# Patient Record
Sex: FEMALE | Race: WHITE | NOT HISPANIC OR LATINO | Employment: OTHER | URBAN - METROPOLITAN AREA
[De-identification: names, ages, dates, MRNs, and addresses within clinical notes are randomized per-mention and may not be internally consistent; named-entity substitution may affect disease eponyms.]

---

## 2017-01-04 ENCOUNTER — DOCUMENTATION (OUTPATIENT)
Dept: BARIATRICS/WEIGHT MGMT | Facility: HOSPITAL | Age: 62
End: 2017-01-04

## 2017-01-04 NOTE — PROGRESS NOTES
Weight Loss Surgery  Presurgical Nutrition Assessment     Jessica Flowers  12/[birth year]16  [id number]  [id number]  1955  female    Surgery desired: Sleeve Gastrectomy    Vital Signs:  Weight: 242 lb 8 oz (110 kg)   Body mass index is 45.82 kg/(m^2).  Past Medical History   Diagnosis Date   • Allergic rhinitis      uses inhalers prn, denies asthma   • Anxiety    • Carpal tunnel syndrome    • Depression    • Dyspnea on exertion    • Fatigue    • GERD (gastroesophageal reflux disease)      on Prilosec + BID Zantac   • Glucose intolerance (impaired glucose tolerance)    • Hx MRSA infection      2010 on abdomen, tx w/ Bactrim   • Hyperlipidemia    • Hypertension    • Hypothyroid    • Insomnia      uses Trazodone   • Joint pain    • Morbid obesity    • OAB (overactive bladder)      tx w/ botox injections   • Plantar fasciitis    • Postmenopausal HRT (hormone replacement therapy)    • Psoriasis    • Vitamin D deficiency      Past Surgical History   Procedure Laterality Date   • Appendectomy  1989     emergent, open   • Laparoscopic tubal ligation  1988   • Dilatation and curettage  1990, 2015   • Carpal tunnel release       (B)   • Cholecystectomy open  1980     gallstone   • Other surgical history       denies anesthesia issues     No Known Allergies    Current Outpatient Prescriptions:   •  albuterol (PROVENTIL HFA;VENTOLIN HFA) 108 (90 BASE) MCG/ACT inhaler, Inhale 2 puffs Every 4 (Four) Hours As Needed for wheezing., Disp: , Rfl:   •  benazepril (LOTENSIN) 20 MG tablet, Take 20 mg by mouth Daily., Disp: , Rfl:   •  estradiol (ESTRACE) 1 MG tablet, Take 1 mg by mouth Daily., Disp: , Rfl:   •  FLUoxetine (PROzac) 20 MG capsule, Take 20 mg by mouth Daily., Disp: , Rfl:   •  gabapentin (NEURONTIN) 800 MG tablet, 800 mg 4 (Four) Times a Day., Disp: , Rfl: 0  •  HYDROcodone-acetaminophen (NORCO) 7.5-325 MG per tablet, take 1 tablet by mouth every 6 hours if needed for pain, Disp: , Rfl: 0  •  ibuprofen (ADVIL,MOTRIN) 800 MG  tablet, Take 800 mg by mouth Every 6 (Six) Hours As Needed for mild pain (1-3)., Disp: , Rfl:   •  ipratropium (ATROVENT) 0.03 % nasal spray, 2 sprays into each nostril Every 12 (Twelve) Hours., Disp: , Rfl:   •  levothyroxine (SYNTHROID, LEVOTHROID) 100 MCG tablet, Take 100 mcg by mouth Daily., Disp: , Rfl:   •  medroxyPROGESTERone (PROVERA) 10 MG tablet, Take 10 mg by mouth Daily., Disp: , Rfl:   •  meloxicam (MOBIC) 15 MG tablet, Take 15 mg by mouth Daily., Disp: , Rfl:   •  montelukast (SINGULAIR) 10 MG tablet, Take 10 mg by mouth Every Night., Disp: , Rfl:   •  omeprazole (priLOSEC) 40 MG capsule, Take 40 mg by mouth Daily., Disp: , Rfl:   •  phentermine (ADIPEX-P) 37.5 MG tablet, take 1 tablet by mouth once daily, Disp: , Rfl: 0  •  pravastatin (PRAVACHOL) 40 MG tablet, Take 40 mg by mouth Daily., Disp: , Rfl:   •  RA VITAMIN D-3 5000 UNITS capsule, 5,000 Units Daily., Disp: , Rfl: 0  •  raNITIdine (ZANTAC) 150 MG tablet, take 1 tablet by mouth twice a day, Disp: , Rfl: 0  •  traMADol (ULTRAM) 50 MG tablet, 50 mg 2 (Two) Times a Day., Disp: , Rfl: 0  •  traZODone (DESYREL) 100 MG tablet, Take 100 mg by mouth Every Night., Disp: , Rfl:       Nutrition Assessment    Estimated energy needs: 1800    Estimated calories for weight loss: 1400    IBW:  165      Excess body weight: 77       Nutrition Recall  24 Hour recall: (B) (L) (D) -  Reviewed and discussed with patient     Exercise  Unable to exercise due to knee pain.      Education    Provided manual:    Sleeve Gastrectomy    Recommend that team proceed with surgery and follow per protocol.      Nutrition Goals   Dietary Guidelines per manual  Protein goal:  grams per day     Exercise Goals  Add 15-30 minutes of activity per day as tolerated    Discussion:        Dayami Russell RD  01/04/2017  10:06 AM

## 2017-01-17 DIAGNOSIS — R06.09 DYSPNEA ON EXERTION: ICD-10-CM

## 2017-01-17 DIAGNOSIS — R53.83 FATIGUE, UNSPECIFIED TYPE: ICD-10-CM

## 2017-01-18 ENCOUNTER — HOSPITAL ENCOUNTER (OUTPATIENT)
Dept: OTHER | Age: 62
Discharge: HOME | End: 2017-01-18

## 2017-01-18 LAB
A/G RATIO: 1.6 (ref 0.8–2)
ALBUMIN SERPL-MCNC: 4 G/DL (ref 3.4–4.8)
ALP BLD-CCNC: 49 U/L (ref 25–100)
ALT SERPL-CCNC: 20 U/L (ref 4–36)
ANION GAP SERPL CALCULATED.3IONS-SCNC: 13 MMOL/L (ref 3–16)
AST SERPL-CCNC: 16 U/L (ref 8–33)
BILIRUB SERPL-MCNC: 0.5 MG/DL (ref 0.3–1.2)
BUN BLDV-MCNC: 24 MG/DL (ref 6–20)
CALCIUM SERPL-MCNC: 9.3 MG/DL (ref 8.5–10.5)
CHLORIDE BLD-SCNC: 105 MMOL/L (ref 98–107)
CO2: 24 MMOL/L (ref 20–30)
CREAT SERPL-MCNC: 0.8 MG/DL (ref 0.4–1.2)
GFR AFRICAN AMERICAN: >59
GFR NON-AFRICAN AMERICAN: >60
GLOBULIN: 2.5 G/DL
GLUCOSE BLD-MCNC: 114 MG/DL (ref 74–106)
HCT VFR BLD CALC: 39.6 % (ref 37–47)
HEMOGLOBIN: 12.8 G/DL (ref 11.5–16.5)
MCH RBC QN AUTO: 30.6 PG (ref 27–32)
MCHC RBC AUTO-ENTMCNC: 32.3 G/DL (ref 31–35)
MCV RBC AUTO: 94.7 FL (ref 80–100)
PDW BLD-RTO: 12.9 % (ref 11–16)
PLATELET # BLD: 296 K/UL (ref 150–400)
PMV BLD AUTO: 10.8 FL (ref 6–10)
POTASSIUM SERPL-SCNC: 4.5 MMOL/L (ref 3.4–5.1)
RBC # BLD: 4.18 M/UL (ref 3.8–5.8)
SODIUM BLD-SCNC: 142 MMOL/L (ref 136–145)
TOTAL PROTEIN: 6.5 G/DL (ref 6.4–8.3)
WBC # BLD: 6.8 K/UL (ref 4–11)

## 2017-01-23 PROCEDURE — 88305 TISSUE EXAM BY PATHOLOGIST: CPT | Performed by: SURGERY

## 2017-01-24 ENCOUNTER — LAB REQUISITION (OUTPATIENT)
Dept: LAB | Facility: HOSPITAL | Age: 62
End: 2017-01-24

## 2017-01-24 DIAGNOSIS — R06.00 DYSPNEA: ICD-10-CM

## 2017-01-25 LAB
CYTO UR: NORMAL
LAB AP CASE REPORT: NORMAL
LAB AP CLINICAL INFORMATION: NORMAL
Lab: NORMAL
PATH REPORT.FINAL DX SPEC: NORMAL
PATH REPORT.GROSS SPEC: NORMAL

## 2017-03-16 ENCOUNTER — HOSPITAL ENCOUNTER (OUTPATIENT)
Dept: OTHER | Age: 62
Discharge: HOME | End: 2017-03-16
Attending: UROLOGY | Admitting: UROLOGY

## 2017-03-16 DIAGNOSIS — N32.81 OVERACTIVE BLADDER: ICD-10-CM

## 2017-03-16 LAB
A/G RATIO: 1.9 (ref 0.8–2)
ALBUMIN SERPL-MCNC: 4.4 G/DL (ref 3.4–4.8)
ALP BLD-CCNC: 55 U/L (ref 25–100)
ALT SERPL-CCNC: 15 U/L (ref 4–36)
ANION GAP SERPL CALCULATED.3IONS-SCNC: 13 MMOL/L (ref 3–16)
AST SERPL-CCNC: 13 U/L (ref 8–33)
BILIRUB SERPL-MCNC: 0.6 MG/DL (ref 0.3–1.2)
BILIRUBIN URINE: NEGATIVE
BLOOD, URINE: NEGATIVE
BUN BLDV-MCNC: 22 MG/DL (ref 6–20)
CALCIUM SERPL-MCNC: 9.6 MG/DL (ref 8.5–10.5)
CHLORIDE BLD-SCNC: 103 MMOL/L (ref 98–107)
CLARITY: CLEAR
CO2: 26 MMOL/L (ref 20–30)
COLOR: YELLOW
CREAT SERPL-MCNC: 0.8 MG/DL (ref 0.4–1.2)
GFR AFRICAN AMERICAN: >59
GFR NON-AFRICAN AMERICAN: >60
GLOBULIN: 2.3 G/DL
GLUCOSE BLD-MCNC: 110 MG/DL (ref 74–106)
GLUCOSE URINE: NEGATIVE MG/DL
KETONES, URINE: NEGATIVE MG/DL
LEUKOCYTE ESTERASE, URINE: NEGATIVE
MICROSCOPIC EXAMINATION: NORMAL
NITRITE, URINE: NEGATIVE
PH UA: 5
POTASSIUM SERPL-SCNC: 4.5 MMOL/L (ref 3.4–5.1)
PROTEIN UA: NEGATIVE MG/DL
SODIUM BLD-SCNC: 142 MMOL/L (ref 136–145)
SPECIFIC GRAVITY UA: >=1.03
TOTAL PROTEIN: 6.7 G/DL (ref 6.4–8.3)
URINE TYPE: NORMAL
UROBILINOGEN, URINE: 0.2 E.U./DL

## 2017-03-18 LAB — URINE CULTURE, ROUTINE: NORMAL

## 2017-05-19 DIAGNOSIS — R10.13 DYSPEPSIA: ICD-10-CM

## 2017-05-19 DIAGNOSIS — R06.00 DYSPNEA, UNSPECIFIED TYPE: Primary | ICD-10-CM

## 2017-05-19 DIAGNOSIS — R53.83 FATIGUE, UNSPECIFIED TYPE: ICD-10-CM

## 2017-05-25 ENCOUNTER — HOSPITAL ENCOUNTER (OUTPATIENT)
Dept: OTHER | Age: 62
Discharge: HOME | End: 2017-05-25

## 2017-05-25 DIAGNOSIS — R06.03 ACUTE RESPIRATORY DISTRESS: ICD-10-CM

## 2017-05-25 LAB
A/G RATIO: 1.9 (ref 0.8–2)
ALBUMIN SERPL-MCNC: 4.2 G/DL (ref 3.4–4.8)
ALP BLD-CCNC: 53 U/L (ref 25–100)
ALT SERPL-CCNC: 12 U/L (ref 4–36)
ANION GAP SERPL CALCULATED.3IONS-SCNC: 14 MMOL/L (ref 3–16)
AST SERPL-CCNC: 11 U/L (ref 8–33)
BILIRUB SERPL-MCNC: 0.7 MG/DL (ref 0.3–1.2)
BUN BLDV-MCNC: 21 MG/DL (ref 6–20)
CALCIUM SERPL-MCNC: 9.5 MG/DL (ref 8.5–10.5)
CHLORIDE BLD-SCNC: 103 MMOL/L (ref 98–107)
CO2: 24 MMOL/L (ref 20–30)
CREAT SERPL-MCNC: 0.7 MG/DL (ref 0.4–1.2)
GFR AFRICAN AMERICAN: >59
GFR NON-AFRICAN AMERICAN: >60
GLOBULIN: 2.2 G/DL
GLUCOSE BLD-MCNC: 95 MG/DL (ref 74–106)
HCT VFR BLD CALC: 38.2 % (ref 37–47)
HEMOGLOBIN: 12.9 G/DL (ref 11.5–16.5)
MCH RBC QN AUTO: 31.9 PG (ref 27–32)
MCHC RBC AUTO-ENTMCNC: 33.8 G/DL (ref 31–35)
MCV RBC AUTO: 94.3 FL (ref 80–100)
PDW BLD-RTO: 12.4 % (ref 11–16)
PLATELET # BLD: 276 K/UL (ref 150–400)
PMV BLD AUTO: 11.1 FL (ref 6–10)
POTASSIUM SERPL-SCNC: 4.6 MMOL/L (ref 3.4–5.1)
RBC # BLD: 4.05 M/UL (ref 3.8–5.8)
SODIUM BLD-SCNC: 141 MMOL/L (ref 136–145)
TOTAL PROTEIN: 6.4 G/DL (ref 6.4–8.3)
WBC # BLD: 7.9 K/UL (ref 4–11)

## 2017-05-30 ENCOUNTER — CONSULT (OUTPATIENT)
Dept: BARIATRICS/WEIGHT MGMT | Facility: CLINIC | Age: 62
End: 2017-05-30

## 2017-05-30 VITALS
OXYGEN SATURATION: 99 % | SYSTOLIC BLOOD PRESSURE: 118 MMHG | BODY MASS INDEX: 38.42 KG/M2 | TEMPERATURE: 97.8 F | HEART RATE: 84 BPM | DIASTOLIC BLOOD PRESSURE: 82 MMHG | HEIGHT: 61 IN | RESPIRATION RATE: 18 BRPM | WEIGHT: 203.5 LBS

## 2017-05-30 DIAGNOSIS — E66.01 MORBID OBESITY DUE TO EXCESS CALORIES (HCC): Primary | ICD-10-CM

## 2017-05-30 PROCEDURE — 99215 OFFICE O/P EST HI 40 MIN: CPT | Performed by: SURGERY

## 2017-05-30 PROCEDURE — 99407 BEHAV CHNG SMOKING > 10 MIN: CPT | Performed by: SURGERY

## 2017-05-30 RX ORDER — CHLORHEXIDINE GLUCONATE 0.12 MG/ML
15 RINSE ORAL ONCE
Status: CANCELLED | OUTPATIENT
Start: 2017-05-30

## 2017-05-30 RX ORDER — SCOLOPAMINE TRANSDERMAL SYSTEM 1 MG/1
1 PATCH, EXTENDED RELEASE TRANSDERMAL ONCE
Status: CANCELLED | OUTPATIENT
Start: 2017-05-30 | End: 2017-05-30

## 2017-05-30 RX ORDER — SODIUM CHLORIDE, SODIUM LACTATE, POTASSIUM CHLORIDE, CALCIUM CHLORIDE 600; 310; 30; 20 MG/100ML; MG/100ML; MG/100ML; MG/100ML
150 INJECTION, SOLUTION INTRAVENOUS CONTINUOUS
Status: CANCELLED | OUTPATIENT
Start: 2017-05-30

## 2017-05-30 RX ORDER — PANTOPRAZOLE SODIUM 40 MG/10ML
40 INJECTION, POWDER, LYOPHILIZED, FOR SOLUTION INTRAVENOUS ONCE
Status: CANCELLED | OUTPATIENT
Start: 2017-05-30 | End: 2017-05-30

## 2017-05-30 RX ORDER — SODIUM CHLORIDE 0.9 % (FLUSH) 0.9 %
1-10 SYRINGE (ML) INJECTION AS NEEDED
Status: CANCELLED | OUTPATIENT
Start: 2017-05-30

## 2017-05-30 RX ORDER — ACETAMINOPHEN 325 MG/1
650 TABLET ORAL EVERY 6 HOURS PRN
COMMUNITY
End: 2017-10-31

## 2017-05-30 RX ORDER — ACETAMINOPHEN 10 MG/ML
1000 INJECTION, SOLUTION INTRAVENOUS ONCE
Status: CANCELLED | OUTPATIENT
Start: 2017-05-30 | End: 2017-05-30

## 2017-05-31 ENCOUNTER — HOSPITAL ENCOUNTER (OUTPATIENT)
Dept: GENERAL RADIOLOGY | Age: 62
Discharge: HOME | End: 2017-05-31

## 2017-05-31 DIAGNOSIS — Z12.39 BREAST CANCER SCREENING: ICD-10-CM

## 2017-06-30 PROCEDURE — S0260 H&P FOR SURGERY: HCPCS | Performed by: PHYSICIAN ASSISTANT

## 2017-07-02 ENCOUNTER — APPOINTMENT (OUTPATIENT)
Dept: PREADMISSION TESTING | Facility: HOSPITAL | Age: 62
End: 2017-07-02

## 2017-07-02 VITALS — HEIGHT: 61 IN | BODY MASS INDEX: 38.92 KG/M2 | WEIGHT: 206.13 LBS

## 2017-07-02 LAB
DEPRECATED RDW RBC AUTO: 45.6 FL (ref 37–54)
ERYTHROCYTE [DISTWIDTH] IN BLOOD BY AUTOMATED COUNT: 12.6 % (ref 11.3–14.5)
HBA1C MFR BLD: 5.6 % (ref 4.8–5.6)
HCT VFR BLD AUTO: 40.5 % (ref 34.5–44)
HGB BLD-MCNC: 12.9 G/DL (ref 11.5–15.5)
MCH RBC QN AUTO: 31.4 PG (ref 27–31)
MCHC RBC AUTO-ENTMCNC: 31.9 G/DL (ref 32–36)
MCV RBC AUTO: 98.5 FL (ref 80–99)
PLATELET # BLD AUTO: 278 10*3/MM3 (ref 150–450)
PMV BLD AUTO: 10.8 FL (ref 6–12)
POTASSIUM BLD-SCNC: 4.1 MMOL/L (ref 3.5–5.5)
RBC # BLD AUTO: 4.11 10*6/MM3 (ref 3.89–5.14)
WBC NRBC COR # BLD: 11.47 10*3/MM3 (ref 3.5–10.8)

## 2017-07-02 RX ORDER — OXYBUTYNIN CHLORIDE 10 MG/1
10 TABLET, EXTENDED RELEASE ORAL DAILY
Status: ON HOLD | COMMUNITY
End: 2023-03-25

## 2017-07-02 RX ORDER — LANSOPRAZOLE 30 MG/1
30 CAPSULE, DELAYED RELEASE ORAL DAILY
COMMUNITY
End: 2018-01-03

## 2017-07-02 RX ORDER — IPRATROPIUM BROMIDE 21 UG/1
2 SPRAY, METERED NASAL EVERY 12 HOURS
Status: ON HOLD | COMMUNITY
End: 2023-03-28

## 2017-07-04 LAB — MRSA SPEC QL CULT: NORMAL

## 2017-07-05 ENCOUNTER — ANESTHESIA (OUTPATIENT)
Dept: PERIOP | Facility: HOSPITAL | Age: 62
End: 2017-07-05

## 2017-07-05 ENCOUNTER — ANESTHESIA EVENT (OUTPATIENT)
Dept: PERIOP | Facility: HOSPITAL | Age: 62
End: 2017-07-05

## 2017-07-05 ENCOUNTER — HOSPITAL ENCOUNTER (INPATIENT)
Facility: HOSPITAL | Age: 62
LOS: 1 days | Discharge: HOME | End: 2017-07-06
Attending: SURGERY | Admitting: SURGERY

## 2017-07-05 DIAGNOSIS — E66.01 MORBID OBESITY DUE TO EXCESS CALORIES (HCC): ICD-10-CM

## 2017-07-05 PROCEDURE — 88307 TISSUE EXAM BY PATHOLOGIST: CPT | Performed by: SURGERY

## 2017-07-05 PROCEDURE — 25010000002 DEXAMETHASONE PER 1 MG: Performed by: NURSE ANESTHETIST, CERTIFIED REGISTERED

## 2017-07-05 PROCEDURE — 25010000002 ONDANSETRON PER 1 MG: Performed by: NURSE ANESTHETIST, CERTIFIED REGISTERED

## 2017-07-05 PROCEDURE — 0BQS4ZZ REPAIR LEFT DIAPHRAGM, PERCUTANEOUS ENDOSCOPIC APPROACH: ICD-10-PCS | Performed by: SURGERY

## 2017-07-05 PROCEDURE — 25010000002 BUPRENORPHINE PER 0.1 MG: Performed by: NURSE ANESTHETIST, CERTIFIED REGISTERED

## 2017-07-05 PROCEDURE — 25010000002 PROMETHAZINE PER 50 MG: Performed by: ANESTHESIOLOGY

## 2017-07-05 PROCEDURE — 25010000002 MIDAZOLAM PER 1 MG: Performed by: NURSE ANESTHETIST, CERTIFIED REGISTERED

## 2017-07-05 PROCEDURE — 25010000002 ENOXAPARIN PER 10 MG: Performed by: SURGERY

## 2017-07-05 PROCEDURE — 25010000003 CEFAZOLIN IN DEXTROSE 2-4 GM/100ML-% SOLUTION: Performed by: SURGERY

## 2017-07-05 PROCEDURE — 43775 LAP SLEEVE GASTRECTOMY: CPT | Performed by: SURGERY

## 2017-07-05 PROCEDURE — 25010000002 FENTANYL CITRATE (PF) 100 MCG/2ML SOLUTION: Performed by: NURSE ANESTHETIST, CERTIFIED REGISTERED

## 2017-07-05 PROCEDURE — 0BQR4ZZ REPAIR RIGHT DIAPHRAGM, PERCUTANEOUS ENDOSCOPIC APPROACH: ICD-10-PCS | Performed by: SURGERY

## 2017-07-05 PROCEDURE — 25010000002 DEXAMETHASONE SODIUM PHOSPHATE 10 MG/ML SOLUTION 1 ML VIAL: Performed by: NURSE ANESTHETIST, CERTIFIED REGISTERED

## 2017-07-05 PROCEDURE — 25010000002 NEOSTIGMINE PER 0.5 MG: Performed by: NURSE ANESTHETIST, CERTIFIED REGISTERED

## 2017-07-05 PROCEDURE — 25010000002 PROPOFOL 10 MG/ML EMULSION: Performed by: NURSE ANESTHETIST, CERTIFIED REGISTERED

## 2017-07-05 PROCEDURE — 94799 UNLISTED PULMONARY SVC/PX: CPT

## 2017-07-05 PROCEDURE — 0DB64Z3 EXCISION OF STOMACH, PERCUTANEOUS ENDOSCOPIC APPROACH, VERTICAL: ICD-10-PCS | Performed by: SURGERY

## 2017-07-05 DEVICE — PERI-STRIPS DRY WITH VERITAS COLLAGEN MATRIX (PSD-V) IS PREPARED FROM DEHYDRATED BOVINE PERICARDIUM PROCURED FROM CATTLE UNDER 30 MONTHS OF AGE IN THE UNITED STATES. ONE (1) TUBE OF PSD GEL (GEL) IS PROVIDED FOR EVERY TWO (2) POUCHES OF PSD-V. THE GEL IS USED TO CREATE A TEMPORARY BOND BETWEEN THE PSD-V BUTTRESS AND THE SURGICAL STAPLER JAWS UNTIL THE STAPLER IS POSITIONED AND FIRED.
Type: IMPLANTABLE DEVICE | Site: STOMACH | Status: FUNCTIONAL
Brand: PERI-STRIPS DRY WITH VERITAS COLLAGEN MATRIX

## 2017-07-05 DEVICE — SEALANT FIBRIN TISSEEL FZ 4ML: Type: IMPLANTABLE DEVICE | Site: STOMACH | Status: FUNCTIONAL

## 2017-07-05 RX ORDER — METOCLOPRAMIDE HYDROCHLORIDE 5 MG/ML
10 INJECTION INTRAMUSCULAR; INTRAVENOUS EVERY 6 HOURS PRN
Status: DISCONTINUED | OUTPATIENT
Start: 2017-07-05 | End: 2017-07-06 | Stop reason: HOSPADM

## 2017-07-05 RX ORDER — MORPHINE SULFATE 10 MG/ML
6 INJECTION INTRAMUSCULAR; INTRAVENOUS; SUBCUTANEOUS
Status: DISCONTINUED | OUTPATIENT
Start: 2017-07-05 | End: 2017-07-06 | Stop reason: HOSPADM

## 2017-07-05 RX ORDER — ONDANSETRON 2 MG/ML
INJECTION INTRAMUSCULAR; INTRAVENOUS AS NEEDED
Status: DISCONTINUED | OUTPATIENT
Start: 2017-07-05 | End: 2017-07-05 | Stop reason: SURG

## 2017-07-05 RX ORDER — PROMETHAZINE HYDROCHLORIDE 25 MG/ML
6.25 INJECTION, SOLUTION INTRAMUSCULAR; INTRAVENOUS ONCE
Status: COMPLETED | OUTPATIENT
Start: 2017-07-05 | End: 2017-07-05

## 2017-07-05 RX ORDER — PROPOFOL 10 MG/ML
VIAL (ML) INTRAVENOUS CONTINUOUS PRN
Status: DISCONTINUED | OUTPATIENT
Start: 2017-07-05 | End: 2017-07-05 | Stop reason: SURG

## 2017-07-05 RX ORDER — FAMOTIDINE 20 MG/1
20 TABLET, FILM COATED ORAL ONCE
Status: CANCELLED | OUTPATIENT
Start: 2017-07-05 | End: 2017-07-05

## 2017-07-05 RX ORDER — TRAZODONE HYDROCHLORIDE 100 MG/1
100 TABLET ORAL NIGHTLY
Status: DISCONTINUED | OUTPATIENT
Start: 2017-07-05 | End: 2017-07-06 | Stop reason: HOSPADM

## 2017-07-05 RX ORDER — IPRATROPIUM BROMIDE 21 UG/1
2 SPRAY, METERED NASAL EVERY 12 HOURS SCHEDULED
Status: DISCONTINUED | OUTPATIENT
Start: 2017-07-05 | End: 2017-07-06 | Stop reason: HOSPADM

## 2017-07-05 RX ORDER — OXYBUTYNIN CHLORIDE 10 MG/1
10 TABLET, EXTENDED RELEASE ORAL DAILY
Status: DISCONTINUED | OUTPATIENT
Start: 2017-07-06 | End: 2017-07-06 | Stop reason: HOSPADM

## 2017-07-05 RX ORDER — NALOXONE HCL 0.4 MG/ML
0.4 VIAL (ML) INJECTION
Status: DISCONTINUED | OUTPATIENT
Start: 2017-07-05 | End: 2017-07-06 | Stop reason: HOSPADM

## 2017-07-05 RX ORDER — DIPHENHYDRAMINE HYDROCHLORIDE 50 MG/ML
25 INJECTION INTRAMUSCULAR; INTRAVENOUS EVERY 4 HOURS PRN
Status: DISCONTINUED | OUTPATIENT
Start: 2017-07-05 | End: 2017-07-06 | Stop reason: HOSPADM

## 2017-07-05 RX ORDER — BENAZEPRIL HYDROCHLORIDE 20 MG/1
20 TABLET ORAL DAILY
Status: DISCONTINUED | OUTPATIENT
Start: 2017-07-06 | End: 2017-07-06 | Stop reason: HOSPADM

## 2017-07-05 RX ORDER — SODIUM CHLORIDE, SODIUM LACTATE, POTASSIUM CHLORIDE, CALCIUM CHLORIDE 600; 310; 30; 20 MG/100ML; MG/100ML; MG/100ML; MG/100ML
150 INJECTION, SOLUTION INTRAVENOUS CONTINUOUS
Status: DISCONTINUED | OUTPATIENT
Start: 2017-07-05 | End: 2017-07-06 | Stop reason: HOSPADM

## 2017-07-05 RX ORDER — LORAZEPAM 2 MG/ML
0.5 INJECTION INTRAMUSCULAR EVERY 12 HOURS PRN
Status: DISCONTINUED | OUTPATIENT
Start: 2017-07-05 | End: 2017-07-06 | Stop reason: HOSPADM

## 2017-07-05 RX ORDER — ATORVASTATIN CALCIUM 10 MG/1
10 TABLET, FILM COATED ORAL DAILY
Status: DISCONTINUED | OUTPATIENT
Start: 2017-07-05 | End: 2017-07-06 | Stop reason: HOSPADM

## 2017-07-05 RX ORDER — SIMETHICONE 80 MG
80 TABLET,CHEWABLE ORAL 4 TIMES DAILY PRN
Status: DISCONTINUED | OUTPATIENT
Start: 2017-07-05 | End: 2017-07-06 | Stop reason: HOSPADM

## 2017-07-05 RX ORDER — SODIUM CHLORIDE AND POTASSIUM CHLORIDE 150; 450 MG/100ML; MG/100ML
125 INJECTION, SOLUTION INTRAVENOUS CONTINUOUS
Status: DISCONTINUED | OUTPATIENT
Start: 2017-07-06 | End: 2017-07-06 | Stop reason: HOSPADM

## 2017-07-05 RX ORDER — SCOLOPAMINE TRANSDERMAL SYSTEM 1 MG/1
1 PATCH, EXTENDED RELEASE TRANSDERMAL ONCE
Status: DISCONTINUED | OUTPATIENT
Start: 2017-07-05 | End: 2017-07-05

## 2017-07-05 RX ORDER — CEFAZOLIN SODIUM 2 G/100ML
2 INJECTION, SOLUTION INTRAVENOUS EVERY 8 HOURS
Status: COMPLETED | OUTPATIENT
Start: 2017-07-05 | End: 2017-07-06

## 2017-07-05 RX ORDER — ONDANSETRON 2 MG/ML
4 INJECTION INTRAMUSCULAR; INTRAVENOUS ONCE AS NEEDED
Status: COMPLETED | OUTPATIENT
Start: 2017-07-05 | End: 2017-07-05

## 2017-07-05 RX ORDER — SODIUM CHLORIDE, SODIUM LACTATE, POTASSIUM CHLORIDE, CALCIUM CHLORIDE 600; 310; 30; 20 MG/100ML; MG/100ML; MG/100ML; MG/100ML
150 INJECTION, SOLUTION INTRAVENOUS CONTINUOUS
Status: DISCONTINUED | OUTPATIENT
Start: 2017-07-05 | End: 2017-07-05 | Stop reason: HOSPADM

## 2017-07-05 RX ORDER — FENTANYL CITRATE 50 UG/ML
INJECTION, SOLUTION INTRAMUSCULAR; INTRAVENOUS AS NEEDED
Status: DISCONTINUED | OUTPATIENT
Start: 2017-07-05 | End: 2017-07-05 | Stop reason: SURG

## 2017-07-05 RX ORDER — ACETAMINOPHEN 10 MG/ML
1000 INJECTION, SOLUTION INTRAVENOUS EVERY 6 HOURS
Status: COMPLETED | OUTPATIENT
Start: 2017-07-05 | End: 2017-07-06

## 2017-07-05 RX ORDER — LEVOTHYROXINE SODIUM 0.05 MG/1
50 TABLET ORAL DAILY
Status: DISCONTINUED | OUTPATIENT
Start: 2017-07-05 | End: 2017-07-06 | Stop reason: HOSPADM

## 2017-07-05 RX ORDER — HYDROMORPHONE HYDROCHLORIDE 2 MG/1
2 TABLET ORAL EVERY 4 HOURS PRN
Status: DISCONTINUED | OUTPATIENT
Start: 2017-07-05 | End: 2017-07-06 | Stop reason: HOSPADM

## 2017-07-05 RX ORDER — BUPIVACAINE HYDROCHLORIDE AND EPINEPHRINE 2.5; 5 MG/ML; UG/ML
INJECTION, SOLUTION EPIDURAL; INFILTRATION; INTRACAUDAL; PERINEURAL AS NEEDED
Status: DISCONTINUED | OUTPATIENT
Start: 2017-07-05 | End: 2017-07-05 | Stop reason: HOSPADM

## 2017-07-05 RX ORDER — SODIUM CHLORIDE 0.9 % (FLUSH) 0.9 %
1-10 SYRINGE (ML) INJECTION AS NEEDED
Status: DISCONTINUED | OUTPATIENT
Start: 2017-07-05 | End: 2017-07-05 | Stop reason: HOSPADM

## 2017-07-05 RX ORDER — LORAZEPAM 1 MG/1
1 TABLET ORAL EVERY 12 HOURS PRN
Status: DISCONTINUED | OUTPATIENT
Start: 2017-07-05 | End: 2017-07-06 | Stop reason: HOSPADM

## 2017-07-05 RX ORDER — LIDOCAINE HYDROCHLORIDE 10 MG/ML
INJECTION, SOLUTION INFILTRATION; PERINEURAL AS NEEDED
Status: DISCONTINUED | OUTPATIENT
Start: 2017-07-05 | End: 2017-07-05 | Stop reason: SURG

## 2017-07-05 RX ORDER — PANTOPRAZOLE SODIUM 40 MG/10ML
40 INJECTION, POWDER, LYOPHILIZED, FOR SOLUTION INTRAVENOUS ONCE
Status: COMPLETED | OUTPATIENT
Start: 2017-07-05 | End: 2017-07-05

## 2017-07-05 RX ORDER — GABAPENTIN 400 MG/1
800 CAPSULE ORAL EVERY 6 HOURS PRN
Status: DISCONTINUED | OUTPATIENT
Start: 2017-07-05 | End: 2017-07-06 | Stop reason: HOSPADM

## 2017-07-05 RX ORDER — SODIUM CHLORIDE, SODIUM LACTATE, POTASSIUM CHLORIDE, CALCIUM CHLORIDE 600; 310; 30; 20 MG/100ML; MG/100ML; MG/100ML; MG/100ML
9 INJECTION, SOLUTION INTRAVENOUS CONTINUOUS
Status: CANCELLED | OUTPATIENT
Start: 2017-07-05

## 2017-07-05 RX ORDER — FLUOXETINE HYDROCHLORIDE 20 MG/1
40 CAPSULE ORAL DAILY
Status: DISCONTINUED | OUTPATIENT
Start: 2017-07-05 | End: 2017-07-06 | Stop reason: HOSPADM

## 2017-07-05 RX ORDER — MORPHINE SULFATE 10 MG/ML
4 INJECTION INTRAMUSCULAR; INTRAVENOUS; SUBCUTANEOUS
Status: DISCONTINUED | OUTPATIENT
Start: 2017-07-05 | End: 2017-07-06 | Stop reason: HOSPADM

## 2017-07-05 RX ORDER — CLONIDINE HYDROCHLORIDE 0.1 MG/1
0.1 TABLET ORAL EVERY 6 HOURS PRN
Status: DISCONTINUED | OUTPATIENT
Start: 2017-07-05 | End: 2017-07-06 | Stop reason: HOSPADM

## 2017-07-05 RX ORDER — PANTOPRAZOLE SODIUM 40 MG/10ML
40 INJECTION, POWDER, LYOPHILIZED, FOR SOLUTION INTRAVENOUS
Status: DISCONTINUED | OUTPATIENT
Start: 2017-07-06 | End: 2017-07-06 | Stop reason: HOSPADM

## 2017-07-05 RX ORDER — FENTANYL CITRATE 50 UG/ML
50 INJECTION, SOLUTION INTRAMUSCULAR; INTRAVENOUS
Status: DISCONTINUED | OUTPATIENT
Start: 2017-07-05 | End: 2017-07-05 | Stop reason: HOSPADM

## 2017-07-05 RX ORDER — SODIUM CHLORIDE 9 MG/ML
INJECTION, SOLUTION INTRAVENOUS AS NEEDED
Status: DISCONTINUED | OUTPATIENT
Start: 2017-07-05 | End: 2017-07-05 | Stop reason: HOSPADM

## 2017-07-05 RX ORDER — PROPOFOL 10 MG/ML
VIAL (ML) INTRAVENOUS AS NEEDED
Status: DISCONTINUED | OUTPATIENT
Start: 2017-07-05 | End: 2017-07-05 | Stop reason: SURG

## 2017-07-05 RX ORDER — ATRACURIUM BESYLATE 10 MG/ML
INJECTION, SOLUTION INTRAVENOUS AS NEEDED
Status: DISCONTINUED | OUTPATIENT
Start: 2017-07-05 | End: 2017-07-05 | Stop reason: SURG

## 2017-07-05 RX ORDER — CHLORHEXIDINE GLUCONATE 0.12 MG/ML
15 RINSE ORAL ONCE
Status: COMPLETED | OUTPATIENT
Start: 2017-07-05 | End: 2017-07-05

## 2017-07-05 RX ORDER — ONDANSETRON 2 MG/ML
4 INJECTION INTRAMUSCULAR; INTRAVENOUS EVERY 6 HOURS PRN
Status: DISCONTINUED | OUTPATIENT
Start: 2017-07-05 | End: 2017-07-06 | Stop reason: HOSPADM

## 2017-07-05 RX ORDER — MIDAZOLAM HYDROCHLORIDE 1 MG/ML
INJECTION INTRAMUSCULAR; INTRAVENOUS AS NEEDED
Status: DISCONTINUED | OUTPATIENT
Start: 2017-07-05 | End: 2017-07-05 | Stop reason: SURG

## 2017-07-05 RX ORDER — LIDOCAINE HYDROCHLORIDE 10 MG/ML
0.5 INJECTION, SOLUTION EPIDURAL; INFILTRATION; INTRACAUDAL; PERINEURAL ONCE AS NEEDED
Status: COMPLETED | OUTPATIENT
Start: 2017-07-05 | End: 2017-07-05

## 2017-07-05 RX ORDER — FAMOTIDINE 10 MG/ML
20 INJECTION, SOLUTION INTRAVENOUS ONCE
Status: CANCELLED | OUTPATIENT
Start: 2017-07-05 | End: 2017-07-05

## 2017-07-05 RX ORDER — CYANOCOBALAMIN 1000 UG/ML
1000 INJECTION, SOLUTION INTRAMUSCULAR; SUBCUTANEOUS ONCE
Status: COMPLETED | OUTPATIENT
Start: 2017-07-06 | End: 2017-07-06

## 2017-07-05 RX ORDER — ONDANSETRON 4 MG/1
4 TABLET, FILM COATED ORAL EVERY 6 HOURS PRN
Status: DISCONTINUED | OUTPATIENT
Start: 2017-07-05 | End: 2017-07-06 | Stop reason: HOSPADM

## 2017-07-05 RX ORDER — GLYCOPYRROLATE 0.2 MG/ML
INJECTION INTRAMUSCULAR; INTRAVENOUS AS NEEDED
Status: DISCONTINUED | OUTPATIENT
Start: 2017-07-05 | End: 2017-07-05 | Stop reason: SURG

## 2017-07-05 RX ORDER — SODIUM CHLORIDE, SODIUM LACTATE, POTASSIUM CHLORIDE, CALCIUM CHLORIDE 600; 310; 30; 20 MG/100ML; MG/100ML; MG/100ML; MG/100ML
INJECTION, SOLUTION INTRAVENOUS CONTINUOUS PRN
Status: DISCONTINUED | OUTPATIENT
Start: 2017-07-05 | End: 2017-07-05 | Stop reason: SURG

## 2017-07-05 RX ORDER — DEXAMETHASONE SODIUM PHOSPHATE 4 MG/ML
INJECTION, SOLUTION INTRA-ARTICULAR; INTRALESIONAL; INTRAMUSCULAR; INTRAVENOUS; SOFT TISSUE AS NEEDED
Status: DISCONTINUED | OUTPATIENT
Start: 2017-07-05 | End: 2017-07-05 | Stop reason: SURG

## 2017-07-05 RX ORDER — CEFAZOLIN SODIUM 2 G/100ML
2 INJECTION, SOLUTION INTRAVENOUS ONCE
Status: COMPLETED | OUTPATIENT
Start: 2017-07-05 | End: 2017-07-05

## 2017-07-05 RX ORDER — ACETAMINOPHEN 10 MG/ML
1000 INJECTION, SOLUTION INTRAVENOUS ONCE
Status: COMPLETED | OUTPATIENT
Start: 2017-07-05 | End: 2017-07-05

## 2017-07-05 RX ORDER — MAGNESIUM HYDROXIDE 1200 MG/15ML
LIQUID ORAL AS NEEDED
Status: DISCONTINUED | OUTPATIENT
Start: 2017-07-05 | End: 2017-07-05 | Stop reason: HOSPADM

## 2017-07-05 RX ORDER — HYDROCODONE BITARTRATE AND ACETAMINOPHEN 7.5; 325 MG/1; MG/1
1 TABLET ORAL EVERY 4 HOURS PRN
Status: DISCONTINUED | OUTPATIENT
Start: 2017-07-05 | End: 2017-07-06 | Stop reason: HOSPADM

## 2017-07-05 RX ADMIN — DEXAMETHASONE SODIUM PHOSPHATE: 10 INJECTION, SOLUTION INTRAMUSCULAR; INTRAVENOUS at 07:53

## 2017-07-05 RX ADMIN — FENTANYL CITRATE 50 MCG: 50 INJECTION, SOLUTION INTRAMUSCULAR; INTRAVENOUS at 07:49

## 2017-07-05 RX ADMIN — ACETAMINOPHEN 1000 MG: 10 INJECTION, SOLUTION INTRAVENOUS at 20:53

## 2017-07-05 RX ADMIN — Medication 3 MG: at 09:00

## 2017-07-05 RX ADMIN — TRAZODONE HYDROCHLORIDE 100 MG: 100 TABLET ORAL at 20:52

## 2017-07-05 RX ADMIN — DEXAMETHASONE SODIUM PHOSPHATE 8 MG: 4 INJECTION, SOLUTION INTRAMUSCULAR; INTRAVENOUS at 08:10

## 2017-07-05 RX ADMIN — ACETAMINOPHEN 1000 MG: 10 INJECTION, SOLUTION INTRAVENOUS at 14:52

## 2017-07-05 RX ADMIN — PROMETHAZINE HYDROCHLORIDE 6.25 MG: 25 INJECTION INTRAMUSCULAR; INTRAVENOUS at 09:54

## 2017-07-05 RX ADMIN — SODIUM CHLORIDE, POTASSIUM CHLORIDE, SODIUM LACTATE AND CALCIUM CHLORIDE: 600; 310; 30; 20 INJECTION, SOLUTION INTRAVENOUS at 07:45

## 2017-07-05 RX ADMIN — IPRATROPIUM BROMIDE 2 SPRAY: 21 SPRAY NASAL at 21:00

## 2017-07-05 RX ADMIN — SODIUM CHLORIDE, POTASSIUM CHLORIDE, SODIUM LACTATE AND CALCIUM CHLORIDE 1000 ML: 600; 310; 30; 20 INJECTION, SOLUTION INTRAVENOUS at 07:03

## 2017-07-05 RX ADMIN — CHLORHEXIDINE GLUCONATE 60 ML: 1.2 RINSE ORAL at 07:03

## 2017-07-05 RX ADMIN — PROPOFOL 25 MCG/KG/MIN: 10 INJECTION, EMULSION INTRAVENOUS at 08:00

## 2017-07-05 RX ADMIN — CEFAZOLIN SODIUM 2 G: 2 INJECTION, SOLUTION INTRAVENOUS at 07:47

## 2017-07-05 RX ADMIN — SODIUM CHLORIDE, POTASSIUM CHLORIDE, SODIUM LACTATE AND CALCIUM CHLORIDE: 600; 310; 30; 20 INJECTION, SOLUTION INTRAVENOUS at 08:35

## 2017-07-05 RX ADMIN — ACETAMINOPHEN 1000 MG: 10 INJECTION, SOLUTION INTRAVENOUS at 08:56

## 2017-07-05 RX ADMIN — PANTOPRAZOLE SODIUM 40 MG: 40 INJECTION, POWDER, FOR SOLUTION INTRAVENOUS at 07:04

## 2017-07-05 RX ADMIN — ONDANSETRON 4 MG: 2 INJECTION INTRAMUSCULAR; INTRAVENOUS at 09:00

## 2017-07-05 RX ADMIN — CEFAZOLIN SODIUM 2 G: 2 INJECTION, SOLUTION INTRAVENOUS at 16:20

## 2017-07-05 RX ADMIN — SCOPALAMINE 1 PATCH: 1 PATCH, EXTENDED RELEASE TRANSDERMAL at 07:04

## 2017-07-05 RX ADMIN — LIDOCAINE HYDROCHLORIDE 0.4 ML: 10 INJECTION, SOLUTION EPIDURAL; INFILTRATION; INTRACAUDAL; PERINEURAL at 07:04

## 2017-07-05 RX ADMIN — PROPOFOL 150 MG: 10 INJECTION, EMULSION INTRAVENOUS at 07:49

## 2017-07-05 RX ADMIN — MIDAZOLAM HYDROCHLORIDE 2 MG: 1 INJECTION, SOLUTION INTRAMUSCULAR; INTRAVENOUS at 07:46

## 2017-07-05 RX ADMIN — LIDOCAINE HYDROCHLORIDE 50 MG: 10 INJECTION, SOLUTION INFILTRATION; PERINEURAL at 07:49

## 2017-07-05 RX ADMIN — ENOXAPARIN SODIUM 40 MG: 40 INJECTION SUBCUTANEOUS at 11:53

## 2017-07-05 RX ADMIN — ONDANSETRON 4 MG: 2 INJECTION INTRAMUSCULAR; INTRAVENOUS at 09:34

## 2017-07-05 RX ADMIN — ATRACURIUM BESYLATE 50 MG: 10 INJECTION, SOLUTION INTRAVENOUS at 07:49

## 2017-07-05 RX ADMIN — ROBINUL 0.4 MG: 0.2 INJECTION INTRAMUSCULAR; INTRAVENOUS at 09:00

## 2017-07-05 RX ADMIN — SODIUM CHLORIDE, POTASSIUM CHLORIDE, SODIUM LACTATE AND CALCIUM CHLORIDE 150 ML/HR: 600; 310; 30; 20 INJECTION, SOLUTION INTRAVENOUS at 20:51

## 2017-07-05 NOTE — ANESTHESIA PROCEDURE NOTES
Peripheral IV    Patient location during procedure: pre-op  Line placed for Difficult Access.  Performed By   CRNA: DEMOND SHANNON  Preanesthetic Checklist  Completed: patient identified, site marked, surgical consent, pre-op evaluation, timeout performed, IV checked, risks and benefits discussed and monitors and equipment checked  Peripheral IV Prep   Patient position: supine   Prep: ChloraPrep  Patient monitoring: heart rate, cardiac monitor and continuous pulse ox  Peripheral IV Procedure   Laterality:right  Location:  Hand  Catheter size: 20 G         Post Assessment   Dressing Type: tape and transparent.    IV Dressing/Site: clean, dry and intact

## 2017-07-05 NOTE — OP NOTE
Preop DX:  Morbid obesity w mult co-morbidities  Postop dx:  Same  Proc:  Laparoscopic sleeve gastrectomy   Laparoscopic hiatal hernia repair   EGD  Surgeon:  Marysol  Anesth:  JO ANN  EBL:  50 cc  Fluids crystalloid  Specimens ST gastrectomy  Drains none  Counts correct  Complics:  None    Indications: This is a 61-year-old morbidly obese white female who presents for elective laparoscopic sleeve gastrectomy and concomitant hiatal hernia repair.  She's undergone extensive preoperative education teaching and consent process, everything's in order, and she wishes to proceed.    Operative technique: The patient was brought to the operating room and placed supine upon the operating room table.  SCD hose were placed.  She underwent uneventful general endotracheal anesthesia per the anesthesiology staff, she received IV Ancef and subcutaneous Lovenox, the anesthesiology staff performed a TAP block, and her abdomen was prepped and draped with ChloraPrep in a sterile fashion, and Ioban was used as well, a Bear catheter was not placed.  The peritoneal cavity was entered in the upper abdomen to the left of midline using a 5 mm trocar and an Optiview technique and the abdomen was insufflated to a pressure of 15 mmHg with CO2 gas.  Exploratory laparoscopy revealed no evidence of injury from the entrance technique, an enlarged but smooth liver consistent with fatty infiltration, some adhesions from previous open cholecystectomy to the gallbladder bed fossa, surprisingly none to the anterior abdominal wall.  No other abnormalities noted.  Remaining trochars were placed under direct visualization including 2 additional 5 mm trochars on the left and one on the right.  A 15 mm trocar was placed to the right of the umbilicus and a skin fold.  Through a stab incision in the epigastrium and Nakita retractor was used to elevate the left lobe of the liver exposing the hiatus where there was a small hiatal hernia seen and photo  documented.  During the course of the procedure the lateral aspect of the liver lateral to the retractor would become ischemic but responded nicely to repositioning as needed.  Beginning approximately two thirds of the way around the greater curvature of the stomach the gastrocolic vessels were divided with the Harmonic scalpel.  This proceeded proximally taking down all the short gastric vessels and exposing the left regina along its length adhesions of the posterior stomach to the pancreas were divided.  The hernia sac was incised along the base of the left regina and this was extended up across the phrenoesophageal membrane.  The pars flaccida was divided.  There was not replaced hepatic vessel.  The hernia sac was incised along the base of the right regina and this was extended up across the phrenoesophageal membrane.  The hernia sac and its contents were dissected out of the mediastinum and reduced below the level of the crura.  The GE junction was then lengthened to well below the level of the crura by dissecting loose areolar tissue well into the mediastinum.  A half-inch Penrose drain was used temporarily for esophageal retraction.  The anterior posterior vagus nerves were preserved.  The crura were dissected to their meeting point inferiorly.  The hiatal hernia repair was performed posteriorly with 3 interrupted 0 silk suture with good result photodocumentation obtained before and after repair.  gastreocolic vessels were then divided medially to 5 cm proximal to the pylorus.  The anesthesiology staff passed a 36 French blunt tip bougie dilator which was manipulated along the lesser curvature into the distal antrum.  5% subtotal vertical sleeve gastrectomy was then performed over the 36 French bougie dilator using an [employer] 60 mm articulating electric GST stapler.  The first firing was a black load with a single absorbable Veritas supa-strip however it locked out.  There was no tissue damage.  Another stapler and  black load with the strip were obtained and the sleeve was completed with one black load and 5 green loads.  The black load and the first 4 green loads included a single absorbable Veritas supa-strips, and the final green load included dual of general Veritas supa-strips.  After clamping down the final green load and prior to firing it the bougie dilator was removed.  The sleeve was performed such that it was uniform in size, no hourglassing or narrowing, especially at the angularis, and the final firing was done a centimeter away from the angle of His to hopefully avoid incorporating esophageal fibers.  The subtotal gastrectomy specimen was placed into a large retrieval bag and withdrawn and placed on the separate [place] stand.  It was an average size specimen.  The sleeve was submerged under saline, I performed upper endoscopy, I advanced the endoscope into the duodenal bulb, no air bubbles or leak seen, no bleeding at the staple line, no narrowing at the angularis, no hiatal hernia, and the endoscope was withdrawn.  I inspected the subtotal gastrectomy specimen, staples were all well formed confirming laparoscopic findings, it was sent unopened to pathology for permanent section.  Irrigation fluid was suctioned free.  The sleeve is resting nicely and hemostatic.  The sleeve staple line was treated with 4 cc of aerosolized Tisseel fibrin glue.  Photodocumentation of the sleeve was obtained.  The Nakita retractor was removed.  She at the 15 mm trocar site incision was closed with a horizontal mattress 0 Vicryl suture placed with a suture passer under direct visualization and tying the knot extra poorly.  Fascia was infiltrated with approximately 10 cc of quarter percent Marcaine with epinephrine with the okay from anesthesia and trochars were removed under direct visualization and no bleeding noted from their sites.  Subcutaneous tissue in the 15 mm incision was closed with a figure-of-eight 2-0 Vicryl plus  suture.  And skin in each incision was closed using 3-0 Monocryl plus in an interrupted subcuticular stitch followed by skin affix.  The patient tolerated the procedure well without complication and was taken to the recovery room in stable condition.

## 2017-07-05 NOTE — BRIEF OP NOTE
GASTRIC SLEEVE LAPAROSCOPIC  Procedure Note    Jessica MARIE Lionel  7/5/2017    Pre-op Diagnosis:   Morbid obesity due to excess calories [E66.01]    Post-op Diagnosis:     Post-Op Diagnosis Codes:     * Morbid obesity due to excess calories [E66.01]    Procedure/CPT® Codes:      Procedure(s):  GASTRIC SLEEVE LAPAROSCOPIC, HIATAL HERNIA REPAIR    Surgeon(s):  Cj Gregg MD    Anesthesia: General with Block    Staff:   Circulator: Mago CLEMENTE RN  Scrub Person: Shreya Jones  Nursing Assistant: Eileen Alvarez    Estimated Blood Loss: *No blood loss documented*  Urine Voided: * No values recorded between 7/5/2017  7:45 AM and 7/5/2017  8:38 AM *    Specimens:                  ID Type Source Tests Collected by Time Destination   A : SUB-TOTAL GASTRECTOMY Tissue Stomach TISSUE EXAM Cj Gregg MD 7/5/2017 0817          Drains:           Findings:       Complications: none      Cj Gregg MD     Date: 7/5/2017  Time: 8:38 AM

## 2017-07-05 NOTE — ANESTHESIA PROCEDURE NOTES
Airway  Urgency: elective    Airway not difficult    General Information and Staff    Patient location during procedure: OR  CRNA: DEMOND SHANNON    Indications and Patient Condition  Indications for airway management: airway protection    Preoxygenated: yes  MILS not maintained throughout  Mask difficulty assessment: 1 - vent by mask    Final Airway Details  Final airway type: endotracheal airway      Successful airway: ETT  Cuffed: yes   Successful intubation technique: direct laryngoscopy  Facilitating devices/methods: intubating stylet  Endotracheal tube insertion site: oral  Blade: Santana  Blade size: #2  ETT size: 7.5 mm  Cormack-Lehane Classification: grade I - full view of glottis  Placement verified by: chest auscultation and capnometry   Measured from: lips  ETT to lips (cm): 20  Number of attempts at approach: 1    Additional Comments  Negative epigastric sounds, Breath sound equal bilaterally with symmetric chest rise and fall.  Teeth intact, atraumatic

## 2017-07-05 NOTE — ANESTHESIA POSTPROCEDURE EVALUATION
Patient: Jessica Flowers    Procedure Summary     Date Anesthesia Start Anesthesia Stop Room / Location    07/05/17 0745 0915  [facility] OR 02 / [facility]       Procedure Diagnosis Surgeon Provider    GASTRIC SLEEVE LAPAROSCOPIC,  (N/A Abdomen); HIATAL HERNIA REPAIR LAPAROSCOPIC (N/A Abdomen) Morbid obesity due to excess calories; Hiatal hernia with gastroesophageal reflux  (Morbid obesity due to excess calories [E66.01]) MD Michael Rice MD          Anesthesia Type: general  Last vitals  BP      Temp      Pulse     Resp      SpO2        Post Anesthesia Care and Evaluation    Patient location during evaluation: PACU  Patient participation: complete - patient participated  Level of consciousness: sleepy but conscious  Pain score: 0  Pain management: adequate  Airway patency: patent  Anesthetic complications: No anesthetic complications  PONV Status: none  Cardiovascular status: hemodynamically stable and acceptable  Respiratory status: nonlabored ventilation, acceptable, nasal cannula and oral airway  Hydration status: acceptable

## 2017-07-05 NOTE — ANESTHESIA PREPROCEDURE EVALUATION
Anesthesia Evaluation     NPO Solid Status: > 8 hours  NPO Liquid Status: > 8 hours     Airway   Mallampati: II  TM distance: >3 FB  Neck ROM: full  no difficulty expected  Dental      Pulmonary - normal exam   (-) asthma, not a smoker  Cardiovascular     ECG reviewed  Rhythm: regular  Rate: normal    (+) hypertension,   (-) pacemaker, angina, murmur, cardiac stents, CABG      Neuro/Psych  (-) seizures, TIA, CVA  GI/Hepatic/Renal/Endo    (+) obesity,    (-) liver disease, no renal disease, diabetes    Musculoskeletal     Abdominal    Substance History      OB/GYN          Other                                        Anesthesia Plan    ASA 3     general   (GA  TAPS)  intravenous induction     Plan discussed with CRNA.

## 2017-07-05 NOTE — PLAN OF CARE
Problem: Patient Care Overview (Adult)  Goal: Plan of Care Review  Outcome: Ongoing (interventions implemented as appropriate)    07/05/17 1128   Coping/Psychosocial Response Interventions   Plan Of Care Reviewed With patient;spouse   Patient Care Overview   Progress progress toward functional goals as expected         Problem: Perioperative Period (Adult)  Goal: Signs and Symptoms of Listed Potential Problems Will be Absent or Manageable (Perioperative Period)  Outcome: Ongoing (interventions implemented as appropriate)    07/05/17 1128   Perioperative Period   Problems Assessed (Perioperative Period) all   Problems Present (Perioperative Period) pain         Problem: Pain, Acute (Adult)  Goal: Identify Related Risk Factors and Signs and Symptoms    07/05/17 1128   Pain, Acute   Related Risk Factors (Acute Pain) procedure/treatment   Signs and Symptoms (Acute Pain) nausea/vomiting/anorexia

## 2017-07-05 NOTE — ANESTHESIA PROCEDURE NOTES
Peripheral Block    Patient location during procedure: OR  Reason for block: at surgeon's request and post-op pain management  Performed by  CRNA: BRANDT PAK  Assisted by: DEMOND SHANNON  Preanesthetic Checklist  Completed: patient identified, site marked, surgical consent, pre-op evaluation, timeout performed, IV checked, risks and benefits discussed and monitors and equipment checked  Peripheral Block Prep:  Sterile barriers:cap, gloves, sterile barriers and mask  Prep: ChloraPrep  Patient monitoring: blood pressure monitoring, continuous pulse oximetry and EKG  Peripheral Procedure  Guidance:ultrasound guided  Images:still images obtained    Laterality:Bilateral  Block Type:TAP  Injection Technique:single-shot  Needle Type:short-bevel  Needle Gauge:20 G    Medications  Comment:Block Injection:  LA dose divided between Right and Left block       Adjuncts:  Decadron 4mg PSF, Buprenex 0.3mg (Per total volume of LA)  Local Injected:bupivacaine 0.25% Local Amount Injected:60mL  Post Assessment  Injection Assessment: negative aspiration for heme, incremental injection and no paresthesia on injection  Patient Tolerance:comfortable throughout block  Complications:no  Additional Notes  The pt was placed in the Supine Position and was  anesthetized with:       General Anesthesia     Under Ultrasound guidance, a BBraun 4inch 360 degree needle was advanced with Normal Saline hydro dissection of tissue.  The Internal Oblique and Transversus Abdominus muscles where visualized.  At or before the aponeurosis of Internal Oblique, local anesthetic spread was visualized in the Transversus Abdominus Plane. Injection was made incrementally with aspiration every 5 mls.  There was no  intravascular injection,  injection pressure was normal, there was no neural injection, and the procedure was completed without difficulty.  Thank You.

## 2017-07-06 ENCOUNTER — APPOINTMENT (OUTPATIENT)
Dept: GENERAL RADIOLOGY | Facility: HOSPITAL | Age: 62
End: 2017-07-06
Attending: SURGERY

## 2017-07-06 VITALS
RESPIRATION RATE: 18 BRPM | TEMPERATURE: 97.4 F | SYSTOLIC BLOOD PRESSURE: 127 MMHG | HEIGHT: 61 IN | OXYGEN SATURATION: 90 % | HEART RATE: 80 BPM | BODY MASS INDEX: 38.48 KG/M2 | WEIGHT: 203.8 LBS | DIASTOLIC BLOOD PRESSURE: 61 MMHG

## 2017-07-06 LAB
ALBUMIN SERPL-MCNC: 3.7 G/DL (ref 3.2–4.8)
ALBUMIN/GLOB SERPL: 1.8 G/DL (ref 1.5–2.5)
ALP SERPL-CCNC: 57 U/L (ref 25–100)
ALT SERPL W P-5'-P-CCNC: 106 U/L (ref 7–40)
ANION GAP SERPL CALCULATED.3IONS-SCNC: 7 MMOL/L (ref 3–11)
AST SERPL-CCNC: 76 U/L (ref 0–33)
BASOPHILS # BLD AUTO: 0.01 10*3/MM3 (ref 0–0.2)
BASOPHILS NFR BLD AUTO: 0.1 % (ref 0–1)
BILIRUB SERPL-MCNC: 0.6 MG/DL (ref 0.3–1.2)
BUN BLD-MCNC: 17 MG/DL (ref 9–23)
BUN/CREAT SERPL: 34 (ref 7–25)
CALCIUM SPEC-SCNC: 9.6 MG/DL (ref 8.7–10.4)
CHLORIDE SERPL-SCNC: 100 MMOL/L (ref 99–109)
CO2 SERPL-SCNC: 29 MMOL/L (ref 20–31)
CREAT BLD-MCNC: 0.5 MG/DL (ref 0.6–1.3)
CYTO UR: NORMAL
DEPRECATED RDW RBC AUTO: 44.2 FL (ref 37–54)
EOSINOPHIL # BLD AUTO: 0.04 10*3/MM3 (ref 0–0.3)
EOSINOPHIL NFR BLD AUTO: 0.3 % (ref 0–3)
ERYTHROCYTE [DISTWIDTH] IN BLOOD BY AUTOMATED COUNT: 12.5 % (ref 11.3–14.5)
GFR SERPL CREATININE-BSD FRML MDRD: 126 ML/MIN/1.73
GLOBULIN UR ELPH-MCNC: 2.1 GM/DL
GLUCOSE BLD-MCNC: 101 MG/DL (ref 70–100)
HCT VFR BLD AUTO: 36.3 % (ref 34.5–44)
HGB BLD-MCNC: 12 G/DL (ref 11.5–15.5)
IMM GRANULOCYTES # BLD: 0.02 10*3/MM3 (ref 0–0.03)
IMM GRANULOCYTES NFR BLD: 0.2 % (ref 0–0.6)
IRON 24H UR-MRATE: 34 MCG/DL (ref 50–175)
LAB AP CASE REPORT: NORMAL
LAB AP CLINICAL INFORMATION: NORMAL
LYMPHOCYTES # BLD AUTO: 1.09 10*3/MM3 (ref 0.6–4.8)
LYMPHOCYTES NFR BLD AUTO: 9.2 % (ref 24–44)
Lab: NORMAL
MCH RBC QN AUTO: 32.1 PG (ref 27–31)
MCHC RBC AUTO-ENTMCNC: 33.1 G/DL (ref 32–36)
MCV RBC AUTO: 97.1 FL (ref 80–99)
MONOCYTES # BLD AUTO: 0.94 10*3/MM3 (ref 0–1)
MONOCYTES NFR BLD AUTO: 8 % (ref 0–12)
NEUTROPHILS # BLD AUTO: 9.72 10*3/MM3 (ref 1.5–8.3)
NEUTROPHILS NFR BLD AUTO: 82.2 % (ref 41–71)
PATH REPORT.FINAL DX SPEC: NORMAL
PATH REPORT.GROSS SPEC: NORMAL
PLATELET # BLD AUTO: 224 10*3/MM3 (ref 150–450)
PMV BLD AUTO: 11 FL (ref 6–12)
POTASSIUM BLD-SCNC: 4.1 MMOL/L (ref 3.5–5.5)
PROT SERPL-MCNC: 5.8 G/DL (ref 5.7–8.2)
RBC # BLD AUTO: 3.74 10*6/MM3 (ref 3.89–5.14)
SODIUM BLD-SCNC: 136 MMOL/L (ref 132–146)
WBC NRBC COR # BLD: 11.82 10*3/MM3 (ref 3.5–10.8)

## 2017-07-06 PROCEDURE — 25010000002 LORAZEPAM PER 2 MG: Performed by: SURGERY

## 2017-07-06 PROCEDURE — 0 DIATRIZOATE MEGLUMINE & SODIUM PER 1 ML: Performed by: SURGERY

## 2017-07-06 PROCEDURE — 85025 COMPLETE CBC W/AUTO DIFF WBC: CPT | Performed by: SURGERY

## 2017-07-06 PROCEDURE — 94799 UNLISTED PULMONARY SVC/PX: CPT

## 2017-07-06 PROCEDURE — 25010000002 THIAMINE PER 100 MG: Performed by: SURGERY

## 2017-07-06 PROCEDURE — 25010000002 ENOXAPARIN PER 10 MG: Performed by: SURGERY

## 2017-07-06 PROCEDURE — 25010000002 PYRIDOXINE PER 100 MG: Performed by: SURGERY

## 2017-07-06 PROCEDURE — 74241: CPT

## 2017-07-06 PROCEDURE — 25010000002 CYANOCOBALAMIN PER 1000 MCG: Performed by: SURGERY

## 2017-07-06 PROCEDURE — 25010000003 CEFAZOLIN IN DEXTROSE 2-4 GM/100ML-% SOLUTION: Performed by: SURGERY

## 2017-07-06 PROCEDURE — 25010000002 METOCLOPRAMIDE PER 10 MG: Performed by: SURGERY

## 2017-07-06 PROCEDURE — 83540 ASSAY OF IRON: CPT | Performed by: SURGERY

## 2017-07-06 PROCEDURE — 25010000002 NA FERRIC GLUC CPLX PER 12.5 MG: Performed by: SURGERY

## 2017-07-06 PROCEDURE — 80053 COMPREHEN METABOLIC PANEL: CPT | Performed by: SURGERY

## 2017-07-06 PROCEDURE — 99024 POSTOP FOLLOW-UP VISIT: CPT | Performed by: SURGERY

## 2017-07-06 RX ORDER — HYDROMORPHONE HYDROCHLORIDE 2 MG/1
2 TABLET ORAL EVERY 4 HOURS PRN
Qty: 30 TABLET | Refills: 0 | Status: SHIPPED | OUTPATIENT
Start: 2017-07-06 | End: 2017-10-31

## 2017-07-06 RX ORDER — ONDANSETRON 4 MG/1
4 TABLET, FILM COATED ORAL EVERY 4 HOURS PRN
Qty: 20 TABLET | Refills: 0 | Status: SHIPPED | OUTPATIENT
Start: 2017-07-06 | End: 2017-07-12

## 2017-07-06 RX ADMIN — CEFAZOLIN SODIUM 2 G: 2 INJECTION, SOLUTION INTRAVENOUS at 00:41

## 2017-07-06 RX ADMIN — SODIUM CHLORIDE 125 MG: 9 INJECTION, SOLUTION INTRAVENOUS at 14:08

## 2017-07-06 RX ADMIN — IPRATROPIUM BROMIDE 2 SPRAY: 21 SPRAY NASAL at 08:27

## 2017-07-06 RX ADMIN — ENOXAPARIN SODIUM 40 MG: 40 INJECTION SUBCUTANEOUS at 08:26

## 2017-07-06 RX ADMIN — BENAZEPRIL HYDROCHLORIDE 20 MG: 20 TABLET, FILM COATED ORAL at 08:25

## 2017-07-06 RX ADMIN — HYDROCODONE BITARTRATE AND ACETAMINOPHEN 1 TABLET: 7.5; 325 TABLET ORAL at 09:01

## 2017-07-06 RX ADMIN — LEVOTHYROXINE SODIUM 50 MCG: 50 TABLET ORAL at 08:26

## 2017-07-06 RX ADMIN — FLUOXETINE HYDROCHLORIDE 40 MG: 20 CAPSULE ORAL at 08:25

## 2017-07-06 RX ADMIN — THIAMINE HYDROCHLORIDE 250 ML/HR: 100 INJECTION, SOLUTION INTRAMUSCULAR; INTRAVENOUS at 08:26

## 2017-07-06 RX ADMIN — ACETAMINOPHEN 1000 MG: 10 INJECTION, SOLUTION INTRAVENOUS at 02:59

## 2017-07-06 RX ADMIN — CYANOCOBALAMIN 1000 MCG: 1000 INJECTION, SOLUTION INTRAMUSCULAR; SUBCUTANEOUS at 08:26

## 2017-07-06 RX ADMIN — ONDANSETRON 4 MG: 4 TABLET, FILM COATED ORAL at 09:01

## 2017-07-06 RX ADMIN — LORAZEPAM 0.5 MG: 2 INJECTION INTRAMUSCULAR; INTRAVENOUS at 02:59

## 2017-07-06 RX ADMIN — METOCLOPRAMIDE 10 MG: 5 INJECTION, SOLUTION INTRAMUSCULAR; INTRAVENOUS at 02:58

## 2017-07-06 RX ADMIN — PANTOPRAZOLE SODIUM 40 MG: 40 INJECTION, POWDER, FOR SOLUTION INTRAVENOUS at 06:07

## 2017-07-06 RX ADMIN — OXYBUTYNIN CHLORIDE 10 MG: 10 TABLET, EXTENDED RELEASE ORAL at 08:25

## 2017-07-06 RX ADMIN — Medication 60 ML: at 10:05

## 2017-07-06 NOTE — PROGRESS NOTES
Discharge Planning Assessment  [facility]     Patient Name: Jessica Flowers  MRN: [id number]  Today's Date: 7/6/2017    Admit Date: 7/5/2017          Discharge Needs Assessment       07/06/17 1458    Living Environment    Lives With spouse    Living Arrangements mobile home    Home Accessibility no concerns;stairs to enter home    Stair Railings at Home none    Type of Financial/Environmental Concern none    Transportation Available family or friend will provide    Living Environment    Provides Primary Care For no one    Primary Care Provided By spouse/significant other    Quality Of Family Relationships supportive    Able to Return to Prior Living Arrangements yes    Discharge Needs Assessment    Concerns To Be Addressed no discharge needs identified    Readmission Within The Last 30 Days no previous admission in last 30 days    Anticipated Changes Related to Illness none    Equipment Currently Used at Home none    Equipment Needed After Discharge none    Discharge Disposition home or self-care            Discharge Plan       07/06/17 1459    Case Management/Social Work Plan    Plan Home     Patient/Family In Agreement With Plan yes    Additional Comments Spoke with patient who lives with her spouse who can provide any help she needs and she is comfortable returning home. There are no discharge needs at this time - susan bonner rn x [phone]         Discharge Placement     No information found        Expected Discharge Date and Time     Expected Discharge Date Expected Discharge Time    Jul 6, 2017               Demographic Summary       07/06/17 1454    Referral Information    Admission Type inpatient    Arrived From admitted as an inpatient    Referral Source admission list    Reason For Consult discharge planning    Contact Information    Permission Granted to Share Information With     Primary Care Physician Information    Name Karley Dinh in [place]             Functional Status       07/06/17  1454    Functional Status Current    Current Functional Level Comment Please see nurses notes     Change in Functional Status Since Onset of Current Illness/Injury no    Functional Status Prior    Ambulation 0-->independent    Transferring 0-->independent    Toileting 0-->independent    Bathing 0-->independent    Dressing 0-->independent    Eating 0-->independent    Communication 0-->understands/communicates without difficulty    Swallowing 0-->swallows foods/liquids without difficulty    IADL    Medications independent    Meal Preparation independent    Housekeeping independent    Laundry independent    Shopping independent    Oral Care independent    Activity Tolerance    Current Activity Limitations none    Usual Activity Tolerance good    Current Activity Tolerance good    Cognitive/Perceptual/Developmental    Current Mental Status/Cognitive Functioning no deficits noted    Recent Changes in Mental Status/Cognitive Functioning no changes    Employment/Financial    Employment/Finance Comments no issues with finances or medical coverage             Psychosocial     None            Abuse/Neglect     None            Legal     None            Substance Abuse     None            Patient Forms     None          Sidra Woodson RN

## 2017-07-06 NOTE — PROGRESS NOTES
Cc:  POD#1  She feels much better today.  Her pain is controlled with oral medication.  She says she is ambulating and voiding well.  No pulmonary complaints.  She says she is voiding a lot.  No bowel movement or flatus.  She would like to go home.  No fever or tachycardia pulse 80 blood pressure 127/61 she is in no apparent distress.  Abdomen is soft, nontender, nondistended, bowel sounds present.  Wounds look okay.  Glucose 101 creatinine 0.5 a  AST 76 iron 34 white blood count 11.8 with 82 segs 9 lymphs 8 monocytes H&H 12 and 36 MRSA screen negative hemoglobin A1c 5.6 I reviewed her upper GI looks unremarkable to me    Impression: Doing okay.  Iron deficiency anemia without evidence of bleeding on Lovenox.    Plan: Discharge home.  Discharge instructions were discussed.  See orders

## 2017-07-12 ENCOUNTER — OFFICE VISIT (OUTPATIENT)
Dept: BARIATRICS/WEIGHT MGMT | Facility: CLINIC | Age: 62
End: 2017-07-12

## 2017-07-12 VITALS
OXYGEN SATURATION: 99 % | SYSTOLIC BLOOD PRESSURE: 112 MMHG | BODY MASS INDEX: 36.15 KG/M2 | DIASTOLIC BLOOD PRESSURE: 78 MMHG | WEIGHT: 191.5 LBS | HEIGHT: 61 IN | HEART RATE: 88 BPM | RESPIRATION RATE: 18 BRPM | TEMPERATURE: 97.8 F

## 2017-07-12 DIAGNOSIS — Z98.84 S/P BARIATRIC SURGERY: Primary | ICD-10-CM

## 2017-07-12 PROCEDURE — 99024 POSTOP FOLLOW-UP VISIT: CPT | Performed by: PHYSICIAN ASSISTANT

## 2017-07-12 NOTE — PROGRESS NOTES
[facility] Bariatric Surgery  [address]  [facility] [id number] [phone]      Patient Name:  Jessica Flowers.  :  1955      Date of Visit: 2017      Reason for Visit:  POD # 7    HPI:  Jessica Flowers is a 61 y.o. female s/p LSG/HHR by GDW on 17    Doing well.  No issues/concerns. Denies dysphagia, reflux, nausea, vomiting, abdominal pain, pulmonary issues and fevers.  Tolerating diet progression - on stage 1.  Getting 90g prot/day.  Drinking 64 fluid oz/day.  Taking MVI, B12, B1, Vit D and iron.  On Lansoprazole.  Holding Tramadol and Hormones.  Ambulating.     Presurgery weight: 203 pounds.  Today's weight is 191 lb 8 oz (86.9 kg) pounds, today's  Body mass index is 36.18 kg/(m^2)., and her weight loss since surgery is 12 pounds.       Past Medical History:   Diagnosis Date   • Abnormal CXR     bronchitis poss, [facility]   • Allergic rhinitis     uses inhalers prn, denies asthma   • Anxiety    • Carpal tunnel syndrome    • Chronic narcotic use     HC   • Depression    • Dyspnea on exertion    • Fatigue    • GERD (gastroesophageal reflux disease)     on Prilosec + BID Zantac   • Glucose intolerance (impaired glucose tolerance)    • Hiatal hernia with gastroesophageal reflux     EGD GDW , 39 cm, path DE mild nonspec changes.  serum h. pyl neg   • Hx MRSA infection     2010 on abdomen, tx w/ Bactrim   • Hyperlipidemia    • Hypertension    • Hypertriglyceridemia    • Hypothyroid    • Insomnia     uses Trazodone   • Joint pain    • Morbid obesity    • OAB (overactive bladder)     tx w/ botox injections   • Osteoarthritis of knees, bilateral     steroid injxns as above, supposed to get synvisc soon.  Says bone on bone, needs TKR, but ortho surgeon wants her to have WLS first   • Plantar fasciitis    • Postmenopausal HRT (hormone replacement therapy)    • Psoriasis    • Vitamin D deficiency      Past Surgical History:   Procedure Laterality Date   •  APPENDECTOMY  1989    emergent, open   • CARPAL TUNNEL RELEASE      (B)   • CHOLECYSTECTOMY OPEN  1980    [facility]   • DILATATION AND CURETTAGE  1990, 2015   • GASTRIC SLEEVE LAPAROSCOPIC N/A 7/5/2017    Procedure: GASTRIC SLEEVE LAPAROSCOPIC, ;  Surgeon: Cj Gregg MD;  Location:  [facility];  Service:    • LAPAROSCOPIC TUBAL LIGATION  1988   • OTHER SURGICAL HISTORY      denies anesthesia issues   • [place] LAP,ESOPHAGOGAST FUNDOPLASTY N/A 7/5/2017    Procedure: HIATAL HERNIA REPAIR LAPAROSCOPIC;  Surgeon: Cj Gregg MD;  Location:  [facility];  Service: Bariatric     Outpatient Prescriptions Marked as Taking for the 7/12/17 encounter (Office Visit) with GAVI Posey   Medication Sig Dispense Refill   • acetaminophen (TYLENOL) 325 MG tablet Take 650 mg by mouth Every 6 (Six) Hours As Needed for Mild Pain (1-3).     • albuterol (PROVENTIL HFA;VENTOLIN HFA) 108 (90 BASE) MCG/ACT inhaler Inhale 2 puffs Every 4 (Four) Hours As Needed for wheezing.     • benazepril (LOTENSIN) 20 MG tablet Take 20 mg by mouth Daily.     • FLUoxetine (PROzac) 20 MG capsule Take 40 mg by mouth Daily.     • gabapentin (NEURONTIN) 800 MG tablet 800 mg 4 (Four) Times a Day.  0   • HYDROmorphone (DILAUDID) 2 MG tablet Take 1 tablet by mouth Every 4 (Four) Hours As Needed for Moderate Pain (4-6). 30 tablet 0   • ipratropium (ATROVENT) 0.03 % nasal spray 2 sprays into each nostril Every 12 (Twelve) Hours.     • lansoprazole (PREVACID) 30 MG capsule Take 30 mg by mouth Daily.     • levothyroxine (SYNTHROID, LEVOTHROID) 100 MCG tablet Take 50 mcg by mouth Daily.     • oxybutynin XL (DITROPAN-XL) 10 MG 24 hr tablet Take 10 mg by mouth Daily.     • pravastatin (PRAVACHOL) 40 MG tablet Take 40 mg by mouth Daily.     • RA VITAMIN D-3 5000 UNITS capsule 5,000 Units Daily.  0   • raNITIdine (ZANTAC) 150 MG tablet take 1 tablet by mouth twice a day  0   • traZODone (DESYREL) 100 MG tablet Take 100 mg by mouth Every Night.       No  "Known Allergies    Social History     Social History   • Marital status: Legally      Spouse name: N/A   • Number of children: N/A   • Years of education: N/A     Occupational History   • Homemaker, formerly worked as a  @ [employer]      Social History Main Topics   • Smoking status: Never Smoker   • Smokeless tobacco: Never Used   • Alcohol use No   • Drug use: No   • Sexual activity: Yes     Partners: Male      Comment: spouse     Other Topics Concern   • Not on file     Social History Narrative    Lives in [place], [place] w/ son (who does smoke).        /78 (BP Location: Left arm, Patient Position: Sitting, Cuff Size: Large Adult)  Pulse 88  Temp 97.8 °F (36.6 °C) (Temporal Artery )   Resp 18  Ht 61\" (154.9 cm)  Wt 191 lb 8 oz (86.9 kg)  SpO2 99%  BMI 36.18 kg/m2  Physical Exam   Constitutional: She appears well-developed and well-nourished. She is cooperative.   obese   HENT:   Mouth/Throat: Oropharynx is clear and moist and mucous membranes are normal.   Eyes: Conjunctivae are normal. No scleral icterus.   Cardiovascular: Normal rate.    Pulmonary/Chest: Effort normal.   Abdominal: Soft. Bowel sounds are normal. There is no tenderness.   Incisions healing well   Musculoskeletal: Normal range of motion. She exhibits no edema.   Neurological: She is alert.   Skin: Skin is warm and dry. No rash noted.   Psychiatric: She has a normal mood and affect. Judgment normal.         Assessment:   POD # 7 s/p LSG/HHR by GDW on 7/5/17      Plan:  Doing well. Continue to advance diet per manual.  Increase protein intake to 100g/day.  Increase exercise/activity as tolerated.  Reviewed lifting restrictions, nothing >25 lbs x 2 more weeks.  Continue vitamins.  Continue PPI.  Continue to avoid ASA/NSAIDs/Steroids x 6 weeks postop.  Call w/ problems/concerns.    The patient was instructed to follow up in 3 weeks, sooner if needed.   "

## 2017-08-02 ENCOUNTER — OFFICE VISIT (OUTPATIENT)
Dept: BARIATRICS/WEIGHT MGMT | Facility: CLINIC | Age: 62
End: 2017-08-02

## 2017-08-02 VITALS
RESPIRATION RATE: 18 BRPM | TEMPERATURE: 97.8 F | OXYGEN SATURATION: 99 % | SYSTOLIC BLOOD PRESSURE: 118 MMHG | WEIGHT: 184.5 LBS | HEART RATE: 63 BPM | DIASTOLIC BLOOD PRESSURE: 75 MMHG | BODY MASS INDEX: 34.83 KG/M2 | HEIGHT: 61 IN

## 2017-08-02 DIAGNOSIS — R73.02 GLUCOSE INTOLERANCE (IMPAIRED GLUCOSE TOLERANCE): ICD-10-CM

## 2017-08-02 DIAGNOSIS — Z98.84 S/P BARIATRIC SURGERY: ICD-10-CM

## 2017-08-02 DIAGNOSIS — R53.83 FATIGUE, UNSPECIFIED TYPE: ICD-10-CM

## 2017-08-02 DIAGNOSIS — E78.5 HYPERLIPIDEMIA, UNSPECIFIED HYPERLIPIDEMIA TYPE: ICD-10-CM

## 2017-08-02 DIAGNOSIS — I10 ESSENTIAL HYPERTENSION: Primary | ICD-10-CM

## 2017-08-02 DIAGNOSIS — E66.9 OBESITY, CLASS I, BMI 30-34.9: ICD-10-CM

## 2017-08-02 DIAGNOSIS — E55.9 VITAMIN D DEFICIENCY: ICD-10-CM

## 2017-08-02 PROBLEM — E66.01 MORBID OBESITY DUE TO EXCESS CALORIES: Status: RESOLVED | Noted: 2017-07-05 | Resolved: 2017-08-02

## 2017-08-02 PROCEDURE — 99024 POSTOP FOLLOW-UP VISIT: CPT | Performed by: PHYSICIAN ASSISTANT

## 2017-08-07 LAB
25(OH)D3+25(OH)D2 SERPL-MCNC: 77.8 NG/ML (ref 30–100)
A-TOCOPHEROL VIT E SERPL-MCNC: 8.9 MG/L (ref 5.3–16.8)
ALBUMIN SERPL-MCNC: 4.2 G/DL (ref 3.5–5.5)
ALBUMIN/GLOB SERPL: 1.8 {RATIO} (ref 1.2–2.2)
ALP SERPL-CCNC: 54 IU/L (ref 39–117)
ALT SERPL-CCNC: 19 IU/L (ref 0–32)
AST SERPL-CCNC: 16 IU/L (ref 0–40)
BASOPHILS # BLD AUTO: 0 X10E3/UL (ref 0–0.2)
BASOPHILS NFR BLD AUTO: 1 %
BILIRUB SERPL-MCNC: 1 MG/DL (ref 0–1.2)
BUN SERPL-MCNC: 21 MG/DL (ref 6–24)
BUN/CREAT SERPL: 38 (ref 9–23)
CALCIUM SERPL-MCNC: 9.8 MG/DL (ref 8.7–10.2)
CHLORIDE SERPL-SCNC: 102 MMOL/L (ref 96–106)
CO2 SERPL-SCNC: 25 MMOL/L (ref 18–29)
CREAT SERPL-MCNC: 0.56 MG/DL (ref 0.57–1)
EOSINOPHIL # BLD AUTO: 0.2 X10E3/UL (ref 0–0.4)
EOSINOPHIL NFR BLD AUTO: 3 %
ERYTHROCYTE [DISTWIDTH] IN BLOOD BY AUTOMATED COUNT: 13.4 % (ref 12.3–15.4)
FERRITIN SERPL-MCNC: 157 NG/ML (ref 15–150)
FOLATE SERPL-MCNC: 19.6 NG/ML
GLOBULIN SER CALC-MCNC: 2.3 G/DL (ref 1.5–4.5)
GLUCOSE SERPL-MCNC: 83 MG/DL (ref 65–99)
HCT VFR BLD AUTO: 38.2 % (ref 34–46.6)
HGB BLD-MCNC: 12.7 G/DL (ref 11.1–15.9)
IMM GRANULOCYTES # BLD: 0 X10E3/UL (ref 0–0.1)
IMM GRANULOCYTES NFR BLD: 0 %
IRON SERPL-MCNC: 59 UG/DL (ref 27–159)
LYMPHOCYTES # BLD AUTO: 1.7 X10E3/UL (ref 0.7–3.1)
LYMPHOCYTES NFR BLD AUTO: 29 %
MAGNESIUM SERPL-MCNC: 2.1 MG/DL (ref 1.6–2.3)
MCH RBC QN AUTO: 30.4 PG (ref 26.6–33)
MCHC RBC AUTO-ENTMCNC: 33.2 G/DL (ref 31.5–35.7)
MCV RBC AUTO: 91 FL (ref 79–97)
METHYLMALONATE SERPL-SCNC: 559 NMOL/L (ref 0–378)
MONOCYTES # BLD AUTO: 0.4 X10E3/UL (ref 0.1–0.9)
MONOCYTES NFR BLD AUTO: 6 %
NEUTROPHILS # BLD AUTO: 3.5 X10E3/UL (ref 1.4–7)
NEUTROPHILS NFR BLD AUTO: 61 %
PHOSPHATE SERPL-MCNC: 4.3 MG/DL (ref 2.5–4.5)
PLATELET # BLD AUTO: 215 X10E3/UL (ref 150–379)
POTASSIUM SERPL-SCNC: 4.2 MMOL/L (ref 3.5–5.2)
PREALB SERPL-MCNC: 22 MG/DL (ref 10–36)
PROT SERPL-MCNC: 6.5 G/DL (ref 6–8.5)
PTH-INTACT SERPL-MCNC: 16 PG/ML (ref 15–65)
RBC # BLD AUTO: 4.18 X10E6/UL (ref 3.77–5.28)
SODIUM SERPL-SCNC: 144 MMOL/L (ref 134–144)
VIT A SERPL-MCNC: 61 UG/DL (ref 20–65)
VIT B1 BLD-SCNC: 224 NMOL/L (ref 66.5–200)
WBC # BLD AUTO: 5.8 X10E3/UL (ref 3.4–10.8)
ZINC SERPL-MCNC: 99 UG/DL (ref 56–134)

## 2017-08-16 ENCOUNTER — TELEPHONE (OUTPATIENT)
Dept: BARIATRICS/WEIGHT MGMT | Facility: CLINIC | Age: 62
End: 2017-08-16

## 2017-08-17 NOTE — TELEPHONE ENCOUNTER
Contacted pt to notify her that Zofran is not typically refilled after hospital discharge and asked if she was having any symptoms that she needed the Zofran refilled.  Pt stated that she wakes up with some nausea in the mornings, but its not all of the time and that she can get her PCP to give her some Zofran.

## 2017-08-29 ENCOUNTER — HOSPITAL ENCOUNTER (OUTPATIENT)
Dept: OTHER | Age: 62
Discharge: HOME | End: 2017-08-29
Attending: NURSE PRACTITIONER | Admitting: NURSE PRACTITIONER

## 2017-08-29 DIAGNOSIS — Z01.818 PREOP EXAMINATION: ICD-10-CM

## 2017-08-29 LAB
A/G RATIO: 2 (ref 0.8–2)
ALBUMIN SERPL-MCNC: 4.4 G/DL (ref 3.4–4.8)
ALP BLD-CCNC: 55 U/L (ref 25–100)
ALT SERPL-CCNC: 15 U/L (ref 4–36)
ANION GAP SERPL CALCULATED.3IONS-SCNC: 11 MMOL/L (ref 3–16)
AST SERPL-CCNC: 14 U/L (ref 8–33)
BASOPHILS ABSOLUTE: 0 K/UL (ref 0–0.1)
BASOPHILS RELATIVE PERCENT: 0.7 %
BILIRUB SERPL-MCNC: 1.2 MG/DL (ref 0.3–1.2)
BUN BLDV-MCNC: 21 MG/DL (ref 6–20)
CALCIUM SERPL-MCNC: 9.7 MG/DL (ref 8.5–10.5)
CHLORIDE BLD-SCNC: 104 MMOL/L (ref 98–107)
CHOLESTEROL, TOTAL: 109 MG/DL (ref 0–200)
CO2: 27 MMOL/L (ref 20–30)
CREAT SERPL-MCNC: 0.6 MG/DL (ref 0.4–1.2)
EOSINOPHILS ABSOLUTE: 0.2 K/UL (ref 0–0.4)
EOSINOPHILS RELATIVE PERCENT: 2.9 %
FOLATE: 18.8 NG/ML
GFR AFRICAN AMERICAN: >59
GFR NON-AFRICAN AMERICAN: >60
GLOBULIN: 2.2 G/DL
GLUCOSE BLD-MCNC: 97 MG/DL (ref 74–106)
HCT VFR BLD CALC: 41 % (ref 37–47)
HDLC SERPL-MCNC: 55 MG/DL (ref 40–60)
HEMOGLOBIN: 13.4 G/DL (ref 11.5–16.5)
LDL CHOLESTEROL CALCULATED: 38 MG/DL
LYMPHOCYTES ABSOLUTE: 1.5 K/UL (ref 1.5–4)
LYMPHOCYTES RELATIVE PERCENT: 24.8 % (ref 20–40)
MCH RBC QN AUTO: 31.3 PG (ref 27–32)
MCHC RBC AUTO-ENTMCNC: 32.7 G/DL (ref 31–35)
MCV RBC AUTO: 95.8 FL (ref 80–100)
MONOCYTES ABSOLUTE: 0.4 K/UL (ref 0.2–0.8)
MONOCYTES RELATIVE PERCENT: 7 % (ref 3–10)
NEUTROPHILS ABSOLUTE: 4 K/UL (ref 2–7.5)
NEUTROPHILS RELATIVE PERCENT: 64.6 %
PDW BLD-RTO: 13.4 % (ref 11–16)
PLATELET # BLD: 234 K/UL (ref 150–400)
PMV BLD AUTO: 13 FL (ref 6–10)
POTASSIUM SERPL-SCNC: 4.3 MMOL/L (ref 3.4–5.1)
RBC # BLD: 4.28 M/UL (ref 3.8–5.8)
SODIUM BLD-SCNC: 142 MMOL/L (ref 136–145)
TOTAL PROTEIN: 6.6 G/DL (ref 6.4–8.3)
TRIGL SERPL-MCNC: 78 MG/DL (ref 0–249)
TSH SERPL DL<=0.05 MIU/L-ACNC: 0.62 UIU/ML (ref 0.35–5.5)
VITAMIN B-12: 1166 PG/ML (ref 211–911)
VLDLC SERPL CALC-MCNC: 16 MG/DL
WBC # BLD: 6.1 K/UL (ref 4–11)

## 2017-10-31 ENCOUNTER — OFFICE VISIT (OUTPATIENT)
Dept: BARIATRICS/WEIGHT MGMT | Facility: CLINIC | Age: 62
End: 2017-10-31

## 2017-10-31 VITALS
SYSTOLIC BLOOD PRESSURE: 129 MMHG | WEIGHT: 153.5 LBS | BODY MASS INDEX: 28.98 KG/M2 | TEMPERATURE: 97.9 F | HEART RATE: 68 BPM | OXYGEN SATURATION: 99 % | DIASTOLIC BLOOD PRESSURE: 81 MMHG | HEIGHT: 61 IN | RESPIRATION RATE: 18 BRPM

## 2017-10-31 DIAGNOSIS — E03.9 HYPOTHYROIDISM, UNSPECIFIED TYPE: ICD-10-CM

## 2017-10-31 DIAGNOSIS — Z98.84 S/P BARIATRIC SURGERY: ICD-10-CM

## 2017-10-31 DIAGNOSIS — R53.83 FATIGUE, UNSPECIFIED TYPE: ICD-10-CM

## 2017-10-31 DIAGNOSIS — R10.13 DYSPEPSIA: Primary | ICD-10-CM

## 2017-10-31 DIAGNOSIS — E53.8 VITAMIN B12 DEFICIENCY: ICD-10-CM

## 2017-10-31 PROCEDURE — 99214 OFFICE O/P EST MOD 30 MIN: CPT | Performed by: PHYSICIAN ASSISTANT

## 2017-10-31 RX ORDER — ESTRADIOL 1 MG/1
TABLET ORAL
Refills: 0 | COMMUNITY
Start: 2017-10-10 | End: 2022-03-21 | Stop reason: SDUPTHER

## 2017-10-31 RX ORDER — MEDROXYPROGESTERONE ACETATE 2.5 MG/1
TABLET ORAL
Refills: 0 | COMMUNITY
Start: 2017-10-16 | End: 2022-03-21 | Stop reason: SDUPTHER

## 2017-10-31 NOTE — PROGRESS NOTES
[facility] Bariatric Surgery  [address]  [facility] [id number] [phone]      Patient Name:  Jessica Flowers.  :  1955      Date of Visit: 10/31/2017      Reason for Visit:  postop eval     HPI:  Jessica Flowers is a 62 y.o. female almost 4 months s/p LSG/HHR by [facility] on 17    LOV POD #28.  Says 3 month f/up was rescheduled d/t ortho issues.  Scheduled for (R) TKR in AM.  No further issues w/ nausea unless she has eaten something too fast.  Continues on Prevacid daily - denies reflux.  No other issues/concerns.  Tolerated diet progression.  Getting 60+g prot/day - still drinking 2 protein shakes/day.  Labs 17 - abnL /elevated MMA.   RX'ed daily B12 injections x 2 weeks but patient says she did not get RX.  Continues on same vitamin regimen - MVI, B12, B1, Vit D and iron.  External pharmacy med list shows she is taking B12 1000mcg SL daily.  Has not been exercising most recently d/t knee issues.       Presurgery weight: 203 pounds.  Today's weight is 153 lb 8 oz (69.6 kg) pounds, today's  Body mass index is 29 kg/(m^2)., and her weight loss since surgery is 50 pounds - goal weight 135 lbs.       Past Medical History:   Diagnosis Date   • Abnormal CXR     bronchitis poss, [facility]   • Allergic rhinitis     uses inhalers prn, denies asthma   • Anxiety    • Carpal tunnel syndrome    • Chronic narcotic use     HC   • Depression    • Dyspnea on exertion    • Fatigue    • GERD (gastroesophageal reflux disease)     on Prilosec + BID Zantac   • Glucose intolerance (impaired glucose tolerance)    • Hiatal hernia with gastroesophageal reflux     EGD [facility] , 39 cm, path DE mild nonspec changes.  serum h. pyl neg   • Hx MRSA infection      on abdomen, tx w/ Bactrim   • Hyperlipidemia    • Hypertension    • Hypertriglyceridemia    • Hypothyroid    • Insomnia     uses Trazodone   • Joint pain    • Morbid obesity    • OAB (overactive bladder)     tx w/ botox injections    • Osteoarthritis of knees, bilateral     steroid injxns as above, supposed to get synvisc soon.  Says bone on bone, needs TKR, but ortho surgeon wants her to have WLS first   • Plantar fasciitis    • Postmenopausal HRT (hormone replacement therapy)    • Psoriasis    • Vitamin D deficiency      Past Surgical History:   Procedure Laterality Date   • APPENDECTOMY  1989    emergent, open   • CARPAL TUNNEL RELEASE      (B)   • CHOLECYSTECTOMY OPEN  1980    [facility]   • DILATATION AND CURETTAGE  1990, 2015   • GASTRIC SLEEVE LAPAROSCOPIC N/A 7/5/2017    Procedure: GASTRIC SLEEVE LAPAROSCOPIC, ;  Surgeon: Cj Gregg MD;  Location:  [facility];  Service:    • LAPAROSCOPIC TUBAL LIGATION  1988   • OTHER SURGICAL HISTORY      denies anesthesia issues   • [place] LAP,ESOPHAGOGAST FUNDOPLASTY N/A 7/5/2017    Procedure: HIATAL HERNIA REPAIR LAPAROSCOPIC;  Surgeon: Cj Gregg MD;  Location:  [facility];  Service: Bariatric     Outpatient Prescriptions Marked as Taking for the 10/31/17 encounter (Office Visit) with GAVI Posey   Medication Sig Dispense Refill   • benazepril (LOTENSIN) 20 MG tablet Take 20 mg by mouth Daily.     • estradiol (ESTRACE) 1 MG tablet take 1 tablet by mouth once daily  0   • FLUoxetine (PROzac) 20 MG capsule Take 40 mg by mouth Daily.     • ipratropium (ATROVENT) 0.03 % nasal spray 2 sprays into each nostril Every 12 (Twelve) Hours.     • lansoprazole (PREVACID) 30 MG capsule Take 30 mg by mouth Daily.     • levothyroxine (SYNTHROID, LEVOTHROID) 100 MCG tablet Take 50 mcg by mouth Daily.     • medroxyPROGESTERone (PROVERA) 2.5 MG tablet take 1 tablet by mouth once daily  0   • oxybutynin XL (DITROPAN-XL) 10 MG 24 hr tablet Take 10 mg by mouth Daily.     • pravastatin (PRAVACHOL) 40 MG tablet Take 40 mg by mouth Daily.     • RA VITAMIN D-3 5000 UNITS capsule 5,000 Units Daily.  0   • traZODone (DESYREL) 100 MG tablet Take 100 mg by mouth Every Night.     • [DISCONTINUED] raNITIdine  "(ZANTAC) 150 MG tablet take 1 tablet by mouth twice a day  0     No Known Allergies    Social History     Social History   • Marital status: Legally      Spouse name: N/A   • Number of children: N/A   • Years of education: N/A     Occupational History   • Homemaker, formerly worked as a  @ [employer]      Social History Main Topics   • Smoking status: Never Smoker   • Smokeless tobacco: Never Used   • Alcohol use No   • Drug use: No   • Sexual activity: Yes     Partners: Male      Comment: spouse     Other Topics Concern   • Not on file     Social History Narrative    Lives in [place], [place] w/ son (who does smoke).        /81 (BP Location: Left arm, Patient Position: Sitting, Cuff Size: Large Adult)  Pulse 68  Temp 97.9 °F (36.6 °C) (Temporal Artery )   Resp 18  Ht 61\" (154.9 cm)  Wt 153 lb 8 oz (69.6 kg)  SpO2 99%  BMI 29 kg/m2  Physical Exam   Constitutional: She appears well-developed and well-nourished. She is cooperative.   obese   HENT:   Mouth/Throat: Oropharynx is clear and moist and mucous membranes are normal.   Eyes: Conjunctivae are normal. No scleral icterus.   Cardiovascular: Normal rate.    Pulmonary/Chest: Effort normal.   Abdominal: Soft. Bowel sounds are normal. There is no tenderness.   Incisions well healed   Musculoskeletal: Normal range of motion. She exhibits no edema.   Neurological: She is alert.   Skin: Skin is warm and dry. No rash noted.   Psychiatric: She has a normal mood and affect. Judgment normal.         Assessment:  Almost 4 months s/p LSG/HHR by GDW on 7/5/17    ICD-10-CM ICD-9-CM   1. Dyspepsia R10.13 536.8   2. Fatigue, unspecified type R53.83 780.79   3. Vitamin B12 deficiency E53.8 266.2   4. Hypothyroidism, unspecified type E03.9 244.9   5. S/P bariatric surgery Z98.84 V45.86       Plan:  Doing well from a bariatric standpoint. Continue w/ good food choices and healthy habits.  Routine labs ordered.  Continue vitamins w/ adjustments pending lab results.  " Call w/ problems/concerns.    The patient was instructed to follow up in 2 months, sooner if needed.     Total time spent w/ patient 25 minutes and 15 minutes spent counseling the patient on nutrition and necessary dietary/lifestyle modifications.

## 2017-11-07 LAB
25(OH)D3+25(OH)D2 SERPL-MCNC: 70.2 NG/ML (ref 30–100)
ALBUMIN SERPL-MCNC: 4.3 G/DL (ref 3.6–4.8)
ALBUMIN/GLOB SERPL: 2 {RATIO} (ref 1.2–2.2)
ALP SERPL-CCNC: 60 IU/L (ref 39–117)
ALT SERPL-CCNC: 16 IU/L (ref 0–32)
AST SERPL-CCNC: 14 IU/L (ref 0–40)
BASOPHILS # BLD AUTO: 0.1 X10E3/UL (ref 0–0.2)
BASOPHILS NFR BLD AUTO: 1 %
BILIRUB SERPL-MCNC: 1.1 MG/DL (ref 0–1.2)
BUN SERPL-MCNC: 26 MG/DL (ref 8–27)
BUN/CREAT SERPL: 41 (ref 12–28)
CALCIUM SERPL-MCNC: 9.3 MG/DL (ref 8.7–10.3)
CHLORIDE SERPL-SCNC: 105 MMOL/L (ref 96–106)
CO2 SERPL-SCNC: 22 MMOL/L (ref 18–29)
CREAT SERPL-MCNC: 0.64 MG/DL (ref 0.57–1)
EOSINOPHIL # BLD AUTO: 0.1 X10E3/UL (ref 0–0.4)
EOSINOPHIL NFR BLD AUTO: 2 %
ERYTHROCYTE [DISTWIDTH] IN BLOOD BY AUTOMATED COUNT: 13.4 % (ref 12.3–15.4)
FERRITIN SERPL-MCNC: 167 NG/ML (ref 15–150)
FOLATE SERPL-MCNC: 16.4 NG/ML
GFR SERPLBLD CREATININE-BSD FMLA CKD-EPI: 112 ML/MIN/1.73
GFR SERPLBLD CREATININE-BSD FMLA CKD-EPI: 97 ML/MIN/1.73
GLOBULIN SER CALC-MCNC: 2.1 G/DL (ref 1.5–4.5)
GLUCOSE SERPL-MCNC: 95 MG/DL (ref 65–99)
HCT VFR BLD AUTO: 41.2 % (ref 34–46.6)
HGB BLD-MCNC: 13.9 G/DL (ref 11.1–15.9)
IMM GRANULOCYTES # BLD: 0 X10E3/UL (ref 0–0.1)
IMM GRANULOCYTES NFR BLD: 0 %
IRON SERPL-MCNC: 45 UG/DL (ref 27–159)
LYMPHOCYTES # BLD AUTO: 1.6 X10E3/UL (ref 0.7–3.1)
LYMPHOCYTES NFR BLD AUTO: 25 %
MCH RBC QN AUTO: 31.2 PG (ref 26.6–33)
MCHC RBC AUTO-ENTMCNC: 33.7 G/DL (ref 31.5–35.7)
MCV RBC AUTO: 93 FL (ref 79–97)
METHYLMALONATE SERPL-SCNC: 690 NMOL/L (ref 0–378)
MONOCYTES # BLD AUTO: 0.5 X10E3/UL (ref 0.1–0.9)
MONOCYTES NFR BLD AUTO: 8 %
NEUTROPHILS # BLD AUTO: 4.1 X10E3/UL (ref 1.4–7)
NEUTROPHILS NFR BLD AUTO: 64 %
PLATELET # BLD AUTO: 268 X10E3/UL (ref 150–379)
POTASSIUM SERPL-SCNC: 4.2 MMOL/L (ref 3.5–5.2)
PREALB SERPL-MCNC: 24 MG/DL (ref 10–36)
PROT SERPL-MCNC: 6.4 G/DL (ref 6–8.5)
RBC # BLD AUTO: 4.45 X10E6/UL (ref 3.77–5.28)
SODIUM SERPL-SCNC: 145 MMOL/L (ref 134–144)
TSH SERPL DL<=0.005 MIU/L-ACNC: 0.65 UIU/ML (ref 0.45–4.5)
VIT B1 BLD-SCNC: 270 NMOL/L (ref 66.5–200)
VIT B12 SERPL-MCNC: 927 PG/ML (ref 211–946)
WBC # BLD AUTO: 6.5 X10E3/UL (ref 3.4–10.8)

## 2017-11-21 ENCOUNTER — HOSPITAL ENCOUNTER (OUTPATIENT)
Dept: PHYSICAL THERAPY | Age: 62
Discharge: HOME | End: 2017-11-30
Attending: ORTHOPAEDIC SURGERY | Admitting: ORTHOPAEDIC SURGERY

## 2017-11-21 DIAGNOSIS — N20.0 CALCULUS OF KIDNEY: ICD-10-CM

## 2017-11-21 NOTE — PROGRESS NOTES
Achieve right knee AROM: 0-115. Short term goal 3: Independent with HEP. Long term goals  Time Frame for Long term goals : 6-8 weeks  Long term goal 1: Achieve right knee pain at or less than 1/10 with ADL's and I-ADL's. Long term goal 2: Achieve right knee AROM: 0-125. Long term goal 3: Achieve 4+/5 right knee strength. Long term goal 4: Achieve a normal gait pattern on level surfaces. Long term goal 5: Achieve a LEFS score of 60/80. Therapy Time   Individual Concurrent Group Co-treatment   Time In           Time Out           Minutes                   Electronically signed by Luz Cota PT on 11/21/2017 at 5:83 PM      Certification of Medical Necessity: It will be understood that this treatment plan is certified medically necessary by the documenting therapist and referring physician mentioned in this report. Unless the physician indicated otherwise through written correspondence with our office, all further referrals will act as certification of medical necessity on this treatment plan. Thank you for this referral.  If you have questions regarding this plan of care, please call [phone].           Revisions to this plan (optional):                     Please sign and return this plan to:   FAX: [phone]      Signature:                                 Date:

## 2017-11-27 ENCOUNTER — HOSPITAL ENCOUNTER (OUTPATIENT)
Dept: PHYSICAL THERAPY | Age: 62
Discharge: HOME | End: 2017-11-27

## 2017-11-27 DIAGNOSIS — N20.0 CALCULUS OF KIDNEY: ICD-10-CM

## 2017-11-29 ENCOUNTER — HOSPITAL ENCOUNTER (OUTPATIENT)
Dept: PHYSICAL THERAPY | Age: 62
Discharge: HOME | End: 2017-11-29

## 2017-11-29 DIAGNOSIS — N20.0 CALCULUS OF KIDNEY: ICD-10-CM

## 2017-11-29 NOTE — FLOWSHEET NOTE
Physical Therapy Daily Treatment Note   Date:  2017    TIme In:   1430                   Time Out: 18    Patient Name:  Ignacio Patterson    :  1955  MRN: [id number]    Restrictions/Precautions:    Pertinent Medical History:  Medical/Treatment Diagnosis Information:    s/p right TKA: pain, ROM and strength deficits; difficulty walking  ·       Insurance/Certification information:    [place] [place] Medicaid  Physician Information:    Cody Meyers MD  Plan of care signed (Y/N):    Visit# / total visits:    3 /    G-Code (if applicable):      Date / Visit # G-Code Applied:         Progress Note: []  Yes  [x]  No  Next due by: Visit #10      Pain level: 0 /10  Subjective: Pt reports her knee is doing pretty good and she didn't get sore after last visit. She expresses that all of her pain is in her other knee. Objective:  Observation:   Test measurements:    Palpation:    Exercises:  Exercise Resistance/Repetitions Other comments   GS x30 29   QS 5\"x30 29   SLR flex 2x30 29   HS x3' 29   AP's x30 29   Heel prop x5' 29   Nustep L3x8' 29   Mini squats x15 29   Calf st 3x30\" 29                    Other Therapeutic Activities:      Manual Treatments:   Patella mobs, fem/tib mobs in ext, PROM x12'. Modalities:        Timed Code Treatment Minutes:  45      Total Treatment Minutes:  53    Treatment/Activity Tolerance:  [x] Patient tolerated treatment well [] Patient limited by fatigue  [] Patient limited by pain  [] Patient limited by other medical complications  [x] Other:  Pt completed tx with no pain and responds well to manual tx but does require rest breaks throughout PROM due to pain.     Pain after treatment:     0 /10    Prognosis: [x] Good [] Fair  [] Poor     Patient Requires Follow-up: [x] Yes  [] No    Plan:   [x] Continue per plan of care [] Alter current plan (see comments)  [] Plan of care initiated [] Hold pending MD visit [] Discharge    Plan for Next Session:        Electronically signed by:

## 2017-12-01 ENCOUNTER — HOSPITAL ENCOUNTER (OUTPATIENT)
Dept: OTHER | Age: 62
Discharge: HOME | End: 2017-12-31
Attending: ORTHOPAEDIC SURGERY | Admitting: ORTHOPAEDIC SURGERY

## 2017-12-04 ENCOUNTER — HOSPITAL ENCOUNTER (OUTPATIENT)
Dept: PHYSICAL THERAPY | Age: 62
Discharge: HOME | End: 2017-12-04

## 2017-12-04 DIAGNOSIS — N20.0 CALCULUS OF KIDNEY: ICD-10-CM

## 2017-12-04 NOTE — FLOWSHEET NOTE
Physical Therapy Daily Treatment Note   Date:  2017    TIme In:   4255                   Time Out: 7533    Patient Name:  Hernan Turner    :  1955  MRN: [id number]    Restrictions/Precautions:    Pertinent Medical History:  Medical/Treatment Diagnosis Information:    s/p right TKA: pain, ROM and strength deficits; difficulty walking   ·       Insurance/Certification information:    [place] Medicaid  Physician Information:    Jose Muse MD  Plan of care signed (Y/N):    Visit# / total visits:    4 /    G-Code (if applicable):      Date / Visit # G-Code Applied:         Progress Note: []  Yes  [x]  No  Next due by: Visit #10      Pain level:  0/10  Subjective: Pt reports her surgical knee is doing well but her bad knee is hurting. She expresses that her ankle hurts when we stretch. Objective:  Observation:   Test measurements:    Palpation:    Exercises:  Exercise Resistance/Repetitions Other comments   GS 20x5\" 4   QS 5\"x30 4   SLR flex 2x30 4   HS x3' 4   Ankle 4-way x30 YTB 4   Heel prop x5' 4   Nustep L5x8' 4   Mini squats 2x10 4   Calf st incline 4x20\" 4   St marches x1' 4   HR 2x10 4          Other Therapeutic Activities:      Manual Treatments:   Patella mobs, fem/tib mobs in ext, PROM x12'. Modalities:        Timed Code Treatment Minutes: 55      Total Treatment Minutes:  62    Treatment/Activity Tolerance:  [x] Patient tolerated treatment well [] Patient limited by fatigue  [] Patient limited by pain  [] Patient limited by other medical complications  [x] Other:  Pt completed tx with no pain and required rest breaks with manual tx.     Pain after treatment:    0 /10    Prognosis: [x] Good [] Fair  [] Poor     Patient Requires Follow-up: [x] Yes  [] No    Plan:   [x] Continue per plan of care [] Alter current plan (see comments)  [] Plan of care initiated [] Hold pending MD visit [] Discharge    Plan for Next Session:        Electronically signed by:  Alana Cantrell PTA

## 2017-12-05 ENCOUNTER — HOSPITAL ENCOUNTER (OUTPATIENT)
Dept: OTHER | Age: 62
Discharge: HOME | End: 2017-12-05
Attending: NURSE PRACTITIONER | Admitting: NURSE PRACTITIONER

## 2017-12-05 LAB
A/G RATIO: 1.9 (ref 0.8–2)
ALBUMIN SERPL-MCNC: 4.2 G/DL (ref 3.4–4.8)
ALP BLD-CCNC: 66 U/L (ref 25–100)
ALT SERPL-CCNC: 12 U/L (ref 4–36)
ANION GAP SERPL CALCULATED.3IONS-SCNC: 13 MMOL/L (ref 3–16)
AST SERPL-CCNC: 15 U/L (ref 8–33)
BASOPHILS ABSOLUTE: 0.1 K/UL (ref 0–0.1)
BASOPHILS RELATIVE PERCENT: 1.6 %
BILIRUB SERPL-MCNC: 1.2 MG/DL (ref 0.3–1.2)
BUN BLDV-MCNC: 20 MG/DL (ref 6–20)
CALCIUM SERPL-MCNC: 9.5 MG/DL (ref 8.5–10.5)
CHLORIDE BLD-SCNC: 104 MMOL/L (ref 98–107)
CHOLESTEROL, TOTAL: 129 MG/DL (ref 0–200)
CO2: 26 MMOL/L (ref 20–30)
CREAT SERPL-MCNC: 0.7 MG/DL (ref 0.4–1.2)
EOSINOPHILS ABSOLUTE: 0.6 K/UL (ref 0–0.4)
EOSINOPHILS RELATIVE PERCENT: 10.5 %
FOLATE: 13.28 NG/ML
GFR AFRICAN AMERICAN: >59
GFR NON-AFRICAN AMERICAN: >60
GLOBULIN: 2.2 G/DL
GLUCOSE BLD-MCNC: 104 MG/DL (ref 74–106)
HBA1C MFR BLD: 4.9 %
HCT VFR BLD CALC: 43.9 % (ref 37–47)
HDLC SERPL-MCNC: 66 MG/DL (ref 40–60)
HEMOGLOBIN: 13.4 G/DL (ref 11.5–16.5)
LDL CHOLESTEROL CALCULATED: 47 MG/DL
LYMPHOCYTES ABSOLUTE: 1.5 K/UL (ref 1.5–4)
LYMPHOCYTES RELATIVE PERCENT: 27.7 %
MCH RBC QN AUTO: 31.2 PG (ref 27–32)
MCHC RBC AUTO-ENTMCNC: 30.5 G/DL (ref 31–35)
MCV RBC AUTO: 102.1 FL (ref 80–100)
MONOCYTES ABSOLUTE: 0.4 K/UL (ref 0.2–0.8)
MONOCYTES RELATIVE PERCENT: 6.9 %
NEUTROPHILS ABSOLUTE: 2.9 K/UL (ref 2–7.5)
NEUTROPHILS RELATIVE PERCENT: 52.9 %
PDW BLD-RTO: 12.6 % (ref 11–16)
PLATELET # BLD: 234 K/UL (ref 150–400)
PMV BLD AUTO: 12.7 FL (ref 6–10)
POTASSIUM SERPL-SCNC: 4.5 MMOL/L (ref 3.4–5.1)
RBC # BLD: 4.3 M/UL (ref 3.8–5.8)
SODIUM BLD-SCNC: 143 MMOL/L (ref 136–145)
TOTAL PROTEIN: 6.4 G/DL (ref 6.4–8.3)
TRIGL SERPL-MCNC: 81 MG/DL (ref 0–249)
TSH SERPL DL<=0.05 MIU/L-ACNC: 1.36 UIU/ML (ref 0.35–5.5)
VITAMIN B-12: 787 PG/ML (ref 211–911)
VLDLC SERPL CALC-MCNC: 16 MG/DL
WBC # BLD: 5.5 K/UL (ref 4–11)

## 2017-12-11 ENCOUNTER — HOSPITAL ENCOUNTER (OUTPATIENT)
Dept: PHYSICAL THERAPY | Age: 62
Discharge: HOME | End: 2017-12-11

## 2017-12-11 DIAGNOSIS — N20.0 CALCULUS OF KIDNEY: ICD-10-CM

## 2017-12-20 ENCOUNTER — HOSPITAL ENCOUNTER (OUTPATIENT)
Dept: PHYSICAL THERAPY | Age: 62
Discharge: HOME | End: 2017-12-20

## 2017-12-20 DIAGNOSIS — N20.0 CALCULUS OF KIDNEY: ICD-10-CM

## 2017-12-20 NOTE — PROGRESS NOTES
Physical Therapy Daily Reassessment Note   Date:  2017    TIme In:   1430                   Time Out: 1526    Patient Name:  Ignacio Patterson    :  1955  MRN: [id number]    Restrictions/Precautions:    Pertinent Medical History:  Medical/Treatment Diagnosis Information:    s/p right TKA: pain, ROM and strength deficits; difficulty walking   ·       Insurance/Certification information:    [place] Medicaid  Physician Information:    Cody Meyers MD  Plan of care signed (Y/N):    Visit# / total visits:    6/    G-Code (if applicable):      Date / Visit # G-Code Applied:         Progress Note: [x]  Yes  []  No  Next due by: Visit #10      Pain level:  0/10    Subjective:    Pt reports doing okay today; no new c/o; had injection in left knee last week; states MD referred for 6 more weeks of therapy; functionally doing well at home with ADL's and light I-ADL's. Objective:  Observation:   Test measurements:  Right knee A/PROM: ext = -5/0 , flexion  110/115. MMT = 4/5 flex, ext ; LEFS = 50/80. Palpation:   Mild general tenderness right knee. Exercises:  Exercise Resistance/Repetitions Other comments   GS 20x5\" 20   QS 5\"x30 20   SLR flex 2x30 20   HS x3' 20   Ankle 4-way x30 YTB 20   Heel prop x5' 20   Nustep L5x8' 20   Mini squats 2x10 20   Calf st incline 4x20\" 20   St marches x1' 20   HR 2x10 20          Other Therapeutic Activities:      Manual Treatments:   Patella mobs, fem/tib mobs in ext, PROM x12'. Modalities:        Timed Code Treatment Minutes: 48'      Total Treatment Minutes:  47'    Treatment/Activity Tolerance:  [x] Patient tolerated treatment well [] Patient limited by fatigue  [] Patient limited by pain  [] Patient limited by other medical complications  [x] Other:  Pt completed tx with no pain.     Pain after treatment:    0 /10    Prognosis: [x] Good [] Fair  [] Poor    Goals  Short term goals  Time Frame for Short term goals: 3-4 weeks  Short term goal 1: Achieve right knee pain at or less than 2/10 with ADL's and I-ADL's. - met  Short term goal 2: Achieve right knee AROM: 0-115. - nearly met, progressing  Short term goal 3: Independent with HEP. - met  Long term goals  Time Frame for Long term goals : 6-8 weeks  Long term goal 1: Achieve right knee pain at or less than 1/10 with ADL's and I-ADL's. Long term goal 2: Achieve right knee AROM: 0-125. Long term goal 3: Achieve 4+/5 right knee strength. Long term goal 4: Achieve a normal gait pattern on level surfaces. Long term goal 5: Achieve a LEFS score of 60/80. Patient is showing steady, appropriate progress.      Patient Requires Follow-up: [x] Yes  [] No    Plan:   [x] Continue per plan of care [] Alter current plan (see comments)  [] Plan of care initiated [] Hold pending MD visit [] Discharge    Plan for Next Session:        Electronically signed by:  Electronically signed by Ruth Ann Hough PT on 12/20/2017 at 3:46 PM

## 2017-12-26 ENCOUNTER — HOSPITAL ENCOUNTER (OUTPATIENT)
Dept: PHYSICAL THERAPY | Age: 62
Discharge: HOME | End: 2017-12-26

## 2017-12-26 DIAGNOSIS — N20.0 CALCULUS OF KIDNEY: ICD-10-CM

## 2017-12-26 NOTE — PROGRESS NOTES
2: Achieve right knee AROM: 0-115. - nearly met, progressing  Short term goal 3: Independent with HEP. - met  Long term goals  Time Frame for Long term goals : 6-8 weeks  Long term goal 1: Achieve right knee pain at or less than 1/10 with ADL's and I-ADL's. Long term goal 2: Achieve right knee AROM: 0-125. Long term goal 3: Achieve 4+/5 right knee strength. Long term goal 4: Achieve a normal gait pattern on level surfaces. Long term goal 5: Achieve a LEFS score of 60/80.          Patient Requires Follow-up: [x] Yes  [] No    Plan:   [x] Continue per plan of care [] Alter current plan (see comments)  [] Plan of care initiated [] Hold pending MD visit [] Discharge    Plan for Next Session:        Electronically signed by:  Electronically signed by Sue Cantrell PTA on 12/26/2017 at 2:04 PM

## 2017-12-27 ENCOUNTER — HOSPITAL ENCOUNTER (OUTPATIENT)
Dept: OTHER | Age: 62
Discharge: HOME | End: 2017-12-27
Attending: NURSE PRACTITIONER | Admitting: NURSE PRACTITIONER

## 2017-12-27 LAB
RAPID INFLUENZA  B AGN: NEGATIVE
RAPID INFLUENZA A AGN: NEGATIVE

## 2018-01-01 ENCOUNTER — HOSPITAL ENCOUNTER (OUTPATIENT)
Dept: OTHER | Age: 63
Discharge: HOME | End: 2018-01-31
Attending: ORTHOPAEDIC SURGERY | Admitting: ORTHOPAEDIC SURGERY

## 2018-01-03 ENCOUNTER — HOSPITAL ENCOUNTER (OUTPATIENT)
Dept: PHYSICAL THERAPY | Age: 63
Discharge: HOME | End: 2018-01-03

## 2018-01-03 ENCOUNTER — OFFICE VISIT (OUTPATIENT)
Dept: BARIATRICS/WEIGHT MGMT | Facility: CLINIC | Age: 63
End: 2018-01-03

## 2018-01-03 VITALS
DIASTOLIC BLOOD PRESSURE: 87 MMHG | SYSTOLIC BLOOD PRESSURE: 132 MMHG | RESPIRATION RATE: 18 BRPM | OXYGEN SATURATION: 99 % | HEART RATE: 70 BPM | BODY MASS INDEX: 26.71 KG/M2 | TEMPERATURE: 98.1 F | HEIGHT: 61 IN | WEIGHT: 141.5 LBS

## 2018-01-03 DIAGNOSIS — N20.0 CALCULUS OF KIDNEY: ICD-10-CM

## 2018-01-03 DIAGNOSIS — E55.9 VITAMIN D DEFICIENCY: ICD-10-CM

## 2018-01-03 DIAGNOSIS — R10.13 DYSPEPSIA: Primary | ICD-10-CM

## 2018-01-03 DIAGNOSIS — R53.83 FATIGUE, UNSPECIFIED TYPE: ICD-10-CM

## 2018-01-03 DIAGNOSIS — M25.50 ARTHRALGIA, UNSPECIFIED JOINT: ICD-10-CM

## 2018-01-03 DIAGNOSIS — Z98.84 S/P BARIATRIC SURGERY: ICD-10-CM

## 2018-01-03 PROCEDURE — 99214 OFFICE O/P EST MOD 30 MIN: CPT | Performed by: PHYSICIAN ASSISTANT

## 2018-01-03 NOTE — PROGRESS NOTES
[facility] Bariatric Surgery  [address]  [facility] [id number] [phone]      Patient Name:  Jessica Flowers.  :  1955      Date of Visit: 1/3/2018      Reason for Visit:  6 months postop    HPI:  Jessica Flowers is a 62 y.o. female s/p LSG/HHR by [facility] on 17    Since LOV had (R) TKR.  Still doing PT, but overall doing well from an ortho standpoint.  Does say however, ever since having that surgery she has had issues w/ dysphagia and indigestion.  Med review shows she has discontinued her PPI - she does not recall when.  Denies associated vomiting/abd.pain/fevers/chills.       Labs 10/31/17 looked good.  Does have hx elevated MMA, but B12 level WNL.  Continues on same vitamin regimen - MVI, B12, B1, Vit D and iron.      Presurgery weight: 203 pounds.  Today's weight is 64.2 kg (141 lb 8 oz) pounds, today's  Body mass index is 26.74 kg/(m^2)., and her weight loss since surgery is 62 pounds - goal weight 135 lbs.       Past Medical History:   Diagnosis Date   • Abnormal CXR     bronchitis poss, [facility]   • Allergic rhinitis     uses inhalers prn, denies asthma   • Anxiety    • Carpal tunnel syndrome    • Chronic narcotic use     HC   • Depression    • Dyspnea on exertion    • Fatigue    • GERD (gastroesophageal reflux disease)     on Prilosec + BID Zantac   • Glucose intolerance (impaired glucose tolerance)    • Hiatal hernia with gastroesophageal reflux     EGD [facility] , 39 cm, path DE mild nonspec changes.  serum h. pyl neg   • Hx MRSA infection     2010 on abdomen, tx w/ Bactrim   • Hyperlipidemia    • Hypertension    • Hypertriglyceridemia    • Hypothyroid    • Insomnia     uses Trazodone   • Joint pain    • Morbid obesity    • OAB (overactive bladder)     tx w/ botox injections   • Osteoarthritis of knees, bilateral     steroid injxns as above, supposed to get synvisc soon.  Says bone on bone, needs TKR, but ortho surgeon wants her to have WLS first   • Plantar  fasciitis    • Postmenopausal HRT (hormone replacement therapy)    • Psoriasis    • Vitamin D deficiency      Past Surgical History:   Procedure Laterality Date   • APPENDECTOMY  1989    emergent, open   • CARPAL TUNNEL RELEASE      (B)   • CHOLECYSTECTOMY OPEN  1980    [facility]   • DILATATION AND CURETTAGE  1990, 2015   • GASTRIC SLEEVE LAPAROSCOPIC N/A 7/5/2017    Procedure: GASTRIC SLEEVE LAPAROSCOPIC, ;  Surgeon: Cj Gregg MD;  Location:  [facility];  Service:    • LAPAROSCOPIC TUBAL LIGATION  1988   • OTHER SURGICAL HISTORY      denies anesthesia issues   • UT LAP,ESOPHAGOGAST FUNDOPLASTY N/A 7/5/2017    Procedure: HIATAL HERNIA REPAIR LAPAROSCOPIC;  Surgeon: Cj Gregg MD;  Location:  [facility];  Service: Bariatric     Outpatient Prescriptions Marked as Taking for the 1/3/18 encounter (Office Visit) with GAVI Posey   Medication Sig Dispense Refill   • estradiol (ESTRACE) 1 MG tablet take 1 tablet by mouth once daily  0   • FLUoxetine (PROzac) 20 MG capsule Take 40 mg by mouth Daily.     • ipratropium (ATROVENT) 0.03 % nasal spray 2 sprays into each nostril Every 12 (Twelve) Hours.     • levothyroxine (SYNTHROID, LEVOTHROID) 100 MCG tablet Take 50 mcg by mouth Daily.     • medroxyPROGESTERone (PROVERA) 2.5 MG tablet take 1 tablet by mouth once daily  0   • oxybutynin XL (DITROPAN-XL) 10 MG 24 hr tablet Take 10 mg by mouth Daily.     • RA VITAMIN D-3 5000 UNITS capsule 5,000 Units Daily.  0   • traZODone (DESYREL) 100 MG tablet Take 100 mg by mouth Every Night.     • [DISCONTINUED] lansoprazole (PREVACID) 30 MG capsule Take 30 mg by mouth Daily.       No Known Allergies    Social History     Social History   • Marital status: Legally      Spouse name: N/A   • Number of children: N/A   • Years of education: N/A     Occupational History   • Homemaker, formerly worked as a  @ [employer]      Social History Main Topics   • Smoking status: Never Smoker   • Smokeless tobacco:  "Never Used   • Alcohol use No   • Drug use: No   • Sexual activity: Yes     Partners: Male      Comment: spouse     Other Topics Concern   • Not on file     Social History Narrative    Lives in [place], [place] w/ son (who does smoke).        /87 (BP Location: Left arm, Patient Position: Sitting, Cuff Size: Large Adult)  Pulse 70  Temp 98.1 °F (36.7 °C) (Temporal Artery )   Resp 18  Ht 154.9 cm (61\")  Wt 64.2 kg (141 lb 8 oz)  SpO2 99%  BMI 26.74 kg/m2  Physical Exam   Constitutional: She appears well-developed and well-nourished. She is cooperative.   HENT:   Mouth/Throat: Oropharynx is clear and moist and mucous membranes are normal.   Eyes: Conjunctivae are normal. No scleral icterus.   Cardiovascular: Normal rate.    Pulmonary/Chest: Effort normal.   Abdominal: Soft. Bowel sounds are normal. There is no tenderness.   Incisions well healed   Musculoskeletal: Normal range of motion. She exhibits no edema.   Neurological: She is alert.   Skin: Skin is warm and dry. No rash noted.   Psychiatric: She has a normal mood and affect. Judgment normal.         Assessment:  6 months s/p LSG/HHR by [facility] on 7/5/17    ICD-10-CM ICD-9-CM   1. Dyspepsia R10.13 536.8   2. Fatigue, unspecified type R53.83 780.79   3. Vitamin D deficiency E55.9 268.9   4. Arthralgia, unspecified joint M25.50 719.40   5. S/P bariatric surgery Z98.84 V45.86       Plan:  Discussed UGI to further evaluate dysphagia/dyspepsia/indigestion but she declines.  Will resume daily PPI and call if issues persist.  Of note, she did require daily PPI + BID [id number] prior to LSG/HHR.  Otherwise, continue w/ PT and increase exercise/activity as able.  Continue w/ good food choices and healthy habits.  Routine labs ordered.  Continue vitamins w/ adjustments pending lab results.  Call w/ any other problems/concerns.    The patient was instructed to follow up in 3 months, sooner if needed.     Total time spent w/ patient 25 minutes and 15 minutes spent " counseling the patient on nutrition and necessary dietary/lifestyle modifications.

## 2018-01-03 NOTE — PROGRESS NOTES
Physical Therapy Daily Note   Date:  1/3/2018    TIme In:    5987                  Time Out: [address] [address] [facility]    Patient Name:  Carl Kevin    :  1955  MRN: [id number]    Restrictions/Precautions:    Pertinent Medical History:  Medical/Treatment Diagnosis Information:    s/p right TKA: pain, ROM and strength deficits; difficulty walking   ·       Insurance/Certification information:    [employer] Medicaid  Physician Information:    Dixon Schmitz MD  Plan of care signed (Y/N):    Visit# / total visits:    8/    G-Code (if applicable):      Date / Visit # G-Code Applied:         Progress Note: []  Yes  [x]  No  Next due by: Visit #10      Pain level:  0/10    Subjective:    Pt reports nothing new going on with pain. Objective:  Observation:   Test measurements:  Right knee A/PROM: ext = -5/0 , flexion  110/115. MMT = 4/5 flex, ext ; LEFS = 50/80. Palpation:   Mild general tenderness right knee. Exercises:  Exercise Resistance/Repetitions Other comments   GS 20x5\" 3   QS 5\"x30 3   SLR flex 2x30 3   HS x5' 3   Ankle 4-way x30 YTB 3   Heel prop x5' 3   Nustep L5x10' 3   Mini squats 2x10 3   Calf st incline 4x20\" 3   St marches x1' 3   HR 2x10 3          Other Therapeutic Activities:      Manual Treatments:   Patella mobs, fem/tib mobs in ext, PROM x12'. Modalities:        Timed Code Treatment Minutes: 54'      Total Treatment Minutes:  58'    Treatment/Activity Tolerance:  [x] Patient tolerated treatment well [] Patient limited by fatigue  [] Patient limited by pain  [] Patient limited by other medical complications  [x] Other:  Pt completed tx with no pain and is tolerating manual tx with less discomfort.     Pain after treatment:   0  /10    Prognosis: [x] Good [] Fair  [] Poor    Goals  Short term goals  Time Frame for Short term goals: 3-4 weeks  Short term goal 1: Achieve right knee pain at or less than 2/10 with ADL's and I-ADL's. - met  Short term goal 2: Achieve right knee AROM: 0-115. - nearly met, progressing  Short term goal 3: Independent with HEP. - met  Long term goals  Time Frame for Long term goals : 6-8 weeks  Long term goal 1: Achieve right knee pain at or less than 1/10 with ADL's and I-ADL's. Long term goal 2: Achieve right knee AROM: 0-125. Long term goal 3: Achieve 4+/5 right knee strength. Long term goal 4: Achieve a normal gait pattern on level surfaces. Long term goal 5: Achieve a LEFS score of 60/80.          Patient Requires Follow-up: [x] Yes  [] No    Plan:   [x] Continue per plan of care [] Alter current plan (see comments)  [] Plan of care initiated [] Hold pending MD visit [] Discharge    Plan for Next Session:        Electronically signed by:  Electronically signed by Maria Esther Paz PT on 1/3/2018 at 4:43 PM

## 2018-01-05 ENCOUNTER — HOSPITAL ENCOUNTER (OUTPATIENT)
Dept: PHYSICAL THERAPY | Age: 63
Discharge: HOME | End: 2018-01-05

## 2018-01-05 DIAGNOSIS — N20.0 CALCULUS OF KIDNEY: ICD-10-CM

## 2018-01-05 NOTE — PROGRESS NOTES
Physical Therapy Daily Note   Date:  2018    TIme In:    1135                  Time Out:    1235    Patient Name:  Rosa Herzog    :  1955  MRN: [id number]    Restrictions/Precautions:    Pertinent Medical History:  Medical/Treatment Diagnosis Information:    s/p right TKA: pain, ROM and strength deficits; difficulty walking   ·       Insurance/Certification information:    [employer] Medicaid  Physician Information:    Finis Brittle, MD  Plan of care signed (Y/N):    Visit# / total visits:    9/    G-Code (if applicable):      Date / Visit # G-Code Applied:         Progress Note: []  Yes  [x]  No  Next due by: Visit #10      Pain level:  0/10    Subjective:    Pt reports nothing new going on with pain. Objective:  Observation:   Test measurements:  Right knee A/PROM: ext = -5/0 , flexion  110/115. MMT = 4/5 flex, ext ; LEFS = 50/80. Palpation:   Mild general tenderness right knee. Exercises:  Exercise Resistance/Repetitions Other comments   GS 20x5\" 5   QS 5\"x30 5   SLR flex 2x30 5   HS x5' 5   Ankle 4-way x30 YTB 5   Heel prop x5' 5   Nustep L5x10' 5   Mini squats 2x10 5   Calf st incline 4x20\" 5   St marches x1' 5   HR 2x10 5          Other Therapeutic Activities:      Manual Treatments:   Patella mobs, fem/tib mobs in ext, PROM x10'. Modalities:        Timed Code Treatment Minutes:   55      Total Treatment Minutes:    60    Treatment/Activity Tolerance:  [x] Patient tolerated treatment well [] Patient limited by fatigue  [] Patient limited by pain  [] Patient limited by other medical complications  [x] Other:  Pt completed tx with no pain and is tolerating manual tx with less discomfort.     Pain after treatment:   0  /10    Prognosis: [x] Good [] Fair  [] Poor    Goals  Short term goals  Time Frame for Short term goals: 3-4 weeks  Short term goal 1: Achieve right knee pain at or less than 2/10 with ADL's and I-ADL's. - met  Short term goal 2: Achieve right knee AROM: 0-115. - nearly met, progressing  Short term goal 3: Independent with HEP. - met  Long term goals  Time Frame for Long term goals : 6-8 weeks  Long term goal 1: Achieve right knee pain at or less than 1/10 with ADL's and I-ADL's. Long term goal 2: Achieve right knee AROM: 0-125. Long term goal 3: Achieve 4+/5 right knee strength. Long term goal 4: Achieve a normal gait pattern on level surfaces. Long term goal 5: Achieve a LEFS score of 60/80.          Patient Requires Follow-up: [x] Yes  [] No    Plan:   [x] Continue per plan of care [] Alter current plan (see comments)  [] Plan of care initiated [] Hold pending MD visit [] Discharge    Plan for Next Session:        Electronically signed by:  Electronically signed by Jojo Bledsoe PTA on 1/5/2018 at 11:49 AM

## 2018-01-06 LAB
25(OH)D3+25(OH)D2 SERPL-MCNC: 67.1 NG/ML (ref 30–100)
ALBUMIN SERPL-MCNC: 4 G/DL (ref 3.6–4.8)
ALBUMIN/GLOB SERPL: 1.8 {RATIO} (ref 1.2–2.2)
ALP SERPL-CCNC: 66 IU/L (ref 39–117)
ALT SERPL-CCNC: 14 IU/L (ref 0–32)
AST SERPL-CCNC: 16 IU/L (ref 0–40)
BASOPHILS # BLD AUTO: 0.1 X10E3/UL (ref 0–0.2)
BASOPHILS NFR BLD AUTO: 1 %
BILIRUB SERPL-MCNC: 0.7 MG/DL (ref 0–1.2)
BUN SERPL-MCNC: 16 MG/DL (ref 8–27)
BUN/CREAT SERPL: 26 (ref 12–28)
CALCIUM SERPL-MCNC: 9.5 MG/DL (ref 8.7–10.3)
CHLORIDE SERPL-SCNC: 107 MMOL/L (ref 96–106)
CO2 SERPL-SCNC: 24 MMOL/L (ref 18–29)
CREAT SERPL-MCNC: 0.61 MG/DL (ref 0.57–1)
EOSINOPHIL # BLD AUTO: 0.4 X10E3/UL (ref 0–0.4)
EOSINOPHIL NFR BLD AUTO: 5 %
ERYTHROCYTE [DISTWIDTH] IN BLOOD BY AUTOMATED COUNT: 12.7 % (ref 12.3–15.4)
FERRITIN SERPL-MCNC: 178 NG/ML (ref 15–150)
FOLATE SERPL-MCNC: 14.6 NG/ML
GLOBULIN SER CALC-MCNC: 2.2 G/DL (ref 1.5–4.5)
GLUCOSE SERPL-MCNC: 88 MG/DL (ref 65–99)
HCT VFR BLD AUTO: 40.7 % (ref 34–46.6)
HGB BLD-MCNC: 13.8 G/DL (ref 11.1–15.9)
IMM GRANULOCYTES # BLD: 0 X10E3/UL (ref 0–0.1)
IMM GRANULOCYTES NFR BLD: 0 %
IRON SERPL-MCNC: 69 UG/DL (ref 27–159)
LYMPHOCYTES # BLD AUTO: 1.9 X10E3/UL (ref 0.7–3.1)
LYMPHOCYTES NFR BLD AUTO: 27 %
MCH RBC QN AUTO: 31.7 PG (ref 26.6–33)
MCHC RBC AUTO-ENTMCNC: 33.9 G/DL (ref 31.5–35.7)
MCV RBC AUTO: 94 FL (ref 79–97)
METHYLMALONATE SERPL-SCNC: 536 NMOL/L (ref 0–378)
MONOCYTES # BLD AUTO: 0.3 X10E3/UL (ref 0.1–0.9)
MONOCYTES NFR BLD AUTO: 5 %
NEUTROPHILS # BLD AUTO: 4.5 X10E3/UL (ref 1.4–7)
NEUTROPHILS NFR BLD AUTO: 62 %
PLATELET # BLD AUTO: 267 X10E3/UL (ref 150–379)
POTASSIUM SERPL-SCNC: 4.3 MMOL/L (ref 3.5–5.2)
PREALB SERPL-MCNC: 23 MG/DL (ref 10–36)
PROT SERPL-MCNC: 6.2 G/DL (ref 6–8.5)
RBC # BLD AUTO: 4.35 X10E6/UL (ref 3.77–5.28)
SODIUM SERPL-SCNC: 146 MMOL/L (ref 134–144)
VIT B1 BLD-SCNC: 154.5 NMOL/L (ref 66.5–200)
WBC # BLD AUTO: 7.2 X10E3/UL (ref 3.4–10.8)

## 2018-01-08 ENCOUNTER — HOSPITAL ENCOUNTER (OUTPATIENT)
Dept: PHYSICAL THERAPY | Age: 63
Discharge: HOME | End: 2018-01-08

## 2018-01-08 DIAGNOSIS — N20.0 CALCULUS OF KIDNEY: ICD-10-CM

## 2018-01-08 NOTE — PROGRESS NOTES
Physical Therapy Daily Note   Date:  2018    TIme In:    1410                  Time Out:    1500    Patient Name:  Varun Salinas    :  1955  MRN: [id number]    Restrictions/Precautions:    Pertinent Medical History:  Medical/Treatment Diagnosis Information:    s/p right TKA: pain, ROM and strength deficits; difficulty walking   ·       Insurance/Certification information:    Areta Richter Medicaid  Physician Information:    Rhoderick Skiff, MD  Plan of care signed (Y/N):    Visit# / total visits:    10/    G-Code (if applicable):      Date / Visit # G-Code Applied:         Progress Note: []  Yes  [x]  No  Next due by: Visit #10      Pain level:  0/10    Subjective:    Pt reports being ill and not feeling well today. Objective:  Observation:   Test measurements:  Right knee A/PROM: ext = -5/0 , flexion  110/115. MMT = 4/5 flex, ext ; LEFS = 50/80. Palpation:   Mild general tenderness right knee. Exercises:  Exercise Resistance/Repetitions Other comments   GS with SB 20x5\" 8   SLR flex 2x10 2# 8   S/L hip abduction 2x10 8   Prone hip extension 2x10 8   HS x4' 8   Ankle 4-way x30 RTB 8   Heel prop x5' 8   Nustep L5x10' 8   Mini squats 2x10 8   Calf st incline 4x20\" 8   St marches 2x1' 8   HR/TR 2x10 8          Other Therapeutic Activities:      Manual Treatments:   Patella mobs, fem/tib mobs in ext, PROM     Modalities:        Timed Code Treatment Minutes:   50      Total Treatment Minutes:    50    Treatment/Activity Tolerance:  [x] Patient tolerated treatment well [] Patient limited by fatigue  [] Patient limited by pain  [] Patient limited by other medical complications  [x] Other:  Pt completed tx with no pain, manual therapy not performed due to pt being ill and wanting to go home.     Pain after treatment:   0/10    Prognosis: [x] Good [] Fair  [] Poor    Goals  Short term goals  Time Frame for Short term goals: 3-4 weeks  Short term goal 1: Achieve right knee pain at or less than 2/10 with ADL's and

## 2018-01-10 ENCOUNTER — HOSPITAL ENCOUNTER (OUTPATIENT)
Dept: PHYSICAL THERAPY | Age: 63
Discharge: HOME | End: 2018-01-10

## 2018-01-10 DIAGNOSIS — N20.0 CALCULUS OF KIDNEY: ICD-10-CM

## 2018-01-10 NOTE — PROGRESS NOTES
Physical Therapy Daily Note   Date:  1/10/2018    TIme In:   1137                   Time Out:   [place]    Patient Name:  Epi Palafox    :  1955  MRN: [id number]    Restrictions/Precautions:    Pertinent Medical History:  Medical/Treatment Diagnosis Information:    s/p right TKA: pain, ROM and strength deficits; difficulty walking   ·       Insurance/Certification information:    [facility] Medicaid  Physician Information:    Annie Thompson MD  Plan of care signed (Y/N):    Visit# / total visits:    11/    G-Code (if applicable):      Date / Visit # G-Code Applied:         Progress Note: []  Yes  [x]  No  Next due by: Visit #10      Pain level: 0 /10    Subjective:    Pt reports her knee is doing good but its just stiff. She knows that she needs to bend it more when she walks. Objective:   Observation:   Test measurements:  Right knee A/PROM: ext = -5/0 , flexion  110/120. MMT = 4/5 flex, ext ; LEFS = 50/80. Palpation:   Mild general tenderness right knee. Exercises:  Exercise Resistance/Repetitions Other comments   GS with peanut 20x5\" 10   SLR flex 2x10 2# 10   S/L hip abduction 2x10 10   Prone hip extension 2x10 10   HS x4' 10   Ankle 4-way x30 RTB 10   Heel prop x5' 10   Nustep L5x10' 10   Mini squats 2x10 10   Calf st incline 4x20\" 10   St marches 2x1' 10   HR/TR 2x10 10          Other Therapeutic Activities: There-ex observed by Martin Russell, PT. Manual Treatments:   Patella mobs, fem/tib mobs in ext, PROM     Modalities:        Timed Code Treatment Minutes:   50      Total Treatment Minutes:    55    Treatment/Activity Tolerance:  [x] Patient tolerated treatment well [] Patient limited by fatigue  [] Patient limited by pain  [] Patient limited by other medical complications  [x] Other:  Pt completed tx with no pain and increased right knee flex PROM as compared to previous measures.     Pain after treatment:   0/10 \"just stiff\"    Prognosis: [x] Good [] Fair  [] Poor    Goals  Short term goals  Time Frame for Short term goals: 3-4 weeks  Short term goal 1: Achieve right knee pain at or less than 2/10 with ADL's and I-ADL's. - met  Short term goal 2: Achieve right knee AROM: 0-115. - nearly met, progressing  Short term goal 3: Independent with HEP. - met  Long term goals  Time Frame for Long term goals : 6-8 weeks  Long term goal 1: Achieve right knee pain at or less than 1/10 with ADL's and I-ADL's. Long term goal 2: Achieve right knee AROM: 0-125. Long term goal 3: Achieve 4+/5 right knee strength. Long term goal 4: Achieve a normal gait pattern on level surfaces. Long term goal 5: Achieve a LEFS score of 60/80.          Patient Requires Follow-up: [x] Yes  [] No    Plan:   [x] Continue per plan of care [] Alter current plan (see comments)  [] Plan of care initiated [] Hold pending MD visit [] Discharge    Plan for Next Session:        Electronically signed by:  Electronically signed by Floyd Cantrell PTA on 1/10/2018 at 11:39 AM

## 2018-01-15 ENCOUNTER — HOSPITAL ENCOUNTER (OUTPATIENT)
Dept: PHYSICAL THERAPY | Age: 63
Discharge: HOME | End: 2018-01-15

## 2018-01-15 DIAGNOSIS — N20.0 CALCULUS OF KIDNEY: ICD-10-CM

## 2018-01-15 NOTE — PROGRESS NOTES
Physical Therapy Daily Note   Date:  1/15/2018    TIme In:   1405                   Time Out:   1500    Patient Name:  Antonio Lester    :  1955  MRN: [id number]    Restrictions/Precautions:    Pertinent Medical History:  Medical/Treatment Diagnosis Information:    s/p right TKA: pain, ROM and strength deficits; difficulty walking   ·       Insurance/Certification information:    [employer] Medicaid  Physician Information:    Camilo Michele MD  Plan of care signed (Y/N):    Visit# / total visits:    12/    G-Code (if applicable):      Date / Visit # G-Code Applied:         Progress Note: []  Yes  [x]  No  Next due by: Visit #10      Pain level: 0 /10    Subjective:    Pt reports her knee is doing good but its just stiff. Objective:   Observation:   Test measurements:  Right knee A/PROM: ext = -5/0 , flexion  110/120. MMT = 4/5 flex, ext ; LEFS = 50/80. Palpation:   Mild general tenderness right knee. Exercises:  Exercise Resistance/Repetitions Other comments   GS with peanut 20x5\" 10   SLR flex 2x10 2# 10   S/L hip abduction 2x10 10   Prone hip extension 2x10 10   HS x4' 10   Ankle 4-way x30 RTB 10   Heel prop x5' 10   Nustep L5x10' 10   Mini squats 2x10 10   Calf st incline 4x20\" 10   St marches 2x1' 10   HR/TR 2x10 10          Other Therapeutic Activities:      Manual Treatments:   Patella mobs, fem/tib mobs in ext, PROM     Modalities:        Timed Code Treatment Minutes:   50      Total Treatment Minutes:    55    Treatment/Activity Tolerance:  [x] Patient tolerated treatment well [] Patient limited by fatigue  [] Patient limited by pain  [] Patient limited by other medical complications  [x] Other:  Pt completed tx with no pain and increased right knee flex PROM.     Pain after treatment:   0/10 \"just stiff\"    Prognosis: [x] Good [] Fair  [] Poor    Goals  Short term goals  Time Frame for Short term goals: 3-4 weeks  Short term goal 1: Achieve right knee pain at or less than 2/10 with ADL's and

## 2018-02-01 ENCOUNTER — HOSPITAL ENCOUNTER (OUTPATIENT)
Dept: OTHER | Age: 63
Discharge: HOME | End: 2018-02-28
Attending: ORTHOPAEDIC SURGERY | Admitting: ORTHOPAEDIC SURGERY

## 2018-03-28 ENCOUNTER — HOSPITAL ENCOUNTER (OUTPATIENT)
Dept: GENERAL RADIOLOGY | Age: 63
Discharge: HOME | End: 2018-03-28
Attending: UROLOGY | Admitting: UROLOGY

## 2018-03-28 DIAGNOSIS — N20.0 CALCULUS OF KIDNEY: ICD-10-CM

## 2018-03-28 DIAGNOSIS — N13.30 HYDRONEPHROSIS, UNSPECIFIED HYDRONEPHROSIS TYPE: ICD-10-CM

## 2018-04-27 ENCOUNTER — HOSPITAL ENCOUNTER (OUTPATIENT)
Dept: OTHER | Age: 63
Discharge: HOME | End: 2018-04-27
Attending: NURSE PRACTITIONER | Admitting: NURSE PRACTITIONER

## 2018-04-27 LAB
A/G RATIO: 1.9 (ref 0.8–2)
ALBUMIN SERPL-MCNC: 4.1 G/DL (ref 3.4–4.8)
ALP BLD-CCNC: 54 U/L (ref 25–100)
ALT SERPL-CCNC: 17 U/L (ref 4–36)
ANION GAP SERPL CALCULATED.3IONS-SCNC: 10 MMOL/L (ref 3–16)
AST SERPL-CCNC: 17 U/L (ref 8–33)
BASOPHILS ABSOLUTE: 0.1 K/UL (ref 0–0.1)
BASOPHILS RELATIVE PERCENT: 0.8 %
BILIRUB SERPL-MCNC: 0.5 MG/DL (ref 0.3–1.2)
BUN BLDV-MCNC: 21 MG/DL (ref 6–20)
CALCIUM SERPL-MCNC: 8.9 MG/DL (ref 8.5–10.5)
CHLORIDE BLD-SCNC: 100 MMOL/L (ref 98–107)
CHOLESTEROL, TOTAL: 169 MG/DL (ref 0–200)
CO2: 29 MMOL/L (ref 20–30)
CREAT SERPL-MCNC: 0.6 MG/DL (ref 0.4–1.2)
EOSINOPHILS ABSOLUTE: 0.6 K/UL (ref 0–0.4)
EOSINOPHILS RELATIVE PERCENT: 10.6 %
FOLATE: 16.68 NG/ML
GFR AFRICAN AMERICAN: >59
GFR NON-AFRICAN AMERICAN: >60
GLOBULIN: 2.2 G/DL
GLUCOSE BLD-MCNC: 87 MG/DL (ref 74–106)
HBA1C MFR BLD: 5.2 %
HCT VFR BLD CALC: 40.3 % (ref 37–47)
HDLC SERPL-MCNC: 72 MG/DL (ref 40–60)
HEMOGLOBIN: 12.9 G/DL (ref 11.5–16.5)
IMMATURE GRANULOCYTES #: 0 K/UL
IMMATURE GRANULOCYTES %: 0.2 % (ref 0–5)
LDL CHOLESTEROL CALCULATED: 80 MG/DL
LYMPHOCYTES ABSOLUTE: 1.6 K/UL (ref 1.5–4)
LYMPHOCYTES RELATIVE PERCENT: 27.3 %
MCH RBC QN AUTO: 31.7 PG (ref 27–32)
MCHC RBC AUTO-ENTMCNC: 32 G/DL (ref 31–35)
MCV RBC AUTO: 99 FL (ref 80–100)
MONOCYTES ABSOLUTE: 0.4 K/UL (ref 0.2–0.8)
MONOCYTES RELATIVE PERCENT: 6 %
NEUTROPHILS ABSOLUTE: 3.3 K/UL (ref 2–7.5)
NEUTROPHILS RELATIVE PERCENT: 55.1 %
PDW BLD-RTO: 11.9 % (ref 11–16)
PLATELET # BLD: 236 K/UL (ref 150–400)
PMV BLD AUTO: 11.2 FL (ref 6–10)
POTASSIUM SERPL-SCNC: 4.6 MMOL/L (ref 3.4–5.1)
RBC # BLD: 4.07 M/UL (ref 3.8–5.8)
SODIUM BLD-SCNC: 139 MMOL/L (ref 136–145)
TOTAL PROTEIN: 6.3 G/DL (ref 6.4–8.3)
TRIGL SERPL-MCNC: 85 MG/DL (ref 0–249)
TSH SERPL DL<=0.05 MIU/L-ACNC: 2.03 UIU/ML (ref 0.35–5.5)
VITAMIN B-12: 727 PG/ML (ref 211–911)
VITAMIN D 25-HYDROXY: 59.9 (ref 32–100)
VLDLC SERPL CALC-MCNC: 17 MG/DL
WBC # BLD: 6 K/UL (ref 4–11)

## 2018-04-30 ENCOUNTER — HOSPITAL ENCOUNTER (OUTPATIENT)
Dept: OTHER | Age: 63
Discharge: HOME | End: 2018-04-30
Attending: NURSE PRACTITIONER | Admitting: NURSE PRACTITIONER

## 2018-05-03 LAB
ORGANISM: ABNORMAL
URINE CULTURE, ROUTINE: ABNORMAL
URINE CULTURE, ROUTINE: ABNORMAL

## 2018-05-09 ENCOUNTER — OFFICE VISIT (OUTPATIENT)
Dept: BARIATRICS/WEIGHT MGMT | Facility: CLINIC | Age: 63
End: 2018-05-09

## 2018-05-09 VITALS
TEMPERATURE: 97.9 F | DIASTOLIC BLOOD PRESSURE: 78 MMHG | HEIGHT: 61 IN | SYSTOLIC BLOOD PRESSURE: 121 MMHG | WEIGHT: 141.5 LBS | RESPIRATION RATE: 18 BRPM | OXYGEN SATURATION: 98 % | HEART RATE: 65 BPM | BODY MASS INDEX: 26.71 KG/M2

## 2018-05-09 DIAGNOSIS — E78.5 HYPERLIPIDEMIA, UNSPECIFIED HYPERLIPIDEMIA TYPE: ICD-10-CM

## 2018-05-09 DIAGNOSIS — E53.8 VITAMIN B12 DEFICIENCY: ICD-10-CM

## 2018-05-09 DIAGNOSIS — K91.2 POSTGASTRECTOMY MALABSORPTION: ICD-10-CM

## 2018-05-09 DIAGNOSIS — R53.83 FATIGUE, UNSPECIFIED TYPE: Primary | ICD-10-CM

## 2018-05-09 DIAGNOSIS — Z90.3 POSTGASTRECTOMY MALABSORPTION: ICD-10-CM

## 2018-05-09 DIAGNOSIS — Z13.0 SCREENING, IRON DEFICIENCY ANEMIA: ICD-10-CM

## 2018-05-09 DIAGNOSIS — Z13.21 MALNUTRITION SCREEN: ICD-10-CM

## 2018-05-09 DIAGNOSIS — Z98.84 STATUS POST BARIATRIC SURGERY: ICD-10-CM

## 2018-05-09 DIAGNOSIS — I10 ESSENTIAL HYPERTENSION: ICD-10-CM

## 2018-05-09 DIAGNOSIS — K21.9 GASTROESOPHAGEAL REFLUX DISEASE, ESOPHAGITIS PRESENCE NOT SPECIFIED: ICD-10-CM

## 2018-05-09 DIAGNOSIS — M25.50 ARTHRALGIA, UNSPECIFIED JOINT: ICD-10-CM

## 2018-05-09 DIAGNOSIS — E55.9 HYPOVITAMINOSIS D: ICD-10-CM

## 2018-05-09 PROCEDURE — 99214 OFFICE O/P EST MOD 30 MIN: CPT | Performed by: SURGERY

## 2018-05-09 RX ORDER — RANITIDINE 150 MG/1
150 TABLET ORAL 2 TIMES DAILY
COMMUNITY
End: 2018-08-23

## 2018-05-09 RX ORDER — OMEPRAZOLE 40 MG/1
40 CAPSULE, DELAYED RELEASE ORAL DAILY
COMMUNITY
End: 2019-02-25

## 2018-05-09 NOTE — PROGRESS NOTES
[facility] Bariatric Surgery  [address]  Formerly [facility] [address] [phone]        Patient Name:  Jessica Flowers.  :  1955      Date of Visit: 2018      Reason for Visit:   10 months postop      HPI: Jessica Flowers is a 62 y.o. female s/p LSG/HHR by [facility] on 17    Doing well.  Sometimes has some burping/dyspepsia but she thinks associated with eating too fast.  At last visit, was complaining of reflux/dysphagia, but both are better now on omeprazole 40 mg qday and ranitidine 150 mg bid, both of which she took before surgery.  Denies dysphagia, reflux, nausea, vomiting and abdominal pain.  Getting 70+g prot/day.  Still drinks 2 protein shakes per day.  For a while she didn't think she was getting enough protein because she had hair loss, so she increased her protein.  Drinking 64 fluid oz/day.  6 month labs revealed no deficiencies.  Taking MVI, B12, B1, Calcium, Vit D, iron and Vit C.  On Omeprazole  and ranitidine.  Exercise: does strength exercises at home every day.  These are exercises she learned at physical therapy after most recent total knee replacement.  Also does situps.       Presurgery weight: 203 pounds.  Today's weight is 64.2 kg (141 lb 8 oz) pounds, today's  Body mass index is 26.74 kg/m²., and her weight loss since surgery is 52 pounds.      Past Medical History:   Diagnosis Date   • Abnormal CXR     bronchitis poss, [facility]   • Allergic rhinitis     uses inhalers prn, denies asthma   • Anxiety    • Carpal tunnel syndrome    • Chronic narcotic use     HC   • Depression    • Dyspnea on exertion    • Fatigue    • GERD (gastroesophageal reflux disease)     on Prilosec + BID Zantac   • Glucose intolerance (impaired glucose tolerance)    • Hiatal hernia with gastroesophageal reflux     EGD [facility] , 39 cm, path DE mild nonspec changes.  serum h. pyl neg   • Hx MRSA infection     2010 on abdomen, tx w/ Bactrim   • Hyperlipidemia    • Hypertension     • Hypertriglyceridemia    • Hypothyroid    • Insomnia     uses Trazodone   • Joint pain    • Morbid obesity    • OAB (overactive bladder)     tx w/ botox injections   • Osteoarthritis of knees, bilateral     steroid injxns as above, supposed to get synvisc soon.  Says bone on bone, needs TKR, but ortho surgeon wants her to have WLS first   • Plantar fasciitis    • Postmenopausal HRT (hormone replacement therapy)    • Psoriasis    • Vitamin D deficiency      Past Surgical History:   Procedure Laterality Date   • APPENDECTOMY  1989    emergent, open   • CARPAL TUNNEL RELEASE      (B)   • CHOLECYSTECTOMY OPEN  1980    [facility]   • DILATATION AND CURETTAGE  1990, 2015   • GASTRIC SLEEVE LAPAROSCOPIC N/A 7/5/2017    Procedure: GASTRIC SLEEVE LAPAROSCOPIC, ;  Surgeon: Cj Gregg MD;  Location:  [facility];  Service:    • LAPAROSCOPIC TUBAL LIGATION  1988   • OTHER SURGICAL HISTORY      denies anesthesia issues   • DC LAP,ESOPHAGOGAST FUNDOPLASTY N/A 7/5/2017    Procedure: HIATAL HERNIA REPAIR LAPAROSCOPIC;  Surgeon: Cj Gregg MD;  Location:  [facility];  Service: Bariatric     Outpatient Prescriptions Marked as Taking for the 5/9/18 encounter (Office Visit) with Pebbles Arshad MD   Medication Sig Dispense Refill   • estradiol (ESTRACE) 1 MG tablet take 1 tablet by mouth once daily  0   • FLUoxetine (PROzac) 20 MG capsule Take 40 mg by mouth Daily.     • ipratropium (ATROVENT) 0.03 % nasal spray 2 sprays into each nostril Every 12 (Twelve) Hours.     • levothyroxine (SYNTHROID, LEVOTHROID) 100 MCG tablet Take 50 mcg by mouth Daily.     • medroxyPROGESTERone (PROVERA) 2.5 MG tablet take 1 tablet by mouth once daily  0   • omeprazole (priLOSEC) 40 MG capsule Take 40 mg by mouth Daily.     • oxybutynin XL (DITROPAN-XL) 10 MG 24 hr tablet Take 10 mg by mouth Daily.     • RA VITAMIN D-3 5000 UNITS capsule 5,000 Units Daily.  0   • raNITIdine (ZANTAC) 150 MG tablet Take 150 mg by mouth 2 (Two) Times a  "Day.     • traZODone (DESYREL) 100 MG tablet Take 100 mg by mouth Every Night.         No Known Allergies    Social History     Social History   • Marital status: Legally      Spouse name: N/A   • Number of children: N/A   • Years of education: N/A     Occupational History   • Homemaker, formerly worked as a  @ [employer]      Social History Main Topics   • Smoking status: Never Smoker   • Smokeless tobacco: Never Used   • Alcohol use No   • Drug use: No   • Sexual activity: Yes     Partners: Male      Comment: spouse     Other Topics Concern   • Not on file     Social History Narrative    Lives in [place], [place] w/ son (who does smoke).        /78 (BP Location: Left arm, Patient Position: Sitting, Cuff Size: Large Adult)   Pulse 65   Temp 97.9 °F (36.6 °C) (Temporal Artery )   Resp 18   Ht 154.9 cm (61\")   Wt 64.2 kg (141 lb 8 oz)   SpO2 98%   BMI 26.74 kg/m²     Physical Exam   Constitutional: She is oriented to person, place, and time. She appears well-developed and well-nourished. No distress.   HENT:   Head: Normocephalic and atraumatic.   Mouth/Throat: No oropharyngeal exudate.   Eyes: Conjunctivae and EOM are normal. Pupils are equal, round, and reactive to light.   Pulmonary/Chest: Effort normal. No respiratory distress.   Abdominal: Soft. She exhibits no distension.   Neurological: She is alert and oriented to person, place, and time. No cranial nerve deficit.   Skin: Skin is warm and dry. No rash noted. She is not diaphoretic. No erythema.   Psychiatric: She has a normal mood and affect. Her behavior is normal. Judgment and thought content normal.         Assessment:  10 months s/p LSG/HHR by [facility] on 7/5/17    ICD-10-CM ICD-9-CM   1. Fatigue, unspecified type R53.83 780.79   2. Hypovitaminosis D E55.9 268.9   3. Postgastrectomy malabsorption K91.2 579.3    Z90.3    4. Screening, iron deficiency anemia Z13.0 V78.0   5. Malnutrition screen Z13.21 V77.2   6. Vitamin B12 deficiency E53.8 " 266.2   7. Status post bariatric surgery Z98.84 V45.86   8. Essential hypertension I10 401.9   9. Arthralgia, unspecified joint M25.50 719.40   10. Hyperlipidemia, unspecified hyperlipidemia type E78.5 272.4   11. Gastroesophageal reflux disease, esophagitis presence not specified K21.9 530.81         Plan:  Doing well. Continue w/ good food choices and healthy habits.  Continue protein >70g/day.  Continue routine exercise.  Routine bariatric labs ordered.  Continue vitamins w/ adjustments pending lab results.  Call w/ problems/concerns.     The patient was instructed to follow up in 3 months, sooner if needed.    note: approx 15 of the 25 minute visit was spent counseling on nutrition and necessary dietary/lifestyle modifications.    Pebbles Arshad MD

## 2018-05-12 LAB
25(OH)D3+25(OH)D2 SERPL-MCNC: 48.1 NG/ML
ALBUMIN SERPL-MCNC: 3.8 G/DL (ref 3.2–4.8)
ALBUMIN/GLOB SERPL: 1.7 G/DL (ref 1.5–2.5)
ALP SERPL-CCNC: 52 U/L (ref 25–100)
ALT SERPL-CCNC: 33 U/L (ref 7–40)
AST SERPL-CCNC: 21 U/L (ref 0–33)
BASOPHILS # BLD AUTO: 0.05 10*3/MM3 (ref 0–0.2)
BASOPHILS NFR BLD AUTO: 1 % (ref 0–1)
BILIRUB SERPL-MCNC: 1.1 MG/DL (ref 0.3–1.2)
BUN SERPL-MCNC: 24 MG/DL (ref 9–23)
BUN/CREAT SERPL: 34.3 (ref 7–25)
CALCIUM SERPL-MCNC: 8.8 MG/DL (ref 8.7–10.4)
CHLORIDE SERPL-SCNC: 106 MMOL/L (ref 99–109)
CO2 SERPL-SCNC: 30 MMOL/L (ref 20–31)
CREAT SERPL-MCNC: 0.7 MG/DL (ref 0.6–1.3)
EOSINOPHIL # BLD AUTO: 0.66 10*3/MM3 (ref 0–0.3)
EOSINOPHIL NFR BLD AUTO: 13.7 % (ref 0–3)
ERYTHROCYTE [DISTWIDTH] IN BLOOD BY AUTOMATED COUNT: 12.6 % (ref 11.3–14.5)
FERRITIN SERPL-MCNC: 84 NG/ML (ref 10–291)
FOLATE SERPL-MCNC: 17.22 NG/ML (ref 3.2–20)
GFR SERPLBLD CREATININE-BSD FMLA CKD-EPI: 103 ML/MIN/1.73
GFR SERPLBLD CREATININE-BSD FMLA CKD-EPI: 85 ML/MIN/1.73
GLOBULIN SER CALC-MCNC: 2.3 GM/DL
GLUCOSE SERPL-MCNC: 81 MG/DL (ref 70–100)
HCT VFR BLD AUTO: 41.3 % (ref 34.5–44)
HGB BLD-MCNC: 13 G/DL (ref 11.5–15.5)
IMM GRANULOCYTES # BLD: 0.02 10*3/MM3 (ref 0–0.03)
IMM GRANULOCYTES NFR BLD: 0.4 % (ref 0–0.6)
IRON SERPL-MCNC: 109 MCG/DL (ref 50–175)
LYMPHOCYTES # BLD AUTO: 1.44 10*3/MM3 (ref 0.6–4.8)
LYMPHOCYTES NFR BLD AUTO: 29.8 % (ref 24–44)
MCH RBC QN AUTO: 31.3 PG (ref 27–31)
MCHC RBC AUTO-ENTMCNC: 31.5 G/DL (ref 32–36)
MCV RBC AUTO: 99.5 FL (ref 80–99)
METHYLMALONATE SERPL-SCNC: 992 NMOL/L (ref 0–378)
MONOCYTES # BLD AUTO: 0.33 10*3/MM3 (ref 0–1)
MONOCYTES NFR BLD AUTO: 6.8 % (ref 0–12)
NEUTROPHILS # BLD AUTO: 2.33 10*3/MM3 (ref 1.5–8.3)
NEUTROPHILS NFR BLD AUTO: 48.3 % (ref 41–71)
PLATELET # BLD AUTO: 238 10*3/MM3 (ref 150–450)
POTASSIUM SERPL-SCNC: 4.6 MMOL/L (ref 3.5–5.5)
PREALB SERPL-MCNC: 26 MG/DL (ref 10–36)
PROT SERPL-MCNC: 6.1 G/DL (ref 5.7–8.2)
RBC # BLD AUTO: 4.15 10*6/MM3 (ref 3.89–5.14)
SODIUM SERPL-SCNC: 142 MMOL/L (ref 132–146)
VIT B1 BLD-SCNC: 190.5 NMOL/L (ref 66.5–200)
WBC # BLD AUTO: 4.83 10*3/MM3 (ref 3.5–10.8)

## 2018-05-24 ENCOUNTER — TELEPHONE (OUTPATIENT)
Dept: BARIATRICS/WEIGHT MGMT | Facility: CLINIC | Age: 63
End: 2018-05-24

## 2018-05-24 NOTE — TELEPHONE ENCOUNTER
Pt called in and stated that she has been taking her b12 shots to her pcp and a MA at their office has been administering them to her. Is it okay if she misses them on Sunday and Monday? The doctor is not open on Sundays and will be out for memorial day on Monday. Please advise. Thanks.

## 2018-05-30 ENCOUNTER — HOSPITAL ENCOUNTER (OUTPATIENT)
Dept: OTHER | Age: 63
Discharge: HOME | End: 2018-05-30
Attending: NURSE PRACTITIONER | Admitting: NURSE PRACTITIONER

## 2018-06-02 LAB
ORGANISM: ABNORMAL
URINE CULTURE, ROUTINE: ABNORMAL
URINE CULTURE, ROUTINE: ABNORMAL

## 2018-06-05 ENCOUNTER — HOSPITAL ENCOUNTER (OUTPATIENT)
Dept: OTHER | Age: 63
Discharge: HOME | End: 2018-06-05
Attending: NURSE PRACTITIONER | Admitting: NURSE PRACTITIONER

## 2018-06-05 LAB — VITAMIN B-12: >2000 PG/ML (ref 211–911)

## 2018-06-07 ENCOUNTER — HOSPITAL ENCOUNTER (OUTPATIENT)
Dept: GENERAL RADIOLOGY | Age: 63
Discharge: HOME | End: 2018-06-07
Attending: NURSE PRACTITIONER | Admitting: NURSE PRACTITIONER

## 2018-06-07 DIAGNOSIS — N20.0 CALCULUS OF KIDNEY: ICD-10-CM

## 2018-06-07 DIAGNOSIS — Z12.39 BREAST CANCER SCREENING: ICD-10-CM

## 2018-06-26 ENCOUNTER — HOSPITAL ENCOUNTER (OUTPATIENT)
Dept: OTHER | Age: 63
Discharge: HOME | End: 2018-06-26
Attending: NURSE PRACTITIONER | Admitting: NURSE PRACTITIONER

## 2018-06-26 LAB
A/G RATIO: 2.1 (ref 0.8–2)
ALBUMIN SERPL-MCNC: 4.1 G/DL (ref 3.4–4.8)
ALP BLD-CCNC: 64 U/L (ref 25–100)
ALT SERPL-CCNC: 86 U/L (ref 4–36)
ANION GAP SERPL CALCULATED.3IONS-SCNC: 10 MMOL/L (ref 3–16)
AST SERPL-CCNC: 84 U/L (ref 8–33)
BASOPHILS ABSOLUTE: 0 K/UL (ref 0–0.1)
BASOPHILS RELATIVE PERCENT: 0.3 %
BILIRUB SERPL-MCNC: 1.8 MG/DL (ref 0.3–1.2)
BUN BLDV-MCNC: 22 MG/DL (ref 6–20)
CALCIUM SERPL-MCNC: 9.1 MG/DL (ref 8.5–10.5)
CHLORIDE BLD-SCNC: 100 MMOL/L (ref 98–107)
CHOLESTEROL, TOTAL: 175 MG/DL (ref 0–200)
CO2: 28 MMOL/L (ref 20–30)
CREAT SERPL-MCNC: 0.7 MG/DL (ref 0.4–1.2)
EOSINOPHILS ABSOLUTE: 0.8 K/UL (ref 0–0.4)
EOSINOPHILS RELATIVE PERCENT: 6.6 %
FOLATE: 16.86 NG/ML
GFR AFRICAN AMERICAN: >59
GFR NON-AFRICAN AMERICAN: >60
GLOBULIN: 2 G/DL
GLUCOSE BLD-MCNC: 106 MG/DL (ref 74–106)
HCT VFR BLD CALC: 42.3 % (ref 37–47)
HDLC SERPL-MCNC: 100 MG/DL (ref 40–60)
HEMOGLOBIN: 13.2 G/DL (ref 11.5–16.5)
IMMATURE GRANULOCYTES #: 0 K/UL
IMMATURE GRANULOCYTES %: 0.2 % (ref 0–5)
LDL CHOLESTEROL CALCULATED: 59 MG/DL
LYMPHOCYTES ABSOLUTE: 0.4 K/UL (ref 1.5–4)
LYMPHOCYTES RELATIVE PERCENT: 3.3 %
MCH RBC QN AUTO: 31.7 PG (ref 27–32)
MCHC RBC AUTO-ENTMCNC: 31.2 G/DL (ref 31–35)
MCV RBC AUTO: 101.7 FL (ref 80–100)
MONOCYTES ABSOLUTE: 0.8 K/UL (ref 0.2–0.8)
MONOCYTES RELATIVE PERCENT: 6.2 %
NEUTROPHILS ABSOLUTE: 10.2 K/UL (ref 2–7.5)
NEUTROPHILS RELATIVE PERCENT: 83.4 %
PDW BLD-RTO: 12.2 % (ref 11–16)
PLATELET # BLD: 198 K/UL (ref 150–400)
PMV BLD AUTO: 11.4 FL (ref 6–10)
POTASSIUM SERPL-SCNC: 4.2 MMOL/L (ref 3.4–5.1)
RBC # BLD: 4.16 M/UL (ref 3.8–5.8)
SODIUM BLD-SCNC: 138 MMOL/L (ref 136–145)
TOTAL PROTEIN: 6.1 G/DL (ref 6.4–8.3)
TRIGL SERPL-MCNC: 79 MG/DL (ref 0–249)
TSH SERPL DL<=0.05 MIU/L-ACNC: 1.48 UIU/ML (ref 0.35–5.5)
VITAMIN B-12: 1923 PG/ML (ref 211–911)
VITAMIN D 25-HYDROXY: 60 (ref 32–100)
VLDLC SERPL CALC-MCNC: 16 MG/DL
WBC # BLD: 12.2 K/UL (ref 4–11)

## 2018-06-27 LAB
HAV IGM SER IA-ACNC: NORMAL
HEPATITIS B CORE IGM ANTIBODY: NORMAL
HEPATITIS B SURFACE ANTIGEN INTERPRETATION: NORMAL
HEPATITIS C ANTIBODY INTERPRETATION: NORMAL

## 2018-07-02 ENCOUNTER — HOSPITAL ENCOUNTER (OUTPATIENT)
Dept: GENERAL RADIOLOGY | Age: 63
Discharge: HOME | End: 2018-07-02
Attending: NURSE PRACTITIONER | Admitting: NURSE PRACTITIONER

## 2018-07-02 DIAGNOSIS — R94.5 ABNORMAL RESULTS OF LIVER FUNCTION STUDIES: ICD-10-CM

## 2018-07-02 DIAGNOSIS — N20.0 CALCULUS OF KIDNEY: ICD-10-CM

## 2018-07-05 ENCOUNTER — OFFICE VISIT (OUTPATIENT)
Dept: SURGERY | Facility: CLINIC | Age: 63
End: 2018-07-05

## 2018-07-05 VITALS
DIASTOLIC BLOOD PRESSURE: 49 MMHG | OXYGEN SATURATION: 98 % | BODY MASS INDEX: 27.58 KG/M2 | HEART RATE: 74 BPM | WEIGHT: 146.1 LBS | HEIGHT: 61 IN | TEMPERATURE: 99.2 F | SYSTOLIC BLOOD PRESSURE: 92 MMHG

## 2018-07-05 DIAGNOSIS — K64.4 EXTERNAL HEMORRHOIDS: ICD-10-CM

## 2018-07-05 DIAGNOSIS — Z12.11 SCREENING FOR COLON CANCER: Primary | ICD-10-CM

## 2018-07-05 PROCEDURE — 99214 OFFICE O/P EST MOD 30 MIN: CPT | Performed by: SURGERY

## 2018-07-05 RX ORDER — BISACODYL 5 MG/1
5 TABLET, DELAYED RELEASE ORAL DAILY
Qty: 4 TABLET | Refills: 0 | Status: SHIPPED | OUTPATIENT
Start: 2018-07-05 | End: 2018-08-23

## 2018-07-05 RX ORDER — POLYETHYLENE GLYCOL 3350 17 G/17G
238 POWDER, FOR SOLUTION ORAL ONCE
Qty: 14 PACKET | Refills: 0 | Status: SHIPPED | OUTPATIENT
Start: 2018-07-05 | End: 2018-07-05

## 2018-07-05 NOTE — PROGRESS NOTES
Patient: Jessica Flowers    YOB: 1955    Date: 07/05/2018    Primary Care Provider: CYNTHIA Morrissey    Chief Complaint   Patient presents with   • Hemorrhoids       Subjective .     History of present illness:  I saw the patient in the office today as a consultation for evaluation and treatment of hemorrhoids. She complains of an external hemorrhoid for the past two years. She complains of associated sharp rectal pain and occasional constipation. She denies rectal bleeding and diarrhea. No previous colonoscopy. No visible rectal bleeding or anemia. No weight loss.    The following portions of the patient's history were reviewed and updated as appropriate: allergies, current medications, past family history, past medical history, past social history, past surgical history and problem list.      Review of Systems   Constitutional: Negative for chills, fever and unexpected weight change.   HENT: Negative for hearing loss, trouble swallowing and voice change.    Eyes: Negative for visual disturbance.   Respiratory: Negative for apnea, cough, chest tightness, shortness of breath and wheezing.    Cardiovascular: Negative for chest pain, palpitations and leg swelling.   Gastrointestinal: Positive for constipation and rectal pain. Negative for abdominal distention, abdominal pain, anal bleeding, blood in stool, diarrhea, nausea and vomiting.   Endocrine: Negative for cold intolerance and heat intolerance.   Genitourinary: Negative for difficulty urinating, dysuria and flank pain.   Musculoskeletal: Negative for back pain and gait problem.   Skin: Negative for color change, rash and wound.   Neurological: Negative for dizziness, syncope, speech difficulty, weakness, light-headedness, numbness and headaches.   Hematological: Negative for adenopathy. Does not bruise/bleed easily.   Psychiatric/Behavioral: Negative for confusion. The patient is not nervous/anxious.        History:  Past Medical History:    Diagnosis Date   • Abnormal CXR     bronchitis poss, [facility]   • Allergic rhinitis     uses inhalers prn, denies asthma   • Anxiety    • Carpal tunnel syndrome    • Chronic narcotic use     HC   • Depression    • Dyspnea on exertion    • Fatigue    • GERD (gastroesophageal reflux disease)     on Prilosec + BID Zantac   • Glucose intolerance (impaired glucose tolerance)    • Hiatal hernia with gastroesophageal reflux     EGD [facility] 1/17, 39 cm, path DE mild nonspec changes.  serum h. pyl neg   • Hx MRSA infection     2010 on abdomen, tx w/ Bactrim   • Hyperlipidemia    • Hypertension    • Hypertriglyceridemia    • Hypothyroid    • Insomnia     uses Trazodone   • Joint pain    • Morbid obesity (CMS/HCC)    • OAB (overactive bladder)     tx w/ botox injections   • Osteoarthritis of knees, bilateral     steroid injxns as above, supposed to get synvisc soon.  Says bone on bone, needs TKR, but ortho surgeon wants her to have WLS first   • Plantar fasciitis    • Postmenopausal HRT (hormone replacement therapy)    • Psoriasis    • Vitamin D deficiency        Past Surgical History:   Procedure Laterality Date   • APPENDECTOMY  1989    emergent, open   • CARPAL TUNNEL RELEASE      (B)   • CHOLECYSTECTOMY OPEN  1980    [facility]   • DILATATION AND CURETTAGE  1990, 2015   • GASTRIC SLEEVE LAPAROSCOPIC N/A 7/5/2017    Procedure: GASTRIC SLEEVE LAPAROSCOPIC, ;  Surgeon: Cj Gregg MD;  Location:  [facility];  Service:    • LAPAROSCOPIC TUBAL LIGATION  1988   • OTHER SURGICAL HISTORY      denies anesthesia issues   • ME LAP,ESOPHAGOGAST FUNDOPLASTY N/A 7/5/2017    Procedure: HIATAL HERNIA REPAIR LAPAROSCOPIC;  Surgeon: Cj Gregg MD;  Location:  [facility];  Service: Bariatric       Family History   Problem Relation Age of Onset   • Hypertension Mother    • Diabetes Father    • Cancer Father         brain   • Heart attack Father    • Cancer Sister         uterine   • Diabetes Brother    • Heart attack Brother   "      Social History   Substance Use Topics   • Smoking status: Never Smoker   • Smokeless tobacco: Never Used   • Alcohol use No       Medications:   Current Outpatient Prescriptions:   •  bisacodyl (bisacodyl) 5 MG EC tablet, Take 1 tablet by mouth Daily., Disp: 4 tablet, Rfl: 0  •  estradiol (ESTRACE) 1 MG tablet, take 1 tablet by mouth once daily, Disp: , Rfl: 0  •  FLUoxetine (PROzac) 20 MG capsule, Take 40 mg by mouth Daily., Disp: , Rfl:   •  ipratropium (ATROVENT) 0.03 % nasal spray, 2 sprays into each nostril Every 12 (Twelve) Hours., Disp: , Rfl:   •  levothyroxine (SYNTHROID, LEVOTHROID) 100 MCG tablet, Take 50 mcg by mouth Daily., Disp: , Rfl:   •  medroxyPROGESTERone (PROVERA) 2.5 MG tablet, take 1 tablet by mouth once daily, Disp: , Rfl: 0  •  omeprazole (priLOSEC) 40 MG capsule, Take 40 mg by mouth Daily., Disp: , Rfl:   •  oxybutynin XL (DITROPAN-XL) 10 MG 24 hr tablet, Take 10 mg by mouth Daily., Disp: , Rfl:   •  polyethylene glycol (MIRALAX) packet, Take 238 g by mouth 1 (One) Time for 1 dose., Disp: 14 packet, Rfl: 0  •  RA VITAMIN D-3 5000 UNITS capsule, 5,000 Units Daily., Disp: , Rfl: 0  •  raNITIdine (ZANTAC) 150 MG tablet, Take 150 mg by mouth 2 (Two) Times a Day., Disp: , Rfl:   •  traZODone (DESYREL) 100 MG tablet, Take 100 mg by mouth Every Night., Disp: , Rfl:        Allergies: No Known Allergies    Objective     Vital Signs:  Vitals:    07/05/18 1106   BP: 92/49   Pulse: 74   Temp: 99.2 °F (37.3 °C)   SpO2: 98%   Weight: 66.3 kg (146 lb 1.6 oz)   Height: 154.9 cm (60.98\")       Physical Exam:   General Appearance:    Alert, cooperative, in no acute distress   Head:    Normocephalic, without obvious abnormality, atraumatic   Eyes:            Lids and lashes normal, conjunctivae and sclerae normal, no   icterus, no pallor, corneas clear, PERRL   Ears:    Ears appear intact with no abnormalities noted   Throat:   No oral lesions, no thrush, oral mucosa moist   Neck:   No adenopathy, " supple, trachea midline, no thyromegaly,  no JVD   Lungs:     Clear to auscultation,respirations regular, even and                  unlabored    Heart:    Regular rhythm and normal rate, normal S1 and S2, no            murmur   Abdomen:     no masses, no organomegaly, soft non-tender, non-distended, no guarding   Extremities:   Moves all extremities well, no edema, no cyanosis, no             redness   Pulses:   Pulses palpable and equal bilaterally   Skin:   No bleeding, bruising or rash   Lymph nodes:   No palpable adenopathy   Neurologic:   Cranial nerves 2 - 12 grossly intact, sensation intact  Psychiatric: No evidence of depression or anxiety   Results Review:   I reviewed the patient's new clinical results.    Review of Systems was reviewed and confirmed as accurate today.    Assessment/Plan :    1. Screening for colon cancer    2. External hemorrhoids        I recommend a colonoscopy for further evaluation. The procedure was explained as well as the risks which include but are not limited to bleeding, infection, perforation, abdominal pain etc. The patient understands these risks and the procedure and wishes to proceed. Patient reassured about external hemorrhoid, appears to be a residual skin tag without bleeding or ulceration. Recommend a high fiber diet and local Preparation H for care.     Electronically signed by Heike Mcdonald MD  07/05/18  9:03 AM    Portions of this note has been scribed for Heike Mcdonald MD by Mandie Unger. 7/5/2018  12:12 PM

## 2018-07-31 ENCOUNTER — TELEPHONE (OUTPATIENT)
Dept: SURGERY | Facility: CLINIC | Age: 63
End: 2018-07-31

## 2018-08-02 ENCOUNTER — OUTSIDE FACILITY SERVICE (OUTPATIENT)
Dept: SURGERY | Facility: CLINIC | Age: 63
End: 2018-08-02

## 2018-08-02 ENCOUNTER — ANESTHESIA (OUTPATIENT)
Dept: ENDOSCOPY | Facility: HOSPITAL | Age: 63
End: 2018-08-02
Payer: MEDICAID

## 2018-08-02 ENCOUNTER — HOSPITAL ENCOUNTER (OUTPATIENT)
Facility: HOSPITAL | Age: 63
Setting detail: OUTPATIENT SURGERY
Discharge: HOME | End: 2018-08-02
Attending: INTERNAL MEDICINE | Admitting: INTERNAL MEDICINE
Payer: MEDICAID

## 2018-08-02 ENCOUNTER — ANESTHESIA EVENT (OUTPATIENT)
Dept: ENDOSCOPY | Facility: HOSPITAL | Age: 63
End: 2018-08-02
Payer: MEDICAID

## 2018-08-02 VITALS
RESPIRATION RATE: 18 BRPM | TEMPERATURE: 97.6 F | DIASTOLIC BLOOD PRESSURE: 78 MMHG | HEART RATE: 62 BPM | SYSTOLIC BLOOD PRESSURE: 102 MMHG | OXYGEN SATURATION: 99 %

## 2018-08-02 PROCEDURE — 2580000003 HC RX 258: Performed by: NURSE ANESTHETIST, CERTIFIED REGISTERED

## 2018-08-02 PROCEDURE — G0121 COLON CA SCRN NOT HI RSK IND: HCPCS | Performed by: SURGERY

## 2018-08-02 PROCEDURE — 7100000011 HC PHASE II RECOVERY - ADDTL 15 MIN: Performed by: SURGERY

## 2018-08-02 PROCEDURE — 3700000000 HC ANESTHESIA ATTENDED CARE: Performed by: SURGERY

## 2018-08-02 PROCEDURE — 7100000010 HC PHASE II RECOVERY - FIRST 15 MIN: Performed by: SURGERY

## 2018-08-02 PROCEDURE — 3609009500 HC COLONOSCOPY DIAGNOSTIC OR SCREENING: Performed by: SURGERY

## 2018-08-02 PROCEDURE — 6360000002 HC RX W HCPCS: Performed by: NURSE ANESTHETIST, CERTIFIED REGISTERED

## 2018-08-02 PROCEDURE — 3700000001 HC ADD 15 MINUTES (ANESTHESIA): Performed by: SURGERY

## 2018-08-02 RX ORDER — SODIUM CHLORIDE, SODIUM LACTATE, POTASSIUM CHLORIDE, CALCIUM CHLORIDE 600; 310; 30; 20 MG/100ML; MG/100ML; MG/100ML; MG/100ML
INJECTION, SOLUTION INTRAVENOUS CONTINUOUS PRN
Status: DISCONTINUED | OUTPATIENT
Start: 2018-08-02 | End: 2018-08-02 | Stop reason: SDUPTHER

## 2018-08-02 RX ORDER — PROPOFOL 10 MG/ML
INJECTION, EMULSION INTRAVENOUS PRN
Status: DISCONTINUED | OUTPATIENT
Start: 2018-08-02 | End: 2018-08-02 | Stop reason: SDUPTHER

## 2018-08-02 RX ADMIN — PROPOFOL 30 MG: 10 INJECTION, EMULSION INTRAVENOUS at 09:23

## 2018-08-02 RX ADMIN — PROPOFOL 30 MG: 10 INJECTION, EMULSION INTRAVENOUS at 09:33

## 2018-08-02 RX ADMIN — PROPOFOL 50 MG: 10 INJECTION, EMULSION INTRAVENOUS at 09:19

## 2018-08-02 RX ADMIN — SODIUM CHLORIDE, POTASSIUM CHLORIDE, SODIUM LACTATE AND CALCIUM CHLORIDE: 600; 310; 30; 20 INJECTION, SOLUTION INTRAVENOUS at 09:11

## 2018-08-02 RX ADMIN — PROPOFOL 40 MG: 10 INJECTION, EMULSION INTRAVENOUS at 09:26

## 2018-08-02 RX ADMIN — PROPOFOL 40 MG: 10 INJECTION, EMULSION INTRAVENOUS at 09:29

## 2018-08-02 RX ADMIN — PROPOFOL 20 MG: 10 INJECTION, EMULSION INTRAVENOUS at 09:37

## 2018-08-02 RX ADMIN — PROPOFOL 40 MG: 10 INJECTION, EMULSION INTRAVENOUS at 09:35

## 2018-08-02 NOTE — PROGRESS NOTES
Pt awake. No complaints of pain or nausea. Abd soft. +BS. +Flatus. Tolerates CL well. Verbalizes understanding of d/c instructions.

## 2018-08-02 NOTE — ANESTHESIA PRE PROCEDURE
BEART, [id number]     Type & Screen (If Applicable):  No results found for: LABABO, [address]    Anesthesia Evaluation  Patient summary reviewed and Nursing notes reviewed  Airway: Mallampati: II        Dental:          Pulmonary:Negative Pulmonary ROS and normal exam  breath sounds clear to auscultation                            ROS comment: Season allergies   Cardiovascular:    (+) hypertension:,         Rhythm: regular  Rate: normal                    Neuro/Psych:   Negative Neuro/Psych ROS              GI/Hepatic/Renal: Neg GI/Hepatic/Renal ROS            Endo/Other:    (+) hypothyroidism::., .                 Abdominal:           Vascular: negative vascular ROS. Anesthesia Plan      MAC     ASA 3       Induction: intravenous. Anesthetic plan and risks discussed with patient. Use of blood products discussed with patient whom.                    KARMA Galindo - CRNA   8/2/2018

## 2018-08-02 NOTE — OP NOTE
condition.     QUALITY OF PREP:  suboptimal    PLAN:  Repeat colonoscopy in 3 years due to poor prep    Electronically signed by Marco Antonio Guadarrama MD on 8/2/2018 at 9:47 AM

## 2018-08-02 NOTE — ANESTHESIA PRE PROCEDURE
Department of Anesthesiology  Preprocedure Note       Name:  Kendall Juan   Age:  63 y.o.  :  1955                                          MRN:  [id number]         Date:  2018      Surgeon: Aston Oropeza):  Marni Arias MD    Procedure: Procedure(s):  COLONOSCOPY DIAGNOSTIC OR SCREENING    Medications prior to admission:   Prior to Admission medications    Medication Sig Start Date End Date Taking? Authorizing Provider   albuterol sulfate HFA (PROVENTIL HFA) 108 (90 Base) MCG/ACT inhaler Inhale 2 puffs into the lungs every 4 hours as needed for Wheezing or Shortness of Breath With spacer (and mask if indicated). Thanks. 7/4/18 8/3/18  Theodore Nobles DO   traMADol (ULTRAM) 50 MG tablet Take 50 mg by mouth every 6 hours as needed for Pain. Cristina Manley Historical Provider, MD   pravastatin (PRAVACHOL) 10 MG tablet Take 10 mg by mouth daily Indications: patient unsure of dosage    Historical Provider, MD   levothyroxine (SYNTHROID) 100 MCG injection Infuse 50 mcg intravenously every morning (before breakfast) Indications: patient unsure of dosage    Historical Provider, MD       Current medications:    No current facility-administered medications for this encounter. Current Outpatient Prescriptions   Medication Sig Dispense Refill    albuterol sulfate HFA (PROVENTIL HFA) 108 (90 Base) MCG/ACT inhaler Inhale 2 puffs into the lungs every 4 hours as needed for Wheezing or Shortness of Breath With spacer (and mask if indicated). Thanks. 1 Inhaler 1    traMADol (ULTRAM) 50 MG tablet Take 50 mg by mouth every 6 hours as needed for Pain. Tiera Glatter pravastatin (PRAVACHOL) 10 MG tablet Take 10 mg by mouth daily Indications: patient unsure of dosage      levothyroxine (SYNTHROID) 100 MCG injection Infuse 50 mcg intravenously every morning (before breakfast) Indications: patient unsure of dosage         Allergies:  No Known Allergies    Problem List:  There is no problem list on file for this patient.       Past Medical History:        Diagnosis Date    Arthritis     Hypertension     Thyroid disease        Past Surgical History:        Procedure Laterality Date    APPENDECTOMY      CARPAL TUNNEL RELEASE Bilateral     CHOLECYSTECTOMY      GASTRIC BYPASS SURGERY      JOINT REPLACEMENT Right     TUBAL LIGATION         Social History:    Social History   Substance Use Topics    Smoking status: Never Smoker    Smokeless tobacco: Not on file    Alcohol use No                                Counseling given: Not Answered      Vital Signs (Current): There were no vitals filed for this visit. BP Readings from Last 3 Encounters:   07/04/18 103/72   06/09/18 129/84   09/06/15 121/75       NPO Status:                                                                                 BMI:   Wt Readings from Last 3 Encounters:   07/04/18 140 lb (63.5 kg)   06/09/18 140 lb (63.5 kg)   09/06/15 235 lb (106.6 kg)     There is no height or weight on file to calculate BMI.    CBC:   Lab Results   Component Value Date    WBC 12.2 06/26/2018    RBC 4.16 06/26/2018    HGB 13.2 06/26/2018    HCT 42.3 06/26/2018    .7 06/26/2018    RDW 12.2 06/26/2018     06/26/2018       CMP:   Lab Results   Component Value Date     06/26/2018    K 4.2 06/26/2018     06/26/2018    CO2 28 06/26/2018    BUN 22 06/26/2018    CREATININE 0.7 06/26/2018    GFRAA >59 06/26/2018    AGRATIO 2.1 06/26/2018    LABGLOM >60 06/26/2018    GLUCOSE 106 06/26/2018    PROT 6.1 06/26/2018    CALCIUM 9.1 06/26/2018    BILITOT 1.8 06/26/2018    ALKPHOS 64 06/26/2018    AST 84 06/26/2018    ALT 86 06/26/2018       POC Tests: No results for input(s): POCGLU, POCNA, POCK, POCCL, POCBUN, POCHEMO, POCHCT in the last 72 hours.     Coags: No results found for: PROTIME, INR, APTT    HCG (If Applicable): No results found for: PREGTESTUR, PREGSERUM, HCG, HCGQUANT     ABGs: No results found for: PHART, PO2ART, EIG6VVC, JVW8ANA,

## 2018-08-07 ENCOUNTER — HOSPITAL ENCOUNTER (OUTPATIENT)
Facility: HOSPITAL | Age: 63
Discharge: HOME | End: 2018-08-07
Payer: MEDICAID

## 2018-08-07 ENCOUNTER — HOSPITAL ENCOUNTER (OUTPATIENT)
Dept: GENERAL RADIOLOGY | Facility: HOSPITAL | Age: 63
Discharge: HOME | End: 2018-08-07
Payer: MEDICAID

## 2018-08-07 DIAGNOSIS — N30.00 ACUTE CYSTITIS WITHOUT HEMATURIA: ICD-10-CM

## 2018-08-07 LAB
A/G RATIO: 1.6 (ref 0.8–2)
ALBUMIN SERPL-MCNC: 3.9 G/DL (ref 3.4–4.8)
ALP BLD-CCNC: 48 U/L (ref 25–100)
ALT SERPL-CCNC: 20 U/L (ref 4–36)
ANION GAP SERPL CALCULATED.3IONS-SCNC: 8 MMOL/L (ref 3–16)
AST SERPL-CCNC: 17 U/L (ref 8–33)
BILIRUB SERPL-MCNC: 0.8 MG/DL (ref 0.3–1.2)
BILIRUBIN URINE: NEGATIVE
BLOOD, URINE: NEGATIVE
BUN BLDV-MCNC: 18 MG/DL (ref 6–20)
CALCIUM SERPL-MCNC: 9.2 MG/DL (ref 8.5–10.5)
CHLORIDE BLD-SCNC: 107 MMOL/L (ref 98–107)
CLARITY: CLEAR
CO2: 29 MMOL/L (ref 20–30)
COLOR: YELLOW
CREAT SERPL-MCNC: 0.7 MG/DL (ref 0.4–1.2)
GFR AFRICAN AMERICAN: >59
GFR NON-AFRICAN AMERICAN: >60
GLOBULIN: 2.4 G/DL
GLUCOSE BLD-MCNC: 114 MG/DL (ref 74–106)
GLUCOSE URINE: NEGATIVE MG/DL
KETONES, URINE: NEGATIVE MG/DL
LEUKOCYTE ESTERASE, URINE: NEGATIVE
MICROSCOPIC EXAMINATION: NORMAL
NITRITE, URINE: NEGATIVE
PH UA: 6
POTASSIUM SERPL-SCNC: 4.2 MMOL/L (ref 3.4–5.1)
PROTEIN UA: NEGATIVE MG/DL
SODIUM BLD-SCNC: 144 MMOL/L (ref 136–145)
SPECIFIC GRAVITY UA: 1.02
TOTAL PROTEIN: 6.3 G/DL (ref 6.4–8.3)
URINE TYPE: NORMAL
UROBILINOGEN, URINE: 0.2 E.U./DL

## 2018-08-07 PROCEDURE — 87086 URINE CULTURE/COLONY COUNT: CPT

## 2018-08-07 PROCEDURE — 80053 COMPREHEN METABOLIC PANEL: CPT

## 2018-08-07 PROCEDURE — 74018 RADEX ABDOMEN 1 VIEW: CPT

## 2018-08-07 PROCEDURE — 36415 COLL VENOUS BLD VENIPUNCTURE: CPT

## 2018-08-07 PROCEDURE — 81003 URINALYSIS AUTO W/O SCOPE: CPT

## 2018-08-09 LAB — URINE CULTURE, ROUTINE: NORMAL

## 2018-08-23 ENCOUNTER — OFFICE VISIT (OUTPATIENT)
Dept: BARIATRICS/WEIGHT MGMT | Facility: CLINIC | Age: 63
End: 2018-08-23

## 2018-08-23 VITALS
TEMPERATURE: 97.8 F | WEIGHT: 149.5 LBS | HEART RATE: 65 BPM | OXYGEN SATURATION: 99 % | RESPIRATION RATE: 18 BRPM | DIASTOLIC BLOOD PRESSURE: 70 MMHG | HEIGHT: 61 IN | SYSTOLIC BLOOD PRESSURE: 112 MMHG | BODY MASS INDEX: 28.22 KG/M2

## 2018-08-23 DIAGNOSIS — E55.9 HYPOVITAMINOSIS D: ICD-10-CM

## 2018-08-23 DIAGNOSIS — Z13.21 MALNUTRITION SCREEN: ICD-10-CM

## 2018-08-23 DIAGNOSIS — Z90.3 POSTGASTRECTOMY MALABSORPTION: ICD-10-CM

## 2018-08-23 DIAGNOSIS — E53.8 B12 DEFICIENCY: ICD-10-CM

## 2018-08-23 DIAGNOSIS — R53.83 FATIGUE, UNSPECIFIED TYPE: ICD-10-CM

## 2018-08-23 DIAGNOSIS — K91.2 POSTGASTRECTOMY MALABSORPTION: ICD-10-CM

## 2018-08-23 DIAGNOSIS — Z13.0 SCREENING, IRON DEFICIENCY ANEMIA: ICD-10-CM

## 2018-08-23 DIAGNOSIS — Z98.84 STATUS POST BARIATRIC SURGERY: Primary | ICD-10-CM

## 2018-08-23 PROCEDURE — 99214 OFFICE O/P EST MOD 30 MIN: CPT | Performed by: PHYSICIAN ASSISTANT

## 2018-08-23 NOTE — PROGRESS NOTES
[facility] Bariatric Surgery  [address]  [facility] [address]  [phone]        Patient Name:  Jessica Flowers.  :  1955      Date of Visit: 2018      Reason for Visit:   Annual Eval , 1 year postop    HPI: Jessica Flowers is a 63 y.o. female s/p LSG/HHR by [facility]DW on 17    Doing well.  Pleased with progress, very proud of herself, she has worked so hard.  Would like to maintain current weight. Does c/o fatigue. No other issues/concerns. Denies dysphagia, reflux, nausea, vomiting and abdominal pain.  Getting 100+g prot/day, 3 shakes a day + food. Eating 3 meals a day + 3 shakes.  Breakfast: egg and sausage. Lunch/ Dinner: meat and vegetables. Drinking unsure how much she is drinking, only water- minimal green tea (diet).  Last labs revealed B12 deficiency, advised daily injections x 2 weeks with increase PO to 2500.  .  Taking MVI, B12, B1, Calcium, Vit D, iron and Vit C, B12 is 2500.  On Omeprazole .  Exercising exercise bike 15min 3 times  A day.      Presurgery weight: 203 pounds.  Today's weight is 67.8 kg (149 lb 8 oz) pounds, today's  Body mass index is 28.25 kg/m²., and her weight loss since surgery is 54 pounds.      Past Medical History:   Diagnosis Date   • Abnormal CXR     bronchitis poss, [facility]   • Allergic rhinitis     uses inhalers prn, denies asthma   • Anxiety    • Carpal tunnel syndrome    • Chronic narcotic use     HC   • Depression    • Dyspnea on exertion    • Fatigue    • GERD (gastroesophageal reflux disease)     on Prilosec + BID Zantac   • Glucose intolerance (impaired glucose tolerance)    • Hiatal hernia with gastroesophageal reflux     EGD [facility] , 39 cm, path DE mild nonspec changes.  serum h. pyl neg   • Hx MRSA infection      on abdomen, tx w/ Bactrim   • Hyperlipidemia    • Hypertension    • Hypertriglyceridemia    • Hypothyroid    • Insomnia     uses Trazodone   • Joint pain    • Morbid obesity (CMS/HCC)    • OAB  (overactive bladder)     tx w/ botox injections   • Osteoarthritis of knees, bilateral     steroid injxns as above, supposed to get synvisc soon.  Says bone on bone, needs TKR, but ortho surgeon wants her to have WLS first   • Plantar fasciitis    • Postmenopausal HRT (hormone replacement therapy)    • Psoriasis    • Vitamin D deficiency      Past Surgical History:   Procedure Laterality Date   • APPENDECTOMY  1989    emergent, open   • CARPAL TUNNEL RELEASE      (B)   • CHOLECYSTECTOMY OPEN  1980    [facility]   • DILATATION AND CURETTAGE  1990, 2015   • GASTRIC SLEEVE LAPAROSCOPIC N/A 7/5/2017    Procedure: GASTRIC SLEEVE LAPAROSCOPIC, ;  Surgeon: Cj Gregg MD;  Location:  [facility];  Service:    • LAPAROSCOPIC TUBAL LIGATION  1988   • OTHER SURGICAL HISTORY      denies anesthesia issues   • DE LAP,ESOPHAGOGAST FUNDOPLASTY N/A 7/5/2017    Procedure: HIATAL HERNIA REPAIR LAPAROSCOPIC;  Surgeon: Cj Gregg MD;  Location:  [facility];  Service: Bariatric     Outpatient Prescriptions Marked as Taking for the 8/23/18 encounter (Office Visit) with Amy Reyes PA-C   Medication Sig Dispense Refill   • estradiol (ESTRACE) 1 MG tablet take 1 tablet by mouth once daily  0   • FLUoxetine (PROzac) 20 MG capsule Take 40 mg by mouth Daily.     • ipratropium (ATROVENT) 0.03 % nasal spray 2 sprays into each nostril Every 12 (Twelve) Hours.     • levothyroxine (SYNTHROID, LEVOTHROID) 100 MCG tablet Take 50 mcg by mouth Daily.     • medroxyPROGESTERone (PROVERA) 2.5 MG tablet take 1 tablet by mouth once daily  0   • omeprazole (priLOSEC) 40 MG capsule Take 40 mg by mouth Daily.     • oxybutynin XL (DITROPAN-XL) 10 MG 24 hr tablet Take 10 mg by mouth Daily.     • RA VITAMIN D-3 5000 UNITS capsule 5,000 Units Daily.  0   • traZODone (DESYREL) 100 MG tablet Take 100 mg by mouth Every Night.     • [DISCONTINUED] raNITIdine (ZANTAC) 150 MG tablet Take 150 mg by mouth 2 (Two) Times a Day.         No Known  "Allergies    Social History     Social History   • Marital status: Legally      Spouse name: N/A   • Number of children: N/A   • Years of education: N/A     Occupational History   • Homemaker, formerly worked as a  @ [employer]      Social History Main Topics   • Smoking status: Never Smoker   • Smokeless tobacco: Never Used   • Alcohol use No   • Drug use: No   • Sexual activity: Yes     Partners: Male      Comment: spouse     Other Topics Concern   • Not on file     Social History Narrative    Lives in [place], [place] w/ son (who does smoke).        /70 (BP Location: Left arm, Patient Position: Sitting, Cuff Size: Large Adult)   Pulse 65   Temp 97.8 °F (36.6 °C) (Temporal Artery )   Resp 18   Ht 154.9 cm (61\")   Wt 67.8 kg (149 lb 8 oz)   SpO2 99%   BMI 28.25 kg/m²     Physical Exam   Constitutional: She is oriented to person, place, and time. She appears well-developed and well-nourished.   HENT:   Head: Normocephalic and atraumatic.   Cardiovascular: Normal rate, regular rhythm and normal heart sounds.    Pulmonary/Chest: Effort normal and breath sounds normal. No respiratory distress. She has no wheezes.   Abdominal: Soft. Bowel sounds are normal. She exhibits no distension. There is no tenderness.   Loose skin   Neurological: She is alert and oriented to person, place, and time.   Skin: Skin is warm and dry.   Psychiatric: She has a normal mood and affect. Her behavior is normal. Judgment and thought content normal.         Assessment:  1 year s/p LSG/HHR by [facility] on 7/5/17    ICD-10-CM ICD-9-CM   1. Status post bariatric surgery Z98.84 V45.86   2. Fatigue, unspecified type R53.83 780.79   3. Hypovitaminosis D E55.9 268.9   4. Screening, iron deficiency anemia Z13.0 V78.0   5. Malnutrition screen Z13.21 V77.2   6. Postgastrectomy malabsorption K91.2 579.3    Z90.3    7. B12 deficiency E53.8 266.2         Plan: Doing well. Continue w/ good food choices and healthy habits.  Continue to focus on " high protein, low carb.  Keep tracking intake.  Continue routine exercise.  Routine bariatric labs ordered.  Continue vitamins w/ adjustments pending lab results.  Call w/ problems/concerns.     The patient was instructed to follow up in 6 months, sooner if needed.      Total time spent w/ patient 25 minutes and 15 minutes spent counseling the patient on nutrition and necessary dietary/lifestyle modifications.

## 2018-08-26 LAB
25(OH)D3+25(OH)D2 SERPL-MCNC: 42.2 NG/ML
ALBUMIN SERPL-MCNC: 3.96 G/DL (ref 3.2–4.8)
ALBUMIN/GLOB SERPL: 1.6 G/DL (ref 1.5–2.5)
ALP SERPL-CCNC: 50 U/L (ref 25–100)
ALT SERPL-CCNC: 19 U/L (ref 7–40)
AST SERPL-CCNC: 19 U/L (ref 0–33)
BASOPHILS # BLD AUTO: 0.09 10*3/MM3 (ref 0–0.2)
BASOPHILS NFR BLD AUTO: 1.7 % (ref 0–1)
BILIRUB SERPL-MCNC: 0.8 MG/DL (ref 0.3–1.2)
BUN SERPL-MCNC: 18 MG/DL (ref 9–23)
BUN/CREAT SERPL: 26.5 (ref 7–25)
CALCIUM SERPL-MCNC: 8.9 MG/DL (ref 8.7–10.4)
CHLORIDE SERPL-SCNC: 105 MMOL/L (ref 99–109)
CO2 SERPL-SCNC: 28 MMOL/L (ref 20–31)
CREAT SERPL-MCNC: 0.68 MG/DL (ref 0.6–1.3)
EOSINOPHIL # BLD AUTO: 0.45 10*3/MM3 (ref 0–0.3)
EOSINOPHIL NFR BLD AUTO: 8.6 % (ref 0–3)
ERYTHROCYTE [DISTWIDTH] IN BLOOD BY AUTOMATED COUNT: 12.5 % (ref 11.3–14.5)
FERRITIN SERPL-MCNC: 76 NG/ML (ref 10–291)
FOLATE SERPL-MCNC: 15.75 NG/ML (ref 3.2–20)
GLOBULIN SER CALC-MCNC: 2.5 GM/DL
GLUCOSE SERPL-MCNC: 87 MG/DL (ref 70–100)
HCT VFR BLD AUTO: 42.5 % (ref 34.5–44)
HGB BLD-MCNC: 13.3 G/DL (ref 11.5–15.5)
IMM GRANULOCYTES # BLD: 0.01 10*3/MM3 (ref 0–0.03)
IMM GRANULOCYTES NFR BLD: 0.2 % (ref 0–0.6)
IRON SERPL-MCNC: 56 MCG/DL (ref 50–175)
LYMPHOCYTES # BLD AUTO: 1.53 10*3/MM3 (ref 0.6–4.8)
LYMPHOCYTES NFR BLD AUTO: 29.4 % (ref 24–44)
Lab: ABNORMAL
MCH RBC QN AUTO: 31.3 PG (ref 27–31)
MCHC RBC AUTO-ENTMCNC: 31.3 G/DL (ref 32–36)
MCV RBC AUTO: 100 FL (ref 80–99)
METHYLMALONATE SERPL-SCNC: 527 NMOL/L (ref 0–378)
MONOCYTES # BLD AUTO: 0.3 10*3/MM3 (ref 0–1)
MONOCYTES NFR BLD AUTO: 5.8 % (ref 0–12)
NEUTROPHILS # BLD AUTO: 2.83 10*3/MM3 (ref 1.5–8.3)
NEUTROPHILS NFR BLD AUTO: 54.3 % (ref 41–71)
PLATELET # BLD AUTO: 248 10*3/MM3 (ref 150–450)
POTASSIUM SERPL-SCNC: 4.7 MMOL/L (ref 3.5–5.5)
PREALB SERPL-MCNC: 28 MG/DL (ref 10–36)
PROT SERPL-MCNC: 6.5 G/DL (ref 5.7–8.2)
RBC # BLD AUTO: 4.25 10*6/MM3 (ref 3.89–5.14)
SODIUM SERPL-SCNC: 140 MMOL/L (ref 132–146)
VIT B1 BLD-SCNC: 205.1 NMOL/L (ref 66.5–200)
VIT B12 SERPL-MCNC: 1711 PG/ML (ref 211–911)
WBC # BLD AUTO: 5.21 10*3/MM3 (ref 3.5–10.8)

## 2018-09-25 NOTE — PROGRESS NOTES
Physical Therapy Discharge   Date:  01/15/2018    TIme In:   1405                   Time Out:   1500    Patient Name:  Michael Stevenson    :  1955  MRN: [id number]    Restrictions/Precautions:    Pertinent Medical History:  Medical/Treatment Diagnosis Information:    s/p right TKA: pain, ROM and strength deficits; difficulty walking   ·       Insurance/Certification information:    [employer]  Physician Information:    Jocelin Mancini MD  Plan of care signed (Y/N):    Visit# / total visits:    12/    G-Code (if applicable):      Date / Visit # G-Code Applied:         Progress Note: []  Yes  [x]  No  Next due by: Visit #10      Pain level: 0 /10    Subjective:    Pt reports her knee is doing good but its just stiff. Objective:   Observation:   Test measurements:  Right knee A/PROM: ext = -5/0 , flexion  110/120. MMT = 4/5 flex, ext ; LEFS = 50/80. Palpation:   Mild general tenderness right knee. Exercises:  Exercise Resistance/Repetitions Other comments   GS with peanut 20x5\" 10   SLR flex 2x10 2# 10   S/L hip abduction 2x10 10   Prone hip extension 2x10 10   HS x4' 10   Ankle 4-way x30 RTB 10   Heel prop x5' 10   Nustep L5x10' 10   Mini squats 2x10 10   Calf st incline 4x20\" 10   St marches 2x1' 10   HR/TR 2x10 10          Other Therapeutic Activities:      Manual Treatments:   Patella mobs, fem/tib mobs in ext, PROM     Modalities:        Timed Code Treatment Minutes:   50      Total Treatment Minutes:    55    Treatment/Activity Tolerance:  [x] Patient tolerated treatment well [] Patient limited by fatigue  [] Patient limited by pain  [] Patient limited by other medical complications  [x] Other:  Pt completed tx with no pain and increased right knee flex PROM.     Pain after treatment:   0/10 \"just stiff\"    Prognosis: [x] Good [] Fair  [] Poor    Goals  Short term goals  Time Frame for Short term goals: 3-4 weeks  Short term goal 1: Achieve right knee pain at or less than 2/10 with ADL's and

## 2018-11-26 ENCOUNTER — HOSPITAL ENCOUNTER (OUTPATIENT)
Facility: HOSPITAL | Age: 63
Discharge: HOME | End: 2018-11-26
Payer: MEDICAID

## 2018-11-26 LAB
A/G RATIO: 1.8 (ref 0.8–2)
ALBUMIN SERPL-MCNC: 4 G/DL (ref 3.4–4.8)
ALP BLD-CCNC: 49 U/L (ref 25–100)
ALT SERPL-CCNC: 11 U/L (ref 4–36)
ANION GAP SERPL CALCULATED.3IONS-SCNC: 10 MMOL/L (ref 3–16)
AST SERPL-CCNC: 15 U/L (ref 8–33)
BASOPHILS ABSOLUTE: 0.1 K/UL (ref 0–0.1)
BASOPHILS RELATIVE PERCENT: 1.2 %
BILIRUB SERPL-MCNC: 0.8 MG/DL (ref 0.3–1.2)
BUN BLDV-MCNC: 16 MG/DL (ref 6–20)
CALCIUM SERPL-MCNC: 9.2 MG/DL (ref 8.5–10.5)
CHLORIDE BLD-SCNC: 105 MMOL/L (ref 98–107)
CHOLESTEROL, TOTAL: 194 MG/DL (ref 0–200)
CO2: 28 MMOL/L (ref 20–30)
CREAT SERPL-MCNC: 0.7 MG/DL (ref 0.4–1.2)
EOSINOPHILS ABSOLUTE: 0.4 K/UL (ref 0–0.4)
EOSINOPHILS RELATIVE PERCENT: 8.2 %
FOLATE: >20 NG/ML
GFR AFRICAN AMERICAN: >59
GFR NON-AFRICAN AMERICAN: >60
GLOBULIN: 2.2 G/DL
GLUCOSE BLD-MCNC: 88 MG/DL (ref 74–106)
HCT VFR BLD CALC: 39.4 % (ref 37–47)
HDLC SERPL-MCNC: 80 MG/DL (ref 40–60)
HEMOGLOBIN: 12.3 G/DL (ref 11.5–16.5)
IMMATURE GRANULOCYTES #: 0 K/UL
IMMATURE GRANULOCYTES %: 0.2 % (ref 0–5)
LDL CHOLESTEROL CALCULATED: 95 MG/DL
LYMPHOCYTES ABSOLUTE: 1.2 K/UL (ref 1.5–4)
LYMPHOCYTES RELATIVE PERCENT: 23 %
MCH RBC QN AUTO: 30.8 PG (ref 27–32)
MCHC RBC AUTO-ENTMCNC: 31.2 G/DL (ref 31–35)
MCV RBC AUTO: 98.5 FL (ref 80–100)
MONOCYTES ABSOLUTE: 0.4 K/UL (ref 0.2–0.8)
MONOCYTES RELATIVE PERCENT: 7.4 %
NEUTROPHILS ABSOLUTE: 3.1 K/UL (ref 2–7.5)
NEUTROPHILS RELATIVE PERCENT: 60 %
PDW BLD-RTO: 12 % (ref 11–16)
PLATELET # BLD: 244 K/UL (ref 150–400)
PMV BLD AUTO: 11.2 FL (ref 6–10)
POTASSIUM SERPL-SCNC: 4.5 MMOL/L (ref 3.4–5.1)
RBC # BLD: 4 M/UL (ref 3.8–5.8)
SODIUM BLD-SCNC: 143 MMOL/L (ref 136–145)
TOTAL PROTEIN: 6.2 G/DL (ref 6.4–8.3)
TRIGL SERPL-MCNC: 97 MG/DL (ref 0–249)
TSH SERPL DL<=0.05 MIU/L-ACNC: 3.97 UIU/ML (ref 0.35–5.5)
VITAMIN B-12: 693 PG/ML (ref 211–911)
VLDLC SERPL CALC-MCNC: 19 MG/DL
WBC # BLD: 5.1 K/UL (ref 4–11)

## 2018-11-26 PROCEDURE — 36415 COLL VENOUS BLD VENIPUNCTURE: CPT

## 2018-11-26 PROCEDURE — 82607 VITAMIN B-12: CPT

## 2018-11-26 PROCEDURE — 82746 ASSAY OF FOLIC ACID SERUM: CPT

## 2018-11-26 PROCEDURE — 80053 COMPREHEN METABOLIC PANEL: CPT

## 2018-11-26 PROCEDURE — 80061 LIPID PANEL: CPT

## 2018-11-26 PROCEDURE — 84443 ASSAY THYROID STIM HORMONE: CPT

## 2018-11-26 PROCEDURE — 85025 COMPLETE CBC W/AUTO DIFF WBC: CPT

## 2019-02-10 ENCOUNTER — HOSPITAL ENCOUNTER (EMERGENCY)
Facility: HOSPITAL | Age: 64
Discharge: HOME | End: 2019-02-10
Attending: EMERGENCY MEDICINE
Payer: MEDICAID

## 2019-02-10 VITALS
BODY MASS INDEX: 30.21 KG/M2 | HEIGHT: 61 IN | OXYGEN SATURATION: 97 % | WEIGHT: 160 LBS | TEMPERATURE: 98.2 F | HEART RATE: 71 BPM | SYSTOLIC BLOOD PRESSURE: 103 MMHG | DIASTOLIC BLOOD PRESSURE: 67 MMHG | RESPIRATION RATE: 16 BRPM

## 2019-02-10 DIAGNOSIS — R05.9 COUGH: ICD-10-CM

## 2019-02-10 DIAGNOSIS — B34.9 VIRAL SYNDROME: Primary | ICD-10-CM

## 2019-02-10 LAB
A/G RATIO: 1.3 (ref 0.8–2)
ALBUMIN SERPL-MCNC: 3.5 G/DL (ref 3.4–4.8)
ALP BLD-CCNC: 60 U/L (ref 25–100)
ALT SERPL-CCNC: 26 U/L (ref 4–36)
ANION GAP SERPL CALCULATED.3IONS-SCNC: 10 MMOL/L (ref 3–16)
AST SERPL-CCNC: 19 U/L (ref 8–33)
BACTERIA: ABNORMAL /HPF
BASOPHILS ABSOLUTE: 0.1 K/UL (ref 0–0.1)
BASOPHILS RELATIVE PERCENT: 0.6 %
BILIRUB SERPL-MCNC: 0.8 MG/DL (ref 0.3–1.2)
BILIRUBIN URINE: NEGATIVE
BLOOD, URINE: NEGATIVE
BUN BLDV-MCNC: 19 MG/DL (ref 6–20)
CALCIUM SERPL-MCNC: 9.1 MG/DL (ref 8.5–10.5)
CHLORIDE BLD-SCNC: 104 MMOL/L (ref 98–107)
CLARITY: CLEAR
CO2: 26 MMOL/L (ref 20–30)
COLOR: YELLOW
CREAT SERPL-MCNC: 1.2 MG/DL (ref 0.4–1.2)
EOSINOPHILS ABSOLUTE: 0.9 K/UL (ref 0–0.4)
EOSINOPHILS RELATIVE PERCENT: 9.1 %
EPITHELIAL CELLS, UA: ABNORMAL /HPF
GFR AFRICAN AMERICAN: 55
GFR NON-AFRICAN AMERICAN: 46
GLOBULIN: 2.6 G/DL
GLUCOSE BLD-MCNC: 95 MG/DL (ref 74–106)
GLUCOSE URINE: NEGATIVE MG/DL
HCT VFR BLD CALC: 38.3 % (ref 37–47)
HEMOGLOBIN: 12.4 G/DL (ref 11.5–16.5)
IMMATURE GRANULOCYTES #: 0 K/UL
IMMATURE GRANULOCYTES %: 0.4 % (ref 0–5)
KETONES, URINE: NEGATIVE MG/DL
LACTIC ACID: 1.8 MMOL/L (ref 0.4–2)
LEUKOCYTE ESTERASE, URINE: NEGATIVE
LIPASE: 30 U/L (ref 5.6–51.3)
LYMPHOCYTES ABSOLUTE: 0.6 K/UL (ref 1.5–4)
LYMPHOCYTES RELATIVE PERCENT: 6 %
MCH RBC QN AUTO: 31.4 PG (ref 27–32)
MCHC RBC AUTO-ENTMCNC: 32.4 G/DL (ref 31–35)
MCV RBC AUTO: 97 FL (ref 80–100)
MICROSCOPIC EXAMINATION: YES
MONO TEST: NEGATIVE
MONOCYTES ABSOLUTE: 0.8 K/UL (ref 0.2–0.8)
MONOCYTES RELATIVE PERCENT: 7.5 %
NEUTROPHILS ABSOLUTE: 7.7 K/UL (ref 2–7.5)
NEUTROPHILS RELATIVE PERCENT: 76.4 %
NITRITE, URINE: NEGATIVE
PDW BLD-RTO: 11.8 % (ref 11–16)
PH UA: 5.5
PLATELET # BLD: 229 K/UL (ref 150–400)
PMV BLD AUTO: 10.7 FL (ref 6–10)
POTASSIUM SERPL-SCNC: 3.7 MMOL/L (ref 3.4–5.1)
PROTEIN UA: ABNORMAL MG/DL
RAPID INFLUENZA  B AGN: NEGATIVE
RAPID INFLUENZA A AGN: NEGATIVE
RBC # BLD: 3.95 M/UL (ref 3.8–5.8)
RBC UA: ABNORMAL /HPF (ref 0–2)
SODIUM BLD-SCNC: 140 MMOL/L (ref 136–145)
SPECIFIC GRAVITY UA: 1.01
TOTAL PROTEIN: 6.1 G/DL (ref 6.4–8.3)
TROPONIN: <0.3 NG/ML
URINE REFLEX TO CULTURE: ABNORMAL
URINE TYPE: ABNORMAL
UROBILINOGEN, URINE: 0.2 E.U./DL
WBC # BLD: 10.1 K/UL (ref 4–11)
WBC UA: ABNORMAL /HPF (ref 0–5)

## 2019-02-10 PROCEDURE — 2580000003 HC RX 258: Performed by: EMERGENCY MEDICINE

## 2019-02-10 PROCEDURE — 6360000002 HC RX W HCPCS: Performed by: EMERGENCY MEDICINE

## 2019-02-10 PROCEDURE — 99283 EMERGENCY DEPT VISIT LOW MDM: CPT

## 2019-02-10 PROCEDURE — 93005 ELECTROCARDIOGRAM TRACING: CPT

## 2019-02-10 PROCEDURE — 96374 THER/PROPH/DIAG INJ IV PUSH: CPT

## 2019-02-10 PROCEDURE — 36415 COLL VENOUS BLD VENIPUNCTURE: CPT

## 2019-02-10 PROCEDURE — 85025 COMPLETE CBC W/AUTO DIFF WBC: CPT

## 2019-02-10 PROCEDURE — 86308 HETEROPHILE ANTIBODY SCREEN: CPT

## 2019-02-10 PROCEDURE — 84484 ASSAY OF TROPONIN QUANT: CPT

## 2019-02-10 PROCEDURE — 87804 INFLUENZA ASSAY W/OPTIC: CPT

## 2019-02-10 PROCEDURE — 83690 ASSAY OF LIPASE: CPT

## 2019-02-10 PROCEDURE — 80053 COMPREHEN METABOLIC PANEL: CPT

## 2019-02-10 PROCEDURE — 83605 ASSAY OF LACTIC ACID: CPT

## 2019-02-10 PROCEDURE — 81001 URINALYSIS AUTO W/SCOPE: CPT

## 2019-02-10 RX ORDER — BENZONATATE 100 MG/1
100 CAPSULE ORAL 3 TIMES DAILY PRN
Qty: 30 CAPSULE | Refills: 0 | Status: SHIPPED | OUTPATIENT
Start: 2019-02-10 | End: 2019-02-16

## 2019-02-10 RX ORDER — ONDANSETRON 2 MG/ML
4 INJECTION INTRAMUSCULAR; INTRAVENOUS ONCE
Status: COMPLETED | OUTPATIENT
Start: 2019-02-10 | End: 2019-02-10

## 2019-02-10 RX ORDER — BENZONATATE 100 MG/1
100 CAPSULE ORAL 3 TIMES DAILY PRN
Status: DISCONTINUED | OUTPATIENT
Start: 2019-02-10 | End: 2019-02-10

## 2019-02-10 RX ORDER — 0.9 % SODIUM CHLORIDE 0.9 %
1000 INTRAVENOUS SOLUTION INTRAVENOUS ONCE
Status: COMPLETED | OUTPATIENT
Start: 2019-02-10 | End: 2019-02-10

## 2019-02-10 RX ADMIN — ONDANSETRON 4 MG: 2 INJECTION INTRAMUSCULAR; INTRAVENOUS at 02:19

## 2019-02-10 RX ADMIN — SODIUM CHLORIDE 1000 ML: 9 INJECTION, SOLUTION INTRAVENOUS at 02:19

## 2019-02-10 ASSESSMENT — PAIN DESCRIPTION - DESCRIPTORS: DESCRIPTORS: ACHING

## 2019-02-10 ASSESSMENT — PAIN DESCRIPTION - PAIN TYPE: TYPE: ACUTE PAIN

## 2019-02-10 ASSESSMENT — PAIN SCALES - GENERAL: PAINLEVEL_OUTOF10: 6

## 2019-02-10 ASSESSMENT — PAIN DESCRIPTION - PROGRESSION: CLINICAL_PROGRESSION: NOT CHANGED

## 2019-02-10 ASSESSMENT — PAIN DESCRIPTION - LOCATION: LOCATION: HEAD

## 2019-02-10 ASSESSMENT — PAIN DESCRIPTION - ONSET: ONSET: ON-GOING

## 2019-02-10 ASSESSMENT — PAIN DESCRIPTION - ORIENTATION: ORIENTATION: POSTERIOR

## 2019-02-10 ASSESSMENT — PAIN DESCRIPTION - FREQUENCY: FREQUENCY: INTERMITTENT

## 2019-02-12 ENCOUNTER — HOSPITAL ENCOUNTER (OUTPATIENT)
Facility: HOSPITAL | Age: 64
Discharge: HOME | End: 2019-02-12
Payer: MEDICAID

## 2019-02-12 LAB
A/G RATIO: 1.7 (ref 0.8–2)
ALBUMIN SERPL-MCNC: 3.6 G/DL (ref 3.4–4.8)
ALP BLD-CCNC: 58 U/L (ref 25–100)
ALT SERPL-CCNC: 18 U/L (ref 4–36)
ANION GAP SERPL CALCULATED.3IONS-SCNC: 12 MMOL/L (ref 3–16)
AST SERPL-CCNC: 12 U/L (ref 8–33)
BASOPHILS ABSOLUTE: 0.1 K/UL (ref 0–0.1)
BASOPHILS RELATIVE PERCENT: 0.9 %
BILIRUB SERPL-MCNC: 0.5 MG/DL (ref 0.3–1.2)
BUN BLDV-MCNC: 17 MG/DL (ref 6–20)
CALCIUM SERPL-MCNC: 8.7 MG/DL (ref 8.5–10.5)
CHLORIDE BLD-SCNC: 105 MMOL/L (ref 98–107)
CHOLESTEROL, TOTAL: 127 MG/DL (ref 0–200)
CO2: 24 MMOL/L (ref 20–30)
CREAT SERPL-MCNC: 1.2 MG/DL (ref 0.4–1.2)
EOSINOPHILS ABSOLUTE: 0.6 K/UL (ref 0–0.4)
EOSINOPHILS RELATIVE PERCENT: 10.1 %
FOLATE: >20 NG/ML
GFR AFRICAN AMERICAN: 55
GFR NON-AFRICAN AMERICAN: 46
GLOBULIN: 2.1 G/DL
GLUCOSE BLD-MCNC: 84 MG/DL (ref 74–106)
HBA1C MFR BLD: 5.1 %
HCT VFR BLD CALC: 36.8 % (ref 37–47)
HDLC SERPL-MCNC: 50 MG/DL (ref 40–60)
HEMOGLOBIN: 12 G/DL (ref 11.5–16.5)
IMMATURE GRANULOCYTES #: 0 K/UL
IMMATURE GRANULOCYTES %: 0.7 % (ref 0–5)
LDL CHOLESTEROL CALCULATED: 62 MG/DL
LYMPHOCYTES ABSOLUTE: 1.2 K/UL (ref 1.5–4)
LYMPHOCYTES RELATIVE PERCENT: 21.1 %
MCH RBC QN AUTO: 31.3 PG (ref 27–32)
MCHC RBC AUTO-ENTMCNC: 32.6 G/DL (ref 31–35)
MCV RBC AUTO: 95.8 FL (ref 80–100)
MONOCYTES ABSOLUTE: 0.4 K/UL (ref 0.2–0.8)
MONOCYTES RELATIVE PERCENT: 7.2 %
NEUTROPHILS ABSOLUTE: 3.3 K/UL (ref 2–7.5)
NEUTROPHILS RELATIVE PERCENT: 60 %
PDW BLD-RTO: 11.9 % (ref 11–16)
PLATELET # BLD: 256 K/UL (ref 150–400)
PMV BLD AUTO: 11 FL (ref 6–10)
POTASSIUM SERPL-SCNC: 3.9 MMOL/L (ref 3.4–5.1)
RAPID INFLUENZA  B AGN: NEGATIVE
RAPID INFLUENZA A AGN: NEGATIVE
RBC # BLD: 3.84 M/UL (ref 3.8–5.8)
SODIUM BLD-SCNC: 141 MMOL/L (ref 136–145)
TOTAL PROTEIN: 5.7 G/DL (ref 6.4–8.3)
TRIGL SERPL-MCNC: 73 MG/DL (ref 0–249)
TSH SERPL DL<=0.05 MIU/L-ACNC: 2.4 UIU/ML (ref 0.35–5.5)
VITAMIN B-12: 1005 PG/ML (ref 211–911)
VITAMIN D 25-HYDROXY: 60 (ref 32–100)
VLDLC SERPL CALC-MCNC: 15 MG/DL
WBC # BLD: 5.4 K/UL (ref 4–11)

## 2019-02-12 PROCEDURE — 36415 COLL VENOUS BLD VENIPUNCTURE: CPT

## 2019-02-12 PROCEDURE — 84443 ASSAY THYROID STIM HORMONE: CPT

## 2019-02-12 PROCEDURE — 80053 COMPREHEN METABOLIC PANEL: CPT

## 2019-02-12 PROCEDURE — 82306 VITAMIN D 25 HYDROXY: CPT

## 2019-02-12 PROCEDURE — 80061 LIPID PANEL: CPT

## 2019-02-12 PROCEDURE — 83036 HEMOGLOBIN GLYCOSYLATED A1C: CPT

## 2019-02-12 PROCEDURE — 82746 ASSAY OF FOLIC ACID SERUM: CPT

## 2019-02-12 PROCEDURE — 85025 COMPLETE CBC W/AUTO DIFF WBC: CPT

## 2019-02-12 PROCEDURE — 82607 VITAMIN B-12: CPT

## 2019-02-12 PROCEDURE — 87804 INFLUENZA ASSAY W/OPTIC: CPT

## 2019-02-13 ENCOUNTER — HOSPITAL ENCOUNTER (EMERGENCY)
Facility: HOSPITAL | Age: 64
Discharge: HOME | End: 2019-02-13
Attending: EMERGENCY MEDICINE
Payer: MEDICAID

## 2019-02-13 ENCOUNTER — APPOINTMENT (OUTPATIENT)
Dept: GENERAL RADIOLOGY | Facility: HOSPITAL | Age: 64
End: 2019-02-13
Payer: MEDICAID

## 2019-02-13 VITALS
BODY MASS INDEX: 32.1 KG/M2 | SYSTOLIC BLOOD PRESSURE: 93 MMHG | TEMPERATURE: 98.2 F | RESPIRATION RATE: 22 BRPM | OXYGEN SATURATION: 91 % | HEIGHT: 61 IN | DIASTOLIC BLOOD PRESSURE: 58 MMHG | HEART RATE: 93 BPM | WEIGHT: 170 LBS

## 2019-02-13 DIAGNOSIS — J18.9 ATYPICAL PNEUMONIA: Primary | ICD-10-CM

## 2019-02-13 LAB
BASOPHILS ABSOLUTE: 0 K/UL (ref 0–0.1)
BASOPHILS RELATIVE PERCENT: 0.3 %
D DIMER: 420 NG/ML DDU
EOSINOPHILS ABSOLUTE: 0.4 K/UL (ref 0–0.4)
EOSINOPHILS RELATIVE PERCENT: 3.7 %
HCT VFR BLD CALC: 36.5 % (ref 37–47)
HEMOGLOBIN: 12.1 G/DL (ref 11.5–16.5)
IMMATURE GRANULOCYTES #: 0.1 K/UL
IMMATURE GRANULOCYTES %: 0.6 % (ref 0–5)
LYMPHOCYTES ABSOLUTE: 0.4 K/UL (ref 1.5–4)
LYMPHOCYTES RELATIVE PERCENT: 4.3 %
MCH RBC QN AUTO: 31.6 PG (ref 27–32)
MCHC RBC AUTO-ENTMCNC: 33.2 G/DL (ref 31–35)
MCV RBC AUTO: 95.3 FL (ref 80–100)
MONOCYTES ABSOLUTE: 0.2 K/UL (ref 0.2–0.8)
MONOCYTES RELATIVE PERCENT: 2.2 %
NEUTROPHILS ABSOLUTE: 8.9 K/UL (ref 2–7.5)
NEUTROPHILS RELATIVE PERCENT: 88.9 %
PDW BLD-RTO: 11.7 % (ref 11–16)
PLATELET # BLD: 235 K/UL (ref 150–400)
PMV BLD AUTO: 10.3 FL (ref 6–10)
RAPID INFLUENZA  B AGN: NEGATIVE
RAPID INFLUENZA A AGN: NEGATIVE
RBC # BLD: 3.83 M/UL (ref 3.8–5.8)
TROPONIN: <0.3 NG/ML
WBC # BLD: 10 K/UL (ref 4–11)

## 2019-02-13 PROCEDURE — 36415 COLL VENOUS BLD VENIPUNCTURE: CPT

## 2019-02-13 PROCEDURE — 85025 COMPLETE CBC W/AUTO DIFF WBC: CPT

## 2019-02-13 PROCEDURE — 96375 TX/PRO/DX INJ NEW DRUG ADDON: CPT

## 2019-02-13 PROCEDURE — 85379 FIBRIN DEGRADATION QUANT: CPT

## 2019-02-13 PROCEDURE — 96366 THER/PROPH/DIAG IV INF ADDON: CPT

## 2019-02-13 PROCEDURE — 71046 X-RAY EXAM CHEST 2 VIEWS: CPT

## 2019-02-13 PROCEDURE — 99285 EMERGENCY DEPT VISIT HI MDM: CPT

## 2019-02-13 PROCEDURE — 87804 INFLUENZA ASSAY W/OPTIC: CPT

## 2019-02-13 PROCEDURE — 84484 ASSAY OF TROPONIN QUANT: CPT

## 2019-02-13 PROCEDURE — 96361 HYDRATE IV INFUSION ADD-ON: CPT

## 2019-02-13 PROCEDURE — 2580000003 HC RX 258: Performed by: EMERGENCY MEDICINE

## 2019-02-13 PROCEDURE — 96365 THER/PROPH/DIAG IV INF INIT: CPT

## 2019-02-13 PROCEDURE — 6360000002 HC RX W HCPCS: Performed by: EMERGENCY MEDICINE

## 2019-02-13 PROCEDURE — 93005 ELECTROCARDIOGRAM TRACING: CPT

## 2019-02-13 PROCEDURE — 6370000000 HC RX 637 (ALT 250 FOR IP): Performed by: EMERGENCY MEDICINE

## 2019-02-13 RX ORDER — HYDROCODONE POLISTIREX AND CHLORPHENIRAMINE POLISTIREX 10; 8 MG/5ML; MG/5ML
2.5 SUSPENSION, EXTENDED RELEASE ORAL EVERY 12 HOURS PRN
Qty: 30 ML | Refills: 0 | Status: SHIPPED | OUTPATIENT
Start: 2019-02-13 | End: 2019-02-19

## 2019-02-13 RX ORDER — 0.9 % SODIUM CHLORIDE 0.9 %
500 INTRAVENOUS SOLUTION INTRAVENOUS ONCE
Status: COMPLETED | OUTPATIENT
Start: 2019-02-13 | End: 2019-02-13

## 2019-02-13 RX ORDER — HYDROCODONE POLISTIREX AND CHLORPHENIRAMINE POLISTIREX 10; 8 MG/5ML; MG/5ML
5 SUSPENSION, EXTENDED RELEASE ORAL EVERY 12 HOURS PRN
Status: DISCONTINUED | OUTPATIENT
Start: 2019-02-13 | End: 2019-02-14 | Stop reason: HOSPADM

## 2019-02-13 RX ORDER — HYDROCODONE POLISTIREX AND CHLORPHENIRAMINE POLISTIREX 10; 8 MG/5ML; MG/5ML
5 SUSPENSION, EXTENDED RELEASE ORAL EVERY 12 HOURS PRN
Qty: 60 ML | Refills: 0 | Status: SHIPPED | OUTPATIENT
Start: 2019-02-13 | End: 2019-02-13

## 2019-02-13 RX ORDER — AZITHROMYCIN 250 MG/1
250 TABLET, FILM COATED ORAL DAILY
Qty: 4 TABLET | Refills: 0 | Status: ON HOLD | OUTPATIENT
Start: 2019-02-13 | End: 2019-02-19 | Stop reason: HOSPADM

## 2019-02-13 RX ORDER — KETOROLAC TROMETHAMINE 30 MG/ML
30 INJECTION, SOLUTION INTRAMUSCULAR; INTRAVENOUS ONCE
Status: COMPLETED | OUTPATIENT
Start: 2019-02-13 | End: 2019-02-13

## 2019-02-13 RX ADMIN — AZITHROMYCIN MONOHYDRATE 500 MG: 500 INJECTION, POWDER, LYOPHILIZED, FOR SOLUTION INTRAVENOUS at 21:34

## 2019-02-13 RX ADMIN — Medication 5 ML: at 20:59

## 2019-02-13 RX ADMIN — DEXTROSE MONOHYDRATE 1 G: 5 INJECTION INTRAVENOUS at 20:58

## 2019-02-13 RX ADMIN — SODIUM CHLORIDE 500 ML: 9 INJECTION, SOLUTION INTRAVENOUS at 20:21

## 2019-02-13 RX ADMIN — KETOROLAC TROMETHAMINE 30 MG: 30 INJECTION, SOLUTION INTRAMUSCULAR; INTRAVENOUS at 20:21

## 2019-02-13 RX ADMIN — SODIUM CHLORIDE 500 ML: 9 INJECTION, SOLUTION INTRAVENOUS at 21:59

## 2019-02-13 ASSESSMENT — PAIN SCALES - GENERAL
PAINLEVEL_OUTOF10: 8
PAINLEVEL_OUTOF10: 8

## 2019-02-13 ASSESSMENT — ENCOUNTER SYMPTOMS: COUGH: 1

## 2019-02-16 ENCOUNTER — HOSPITAL ENCOUNTER (INPATIENT)
Facility: HOSPITAL | Age: 64
LOS: 3 days | Discharge: HOME | DRG: 194 | End: 2019-02-19
Attending: EMERGENCY MEDICINE | Admitting: INTERNAL MEDICINE
Payer: MEDICAID

## 2019-02-16 ENCOUNTER — APPOINTMENT (OUTPATIENT)
Dept: CT IMAGING | Facility: HOSPITAL | Age: 64
DRG: 194 | End: 2019-02-16
Payer: MEDICAID

## 2019-02-16 DIAGNOSIS — I50.21 ACUTE SYSTOLIC CONGESTIVE HEART FAILURE (HCC): ICD-10-CM

## 2019-02-16 DIAGNOSIS — R06.02 SHORTNESS OF BREATH: Primary | ICD-10-CM

## 2019-02-16 DIAGNOSIS — I50.30 DIASTOLIC CONGESTIVE HEART FAILURE, UNSPECIFIED HF CHRONICITY (HCC): ICD-10-CM

## 2019-02-16 DIAGNOSIS — R09.02 HYPOXIA: ICD-10-CM

## 2019-02-16 PROBLEM — I50.9 ACUTE HEART FAILURE (HCC): Status: ACTIVE | Noted: 2019-02-16

## 2019-02-16 LAB
A/G RATIO: 1.1 (ref 0.8–2)
ALBUMIN SERPL-MCNC: 3.2 G/DL (ref 3.4–4.8)
ALP BLD-CCNC: 55 U/L (ref 25–100)
ALT SERPL-CCNC: 10 U/L (ref 4–36)
ANION GAP SERPL CALCULATED.3IONS-SCNC: 11 MMOL/L (ref 3–16)
AST SERPL-CCNC: 10 U/L (ref 8–33)
BASE EXCESS ARTERIAL: 1.6 MMOL/L (ref -3–3)
BASOPHILS ABSOLUTE: 0.1 K/UL (ref 0–0.1)
BASOPHILS RELATIVE PERCENT: 0.6 %
BILIRUB SERPL-MCNC: 0.4 MG/DL (ref 0.3–1.2)
BUN BLDV-MCNC: 18 MG/DL (ref 6–20)
CALCIUM SERPL-MCNC: 9 MG/DL (ref 8.5–10.5)
CHLORIDE BLD-SCNC: 107 MMOL/L (ref 98–107)
CO2: 25 MMOL/L (ref 20–30)
CREAT SERPL-MCNC: 1.3 MG/DL (ref 0.4–1.2)
EOSINOPHILS ABSOLUTE: 0.8 K/UL (ref 0–0.4)
EOSINOPHILS RELATIVE PERCENT: 8.3 %
FIO2: 0.21 %
GFR AFRICAN AMERICAN: 50
GFR NON-AFRICAN AMERICAN: 42
GLOBULIN: 2.8 G/DL
GLUCOSE BLD-MCNC: 101 MG/DL (ref 74–106)
HCO3 ARTERIAL: 25.4 MMOL/L (ref 22–26)
HCT VFR BLD CALC: 37 % (ref 37–47)
HEMOGLOBIN: 11.8 G/DL (ref 11.5–16.5)
IMMATURE GRANULOCYTES #: 0.1 K/UL
IMMATURE GRANULOCYTES %: 1.1 % (ref 0–5)
LACTIC ACID: 1.8 MMOL/L (ref 0.4–2)
LYMPHOCYTES ABSOLUTE: 1.9 K/UL (ref 1.5–4)
LYMPHOCYTES RELATIVE PERCENT: 19 %
MCH RBC QN AUTO: 31.1 PG (ref 27–32)
MCHC RBC AUTO-ENTMCNC: 31.9 G/DL (ref 31–35)
MCV RBC AUTO: 97.4 FL (ref 80–100)
MONOCYTES ABSOLUTE: 0.4 K/UL (ref 0.2–0.8)
MONOCYTES RELATIVE PERCENT: 3.5 %
NEUTROPHILS ABSOLUTE: 6.8 K/UL (ref 2–7.5)
NEUTROPHILS RELATIVE PERCENT: 67.5 %
O2 SAT, ARTERIAL: 85.7 %
O2 THERAPY: ABNORMAL
PCO2 ARTERIAL: 37 MMHG (ref 35–45)
PDW BLD-RTO: 12 % (ref 11–16)
PH ARTERIAL: 7.46 (ref 7.35–7.45)
PLATELET # BLD: 244 K/UL (ref 150–400)
PMV BLD AUTO: 10.5 FL (ref 6–10)
PO2 ARTERIAL: 51.3 MMHG (ref 80–100)
POTASSIUM SERPL-SCNC: 3.9 MMOL/L (ref 3.4–5.1)
PRO-BNP: 2396 PG/ML (ref 0–1800)
RAPID INFLUENZA  B AGN: NEGATIVE
RAPID INFLUENZA A AGN: NEGATIVE
RBC # BLD: 3.8 M/UL (ref 3.8–5.8)
SODIUM BLD-SCNC: 143 MMOL/L (ref 136–145)
TCO2 ARTERIAL: 26.5 MMOL/L (ref 24–30)
TOTAL PROTEIN: 6 G/DL (ref 6.4–8.3)
TROPONIN: <0.3 NG/ML
WBC # BLD: 10.1 K/UL (ref 4–11)

## 2019-02-16 PROCEDURE — 87804 INFLUENZA ASSAY W/OPTIC: CPT

## 2019-02-16 PROCEDURE — 83880 ASSAY OF NATRIURETIC PEPTIDE: CPT

## 2019-02-16 PROCEDURE — 36415 COLL VENOUS BLD VENIPUNCTURE: CPT

## 2019-02-16 PROCEDURE — 96374 THER/PROPH/DIAG INJ IV PUSH: CPT

## 2019-02-16 PROCEDURE — 82803 BLOOD GASES ANY COMBINATION: CPT

## 2019-02-16 PROCEDURE — 6370000000 HC RX 637 (ALT 250 FOR IP): Performed by: EMERGENCY MEDICINE

## 2019-02-16 PROCEDURE — 87040 BLOOD CULTURE FOR BACTERIA: CPT

## 2019-02-16 PROCEDURE — 93005 ELECTROCARDIOGRAM TRACING: CPT

## 2019-02-16 PROCEDURE — 71275 CT ANGIOGRAPHY CHEST: CPT

## 2019-02-16 PROCEDURE — 6360000004 HC RX CONTRAST MEDICATION: Performed by: EMERGENCY MEDICINE

## 2019-02-16 PROCEDURE — 84484 ASSAY OF TROPONIN QUANT: CPT

## 2019-02-16 PROCEDURE — 70450 CT HEAD/BRAIN W/O DYE: CPT

## 2019-02-16 PROCEDURE — 6360000002 HC RX W HCPCS: Performed by: EMERGENCY MEDICINE

## 2019-02-16 PROCEDURE — 83605 ASSAY OF LACTIC ACID: CPT

## 2019-02-16 PROCEDURE — 1200000000 HC SEMI PRIVATE

## 2019-02-16 PROCEDURE — 36600 WITHDRAWAL OF ARTERIAL BLOOD: CPT

## 2019-02-16 PROCEDURE — 99285 EMERGENCY DEPT VISIT HI MDM: CPT

## 2019-02-16 PROCEDURE — 85025 COMPLETE CBC W/AUTO DIFF WBC: CPT

## 2019-02-16 PROCEDURE — 80053 COMPREHEN METABOLIC PANEL: CPT

## 2019-02-16 RX ORDER — IPRATROPIUM BROMIDE AND ALBUTEROL SULFATE 2.5; .5 MG/3ML; MG/3ML
1 SOLUTION RESPIRATORY (INHALATION)
Status: DISCONTINUED | OUTPATIENT
Start: 2019-02-17 | End: 2019-02-16

## 2019-02-16 RX ORDER — FUROSEMIDE 10 MG/ML
20 INJECTION INTRAMUSCULAR; INTRAVENOUS ONCE
Status: COMPLETED | OUTPATIENT
Start: 2019-02-16 | End: 2019-02-16

## 2019-02-16 RX ORDER — FAMOTIDINE 20 MG/1
20 TABLET, FILM COATED ORAL 2 TIMES DAILY
Status: DISCONTINUED | OUTPATIENT
Start: 2019-02-16 | End: 2019-02-16

## 2019-02-16 RX ORDER — IPRATROPIUM BROMIDE AND ALBUTEROL SULFATE 2.5; .5 MG/3ML; MG/3ML
1 SOLUTION RESPIRATORY (INHALATION) EVERY 4 HOURS PRN
Status: DISCONTINUED | OUTPATIENT
Start: 2019-02-17 | End: 2019-02-19 | Stop reason: HOSPADM

## 2019-02-16 RX ORDER — ONDANSETRON 2 MG/ML
4 INJECTION INTRAMUSCULAR; INTRAVENOUS EVERY 6 HOURS PRN
Status: DISCONTINUED | OUTPATIENT
Start: 2019-02-16 | End: 2019-02-19 | Stop reason: HOSPADM

## 2019-02-16 RX ORDER — SODIUM CHLORIDE 0.9 % (FLUSH) 0.9 %
10 SYRINGE (ML) INJECTION EVERY 12 HOURS SCHEDULED
Status: DISCONTINUED | OUTPATIENT
Start: 2019-02-16 | End: 2019-02-19 | Stop reason: HOSPADM

## 2019-02-16 RX ORDER — HYDROCODONE POLISTIREX AND CHLORPHENIRAMINE POLISTIREX 10; 8 MG/5ML; MG/5ML
5 SUSPENSION, EXTENDED RELEASE ORAL ONCE
Status: COMPLETED | OUTPATIENT
Start: 2019-02-16 | End: 2019-02-16

## 2019-02-16 RX ORDER — SODIUM CHLORIDE 0.9 % (FLUSH) 0.9 %
10 SYRINGE (ML) INJECTION PRN
Status: DISCONTINUED | OUTPATIENT
Start: 2019-02-16 | End: 2019-02-19 | Stop reason: HOSPADM

## 2019-02-16 RX ORDER — ACETAMINOPHEN 325 MG/1
650 TABLET ORAL EVERY 4 HOURS PRN
Status: DISCONTINUED | OUTPATIENT
Start: 2019-02-16 | End: 2019-02-19 | Stop reason: HOSPADM

## 2019-02-16 RX ORDER — TRAMADOL HYDROCHLORIDE 50 MG/1
50 TABLET ORAL EVERY 6 HOURS PRN
Status: DISCONTINUED | OUTPATIENT
Start: 2019-02-16 | End: 2019-02-19 | Stop reason: HOSPADM

## 2019-02-16 RX ORDER — FAMOTIDINE 20 MG/1
20 TABLET, FILM COATED ORAL DAILY
Status: DISCONTINUED | OUTPATIENT
Start: 2019-02-17 | End: 2019-02-19 | Stop reason: HOSPADM

## 2019-02-16 RX ADMIN — FUROSEMIDE 20 MG: 10 INJECTION, SOLUTION INTRAMUSCULAR; INTRAVENOUS at 21:13

## 2019-02-16 RX ADMIN — IOPAMIDOL 100 ML: 755 INJECTION, SOLUTION INTRAVENOUS at 20:05

## 2019-02-16 RX ADMIN — Medication 5 ML: at 21:35

## 2019-02-16 ASSESSMENT — ENCOUNTER SYMPTOMS
SHORTNESS OF BREATH: 1
CONSTIPATION: 0
CHEST TIGHTNESS: 0
ABDOMINAL PAIN: 0
DIARRHEA: 0
VOMITING: 0
EYE DISCHARGE: 0
NAUSEA: 0
WHEEZING: 0
RHINORRHEA: 0
TROUBLE SWALLOWING: 0
SINUS PRESSURE: 0
EYE PAIN: 0
SORE THROAT: 0
BACK PAIN: 0
COUGH: 0
EYE REDNESS: 0

## 2019-02-16 ASSESSMENT — PAIN DESCRIPTION - LOCATION: LOCATION: HEAD

## 2019-02-16 ASSESSMENT — PAIN DESCRIPTION - PAIN TYPE: TYPE: ACUTE PAIN

## 2019-02-16 ASSESSMENT — PAIN DESCRIPTION - FREQUENCY: FREQUENCY: INTERMITTENT

## 2019-02-16 ASSESSMENT — PAIN SCALES - GENERAL: PAINLEVEL_OUTOF10: 8

## 2019-02-16 ASSESSMENT — PAIN DESCRIPTION - DESCRIPTORS: DESCRIPTORS: ACHING

## 2019-02-17 PROBLEM — E03.9 HYPOTHYROIDISM: Status: ACTIVE | Noted: 2019-02-17

## 2019-02-17 PROBLEM — R53.1 GENERALIZED WEAKNESS: Status: ACTIVE | Noted: 2019-02-17

## 2019-02-17 PROBLEM — J18.9 PNEUMONIA: Status: ACTIVE | Noted: 2019-02-17

## 2019-02-17 PROBLEM — D64.9 ANEMIA: Status: ACTIVE | Noted: 2019-02-17

## 2019-02-17 PROBLEM — G89.29 CHRONIC PAIN: Status: ACTIVE | Noted: 2019-02-17

## 2019-02-17 PROBLEM — I10 BENIGN ESSENTIAL HTN: Status: ACTIVE | Noted: 2019-02-17

## 2019-02-17 PROBLEM — R06.02 SHORTNESS OF BREATH: Status: ACTIVE | Noted: 2019-02-17

## 2019-02-17 LAB
ANION GAP SERPL CALCULATED.3IONS-SCNC: 10 MMOL/L (ref 3–16)
BASOPHILS ABSOLUTE: 0 K/UL (ref 0–0.1)
BASOPHILS RELATIVE PERCENT: 0.3 %
BUN BLDV-MCNC: 15 MG/DL (ref 6–20)
CALCIUM SERPL-MCNC: 8.7 MG/DL (ref 8.5–10.5)
CHLORIDE BLD-SCNC: 106 MMOL/L (ref 98–107)
CO2: 28 MMOL/L (ref 20–30)
CREAT SERPL-MCNC: 1 MG/DL (ref 0.4–1.2)
EOSINOPHILS ABSOLUTE: 0.9 K/UL (ref 0–0.4)
EOSINOPHILS RELATIVE PERCENT: 9.1 %
FERRITIN: 192.6 NG/ML (ref 22–322)
GFR AFRICAN AMERICAN: >59
GFR NON-AFRICAN AMERICAN: 56
GLUCOSE BLD-MCNC: 106 MG/DL (ref 74–106)
HCT VFR BLD CALC: 33.9 % (ref 37–47)
HEMOGLOBIN: 11 G/DL (ref 11.5–16.5)
IMMATURE GRANULOCYTES #: 0.1 K/UL
IMMATURE GRANULOCYTES %: 1.4 % (ref 0–5)
IRON SATURATION: 14 % (ref 15–50)
IRON: 27 UG/DL (ref 37–145)
LYMPHOCYTES ABSOLUTE: 1.7 K/UL (ref 1.5–4)
LYMPHOCYTES RELATIVE PERCENT: 17.4 %
MAGNESIUM: 2 MG/DL (ref 1.7–2.4)
MCH RBC QN AUTO: 31.1 PG (ref 27–32)
MCHC RBC AUTO-ENTMCNC: 32.4 G/DL (ref 31–35)
MCV RBC AUTO: 95.8 FL (ref 80–100)
MONOCYTES ABSOLUTE: 0.5 K/UL (ref 0.2–0.8)
MONOCYTES RELATIVE PERCENT: 4.8 %
NEUTROPHILS ABSOLUTE: 6.6 K/UL (ref 2–7.5)
NEUTROPHILS RELATIVE PERCENT: 67 %
PDW BLD-RTO: 11.9 % (ref 11–16)
PLATELET # BLD: 252 K/UL (ref 150–400)
PMV BLD AUTO: 10.7 FL (ref 6–10)
POTASSIUM REFLEX MAGNESIUM: 3.4 MMOL/L (ref 3.4–5.1)
RBC # BLD: 3.54 M/UL (ref 3.8–5.8)
SODIUM BLD-SCNC: 144 MMOL/L (ref 136–145)
TOTAL IRON BINDING CAPACITY: 195 UG/DL (ref 250–450)
WBC # BLD: 9.9 K/UL (ref 4–11)

## 2019-02-17 PROCEDURE — 36415 COLL VENOUS BLD VENIPUNCTURE: CPT

## 2019-02-17 PROCEDURE — 2580000003 HC RX 258: Performed by: PHYSICIAN ASSISTANT

## 2019-02-17 PROCEDURE — 6370000000 HC RX 637 (ALT 250 FOR IP): Performed by: PHYSICIAN ASSISTANT

## 2019-02-17 PROCEDURE — 80048 BASIC METABOLIC PNL TOTAL CA: CPT

## 2019-02-17 PROCEDURE — 99223 1ST HOSP IP/OBS HIGH 75: CPT | Performed by: PEDIATRICS

## 2019-02-17 PROCEDURE — 83550 IRON BINDING TEST: CPT

## 2019-02-17 PROCEDURE — 83735 ASSAY OF MAGNESIUM: CPT

## 2019-02-17 PROCEDURE — 1200000000 HC SEMI PRIVATE

## 2019-02-17 PROCEDURE — 6370000000 HC RX 637 (ALT 250 FOR IP): Performed by: PEDIATRICS

## 2019-02-17 PROCEDURE — 6360000002 HC RX W HCPCS: Performed by: PHYSICIAN ASSISTANT

## 2019-02-17 PROCEDURE — 83540 ASSAY OF IRON: CPT

## 2019-02-17 PROCEDURE — 82728 ASSAY OF FERRITIN: CPT

## 2019-02-17 PROCEDURE — 85025 COMPLETE CBC W/AUTO DIFF WBC: CPT

## 2019-02-17 PROCEDURE — 2700000000 HC OXYGEN THERAPY PER DAY

## 2019-02-17 RX ORDER — LEVOTHYROXINE SODIUM 0.05 MG/1
50 TABLET ORAL DAILY
COMMUNITY

## 2019-02-17 RX ORDER — THIAMINE MONONITRATE (VIT B1) 100 MG
100 TABLET ORAL DAILY
Status: DISCONTINUED | OUTPATIENT
Start: 2019-02-17 | End: 2019-02-19 | Stop reason: HOSPADM

## 2019-02-17 RX ORDER — BENZONATATE 100 MG/1
200 CAPSULE ORAL 3 TIMES DAILY PRN
Status: DISCONTINUED | OUTPATIENT
Start: 2019-02-17 | End: 2019-02-19 | Stop reason: HOSPADM

## 2019-02-17 RX ORDER — FUROSEMIDE 10 MG/ML
20 INJECTION INTRAMUSCULAR; INTRAVENOUS ONCE
Status: DISCONTINUED | OUTPATIENT
Start: 2019-02-17 | End: 2019-02-17

## 2019-02-17 RX ORDER — TRAMADOL HYDROCHLORIDE 50 MG/1
50 TABLET ORAL EVERY 6 HOURS PRN
COMMUNITY
End: 2019-08-09

## 2019-02-17 RX ORDER — IPRATROPIUM BROMIDE 21 UG/1
1 SPRAY, METERED NASAL 2 TIMES DAILY
COMMUNITY

## 2019-02-17 RX ORDER — OYSTER SHELL CALCIUM WITH VITAMIN D 500; 200 MG/1; [IU]/1
1 TABLET, FILM COATED ORAL DAILY
Status: DISCONTINUED | OUTPATIENT
Start: 2019-02-17 | End: 2019-02-19 | Stop reason: HOSPADM

## 2019-02-17 RX ORDER — LANOLIN ALCOHOL/MO/W.PET/CERES
1000 CREAM (GRAM) TOPICAL DAILY
COMMUNITY
End: 2020-11-29

## 2019-02-17 RX ORDER — MELOXICAM 7.5 MG/1
15 TABLET ORAL DAILY
Status: DISCONTINUED | OUTPATIENT
Start: 2019-02-17 | End: 2019-02-19 | Stop reason: HOSPADM

## 2019-02-17 RX ORDER — LEVOTHYROXINE SODIUM 0.05 MG/1
50 TABLET ORAL DAILY
Status: DISCONTINUED | OUTPATIENT
Start: 2019-02-18 | End: 2019-02-19 | Stop reason: HOSPADM

## 2019-02-17 RX ORDER — NITROFURANTOIN 25; 75 MG/1; MG/1
100 CAPSULE ORAL NIGHTLY
COMMUNITY
End: 2019-08-09

## 2019-02-17 RX ORDER — MONTELUKAST SODIUM 10 MG/1
10 TABLET ORAL DAILY
COMMUNITY

## 2019-02-17 RX ORDER — THIAMINE MONONITRATE (VIT B1) 100 MG
100 TABLET ORAL DAILY
COMMUNITY
End: 2020-11-29

## 2019-02-17 RX ORDER — IPRATROPIUM BROMIDE 21 UG/1
1 SPRAY, METERED NASAL 2 TIMES DAILY
Status: DISCONTINUED | OUTPATIENT
Start: 2019-02-17 | End: 2019-02-19 | Stop reason: HOSPADM

## 2019-02-17 RX ORDER — FUROSEMIDE 10 MG/ML
20 INJECTION INTRAMUSCULAR; INTRAVENOUS ONCE
Status: COMPLETED | OUTPATIENT
Start: 2019-02-17 | End: 2019-02-17

## 2019-02-17 RX ORDER — MONTELUKAST SODIUM 10 MG/1
10 TABLET ORAL DAILY
Status: DISCONTINUED | OUTPATIENT
Start: 2019-02-17 | End: 2019-02-19 | Stop reason: HOSPADM

## 2019-02-17 RX ORDER — PANTOPRAZOLE SODIUM 20 MG/1
20 TABLET, DELAYED RELEASE ORAL
Status: DISCONTINUED | OUTPATIENT
Start: 2019-02-18 | End: 2019-02-19 | Stop reason: HOSPADM

## 2019-02-17 RX ORDER — MEDROXYPROGESTERONE ACETATE 2.5 MG/1
2.5 TABLET ORAL DAILY
COMMUNITY

## 2019-02-17 RX ORDER — ESTRADIOL 0.5 MG/1
1 TABLET ORAL DAILY
Status: DISCONTINUED | OUTPATIENT
Start: 2019-02-17 | End: 2019-02-19 | Stop reason: HOSPADM

## 2019-02-17 RX ORDER — LANSOPRAZOLE 30 MG/1
30 CAPSULE, DELAYED RELEASE ORAL DAILY
COMMUNITY

## 2019-02-17 RX ORDER — ASCORBIC ACID 500 MG
500 TABLET ORAL DAILY
COMMUNITY
End: 2020-11-29

## 2019-02-17 RX ORDER — ESTRADIOL 1 MG/1
1 TABLET ORAL DAILY
COMMUNITY

## 2019-02-17 RX ORDER — ASCORBIC ACID 500 MG
500 TABLET ORAL DAILY
Status: DISCONTINUED | OUTPATIENT
Start: 2019-02-17 | End: 2019-02-19 | Stop reason: HOSPADM

## 2019-02-17 RX ORDER — TRAZODONE HYDROCHLORIDE 100 MG/1
100 TABLET ORAL NIGHTLY
COMMUNITY

## 2019-02-17 RX ORDER — CHOLECALCIFEROL (VITAMIN D3) 125 MCG
1000 CAPSULE ORAL DAILY
Status: DISCONTINUED | OUTPATIENT
Start: 2019-02-17 | End: 2019-02-19 | Stop reason: HOSPADM

## 2019-02-17 RX ORDER — MEDROXYPROGESTERONE ACETATE 2.5 MG/1
2.5 TABLET ORAL DAILY
Status: DISCONTINUED | OUTPATIENT
Start: 2019-02-17 | End: 2019-02-19 | Stop reason: HOSPADM

## 2019-02-17 RX ORDER — OXYBUTYNIN CHLORIDE 15 MG/1
15 TABLET, EXTENDED RELEASE ORAL DAILY
COMMUNITY
End: 2019-02-27

## 2019-02-17 RX ORDER — PRAMIPEXOLE DIHYDROCHLORIDE 1 MG/1
1 TABLET ORAL NIGHTLY
Status: DISCONTINUED | OUTPATIENT
Start: 2019-02-17 | End: 2019-02-19 | Stop reason: HOSPADM

## 2019-02-17 RX ORDER — MELOXICAM 15 MG/1
15 TABLET ORAL DAILY
COMMUNITY

## 2019-02-17 RX ORDER — TRAZODONE HYDROCHLORIDE 50 MG/1
100 TABLET ORAL NIGHTLY
Status: DISCONTINUED | OUTPATIENT
Start: 2019-02-17 | End: 2019-02-19 | Stop reason: HOSPADM

## 2019-02-17 RX ORDER — PRAMIPEXOLE DIHYDROCHLORIDE 1 MG/1
1 TABLET ORAL NIGHTLY
COMMUNITY

## 2019-02-17 RX ORDER — FERROUS SULFATE 325(65) MG
325 TABLET ORAL 2 TIMES DAILY WITH MEALS
Status: DISCONTINUED | OUTPATIENT
Start: 2019-02-17 | End: 2019-02-18 | Stop reason: ALTCHOICE

## 2019-02-17 RX ADMIN — CALCIUM CARBONATE-VITAMIN D TAB 500 MG-200 UNIT 1 TABLET: 500-200 TAB at 16:54

## 2019-02-17 RX ADMIN — ACETAMINOPHEN 650 MG: 325 TABLET, FILM COATED ORAL at 16:54

## 2019-02-17 RX ADMIN — AZITHROMYCIN MONOHYDRATE 500 MG: 500 INJECTION, POWDER, LYOPHILIZED, FOR SOLUTION INTRAVENOUS at 23:54

## 2019-02-17 RX ADMIN — CYANOCOBALAMIN TAB 500 MCG 1000 MCG: 500 TAB at 16:54

## 2019-02-17 RX ADMIN — MELOXICAM 15 MG: 7.5 TABLET ORAL at 16:53

## 2019-02-17 RX ADMIN — BENZONATATE 200 MG: 100 CAPSULE ORAL at 20:11

## 2019-02-17 RX ADMIN — Medication 10 ML: at 09:09

## 2019-02-17 RX ADMIN — ENOXAPARIN SODIUM 40 MG: 40 INJECTION SUBCUTANEOUS at 09:09

## 2019-02-17 RX ADMIN — OXYCODONE HYDROCHLORIDE AND ACETAMINOPHEN 500 MG: 500 TABLET ORAL at 16:54

## 2019-02-17 RX ADMIN — CEFTRIAXONE 1 G: 1 INJECTION, POWDER, FOR SOLUTION INTRAMUSCULAR; INTRAVENOUS at 23:55

## 2019-02-17 RX ADMIN — FUROSEMIDE 20 MG: 10 INJECTION, SOLUTION INTRAMUSCULAR; INTRAVENOUS at 14:19

## 2019-02-17 RX ADMIN — TRAMADOL HYDROCHLORIDE 50 MG: 50 TABLET, FILM COATED ORAL at 20:11

## 2019-02-17 RX ADMIN — MONTELUKAST SODIUM 10 MG: 10 TABLET, FILM COATED ORAL at 16:55

## 2019-02-17 RX ADMIN — CEFTRIAXONE 1 G: 1 INJECTION, POWDER, FOR SOLUTION INTRAMUSCULAR; INTRAVENOUS at 00:28

## 2019-02-17 RX ADMIN — FAMOTIDINE 20 MG: 20 TABLET, FILM COATED ORAL at 09:09

## 2019-02-17 RX ADMIN — Medication 100 MG: at 16:54

## 2019-02-17 RX ADMIN — Medication 10 ML: at 00:29

## 2019-02-17 RX ADMIN — Medication 10 ML: at 20:17

## 2019-02-17 RX ADMIN — FUROSEMIDE 20 MG: 10 INJECTION, SOLUTION INTRAMUSCULAR; INTRAVENOUS at 13:14

## 2019-02-17 RX ADMIN — PRAMIPEXOLE DIHYDROCHLORIDE 1 MG: 1 TABLET ORAL at 20:11

## 2019-02-17 RX ADMIN — TRAZODONE HYDROCHLORIDE 100 MG: 50 TABLET ORAL at 20:11

## 2019-02-17 RX ADMIN — ESTRADIOL 1 MG: 0.5 TABLET ORAL at 16:54

## 2019-02-17 ASSESSMENT — PAIN SCALES - GENERAL
PAINLEVEL_OUTOF10: 7
PAINLEVEL_OUTOF10: 5

## 2019-02-18 PROBLEM — E87.6 HYPOKALEMIA: Status: ACTIVE | Noted: 2019-02-18

## 2019-02-18 LAB
ANION GAP SERPL CALCULATED.3IONS-SCNC: 12 MMOL/L (ref 3–16)
BUN BLDV-MCNC: 15 MG/DL (ref 6–20)
CALCIUM SERPL-MCNC: 8.6 MG/DL (ref 8.5–10.5)
CHLORIDE BLD-SCNC: 102 MMOL/L (ref 98–107)
CO2: 30 MMOL/L (ref 20–30)
CREAT SERPL-MCNC: 0.9 MG/DL (ref 0.4–1.2)
GFR AFRICAN AMERICAN: >59
GFR NON-AFRICAN AMERICAN: >60
GLUCOSE BLD-MCNC: 94 MG/DL (ref 74–106)
HCT VFR BLD CALC: 35 % (ref 37–47)
HEMOGLOBIN: 11.4 G/DL (ref 11.5–16.5)
LV EF: 63 %
LVEF MODALITY: NORMAL
MCH RBC QN AUTO: 30.6 PG (ref 27–32)
MCHC RBC AUTO-ENTMCNC: 32.6 G/DL (ref 31–35)
MCV RBC AUTO: 94.1 FL (ref 80–100)
PDW BLD-RTO: 11.8 % (ref 11–16)
PLATELET # BLD: 266 K/UL (ref 150–400)
PMV BLD AUTO: 10.7 FL (ref 6–10)
POTASSIUM SERPL-SCNC: 3 MMOL/L (ref 3.4–5.1)
RBC # BLD: 3.72 M/UL (ref 3.8–5.8)
SODIUM BLD-SCNC: 144 MMOL/L (ref 136–145)
WBC # BLD: 6.1 K/UL (ref 4–11)

## 2019-02-18 PROCEDURE — 80048 BASIC METABOLIC PNL TOTAL CA: CPT

## 2019-02-18 PROCEDURE — 97530 THERAPEUTIC ACTIVITIES: CPT

## 2019-02-18 PROCEDURE — 94760 N-INVAS EAR/PLS OXIMETRY 1: CPT

## 2019-02-18 PROCEDURE — 36415 COLL VENOUS BLD VENIPUNCTURE: CPT

## 2019-02-18 PROCEDURE — 99232 SBSQ HOSP IP/OBS MODERATE 35: CPT | Performed by: INTERNAL MEDICINE

## 2019-02-18 PROCEDURE — 6360000002 HC RX W HCPCS: Performed by: PHYSICIAN ASSISTANT

## 2019-02-18 PROCEDURE — 6370000000 HC RX 637 (ALT 250 FOR IP): Performed by: NURSE PRACTITIONER

## 2019-02-18 PROCEDURE — 93306 TTE W/DOPPLER COMPLETE: CPT

## 2019-02-18 PROCEDURE — 6370000000 HC RX 637 (ALT 250 FOR IP): Performed by: PEDIATRICS

## 2019-02-18 PROCEDURE — 6360000002 HC RX W HCPCS: Performed by: NURSE PRACTITIONER

## 2019-02-18 PROCEDURE — 97161 PT EVAL LOW COMPLEX 20 MIN: CPT

## 2019-02-18 PROCEDURE — 6370000000 HC RX 637 (ALT 250 FOR IP): Performed by: PHYSICIAN ASSISTANT

## 2019-02-18 PROCEDURE — 2580000003 HC RX 258: Performed by: NURSE PRACTITIONER

## 2019-02-18 PROCEDURE — 2580000003 HC RX 258: Performed by: PHYSICIAN ASSISTANT

## 2019-02-18 PROCEDURE — 97165 OT EVAL LOW COMPLEX 30 MIN: CPT

## 2019-02-18 PROCEDURE — 2700000000 HC OXYGEN THERAPY PER DAY

## 2019-02-18 PROCEDURE — 6360000002 HC RX W HCPCS: Performed by: PEDIATRICS

## 2019-02-18 PROCEDURE — 1200000000 HC SEMI PRIVATE

## 2019-02-18 PROCEDURE — 85027 COMPLETE CBC AUTOMATED: CPT

## 2019-02-18 RX ORDER — POTASSIUM CHLORIDE 7.45 MG/ML
10 INJECTION INTRAVENOUS ONCE
Status: COMPLETED | OUTPATIENT
Start: 2019-02-18 | End: 2019-02-18

## 2019-02-18 RX ORDER — FUROSEMIDE 10 MG/ML
20 INJECTION INTRAMUSCULAR; INTRAVENOUS ONCE
Status: COMPLETED | OUTPATIENT
Start: 2019-02-18 | End: 2019-02-18

## 2019-02-18 RX ORDER — FERROUS SULFATE TAB EC 324 MG (65 MG FE EQUIVALENT) 324 (65 FE) MG
324 TABLET DELAYED RESPONSE ORAL 2 TIMES DAILY WITH MEALS
Status: DISCONTINUED | OUTPATIENT
Start: 2019-02-18 | End: 2019-02-19 | Stop reason: HOSPADM

## 2019-02-18 RX ORDER — POTASSIUM CHLORIDE 20 MEQ/1
40 TABLET, EXTENDED RELEASE ORAL ONCE
Status: COMPLETED | OUTPATIENT
Start: 2019-02-18 | End: 2019-02-18

## 2019-02-18 RX ADMIN — TRAZODONE HYDROCHLORIDE 100 MG: 50 TABLET ORAL at 20:44

## 2019-02-18 RX ADMIN — IPRATROPIUM BROMIDE 1 SPRAY: 21 SPRAY, METERED NASAL at 22:20

## 2019-02-18 RX ADMIN — Medication 100 MG: at 08:36

## 2019-02-18 RX ADMIN — FAMOTIDINE 20 MG: 20 TABLET, FILM COATED ORAL at 08:36

## 2019-02-18 RX ADMIN — CALCIUM CARBONATE-VITAMIN D TAB 500 MG-200 UNIT 1 TABLET: 500-200 TAB at 08:36

## 2019-02-18 RX ADMIN — PANTOPRAZOLE SODIUM 20 MG: 20 TABLET, DELAYED RELEASE ORAL at 06:22

## 2019-02-18 RX ADMIN — OXYCODONE HYDROCHLORIDE AND ACETAMINOPHEN 500 MG: 500 TABLET ORAL at 08:36

## 2019-02-18 RX ADMIN — TRAMADOL HYDROCHLORIDE 50 MG: 50 TABLET, FILM COATED ORAL at 20:44

## 2019-02-18 RX ADMIN — Medication 10 ML: at 20:51

## 2019-02-18 RX ADMIN — POTASSIUM CHLORIDE 10 MEQ: 7.46 INJECTION, SOLUTION INTRAVENOUS at 07:52

## 2019-02-18 RX ADMIN — LEVOTHYROXINE SODIUM 50 MCG: 50 TABLET ORAL at 06:22

## 2019-02-18 RX ADMIN — ACETAMINOPHEN 650 MG: 325 TABLET, FILM COATED ORAL at 11:16

## 2019-02-18 RX ADMIN — ONDANSETRON 4 MG: 2 INJECTION INTRAMUSCULAR; INTRAVENOUS at 12:47

## 2019-02-18 RX ADMIN — MELOXICAM 15 MG: 7.5 TABLET ORAL at 08:36

## 2019-02-18 RX ADMIN — MONTELUKAST SODIUM 10 MG: 10 TABLET, FILM COATED ORAL at 08:36

## 2019-02-18 RX ADMIN — FERROUS SULFATE TAB EC 324 MG (65 MG FE EQUIVALENT) 324 MG: 324 (65 FE) TABLET DELAYED RESPONSE at 16:28

## 2019-02-18 RX ADMIN — PRAMIPEXOLE DIHYDROCHLORIDE 1 MG: 1 TABLET ORAL at 20:44

## 2019-02-18 RX ADMIN — CYANOCOBALAMIN TAB 500 MCG 1000 MCG: 500 TAB at 08:36

## 2019-02-18 RX ADMIN — FERROUS SULFATE TAB EC 324 MG (65 MG FE EQUIVALENT) 324 MG: 324 (65 FE) TABLET DELAYED RESPONSE at 11:09

## 2019-02-18 RX ADMIN — POTASSIUM CHLORIDE 40 MEQ: 20 TABLET, EXTENDED RELEASE ORAL at 08:36

## 2019-02-18 RX ADMIN — ENOXAPARIN SODIUM 40 MG: 40 INJECTION SUBCUTANEOUS at 08:38

## 2019-02-18 RX ADMIN — IRON SUCROSE 200 MG: 20 INJECTION, SOLUTION INTRAVENOUS at 11:09

## 2019-02-18 RX ADMIN — FUROSEMIDE 20 MG: 10 INJECTION, SOLUTION INTRAVENOUS at 16:28

## 2019-02-18 RX ADMIN — BENZONATATE 200 MG: 100 CAPSULE ORAL at 12:47

## 2019-02-18 RX ADMIN — ESTRADIOL 1 MG: 0.5 TABLET ORAL at 08:36

## 2019-02-18 ASSESSMENT — PAIN DESCRIPTION - LOCATION: LOCATION: HEAD

## 2019-02-18 ASSESSMENT — PAIN DESCRIPTION - ONSET: ONSET: AWAKENED FROM SLEEP

## 2019-02-18 ASSESSMENT — PAIN SCALES - GENERAL
PAINLEVEL_OUTOF10: 2
PAINLEVEL_OUTOF10: 6
PAINLEVEL_OUTOF10: 5
PAINLEVEL_OUTOF10: 5

## 2019-02-18 ASSESSMENT — PAIN DESCRIPTION - PAIN TYPE: TYPE: ACUTE PAIN

## 2019-02-19 ENCOUNTER — APPOINTMENT (OUTPATIENT)
Dept: GENERAL RADIOLOGY | Facility: HOSPITAL | Age: 64
DRG: 194 | End: 2019-02-19
Payer: MEDICAID

## 2019-02-19 VITALS
WEIGHT: 169.3 LBS | HEART RATE: 74 BPM | RESPIRATION RATE: 18 BRPM | DIASTOLIC BLOOD PRESSURE: 52 MMHG | BODY MASS INDEX: 31.96 KG/M2 | TEMPERATURE: 98.3 F | SYSTOLIC BLOOD PRESSURE: 118 MMHG | OXYGEN SATURATION: 92 % | HEIGHT: 61 IN

## 2019-02-19 PROBLEM — I50.30 DIASTOLIC CHF (HCC): Status: ACTIVE | Noted: 2019-02-19

## 2019-02-19 LAB
ANION GAP SERPL CALCULATED.3IONS-SCNC: 10 MMOL/L (ref 3–16)
BUN BLDV-MCNC: 15 MG/DL (ref 6–20)
CALCIUM SERPL-MCNC: 8.7 MG/DL (ref 8.5–10.5)
CHLORIDE BLD-SCNC: 101 MMOL/L (ref 98–107)
CO2: 31 MMOL/L (ref 20–30)
CREAT SERPL-MCNC: 0.8 MG/DL (ref 0.4–1.2)
GFR AFRICAN AMERICAN: >59
GFR NON-AFRICAN AMERICAN: >60
GLUCOSE BLD-MCNC: 83 MG/DL (ref 74–106)
HCT VFR BLD CALC: 36.2 % (ref 37–47)
HEMOGLOBIN: 11.7 G/DL (ref 11.5–16.5)
MCH RBC QN AUTO: 30.7 PG (ref 27–32)
MCHC RBC AUTO-ENTMCNC: 32.3 G/DL (ref 31–35)
MCV RBC AUTO: 95 FL (ref 80–100)
PDW BLD-RTO: 11.8 % (ref 11–16)
PLATELET # BLD: 254 K/UL (ref 150–400)
PMV BLD AUTO: 10.5 FL (ref 6–10)
POTASSIUM SERPL-SCNC: 3.9 MMOL/L (ref 3.4–5.1)
RBC # BLD: 3.81 M/UL (ref 3.8–5.8)
SODIUM BLD-SCNC: 142 MMOL/L (ref 136–145)
WBC # BLD: 5.2 K/UL (ref 4–11)

## 2019-02-19 PROCEDURE — 36415 COLL VENOUS BLD VENIPUNCTURE: CPT

## 2019-02-19 PROCEDURE — 80048 BASIC METABOLIC PNL TOTAL CA: CPT

## 2019-02-19 PROCEDURE — 6370000000 HC RX 637 (ALT 250 FOR IP): Performed by: PEDIATRICS

## 2019-02-19 PROCEDURE — 2580000003 HC RX 258: Performed by: PHYSICIAN ASSISTANT

## 2019-02-19 PROCEDURE — 85027 COMPLETE CBC AUTOMATED: CPT

## 2019-02-19 PROCEDURE — 6370000000 HC RX 637 (ALT 250 FOR IP): Performed by: PHYSICIAN ASSISTANT

## 2019-02-19 PROCEDURE — 71046 X-RAY EXAM CHEST 2 VIEWS: CPT

## 2019-02-19 PROCEDURE — 99238 HOSP IP/OBS DSCHRG MGMT 30/<: CPT | Performed by: INTERNAL MEDICINE

## 2019-02-19 PROCEDURE — 2700000000 HC OXYGEN THERAPY PER DAY

## 2019-02-19 PROCEDURE — 94618 PULMONARY STRESS TESTING: CPT

## 2019-02-19 PROCEDURE — 6360000002 HC RX W HCPCS: Performed by: NURSE PRACTITIONER

## 2019-02-19 PROCEDURE — 6360000002 HC RX W HCPCS: Performed by: PHYSICIAN ASSISTANT

## 2019-02-19 RX ORDER — DOXYCYCLINE HYCLATE 100 MG
100 TABLET ORAL 2 TIMES DAILY
Qty: 14 TABLET | Refills: 0 | Status: SHIPPED | OUTPATIENT
Start: 2019-02-19 | End: 2019-02-26

## 2019-02-19 RX ORDER — CEFDINIR 300 MG/1
300 CAPSULE ORAL 2 TIMES DAILY
Qty: 14 CAPSULE | Refills: 0 | Status: SHIPPED | OUTPATIENT
Start: 2019-02-19 | End: 2019-02-26

## 2019-02-19 RX ORDER — FUROSEMIDE 10 MG/ML
20 INJECTION INTRAMUSCULAR; INTRAVENOUS ONCE
Status: COMPLETED | OUTPATIENT
Start: 2019-02-19 | End: 2019-02-19

## 2019-02-19 RX ORDER — ASPIRIN 81 MG/1
81 TABLET ORAL DAILY
Qty: 30 TABLET | Refills: 0 | Status: SHIPPED | OUTPATIENT
Start: 2019-02-19 | End: 2020-01-15

## 2019-02-19 RX ORDER — FUROSEMIDE 20 MG/1
20 TABLET ORAL DAILY
Qty: 30 TABLET | Refills: 0 | Status: SHIPPED | OUTPATIENT
Start: 2019-02-19 | End: 2019-08-09

## 2019-02-19 RX ADMIN — BENZONATATE 200 MG: 100 CAPSULE ORAL at 12:49

## 2019-02-19 RX ADMIN — FERROUS SULFATE TAB EC 324 MG (65 MG FE EQUIVALENT) 324 MG: 324 (65 FE) TABLET DELAYED RESPONSE at 09:30

## 2019-02-19 RX ADMIN — MONTELUKAST SODIUM 10 MG: 10 TABLET, FILM COATED ORAL at 09:30

## 2019-02-19 RX ADMIN — TRAMADOL HYDROCHLORIDE 50 MG: 50 TABLET, FILM COATED ORAL at 09:36

## 2019-02-19 RX ADMIN — FUROSEMIDE 20 MG: 10 INJECTION, SOLUTION INTRAMUSCULAR; INTRAVENOUS at 16:16

## 2019-02-19 RX ADMIN — MELOXICAM 15 MG: 7.5 TABLET ORAL at 09:30

## 2019-02-19 RX ADMIN — FERROUS SULFATE TAB EC 324 MG (65 MG FE EQUIVALENT) 324 MG: 324 (65 FE) TABLET DELAYED RESPONSE at 16:16

## 2019-02-19 RX ADMIN — CYANOCOBALAMIN TAB 500 MCG 1000 MCG: 500 TAB at 09:30

## 2019-02-19 RX ADMIN — Medication 100 MG: at 09:30

## 2019-02-19 RX ADMIN — Medication 10 ML: at 09:31

## 2019-02-19 RX ADMIN — PANTOPRAZOLE SODIUM 20 MG: 20 TABLET, DELAYED RELEASE ORAL at 05:49

## 2019-02-19 RX ADMIN — ONDANSETRON 4 MG: 2 INJECTION INTRAMUSCULAR; INTRAVENOUS at 09:36

## 2019-02-19 RX ADMIN — ESTRADIOL 1 MG: 0.5 TABLET ORAL at 09:30

## 2019-02-19 RX ADMIN — ENOXAPARIN SODIUM 40 MG: 40 INJECTION SUBCUTANEOUS at 09:33

## 2019-02-19 RX ADMIN — LEVOTHYROXINE SODIUM 50 MCG: 50 TABLET ORAL at 05:49

## 2019-02-19 RX ADMIN — FAMOTIDINE 20 MG: 20 TABLET, FILM COATED ORAL at 09:30

## 2019-02-19 RX ADMIN — CALCIUM CARBONATE-VITAMIN D TAB 500 MG-200 UNIT 1 TABLET: 500-200 TAB at 09:30

## 2019-02-19 RX ADMIN — CEFTRIAXONE 1 G: 1 INJECTION, POWDER, FOR SOLUTION INTRAMUSCULAR; INTRAVENOUS at 00:25

## 2019-02-19 ASSESSMENT — PAIN SCALES - GENERAL
PAINLEVEL_OUTOF10: 5
PAINLEVEL_OUTOF10: 2

## 2019-02-19 ASSESSMENT — PAIN DESCRIPTION - PAIN TYPE: TYPE: ACUTE PAIN

## 2019-02-19 ASSESSMENT — PAIN DESCRIPTION - LOCATION: LOCATION: GENERALIZED

## 2019-02-22 LAB
BLOOD CULTURE, ROUTINE: NORMAL
CULTURE, BLOOD 2: NORMAL

## 2019-02-25 ENCOUNTER — OFFICE VISIT (OUTPATIENT)
Dept: BARIATRICS/WEIGHT MGMT | Facility: CLINIC | Age: 64
End: 2019-02-25

## 2019-02-25 VITALS
BODY MASS INDEX: 32.1 KG/M2 | HEIGHT: 61 IN | WEIGHT: 170 LBS | DIASTOLIC BLOOD PRESSURE: 70 MMHG | HEART RATE: 68 BPM | TEMPERATURE: 98.2 F | SYSTOLIC BLOOD PRESSURE: 122 MMHG | RESPIRATION RATE: 18 BRPM | OXYGEN SATURATION: 99 %

## 2019-02-25 DIAGNOSIS — Z90.3 POSTGASTRECTOMY MALABSORPTION: ICD-10-CM

## 2019-02-25 DIAGNOSIS — Z13.21 MALNUTRITION SCREEN: ICD-10-CM

## 2019-02-25 DIAGNOSIS — Z13.0 SCREENING, IRON DEFICIENCY ANEMIA: ICD-10-CM

## 2019-02-25 DIAGNOSIS — K91.2 POSTGASTRECTOMY MALABSORPTION: ICD-10-CM

## 2019-02-25 DIAGNOSIS — E66.9 OBESITY, CLASS I, BMI 30-34.9: ICD-10-CM

## 2019-02-25 DIAGNOSIS — R53.83 FATIGUE, UNSPECIFIED TYPE: ICD-10-CM

## 2019-02-25 DIAGNOSIS — Z98.84 STATUS POST BARIATRIC SURGERY: Primary | ICD-10-CM

## 2019-02-25 DIAGNOSIS — E53.8 B12 DEFICIENCY: ICD-10-CM

## 2019-02-25 DIAGNOSIS — E55.9 HYPOVITAMINOSIS D: ICD-10-CM

## 2019-02-25 PROCEDURE — 99214 OFFICE O/P EST MOD 30 MIN: CPT | Performed by: PHYSICIAN ASSISTANT

## 2019-02-25 RX ORDER — FUROSEMIDE 20 MG/1
20 TABLET ORAL 2 TIMES DAILY
Status: ON HOLD | COMMUNITY
End: 2023-04-01 | Stop reason: SDUPTHER

## 2019-02-25 RX ORDER — ASPIRIN 81 MG/1
81 TABLET, CHEWABLE ORAL DAILY
COMMUNITY

## 2019-02-25 RX ORDER — LANSOPRAZOLE 30 MG/1
30 CAPSULE, DELAYED RELEASE ORAL DAILY
Refills: 0 | COMMUNITY
Start: 2019-02-21

## 2019-02-25 RX ORDER — DOXYCYCLINE HYCLATE 100 MG/1
100 CAPSULE ORAL 2 TIMES DAILY
Status: ON HOLD | COMMUNITY
End: 2023-03-25

## 2019-02-25 RX ORDER — CEFDINIR 300 MG/1
300 CAPSULE ORAL 2 TIMES DAILY
Status: ON HOLD | COMMUNITY
End: 2023-03-25

## 2019-02-25 NOTE — PROGRESS NOTES
[facility] Bariatric Surgery  [address]  [facility] [address] [phone]        Patient Name:  Jessica Flowers.  :  1955        Reason for Visit:   Annual Eval  18 month postop    HPI: Jessica Flowers is a 63 y.o. female  s/p LSG/HHR by [facility] on 17    Doing well.  Has had a lot of stressers recently. Was admitted with PNA, pulm edema, diastolic CHF. Treated with Abx and diuresis. Still recovering. Grandchildren's dad is drug addict, taking care of her 3 grandchildren (her daughter passed away 5 years ago). Has resorted to poor eating habits with stress. Has a good support system through her Muslim. No GI issues/concerns. Takes prevacid daily, controls reflux well. Rare nausea- really with just being sick.  Denies dysphagia, nausea, vomiting and abdominal pain. Has gotten off track with eating habits.  Increased eating with additional stress.   Getting 60g prot/day. Eating small meals 3-4 times a day.  Drinking 64 fluid oz/day. Limited right now with diastolic CHF restrictions.  Last labs revealed MMA elevated, B12 1700, advised decreasing B12, otherwise ok .  Taking B12, B1, Calcium, Vit D and iron.  On Lansoprazole.  Not exercising.     Presurgery weight: 203 pounds.  Today's weight is 77.1 kg (170 lb) pounds, today's  Body mass index is 32.12 kg/m²., and her weight loss since surgery is 33 pounds.  Gained 21lb since LOV.    Past Medical History:   Diagnosis Date   • Abnormal CXR     bronchitis poss, [facility]   • Allergic rhinitis     uses inhalers prn, denies asthma   • Anxiety    • Carpal tunnel syndrome    • Chronic narcotic use     [facility]   • Depression    • Dyspnea on exertion    • Fatigue    • GERD (gastroesophageal reflux disease)     on Prilosec + BID Zantac   • Glucose intolerance (impaired glucose tolerance)    • Hiatal hernia with gastroesophageal reflux     EGD [facility] , 39 cm, path DE mild nonspec changes.  serum h. pyl neg   • Hx MRSA infection       on abdomen, tx w/ Bactrim   • Hyperlipidemia    • Hypertension    • Hypertriglyceridemia    • Hypothyroid    • Insomnia     uses Trazodone   • Joint pain    • Morbid obesity (CMS/HCC)    • OAB (overactive bladder)     tx w/ botox injections   • Osteoarthritis of knees, bilateral     steroid injxns as above, supposed to get synvisc soon.  Says bone on bone, needs TKR, but ortho surgeon wants her to have WLS first   • Plantar fasciitis    • Postmenopausal HRT (hormone replacement therapy)    • Psoriasis    • Vitamin D deficiency      Past Surgical History:   Procedure Laterality Date   • APPENDECTOMY  1989    [facility], open   • CARPAL TUNNEL RELEASE      (B)   • CHOLECYSTECTOMY OPEN  1980    [facility]   • DILATATION AND CURETTAGE  1990, 2015   • GASTRIC SLEEVE LAPAROSCOPIC N/A 7/5/2017    Procedure: GASTRIC SLEEVE LAPAROSCOPIC, ;  Surgeon: Cj Gregg MD;  Location:  [facility];  Service:    • LAPAROSCOPIC TUBAL LIGATION  1988   • OTHER SURGICAL HISTORY      denies anesthesia issues   • HI LAP,ESOPHAGOGAST FUNDOPLASTY N/A 7/5/2017    Procedure: HIATAL HERNIA REPAIR LAPAROSCOPIC;  Surgeon: Cj Gregg MD;  Location:  [facility];  Service: Bariatric     Outpatient Medications Marked as Taking for the 2/25/19 encounter (Office Visit) with Amy Reyes PA-C   Medication Sig Dispense Refill   • aspirin 81 MG chewable tablet Chew 81 mg Daily.     • cefdinir (OMNICEF) 300 MG capsule Take 300 mg by mouth 2 (Two) Times a Day.     • doxycycline (VIBRAMYCIN) 100 MG capsule Take 100 mg by mouth 2 (Two) Times a Day.     • estradiol (ESTRACE) 1 MG tablet take 1 tablet by mouth once daily  0   • FLUoxetine (PROzac) 20 MG capsule Take 40 mg by mouth Daily.     • furosemide (LASIX) 20 MG tablet Take 20 mg by mouth 2 (Two) Times a Day.     • ipratropium (ATROVENT) 0.03 % nasal spray 2 sprays into each nostril Every 12 (Twelve) Hours.     • levothyroxine (SYNTHROID, LEVOTHROID) 100 MCG tablet Take 50 mcg by mouth Daily.  "    • medroxyPROGESTERone (PROVERA) 2.5 MG tablet take 1 tablet by mouth once daily  0   • oxybutynin XL (DITROPAN-XL) 10 MG 24 hr tablet Take 10 mg by mouth Daily.     • RA VITAMIN D-3 5000 UNITS capsule 5,000 Units Daily.  0   • traZODone (DESYREL) 100 MG tablet Take 100 mg by mouth Every Night.         No Known Allergies    Social History     Socioeconomic History   • Marital status: Legally      Spouse name: Not on file   • Number of children: Not on file   • Years of education: Not on file   • Highest education level: Not on file   Social Needs   • Financial resource strain: Not on file   • Food insecurity - worry: Not on file   • Food insecurity - inability: Not on file   • Transportation needs - medical: Not on file   • Transportation needs - non-medical: Not on file   Occupational History   • Occupation: Homemaker, formerly worked as a  @ [employer]   Tobacco Use   • Smoking status: Never Smoker   • Smokeless tobacco: Never Used   Substance and Sexual Activity   • Alcohol use: No   • Drug use: No   • Sexual activity: Yes     Partners: Male     Comment: spouse   Other Topics Concern   • Not on file   Social History Narrative    Lives in [place], [place] w/ son (who does smoke).        /70 (BP Location: Left arm, Patient Position: Sitting, Cuff Size: Large Adult)   Pulse 68   Temp 98.2 °F (36.8 °C) (Temporal)   Resp 18   Ht 154.9 cm (61\")   Wt 77.1 kg (170 lb)   SpO2 99%   BMI 32.12 kg/m²     Physical Exam   Constitutional: She is oriented to person, place, and time. She appears well-developed and well-nourished.   HENT:   Head: Normocephalic and atraumatic.   Cardiovascular: Normal rate, regular rhythm and normal heart sounds.   Pulmonary/Chest: Effort normal and breath sounds normal. No respiratory distress. She has no wheezes.   Abdominal: Soft. Bowel sounds are normal. She exhibits no distension. There is no tenderness.   Neurological: She is alert and oriented to person, place, and time. "   Skin: Skin is warm and dry.   Psychiatric: She has a normal mood and affect. Her behavior is normal. Judgment and thought content normal.         Assessment:  18 months s/p LSG/HHR by [facility] on 7/5/17    ICD-10-CM ICD-9-CM   1. Status post bariatric surgery Z98.84 V45.86   2. Fatigue, unspecified type R53.83 780.79   3. Obesity, Class I, BMI 30-34.9 E66.9 278.00   4. Hypovitaminosis D E55.9 268.9   5. Screening, iron deficiency anemia Z13.0 V78.0   6. Malnutrition screen Z13.21 V77.2   7. Postgastrectomy malabsorption K91.2 579.3    Z90.3    8. B12 deficiency E53.8 266.2         Plan:  Discussed getting back on healthy eating plan, using support system rather than food as outlet.  Focus on  good food choices and healthy habits.  Continue to focus on high protein, low carb.  Start tracking intake. Goal of protein 70-100g.  Encouraged routine exercise. [place] calorie target 1100, offer BMR.  Routine bariatric labs ordered.  Continue vitamins w/ adjustments pending lab results.  Call w/ problems/concerns.     The patient was instructed to follow up in 6 months, sooner if needed.      Total time spent w/ patient 25 minutes and 15 minutes spent counseling the patient on nutrition and necessary dietary/lifestyle modifications.

## 2019-02-27 ENCOUNTER — APPOINTMENT (OUTPATIENT)
Dept: CT IMAGING | Facility: HOSPITAL | Age: 64
End: 2019-02-27
Payer: MEDICAID

## 2019-02-27 ENCOUNTER — APPOINTMENT (OUTPATIENT)
Dept: GENERAL RADIOLOGY | Facility: HOSPITAL | Age: 64
End: 2019-02-27
Payer: MEDICAID

## 2019-02-27 ENCOUNTER — HOSPITAL ENCOUNTER (EMERGENCY)
Facility: HOSPITAL | Age: 64
Discharge: HOME | End: 2019-02-27
Attending: EMERGENCY MEDICINE
Payer: MEDICAID

## 2019-02-27 VITALS
HEIGHT: 61 IN | TEMPERATURE: 98.5 F | HEART RATE: 75 BPM | DIASTOLIC BLOOD PRESSURE: 71 MMHG | RESPIRATION RATE: 13 BRPM | WEIGHT: 170 LBS | OXYGEN SATURATION: 90 % | SYSTOLIC BLOOD PRESSURE: 103 MMHG | BODY MASS INDEX: 32.1 KG/M2

## 2019-02-27 DIAGNOSIS — R07.89 CHEST WALL PAIN: ICD-10-CM

## 2019-02-27 DIAGNOSIS — J18.9 PNEUMONIA DUE TO ORGANISM: Primary | ICD-10-CM

## 2019-02-27 LAB
BASOPHILS ABSOLUTE: 0.1 K/UL (ref 0–0.1)
BASOPHILS RELATIVE PERCENT: 0.5 %
D DIMER: 610 NG/ML DDU
EOSINOPHILS ABSOLUTE: 1.5 K/UL (ref 0–0.4)
EOSINOPHILS RELATIVE PERCENT: 15.9 %
HCT VFR BLD CALC: 35.3 % (ref 37–47)
HEMOGLOBIN: 11.6 G/DL (ref 11.5–16.5)
IMMATURE GRANULOCYTES #: 0 K/UL
IMMATURE GRANULOCYTES %: 0.2 % (ref 0–5)
LYMPHOCYTES ABSOLUTE: 1 K/UL (ref 1.5–4)
LYMPHOCYTES RELATIVE PERCENT: 10.5 %
MCH RBC QN AUTO: 31.8 PG (ref 27–32)
MCHC RBC AUTO-ENTMCNC: 32.9 G/DL (ref 31–35)
MCV RBC AUTO: 96.7 FL (ref 80–100)
MONOCYTES ABSOLUTE: 0.3 K/UL (ref 0.2–0.8)
MONOCYTES RELATIVE PERCENT: 3.2 %
NEUTROPHILS ABSOLUTE: 6.5 K/UL (ref 2–7.5)
NEUTROPHILS RELATIVE PERCENT: 69.7 %
PDW BLD-RTO: 12.5 % (ref 11–16)
PLATELET # BLD: 350 K/UL (ref 150–400)
PMV BLD AUTO: 11.3 FL (ref 6–10)
RBC # BLD: 3.65 M/UL (ref 3.8–5.8)
TROPONIN: <0.3 NG/ML
WBC # BLD: 9.3 K/UL (ref 4–11)

## 2019-02-27 PROCEDURE — 71275 CT ANGIOGRAPHY CHEST: CPT

## 2019-02-27 PROCEDURE — 85379 FIBRIN DEGRADATION QUANT: CPT

## 2019-02-27 PROCEDURE — 71046 X-RAY EXAM CHEST 2 VIEWS: CPT

## 2019-02-27 PROCEDURE — 6360000004 HC RX CONTRAST MEDICATION: Performed by: EMERGENCY MEDICINE

## 2019-02-27 PROCEDURE — 93005 ELECTROCARDIOGRAM TRACING: CPT

## 2019-02-27 PROCEDURE — 99285 EMERGENCY DEPT VISIT HI MDM: CPT

## 2019-02-27 PROCEDURE — 6360000002 HC RX W HCPCS: Performed by: EMERGENCY MEDICINE

## 2019-02-27 PROCEDURE — 84484 ASSAY OF TROPONIN QUANT: CPT

## 2019-02-27 PROCEDURE — 96374 THER/PROPH/DIAG INJ IV PUSH: CPT

## 2019-02-27 PROCEDURE — 36415 COLL VENOUS BLD VENIPUNCTURE: CPT

## 2019-02-27 PROCEDURE — 6370000000 HC RX 637 (ALT 250 FOR IP): Performed by: EMERGENCY MEDICINE

## 2019-02-27 PROCEDURE — 85025 COMPLETE CBC W/AUTO DIFF WBC: CPT

## 2019-02-27 RX ORDER — AZITHROMYCIN 250 MG/1
500 TABLET, FILM COATED ORAL ONCE
Status: COMPLETED | OUTPATIENT
Start: 2019-02-27 | End: 2019-02-27

## 2019-02-27 RX ORDER — NAPROXEN 500 MG/1
500 TABLET ORAL 2 TIMES DAILY WITH MEALS
Qty: 15 TABLET | Refills: 0 | Status: SHIPPED | OUTPATIENT
Start: 2019-02-27 | End: 2019-08-09

## 2019-02-27 RX ORDER — AZITHROMYCIN 250 MG/1
250 TABLET, FILM COATED ORAL DAILY
Qty: 4 TABLET | Refills: 0 | Status: SHIPPED | OUTPATIENT
Start: 2019-02-27 | End: 2019-03-03

## 2019-02-27 RX ORDER — KETOROLAC TROMETHAMINE 30 MG/ML
30 INJECTION, SOLUTION INTRAMUSCULAR; INTRAVENOUS ONCE
Status: DISCONTINUED | OUTPATIENT
Start: 2019-02-27 | End: 2019-02-27

## 2019-02-27 RX ORDER — FLUOXETINE HYDROCHLORIDE 20 MG/1
20 CAPSULE ORAL DAILY
COMMUNITY
End: 2019-08-09

## 2019-02-27 RX ORDER — HYDROCODONE POLISTIREX AND CHLORPHENIRAMINE POLISTIREX 10; 8 MG/5ML; MG/5ML
5 SUSPENSION, EXTENDED RELEASE ORAL ONCE
Status: COMPLETED | OUTPATIENT
Start: 2019-02-27 | End: 2019-02-27

## 2019-02-27 RX ORDER — KETOROLAC TROMETHAMINE 30 MG/ML
15 INJECTION, SOLUTION INTRAMUSCULAR; INTRAVENOUS ONCE
Status: COMPLETED | OUTPATIENT
Start: 2019-02-27 | End: 2019-02-27

## 2019-02-27 RX ORDER — BENZONATATE 100 MG/1
100 CAPSULE ORAL 3 TIMES DAILY PRN
COMMUNITY
End: 2020-01-15

## 2019-02-27 RX ADMIN — IOPAMIDOL 100 ML: 755 INJECTION, SOLUTION INTRAVENOUS at 21:35

## 2019-02-27 RX ADMIN — Medication 5 ML: at 20:55

## 2019-02-27 RX ADMIN — AZITHROMYCIN 500 MG: 250 TABLET, FILM COATED ORAL at 20:55

## 2019-02-27 RX ADMIN — KETOROLAC TROMETHAMINE 15 MG: 30 INJECTION, SOLUTION INTRAMUSCULAR; INTRAVENOUS at 20:55

## 2019-02-27 ASSESSMENT — PAIN DESCRIPTION - PAIN TYPE: TYPE: ACUTE PAIN

## 2019-02-27 ASSESSMENT — PAIN DESCRIPTION - ORIENTATION: ORIENTATION: MID

## 2019-02-27 ASSESSMENT — PAIN DESCRIPTION - LOCATION: LOCATION: CHEST

## 2019-02-27 ASSESSMENT — PAIN SCALES - GENERAL: PAINLEVEL_OUTOF10: 8

## 2019-02-27 ASSESSMENT — PAIN DESCRIPTION - FREQUENCY: FREQUENCY: INTERMITTENT

## 2019-02-28 ASSESSMENT — ENCOUNTER SYMPTOMS
RHINORRHEA: 0
APNEA: 0
NAUSEA: 0
EYE REDNESS: 0
COLOR CHANGE: 0
EYE PAIN: 0
CHOKING: 0
RECTAL PAIN: 0
SHORTNESS OF BREATH: 0
EYE DISCHARGE: 0
PHOTOPHOBIA: 0
DIARRHEA: 0
SINUS PRESSURE: 0
BLOOD IN STOOL: 0
BACK PAIN: 1
COUGH: 1
EYE ITCHING: 0
ANAL BLEEDING: 0
TROUBLE SWALLOWING: 0
VOMITING: 0
SORE THROAT: 0
ABDOMINAL PAIN: 0

## 2019-03-02 LAB
25(OH)D3+25(OH)D2 SERPL-MCNC: 51.6 NG/ML
ALBUMIN SERPL-MCNC: 3.84 G/DL (ref 3.2–4.8)
ALBUMIN/GLOB SERPL: 1.7 G/DL (ref 1.5–2.5)
ALP SERPL-CCNC: 51 U/L (ref 25–100)
ALT SERPL-CCNC: 12 U/L (ref 7–40)
AST SERPL-CCNC: 12 U/L (ref 0–33)
BASOPHILS # BLD AUTO: 0.05 10*3/MM3 (ref 0–0.2)
BASOPHILS NFR BLD AUTO: 0.3 % (ref 0–1)
BILIRUB SERPL-MCNC: 1.2 MG/DL (ref 0.3–1.2)
BUN SERPL-MCNC: 19 MG/DL (ref 9–23)
BUN/CREAT SERPL: 22.6 (ref 7–25)
CALCIUM SERPL-MCNC: 9.3 MG/DL (ref 8.7–10.4)
CHLORIDE SERPL-SCNC: 104 MMOL/L (ref 99–109)
CO2 SERPL-SCNC: 28 MMOL/L (ref 20–31)
CREAT SERPL-MCNC: 0.84 MG/DL (ref 0.6–1.3)
EOSINOPHIL # BLD AUTO: 0.83 10*3/MM3 (ref 0–0.3)
EOSINOPHIL NFR BLD AUTO: 4.5 % (ref 0–3)
ERYTHROCYTE [DISTWIDTH] IN BLOOD BY AUTOMATED COUNT: 12.8 % (ref 11.3–14.5)
FERRITIN SERPL-MCNC: 304 NG/ML (ref 10–291)
FOLATE SERPL-MCNC: 8.39 NG/ML (ref 3.2–20)
GLOBULIN SER CALC-MCNC: 2.3 GM/DL
GLUCOSE SERPL-MCNC: 95 MG/DL (ref 70–100)
HCT VFR BLD AUTO: 39.6 % (ref 34.5–44)
HGB BLD-MCNC: 12.7 G/DL (ref 11.5–15.5)
IMM GRANULOCYTES # BLD AUTO: 0.06 10*3/MM3 (ref 0–0.05)
IMM GRANULOCYTES NFR BLD AUTO: 0.3 % (ref 0–0.6)
IRON SERPL-MCNC: 15 MCG/DL (ref 50–175)
LYMPHOCYTES # BLD AUTO: 0.81 10*3/MM3 (ref 0.6–4.8)
LYMPHOCYTES NFR BLD AUTO: 4.4 % (ref 24–44)
Lab: ABNORMAL
MCH RBC QN AUTO: 31.6 PG (ref 27–31)
MCHC RBC AUTO-ENTMCNC: 32.1 G/DL (ref 32–36)
MCV RBC AUTO: 98.5 FL (ref 80–99)
METHYLMALONATE SERPL-SCNC: 638 NMOL/L (ref 0–378)
MONOCYTES # BLD AUTO: 0.33 10*3/MM3 (ref 0–1)
MONOCYTES NFR BLD AUTO: 1.8 % (ref 0–12)
NEUTROPHILS # BLD AUTO: 16.32 10*3/MM3 (ref 1.5–8.3)
NEUTROPHILS NFR BLD AUTO: 88.7 % (ref 41–71)
PLATELET # BLD AUTO: 364 10*3/MM3 (ref 150–450)
POTASSIUM SERPL-SCNC: 4.1 MMOL/L (ref 3.5–5.5)
PREALB SERPL-MCNC: 28 MG/DL (ref 10–36)
PROT SERPL-MCNC: 6.1 G/DL (ref 5.7–8.2)
RBC # BLD AUTO: 4.02 10*6/MM3 (ref 3.89–5.14)
SODIUM SERPL-SCNC: 140 MMOL/L (ref 132–146)
VIT B1 BLD-SCNC: 235.4 NMOL/L (ref 66.5–200)
VIT B12 SERPL-MCNC: 1410 PG/ML (ref 211–911)
WBC # BLD AUTO: 18.4 10*3/MM3 (ref 3.5–10.8)

## 2019-03-12 ENCOUNTER — HOSPITAL ENCOUNTER (OUTPATIENT)
Dept: GENERAL RADIOLOGY | Facility: HOSPITAL | Age: 64
Discharge: HOME | End: 2019-03-12
Payer: MEDICAID

## 2019-03-12 ENCOUNTER — HOSPITAL ENCOUNTER (OUTPATIENT)
Facility: HOSPITAL | Age: 64
Discharge: HOME | End: 2019-03-12
Payer: MEDICAID

## 2019-03-12 DIAGNOSIS — R06.02 BREATH SHORTNESS: ICD-10-CM

## 2019-03-12 PROCEDURE — 71046 X-RAY EXAM CHEST 2 VIEWS: CPT

## 2019-03-22 ENCOUNTER — HOSPITAL ENCOUNTER (OUTPATIENT)
Facility: HOSPITAL | Age: 64
Discharge: HOME | End: 2019-03-22
Payer: MEDICAID

## 2019-03-22 ENCOUNTER — HOSPITAL ENCOUNTER (OUTPATIENT)
Dept: GENERAL RADIOLOGY | Facility: HOSPITAL | Age: 64
Discharge: HOME | End: 2019-03-22
Payer: MEDICAID

## 2019-03-22 DIAGNOSIS — N30.00 ACUTE ON CHRONIC CYSTITIS: ICD-10-CM

## 2019-03-22 DIAGNOSIS — N30.20 ACUTE ON CHRONIC CYSTITIS: ICD-10-CM

## 2019-03-22 LAB
A/G RATIO: 1.3 (ref 0.8–2)
ALBUMIN SERPL-MCNC: 3.9 G/DL (ref 3.4–4.8)
ALP BLD-CCNC: 61 U/L (ref 25–100)
ALT SERPL-CCNC: 11 U/L (ref 4–36)
ANION GAP SERPL CALCULATED.3IONS-SCNC: 7 MMOL/L (ref 3–16)
AST SERPL-CCNC: 15 U/L (ref 8–33)
BILIRUB SERPL-MCNC: 0.6 MG/DL (ref 0.3–1.2)
BILIRUBIN URINE: NEGATIVE
BLOOD, URINE: NEGATIVE
BUN BLDV-MCNC: 14 MG/DL (ref 6–20)
CALCIUM SERPL-MCNC: 9.9 MG/DL (ref 8.5–10.5)
CHLORIDE BLD-SCNC: 106 MMOL/L (ref 98–107)
CLARITY: CLEAR
CO2: 30 MMOL/L (ref 20–30)
COLOR: YELLOW
CREAT SERPL-MCNC: 0.8 MG/DL (ref 0.4–1.2)
GFR AFRICAN AMERICAN: >59
GFR NON-AFRICAN AMERICAN: >60
GLOBULIN: 2.9 G/DL
GLUCOSE BLD-MCNC: 88 MG/DL (ref 74–106)
GLUCOSE URINE: NEGATIVE MG/DL
KETONES, URINE: NEGATIVE MG/DL
LEUKOCYTE ESTERASE, URINE: NEGATIVE
MICROSCOPIC EXAMINATION: NORMAL
NITRITE, URINE: NEGATIVE
PH UA: 5.5 (ref 5–8)
POTASSIUM SERPL-SCNC: 4.3 MMOL/L (ref 3.4–5.1)
PROTEIN UA: NEGATIVE MG/DL
SODIUM BLD-SCNC: 143 MMOL/L (ref 136–145)
SPECIFIC GRAVITY UA: 1.02 (ref 1–1.03)
TOTAL PROTEIN: 6.8 G/DL (ref 6.4–8.3)
URINE TYPE: NORMAL
UROBILINOGEN, URINE: 0.2 E.U./DL

## 2019-03-22 PROCEDURE — 80053 COMPREHEN METABOLIC PANEL: CPT

## 2019-03-22 PROCEDURE — 74018 RADEX ABDOMEN 1 VIEW: CPT

## 2019-03-22 PROCEDURE — 87086 URINE CULTURE/COLONY COUNT: CPT

## 2019-03-22 PROCEDURE — 81003 URINALYSIS AUTO W/O SCOPE: CPT

## 2019-03-22 PROCEDURE — 36415 COLL VENOUS BLD VENIPUNCTURE: CPT

## 2019-03-24 LAB — URINE CULTURE, ROUTINE: NORMAL

## 2019-04-04 ENCOUNTER — TELEPHONE (OUTPATIENT)
Dept: CARDIOLOGY | Facility: CLINIC | Age: 64
End: 2019-04-04

## 2019-06-24 ENCOUNTER — HOSPITAL ENCOUNTER (OUTPATIENT)
Facility: HOSPITAL | Age: 64
Discharge: HOME | End: 2019-06-24
Payer: MEDICAID

## 2019-06-24 ENCOUNTER — HOSPITAL ENCOUNTER (OUTPATIENT)
Dept: GENERAL RADIOLOGY | Facility: HOSPITAL | Age: 64
Discharge: HOME | End: 2019-06-24
Payer: MEDICAID

## 2019-06-24 DIAGNOSIS — R31.1 BENIGN ESSENTIAL MICROSCOPIC HEMATURIA: ICD-10-CM

## 2019-06-24 LAB
BILIRUBIN URINE: NEGATIVE
BLOOD, URINE: NEGATIVE
CLARITY: CLEAR
COLOR: YELLOW
GLUCOSE URINE: NEGATIVE MG/DL
KETONES, URINE: NEGATIVE MG/DL
LEUKOCYTE ESTERASE, URINE: NEGATIVE
MICROSCOPIC EXAMINATION: NORMAL
NITRITE, URINE: NEGATIVE
PH UA: 5.5 (ref 5–8)
PROTEIN UA: NEGATIVE MG/DL
SPECIFIC GRAVITY UA: 1.02 (ref 1–1.03)
URINE TYPE: NORMAL
UROBILINOGEN, URINE: 0.2 E.U./DL

## 2019-06-24 PROCEDURE — 87086 URINE CULTURE/COLONY COUNT: CPT

## 2019-06-24 PROCEDURE — 74018 RADEX ABDOMEN 1 VIEW: CPT

## 2019-06-24 PROCEDURE — 81003 URINALYSIS AUTO W/O SCOPE: CPT

## 2019-06-25 LAB — URINE CULTURE, ROUTINE: NORMAL

## 2019-07-02 ENCOUNTER — HOSPITAL ENCOUNTER (OUTPATIENT)
Dept: GENERAL RADIOLOGY | Facility: HOSPITAL | Age: 64
Discharge: HOME | End: 2019-07-02
Payer: MEDICAID

## 2019-07-02 ENCOUNTER — HOSPITAL ENCOUNTER (OUTPATIENT)
Facility: HOSPITAL | Age: 64
Discharge: HOME | End: 2019-07-02
Payer: MEDICAID

## 2019-07-02 DIAGNOSIS — M54.9 BACK PAIN, UNSPECIFIED BACK LOCATION, UNSPECIFIED BACK PAIN LATERALITY, UNSPECIFIED CHRONICITY: ICD-10-CM

## 2019-07-02 PROCEDURE — 72040 X-RAY EXAM NECK SPINE 2-3 VW: CPT

## 2019-07-02 PROCEDURE — 72070 X-RAY EXAM THORAC SPINE 2VWS: CPT

## 2019-07-02 PROCEDURE — 72100 X-RAY EXAM L-S SPINE 2/3 VWS: CPT

## 2019-08-07 ENCOUNTER — HOSPITAL ENCOUNTER (OUTPATIENT)
Facility: HOSPITAL | Age: 64
Discharge: HOME | End: 2019-08-07
Payer: MEDICAID

## 2019-08-07 LAB
A/G RATIO: 2.2 (ref 0.8–2)
ALBUMIN SERPL-MCNC: 4.1 G/DL (ref 3.4–4.8)
ALP BLD-CCNC: 44 U/L (ref 25–100)
ALT SERPL-CCNC: 12 U/L (ref 4–36)
ANION GAP SERPL CALCULATED.3IONS-SCNC: 12 MMOL/L (ref 3–16)
AST SERPL-CCNC: 12 U/L (ref 8–33)
BASOPHILS ABSOLUTE: 0.1 K/UL (ref 0–0.1)
BASOPHILS RELATIVE PERCENT: 1.2 %
BILIRUB SERPL-MCNC: 0.4 MG/DL (ref 0.3–1.2)
BUN BLDV-MCNC: 20 MG/DL (ref 6–20)
CALCIUM SERPL-MCNC: 9.1 MG/DL (ref 8.5–10.5)
CHLORIDE BLD-SCNC: 107 MMOL/L (ref 98–107)
CHOLESTEROL, TOTAL: 170 MG/DL (ref 0–200)
CO2: 26 MMOL/L (ref 20–30)
CREAT SERPL-MCNC: 0.7 MG/DL (ref 0.4–1.2)
EOSINOPHILS ABSOLUTE: 0.3 K/UL (ref 0–0.4)
EOSINOPHILS RELATIVE PERCENT: 7.2 %
FOLATE: 14.5 NG/ML
GFR AFRICAN AMERICAN: >59
GFR NON-AFRICAN AMERICAN: >60
GLOBULIN: 1.9 G/DL
GLUCOSE BLD-MCNC: 90 MG/DL (ref 74–106)
HBA1C MFR BLD: 5.1 %
HCT VFR BLD CALC: 39.3 % (ref 37–47)
HDLC SERPL-MCNC: 69 MG/DL (ref 40–60)
HEMOGLOBIN: 12.8 G/DL (ref 11.5–16.5)
IMMATURE GRANULOCYTES #: 0 K/UL
IMMATURE GRANULOCYTES %: 0.2 % (ref 0–5)
LDL CHOLESTEROL CALCULATED: 80 MG/DL
LYMPHOCYTES ABSOLUTE: 1 K/UL (ref 1.5–4)
LYMPHOCYTES RELATIVE PERCENT: 23.6 %
MCH RBC QN AUTO: 31.9 PG (ref 27–32)
MCHC RBC AUTO-ENTMCNC: 32.6 G/DL (ref 31–35)
MCV RBC AUTO: 98 FL (ref 80–100)
MONOCYTES ABSOLUTE: 0.4 K/UL (ref 0.2–0.8)
MONOCYTES RELATIVE PERCENT: 9.8 %
NEUTROPHILS ABSOLUTE: 2.4 K/UL (ref 2–7.5)
NEUTROPHILS RELATIVE PERCENT: 58 %
PDW BLD-RTO: 12 % (ref 11–16)
PLATELET # BLD: 226 K/UL (ref 150–400)
PMV BLD AUTO: 11.2 FL (ref 6–10)
POTASSIUM SERPL-SCNC: 4.3 MMOL/L (ref 3.4–5.1)
RBC # BLD: 4.01 M/UL (ref 3.8–5.8)
SODIUM BLD-SCNC: 145 MMOL/L (ref 136–145)
TOTAL PROTEIN: 6 G/DL (ref 6.4–8.3)
TRIGL SERPL-MCNC: 106 MG/DL (ref 0–249)
TSH SERPL DL<=0.05 MIU/L-ACNC: 2.34 UIU/ML (ref 0.35–5.5)
VITAMIN B-12: 789 PG/ML (ref 211–911)
VITAMIN D 25-HYDROXY: 60 (ref 32–100)
VLDLC SERPL CALC-MCNC: 21 MG/DL
WBC # BLD: 4.2 K/UL (ref 4–11)

## 2019-08-07 PROCEDURE — 36415 COLL VENOUS BLD VENIPUNCTURE: CPT

## 2019-08-07 PROCEDURE — 84443 ASSAY THYROID STIM HORMONE: CPT

## 2019-08-07 PROCEDURE — 82306 VITAMIN D 25 HYDROXY: CPT

## 2019-08-07 PROCEDURE — 85025 COMPLETE CBC W/AUTO DIFF WBC: CPT

## 2019-08-07 PROCEDURE — 82746 ASSAY OF FOLIC ACID SERUM: CPT

## 2019-08-07 PROCEDURE — 82607 VITAMIN B-12: CPT

## 2019-08-07 PROCEDURE — 80053 COMPREHEN METABOLIC PANEL: CPT

## 2019-08-07 PROCEDURE — 83036 HEMOGLOBIN GLYCOSYLATED A1C: CPT

## 2019-08-07 PROCEDURE — 80061 LIPID PANEL: CPT

## 2019-08-09 ENCOUNTER — HOSPITAL ENCOUNTER (EMERGENCY)
Facility: HOSPITAL | Age: 64
Discharge: HOME | End: 2019-08-09
Attending: FAMILY MEDICINE
Payer: MEDICAID

## 2019-08-09 VITALS
DIASTOLIC BLOOD PRESSURE: 83 MMHG | SYSTOLIC BLOOD PRESSURE: 129 MMHG | OXYGEN SATURATION: 98 % | TEMPERATURE: 98.6 F | BODY MASS INDEX: 34.55 KG/M2 | HEART RATE: 74 BPM | WEIGHT: 183 LBS | HEIGHT: 61 IN | RESPIRATION RATE: 18 BRPM

## 2019-08-09 DIAGNOSIS — M79.2 RADICULAR PAIN OF RIGHT UPPER EXTREMITY: Primary | ICD-10-CM

## 2019-08-09 PROCEDURE — 96372 THER/PROPH/DIAG INJ SC/IM: CPT

## 2019-08-09 PROCEDURE — 6370000000 HC RX 637 (ALT 250 FOR IP): Performed by: FAMILY MEDICINE

## 2019-08-09 PROCEDURE — 6360000002 HC RX W HCPCS: Performed by: FAMILY MEDICINE

## 2019-08-09 PROCEDURE — 99283 EMERGENCY DEPT VISIT LOW MDM: CPT

## 2019-08-09 RX ORDER — TRAMADOL HYDROCHLORIDE 50 MG/1
50 TABLET ORAL EVERY 4 HOURS PRN
Qty: 12 TABLET | Refills: 0 | Status: SHIPPED | OUTPATIENT
Start: 2019-08-09 | End: 2019-08-12

## 2019-08-09 RX ORDER — DEXAMETHASONE SODIUM PHOSPHATE 10 MG/ML
8 INJECTION INTRAMUSCULAR; INTRAVENOUS ONCE
Status: COMPLETED | OUTPATIENT
Start: 2019-08-09 | End: 2019-08-09

## 2019-08-09 RX ORDER — HYDROCODONE BITARTRATE AND ACETAMINOPHEN 10; 325 MG/1; MG/1
1 TABLET ORAL ONCE
Status: COMPLETED | OUTPATIENT
Start: 2019-08-09 | End: 2019-08-09

## 2019-08-09 RX ORDER — ESCITALOPRAM OXALATE 10 MG/1
10 TABLET ORAL DAILY
COMMUNITY
End: 2020-01-15

## 2019-08-09 RX ORDER — KETOROLAC TROMETHAMINE 30 MG/ML
60 INJECTION, SOLUTION INTRAMUSCULAR; INTRAVENOUS ONCE
Status: COMPLETED | OUTPATIENT
Start: 2019-08-09 | End: 2019-08-09

## 2019-08-09 RX ORDER — GABAPENTIN 400 MG/1
300 CAPSULE ORAL EVERY 8 HOURS PRN
Qty: 9 CAPSULE | Refills: 0 | Status: SHIPPED | OUTPATIENT
Start: 2019-08-09 | End: 2020-01-15

## 2019-08-09 RX ADMIN — KETOROLAC TROMETHAMINE 60 MG: 30 INJECTION, SOLUTION INTRAMUSCULAR; INTRAVENOUS at 19:29

## 2019-08-09 RX ADMIN — HYDROCODONE BITARTRATE AND ACETAMINOPHEN 1 TABLET: 10; 325 TABLET ORAL at 19:30

## 2019-08-09 RX ADMIN — DEXAMETHASONE SODIUM PHOSPHATE 8 MG: 10 INJECTION INTRAMUSCULAR; INTRAVENOUS at 19:30

## 2019-08-09 ASSESSMENT — PAIN DESCRIPTION - ORIENTATION: ORIENTATION: RIGHT

## 2019-08-09 ASSESSMENT — PAIN SCALES - GENERAL
PAINLEVEL_OUTOF10: 9

## 2019-08-09 ASSESSMENT — PAIN DESCRIPTION - DESCRIPTORS: DESCRIPTORS: ACHING

## 2019-08-09 ASSESSMENT — PAIN DESCRIPTION - LOCATION: LOCATION: ARM

## 2019-08-09 ASSESSMENT — PAIN DESCRIPTION - PAIN TYPE: TYPE: ACUTE PAIN

## 2019-08-09 NOTE — ED PROVIDER NOTES
[address] [place] [address] Court  eMERGENCY dEPARTMENT eNCOUnter      Pt Name: Chandler Boone  MRN: [id number]  [place] 1955  Date of evaluation: 8/9/2019  Provider: Baldemar Copeland, [address] [place] [address]       Chief Complaint   Patient presents with    Arm Pain         HISTORY OF PRESENT ILLNESS   (Location/Symptom, Timing/Onset, Context/Setting, Quality, Duration, Modifying Factors, Severity)  Note limiting factors. Chandler Boone is a 66 y.o. female who presents to the emergency department for having right arm pain. Pt states it started 2 weeks ago on and off and hasn't been that bad, but today started hurting really bad in the forearm and some dull pain to her right shoulder blade area. No injury to her right arm. No swelling to right arm. Pt hasn't been doing any strenuous activity to cause the pain. No neck pain. No numbness or tingling. Pain 9/10, pt has been taking Motrin for the pain. Pt talked to PCP [place] about her arm and is getting a referral to Orthopedics. Nursing Notes were reviewed. REVIEW OF SYSTEMS    (2-9 systems for level 4, 10 or more forlevel 5)     Review of Systems   Musculoskeletal: Positive for arthralgias. Right forearm pain and right scapular pain   All other systems reviewed and are negative.           PAST MEDICAL HISTORY     Past Medical History:   Diagnosis Date    Arthritis     Depression     Hypertension     Thyroid disease     UTI (urinary tract infection)          SURGICAL HISTORY       Past Surgical History:   Procedure Laterality Date    APPENDECTOMY      CARPAL TUNNEL RELEASE Bilateral     CHOLECYSTECTOMY      GASTRIC BYPASS SURGERY      JOINT REPLACEMENT Right     PA COLONOSCOPY FLX DX W/COLLJ SPEC WHEN PFRMD N/A 8/2/2018    COLONOSCOPY DIAGNOSTIC OR SCREENING performed by Jaci Antonio MD at [place] [address]       Previous Medications    ASPIRIN EC 81 MG EC TABLET    Take 1 tablet by pain) for up to 3 days. TRAMADOL (ULTRAM) 50 MG TABLET    Take 1 tablet by mouth every 4 hours as needed for Pain for up to 3 days. Intended supply: 3 days.  Take lowest dose possible to manage pain       (Please note that portions of this note were completed with a voice recognition program.  Efforts were made to edit the dictations but occasionallywords are mis-transcribed.)    Jose Francisco Osullivan DO (electronically signed)  Attending Emergency Physician          Jose Francisco Osullivan DO  08/09/19 2676

## 2019-08-19 ENCOUNTER — HOSPITAL ENCOUNTER (OUTPATIENT)
Dept: PHYSICAL THERAPY | Facility: HOSPITAL | Age: 64
Setting detail: THERAPIES SERIES
Discharge: HOME | End: 2019-08-19
Payer: MEDICAID

## 2019-08-19 PROCEDURE — 97110 THERAPEUTIC EXERCISES: CPT

## 2019-08-19 PROCEDURE — 97140 MANUAL THERAPY 1/> REGIONS: CPT

## 2019-08-19 PROCEDURE — 97161 PT EVAL LOW COMPLEX 20 MIN: CPT

## 2019-08-19 ASSESSMENT — PAIN SCALES - GENERAL: PAINLEVEL_OUTOF10: 8

## 2019-08-19 ASSESSMENT — PAIN DESCRIPTION - LOCATION: LOCATION: ARM;NECK

## 2019-08-19 ASSESSMENT — PAIN DESCRIPTION - ORIENTATION: ORIENTATION: RIGHT

## 2019-08-19 ASSESSMENT — PAIN DESCRIPTION - PAIN TYPE: TYPE: ACUTE PAIN

## 2019-08-19 NOTE — PROGRESS NOTES
retraction 3x10  Pt was educated in and issued a written HEP of the above exercises. Manual therapy: gentle cervical traction, OA release, grade II lateral mobs  Plan to add next visit:  [address] / ES c-spine        Assessment   Conditions Requiring Skilled Therapeutic Intervention  Assessment: Pt will benefit from skilled PT to address cervical pain with R UE radiculopathy to restore optimal functional level. Treatment Diagnosis: Cervical pain, DDD, R UE radiculopathy  Prognosis: Good  Decision Making: Low Complexity  REQUIRES PT FOLLOW UP: Yes  Activity Tolerance  Activity Tolerance: Patient Tolerated treatment well         Plan   Plan  Times per week: 2x/week  Plan weeks: 6-8 week  Current Treatment Recommendations: Strengthening, Patient/Caregiver Education & Training, ROM, Modalities, Pain Management, Manual Therapy - Soft Tissue Mobilization, Home Exercise Program         Goals  Short term goals  Time Frame for Short term goals: 3 weeks  Short term goal 1: Pt to be I with HEP  Short term goal 2: Pt to demonstrate a 5 deg or greater increase in R cervical rotation AROM  Short term goal 3: Pt to perform daily activities with pain of less than 5/10 on average. Short term goal 4: Pt to report a 50% subjective reduction in R UE symptoms. Long term goals  Time Frame for Long term goals : 6-8 weeks  Long term goal 1: Neck Index score to improve to improve to 25% or less indicating improved function. Long term goal 2: Pt to demonstrate B cervical rotation AROM of 0-65 deg. Long term goal 3: Pt to report resolution of RUE symptoms. Long term goal 4: Pt to perform daily activities with pain of 2/10 or less. Tyrese Espinoza PT       Certification of Medical Necessity: It will be understood that this treatment plan is certified medically necessary by the documenting therapist and referring physician mentioned in this report.   Unless the physician indicated otherwise through written correspondence with our office, all further referrals will act as certification of medical necessity on this treatment plan. Thank you for this referral.  If you have questions regarding this plan of care, please call [phone].           Revisions to this plan (optional):                     Please sign and return this plan to:   FAX: [phone]      Signature:                                 Date:

## 2019-08-22 ENCOUNTER — HOSPITAL ENCOUNTER (OUTPATIENT)
Dept: PHYSICAL THERAPY | Facility: HOSPITAL | Age: 64
Setting detail: THERAPIES SERIES
Discharge: HOME | End: 2019-08-22
Payer: MEDICAID

## 2019-08-22 PROCEDURE — G0283 ELEC STIM OTHER THAN WOUND: HCPCS

## 2019-08-22 PROCEDURE — 97110 THERAPEUTIC EXERCISES: CPT

## 2019-08-22 PROCEDURE — 97140 MANUAL THERAPY 1/> REGIONS: CPT

## 2019-08-22 NOTE — FLOWSHEET NOTE
Physical Therapy Daily Treatment Note   Date:  2019    TIme In:   08                   Time Out:  9520    Patient Name:  Mateusz Dunn    :  1955  MRN: [id number]    Restrictions/Precautions:    Pertinent Medical History:  Medical/Treatment Diagnosis Information:  ·   Cervical pain, DDD, R UE radiculopathy  ·    Insurance/Certification information:   [facility]  Physician Information:   Miriam Ashton PA-C  Plan of care signed (Y/N):    Visit# / total visits:     2/    G-Code (if applicable):      Date / Visit # G-Code Applied:         Progress Note: []  Yes  [x]  No  Next due by: Visit #10      Pain level:   8/10  Subjective:   Pt reports she felt better after the initial evaluation. She is having increase in pain currently. Objective:  Observation:   Test measurements:    Palpation:    Exercises:  Exercise Resistance/Repetitions Other comments   UT / LS stretch 5x20\" 22   Chin tucks 2x10 22   Scapular retraction 3x10 22   Radial nerve glides 2x10 22   Wall posture 10 x 5\" 22                                        Other Therapeutic Activities:      Manual Treatments:  Manual cervical traction, OA release, STM cervical paraspinals / SCM / scalenes    Modalities:   / IFC ES (ES to R cx/scapula region,  B UT)      Timed Code Treatment Minutes:  35      Total Treatment Minutes:  50    Treatment/Activity Tolerance:  [x] Patient tolerated treatment well [] Patient limited by fatigue  [] Patient limited by pain  [] Patient limited by other medical complications  [x] Other: Pt responded well to treatment with no pain at end of session.      Pain after treatment:      0/10    Prognosis: [x] Good [] Fair  [] Poor    Patient Requires Follow-up: [x] Yes  [] No    Plan:   [x] Continue per plan of care [] Alter current plan (see comments)  [] Plan of care initiated [] Hold pending MD visit [] Discharge    Plan for Next Session:        Electronically signed by:  Arsenio Hernandez PT

## 2019-08-26 ENCOUNTER — HOSPITAL ENCOUNTER (OUTPATIENT)
Dept: PHYSICAL THERAPY | Facility: HOSPITAL | Age: 64
Setting detail: THERAPIES SERIES
Discharge: HOME | End: 2019-08-26
Payer: MEDICAID

## 2019-08-26 PROCEDURE — G0283 ELEC STIM OTHER THAN WOUND: HCPCS

## 2019-08-26 PROCEDURE — 97140 MANUAL THERAPY 1/> REGIONS: CPT

## 2019-08-26 PROCEDURE — 97110 THERAPEUTIC EXERCISES: CPT

## 2019-08-28 ENCOUNTER — HOSPITAL ENCOUNTER (OUTPATIENT)
Dept: PHYSICAL THERAPY | Facility: HOSPITAL | Age: 64
Setting detail: THERAPIES SERIES
Discharge: HOME | End: 2019-08-28
Payer: MEDICAID

## 2019-08-28 PROCEDURE — 97110 THERAPEUTIC EXERCISES: CPT

## 2019-08-28 PROCEDURE — G0283 ELEC STIM OTHER THAN WOUND: HCPCS

## 2019-08-28 PROCEDURE — 97140 MANUAL THERAPY 1/> REGIONS: CPT

## 2019-09-03 ENCOUNTER — HOSPITAL ENCOUNTER (OUTPATIENT)
Dept: PHYSICAL THERAPY | Facility: HOSPITAL | Age: 64
Setting detail: THERAPIES SERIES
End: 2019-09-03
Payer: MEDICAID

## 2019-09-05 ENCOUNTER — HOSPITAL ENCOUNTER (OUTPATIENT)
Dept: PHYSICAL THERAPY | Facility: HOSPITAL | Age: 64
Setting detail: THERAPIES SERIES
Discharge: HOME | End: 2019-09-05
Payer: MEDICAID

## 2019-09-05 PROCEDURE — 97110 THERAPEUTIC EXERCISES: CPT

## 2019-09-05 PROCEDURE — 97140 MANUAL THERAPY 1/> REGIONS: CPT

## 2019-09-05 PROCEDURE — G0283 ELEC STIM OTHER THAN WOUND: HCPCS

## 2019-09-05 NOTE — FLOWSHEET NOTE
Physical Therapy Daily Treatment Note   Date:  2019    TIme In:   0800                   Time Out:  0900    Patient Name:  Lubna Sanchez    :  1955  MRN: [id number]    Restrictions/Precautions:    Pertinent Medical History:  Medical/Treatment Diagnosis Information:  ·   Cervical pain, DDD, R UE radiculopathy     Insurance/Certification information:   [facility]  Physician Information:   Khai Delgado PA-C  Plan of care signed (Y/N):    Visit# / total visits:     5/    G-Code (if applicable):      Date / Visit # G-Code Applied:         Progress Note: []  Yes  [x]  No  Next due by: Visit #10      Pain level:   0/10  Subjective:   Pt reports no pain today, doing good.      Objective:  Observation:   Test measurements:    Palpation:    Exercises:  Exercise Resistance/Repetitions Other comments   UT / LS stretch 5x20\" 5   Chin tucks 2x10 5   Scapular retraction 3x10 5   Radial nerve glides 2x10 5   Wall posture 10 x 5\" 5                                        Other Therapeutic Activities:      Manual Treatments:  Manual cervical traction, OA release, STM cervical paraspinals / SCM / scalenes, cervical mobs x grade II    Modalities:   / IFC ES (ES to R cx/scapula region,  B UT)      Timed Code Treatment Minutes:  55      Total Treatment Minutes:  60    Treatment/Activity Tolerance:  [x] Patient tolerated treatment well [] Patient limited by fatigue  [] Patient limited by pain  [] Patient limited by other medical complications  [] Other:     Pain after treatment:      0/10    Prognosis: [x] Good [] Fair  [] Poor    Patient Requires Follow-up: [x] Yes  [] No    Plan:   [x] Continue per plan of care [] Alter current plan (see comments)  [] Plan of care initiated [] Hold pending MD visit [] Discharge    Plan for Next Session:        Electronically signed by:  Khai Alarcon PTA

## 2019-09-10 ENCOUNTER — HOSPITAL ENCOUNTER (OUTPATIENT)
Dept: PHYSICAL THERAPY | Facility: HOSPITAL | Age: 64
Setting detail: THERAPIES SERIES
Discharge: HOME | End: 2019-09-10
Payer: MEDICAID

## 2019-09-10 PROCEDURE — G0283 ELEC STIM OTHER THAN WOUND: HCPCS

## 2019-09-10 PROCEDURE — 97140 MANUAL THERAPY 1/> REGIONS: CPT

## 2019-09-10 PROCEDURE — 97110 THERAPEUTIC EXERCISES: CPT

## 2019-09-12 ENCOUNTER — APPOINTMENT (OUTPATIENT)
Dept: PHYSICAL THERAPY | Facility: HOSPITAL | Age: 64
End: 2019-09-12
Payer: MEDICAID

## 2019-09-19 ENCOUNTER — HOSPITAL ENCOUNTER (OUTPATIENT)
Dept: MAMMOGRAPHY | Facility: HOSPITAL | Age: 64
Discharge: HOME | End: 2019-09-19
Payer: MEDICAID

## 2019-09-19 DIAGNOSIS — Z12.39 BREAST CANCER SCREENING: ICD-10-CM

## 2019-09-19 PROCEDURE — 77067 SCR MAMMO BI INCL CAD: CPT

## 2019-09-20 ENCOUNTER — TRANSCRIBE ORDERS (OUTPATIENT)
Dept: ADMINISTRATIVE | Facility: HOSPITAL | Age: 64
End: 2019-09-20

## 2019-09-20 DIAGNOSIS — M50.123 DISORDER OF INTERVERTEBRAL DISC AT C6-C7 LEVEL WITH RADICULOPATHY: Primary | ICD-10-CM

## 2019-09-24 ENCOUNTER — HOSPITAL ENCOUNTER (OUTPATIENT)
Dept: MRI IMAGING | Facility: HOSPITAL | Age: 64
Discharge: HOME | End: 2019-09-24
Admitting: ORTHOPAEDIC SURGERY

## 2019-09-24 DIAGNOSIS — M50.123 DISORDER OF INTERVERTEBRAL DISC AT C6-C7 LEVEL WITH RADICULOPATHY: ICD-10-CM

## 2019-09-24 PROCEDURE — 72141 MRI NECK SPINE W/O DYE: CPT

## 2019-09-30 ENCOUNTER — HOSPITAL ENCOUNTER (OUTPATIENT)
Dept: PHYSICAL THERAPY | Facility: HOSPITAL | Age: 64
Setting detail: THERAPIES SERIES
Discharge: HOME | End: 2019-09-30
Payer: MEDICAID

## 2019-09-30 PROCEDURE — 97140 MANUAL THERAPY 1/> REGIONS: CPT

## 2019-09-30 PROCEDURE — G0283 ELEC STIM OTHER THAN WOUND: HCPCS

## 2019-09-30 PROCEDURE — 97110 THERAPEUTIC EXERCISES: CPT

## 2019-10-03 ENCOUNTER — HOSPITAL ENCOUNTER (OUTPATIENT)
Dept: PHYSICAL THERAPY | Facility: HOSPITAL | Age: 64
Setting detail: THERAPIES SERIES
End: 2019-10-03
Payer: MEDICAID

## 2019-10-08 ENCOUNTER — HOSPITAL ENCOUNTER (OUTPATIENT)
Dept: PHYSICAL THERAPY | Facility: HOSPITAL | Age: 64
Setting detail: THERAPIES SERIES
Discharge: HOME | End: 2019-10-08
Payer: MEDICAID

## 2019-10-08 PROCEDURE — 97110 THERAPEUTIC EXERCISES: CPT

## 2019-10-08 PROCEDURE — 97140 MANUAL THERAPY 1/> REGIONS: CPT

## 2019-10-08 PROCEDURE — G0283 ELEC STIM OTHER THAN WOUND: HCPCS

## 2019-10-10 ENCOUNTER — HOSPITAL ENCOUNTER (OUTPATIENT)
Dept: PHYSICAL THERAPY | Facility: HOSPITAL | Age: 64
Setting detail: THERAPIES SERIES
Discharge: HOME | End: 2019-10-10
Payer: MEDICAID

## 2019-10-10 PROCEDURE — G0283 ELEC STIM OTHER THAN WOUND: HCPCS

## 2019-10-10 PROCEDURE — 97110 THERAPEUTIC EXERCISES: CPT

## 2019-10-10 PROCEDURE — 97140 MANUAL THERAPY 1/> REGIONS: CPT

## 2019-10-21 ENCOUNTER — HOSPITAL ENCOUNTER (OUTPATIENT)
Dept: SLEEP CENTER | Facility: HOSPITAL | Age: 64
Discharge: HOME | End: 2019-10-23
Payer: MEDICAID

## 2019-10-21 PROCEDURE — 95810 POLYSOM 6/> YRS 4/> PARAM: CPT

## 2019-11-30 ENCOUNTER — HOSPITAL ENCOUNTER (EMERGENCY)
Facility: HOSPITAL | Age: 64
Discharge: HOME | End: 2019-11-30
Attending: HOSPITALIST
Payer: MEDICAID

## 2019-11-30 ENCOUNTER — APPOINTMENT (OUTPATIENT)
Dept: GENERAL RADIOLOGY | Facility: HOSPITAL | Age: 64
End: 2019-11-30
Payer: MEDICAID

## 2019-11-30 VITALS
TEMPERATURE: 97.8 F | DIASTOLIC BLOOD PRESSURE: 82 MMHG | OXYGEN SATURATION: 95 % | HEART RATE: 67 BPM | SYSTOLIC BLOOD PRESSURE: 135 MMHG | HEIGHT: 61 IN | RESPIRATION RATE: 16 BRPM | WEIGHT: 178 LBS | BODY MASS INDEX: 33.61 KG/M2

## 2019-11-30 DIAGNOSIS — J20.9 ACUTE BRONCHITIS, UNSPECIFIED ORGANISM: Primary | ICD-10-CM

## 2019-11-30 LAB
A/G RATIO: 1.5 (ref 0.8–2)
ALBUMIN SERPL-MCNC: 3.7 G/DL (ref 3.4–4.8)
ALP BLD-CCNC: 55 U/L (ref 25–100)
ALT SERPL-CCNC: 18 U/L (ref 4–36)
ANION GAP SERPL CALCULATED.3IONS-SCNC: 10 MMOL/L (ref 3–16)
AST SERPL-CCNC: 16 U/L (ref 8–33)
BASOPHILS ABSOLUTE: 0.1 K/UL (ref 0–0.1)
BASOPHILS RELATIVE PERCENT: 1.2 %
BILIRUB SERPL-MCNC: 0.4 MG/DL (ref 0.3–1.2)
BUN BLDV-MCNC: 16 MG/DL (ref 6–20)
CALCIUM SERPL-MCNC: 8.5 MG/DL (ref 8.5–10.5)
CHLORIDE BLD-SCNC: 109 MMOL/L (ref 98–107)
CO2: 22 MMOL/L (ref 20–30)
CREAT SERPL-MCNC: 1.1 MG/DL (ref 0.4–1.2)
EOSINOPHILS ABSOLUTE: 1 K/UL (ref 0–0.4)
EOSINOPHILS RELATIVE PERCENT: 14.3 %
GFR AFRICAN AMERICAN: >59
GFR NON-AFRICAN AMERICAN: 50
GLOBULIN: 2.5 G/DL
GLUCOSE BLD-MCNC: 127 MG/DL (ref 74–106)
HCT VFR BLD CALC: 38.7 % (ref 37–47)
HEMOGLOBIN: 12.4 G/DL (ref 11.5–16.5)
IMMATURE GRANULOCYTES #: 0 K/UL
IMMATURE GRANULOCYTES %: 0.4 % (ref 0–5)
LYMPHOCYTES ABSOLUTE: 0.9 K/UL (ref 1.5–4)
LYMPHOCYTES RELATIVE PERCENT: 12.5 %
MCH RBC QN AUTO: 31.9 PG (ref 27–32)
MCHC RBC AUTO-ENTMCNC: 32 G/DL (ref 31–35)
MCV RBC AUTO: 99.5 FL (ref 80–100)
MONOCYTES ABSOLUTE: 0.4 K/UL (ref 0.2–0.8)
MONOCYTES RELATIVE PERCENT: 6.2 %
NEUTROPHILS ABSOLUTE: 4.4 K/UL (ref 2–7.5)
NEUTROPHILS RELATIVE PERCENT: 65.4 %
PDW BLD-RTO: 11.8 % (ref 11–16)
PLATELET # BLD: 214 K/UL (ref 150–400)
PMV BLD AUTO: 10.6 FL (ref 6–10)
POTASSIUM SERPL-SCNC: 3.4 MMOL/L (ref 3.4–5.1)
RAPID INFLUENZA  B AGN: NEGATIVE
RAPID INFLUENZA A AGN: NEGATIVE
RBC # BLD: 3.89 M/UL (ref 3.8–5.8)
S PYO AG THROAT QL: NEGATIVE
SODIUM BLD-SCNC: 141 MMOL/L (ref 136–145)
TOTAL PROTEIN: 6.2 G/DL (ref 6.4–8.3)
TROPONIN: <0.3 NG/ML
WBC # BLD: 6.8 K/UL (ref 4–11)

## 2019-11-30 PROCEDURE — 84484 ASSAY OF TROPONIN QUANT: CPT

## 2019-11-30 PROCEDURE — 87804 INFLUENZA ASSAY W/OPTIC: CPT

## 2019-11-30 PROCEDURE — 93005 ELECTROCARDIOGRAM TRACING: CPT

## 2019-11-30 PROCEDURE — 99285 EMERGENCY DEPT VISIT HI MDM: CPT

## 2019-11-30 PROCEDURE — 71046 X-RAY EXAM CHEST 2 VIEWS: CPT

## 2019-11-30 PROCEDURE — 85025 COMPLETE CBC W/AUTO DIFF WBC: CPT

## 2019-11-30 PROCEDURE — 36415 COLL VENOUS BLD VENIPUNCTURE: CPT

## 2019-11-30 PROCEDURE — 87880 STREP A ASSAY W/OPTIC: CPT

## 2019-11-30 PROCEDURE — 80053 COMPREHEN METABOLIC PANEL: CPT

## 2019-11-30 RX ORDER — ALBUTEROL SULFATE 90 UG/1
2 AEROSOL, METERED RESPIRATORY (INHALATION) EVERY 4 HOURS PRN
Qty: 1 INHALER | Refills: 1 | Status: SHIPPED | OUTPATIENT
Start: 2019-11-30 | End: 2020-01-15

## 2019-11-30 RX ORDER — GUAIFENESIN 600 MG/1
1200 TABLET, EXTENDED RELEASE ORAL 2 TIMES DAILY
Qty: 28 TABLET | Refills: 0 | Status: SHIPPED | OUTPATIENT
Start: 2019-11-30 | End: 2019-12-07

## 2019-11-30 ASSESSMENT — PAIN SCALES - GENERAL: PAINLEVEL_OUTOF10: 8

## 2019-11-30 ASSESSMENT — PAIN DESCRIPTION - ORIENTATION: ORIENTATION: MID

## 2019-11-30 ASSESSMENT — PAIN DESCRIPTION - DESCRIPTORS: DESCRIPTORS: ACHING

## 2019-11-30 ASSESSMENT — PAIN DESCRIPTION - PAIN TYPE: TYPE: ACUTE PAIN

## 2019-11-30 ASSESSMENT — PAIN DESCRIPTION - LOCATION: LOCATION: CHEST

## 2019-12-17 ENCOUNTER — HOSPITAL ENCOUNTER (OUTPATIENT)
Dept: GENERAL RADIOLOGY | Facility: HOSPITAL | Age: 64
Discharge: HOME | End: 2019-12-17
Payer: MEDICAID

## 2019-12-17 ENCOUNTER — HOSPITAL ENCOUNTER (OUTPATIENT)
Facility: HOSPITAL | Age: 64
Discharge: HOME | End: 2019-12-17
Payer: MEDICAID

## 2019-12-17 DIAGNOSIS — N30.20 ACUTE ON CHRONIC CYSTITIS: ICD-10-CM

## 2019-12-17 DIAGNOSIS — N30.00 ACUTE ON CHRONIC CYSTITIS: ICD-10-CM

## 2019-12-17 LAB
BACTERIA: ABNORMAL /HPF
BILIRUBIN URINE: NEGATIVE
BLOOD, URINE: NEGATIVE
CLARITY: CLEAR
COLOR: YELLOW
EPITHELIAL CELLS, UA: ABNORMAL /HPF
GLUCOSE URINE: NEGATIVE MG/DL
KETONES, URINE: NEGATIVE MG/DL
LEUKOCYTE ESTERASE, URINE: ABNORMAL
MICROSCOPIC EXAMINATION: YES
NITRITE, URINE: NEGATIVE
PH UA: 6 (ref 5–8)
PROTEIN UA: NEGATIVE MG/DL
RBC UA: ABNORMAL /HPF (ref 0–2)
SPECIFIC GRAVITY UA: 1.02 (ref 1–1.03)
URINE TYPE: ABNORMAL
UROBILINOGEN, URINE: 0.2 E.U./DL
WBC UA: ABNORMAL /HPF (ref 0–5)

## 2019-12-17 PROCEDURE — 81001 URINALYSIS AUTO W/SCOPE: CPT

## 2019-12-17 PROCEDURE — 74018 RADEX ABDOMEN 1 VIEW: CPT

## 2019-12-17 PROCEDURE — 87086 URINE CULTURE/COLONY COUNT: CPT

## 2019-12-19 LAB — URINE CULTURE, ROUTINE: NORMAL

## 2020-01-15 ENCOUNTER — HOSPITAL ENCOUNTER (EMERGENCY)
Facility: HOSPITAL | Age: 65
Discharge: HOME | End: 2020-01-16
Attending: EMERGENCY MEDICINE
Payer: MEDICAID

## 2020-01-15 PROCEDURE — 99283 EMERGENCY DEPT VISIT LOW MDM: CPT

## 2020-01-15 PROCEDURE — 96372 THER/PROPH/DIAG INJ SC/IM: CPT

## 2020-01-15 PROCEDURE — 6360000002 HC RX W HCPCS: Performed by: EMERGENCY MEDICINE

## 2020-01-15 RX ORDER — KETOROLAC TROMETHAMINE 30 MG/ML
30 INJECTION, SOLUTION INTRAMUSCULAR; INTRAVENOUS ONCE
Status: COMPLETED | OUTPATIENT
Start: 2020-01-16 | End: 2020-01-15

## 2020-01-15 RX ORDER — FLUOXETINE HYDROCHLORIDE 20 MG/1
40 CAPSULE ORAL DAILY
COMMUNITY

## 2020-01-15 RX ADMIN — KETOROLAC TROMETHAMINE 30 MG: 30 INJECTION, SOLUTION INTRAMUSCULAR at 23:59

## 2020-01-15 ASSESSMENT — PAIN SCALES - GENERAL: PAINLEVEL_OUTOF10: 8

## 2020-01-16 ENCOUNTER — APPOINTMENT (OUTPATIENT)
Dept: GENERAL RADIOLOGY | Facility: HOSPITAL | Age: 65
End: 2020-01-16
Payer: MEDICAID

## 2020-01-16 VITALS
WEIGHT: 178 LBS | HEIGHT: 61 IN | SYSTOLIC BLOOD PRESSURE: 129 MMHG | DIASTOLIC BLOOD PRESSURE: 77 MMHG | RESPIRATION RATE: 16 BRPM | HEART RATE: 78 BPM | BODY MASS INDEX: 33.61 KG/M2 | TEMPERATURE: 98.4 F | OXYGEN SATURATION: 96 %

## 2020-01-16 PROCEDURE — 71046 X-RAY EXAM CHEST 2 VIEWS: CPT

## 2020-01-16 PROCEDURE — 73030 X-RAY EXAM OF SHOULDER: CPT

## 2020-01-16 RX ORDER — TIZANIDINE 4 MG/1
4 TABLET ORAL EVERY 8 HOURS PRN
Qty: 15 TABLET | Refills: 0 | Status: SHIPPED | OUTPATIENT
Start: 2020-01-16 | End: 2020-01-21

## 2020-01-16 RX ORDER — TRAMADOL HYDROCHLORIDE 50 MG/1
50 TABLET ORAL EVERY 6 HOURS PRN
Qty: 12 TABLET | Refills: 0 | Status: SHIPPED | OUTPATIENT
Start: 2020-01-16 | End: 2020-01-19

## 2020-01-16 ASSESSMENT — PAIN SCALES - GENERAL: PAINLEVEL_OUTOF10: 4

## 2020-01-16 NOTE — ED PROVIDER NOTES
murmur. LUNGS: Respirations unlabored. CTAB  ABDOMEN: Soft. Non-tender. No guarding or rebound. No CVAT. EXTREMITIES: No acute deformities. Some left posterior shoulder discomfort arm movement of the left arm. Hand  are equal.  Pulses are equal at both wrists. No joint swelling. No lower extremity calf pain or edema. SKIN: Warm and dry. NEUROLOGICAL:  DTRs are equal.  Moves all 4 extremities spontaneously. No focal motor or sensory abnormalities of the upper or lower extremities. PSYCHIATRIC: Normal mood. I have reviewed and interpreted all of the currently available lab results from this visit (if applicable):  No results found for this visit on 01/15/20. Radiographs (if obtained):  [] The following radiograph was interpreted by myself in the absence of a radiologist:  [x] Radiologist's Report Reviewed:  Chest x-ray shows normal heart size and clear lung fields. No infiltrates. No rib lesions seen on the left. X-ray of the left shoulder shows no acute bony abnormality. EKG (if obtained): (All EKG's are interpreted by myself in the absence of a cardiologist)    MDM:  She is placed in room and examined. She is afebrile. Vitals are stable. She is given Toradol IM for pain. This does help her discomfort somewhat. X-rays show no acute bony pathology or pulmonary pathology. I suspect this is musculoskeletal.  I will provide her with a small prescription of tramadol. I will also give her Zanaflex as a muscle relaxer. I recommend heat and rest.  I recommend follow-up with her family doctor. She is in stable condition on release. Clinical Impression:  1.  Pain of left scapula      (Please note that portions of this note may have been completed with a voice recognition program. Efforts were made to edit the dictations but occasionally words are mis-transcribed.)    MD Pantera Glass MD  01/16/20 [id number]

## 2020-01-16 NOTE — ED TRIAGE NOTES
Back pain, states is under left scapula. Began 2-3 weeks ago, but got better with flexeril. Denies history of injury. Pain began again yesterday. States has productive cough of yellow sputum since last night. Denies fever. Has pain with deep breath. Describes as sharp constant pain. Denies chest pain.

## 2020-02-10 ENCOUNTER — HOSPITAL ENCOUNTER (OUTPATIENT)
Dept: GENERAL RADIOLOGY | Facility: HOSPITAL | Age: 65
Discharge: HOME | End: 2020-02-10
Payer: MEDICAID

## 2020-02-10 ENCOUNTER — HOSPITAL ENCOUNTER (OUTPATIENT)
Facility: HOSPITAL | Age: 65
Discharge: HOME | End: 2020-02-10
Payer: MEDICAID

## 2020-02-10 PROCEDURE — 74018 RADEX ABDOMEN 1 VIEW: CPT

## 2020-02-14 ENCOUNTER — HOSPITAL ENCOUNTER (OUTPATIENT)
Dept: GENERAL RADIOLOGY | Facility: HOSPITAL | Age: 65
Discharge: HOME | End: 2020-02-14
Payer: MEDICAID

## 2020-02-14 ENCOUNTER — HOSPITAL ENCOUNTER (OUTPATIENT)
Facility: HOSPITAL | Age: 65
Discharge: HOME | End: 2020-02-14
Payer: MEDICAID

## 2020-02-14 ENCOUNTER — OUTSIDE FACILITY SERVICE (OUTPATIENT)
Dept: CARDIOLOGY | Facility: CLINIC | Age: 65
End: 2020-02-14

## 2020-02-14 LAB
A/G RATIO: 1.6 (ref 0.8–2)
ALBUMIN SERPL-MCNC: 4.1 G/DL (ref 3.4–4.8)
ALP BLD-CCNC: 61 U/L (ref 25–100)
ALT SERPL-CCNC: 13 U/L (ref 4–36)
ANION GAP SERPL CALCULATED.3IONS-SCNC: 7 MMOL/L (ref 3–16)
AST SERPL-CCNC: 13 U/L (ref 8–33)
BACTERIA: ABNORMAL /HPF
BILIRUB SERPL-MCNC: 0.6 MG/DL (ref 0.3–1.2)
BILIRUBIN URINE: NEGATIVE
BLOOD, URINE: ABNORMAL
BUN BLDV-MCNC: 17 MG/DL (ref 6–20)
CALCIUM SERPL-MCNC: 9.4 MG/DL (ref 8.5–10.5)
CHLORIDE BLD-SCNC: 105 MMOL/L (ref 98–107)
CLARITY: CLEAR
CO2: 28 MMOL/L (ref 20–30)
COLOR: YELLOW
CREAT SERPL-MCNC: 0.7 MG/DL (ref 0.4–1.2)
EPITHELIAL CELLS, UA: >100 /HPF
GFR AFRICAN AMERICAN: >59
GFR NON-AFRICAN AMERICAN: >60
GLOBULIN: 2.5 G/DL
GLUCOSE BLD-MCNC: 82 MG/DL (ref 74–106)
GLUCOSE URINE: NEGATIVE MG/DL
HCT VFR BLD CALC: 42.5 % (ref 37–47)
HEMOGLOBIN: 13.6 G/DL (ref 11.5–16.5)
KETONES, URINE: NEGATIVE MG/DL
LEUKOCYTE ESTERASE, URINE: NEGATIVE
MCH RBC QN AUTO: 31.8 PG (ref 27–32)
MCHC RBC AUTO-ENTMCNC: 32 G/DL (ref 31–35)
MCV RBC AUTO: 99.3 FL (ref 80–100)
MICROSCOPIC EXAMINATION: YES
NITRITE, URINE: NEGATIVE
PDW BLD-RTO: 12.1 % (ref 11–16)
PH UA: 5.5 (ref 5–8)
PLATELET # BLD: 255 K/UL (ref 150–400)
PMV BLD AUTO: 10.4 FL (ref 6–10)
POTASSIUM SERPL-SCNC: 4.2 MMOL/L (ref 3.4–5.1)
PROTEIN UA: NEGATIVE MG/DL
RBC # BLD: 4.28 M/UL (ref 3.8–5.8)
RBC UA: ABNORMAL /HPF (ref 0–2)
SODIUM BLD-SCNC: 140 MMOL/L (ref 136–145)
SPECIFIC GRAVITY UA: 1.02 (ref 1–1.03)
TOTAL PROTEIN: 6.6 G/DL (ref 6.4–8.3)
URINE REFLEX TO CULTURE: ABNORMAL
URINE TYPE: ABNORMAL
URINE TYPE: NORMAL
UROBILINOGEN, URINE: 0.2 E.U./DL
WBC # BLD: 6.4 K/UL (ref 4–11)
WBC UA: ABNORMAL /HPF (ref 0–5)

## 2020-02-14 PROCEDURE — 80053 COMPREHEN METABOLIC PANEL: CPT

## 2020-02-14 PROCEDURE — 81001 URINALYSIS AUTO W/SCOPE: CPT

## 2020-02-14 PROCEDURE — 93010 ELECTROCARDIOGRAM REPORT: CPT | Performed by: INTERNAL MEDICINE

## 2020-02-14 PROCEDURE — 85027 COMPLETE CBC AUTOMATED: CPT

## 2020-02-14 PROCEDURE — 36415 COLL VENOUS BLD VENIPUNCTURE: CPT

## 2020-02-14 PROCEDURE — 87086 URINE CULTURE/COLONY COUNT: CPT

## 2020-02-14 PROCEDURE — 74018 RADEX ABDOMEN 1 VIEW: CPT

## 2020-02-14 PROCEDURE — 71046 X-RAY EXAM CHEST 2 VIEWS: CPT

## 2020-02-14 PROCEDURE — 93005 ELECTROCARDIOGRAM TRACING: CPT

## 2020-02-16 LAB — URINE CULTURE, ROUTINE: NORMAL

## 2020-05-19 ENCOUNTER — OFFICE VISIT (OUTPATIENT)
Dept: PRIMARY CARE CLINIC | Age: 65
End: 2020-05-19
Payer: MEDICAID

## 2020-05-19 VITALS — TEMPERATURE: 98.6 F

## 2020-05-19 PROCEDURE — 99000 SPECIMEN HANDLING OFFICE-LAB: CPT | Performed by: NURSE PRACTITIONER

## 2020-05-19 PROCEDURE — G8428 CUR MEDS NOT DOCUMENT: HCPCS | Performed by: NURSE PRACTITIONER

## 2020-05-19 PROCEDURE — G8417 CALC BMI ABV UP PARAM F/U: HCPCS | Performed by: NURSE PRACTITIONER

## 2020-05-21 ENCOUNTER — TELEPHONE (OUTPATIENT)
Dept: PRIMARY CARE CLINIC | Age: 65
End: 2020-05-21

## 2020-05-21 NOTE — TELEPHONE ENCOUNTER
Health department notified [facility] ([facility] of negative COVID results. Number in patients chart not accepting calls at this time. Please inform patient if she calls the office. Will scan in the copy of results to patients chart.

## 2020-06-17 ENCOUNTER — APPOINTMENT (OUTPATIENT)
Dept: CT IMAGING | Facility: HOSPITAL | Age: 65
End: 2020-06-17
Payer: MEDICAID

## 2020-06-17 ENCOUNTER — HOSPITAL ENCOUNTER (EMERGENCY)
Facility: HOSPITAL | Age: 65
Discharge: HOME | End: 2020-06-17
Attending: EMERGENCY MEDICINE
Payer: MEDICAID

## 2020-06-17 ENCOUNTER — APPOINTMENT (OUTPATIENT)
Dept: GENERAL RADIOLOGY | Facility: HOSPITAL | Age: 65
End: 2020-06-17
Payer: MEDICAID

## 2020-06-17 VITALS
DIASTOLIC BLOOD PRESSURE: 58 MMHG | WEIGHT: 187 LBS | SYSTOLIC BLOOD PRESSURE: 114 MMHG | RESPIRATION RATE: 18 BRPM | TEMPERATURE: 98.6 F | OXYGEN SATURATION: 97 % | BODY MASS INDEX: 35.3 KG/M2 | HEART RATE: 67 BPM | HEIGHT: 61 IN

## 2020-06-17 LAB
A/G RATIO: 1.4 (ref 0.8–2)
ALBUMIN SERPL-MCNC: 4.1 G/DL (ref 3.4–4.8)
ALP BLD-CCNC: 63 U/L (ref 25–100)
ALT SERPL-CCNC: 14 U/L (ref 4–36)
ANION GAP SERPL CALCULATED.3IONS-SCNC: 11 MMOL/L (ref 3–16)
AST SERPL-CCNC: 19 U/L (ref 8–33)
BASOPHILS ABSOLUTE: 0.1 K/UL (ref 0–0.1)
BASOPHILS RELATIVE PERCENT: 1.1 %
BILIRUB SERPL-MCNC: 0.5 MG/DL (ref 0.3–1.2)
BILIRUBIN URINE: NEGATIVE
BLOOD, URINE: NEGATIVE
BUN BLDV-MCNC: 14 MG/DL (ref 6–20)
CALCIUM SERPL-MCNC: 9.4 MG/DL (ref 8.5–10.5)
CHLORIDE BLD-SCNC: 99 MMOL/L (ref 98–107)
CLARITY: NORMAL
CO2: 25 MMOL/L (ref 20–30)
COLOR: YELLOW
CREAT SERPL-MCNC: 0.8 MG/DL (ref 0.4–1.2)
EOSINOPHILS ABSOLUTE: 0.4 K/UL (ref 0–0.4)
EOSINOPHILS RELATIVE PERCENT: 7.1 %
GFR AFRICAN AMERICAN: >59
GFR NON-AFRICAN AMERICAN: >60
GLOBULIN: 2.9 G/DL
GLUCOSE BLD-MCNC: 77 MG/DL (ref 74–106)
GLUCOSE URINE: NEGATIVE MG/DL
HCT VFR BLD CALC: 42.1 % (ref 37–47)
HEMOGLOBIN: 13.4 G/DL (ref 11.5–16.5)
IMMATURE GRANULOCYTES #: 0 K/UL
IMMATURE GRANULOCYTES %: 0.3 % (ref 0–5)
KETONES, URINE: NEGATIVE MG/DL
LEUKOCYTE ESTERASE, URINE: NEGATIVE
LIPASE: 34 U/L (ref 5.6–51.3)
LYMPHOCYTES ABSOLUTE: 1.8 K/UL (ref 1.5–4)
LYMPHOCYTES RELATIVE PERCENT: 29.5 %
MCH RBC QN AUTO: 31.1 PG (ref 27–32)
MCHC RBC AUTO-ENTMCNC: 31.8 G/DL (ref 31–35)
MCV RBC AUTO: 97.7 FL (ref 80–100)
MICROSCOPIC EXAMINATION: NORMAL
MONOCYTES ABSOLUTE: 0.5 K/UL (ref 0.2–0.8)
MONOCYTES RELATIVE PERCENT: 7.2 %
NEUTROPHILS ABSOLUTE: 3.4 K/UL (ref 2–7.5)
NEUTROPHILS RELATIVE PERCENT: 54.8 %
NITRITE, URINE: NEGATIVE
PDW BLD-RTO: 12.2 % (ref 11–16)
PH UA: 5.5 (ref 5–8)
PLATELET # BLD: 196 K/UL (ref 150–400)
PMV BLD AUTO: 12.2 FL (ref 6–10)
POTASSIUM REFLEX MAGNESIUM: 4.4 MMOL/L (ref 3.4–5.1)
PRO-BNP: 122 PG/ML (ref 0–1800)
PROTEIN UA: NEGATIVE MG/DL
RBC # BLD: 4.31 M/UL (ref 3.8–5.8)
S PYO AG THROAT QL: NEGATIVE
SODIUM BLD-SCNC: 135 MMOL/L (ref 136–145)
SPECIFIC GRAVITY UA: 1.02 (ref 1–1.03)
TOTAL PROTEIN: 7 G/DL (ref 6.4–8.3)
TROPONIN: <0.3 NG/ML
URINE REFLEX TO CULTURE: NORMAL
URINE TYPE: NORMAL
UROBILINOGEN, URINE: 0.2 E.U./DL
WBC # BLD: 6.2 K/UL (ref 4–11)

## 2020-06-17 PROCEDURE — C9113 INJ PANTOPRAZOLE SODIUM, VIA: HCPCS | Performed by: EMERGENCY MEDICINE

## 2020-06-17 PROCEDURE — 83690 ASSAY OF LIPASE: CPT

## 2020-06-17 PROCEDURE — 83880 ASSAY OF NATRIURETIC PEPTIDE: CPT

## 2020-06-17 PROCEDURE — 2580000003 HC RX 258: Performed by: EMERGENCY MEDICINE

## 2020-06-17 PROCEDURE — C9113 INJ PANTOPRAZOLE SODIUM, VIA: HCPCS

## 2020-06-17 PROCEDURE — 71045 X-RAY EXAM CHEST 1 VIEW: CPT

## 2020-06-17 PROCEDURE — 2580000003 HC RX 258

## 2020-06-17 PROCEDURE — 99284 EMERGENCY DEPT VISIT MOD MDM: CPT

## 2020-06-17 PROCEDURE — 96375 TX/PRO/DX INJ NEW DRUG ADDON: CPT

## 2020-06-17 PROCEDURE — 81003 URINALYSIS AUTO W/O SCOPE: CPT

## 2020-06-17 PROCEDURE — 85025 COMPLETE CBC W/AUTO DIFF WBC: CPT

## 2020-06-17 PROCEDURE — 80053 COMPREHEN METABOLIC PANEL: CPT

## 2020-06-17 PROCEDURE — 6360000002 HC RX W HCPCS: Performed by: EMERGENCY MEDICINE

## 2020-06-17 PROCEDURE — 36415 COLL VENOUS BLD VENIPUNCTURE: CPT

## 2020-06-17 PROCEDURE — 70450 CT HEAD/BRAIN W/O DYE: CPT

## 2020-06-17 PROCEDURE — 93005 ELECTROCARDIOGRAM TRACING: CPT

## 2020-06-17 PROCEDURE — 87880 STREP A ASSAY W/OPTIC: CPT

## 2020-06-17 PROCEDURE — 74176 CT ABD & PELVIS W/O CONTRAST: CPT

## 2020-06-17 PROCEDURE — 84484 ASSAY OF TROPONIN QUANT: CPT

## 2020-06-17 PROCEDURE — 96365 THER/PROPH/DIAG IV INF INIT: CPT

## 2020-06-17 PROCEDURE — 6360000002 HC RX W HCPCS

## 2020-06-17 RX ORDER — SODIUM CHLORIDE 9 MG/ML
INJECTION, SOLUTION INTRAVENOUS
Status: COMPLETED
Start: 2020-06-17 | End: 2020-06-17

## 2020-06-17 RX ORDER — ONDANSETRON 4 MG/1
4 TABLET, ORALLY DISINTEGRATING ORAL 3 TIMES DAILY PRN
Qty: 9 TABLET | Refills: 0 | Status: SHIPPED | OUTPATIENT
Start: 2020-06-17 | End: 2020-06-20

## 2020-06-17 RX ORDER — PANTOPRAZOLE SODIUM 40 MG/10ML
INJECTION, POWDER, LYOPHILIZED, FOR SOLUTION INTRAVENOUS
Status: COMPLETED
Start: 2020-06-17 | End: 2020-06-17

## 2020-06-17 RX ORDER — DICYCLOMINE HCL 20 MG
20 TABLET ORAL 3 TIMES DAILY PRN
Qty: 21 TABLET | Refills: 0 | Status: SHIPPED | OUTPATIENT
Start: 2020-06-17 | End: 2020-11-29

## 2020-06-17 RX ORDER — KETOROLAC TROMETHAMINE 30 MG/ML
30 INJECTION, SOLUTION INTRAMUSCULAR; INTRAVENOUS ONCE
Status: COMPLETED | OUTPATIENT
Start: 2020-06-17 | End: 2020-06-17

## 2020-06-17 RX ORDER — ONDANSETRON 2 MG/ML
4 INJECTION INTRAMUSCULAR; INTRAVENOUS ONCE
Status: COMPLETED | OUTPATIENT
Start: 2020-06-17 | End: 2020-06-17

## 2020-06-17 RX ORDER — 0.9 % SODIUM CHLORIDE 0.9 %
1000 INTRAVENOUS SOLUTION INTRAVENOUS ONCE
Status: COMPLETED | OUTPATIENT
Start: 2020-06-17 | End: 2020-06-17

## 2020-06-17 RX ADMIN — ONDANSETRON 4 MG: 2 INJECTION INTRAMUSCULAR; INTRAVENOUS at 19:57

## 2020-06-17 RX ADMIN — Medication 1000 ML: at 19:55

## 2020-06-17 RX ADMIN — KETOROLAC TROMETHAMINE 30 MG: 30 INJECTION, SOLUTION INTRAMUSCULAR at 19:57

## 2020-06-17 RX ADMIN — SODIUM CHLORIDE 40 MG: 9 INJECTION, SOLUTION INTRAVENOUS at 20:04

## 2020-06-17 RX ADMIN — SODIUM CHLORIDE 1000 ML: 9 INJECTION, SOLUTION INTRAVENOUS at 19:55

## 2020-06-17 RX ADMIN — PANTOPRAZOLE SODIUM 40 MG: 40 INJECTION, POWDER, FOR SOLUTION INTRAVENOUS at 19:56

## 2020-06-17 ASSESSMENT — PAIN SCALES - GENERAL
PAINLEVEL_OUTOF10: 8
PAINLEVEL_OUTOF10: 8

## 2020-06-17 ASSESSMENT — PAIN DESCRIPTION - LOCATION: LOCATION: HEAD

## 2020-06-17 ASSESSMENT — PAIN DESCRIPTION - PAIN TYPE: TYPE: ACUTE PAIN

## 2020-06-17 NOTE — ED PROVIDER NOTES
is supple, full range of motion, trachea midline  Cardiac: Heart regular rate, rhythm, no murmurs, rubs, or gallops  Lungs: Lungs are clear to auscultation, there is no wheezing, rhonchi, or rales. There is no use of accessory muscles. Chest wall: There is no tenderness to palpation over the chest wall or over ribs  Abdomen: Diffuse abdominal pain on palpation that re-creates patient's symptoms of brought her to the emergency department. Increased bowel sounds all 4 quadrants  Musculoskeletal: 5 out of 5 strength in all 4 extremities. No focal muscle deficits are appreciated  Neuro: Motor intact, sensory intact, level of consciousness is normal, cerebellar function is normal, reflexes are grossly normal. No evidence of incontinence or loss of bowel or bladder function, no saddle anesthesia noted   Dermatology: Skin is warm and dry  Psych: Mentation is grossly normal, cognition is grossly normal. Affect is appropriate. DIAGNOSTIC RESULTS     EKG: All EKG's are interpreted by the Emergency Department Physician who either signs or Co-signs this chart in the [address] a cardiologist.    The EKG interpreted by me shows normal sinus rhythm rate of 67 per EDMD    RADIOLOGY:   Non-plain film images such as CT, Ultrasound and MRI are read by the radiologist. Plain radiographic images are visualized and preliminarily interpreted by the emergency physician with the below findings:      ? Radiologist's Report Reviewed:  CT ABDOMEN PELVIS WO CONTRAST Additional Contrast? None   Final Result      1. No findings for acute intracranial abnormality. CT ABDOMEN AND PELVIS WITH CONTRAST      HISTORY: Increased abdominal pain, nausea      FINDINGS: Thin section axial images obtained through the abdomen and pelvis without contrast.  Multiplanar reconstruction images received for interpretation. Low radiation dose CT technique utilized. Heart size appears within normal limits.   Mild pericardial effusion retroperitoneal lymphadenopathy. No free fluid collection seen. Bowel loops are not opacified, demonstrating no findings for obstruction. Patient status post appendectomy. Within the pelvis, bladder mildly distended and otherwise unremarkable. Uterus and adnexal structures are intact. No pelvic lymphadenopathy or free fluid. Bony structures are intact. IMPRESSION:      1. No findings for acute intra-abdominal or pelvic abnormality to explain patient's symptoms. 2.  Mild pericardial effusion. 3.  Status post gastric bypass, cholecystectomy, appendectomy. 4.  Nonobstructive bowel gas pattern. 5.  Punctate nonobstructing left renal calculus. XR CHEST PORTABLE   Final Result      1. Mild bibasilar airspace, likely atelectasis in this patient with low lung volumes.             ED BEDSIDE ULTRASOUND:   Performed by ED Physician - none    LABS:    I have reviewed and interpreted all of the currently available lab results from this visit (ifapplicable):  Results for orders placed or performed during the hospital encounter of 06/17/20   Strep Screen Group A Throat   Result Value Ref Range    Rapid Strep A Screen Negative Negative   CBC Auto Differential   Result Value Ref Range    WBC 6.2 4.0 - 11.0 K/uL    RBC 4.31 3.80 - 5.80 M/uL    Hemoglobin 13.4 11.5 - 16.5 g/dL    Hematocrit 42.1 37.0 - 47.0 %    MCV 97.7 80.0 - 100.0 fL    MCH 31.1 27.0 - 32.0 pg    MCHC 31.8 31.0 - 35.0 g/dL    RDW 12.2 11.0 - 16.0 %    Platelets 218 737 - 850 K/uL    MPV 12.2 (H) 6.0 - 10.0 fL    Neutrophils % 54.8 %    Immature Granulocytes % 0.3 0.0 - 5.0 %    Lymphocytes % 29.5 %    Monocytes % 7.2 %    Eosinophils % 7.1 %    Basophils % 1.1 %    Neutrophils Absolute 3.4 2.0 - 7.5 K/uL    Immature Granulocytes # 0.0 K/uL    Lymphocytes Absolute 1.8 1.5 - 4.0 K/uL    Monocytes Absolute 0.5 0.2 - 0.8 K/uL    Eosinophils Absolute 0.4 0.0 - 0.4 K/uL    Basophils Absolute 0.1 0.0 - 0.1 K/uL   Comprehensive

## 2020-06-18 ENCOUNTER — CARE COORDINATION (OUTPATIENT)
Dept: CARE COORDINATION | Age: 65
End: 2020-06-18

## 2020-06-18 NOTE — ED NOTES
Discharge instructions and medications reviewed with verbalized understanding. Patient had no further questions or concerns.       Niki Avelar RN  06/17/20 [id number]

## 2020-06-19 ENCOUNTER — CARE COORDINATION (OUTPATIENT)
Dept: CARE COORDINATION | Age: 65
End: 2020-06-19

## 2020-06-23 ENCOUNTER — HOSPITAL ENCOUNTER (OUTPATIENT)
Facility: HOSPITAL | Age: 65
Discharge: HOME | End: 2020-06-23
Payer: MEDICAID

## 2020-06-23 LAB
A/G RATIO: 1.6 (ref 0.8–2)
ALBUMIN SERPL-MCNC: 4 G/DL (ref 3.4–4.8)
ALP BLD-CCNC: 61 U/L (ref 25–100)
ALT SERPL-CCNC: 11 U/L (ref 4–36)
ANION GAP SERPL CALCULATED.3IONS-SCNC: 13 MMOL/L (ref 3–16)
AST SERPL-CCNC: 13 U/L (ref 8–33)
BASOPHILS ABSOLUTE: 0.1 K/UL (ref 0–0.1)
BASOPHILS RELATIVE PERCENT: 1.6 %
BILIRUB SERPL-MCNC: 0.5 MG/DL (ref 0.3–1.2)
BUN BLDV-MCNC: 19 MG/DL (ref 6–20)
CALCIUM SERPL-MCNC: 9.1 MG/DL (ref 8.5–10.5)
CHLORIDE BLD-SCNC: 109 MMOL/L (ref 98–107)
CHOLESTEROL, TOTAL: 206 MG/DL (ref 0–200)
CO2: 22 MMOL/L (ref 20–30)
CREAT SERPL-MCNC: 0.9 MG/DL (ref 0.4–1.2)
EOSINOPHILS ABSOLUTE: 0.5 K/UL (ref 0–0.4)
EOSINOPHILS RELATIVE PERCENT: 9.4 %
FOLATE: >20 NG/ML
GFR AFRICAN AMERICAN: >59
GFR NON-AFRICAN AMERICAN: >60
GLOBULIN: 2.5 G/DL
GLUCOSE BLD-MCNC: 155 MG/DL (ref 74–106)
HBA1C MFR BLD: 5.5 %
HCT VFR BLD CALC: 41.4 % (ref 37–47)
HDLC SERPL-MCNC: 72 MG/DL (ref 40–60)
HEMOGLOBIN: 12.8 G/DL (ref 11.5–16.5)
IMMATURE GRANULOCYTES #: 0 K/UL
IMMATURE GRANULOCYTES %: 0.6 % (ref 0–5)
LDL CHOLESTEROL CALCULATED: 96 MG/DL
LYMPHOCYTES ABSOLUTE: 1 K/UL (ref 1.5–4)
LYMPHOCYTES RELATIVE PERCENT: 20.6 %
MCH RBC QN AUTO: 30.9 PG (ref 27–32)
MCHC RBC AUTO-ENTMCNC: 30.9 G/DL (ref 31–35)
MCV RBC AUTO: 100 FL (ref 80–100)
MONOCYTES ABSOLUTE: 0.4 K/UL (ref 0.2–0.8)
MONOCYTES RELATIVE PERCENT: 7.2 %
NEUTROPHILS ABSOLUTE: 3 K/UL (ref 2–7.5)
NEUTROPHILS RELATIVE PERCENT: 60.6 %
PDW BLD-RTO: 12.8 % (ref 11–16)
PLATELET # BLD: 262 K/UL (ref 150–400)
PMV BLD AUTO: 11.1 FL (ref 6–10)
POTASSIUM SERPL-SCNC: 4 MMOL/L (ref 3.4–5.1)
RBC # BLD: 4.14 M/UL (ref 3.8–5.8)
SODIUM BLD-SCNC: 144 MMOL/L (ref 136–145)
TOTAL PROTEIN: 6.5 G/DL (ref 6.4–8.3)
TRIGL SERPL-MCNC: 191 MG/DL (ref 0–249)
TSH SERPL DL<=0.05 MIU/L-ACNC: 1.97 UIU/ML (ref 0.35–5.5)
VITAMIN B-12: 1169 PG/ML (ref 211–911)
VITAMIN D 25-HYDROXY: 60 (ref 32–100)
VLDLC SERPL CALC-MCNC: 38 MG/DL
WBC # BLD: 5 K/UL (ref 4–11)

## 2020-06-23 PROCEDURE — 80053 COMPREHEN METABOLIC PANEL: CPT

## 2020-06-23 PROCEDURE — 85025 COMPLETE CBC W/AUTO DIFF WBC: CPT

## 2020-06-23 PROCEDURE — 82746 ASSAY OF FOLIC ACID SERUM: CPT

## 2020-06-23 PROCEDURE — 84443 ASSAY THYROID STIM HORMONE: CPT

## 2020-06-23 PROCEDURE — 82306 VITAMIN D 25 HYDROXY: CPT

## 2020-06-23 PROCEDURE — 82607 VITAMIN B-12: CPT

## 2020-06-23 PROCEDURE — 83036 HEMOGLOBIN GLYCOSYLATED A1C: CPT

## 2020-06-23 PROCEDURE — 80061 LIPID PANEL: CPT

## 2020-07-27 ENCOUNTER — HOSPITAL ENCOUNTER (OUTPATIENT)
Facility: HOSPITAL | Age: 65
Discharge: HOME | End: 2020-07-27
Payer: MEDICAID

## 2020-07-27 ENCOUNTER — HOSPITAL ENCOUNTER (OUTPATIENT)
Dept: GENERAL RADIOLOGY | Facility: HOSPITAL | Age: 65
Discharge: HOME | End: 2020-07-27
Payer: MEDICAID

## 2020-07-27 LAB
A/G RATIO: 1.6 (ref 0.8–2)
ALBUMIN SERPL-MCNC: 3.9 G/DL (ref 3.4–4.8)
ALP BLD-CCNC: 67 U/L (ref 25–100)
ALT SERPL-CCNC: 10 U/L (ref 4–36)
ANION GAP SERPL CALCULATED.3IONS-SCNC: 8 MMOL/L (ref 3–16)
AST SERPL-CCNC: 11 U/L (ref 8–33)
BILIRUB SERPL-MCNC: 0.8 MG/DL (ref 0.3–1.2)
BILIRUBIN URINE: NEGATIVE
BLOOD, URINE: NEGATIVE
BUN BLDV-MCNC: 18 MG/DL (ref 6–20)
CALCIUM SERPL-MCNC: 9 MG/DL (ref 8.5–10.5)
CHLORIDE BLD-SCNC: 105 MMOL/L (ref 98–107)
CLARITY: CLEAR
CO2: 29 MMOL/L (ref 20–30)
COLOR: YELLOW
CREAT SERPL-MCNC: 0.7 MG/DL (ref 0.4–1.2)
GFR AFRICAN AMERICAN: >59
GFR NON-AFRICAN AMERICAN: >60
GLOBULIN: 2.5 G/DL
GLUCOSE BLD-MCNC: 98 MG/DL (ref 74–106)
GLUCOSE URINE: NEGATIVE MG/DL
KETONES, URINE: NEGATIVE MG/DL
LEUKOCYTE ESTERASE, URINE: NEGATIVE
MICROSCOPIC EXAMINATION: NORMAL
NITRITE, URINE: NEGATIVE
PH UA: 5.5 (ref 5–8)
POTASSIUM SERPL-SCNC: 4.5 MMOL/L (ref 3.4–5.1)
PROTEIN UA: NEGATIVE MG/DL
SODIUM BLD-SCNC: 142 MMOL/L (ref 136–145)
SPECIFIC GRAVITY UA: 1.02 (ref 1–1.03)
TOTAL PROTEIN: 6.4 G/DL (ref 6.4–8.3)
URINE TYPE: NORMAL
UROBILINOGEN, URINE: 0.2 E.U./DL

## 2020-07-27 PROCEDURE — 80053 COMPREHEN METABOLIC PANEL: CPT

## 2020-07-27 PROCEDURE — 74018 RADEX ABDOMEN 1 VIEW: CPT

## 2020-07-27 PROCEDURE — 81003 URINALYSIS AUTO W/O SCOPE: CPT

## 2020-07-27 PROCEDURE — 87086 URINE CULTURE/COLONY COUNT: CPT

## 2020-07-27 PROCEDURE — 36415 COLL VENOUS BLD VENIPUNCTURE: CPT

## 2020-07-28 LAB — URINE CULTURE, ROUTINE: NORMAL

## 2020-08-04 ENCOUNTER — APPOINTMENT (OUTPATIENT)
Dept: GENERAL RADIOLOGY | Facility: HOSPITAL | Age: 65
End: 2020-08-04
Payer: MEDICAID

## 2020-08-04 ENCOUNTER — HOSPITAL ENCOUNTER (EMERGENCY)
Facility: HOSPITAL | Age: 65
Discharge: HOME | End: 2020-08-05
Attending: EMERGENCY MEDICINE
Payer: MEDICAID

## 2020-08-04 PROCEDURE — 6370000000 HC RX 637 (ALT 250 FOR IP): Performed by: EMERGENCY MEDICINE

## 2020-08-04 PROCEDURE — 99283 EMERGENCY DEPT VISIT LOW MDM: CPT

## 2020-08-04 PROCEDURE — 73564 X-RAY EXAM KNEE 4 OR MORE: CPT

## 2020-08-04 RX ORDER — HYDROCODONE BITARTRATE AND ACETAMINOPHEN 7.5; 325 MG/1; MG/1
1 TABLET ORAL ONCE
Status: COMPLETED | OUTPATIENT
Start: 2020-08-04 | End: 2020-08-04

## 2020-08-04 RX ADMIN — HYDROCODONE BITARTRATE AND ACETAMINOPHEN 1 TABLET: 7.5; 325 TABLET ORAL at 23:30

## 2020-08-04 ASSESSMENT — PAIN SCALES - GENERAL: PAINLEVEL_OUTOF10: 10

## 2020-08-05 VITALS
TEMPERATURE: 99.1 F | HEIGHT: 61 IN | DIASTOLIC BLOOD PRESSURE: 62 MMHG | HEART RATE: 73 BPM | OXYGEN SATURATION: 94 % | SYSTOLIC BLOOD PRESSURE: 110 MMHG | BODY MASS INDEX: 33.61 KG/M2 | RESPIRATION RATE: 16 BRPM | WEIGHT: 178 LBS

## 2020-08-05 NOTE — ED TRIAGE NOTES
Has hx.of having a total knee replacement on right knee. States she was walking when the pain started.

## 2020-08-05 NOTE — ED PROVIDER NOTES
[facility] ENCOUNTER      Pt Name: Jacobo Nevarez  MRN: [id number]  YOB: 1955  Date of evaluation: 8/4/2020  Provider: Len Jhaveri MD    [address] [place] [address]       Chief Complaint   Patient presents with    Knee Pain     C/o right knee pain,denies injury. Onset today. HISTORY OF PRESENT ILLNESS  (Location/Symptom, Timing/Onset, Context/Setting, Quality, Duration, Modifying Factors, Severity.)   Jacobo Nevarez is a 60 y.o. female who presents to the emergency department with concern of right knee pain that occurred prior to arrival.  Patient states that she has had ORIF of her right knee approximately 4 years ago. She states that she was at [facility] when she felt a pop and instantaneous pain in her right knee making it difficult to bear weight. She was concerned that she reinjured it so she presented to the emergency department for further evaluation. Patient denies any chest pain shortness of breath or any other complaints. Patient denies any trauma or fall      Nursing notes were reviewed. REVIEW OFSYSTEMS    (2-9 systems for level 4, 10 or more for level 5)   ROS:  General:  No fevers, no chills, no weakness  Cardiovascular:  No chest pain, no palpitations  Respiratory:  No shortness of breath, no cough, no wheezing  Gastrointestinal:  No pain, no nausea, no vomiting, no diarrhea  Musculoskeletal: Right knee pain  Skin:  No rash, no easy bruising  Neurologic:  No speech problems, no headache, no extremity weakness  Psychiatric:  No anxiety  Genitourinary:  No dysuria, no hematuria    Except as noted above the remainder of the review of systems was reviewed and negative.        PAST MEDICAL HISTORY     Past Medical History:   Diagnosis Date    Arthritis     Depression     Hypertension     Thyroid disease     UTI (urinary tract infection)          SURGICAL HISTORY       Past Surgical History:   Procedure Laterality Date    APPENDECTOMY      CARPAL TUNNEL RELEASE Bilateral     CHOLECYSTECTOMY      GASTRIC BYPASS SURGERY      JOINT REPLACEMENT Right     AR COLONOSCOPY FLX DX W/COLLJ SPEC WHEN PFRMD N/A 8/2/2018    COLONOSCOPY DIAGNOSTIC OR SCREENING performed by Tano North MD at [facility]       Previous Medications    CALCIUM-VITAMIN D 600-200 MG-UNIT TABS    Take 2 tablets by mouth daily    CHOLECALCIFEROL (VITAMIN D3) 5000 UNITS CAPS    Take by mouth daily    DICYCLOMINE (BENTYL) 20 MG TABLET    Take 1 tablet by mouth 3 times daily as needed (Abdominal pain)    ESTRADIOL (ESTRACE) 1 MG TABLET    Take 1 mg by mouth daily    FLUOXETINE (PROZAC) 20 MG CAPSULE    Take 40 mg by mouth daily    IPRATROPIUM (ATROVENT) 0.03 % NASAL SPRAY    1 spray by Nasal route 2 times daily    IRON PO    Take 65 mg by mouth daily    LANSOPRAZOLE (PREVACID) 30 MG DELAYED RELEASE CAPSULE    Take 30 mg by mouth daily    LEVOTHYROXINE (SYNTHROID) 50 MCG TABLET    Take 50 mcg by mouth Daily    MEDROXYPROGESTERONE (PROVERA) 2.5 MG TABLET    Take 2.5 mg by mouth daily    MELOXICAM (MOBIC) 15 MG TABLET    Take 15 mg by mouth daily    MONTELUKAST (SINGULAIR) 10 MG TABLET    Take 10 mg by mouth daily    MULTIPLE VITAMIN (ONE DAILY ESSENTIAL PO)    Take by mouth daily    PRAMIPEXOLE (MIRAPEX) 1 MG TABLET    Take 1 mg by mouth nightly    TRAZODONE (DESYREL) 100 MG TABLET    Take 100 mg by mouth nightly    VITAMIN B-1 (THIAMINE) 100 MG TABLET    Take 100 mg by mouth daily    VITAMIN B-12 (CYANOCOBALAMIN) 1000 MCG TABLET    Take 1,000 mcg by mouth daily    VITAMIN C (ASCORBIC ACID) 500 MG TABLET    Take 500 mg by mouth daily       ALLERGIES     Patient has no known allergies. FAMILY HISTORY     History reviewed. No pertinent family history.        SOCIAL HISTORY       Social History     Socioeconomic History    Marital status: Legally      Spouse name: None    Number of children: None    Years of education: None    Highest education level: None   Occupational History    None   Social Needs    Financial resource strain: None    Food insecurity     Worry: None     Inability: None    Transportation needs     Medical: None     Non-medical: None   Tobacco Use    Smoking status: Never Smoker    Smokeless tobacco: Never Used   Substance and Sexual Activity    Alcohol use: No    Drug use: No    Sexual activity: None   Lifestyle    Physical activity     Days per week: None     Minutes per session: None    Stress: None   Relationships    Social connections     Talks on phone: None     Gets together: None     Attends Sabianist service: None     Active member of club or organization: None     Attends meetings of clubs or organizations: None     Relationship status: None    Intimate partner violence     Fear of current or ex partner: None     Emotionally abused: None     Physically abused: None     Forced sexual activity: None   Other Topics Concern    None   Social History Narrative    None         PHYSICAL EXAM    (up to 7 for level 4, 8 or more for level 5)     ED Triage Vitals [08/04/20 2259]   BP Temp Temp Source Pulse Resp SpO2 Height Weight   (!) 94/49 99.1 °F (37.3 °C) Oral 94 16 95 % 5' 1\" (1.549 m) 178 lb (80.7 kg)       Physical Exam  General :Patient is awake, alert, oriented, in no acute distress, nontoxic appearing  HEENT: Pupils are equally round and reactive to light, EOMI, conjunctivae clear. Oral mucosa is moist, no exudate. Uvula is midline  Neck: Neck is supple, full range of motion, trachea midline  Cardiac: Heart regular rate, rhythm, no murmurs, rubs, or gallops  Lungs: Lungs are clear to auscultation, there is no wheezing, rhonchi, or rales. There is no use of accessory muscles. Chest wall: There is no tenderness to palpation over the chest wall or over ribs  Abdomen: Abdomen is soft, nontender, nondistended.  There is no firm or pulsatile masses, no rebound rigidity or guarding. Musculoskeletal: 5 out of 5 strength in all 4 extremities. No focal muscle deficits are appreciated  Right knee= mild decreased range of motion secondary to pain. Pain on palpation along the lateral meniscus. No step-off deformity noted. Patient has 5-5 muscle 2+ reflexes neurovascularly intact  Neuro: Motor intact, sensory intact, level of consciousness is normal, cerebellar function is normal, reflexes are grossly normal. No evidence of incontinence or loss of bowel or bladder function, no saddle anesthesia noted   Dermatology: Skin is warm and dry  Psych: Mentation is grossly normal, cognition is grossly normal. Affect is appropriate. DIAGNOSTIC RESULTS     EKG: All EKG's are interpreted by the Emergency Department Physician who either signs or Co-signs this chart in the [address] a cardiologist.    The EKG interpreted by me shows     RADIOLOGY:   Non-plain film images such as CT, Ultrasound and MRI are read by the radiologist. Plain radiographic images are visualized and preliminarily interpreted by the emergency physician with the below findings:      ? Radiologist's Report Reviewed:  XR KNEE RIGHT (MIN 4 VIEWS)   Final Result     No acute or healing fracture. No hardware complications. ED BEDSIDE ULTRASOUND:   Performed by ED Physician - none    LABS:    I have reviewed and interpreted all of the currently available lab results from this visit (ifapplicable):  No results found for this visit on 08/04/20. All other labs were within normal range or not returned as of this dictation.     EMERGENCY DEPARTMENT COURSE and DIFFERENTIAL DIAGNOSIS/MDM:   Vitals:    Vitals:    08/04/20 2259   BP: (!) 94/49   Pulse: 94   Resp: 16   Temp: 99.1 °F (37.3 °C)   TempSrc: Oral   SpO2: 95%   Weight: 178 lb (80.7 kg)   Height: 5' 1\" (1.549 m)       MEDICATIONS ADMINISTERED IN ED:  Medications   HYDROcodone-acetaminophen (NORCO) 7.5-325 MG per tablet 1 tablet (1 tablet Oral Given 8/4/20 7993)       Patient was placed examination room at which time after exam was performed patient was given Norco 7.5 p.o. was sent over for radiographic evaluation of the right knee which revealed no acute process. Patient was instructed about ice and elevation and to follow-up with orthopedics. Patient was placed in a knee immobilizer. Patient is was referred to Dr. Juan Gold. Patient was appreciative the care and agrees with the plan above. Sylvain [place] report was obtained which revealed that patient receives monthly pain medicine which was reviewed with patient    The patient will follow-up with their PCP in 1-2 days for reevaluation. If the patient or family members have anyfurther concerns or any worsening symptoms they will return to the ED for reevaluation. CONSULTS:  None    PROCEDURES:  Procedures    CRITICAL CARE TIME    Total Critical Care time was 0 minutes, excluding separately reportable procedures. There was a high probability of clinically significant/life threatening deterioration in the patient's condition which required my urgent intervention. FINAL IMPRESSION      1. Acute pain of right knee Stable   2. Strain of right knee, initial encounter Stable   3.  Sprain of right knee, unspecified ligament, initial encounter Stable         DISPOSITION/PLAN   DISPOSITION        PATIENT REFERRED TO:  KARMA Ramey CNP  [address] [place] [id number]  [phone]    Schedule an appointment as soon as possible for a visit       Maikel Moeller MD  [address]  [phone]    Schedule an appointment as soon as possible for a visit in 2 days        DISCHARGE MEDICATIONS:  New Prescriptions    No medications on file       Comment: Please note this report has been produced using speech recognition software and may contain errorsrelated to that system including errors in grammar, punctuation, and spelling, as well as words and phrases that may be inappropriate. If there are any questions or concerns please feel free to contact the dictating providerfor clarification.     Sharmaine Muse MD  Attending Emergency Physician              Sharmaine Muse MD  08/05/20 9069

## 2020-08-17 ENCOUNTER — HOSPITAL ENCOUNTER (OUTPATIENT)
Dept: PHYSICAL THERAPY | Facility: HOSPITAL | Age: 65
Setting detail: THERAPIES SERIES
Discharge: HOME | End: 2020-08-17
Payer: MEDICAID

## 2020-08-17 PROCEDURE — 97110 THERAPEUTIC EXERCISES: CPT

## 2020-08-17 PROCEDURE — 97161 PT EVAL LOW COMPLEX 20 MIN: CPT

## 2020-08-17 ASSESSMENT — PAIN DESCRIPTION - ORIENTATION: ORIENTATION: LEFT

## 2020-08-17 ASSESSMENT — PAIN DESCRIPTION - LOCATION: LOCATION: KNEE

## 2020-08-17 ASSESSMENT — PAIN SCALES - GENERAL: PAINLEVEL_OUTOF10: 7

## 2020-08-17 NOTE — PROGRESS NOTES
Physical Therapy  Initial Assessment  Date: 2020  Patient Name: Alessandra Whitley  MRN: [id number]  : 1955     Treatment Diagnosis: L knee OA, knee pain    Subjective   General  Chart Reviewed: Yes  Patient assessed for rehabilitation services?: Yes  Referral Date : 20  Diagnosis: L knee advanced OA  PT Visit Information  PT Insurance Information: [employer] Castaneda  Subjective  Subjective: Pt presents with progressive L knee pain due to OA. Pt states her L knee has been bothering her for several years but has progressively worsened. She underwent R knee TKA in 2017. Pt states her left knee limits her walking and ability to stand for prolonged periods. She also reports noticing a decrease in her balance and has had falls / near falls due to not being able to  her L foot. Pt reports falling one week ago due to her L foot dragging and landed on her R knee. Pt had a cortisone injection at her last ortho visit which she reports has helped her pain. Pain Screening  Patient Currently in Pain: Yes  Pain Assessment  Pain Assessment: 0-10  Pain Level: 7(pt reports having no pain currently at rest; has pain up to 7-8/10 with activity.)  Pain Location: Knee  Pain Orientation: Left  Vital Signs  Patient Currently in Pain: Yes    Orientation  Orientation  Overall Orientation Status: Within Normal Limits    Objective     Observation/Palpation  Palpation: general grade II tenderness L knee, med / lat and at patella region. AROM LLE (degrees)  LLE AROM : Exceptions  L Knee Flexion 0-145: 108 deg  L Knee Extension 0: 0    Strength LLE  Strength LLE: Exception  L Hip Flexion: 4+/5  L Knee Flexion: 4+/5  L Knee Extension: 4/5  L Ankle Dorsiflexion: 4+/5     Additional Measures  Special Tests: LEFS:  14/80. decreased L knee patella mobility noted.              Exercises  Exercise 1: QS 3x10  Exercise 2: SLR 3x10  Exercise 3: L knee heel slides 3x10  Exercise 4: Seated heel / toe raises 3x10  Exercise 5: LAQ 3x10 Pt was educated in and issued a written HEP of the above exercises. Plan to add next visit:  Nustep: L4-5, 5-8 min  SAQ: 3x10  Hip abd / add: (BTB / ball) 3x10  Manual: L knee patella mobs, knee ROM  MH or ice prn                      Assessment   Conditions Requiring Skilled Therapeutic Intervention  Assessment: Pt will benefit from skilled PT to address L knee OA to improve strength, ROM, and overall function. Treatment Diagnosis: L knee OA, knee pain  Prognosis: Good  REQUIRES PT FOLLOW UP: Yes  Activity Tolerance  Activity Tolerance: Patient Tolerated treatment well         Plan   Plan  Times per week: 2x/week  Plan weeks: 6 weeks  Current Treatment Recommendations: Strengthening, ROM, Home Exercise Program, Manual Therapy - Soft Tissue Mobilization, Balance Training, Patient/Caregiver Education & Training, Manual Therapy - Joint Manipulation, Modalities      Goals  Short term goals  Time Frame for Short term goals: 3 weeks  Short term goal 1: Pt to be I with HEP  Short term goal 2: Pt to demonstrate L knee AROM flexion of 115 deg. Short term goal 3: Pt to perform daily activities with pain of less than 5/10. Long term goals  Time Frame for Long term goals : 6 weeks  Long term goal 1: LEFS score to improve to 40/80 indicating improved function. Long term goal 2: Pt to demonstrate L knee AROM of 0-120 deg. Long term goal 3: Pt to demonstrate 5/5 L knee flexion / extension strength. Anai Enamorado, PT     Certification of Medical Necessity: It will be understood that this treatment plan is certified medically necessary by the documenting therapist and referring physician mentioned in this report. Unless the physician indicated otherwise through written correspondence with our office, all further referrals will act as certification of medical necessity on this treatment plan.       Thank you for this referral.  If you have questions regarding this plan of care, please call [phone].           Revisions to this plan (optional):                     Please sign and return this plan to:   FAX: [phone]      Signature:                                 Date:

## 2020-08-18 ASSESSMENT — PAIN DESCRIPTION - ORIENTATION: ORIENTATION: LEFT

## 2020-08-18 ASSESSMENT — PAIN DESCRIPTION - LOCATION: LOCATION: KNEE

## 2020-08-18 ASSESSMENT — PAIN SCALES - GENERAL: PAINLEVEL_OUTOF10: 7

## 2020-08-24 ENCOUNTER — HOSPITAL ENCOUNTER (OUTPATIENT)
Dept: PHYSICAL THERAPY | Facility: HOSPITAL | Age: 65
Setting detail: THERAPIES SERIES
Discharge: HOME | End: 2020-08-24
Payer: MEDICAID

## 2020-08-24 PROCEDURE — 97110 THERAPEUTIC EXERCISES: CPT

## 2020-08-24 PROCEDURE — 97140 MANUAL THERAPY 1/> REGIONS: CPT

## 2020-08-24 NOTE — FLOWSHEET NOTE
Physical Therapy Daily Treatment Note   Date:  2020    TIme In:  1145                    Time Out: 200    Patient Name:  Rossana Rees    :  1955  MRN: [id number]    Restrictions/Precautions:    Pertinent Medical History:  Medical/Treatment Diagnosis Information:    L knee OA, knee pain  ·    ·    Insurance/Certification information:    [facility]  Physician Information:      Plan of care signed (Y/N):    Visit# / total visits:     2/    G-Code (if applicable):      Date / Visit # G-Code Applied:         Progress Note: []  Yes  [x]  No  Next due by: Visit #10      Pain level:   7/10  Subjective: Pt reports her knee is hurting pretty bad today and she had thought about canceling. Objective:   Observation:    Test measurements:     Palpation:    Exercises:  Exercise Resistance/Repetitions Other comments   QS 3x10  24   SLR 3x10 24   L knee HS 3x10 24   Seated HR/TR 3x10 24   LAQ 3x10 24   Nustep L5x 8' 24   SAQ 3x10 24   Hip abd/add 3x10 BTB 24                         Other Therapeutic Activities:      Manual Treatments:   L knee patella mobs, knee ROM    Modalities:   MH or ice prn. Pt declined. Timed Code Treatment Minutes:  40      Total Treatment Minutes:  45    Treatment/Activity Tolerance:  [x] Patient tolerated treatment well [] Patient limited by fatigue  [] Patient limited by pain  [] Patient limited by other medical complications  [x] Other: Pt completed tx with no pain and responded well to todays tx.     Pain after treatment:      0/10 \"feels much better now\"    Prognosis: [x] Good [] Fair  [] Poor    Patient Requires Follow-up: [x] Yes  [] No    Plan:   [x] Continue per plan of care [] Alter current plan (see comments)  [] Plan of care initiated [] Hold pending MD visit [] Discharge    Plan for Next Session:        Electronically signed by:  Cas Cantrell PTA

## 2020-08-26 ENCOUNTER — HOSPITAL ENCOUNTER (OUTPATIENT)
Dept: PHYSICAL THERAPY | Facility: HOSPITAL | Age: 65
Setting detail: THERAPIES SERIES
Discharge: HOME | End: 2020-08-26
Payer: MEDICAID

## 2020-08-26 PROCEDURE — 97110 THERAPEUTIC EXERCISES: CPT

## 2020-08-26 PROCEDURE — 97140 MANUAL THERAPY 1/> REGIONS: CPT

## 2020-08-26 NOTE — FLOWSHEET NOTE
Physical Therapy Daily Treatment Note   Date:  2020    TIme In:  1139                    Time Out: 3570    Patient Name:  Otilia Ware    :  1955  MRN: [id number]    Restrictions/Precautions:    Pertinent Medical History:  Medical/Treatment Diagnosis Information:    L knee OA, knee pain  ·       Insurance/Certification information:    [facility]  Physician Information:      Plan of care signed (Y/N):    Visit# / total visits:     3 /    G-Code (if applicable):      Date / Visit # G-Code Applied:         Progress Note: []  Yes  [x]  No  Next due by: Visit #10      Pain level:   0 /10    Subjective: Pt reports: felt much better after last treatment     Objective:   Observation: gait = decreased hip and knee flexion   Test measurements:     Palpation: tenderness tera-patella, distal adductors    Exercises:  Exercise Resistance/Repetitions Other comments   QS 3x10  26   SLR 3x10 26   L knee HS 3x10 26   Seated HR/TR 3x10 26   LAQ 3x10 26   Nustep L5x 8' 26   SAQ 3x10 26   Hip abd/add 3x10 BTB 26                         Other Therapeutic Activities:      Manual Treatments:   L knee patella mobs, knee ROM    Modalities:   MH or ice prn. Pt declined. Timed Code Treatment Minutes:  36'      Total Treatment Minutes:  40'    Treatment/Activity Tolerance:  [x] Patient tolerated treatment well [] Patient limited by fatigue  [] Patient limited by pain  [] Patient limited by other medical complications  [x] Other: Pt completed tx with no pain and responded well to todays tx.     Pain after treatment:      0/10 \"feels better\"    Prognosis: [x] Good [] Fair  [] Poor    Patient Requires Follow-up: [x] Yes  [] No    Plan:   [x] Continue per plan of care [] Alter current plan (see comments)  [] Plan of care initiated [] Hold pending MD visit [] Discharge    Plan for Next Session:        Electronically signed by:  Nneka Nino PT

## 2020-08-31 ENCOUNTER — HOSPITAL ENCOUNTER (OUTPATIENT)
Dept: PHYSICAL THERAPY | Facility: HOSPITAL | Age: 65
Setting detail: THERAPIES SERIES
Discharge: HOME | End: 2020-08-31
Payer: MEDICAID

## 2020-08-31 PROCEDURE — 97110 THERAPEUTIC EXERCISES: CPT

## 2020-08-31 PROCEDURE — 97140 MANUAL THERAPY 1/> REGIONS: CPT

## 2020-08-31 NOTE — FLOWSHEET NOTE
Physical Therapy Daily Treatment Note   Date:  2020    TIme In:  830                   Time Out: [address]    Patient Name:  Carolyn Donato    :  1955  MRN: [id number]    Restrictions/Precautions:    Pertinent Medical History:  Medical/Treatment Diagnosis Information:  ·   L knee OA, knee pain      Insurance/Certification information:    [facility]  Physician Information:      Plan of care signed (Y/N):    Visit# / total visits:     4 /    G-Code (if applicable):      Date / Visit # G-Code Applied:         Progress Note: []  Yes  [x]  No  Next due by: Visit #10      Pain level:   610    Subjective: Pt reports: her knee is hurting this morning, most likely due to rainy weather. Objective:   Observation: gait = decreased hip and knee flexion   Test measurements:     Palpation: tenderness tera-patella, distal adductors    Exercises:  Exercise Resistance/Repetitions Other comments   QS 3x10  31   SLR 3x10 31   L knee HS 3x10 31   Seated HR/TR 3x10 31   LAQ 3x10 31   Nustep L5x 8' 31   SAQ 3x10 31   Hip abd/add 3x10 BTB 31                         Other Therapeutic Activities:      Manual Treatments:   L knee patella mobs, STM pes anserine region, knee ROM    Modalities:   MH L knee x10'. Timed Code Treatment Minutes:  44'      Total Treatment Minutes:  48'    Treatment/Activity Tolerance:  [x] Patient tolerated treatment well [] Patient limited by fatigue  [] Patient limited by pain  [] Patient limited by other medical complications  [x] Other: Pt responded well to addition of STM to pes anserine / medial knee region as well as MH at the end of the session. Pt reporting no pain at the end of the session.       Pain after treatment:      0/10 \"feels better\"    Prognosis: [x] Good [] Fair  [] Poor    Patient Requires Follow-up: [x] Yes  [] No    Plan:   [x] Continue per plan of care [] Alter current plan (see comments)  [] Plan of care initiated [] Hold pending MD visit [] Discharge    Plan for Next Session:        Electronically signed by:  Leslie Dow PT

## 2020-09-04 ENCOUNTER — HOSPITAL ENCOUNTER (OUTPATIENT)
Dept: PHYSICAL THERAPY | Facility: HOSPITAL | Age: 65
Setting detail: THERAPIES SERIES
Discharge: HOME | End: 2020-09-04
Payer: MEDICAID

## 2020-09-09 ENCOUNTER — HOSPITAL ENCOUNTER (OUTPATIENT)
Dept: PHYSICAL THERAPY | Facility: HOSPITAL | Age: 65
Setting detail: THERAPIES SERIES
Discharge: HOME | End: 2020-09-09
Payer: MEDICAID

## 2020-09-09 PROCEDURE — 97110 THERAPEUTIC EXERCISES: CPT

## 2020-09-09 PROCEDURE — 97140 MANUAL THERAPY 1/> REGIONS: CPT

## 2020-09-09 NOTE — FLOWSHEET NOTE
Physical Therapy Daily Treatment Note   Date:  2020    TIme In:   0831                  Time Out: 9878    Patient Name:  Otilia Ware    :  1955  MRN: [id number]    Restrictions/Precautions:    Pertinent Medical History:  Medical/Treatment Diagnosis Information:  ·   L knee OA, knee pain      Insurance/Certification information:    [facility]  Physician Information:      Plan of care signed (Y/N):    Visit# / total visits:     5 /    G-Code (if applicable):      Date / Visit # G-Code Applied:         Progress Note: []  Yes  [x]  No  Next due by: Visit #10      Pain level:   0/10    Subjective: Pt reports she is doing good this morning but had a bad weekend. She expressed it is normal for her to have good days and bad days. Objective:   Observation: gait = decreased hip and knee flexion   Test measurements:     Palpation: tenderness tera-patella, distal adductors    Exercises:  Exercise Resistance/Repetitions Other comments   QS 3x10  9   SLR 3x10 9   L knee HS 3x10 9   Seated HR/TR 3x10 9   LAQ 3x10 9   Nustep L5x 8' 9   SAQ 3x10 9   Hip abd/add 3x10 BTB 9                         Other Therapeutic Activities:      Manual Treatments:   L knee patella mobs, STM pes anserine region, knee ROM    Modalities:   MH L knee x10'. Not today per pt request.      Timed Code Treatment Minutes:  35      Total Treatment Minutes:  38    Treatment/Activity Tolerance:  [x] Patient tolerated treatment well [] Patient limited by fatigue  [] Patient limited by pain  [] Patient limited by other medical complications  [x] Other: Pt completed tx with no pain and mod muscle rigidity in left calf.     Pain after treatment:     0 /10     Prognosis: [x] Good [] Fair  [] Poor    Patient Requires Follow-up: [x] Yes  [] No    Plan:   [x] Continue per plan of care [] Alter current plan (see comments)  [] Plan of care initiated [] Hold pending MD visit [] Discharge    Plan for Next Session:        Electronically signed by:

## 2020-09-11 ENCOUNTER — HOSPITAL ENCOUNTER (OUTPATIENT)
Dept: PHYSICAL THERAPY | Facility: HOSPITAL | Age: 65
Setting detail: THERAPIES SERIES
Discharge: HOME | End: 2020-09-11
Payer: MEDICAID

## 2020-09-11 PROCEDURE — 97140 MANUAL THERAPY 1/> REGIONS: CPT

## 2020-09-11 PROCEDURE — 97110 THERAPEUTIC EXERCISES: CPT

## 2020-09-11 NOTE — FLOWSHEET NOTE
Physical Therapy Daily Treatment Note   Date:  2020    TIme In:   0901                  Time Out: 1011    Patient Name:  Carla Carpio    :  1955  MRN: [id number]    Restrictions/Precautions:    Pertinent Medical History:  Medical/Treatment Diagnosis Information:  ·   L knee OA, knee pain      Insurance/Certification information:    [facility]  Physician Information:      Plan of care signed (Y/N):    Visit# / total visits:     6 /    G-Code (if applicable):      Date / Visit # G-Code Applied:         Progress Note: []  Yes  [x]  No  Next due by: Visit #10      Pain level:   3 /10    Subjective: Pt reports: doing, mild pain, soreness this morning,      Objective:   Observation: gait = decreased hip and knee flexion   Test measurements:     Palpation: tenderness tera-patella, distal adductors    Exercises:  Exercise Resistance/Repetitions Other comments   QS 3x10  11   SLR 3x10 11   L knee HS 3x10 11   Seated HR/TR 3x10 11   LAQ 3x10 11   Nustep L5x 8' 11   SAQ 3x10 11   Hip abd/add 3x10 BTB 11                         Other Therapeutic Activities:      Manual Treatments:   L knee patella mobs, STM pes anserine region, knee ROM x 12'    Modalities:   MH L knee x10'.  Not today per pt request.      Timed Code Treatment Minutes:  40      Total Treatment Minutes:  70    Treatment/Activity Tolerance:  [x] Patient tolerated treatment well [] Patient limited by fatigue  [] Patient limited by pain  [] Patient limited by other medical complications  [x] Other:    Pain after treatment:     0 /10     Prognosis: [x] Good [] Fair  [] Poor    Patient Requires Follow-up: [x] Yes  [] No    Plan:   [x] Continue per plan of care [] Alter current plan (see comments)  [] Plan of care initiated [] Hold pending MD visit [] Discharge    Plan for Next Session:        Electronically signed by:  Laura Jessica, PT

## 2020-09-14 ENCOUNTER — HOSPITAL ENCOUNTER (OUTPATIENT)
Dept: PHYSICAL THERAPY | Facility: HOSPITAL | Age: 65
Setting detail: THERAPIES SERIES
Discharge: HOME | End: 2020-09-14
Payer: MEDICAID

## 2020-09-14 PROCEDURE — 97110 THERAPEUTIC EXERCISES: CPT

## 2020-09-14 PROCEDURE — 97140 MANUAL THERAPY 1/> REGIONS: CPT

## 2020-09-14 NOTE — FLOWSHEET NOTE
Physical Therapy Daily Treatment Note   Date:  2020    TIme In:   1410                 Time Out: [facility]    Patient Name:  Otilia Ware    :  1955  MRN: [id number]    Restrictions/Precautions:    Pertinent Medical History:  Medical/Treatment Diagnosis Information:  ·   L knee OA, knee pain      Insurance/Certification information:    [facility]  Physician Information:      Plan of care signed (Y/N):    Visit# / total visits:     7 /    G-Code (if applicable):      Date / Visit # G-Code Applied:         Progress Note: []  Yes  [x]  No  Next due by: Visit #10      Pain level:   0/10    Subjective: Pt reports she does feel like her knee motion has gotten better but her pain remains the same, especially to touch. Objective:   Observation: gait = decreased hip and knee flexion    Test measurements:  LEFS: 14/80, L knee AROM: 0-107 deg, L knee MMT: 5/5 all planes.  Palpation: tenderness tera-patella, distal adductors    Exercises:  Exercise Resistance/Repetitions Other comments   QS 3x10  14   SLR 3x10 14   L knee HS 3x10 14   Seated HR/TR 3x10 14   LAQ 3x10 14   Nustep L5x 8' 14   SAQ 3x10 14   Hip abd/add 3x10 BTB 14                         Other Therapeutic Activities:  Re-assessment performed by Nneka Nino PT. Manual Treatments:   L knee patella mobs, STM pes anserine region, knee ROM x 12'    Modalities:   MH L knee x10'. Not today per pt request.      Timed Code Treatment Minutes:  50    Total Treatment Minutes:  57    Treatment/Activity Tolerance:  [x] Patient tolerated treatment well [] Patient limited by fatigue  [] Patient limited by pain  [] Patient limited by other medical complications  [x] Other:Pt completed tx with no pain but had mild to mod tenderness along left pes anserine. Pt demonstrated improved strength in left knee flexors and extensors today as compared to IE.     Pain after treatment:     0 /10     Goals  Short term goals  Time Frame for Short term goals: 3 weeks  Short term goal 1: Pt to be I with HEP  Short term goal 2: Pt to demonstrate L knee AROM flexion of 115 deg. Short term goal 3: Pt to perform daily activities with pain of less than 5/10. Long term goals  Time Frame for Long term goals : 6 weeks  Long term goal 1: LEFS score to improve to 40/80 indicating improved function. Long term goal 2: Pt to demonstrate L knee AROM of 0-120 deg. Long term goal 3: Pt to demonstrate 5/5 L knee flexion / extension strength.          Prognosis: [x] Good [] Fair  [] Poor    Patient Requires Follow-up: [x] Yes  [] No    Plan:   [x] Continue per plan of care [] Alter current plan (see comments)  [] Plan of care initiated [] Hold pending MD visit [] Discharge    Plan for Next Session:        Electronically signed by:  Lukasz Cantrell PTA

## 2020-09-15 ENCOUNTER — HOSPITAL ENCOUNTER (OUTPATIENT)
Dept: GENERAL RADIOLOGY | Facility: HOSPITAL | Age: 65
Discharge: HOME | End: 2020-09-15
Payer: MEDICAID

## 2020-09-15 ENCOUNTER — HOSPITAL ENCOUNTER (OUTPATIENT)
Facility: HOSPITAL | Age: 65
Discharge: HOME | End: 2020-09-15
Payer: MEDICAID

## 2020-09-15 PROCEDURE — 72072 X-RAY EXAM THORAC SPINE 3VWS: CPT

## 2020-09-15 PROCEDURE — 72040 X-RAY EXAM NECK SPINE 2-3 VW: CPT

## 2020-09-15 PROCEDURE — 72100 X-RAY EXAM L-S SPINE 2/3 VWS: CPT

## 2020-09-17 ENCOUNTER — APPOINTMENT (OUTPATIENT)
Dept: PHYSICAL THERAPY | Facility: HOSPITAL | Age: 65
End: 2020-09-17
Payer: MEDICAID

## 2020-09-21 ENCOUNTER — HOSPITAL ENCOUNTER (OUTPATIENT)
Dept: PHYSICAL THERAPY | Facility: HOSPITAL | Age: 65
Setting detail: THERAPIES SERIES
Discharge: HOME | End: 2020-09-21
Payer: MEDICAID

## 2020-09-21 PROCEDURE — 97140 MANUAL THERAPY 1/> REGIONS: CPT

## 2020-09-21 PROCEDURE — 97110 THERAPEUTIC EXERCISES: CPT

## 2020-09-21 NOTE — FLOWSHEET NOTE
Physical Therapy Daily Treatment Note   Date:  2020    TIme In:   0832                 Time Out: [address]    Patient Name:  Bert Rater    :  1955  MRN: [id number]    Restrictions/Precautions:    Pertinent Medical History:  Medical/Treatment Diagnosis Information:  ·   L knee OA, knee pain      Insurance/Certification information:    [facility]  Physician Information:      Plan of care signed (Y/N):    Visit# / total visits:     8 /    G-Code (if applicable):      Date / Visit # G-Code Applied:         Progress Note: []  Yes  [x]  No  Next due by: Visit #10      Pain level:   0/10    Subjective: Pt reports her continues to improve, feels as though she can bend her knee better when she walks. Objective:   Observation: gait = decreased hip and knee flexion    Test measurements:  LEFS: 14/80, L knee AROM: 0-107 deg, L knee MMT: 5/5 all planes.  Palpation: tenderness tera-patella, distal adductors    Exercises:  Exercise Resistance/Repetitions Other comments   QS 3x10  21   SLR 3x10 21   L knee HS 3x10 21   Seated HR/TR 3x10 21   LAQ 3x10 21   Nustep L5x 8' 21   SAQ 3x10 21   Hip abd/add 3x10 BTB 21                         Other Therapeutic Activities:      Manual Treatments:   L knee patella mobs, STM pes anserine region, knee ROM x 12'    Modalities:   MH L knee x10'. Not today per pt request.      Timed Code Treatment Minutes: 40     Total Treatment Minutes:  40    Treatment/Activity Tolerance:  [x] Patient tolerated treatment well [] Patient limited by fatigue  [] Patient limited by pain  [] Patient limited by other medical complications  [x] Other: Pt had tenderness at medial knee structures but responded well to grade I-II mobs with decrease in pain. She has responded well to PT intervention with improved ROM and pain levels. She has one more approved visit with insurance this week and then plan to d/c to [facility].     Pain after treatment:     0/10     Goals  Short term goals  Time Frame for Short term goals: 3 weeks  Short term goal 1: Pt to be I with HEP  Short term goal 2: Pt to demonstrate L knee AROM flexion of 115 deg. Short term goal 3: Pt to perform daily activities with pain of less than 5/10. Long term goals  Time Frame for Long term goals : 6 weeks  Long term goal 1: LEFS score to improve to 40/80 indicating improved function. Long term goal 2: Pt to demonstrate L knee AROM of 0-120 deg. Long term goal 3: Pt to demonstrate 5/5 L knee flexion / extension strength.          Prognosis: [x] Good [] Fair  [] Poor    Patient Requires Follow-up: [x] Yes  [] No    Plan:   [x] Continue per plan of care [] Alter current plan (see comments)  [] Plan of care initiated [] Hold pending MD visit [] Discharge    Plan for Next Session:        Electronically signed by:  Arnold Wells PT

## 2020-09-23 ENCOUNTER — APPOINTMENT (OUTPATIENT)
Dept: PHYSICAL THERAPY | Facility: HOSPITAL | Age: 65
End: 2020-09-23
Payer: MEDICAID

## 2020-09-28 ENCOUNTER — APPOINTMENT (OUTPATIENT)
Dept: PHYSICAL THERAPY | Facility: HOSPITAL | Age: 65
End: 2020-09-28
Payer: MEDICAID

## 2020-09-30 ENCOUNTER — HOSPITAL ENCOUNTER (OUTPATIENT)
Facility: HOSPITAL | Age: 65
Discharge: HOME | End: 2020-09-30
Payer: MEDICAID

## 2020-09-30 ENCOUNTER — HOSPITAL ENCOUNTER (OUTPATIENT)
Dept: GENERAL RADIOLOGY | Facility: HOSPITAL | Age: 65
Discharge: HOME | End: 2020-09-30
Payer: MEDICAID

## 2020-09-30 PROCEDURE — 74018 RADEX ABDOMEN 1 VIEW: CPT

## 2020-10-06 ENCOUNTER — HOSPITAL ENCOUNTER (OUTPATIENT)
Dept: PHYSICAL THERAPY | Facility: HOSPITAL | Age: 65
Setting detail: THERAPIES SERIES
Discharge: HOME | End: 2020-10-06
Payer: MEDICAID

## 2020-10-06 PROCEDURE — 97140 MANUAL THERAPY 1/> REGIONS: CPT

## 2020-10-06 PROCEDURE — 97110 THERAPEUTIC EXERCISES: CPT

## 2020-10-06 NOTE — FLOWSHEET NOTE
Physical Therapy Daily Treatment Note/Discharge Summary  Date:  10/6/2020    TIme In: 1137                  Time Out: [place]    Patient Name:  Korey Whitley    :  1955  MRN: [id number]    Restrictions/Precautions:    Pertinent Medical History:  Medical/Treatment Diagnosis Information:  ·   L knee OA, knee pain      Insurance/Certification information:    [facility]  Physician Information:      Plan of care signed (Y/N):    Visit# / total visits:     9 /    G-Code (if applicable):      Date / Visit # G-Code Applied:         Progress Note: []  Yes  [x]  No  Next due by: Visit #10      Pain level:   0/10    Subjective: Pt reports she is doing well and Pt has really helped but she feels like she is ready to graduate. She expressed her doc is going to try a new injection on her the next time she sees him. Objective:   Observation: gait = decreased hip and knee flexion    Test measurements:  LEFS: 46/80, L knee AROM: 0-110 deg, L knee MMT: 5/5 all planes.  Palpation: tenderness tera-patella, distal adductors    Exercises:  Exercise Resistance/Repetitions Other comments   QS 3x10  6   SLR 3x10 6   L knee HS 3x10 6   Seated HR/TR 3x10 6   LAQ 3x10 6   Nustep L5x 8' 6   SAQ 3x10 6   Hip abd/add 3x10 BTB 6                         Other Therapeutic Activities:  HEP given and pt verbalized understanding. Manual Treatments:   L knee patella mobs, STM pes anserine region, knee ROM x 12'    Modalities:   MH L knee x10'. Not today per pt request.      Timed Code Treatment Minutes: 40    Total Treatment Minutes:  45    Treatment/Activity Tolerance:  [x] Patient tolerated treatment well [] Patient limited by fatigue  [] Patient limited by pain  [] Patient limited by other medical complications  [x] Other: Pt completed tx with improved knee AROM and improved functional measures.     Pain after treatment:     0/10     Goals  Short term goals  Time Frame for Short term goals: 3 weeks  Short term goal 1: Pt to be I with HEP  Short term goal 2: Pt to demonstrate L knee AROM flexion of 115 deg. Short term goal 3: Pt to perform daily activities with pain of less than 5/10. Long term goals  Time Frame for Long term goals : 6 weeks  Long term goal 1: LEFS score to improve to 40/80 indicating improved function. Long term goal 2: Pt to demonstrate L knee AROM of 0-120 deg. Long term goal 3: Pt to demonstrate 5/5 L knee flexion / extension strength. Prognosis: [x] Good [] Fair  [] Poor    Patient Requires Follow-up: [] Yes  [x] No    Plan:   [] Continue per plan of care [] Alter current plan (see comments)  [] Plan of care initiated [] Hold pending MD visit [x] Discharge    Plan for Next Session:   D/C.       Electronically signed by:  Hank Cantrell PTA

## 2020-10-27 ENCOUNTER — HOSPITAL ENCOUNTER (OUTPATIENT)
Facility: HOSPITAL | Age: 65
Discharge: HOME | End: 2020-10-27
Payer: MEDICAID

## 2020-10-27 ENCOUNTER — HOSPITAL ENCOUNTER (OUTPATIENT)
Dept: GENERAL RADIOLOGY | Facility: HOSPITAL | Age: 65
Discharge: HOME | End: 2020-10-27
Payer: MEDICAID

## 2020-10-27 PROCEDURE — 74018 RADEX ABDOMEN 1 VIEW: CPT

## 2020-11-02 ENCOUNTER — HOSPITAL ENCOUNTER (OUTPATIENT)
Facility: HOSPITAL | Age: 65
Discharge: HOME | End: 2020-11-02
Payer: MEDICAID

## 2020-11-02 LAB
A/G RATIO: 1.8 (ref 0.8–2)
ALBUMIN SERPL-MCNC: 4.2 G/DL (ref 3.4–4.8)
ALP BLD-CCNC: 72 U/L (ref 25–100)
ALT SERPL-CCNC: 10 U/L (ref 4–36)
ANION GAP SERPL CALCULATED.3IONS-SCNC: 9 MMOL/L (ref 3–16)
AST SERPL-CCNC: 12 U/L (ref 8–33)
BASOPHILS ABSOLUTE: 0.1 K/UL (ref 0–0.1)
BASOPHILS RELATIVE PERCENT: 1.6 %
BILIRUB SERPL-MCNC: 0.6 MG/DL (ref 0.3–1.2)
BUN BLDV-MCNC: 16 MG/DL (ref 6–20)
CALCIUM SERPL-MCNC: 9.6 MG/DL (ref 8.5–10.5)
CHLORIDE BLD-SCNC: 110 MMOL/L (ref 98–107)
CHOLESTEROL, TOTAL: 197 MG/DL (ref 0–200)
CO2: 24 MMOL/L (ref 20–30)
CREAT SERPL-MCNC: 0.9 MG/DL (ref 0.4–1.2)
EOSINOPHILS ABSOLUTE: 0.5 K/UL (ref 0–0.4)
EOSINOPHILS RELATIVE PERCENT: 9.4 %
FOLATE: 8.11 NG/ML
GFR AFRICAN AMERICAN: >59
GFR NON-AFRICAN AMERICAN: >60
GLOBULIN: 2.4 G/DL
GLUCOSE BLD-MCNC: 111 MG/DL (ref 74–106)
HBA1C MFR BLD: 5.7 %
HCT VFR BLD CALC: 42.5 % (ref 37–47)
HDLC SERPL-MCNC: 65 MG/DL (ref 40–60)
HEMOGLOBIN: 13 G/DL (ref 11.5–16.5)
IMMATURE GRANULOCYTES #: 0 K/UL
IMMATURE GRANULOCYTES %: 0.4 % (ref 0–5)
LDL CHOLESTEROL CALCULATED: 105 MG/DL
LYMPHOCYTES ABSOLUTE: 1.2 K/UL (ref 1.5–4)
LYMPHOCYTES RELATIVE PERCENT: 20.8 %
MCH RBC QN AUTO: 30.2 PG (ref 27–32)
MCHC RBC AUTO-ENTMCNC: 30.6 G/DL (ref 31–35)
MCV RBC AUTO: 98.6 FL (ref 80–100)
MONOCYTES ABSOLUTE: 0.4 K/UL (ref 0.2–0.8)
MONOCYTES RELATIVE PERCENT: 7.5 %
NEUTROPHILS ABSOLUTE: 3.4 K/UL (ref 2–7.5)
NEUTROPHILS RELATIVE PERCENT: 60.3 %
PDW BLD-RTO: 11.9 % (ref 11–16)
PLATELET # BLD: 272 K/UL (ref 150–400)
PMV BLD AUTO: 11.1 FL (ref 6–10)
POTASSIUM SERPL-SCNC: 4.4 MMOL/L (ref 3.4–5.1)
RBC # BLD: 4.31 M/UL (ref 3.8–5.8)
SODIUM BLD-SCNC: 143 MMOL/L (ref 136–145)
TOTAL PROTEIN: 6.6 G/DL (ref 6.4–8.3)
TRIGL SERPL-MCNC: 137 MG/DL (ref 0–249)
TSH SERPL DL<=0.05 MIU/L-ACNC: 2.42 UIU/ML (ref 0.35–5.5)
VITAMIN B-12: 408 PG/ML (ref 211–911)
VITAMIN D 25-HYDROXY: 35.2 (ref 32–100)
VLDLC SERPL CALC-MCNC: 27 MG/DL
WBC # BLD: 5.6 K/UL (ref 4–11)

## 2020-11-02 PROCEDURE — 83036 HEMOGLOBIN GLYCOSYLATED A1C: CPT

## 2020-11-02 PROCEDURE — 80053 COMPREHEN METABOLIC PANEL: CPT

## 2020-11-02 PROCEDURE — 82306 VITAMIN D 25 HYDROXY: CPT

## 2020-11-02 PROCEDURE — 80061 LIPID PANEL: CPT

## 2020-11-02 PROCEDURE — 36415 COLL VENOUS BLD VENIPUNCTURE: CPT

## 2020-11-02 PROCEDURE — 82607 VITAMIN B-12: CPT

## 2020-11-02 PROCEDURE — 84443 ASSAY THYROID STIM HORMONE: CPT

## 2020-11-02 PROCEDURE — 85025 COMPLETE CBC W/AUTO DIFF WBC: CPT

## 2020-11-02 PROCEDURE — 82746 ASSAY OF FOLIC ACID SERUM: CPT

## 2020-11-29 ENCOUNTER — APPOINTMENT (OUTPATIENT)
Dept: GENERAL RADIOLOGY | Facility: HOSPITAL | Age: 65
End: 2020-11-29
Payer: MEDICAID

## 2020-11-29 ENCOUNTER — HOSPITAL ENCOUNTER (EMERGENCY)
Facility: HOSPITAL | Age: 65
Discharge: HOME | End: 2020-11-29
Attending: EMERGENCY MEDICINE
Payer: MEDICAID

## 2020-11-29 VITALS
HEART RATE: 80 BPM | SYSTOLIC BLOOD PRESSURE: 146 MMHG | BODY MASS INDEX: 33.61 KG/M2 | HEIGHT: 61 IN | RESPIRATION RATE: 16 BRPM | TEMPERATURE: 96.8 F | WEIGHT: 178 LBS | OXYGEN SATURATION: 98 % | DIASTOLIC BLOOD PRESSURE: 80 MMHG

## 2020-11-29 PROCEDURE — 73560 X-RAY EXAM OF KNEE 1 OR 2: CPT

## 2020-11-29 PROCEDURE — 99284 EMERGENCY DEPT VISIT MOD MDM: CPT

## 2020-11-29 PROCEDURE — U0003 INFECTIOUS AGENT DETECTION BY NUCLEIC ACID (DNA OR RNA); SEVERE ACUTE RESPIRATORY SYNDROME CORONAVIRUS 2 (SARS-COV-2) (CORONAVIRUS DISEASE [COVID-19]), AMPLIFIED PROBE TECHNIQUE, MAKING USE OF HIGH THROUGHPUT TECHNOLOGIES AS DESCRIBED BY CMS-2020-01-R: HCPCS

## 2020-11-29 RX ORDER — HYDROCODONE BITARTRATE AND ACETAMINOPHEN 5; 325 MG/1; MG/1
1 TABLET ORAL EVERY 6 HOURS PRN
Qty: 8 TABLET | Refills: 0 | Status: SHIPPED | OUTPATIENT
Start: 2020-11-29 | End: 2020-12-02

## 2020-11-29 ASSESSMENT — PAIN DESCRIPTION - LOCATION
LOCATION: KNEE
LOCATION: KNEE

## 2020-11-29 ASSESSMENT — PAIN - FUNCTIONAL ASSESSMENT: PAIN_FUNCTIONAL_ASSESSMENT: 0-10

## 2020-11-29 ASSESSMENT — PAIN DESCRIPTION - ORIENTATION
ORIENTATION: LEFT
ORIENTATION: LEFT

## 2020-11-29 ASSESSMENT — PAIN DESCRIPTION - DESCRIPTORS
DESCRIPTORS: SHARP;ACHING
DESCRIPTORS: ACHING;SHARP

## 2020-11-29 ASSESSMENT — PAIN DESCRIPTION - PAIN TYPE
TYPE: CHRONIC PAIN
TYPE: ACUTE PAIN

## 2020-11-29 ASSESSMENT — PAIN SCALES - GENERAL
PAINLEVEL_OUTOF10: 7
PAINLEVEL_OUTOF10: 7

## 2020-11-29 ASSESSMENT — PAIN DESCRIPTION - FREQUENCY
FREQUENCY: CONTINUOUS
FREQUENCY: CONTINUOUS

## 2020-11-29 NOTE — ED NOTES
Reviewed discharge plan with Rowan Palomares. Encouraged her to f/u with KARMA Henson CNP and orthopedic surgeon and she understood. NAD noted on discharge, gait steady. Reviewed discharge prescription for:     Current Discharge Medication List      START taking these medications    Details   HYDROcodone-acetaminophen (NORCO) 5-325 MG per tablet Take 1 tablet by mouth every 6 hours as needed for Pain for up to 3 days. Intended supply: 3 days. Take lowest dose possible to manage pain  Qty: 8 tablet, Refills: 0    Associated Diagnoses: Chronic pain of left knee             Kathi states understanding of how and when to take medications.       Electronically signed by Lieutenant Anjali RN on 11/29/2020 at 6:48 PM     Lieutenant Anjali RN  11/29/20 [id number]
good balance

## 2020-11-29 NOTE — ED TRIAGE NOTES
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 She states that she had an injection in her knee the day before thanksgiving and her knee is worse. Pt did ambulate to the scope room with steady gait and nad noted. During triage she advised that she has been exposed to covid last week and she wants a covid test today also.

## 2020-11-29 NOTE — ED PROVIDER NOTES
[facility] ENCOUNTER      Pt Name: Harman Stephens  MRN: [id number]  [place] 1955  Date of evaluation: 11/29/2020  Provider: Nelia Galvin DO    CHIEF COMPLAINT       Chief Complaint   Patient presents with    Knee Pain         HISTORY OF PRESENT ILLNESS   (Location/Symptom, Timing/Onset, Context/Setting, Quality, Duration, Modifying Factors, Severity)  Note limiting factors. Harman Stephens is a 63 y.o. female with history of degenerative joint disease in her left knee who presents to the emergency department with complaint of left knee pain. Patient reports he has a chronic history of left knee pain. She is following up with orthopedic surgery for this. She had a knee injection about 5 days ago on Wednesday to try and get some pain relief. She reports worsening pain since that time. States she did have a fall onto her left side on her left lower thigh as well as left knee that same day. States it was a trip and fall. Denies head trauma or loss of consciousness. Has been taking Motrin 800 mg with mild relief at home however unable to sleep or lay on her left side secondary to pain. Appropriate PPE was used including an eye shield, gloves, N95 mask, surgical mask during the entire patient encounter and exam.  If necessary (pt being intubated or aerosolizing equipment in use) a gown was also used. Nursing Notes were reviewed.       PAST MEDICAL HISTORY     Past Medical History:   Diagnosis Date    Arthritis     Depression     Hypertension     Thyroid disease     UTI (urinary tract infection)          SURGICAL HISTORY       Past Surgical History:   Procedure Laterality Date    APPENDECTOMY      CARPAL TUNNEL RELEASE Bilateral     CHOLECYSTECTOMY      GASTRIC BYPASS SURGERY      JOINT REPLACEMENT Right     [place] COLONOSCOPY FLX DX W/COLLJ SPEC WHEN PFRMD N/A 8/2/2018    COLONOSCOPY DIAGNOSTIC OR SCREENING performed by Nury Mota MD at [address]       Previous Medications    ESTRADIOL (ESTRACE) 1 MG TABLET    Take 1 mg by mouth daily    FLUOXETINE (PROZAC) 20 MG CAPSULE    Take 40 mg by mouth daily    IPRATROPIUM (ATROVENT) 0.03 % NASAL SPRAY    1 spray by Nasal route 2 times daily    LANSOPRAZOLE (PREVACID) 30 MG DELAYED RELEASE CAPSULE    Take 30 mg by mouth daily    LEVOTHYROXINE (SYNTHROID) 50 MCG TABLET    Take 50 mcg by mouth Daily    MEDROXYPROGESTERONE (PROVERA) 2.5 MG TABLET    Take 2.5 mg by mouth daily    MELOXICAM (MOBIC) 15 MG TABLET    Take 15 mg by mouth daily    MONTELUKAST (SINGULAIR) 10 MG TABLET    Take 10 mg by mouth daily    PRAMIPEXOLE (MIRAPEX) 1 MG TABLET    Take 1 mg by mouth nightly    TRAZODONE (DESYREL) 100 MG TABLET    Take 100 mg by mouth nightly       ALLERGIES     Patient has no known allergies. FAMILY HISTORY     History reviewed. No pertinent family history.        SOCIAL HISTORY       Social History     Socioeconomic History    Marital status: Legally      Spouse name: None    Number of children: None    Years of education: None    Highest education level: None   Occupational History    None   Social Needs    Financial resource strain: None    Food insecurity     Worry: None     Inability: None    Transportation needs     Medical: None     Non-medical: None   Tobacco Use    Smoking status: Never Smoker    Smokeless tobacco: Never Used   Substance and Sexual Activity    Alcohol use: No    Drug use: No    Sexual activity: None   Lifestyle    Physical activity     Days per week: None     Minutes per session: None    Stress: None   Relationships    Social connections     Talks on phone: None     Gets together: None     Attends Anglican service: None     Active member of club or organization: None     Attends meetings of clubs or organizations: None     Relationship status: None    Intimate partner violence     Fear of current or VIEWS)   Final Result   Impression:    No acute osseous injury. Osteoarthritis. Soft tissue swelling. LABS:  Labs Reviewed   COVID-19       All other labs were within normal range or not returned as of this dictation. EMERGENCY DEPARTMENT COURSE and DIFFERENTIAL DIAGNOSIS/MDM:   Vitals:    Vitals:    11/29/20 1650 11/29/20 1808   BP: (!) 141/74 (!) 145/81   Pulse: 78 70   Resp: 16 16   Temp: 95.5 °F (35.3 °C)    TempSrc: Tympanic    SpO2: 99% 98%   Weight: 178 lb (80.7 kg)    Height: 5' 1\" (1.549 m)          MDM  73-year-old female presents the emergency department complaint of left knee pain. Vital signs stable. Physical exam as above. Patient appears alert awake and nontoxic. Reports worsening pain since an injection in her left knee around 5 days ago. There is no overlying erythema and she still has full range of motion in the joint. No obvious signs of septic arthritis at this time. She is neurovascular intact distally. No signs of unilateral edema or signs of DVT. She reports her pain is mostly in her knee and does radiate slightly down into her calf. Will obtain an x-ray. She did drive so unable to give her anything stronger than ibuprofen for pain. Patient x-ray negative for acute pathology. Does show osteoarthritis which is known. There is some soft tissue swelling anteriorly. There is no overlying erythema or signs of infection. I would begin to evaluate the patient she is laying on her left side and sleeping which she reported she was unable to do at home. She reports that the pain comes in waves and is asking for something stronger than ibuprofen. Will discharge home with a 2-day supply of Norco and instructions she needs to follow-up with her primary care doctor for any further medications. Patient also reports that she had exposure to someone with COVID-19 last week and asking to be tested. Denies cough, shortness of breath or any symptoms.   Will send send out test and she needs to quarantine for the next 2 to 3 days until her COVID-19 test has resulted and is negative. If it is positive she will need to quarantine for 14 days. Patient agrees with discharge plan. Will follow up with orthopedic surgery and primary care as an outpatient. CONSULTS:  None      FINAL IMPRESSION      1. Chronic pain of left knee    2. Encounter for screening laboratory testing for COVID-19 virus          DISPOSITION/PLAN   DISPOSITION Decision To Discharge 11/29/2020 06:21:37 PM      PATIENT REFERRED TO:  KARMA Davis CNP  [address] [place] [id number]  [phone]    Schedule an appointment as soon as possible for a visit in 2 days  As needed, If symptoms worsen      DISCHARGE MEDICATIONS:  New Prescriptions    HYDROCODONE-ACETAMINOPHEN (NORCO) 5-325 MG PER TABLET    Take 1 tablet by mouth every 6 hours as needed for Pain for up to 3 days. Intended supply: 3 days.  Take lowest dose possible to manage pain          (Please note that portions of this note were completed with a voice recognition program.  Efforts were made to edit the dictations but occasionally words are mis-transcribed.)    Orlando Lizama DO (electronically signed)  Attending Emergency Physician     Orlando Lizama DO  11/29/20 9425

## 2020-11-30 ENCOUNTER — CARE COORDINATION (OUTPATIENT)
Dept: CARE COORDINATION | Age: 65
End: 2020-11-30

## 2020-12-01 LAB — SARS-COV-2: NOT DETECTED

## 2020-12-09 ENCOUNTER — HOSPITAL ENCOUNTER (OUTPATIENT)
Dept: GENERAL RADIOLOGY | Facility: HOSPITAL | Age: 65
Discharge: HOME | End: 2020-12-09
Payer: MEDICAID

## 2020-12-09 ENCOUNTER — HOSPITAL ENCOUNTER (OUTPATIENT)
Facility: HOSPITAL | Age: 65
Discharge: HOME | End: 2020-12-09
Payer: MEDICAID

## 2020-12-09 LAB
BILIRUBIN URINE: NEGATIVE
BLOOD, URINE: NEGATIVE
CLARITY: NORMAL
COLOR: YELLOW
GLUCOSE URINE: NEGATIVE MG/DL
KETONES, URINE: NEGATIVE MG/DL
LEUKOCYTE ESTERASE, URINE: NEGATIVE
MICROSCOPIC EXAMINATION: NORMAL
NITRITE, URINE: NEGATIVE
PH UA: 5.5 (ref 5–8)
PROTEIN UA: NEGATIVE MG/DL
SPECIFIC GRAVITY UA: >=1.03 (ref 1–1.03)
URINE TYPE: NORMAL
UROBILINOGEN, URINE: 0.2 E.U./DL

## 2020-12-09 PROCEDURE — 87086 URINE CULTURE/COLONY COUNT: CPT

## 2020-12-09 PROCEDURE — 81003 URINALYSIS AUTO W/O SCOPE: CPT

## 2020-12-09 PROCEDURE — 74018 RADEX ABDOMEN 1 VIEW: CPT

## 2020-12-10 LAB — URINE CULTURE, ROUTINE: NORMAL

## 2021-01-11 ENCOUNTER — HOSPITAL ENCOUNTER (OUTPATIENT)
Dept: GENERAL RADIOLOGY | Facility: HOSPITAL | Age: 66
Discharge: HOME | End: 2021-01-11
Payer: MEDICAID

## 2021-01-11 ENCOUNTER — HOSPITAL ENCOUNTER (OUTPATIENT)
Facility: HOSPITAL | Age: 66
Discharge: HOME | End: 2021-01-11
Payer: MEDICAID

## 2021-01-11 DIAGNOSIS — M54.2 CERVICALGIA: ICD-10-CM

## 2021-01-11 DIAGNOSIS — N30.20 ACUTE ON CHRONIC CYSTITIS: ICD-10-CM

## 2021-01-11 DIAGNOSIS — M54.9 DORSALGIA: ICD-10-CM

## 2021-01-11 DIAGNOSIS — N30.00 ACUTE ON CHRONIC CYSTITIS: ICD-10-CM

## 2021-01-11 DIAGNOSIS — M54.50 LOW BACK PAIN, UNSPECIFIED BACK PAIN LATERALITY, UNSPECIFIED CHRONICITY, UNSPECIFIED WHETHER SCIATICA PRESENT: ICD-10-CM

## 2021-01-11 LAB
A/G RATIO: 1.9 (ref 0.8–2)
ALBUMIN SERPL-MCNC: 4.2 G/DL (ref 3.4–4.8)
ALP BLD-CCNC: 62 U/L (ref 25–100)
ALT SERPL-CCNC: 9 U/L (ref 4–36)
AMORPHOUS: ABNORMAL /HPF
ANION GAP SERPL CALCULATED.3IONS-SCNC: 8 MMOL/L (ref 3–16)
AST SERPL-CCNC: 11 U/L (ref 8–33)
BACTERIA: ABNORMAL /HPF
BILIRUB SERPL-MCNC: 0.5 MG/DL (ref 0.3–1.2)
BILIRUBIN URINE: NEGATIVE
BLOOD, URINE: NEGATIVE
BUN BLDV-MCNC: 14 MG/DL (ref 6–20)
CALCIUM SERPL-MCNC: 9.7 MG/DL (ref 8.5–10.5)
CHLORIDE BLD-SCNC: 107 MMOL/L (ref 98–107)
CLARITY: ABNORMAL
CO2: 28 MMOL/L (ref 20–30)
COLOR: YELLOW
CREAT SERPL-MCNC: 0.8 MG/DL (ref 0.4–1.2)
EPITHELIAL CELLS, UA: ABNORMAL /HPF (ref 0–5)
GFR AFRICAN AMERICAN: >59
GFR NON-AFRICAN AMERICAN: >60
GLOBULIN: 2.2 G/DL
GLUCOSE BLD-MCNC: 110 MG/DL (ref 74–106)
GLUCOSE URINE: NEGATIVE MG/DL
KETONES, URINE: NEGATIVE MG/DL
LEUKOCYTE ESTERASE, URINE: ABNORMAL
MICROSCOPIC EXAMINATION: YES
NITRITE, URINE: NEGATIVE
PH UA: 5.5 (ref 5–8)
POTASSIUM SERPL-SCNC: 4.4 MMOL/L (ref 3.4–5.1)
PROTEIN UA: NEGATIVE MG/DL
RBC UA: ABNORMAL /HPF (ref 0–4)
SODIUM BLD-SCNC: 143 MMOL/L (ref 136–145)
SPECIFIC GRAVITY UA: 1.02 (ref 1–1.03)
TOTAL PROTEIN: 6.4 G/DL (ref 6.4–8.3)
URINE TYPE: ABNORMAL
UROBILINOGEN, URINE: 0.2 E.U./DL
WBC UA: ABNORMAL /HPF (ref 0–5)
YEAST: PRESENT /HPF

## 2021-01-11 PROCEDURE — 72220 X-RAY EXAM SACRUM TAILBONE: CPT

## 2021-01-11 PROCEDURE — 81001 URINALYSIS AUTO W/SCOPE: CPT

## 2021-01-11 PROCEDURE — 72100 X-RAY EXAM L-S SPINE 2/3 VWS: CPT

## 2021-01-11 PROCEDURE — 87086 URINE CULTURE/COLONY COUNT: CPT

## 2021-01-11 PROCEDURE — 36415 COLL VENOUS BLD VENIPUNCTURE: CPT

## 2021-01-11 PROCEDURE — 74018 RADEX ABDOMEN 1 VIEW: CPT

## 2021-01-11 PROCEDURE — 80053 COMPREHEN METABOLIC PANEL: CPT

## 2021-01-11 PROCEDURE — 72040 X-RAY EXAM NECK SPINE 2-3 VW: CPT

## 2021-01-11 PROCEDURE — 72072 X-RAY EXAM THORAC SPINE 3VWS: CPT

## 2021-01-12 LAB
ORGANISM: ABNORMAL
URINE CULTURE, ROUTINE: ABNORMAL

## 2021-02-03 ENCOUNTER — HOSPITAL ENCOUNTER (OUTPATIENT)
Facility: HOSPITAL | Age: 66
Discharge: HOME | End: 2021-02-03
Payer: MEDICAID

## 2021-02-03 LAB
A/G RATIO: 1.7 (ref 0.8–2)
ALBUMIN SERPL-MCNC: 3.8 G/DL (ref 3.4–4.8)
ALP BLD-CCNC: 58 U/L (ref 25–100)
ALT SERPL-CCNC: 8 U/L (ref 4–36)
ANION GAP SERPL CALCULATED.3IONS-SCNC: 12 MMOL/L (ref 3–16)
AST SERPL-CCNC: 11 U/L (ref 8–33)
BASOPHILS ABSOLUTE: 0.1 K/UL (ref 0–0.1)
BASOPHILS RELATIVE PERCENT: 0.8 %
BILIRUB SERPL-MCNC: 0.4 MG/DL (ref 0.3–1.2)
BUN BLDV-MCNC: 16 MG/DL (ref 6–20)
CALCIUM SERPL-MCNC: 9.1 MG/DL (ref 8.5–10.5)
CHLORIDE BLD-SCNC: 109 MMOL/L (ref 98–107)
CHOLESTEROL, TOTAL: 196 MG/DL (ref 0–200)
CO2: 24 MMOL/L (ref 20–30)
CREAT SERPL-MCNC: 0.9 MG/DL (ref 0.4–1.2)
EOSINOPHILS ABSOLUTE: 0.1 K/UL (ref 0–0.4)
EOSINOPHILS RELATIVE PERCENT: 1.9 %
FOLATE: 5.73 NG/ML
GFR AFRICAN AMERICAN: >59
GFR NON-AFRICAN AMERICAN: >60
GLOBULIN: 2.3 G/DL
GLUCOSE BLD-MCNC: 121 MG/DL (ref 74–106)
HBA1C MFR BLD: 5.5 %
HCT VFR BLD CALC: 37.2 % (ref 37–47)
HDLC SERPL-MCNC: 62 MG/DL (ref 40–60)
HEMOGLOBIN: 11.5 G/DL (ref 11.5–16.5)
IMMATURE GRANULOCYTES #: 0 K/UL
IMMATURE GRANULOCYTES %: 0.3 % (ref 0–5)
LDL CHOLESTEROL CALCULATED: 110 MG/DL
LYMPHOCYTES ABSOLUTE: 1.1 K/UL (ref 1.5–4)
LYMPHOCYTES RELATIVE PERCENT: 17.3 %
MCH RBC QN AUTO: 30.6 PG (ref 27–32)
MCHC RBC AUTO-ENTMCNC: 30.9 G/DL (ref 31–35)
MCV RBC AUTO: 98.9 FL (ref 80–100)
MONOCYTES ABSOLUTE: 0.5 K/UL (ref 0.2–0.8)
MONOCYTES RELATIVE PERCENT: 7.2 %
NEUTROPHILS ABSOLUTE: 4.6 K/UL (ref 2–7.5)
NEUTROPHILS RELATIVE PERCENT: 72.5 %
PDW BLD-RTO: 12.2 % (ref 11–16)
PLATELET # BLD: 267 K/UL (ref 150–400)
PMV BLD AUTO: 11.4 FL (ref 6–10)
POTASSIUM SERPL-SCNC: 3.8 MMOL/L (ref 3.4–5.1)
RBC # BLD: 3.76 M/UL (ref 3.8–5.8)
SODIUM BLD-SCNC: 145 MMOL/L (ref 136–145)
TOTAL PROTEIN: 6.1 G/DL (ref 6.4–8.3)
TRIGL SERPL-MCNC: 119 MG/DL (ref 0–249)
TSH SERPL DL<=0.05 MIU/L-ACNC: 0.86 UIU/ML (ref 0.27–4.2)
VITAMIN B-12: 749 PG/ML (ref 211–911)
VLDLC SERPL CALC-MCNC: 24 MG/DL
WBC # BLD: 6.4 K/UL (ref 4–11)

## 2021-02-03 PROCEDURE — 36415 COLL VENOUS BLD VENIPUNCTURE: CPT

## 2021-02-03 PROCEDURE — 82746 ASSAY OF FOLIC ACID SERUM: CPT

## 2021-02-03 PROCEDURE — 87081 CULTURE SCREEN ONLY: CPT

## 2021-02-03 PROCEDURE — 80061 LIPID PANEL: CPT

## 2021-02-03 PROCEDURE — 80053 COMPREHEN METABOLIC PANEL: CPT

## 2021-02-03 PROCEDURE — 84443 ASSAY THYROID STIM HORMONE: CPT

## 2021-02-03 PROCEDURE — 85025 COMPLETE CBC W/AUTO DIFF WBC: CPT

## 2021-02-03 PROCEDURE — 83036 HEMOGLOBIN GLYCOSYLATED A1C: CPT

## 2021-02-03 PROCEDURE — 82607 VITAMIN B-12: CPT

## 2021-02-06 LAB — MRSA CULTURE ONLY: NORMAL

## 2021-03-15 ENCOUNTER — HOSPITAL ENCOUNTER (OUTPATIENT)
Facility: HOSPITAL | Age: 66
Discharge: HOME | End: 2021-03-15
Payer: MEDICAID

## 2021-03-15 ENCOUNTER — HOSPITAL ENCOUNTER (OUTPATIENT)
Dept: GENERAL RADIOLOGY | Facility: HOSPITAL | Age: 66
Discharge: HOME | End: 2021-03-15
Payer: MEDICAID

## 2021-03-15 DIAGNOSIS — N30.11 CHRONIC INTERSTITIAL CYSTITIS WITH HEMATURIA: ICD-10-CM

## 2021-03-15 LAB
A/G RATIO: 1.6 (ref 0.8–2)
ALBUMIN SERPL-MCNC: 3.6 G/DL (ref 3.4–4.8)
ALP BLD-CCNC: 56 U/L (ref 25–100)
ALT SERPL-CCNC: 6 U/L (ref 4–36)
ANION GAP SERPL CALCULATED.3IONS-SCNC: 7 MMOL/L (ref 3–16)
AST SERPL-CCNC: 9 U/L (ref 8–33)
BILIRUB SERPL-MCNC: 0.7 MG/DL (ref 0.3–1.2)
BILIRUBIN URINE: NEGATIVE
BLOOD, URINE: NEGATIVE
BUN BLDV-MCNC: 14 MG/DL (ref 6–20)
CALCIUM SERPL-MCNC: 9.2 MG/DL (ref 8.5–10.5)
CHLORIDE BLD-SCNC: 109 MMOL/L (ref 98–107)
CLARITY: CLEAR
CO2: 26 MMOL/L (ref 20–30)
COLOR: YELLOW
CREAT SERPL-MCNC: 0.8 MG/DL (ref 0.4–1.2)
GFR AFRICAN AMERICAN: >59
GFR NON-AFRICAN AMERICAN: >60
GLOBULIN: 2.3 G/DL
GLUCOSE BLD-MCNC: 86 MG/DL (ref 74–106)
GLUCOSE URINE: NEGATIVE MG/DL
KETONES, URINE: NEGATIVE MG/DL
LEUKOCYTE ESTERASE, URINE: NEGATIVE
MICROSCOPIC EXAMINATION: NORMAL
NITRITE, URINE: NEGATIVE
PH UA: 5.5 (ref 5–8)
POTASSIUM SERPL-SCNC: 3.9 MMOL/L (ref 3.4–5.1)
PROTEIN UA: NEGATIVE MG/DL
SODIUM BLD-SCNC: 142 MMOL/L (ref 136–145)
SPECIFIC GRAVITY UA: 1.02 (ref 1–1.03)
TOTAL PROTEIN: 5.9 G/DL (ref 6.4–8.3)
URINE TYPE: NORMAL
UROBILINOGEN, URINE: 0.2 E.U./DL

## 2021-03-15 PROCEDURE — 36415 COLL VENOUS BLD VENIPUNCTURE: CPT

## 2021-03-15 PROCEDURE — 80053 COMPREHEN METABOLIC PANEL: CPT

## 2021-03-15 PROCEDURE — 74018 RADEX ABDOMEN 1 VIEW: CPT

## 2021-03-15 PROCEDURE — 81003 URINALYSIS AUTO W/O SCOPE: CPT

## 2021-03-15 PROCEDURE — 87077 CULTURE AEROBIC IDENTIFY: CPT

## 2021-03-15 PROCEDURE — 87086 URINE CULTURE/COLONY COUNT: CPT

## 2021-03-15 PROCEDURE — 87186 SC STD MICRODIL/AGAR DIL: CPT

## 2021-03-20 LAB
ORGANISM: ABNORMAL
ORGANISM: ABNORMAL
URINE CULTURE, ROUTINE: ABNORMAL
URINE CULTURE, ROUTINE: ABNORMAL

## 2021-05-17 ENCOUNTER — HOSPITAL ENCOUNTER (OUTPATIENT)
Facility: HOSPITAL | Age: 66
Discharge: HOME | End: 2021-05-17
Payer: MEDICAID

## 2021-05-17 ENCOUNTER — HOSPITAL ENCOUNTER (OUTPATIENT)
Dept: GENERAL RADIOLOGY | Facility: HOSPITAL | Age: 66
Discharge: HOME | End: 2021-05-17
Payer: MEDICAID

## 2021-05-17 DIAGNOSIS — Z01.818 PREOP EXAMINATION: ICD-10-CM

## 2021-05-17 DIAGNOSIS — N20.0 KIDNEY STONE: ICD-10-CM

## 2021-05-17 LAB
A/G RATIO: 1.5 (ref 0.8–2)
ALBUMIN SERPL-MCNC: 3.5 G/DL (ref 3.4–4.8)
ALP BLD-CCNC: 66 U/L (ref 25–100)
ALT SERPL-CCNC: 9 U/L (ref 4–36)
ANION GAP SERPL CALCULATED.3IONS-SCNC: 9 MMOL/L (ref 3–16)
AST SERPL-CCNC: 12 U/L (ref 8–33)
BASOPHILS ABSOLUTE: 0.1 K/UL (ref 0–0.1)
BASOPHILS RELATIVE PERCENT: 1.3 %
BILIRUB SERPL-MCNC: 1 MG/DL (ref 0.3–1.2)
BILIRUBIN URINE: NEGATIVE
BLOOD, URINE: NEGATIVE
BUN BLDV-MCNC: 12 MG/DL (ref 6–20)
CALCIUM SERPL-MCNC: 8.4 MG/DL (ref 8.5–10.5)
CHLORIDE BLD-SCNC: 108 MMOL/L (ref 98–107)
CHOLESTEROL, TOTAL: 164 MG/DL (ref 0–200)
CLARITY: CLEAR
CO2: 25 MMOL/L (ref 20–30)
COLOR: YELLOW
CREAT SERPL-MCNC: 0.8 MG/DL (ref 0.4–1.2)
EKG ATRIAL RATE: 69 BPM
EKG DIAGNOSIS: NORMAL
EKG P AXIS: 62 DEGREES
EKG P-R INTERVAL: 188 MS
EKG Q-T INTERVAL: 388 MS
EKG QRS DURATION: 84 MS
EKG QTC CALCULATION (BAZETT): 415 MS
EKG R AXIS: 21 DEGREES
EKG T AXIS: 36 DEGREES
EKG VENTRICULAR RATE: 69 BPM
EOSINOPHILS ABSOLUTE: 0.4 K/UL (ref 0–0.4)
EOSINOPHILS RELATIVE PERCENT: 8.6 %
FOLATE: 7.48 NG/ML
GFR AFRICAN AMERICAN: >59
GFR NON-AFRICAN AMERICAN: >60
GLOBULIN: 2.4 G/DL
GLUCOSE BLD-MCNC: 95 MG/DL (ref 74–106)
GLUCOSE URINE: NEGATIVE MG/DL
HBA1C MFR BLD: 5.7 %
HCT VFR BLD CALC: 39.6 % (ref 37–47)
HDLC SERPL-MCNC: 55 MG/DL (ref 40–60)
HEMOGLOBIN: 12.5 G/DL (ref 11.5–16.5)
IMMATURE GRANULOCYTES #: 0 K/UL
IMMATURE GRANULOCYTES %: 0.6 % (ref 0–5)
KETONES, URINE: NEGATIVE MG/DL
LDL CHOLESTEROL CALCULATED: 81 MG/DL
LEUKOCYTE ESTERASE, URINE: NEGATIVE
LYMPHOCYTES ABSOLUTE: 0.9 K/UL (ref 1.5–4)
LYMPHOCYTES RELATIVE PERCENT: 20 %
MCH RBC QN AUTO: 30.6 PG (ref 27–32)
MCHC RBC AUTO-ENTMCNC: 31.6 G/DL (ref 31–35)
MCV RBC AUTO: 97.1 FL (ref 80–100)
MICROSCOPIC EXAMINATION: NORMAL
MONOCYTES ABSOLUTE: 0.5 K/UL (ref 0.2–0.8)
MONOCYTES RELATIVE PERCENT: 9.9 %
NEUTROPHILS ABSOLUTE: 2.8 K/UL (ref 2–7.5)
NEUTROPHILS RELATIVE PERCENT: 59.6 %
NITRITE, URINE: NEGATIVE
PDW BLD-RTO: 13.2 % (ref 11–16)
PH UA: 5.5 (ref 5–8)
PLATELET # BLD: 231 K/UL (ref 150–400)
PMV BLD AUTO: 10.3 FL (ref 6–10)
POTASSIUM SERPL-SCNC: 4.1 MMOL/L (ref 3.4–5.1)
PROTEIN UA: NEGATIVE MG/DL
RBC # BLD: 4.08 M/UL (ref 3.8–5.8)
SODIUM BLD-SCNC: 142 MMOL/L (ref 136–145)
SPECIFIC GRAVITY UA: >=1.03 (ref 1–1.03)
TOTAL PROTEIN: 5.9 G/DL (ref 6.4–8.3)
TRIGL SERPL-MCNC: 139 MG/DL (ref 0–249)
TSH SERPL DL<=0.05 MIU/L-ACNC: 1.95 UIU/ML (ref 0.27–4.2)
URINE REFLEX TO CULTURE: NORMAL
URINE TYPE: NORMAL
UROBILINOGEN, URINE: 0.2 E.U./DL
VITAMIN B-12: 332 PG/ML (ref 211–911)
VLDLC SERPL CALC-MCNC: 28 MG/DL
WBC # BLD: 4.7 K/UL (ref 4–11)

## 2021-05-17 PROCEDURE — 80061 LIPID PANEL: CPT

## 2021-05-17 PROCEDURE — 82746 ASSAY OF FOLIC ACID SERUM: CPT

## 2021-05-17 PROCEDURE — 81003 URINALYSIS AUTO W/O SCOPE: CPT

## 2021-05-17 PROCEDURE — 36415 COLL VENOUS BLD VENIPUNCTURE: CPT

## 2021-05-17 PROCEDURE — 74018 RADEX ABDOMEN 1 VIEW: CPT

## 2021-05-17 PROCEDURE — 84443 ASSAY THYROID STIM HORMONE: CPT

## 2021-05-17 PROCEDURE — 83036 HEMOGLOBIN GLYCOSYLATED A1C: CPT

## 2021-05-17 PROCEDURE — 85025 COMPLETE CBC W/AUTO DIFF WBC: CPT

## 2021-05-17 PROCEDURE — 93005 ELECTROCARDIOGRAM TRACING: CPT

## 2021-05-17 PROCEDURE — 82607 VITAMIN B-12: CPT

## 2021-05-17 PROCEDURE — 80053 COMPREHEN METABOLIC PANEL: CPT

## 2021-05-17 PROCEDURE — 87086 URINE CULTURE/COLONY COUNT: CPT

## 2021-05-17 PROCEDURE — 71046 X-RAY EXAM CHEST 2 VIEWS: CPT

## 2021-05-18 LAB
ORGANISM: ABNORMAL
URINE CULTURE, ROUTINE: ABNORMAL

## 2021-06-09 ENCOUNTER — HOSPITAL ENCOUNTER (OUTPATIENT)
Facility: HOSPITAL | Age: 66
Discharge: HOME | End: 2021-06-09
Payer: MEDICAID

## 2021-06-09 ENCOUNTER — HOSPITAL ENCOUNTER (OUTPATIENT)
Dept: GENERAL RADIOLOGY | Facility: HOSPITAL | Age: 66
Discharge: HOME | End: 2021-06-09
Payer: MEDICAID

## 2021-06-09 DIAGNOSIS — N30.00 ACUTE ON CHRONIC CYSTITIS: ICD-10-CM

## 2021-06-09 DIAGNOSIS — N30.20 ACUTE ON CHRONIC CYSTITIS: ICD-10-CM

## 2021-06-09 LAB
BACTERIA: ABNORMAL /HPF
BILIRUBIN URINE: NEGATIVE
BLOOD, URINE: ABNORMAL
CLARITY: CLEAR
COLOR: YELLOW
EPITHELIAL CELLS, UA: ABNORMAL /HPF (ref 0–5)
GLUCOSE URINE: NEGATIVE MG/DL
KETONES, URINE: NEGATIVE MG/DL
LEUKOCYTE ESTERASE, URINE: NEGATIVE
MICROSCOPIC EXAMINATION: YES
NITRITE, URINE: NEGATIVE
PH UA: 5.5 (ref 5–8)
PROTEIN UA: 30 MG/DL
RBC UA: ABNORMAL /HPF (ref 0–4)
SPECIFIC GRAVITY UA: 1.02 (ref 1–1.03)
URINE REFLEX TO CULTURE: ABNORMAL
URINE TYPE: ABNORMAL
UROBILINOGEN, URINE: 0.2 E.U./DL
WBC UA: ABNORMAL /HPF (ref 0–5)

## 2021-06-09 PROCEDURE — 81001 URINALYSIS AUTO W/SCOPE: CPT

## 2021-06-09 PROCEDURE — 74018 RADEX ABDOMEN 1 VIEW: CPT

## 2021-07-26 ENCOUNTER — HOSPITAL ENCOUNTER (OUTPATIENT)
Dept: PHYSICAL THERAPY | Facility: HOSPITAL | Age: 66
Setting detail: THERAPIES SERIES
Discharge: HOME | End: 2021-07-26
Payer: MEDICAID

## 2021-07-26 PROCEDURE — 97110 THERAPEUTIC EXERCISES: CPT

## 2021-07-26 PROCEDURE — 97140 MANUAL THERAPY 1/> REGIONS: CPT

## 2021-07-26 PROCEDURE — 97161 PT EVAL LOW COMPLEX 20 MIN: CPT

## 2021-07-26 NOTE — PROGRESS NOTES
Physical Therapy  Initial Assessment  Date: 2021  Patient Name: Stefania Faust  MRN: [id number]  : 1955     Treatment Diagnosis: s/p left TKA; keft knee pain, joint stiffness, muscle weakness, abnormality of gait    Restrictions  Restrictions/Precautions  Restrictions/Precautions: General Precautions, Surgical Protocols  Required Braces or Orthoses?: No    Subjective   General  Chart Reviewed: Yes  Patient assessed for rehabilitation services?: Yes  Response To Previous Treatment: Not applicable  Family / Caregiver Present: No  Referring Practitioner: Silver Alcaraz MD  Diagnosis: s/p Right TKR  Follows Commands: Within Functional Limits  PT Visit Information  PT Insurance Information: [employer] [employer]  Subjective  Subjective: Patient reports: she had left TKR surgery on 2021; had 1 overnight stay in hospital, then d/c home; has ice machine, no CPM, no HEP; c/o generalized pain at mild-moderate level-currently 4/10; c/o joint stiffness and LE weakness; denies calf pain; has begun doing heel slides, LAQ's and ankle pumps on her own; states she has tried to replace her bandage, as she was told she could shower, her dressing got wet has had difficulty putting on a dressing that would stay; has begun walking with a cane.   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    Objective     Observation/Palpation  Posture: Good  Palpation: general tenderness left knee; calf is non-tender and supple  Observation: Gait: walking with cane, keeping left knee in full extension with mild antalgia; left knee: mild general edema; dressing/gauze a partially falling off; incision is dry, appears approximated with some areas of dried blood/scab appearance, no odor or errythema noted; new sterile dressing was applied and reinforced with cover roll    AROM LLE (degrees)  L Hip Flexion 0-125: WFL  L Hip ABduction 0-45: WFL  L Hip ADduction 0-10: WFL  L Knee Flexion 0-145: A / PROM: 65 / 85  L Knee Extension 0: A / PROM: 10 / 0  L Ankle Dorsiflexion 0-20: A / PROM: 65 / 85    Strength LLE  L Hip Flexion: 3-/5  L Hip ABduction: 3+/5  L Hip ADduction: 3+/5  L Knee Flexion: 2+/5  L Knee Extension: 2+/5  L Ankle Dorsiflexion: 4+/5  Tone RLE  RLE Tone: Normotonic  Tone LLE  LLE Tone: Normotonic  Motor Control  Gross Motor?: WFL  Additional Measures  Other: LEFS  = 18/80  Sensation  Overall Sensation Status: WFL                   Exercises  Exercise 1: seated heel slides - 3' x 3  Exercise 2: standing: marching with support: 30\" x 6  Exercise 3: standing TKE - left - 3 x 15  Exercise 4: standing heel-toe raises with support - 3 x 15  Exercise 5: glute sets - 2 x 20  Exercise 6: quad sets - 2 x 20  Exercise 7: supine heel slides - 2 x 20  Exercise 8: ankle pumps - 2 x 30  Exercise 9: Achilles stretch with strap - 10\" x 10                      Assessment   Conditions Requiring Skilled Therapeutic Intervention  Body structures, Functions, Activity limitations: Decreased ROM; Decreased ADL status; Decreased high-level IADLs; Increased pain;Decreased strength;Decreased functional mobility   Assessment: s/p left TKA; keft knee pain, joint stiffness, muscle weakness, abnormality of gait; will need intensive therapy to help resolve these deficits and to achieve maximum medical benefit.   Treatment Diagnosis: s/p left TKA; keft knee pain, joint stiffness, muscle weakness, abnormality of gait  Prognosis: Excellent  Decision Making: Low Complexity  REQUIRES PT FOLLOW UP: Yes  Treatment Initiated : Evaluation, manual tx, therex, HEP; ice pack; POC discussion; new dressing applied  Activity Tolerance  Activity Tolerance: Patient Tolerated treatment well         Plan   Plan  Times per week: 2-3 x week  Plan weeks: 8-10 weeks  Current Treatment Recommendations: Strengthening, ROM, Home Exercise Program, Neuromuscular Re-education, Manual Therapy - Soft Tissue Mobilization, Safety Education & Training, Balance Training, Gait Training, ADL/Self-care Training, Stair training, Modalities, Manual Therapy - Joint Manipulation               Goals  Short term goals  Time Frame for Short term goals: 3-4 weeks  Short term goal 1: Patient to be independent with HEP. Short term goal 2: Patient to achieve Left knee AROM: 0-105, with PROM to 110. Short term goal 3: Achieve 4-/5 left knee and hip strength in MMG's. Short term goal 4: Ambulate with normal gait pattern with st. cane on level surfaces. Short term goal 5: Patient achieve ability to perform ADL's with reported no more than minimal difficulty and < 1-2/10 pain. Long term goals  Time Frame for Long term goals : 8-10 weeks  Long term goal 1: Patient to achieve Left knee AROM: 0-115, with PROM to 120-125. Long term goal 2: Achieve 4+/5 left knee and hip strength in MMG's. Long term goal 3: Achieve normal gait pattern on level and unlevel surfaces without assistive device. Long term goal 4: Achieve ability to ambulate community distances and terrains with reported no more than minimal difficulty and < 1/10 pain. Long term goal 5: Patient achieve ability to perform ADL's and I-ADL's, and light social, recreational activities with reported no more than minimal difficulty and < 1/10 pain. Therapy Time   Individual Concurrent Group Co-treatment   Time In           Time Out           Minutes                   Christina Oneil, PT       Certification of Medical Necessity:  It will be understood that this treatment plan is certified medically necessary by the documenting therapist and referring physician mentioned in this report. Unless the physician indicated otherwise through written correspondence with our office, all further referrals will act as certification of medical necessity on this treatment plan. Thank you for this referral.  If you have questions regarding this plan of care, please call [phone].           Revisions to this plan (optional):                     Please sign and return this plan to:   FAX: [phone]      Signature:                                 Date:

## 2021-07-27 ASSESSMENT — PAIN DESCRIPTION - DESCRIPTORS: DESCRIPTORS: ACHING;SORE;TIGHTNESS

## 2021-07-27 ASSESSMENT — PAIN DESCRIPTION - PAIN TYPE: TYPE: SURGICAL PAIN

## 2021-07-27 ASSESSMENT — PAIN SCALES - GENERAL: PAINLEVEL_OUTOF10: 4

## 2021-07-27 ASSESSMENT — PAIN DESCRIPTION - LOCATION: LOCATION: KNEE

## 2021-07-27 ASSESSMENT — PAIN DESCRIPTION - ORIENTATION: ORIENTATION: LEFT

## 2021-07-27 ASSESSMENT — PAIN DESCRIPTION - FREQUENCY: FREQUENCY: CONTINUOUS

## 2021-07-28 ENCOUNTER — APPOINTMENT (OUTPATIENT)
Dept: PHYSICAL THERAPY | Facility: HOSPITAL | Age: 66
End: 2021-07-28
Payer: MEDICAID

## 2021-07-28 ENCOUNTER — HOSPITAL ENCOUNTER (OUTPATIENT)
Facility: HOSPITAL | Age: 66
Discharge: HOME | End: 2021-07-28
Payer: MEDICAID

## 2021-07-28 PROCEDURE — 87086 URINE CULTURE/COLONY COUNT: CPT

## 2021-07-30 LAB — URINE CULTURE, ROUTINE: NORMAL

## 2021-08-02 ENCOUNTER — HOSPITAL ENCOUNTER (OUTPATIENT)
Dept: PHYSICAL THERAPY | Facility: HOSPITAL | Age: 66
Setting detail: THERAPIES SERIES
Discharge: HOME | End: 2021-08-02
Payer: MEDICAID

## 2021-08-02 PROCEDURE — 97110 THERAPEUTIC EXERCISES: CPT

## 2021-08-02 PROCEDURE — 97140 MANUAL THERAPY 1/> REGIONS: CPT

## 2021-08-02 NOTE — FLOWSHEET NOTE
Physical Therapy Daily Treatment Note   Date:  2021    TIme In:    3228                  Time Out: 9812    Patient Name:  Ashley James    :  1955  MRN: [id number]    Restrictions/Precautions:    Pertinent Medical History:  Medical/Treatment Diagnosis Information:    s/p left TKA; keft knee pain, joint stiffness, muscle weakness, abnormality of gait  ·    ·    Insurance/Certification information:    [employer]  Physician Information:    Radha Reis MD  Plan of care signed (Y/N):    Visit# / total visits:     2/    G-Code (if applicable):      Date / Visit # G-Code Applied:         Progress Note: []  Yes  [x]  No  Next due by: Visit #10      Pain level:   5/10  Subjective: Pt reports she went to MD on Friday and everything looked great. She expressed that she can stand anything to touch the skin on the inside of her knee. Objective:   Observation: yellow drainage noted at distal incision, bandage applied and Pt directed to contact MD after todays appt.  Test measurements:  L knee PROM: 0-91 deg.  Palpation:    Exercises:  Exercise Resistance/Repetitions Other comments   Seated HS 3x3' 2   St marching with support 6x30\" 2   St TKE 3x15 L 2   St HR/TR with support 3x15 2   GS 2x20 2   QS 2x20 2   Supine HS 2x20 2   AP's 2x30 2   Achilles st with strap 10x10\" 2                    Other Therapeutic Activities:      Manual Treatments:  Patella mobs attempted but couldn't tolerate. Left knee PROM x 8. Modalities:        Timed Code Treatment Minutes:  40      Total Treatment Minutes:  45    Treatment/Activity Tolerance:  [x] Patient tolerated treatment well [] Patient limited by fatigue  [] Patient limited by pain  [] Patient limited by other medical complications  [x] Other: Pt completed tx with no pain but did have mild signs of infection at distal incision and was informed to contact MD after todays visit.     Pain after treatment:      0/10    Prognosis: [x] Good [] Fair  [] Poor    Patient Requires Follow-up: [x] Yes  [] No    Plan:   [x] Continue per plan of care [] Alter current plan (see comments)  [] Plan of care initiated [] Hold pending MD visit [] Discharge    Plan for Next Session:        Electronically signed by:  Rubin Cantrell PTA

## 2021-08-03 ENCOUNTER — HOSPITAL ENCOUNTER (OUTPATIENT)
Dept: PHYSICAL THERAPY | Facility: HOSPITAL | Age: 66
Setting detail: THERAPIES SERIES
Discharge: HOME | End: 2021-08-03
Payer: MEDICAID

## 2021-08-03 ENCOUNTER — APPOINTMENT (OUTPATIENT)
Dept: ULTRASOUND IMAGING | Facility: HOSPITAL | Age: 66
End: 2021-08-03
Payer: MEDICAID

## 2021-08-03 ENCOUNTER — HOSPITAL ENCOUNTER (EMERGENCY)
Facility: HOSPITAL | Age: 66
Discharge: HOME | End: 2021-08-03
Attending: EMERGENCY MEDICINE
Payer: MEDICAID

## 2021-08-03 ENCOUNTER — APPOINTMENT (OUTPATIENT)
Dept: PHYSICAL THERAPY | Facility: HOSPITAL | Age: 66
End: 2021-08-03
Payer: MEDICAID

## 2021-08-03 VITALS
DIASTOLIC BLOOD PRESSURE: 69 MMHG | OXYGEN SATURATION: 94 % | BODY MASS INDEX: 37.38 KG/M2 | HEIGHT: 61 IN | TEMPERATURE: 98.2 F | WEIGHT: 198 LBS | HEART RATE: 83 BPM | SYSTOLIC BLOOD PRESSURE: 107 MMHG | RESPIRATION RATE: 16 BRPM

## 2021-08-03 DIAGNOSIS — R60.0 LEG EDEMA: Primary | ICD-10-CM

## 2021-08-03 PROCEDURE — 97110 THERAPEUTIC EXERCISES: CPT

## 2021-08-03 PROCEDURE — 99282 EMERGENCY DEPT VISIT SF MDM: CPT

## 2021-08-03 PROCEDURE — 93971 EXTREMITY STUDY: CPT

## 2021-08-03 RX ORDER — OXYCODONE HYDROCHLORIDE AND ACETAMINOPHEN 5; 325 MG/1; MG/1
1 TABLET ORAL EVERY 4 HOURS PRN
COMMUNITY

## 2021-08-03 RX ORDER — GABAPENTIN 800 MG/1
800 TABLET ORAL 3 TIMES DAILY
COMMUNITY

## 2021-08-03 RX ORDER — TRAMADOL HYDROCHLORIDE 50 MG/1
50 TABLET ORAL EVERY 6 HOURS PRN
COMMUNITY

## 2021-08-03 ASSESSMENT — PAIN DESCRIPTION - LOCATION: LOCATION: OTHER (COMMENT)

## 2021-08-03 ASSESSMENT — PAIN DESCRIPTION - ORIENTATION: ORIENTATION: LEFT

## 2021-08-03 ASSESSMENT — PAIN SCALES - GENERAL: PAINLEVEL_OUTOF10: 6

## 2021-08-03 NOTE — ED NOTES
Discharge instructions provided to patient. Patient verbalizes understanding of d/c plan and follow up care. Patient ambulatory off unit with cane to POV with family and no further needs noted.       Sohail Coleman RN  08/03/21 1040

## 2021-08-03 NOTE — ED PROVIDER NOTES
[facility] ENCOUNTER      Pt Name: Bri Melvin  MRN: [id number]  YOB: 1955  Date of evaluation: 8/3/2021  Provider: Katrina Harrington MD    CHIEF COMPLAINT       Chief Complaint   Patient presents with    Leg Swelling     LEFT         HISTORY OF PRESENT ILLNESS  (Location/Symptom, Timing/Onset, Context/Setting, Quality, Duration, Modifying Factors, Severity.)   Bri Melvin is a 63 y.o. female who presents to the emergency department complaining of swelling in her left leg. Had total knee replacement 2 weeks ago. No fever or chills. No chest pain or difficulty breathing. Nursing notes were reviewed. REVIEW OFSYSTEMS    (2-9 systems for level 4, 10 or more for level 5)   ROS:  General:  No fevers, no chills, no weakness  Cardiovascular:  No chest pain, no palpitations  Respiratory:  No shortness of breath, no cough, no wheezing  Gastrointestinal:  No pain, no nausea, no vomiting, no diarrhea  Musculoskeletal:  No muscle pain, no joint pain  Skin:  No rash, no easy bruising  Neurologic:  No speech problems, no headache, no extremity weakness  Psychiatric:  No anxiety  Genitourinary:  No dysuria, no hematuria    Except as noted above the remainder of the review of systems was reviewed and negative.        PAST MEDICAL HISTORY     Past Medical History:   Diagnosis Date    Arthritis     Depression     Hypertension     Thyroid disease     UTI (urinary tract infection)          SURGICAL HISTORY       Past Surgical History:   Procedure Laterality Date    APPENDECTOMY      CARPAL TUNNEL RELEASE Bilateral     CHOLECYSTECTOMY      GASTRIC BYPASS SURGERY      JOINT REPLACEMENT Right     [place] COLONOSCOPY FLX DX W/COLLJ SPEC WHEN PFRMD N/A 8/2/2018    COLONOSCOPY DIAGNOSTIC OR SCREENING performed by Patrick Willams MD at [place] [id number] Left 07/14/2021    TUBAL LIGATION           CURRENT MEDICATIONS Year:    Transportation Needs:     Lack of Transportation (Medical):  Lack of Transportation (Non-Medical):    Physical Activity:     Days of Exercise per Week:     Minutes of Exercise per Session:    Stress:     Feeling of Stress :    Social Connections:     Frequency of Communication with Friends and Family:     Frequency of Social Gatherings with Friends and Family:     Attends Sikh Services:     Active Member of Clubs or Organizations:     Attends Club or Organization Meetings:     Marital Status:    Intimate Partner Violence:     Fear of Current or Ex-Partner:     Emotionally Abused:     Physically Abused:     Sexually Abused:          PHYSICAL EXAM    (up to 7 for level 4, 8 or more for level 5)     ED Triage Vitals [08/03/21 1431]   BP Temp Temp Source Pulse Resp SpO2 Height Weight   107/69 98.2 °F (36.8 °C) Oral 83 16 94 % 5' 1\" (1.549 m) 198 lb (89.8 kg)       Physical Exam  General :Patient is awake, alert, oriented, in no acute distress, nontoxic appearing  HEENT: Pupils are equally round and reactive to light, EOMI, conjunctivae clear. Oral mucosa is moist, no exudate. Uvula is midline  Neck: Neck is supple, full range of motion, trachea midline  Cardiac: Heart regular rate, rhythm, no murmurs, rubs, or gallops  Lungs: Lungs are clear to auscultation, there is no wheezing, rhonchi, or rales. There is no use of accessory muscles. Chest wall: There is no tenderness to palpation over the chest wall or over ribs  Abdomen: Abdomen is soft, nontender, nondistended. There is no firm or pulsatile masses, no rebound rigidity or guarding. Musculoskeletal: 5 out of 5 strength in all 4 extremities. No focal muscle deficits are appreciated. Wound is healing well. No erythema or discharge. The leg is swollen. No palpable cord. Jenet Yanna' sign negative. Neuro:  Motor intact, sensory intact, level of consciousness is normal, cerebellar function is normal, Dermatology: Skin is warm and

## 2021-08-03 NOTE — FLOWSHEET NOTE
Physical Therapy Daily Treatment Note   Date:  8/3/2021    TIme In:      7826                Time Out: 1225    Patient Name:  Tae Mendez    :  1955  MRN: [id number]    Restrictions/Precautions:    Pertinent Medical History:  Medical/Treatment Diagnosis Information:    s/p left TKA; keft knee pain, joint stiffness, muscle weakness, abnormality of gait  ·       Insurance/Certification information:    [facility] Medicaid  Physician Information:    Jamila Cortez MD  Plan of care signed (Y/N):    Visit# / total visits:     3/    G-Code (if applicable):      Date / Visit # G-Code Applied:         Progress Note: []  Yes  [x]  No  Next due by: Visit #10      Pain level:   0/10  Subjective: Pt reports went to the doc after her last appt and was given antibiotics and a culture was taken of the infection. She returns to them next week for a follow up. Objective:   Observation: Pt had increased edema and palpable tenderness in calf that was also noted with active DF of left ankle. Pt encouraged to contact MD to get an order for US of calf to rule out blood clot today.  Test measurements:  L knee PROM: 0-91 deg.  Palpation:    Exercises:  Exercise Resistance/Repetitions Other comments   Seated HS 3x3' 3   St marching with support 6x30\" 3   St TKE 3x15 L 3   St HR/TR with support 3x15 3   GS 2x20 3   QS 2x20 3   Supine HS 2x20 3   AP's 2x30 3   Achilles st with strap 10x10\" 3                    Other Therapeutic Activities:  Pt was evaluated by Jeoffrey Angelucci, PT and Daly Gabriel PT to rule out possibility of blood clot but encouraged to seek medical attention. Manual Treatments:  Patella mobs attempted but couldn't tolerate. Left knee PROM x 8. Not today due to possible blood clot.     Modalities:        Timed Code Treatment Minutes:  35      Total Treatment Minutes:  50    Treatment/Activity Tolerance:  [x] Patient tolerated treatment well [] Patient limited by fatigue  [] Patient limited by pain  [] Patient limited by other medical complications  [x] Other: Pt completed tx with mod c/o pain and possibility of blood clot in left calf noted.       Pain after treatment:      5/10     Prognosis: [x] Good [] Fair  [] Poor    Patient Requires Follow-up: [x] Yes  [] No    Plan:   [x] Continue per plan of care [] Alter current plan (see comments)  [] Plan of care initiated [] Hold pending MD visit [] Discharge    Plan for Next Session:        Electronically signed by:  Zainab Cantrell PTA

## 2021-08-04 ENCOUNTER — APPOINTMENT (OUTPATIENT)
Dept: PHYSICAL THERAPY | Facility: HOSPITAL | Age: 66
End: 2021-08-04
Payer: MEDICAID

## 2021-08-06 ENCOUNTER — HOSPITAL ENCOUNTER (OUTPATIENT)
Dept: PHYSICAL THERAPY | Facility: HOSPITAL | Age: 66
Setting detail: THERAPIES SERIES
Discharge: HOME | End: 2021-08-06
Payer: MEDICAID

## 2021-08-06 PROCEDURE — 97140 MANUAL THERAPY 1/> REGIONS: CPT

## 2021-08-06 PROCEDURE — 97110 THERAPEUTIC EXERCISES: CPT

## 2021-08-06 NOTE — FLOWSHEET NOTE
Physical Therapy Daily Treatment Note   Date:  2021    TIme In:      1131                Time Out: 1227    Patient Name:  García Krueger    :  1955  MRN: [id number]    Restrictions/Precautions:    Pertinent Medical History:  Medical/Treatment Diagnosis Information:    s/p left TKA; keft knee pain, joint stiffness, muscle weakness, abnormality of gait  ·       Insurance/Certification information:    [employer] Medicaid  Physician Information:    Jcarlos Elkins MD  Plan of care signed (Y/N):    Visit# / total visits:     4 /    G-Code (if applicable):      Date / Visit # G-Code Applied:         Progress Note: []  Yes  [x]  No  Next due by: Visit #10      Pain level:   7 /10    Subjective: Pt reports: moderate pain today, c/o edema; testing for DVT reported negative; --- patient advised to f/u with PCP about general LE edema     Objective:   Observation: mild edema bilateral legs   Test measurements:  L knee PROM:  deg.    Palpation:    Exercises:  Exercise Resistance/Repetitions Other comments   Seated Heel slides 3x3' 6   St marching with support 6x30 6   St TKE 3x15 L 6   St HR/TR with support 3x15 6   GS 2x20 6   QS 2x20 6   Supine HS 2x20 6   AP's 2x30 6   Achilles st with strap 10x10\" 6        Heel prop 5' 6                    Other Therapeutic Activities:     Manual Treatments:  Patella mobs, grade 1-2 mobs, PROM x 15'    Modalities:  Patient declined ice pack      Timed Code Treatment Minutes:  48'      Total Treatment Minutes:  64'    Treatment/Activity Tolerance:  [x] Patient tolerated treatment well [] Patient limited by fatigue  [] Patient limited by pain  [] Patient limited by other medical complications  [x] Other:       Pain after treatment:      0 /10     Prognosis: [x] Good [] Fair  [] Poor    Patient Requires Follow-up: [x] Yes  [] No    Plan:   [x] Continue per plan of care [] Alter current plan (see comments)  [] Plan of care initiated [] Hold pending MD visit [] Discharge    Plan for Next Session:        Electronically signed by:  Jigar Paz PT

## 2021-08-09 ENCOUNTER — HOSPITAL ENCOUNTER (OUTPATIENT)
Dept: PHYSICAL THERAPY | Facility: HOSPITAL | Age: 66
Setting detail: THERAPIES SERIES
End: 2021-08-09
Payer: MEDICAID

## 2021-08-11 ENCOUNTER — HOSPITAL ENCOUNTER (OUTPATIENT)
Dept: PHYSICAL THERAPY | Facility: HOSPITAL | Age: 66
Setting detail: THERAPIES SERIES
Discharge: HOME | End: 2021-08-11
Payer: MEDICAID

## 2021-08-11 PROCEDURE — 97140 MANUAL THERAPY 1/> REGIONS: CPT

## 2021-08-11 PROCEDURE — 97110 THERAPEUTIC EXERCISES: CPT

## 2021-08-11 NOTE — FLOWSHEET NOTE
Physical Therapy Daily Treatment Note   Date:  2021    TIme In:      836                Time Out:  926    Patient Name:  Liberty Lang    :  1955  MRN: [id number]    Restrictions/Precautions:    Pertinent Medical History:  Medical/Treatment Diagnosis Information:    s/p left TKA; keft knee pain, joint stiffness, muscle weakness, abnormality of gait  ·       Insurance/Certification information:    [employer] Medicaid  Physician Information:    Lynne Kumar MD  Plan of care signed (Y/N):    Visit# / total visits:     5 /    G-Code (if applicable):      Date / Visit # G-Code Applied:         Progress Note: []  Yes  [x]  No  Next due by: Visit #10      Pain level:   3/10    Subjective: Pt reports her knee is feeling better this morning but it still gets red the more she is on it and its . Objective:   Observation: mild edema bilateral legs   Test measurements:  L knee PROM:  0-96 deg.  Palpation:    Exercises:  Exercise Resistance/Repetitions Other comments   Seated Heel slides 3x3' 11   St marching with support 6x30 11   St TKE 3x15 L 11   St HR/TR with support 3x15 11   GS 2x20 11   QS 2x20 11   Supine HS 2x20 11   AP's 2x30 11   Achilles st with strap 10x10\" 11        Heel prop 5' 11                    Other Therapeutic Activities:     Manual Treatments:  Patella mobs, grade 1-2 mobs, PROM x 10'    Modalities:  Patient declined ice pack      Timed Code Treatment Minutes:  48      Total Treatment Minutes:  50    Treatment/Activity Tolerance:  [x] Patient tolerated treatment well [] Patient limited by fatigue  [] Patient limited by pain  [] Patient limited by other medical complications  [x] Other: Pt completed tx with no pain and improved PROM of left knee today.       Pain after treatment:      0/10     Prognosis: [x] Good [] Fair  [] Poor    Patient Requires Follow-up: [x] Yes  [] No    Plan:   [x] Continue per plan of care [] Alter current plan (see comments)  [] Plan of care initiated [] Hold pending MD visit [] Discharge    Plan for Next Session:        Electronically signed by:  Curtis Cantrell PTA

## 2021-08-12 ENCOUNTER — HOSPITAL ENCOUNTER (OUTPATIENT)
Dept: PHYSICAL THERAPY | Facility: HOSPITAL | Age: 66
Setting detail: THERAPIES SERIES
Discharge: HOME | End: 2021-08-12
Payer: MEDICAID

## 2021-08-12 PROCEDURE — 97140 MANUAL THERAPY 1/> REGIONS: CPT

## 2021-08-12 PROCEDURE — 97110 THERAPEUTIC EXERCISES: CPT

## 2021-08-12 NOTE — FLOWSHEET NOTE
Physical Therapy Daily Treatment Note   Date:  2021    TIme In:      913                Time Out:  200    Patient Name:  Cliff Matos    :  1955  MRN: [id number]    Restrictions/Precautions:    Pertinent Medical History:  Medical/Treatment Diagnosis Information:  ·   s/p left TKA; keft knee pain, joint stiffness, muscle weakness, abnormality of gait     Insurance/Certification information:    [employer]ae Grew Medicaid  Physician Information:    Jessica Phillips MD  Plan of care signed (Y/N):    Visit# / total visits:     6 /    G-Code (if applicable):      Date / Visit # G-Code Applied:         Progress Note: []  Yes  [x]  No  Next due by: Visit #10      Pain level:   3/10    Subjective: Pt reports doing well currently. She states the redness in her leg is improving. Objective:   Observation: mild edema bilateral legs   Test measurements:  L knee PROM:  0-93 deg.  Palpation:    Exercises:  Exercise Resistance/Repetitions Other comments   Seated Heel slides 3x3' 12   St marching with support 6x30 12   St TKE 3x15 L 12   St HR/TR with support 3x15 12   GS 2x20 12   QS 2x20 12   Supine HS 2x20 12   AP's 2x30 12   Achilles st with strap 10x10\" 12        Heel prop 5' 12                    Other Therapeutic Activities:     Manual Treatments:  Patella mobs, grade 1-2 mobs, PROM L knee x 10'    Modalities:  Patient declined ice pack      Timed Code Treatment Minutes:  40      Total Treatment Minutes:  49    Treatment/Activity Tolerance:  [x] Patient tolerated treatment well [] Patient limited by fatigue  [] Patient limited by pain  [] Patient limited by other medical complications  [x] Other: Pt continues to have swelling but erythema is decreased; no drainage noted at distal portion of incision.         Pain after treatment:      0/10     Prognosis: [x] Good [] Fair  [] Poor    Patient Requires Follow-up: [x] Yes  [] No    Plan:   [x] Continue per plan of care [] Alter current plan (see comments)  []

## 2021-08-16 ENCOUNTER — HOSPITAL ENCOUNTER (OUTPATIENT)
Dept: PHYSICAL THERAPY | Facility: HOSPITAL | Age: 66
Setting detail: THERAPIES SERIES
Discharge: HOME | End: 2021-08-16
Payer: MEDICAID

## 2021-08-16 PROCEDURE — 97016 VASOPNEUMATIC DEVICE THERAPY: CPT

## 2021-08-16 PROCEDURE — 97110 THERAPEUTIC EXERCISES: CPT

## 2021-08-16 PROCEDURE — 97140 MANUAL THERAPY 1/> REGIONS: CPT

## 2021-08-16 NOTE — FLOWSHEET NOTE
Physical Therapy Daily Treatment Note   Date:  2021    TIme In:     3496               Time Out:  1235    Patient Name:  Brittaney Delaney    :  1955  MRN: [id number]    Restrictions/Precautions:    Pertinent Medical History:  Medical/Treatment Diagnosis Information:  ·   s/p left TKA; keft knee pain, joint stiffness, muscle weakness, abnormality of gait     Insurance/Certification information:    Lucinda Coulter Medicaid  Physician Information:    Achille Felty, MD  Plan of care signed (Y/N):    Visit# / total visits:     7 /    G-Code (if applicable):      Date / Visit # G-Code Applied:         Progress Note: []  Yes  [x]  No  Next due by: Visit #10      Pain level:   310    Subjective: Pt reports she had increase in pain and swelling yesterday. She states she tried to elevate and ice yesterday but continued to have discomfort. She states her pain is better today. Objective:   Observation: mild edema bilateral legs   Test measurements:  L knee PROM:  0-93 deg.  Palpation:    Exercises:  Exercise Resistance/Repetitions Other comments   Seated Heel slides 3x3' 16   St marching with support 6x30 16   St TKE 3x15 L 16   St HR/TR with support 3x15 16   GS 2x20 16   QS 2x20 16   Supine HS 2x20 16   AP's 2x30 16   Achilles st with strap 10x10\" 16        Heel prop 5' 16                    Other Therapeutic Activities:     Manual Treatments:  Patella mobs, grade 1-2 mobs, PROM L knee x 10'    Modalities:  Game Ready Vasopneumatic ice x15 min, light compression to address LLE edema. Timed Code Treatment Minutes:  40      Total Treatment Minutes:  60    Treatment/Activity Tolerance:  [x] Patient tolerated treatment well [] Patient limited by fatigue  [] Patient limited by pain  [] Patient limited by other medical complications  [x] Other: Game Ready vasopneumatic ice was added today to address LLE edema with good response noted.   Pt was advised to elevate and ice her knee in addition to performing HEP regularly at home. Pt reported decrease in pain at end of session.        Pain after treatment:      0/10     Prognosis: [x] Good [] Fair  [] Poor    Patient Requires Follow-up: [x] Yes  [] No    Plan:   [x] Continue per plan of care [] Alter current plan (see comments)  [] Plan of care initiated [] Hold pending MD visit [] Discharge    Plan for Next Session:        Electronically signed by:  Faustina Jaquez, PT

## 2021-08-17 ENCOUNTER — HOSPITAL ENCOUNTER (OUTPATIENT)
Dept: PHYSICAL THERAPY | Facility: HOSPITAL | Age: 66
Setting detail: THERAPIES SERIES
Discharge: HOME | End: 2021-08-17
Payer: MEDICAID

## 2021-08-17 PROCEDURE — 97016 VASOPNEUMATIC DEVICE THERAPY: CPT

## 2021-08-17 PROCEDURE — 97140 MANUAL THERAPY 1/> REGIONS: CPT

## 2021-08-17 PROCEDURE — 97110 THERAPEUTIC EXERCISES: CPT

## 2021-08-17 NOTE — FLOWSHEET NOTE
Follow-up: [x] Yes  [] No    Plan:   [x] Continue per plan of care [] Alter current plan (see comments)  [] Plan of care initiated [] Hold pending MD visit [] Discharge    Plan for Next Session:        Electronically signed by:  Marija Escalera PT

## 2021-08-18 ENCOUNTER — HOSPITAL ENCOUNTER (OUTPATIENT)
Facility: HOSPITAL | Age: 66
Discharge: HOME | End: 2021-08-18
Payer: MEDICAID

## 2021-08-18 ENCOUNTER — HOSPITAL ENCOUNTER (OUTPATIENT)
Dept: GENERAL RADIOLOGY | Facility: HOSPITAL | Age: 66
Discharge: HOME | End: 2021-08-18
Payer: MEDICAID

## 2021-08-18 DIAGNOSIS — N30.20 ACUTE ON CHRONIC CYSTITIS: ICD-10-CM

## 2021-08-18 DIAGNOSIS — N30.00 ACUTE ON CHRONIC CYSTITIS: ICD-10-CM

## 2021-08-18 LAB
BILIRUBIN URINE: NEGATIVE
BLOOD, URINE: NEGATIVE
CLARITY: CLEAR
COLOR: YELLOW
GLUCOSE URINE: NEGATIVE MG/DL
KETONES, URINE: NEGATIVE MG/DL
LEUKOCYTE ESTERASE, URINE: NEGATIVE
MICROSCOPIC EXAMINATION: NORMAL
NITRITE, URINE: NEGATIVE
PH UA: 5.5 (ref 5–8)
PROTEIN UA: NEGATIVE MG/DL
SPECIFIC GRAVITY UA: >=1.03 (ref 1–1.03)
URINE TYPE: NORMAL
UROBILINOGEN, URINE: 0.2 E.U./DL

## 2021-08-18 PROCEDURE — 87086 URINE CULTURE/COLONY COUNT: CPT

## 2021-08-18 PROCEDURE — 74018 RADEX ABDOMEN 1 VIEW: CPT

## 2021-08-18 PROCEDURE — 81003 URINALYSIS AUTO W/O SCOPE: CPT

## 2021-08-19 ENCOUNTER — HOSPITAL ENCOUNTER (OUTPATIENT)
Dept: PHYSICAL THERAPY | Facility: HOSPITAL | Age: 66
Setting detail: THERAPIES SERIES
End: 2021-08-19
Payer: MEDICAID

## 2021-08-19 LAB — URINE CULTURE, ROUTINE: NORMAL

## 2021-08-23 ENCOUNTER — HOSPITAL ENCOUNTER (OUTPATIENT)
Dept: PHYSICAL THERAPY | Facility: HOSPITAL | Age: 66
Setting detail: THERAPIES SERIES
Discharge: HOME | End: 2021-08-23
Payer: MEDICAID

## 2021-08-23 PROCEDURE — 97140 MANUAL THERAPY 1/> REGIONS: CPT

## 2021-08-23 PROCEDURE — 97110 THERAPEUTIC EXERCISES: CPT

## 2021-08-23 NOTE — FLOWSHEET NOTE
fair tolerance. PROM flexion was decreased as compared to last week due to increase in edema. Pt will continue to benefit from skilled PT to further address deficits and restore function. Pt was further educated in edema reduction techniques including elevation and ice. She has met 2/5 STG. Pain after treatment:      0/10     Prognosis: [x] Good [] Fair  [] Poor    Patient Requires Follow-up: [x] Yes  [] No    Plan:   [x] Continue per plan of care [] Alter current plan (see comments)  [] Plan of care initiated [] Hold pending MD visit [] Discharge    Plan for Next Session:      Goals  Short term goals  Time Frame for Short term goals: 3-4 weeks  Short term goal 1: Patient to be independent with HEP. -MET  Short term goal 2: Patient to achieve Left knee AROM: 0-105, with PROM to 110. Short term goal 3: Achieve 4-/5 left knee and hip strength in MMG's. -MET  Short term goal 4: Ambulate with normal gait pattern with st. cane on level surfaces. -partially met  Short term goal 5: Patient achieve ability to perform ADL's with reported no more than minimal difficulty and < 1-2/10 pain. Long term goals  Time Frame for Long term goals : 8-10 weeks  Long term goal 1: Patient to achieve Left knee AROM: 0-115, with PROM to 120-125. Long term goal 2: Achieve 4+/5 left knee and hip strength in MMG's. Long term goal 3: Achieve normal gait pattern on level and unlevel surfaces without assistive device. Long term goal 4: Achieve ability to ambulate community distances and terrains with reported no more than minimal difficulty and < 1/10 pain.   Long term goal 5: Patient achieve ability to perform ADL's and I-ADL's, and light social, recreational activities with reported no more than minimal difficulty and < 1/10 pain.       Electronically signed by:  Annemarie Owusu PT

## 2021-08-25 ENCOUNTER — APPOINTMENT (OUTPATIENT)
Dept: PHYSICAL THERAPY | Facility: HOSPITAL | Age: 66
End: 2021-08-25
Payer: MEDICAID

## 2021-08-27 ENCOUNTER — HOSPITAL ENCOUNTER (OUTPATIENT)
Dept: PHYSICAL THERAPY | Facility: HOSPITAL | Age: 66
Setting detail: THERAPIES SERIES
End: 2021-08-27
Payer: MEDICAID

## 2021-08-30 ENCOUNTER — HOSPITAL ENCOUNTER (OUTPATIENT)
Dept: PHYSICAL THERAPY | Facility: HOSPITAL | Age: 66
Setting detail: THERAPIES SERIES
Discharge: HOME | End: 2021-08-30
Payer: MEDICAID

## 2021-08-30 PROCEDURE — 97140 MANUAL THERAPY 1/> REGIONS: CPT

## 2021-08-30 PROCEDURE — 97110 THERAPEUTIC EXERCISES: CPT

## 2021-09-01 ENCOUNTER — APPOINTMENT (OUTPATIENT)
Dept: PHYSICAL THERAPY | Facility: HOSPITAL | Age: 66
End: 2021-09-01
Payer: MEDICAID

## 2021-09-03 ENCOUNTER — APPOINTMENT (OUTPATIENT)
Dept: PHYSICAL THERAPY | Facility: HOSPITAL | Age: 66
End: 2021-09-03
Payer: MEDICAID

## 2021-09-06 ENCOUNTER — HOSPITAL ENCOUNTER (EMERGENCY)
Facility: HOSPITAL | Age: 66
Discharge: HOME | End: 2021-09-06
Attending: EMERGENCY MEDICINE
Payer: MEDICAID

## 2021-09-06 VITALS
TEMPERATURE: 98.3 F | RESPIRATION RATE: 16 BRPM | HEART RATE: 74 BPM | BODY MASS INDEX: 35.3 KG/M2 | WEIGHT: 187 LBS | HEIGHT: 61 IN | SYSTOLIC BLOOD PRESSURE: 128 MMHG | OXYGEN SATURATION: 97 % | DIASTOLIC BLOOD PRESSURE: 81 MMHG

## 2021-09-06 DIAGNOSIS — Z01.84 COVID-19 VIRUS ANTIBODY NEGATIVE: ICD-10-CM

## 2021-09-06 DIAGNOSIS — B34.9 VIRAL SYNDROME: Primary | ICD-10-CM

## 2021-09-06 LAB
RAPID INFLUENZA  B AGN: NEGATIVE
RAPID INFLUENZA A AGN: NEGATIVE
SARS-COV-2, NAAT: NOT DETECTED

## 2021-09-06 PROCEDURE — 99281 EMR DPT VST MAYX REQ PHY/QHP: CPT

## 2021-09-06 PROCEDURE — 87635 SARS-COV-2 COVID-19 AMP PRB: CPT

## 2021-09-06 PROCEDURE — 87804 INFLUENZA ASSAY W/OPTIC: CPT

## 2021-09-07 NOTE — ED PROVIDER NOTES
[address]  eMERGENCY dEPARTMENT eNCOUnter      Pt Name: George Sahu  MRN: [id number]  YOB: 1955  Date ofevaluation: 6/8/1967  Provider: Dianah Najjar, MD    CHIEF COMPLAINT       Chief Complaint   Patient presents with    Concern For COVID-19    Nausea    Headache         HISTORY OF PRESENT ILLNESS  (Location/Symptom, Timing/Onset, Context/Setting, Quality, Duration, Modifying Factors, Severity.)   George Sahu is a 61 y.o. female who presents to the emergency department with headache, nausea, sinus congestion, malaise and body aches. She wants to be tested for COVID. She was exposed to her daughter and grand-daughter who is COVID positive. She is vaccinated. Nursing notes were reviewed. REVIEW OF SYSTEMS    (2-9systems for level 4, 10 or more for level 5)   ROS:  General:  + fevers, no chills, no weakness  HEENT: no sore throat, + runny nose but no ear pain  Cardiovascular:  No chest pain, no palpitations  Respiratory:  No shortness of breath, no cough, no wheezing  Gastrointestinal:  No pain, + nausea, no vomiting, no diarrhea  Musculoskeletal:  No muscle pain, no joint pain  Skin:  No rash, no easy bruising  Genitourinary:  No dysuria, no hematuria    Except as noted above theremainder of the review of systems was reviewed and negative.        PASTMEDICAL HISTORY     Past Medical History:   Diagnosis Date    Arthritis     Depression     Hypertension     Thyroid disease     UTI (urinary tract infection)          SURGICAL HISTORY       Past Surgical History:   Procedure Laterality Date    APPENDECTOMY      CARPAL TUNNEL RELEASE Bilateral     CHOLECYSTECTOMY      GASTRIC BYPASS SURGERY      JOINT REPLACEMENT Right     NC COLONOSCOPY FLX DX W/COLLJ SPEC WHEN PFRMD N/A 8/2/2018    COLONOSCOPY DIAGNOSTIC OR SCREENING performed by Brian Mendez [facility] at [facility] ARTHROPLASTY Left 07/14/2021    TUBAL LIGATION CURRENT MEDICATIONS       Previous Medications    ESTRADIOL (ESTRACE) 1 MG TABLET    Take 1 mg by mouth daily    FLUOXETINE (PROZAC) 20 MG CAPSULE    Take 40 mg by mouth daily    GABAPENTIN (NEURONTIN) 800 MG TABLET    Take 800 mg by mouth 3 times daily. IPRATROPIUM (ATROVENT) 0.03 % NASAL SPRAY    1 spray by Nasal route 2 times daily    LANSOPRAZOLE (PREVACID) 30 MG DELAYED RELEASE CAPSULE    Take 30 mg by mouth daily    LEVOTHYROXINE (SYNTHROID) 50 MCG TABLET    Take 50 mcg by mouth Daily    MEDROXYPROGESTERONE (PROVERA) 2.5 MG TABLET    Take 2.5 mg by mouth daily    MELOXICAM (MOBIC) 15 MG TABLET    Take 15 mg by mouth daily    MONTELUKAST (SINGULAIR) 10 MG TABLET    Take 10 mg by mouth daily    OXYBUTYNIN CHLORIDE PO    Take by mouth    OXYCODONE-ACETAMINOPHEN (PERCOCET) 5-325 MG PER TABLET    Take 1 tablet by mouth every 4 hours as needed for Pain. PRAMIPEXOLE (MIRAPEX) 1 MG TABLET    Take 1 mg by mouth nightly    TRAMADOL (ULTRAM) 50 MG TABLET    Take 50 mg by mouth every 6 hours as needed for Pain. TRAZODONE (DESYREL) 100 MG TABLET    Take 100 mg by mouth nightly       ALLERGIES     Patient has no known allergies. FAMILY HISTORY     History reviewed. No pertinent family history.        SOCIAL HISTORY       Social History     Socioeconomic History    Marital status: Legally      Spouse name: None    Number of children: None    Years of education: None    Highest education level: None   Occupational History    None   Tobacco Use    Smoking status: Never Smoker    Smokeless tobacco: Never Used   Vaping Use    Vaping Use: Never used   Substance and Sexual Activity    Alcohol use: No    Drug use: No    Sexual activity: None   Other Topics Concern    None   Social History Narrative    None     Social Determinants of Health     Financial Resource Strain:     Difficulty of Paying Living Expenses:    Food Insecurity:     Worried About Running Out of Food in the Last Year:  Ran Out of Food in the Last Year:    Transportation Needs:     Lack of Transportation (Medical):  Lack of Transportation (Non-Medical):    Physical Activity:     Days of Exercise per Week:     Minutes of Exercise per Session:    Stress:     Feeling of Stress :    Social Connections:     Frequency of Communication with Friends and Family:     Frequency of Social Gatherings with Friends and Family:     Attends Worship Services:     Active Member of Clubs or Organizations:     Attends Club or Organization Meetings:     Marital Status:    Intimate Partner Violence:     Fear of Current or Ex-Partner:     Emotionally Abused:     Physically Abused:     Sexually Abused:          PHYSICAL EXAM    (up to 7 forlevel 4, 8 or more for level 5)     ED Triage Vitals   BP Temp Temp src Pulse Resp SpO2 Height Weight   -- -- -- -- -- -- -- --       Physical Exam  General :Patient is awake, alert, oriented, in no acute distress, nontoxic appearing  HEENT: Pupils are equally round and reactive to light, EOMI. Cardiac: Heart regular rate, rhythm, no murmurs, rubs, or gallops  Lungs: Lungs are clear to auscultation, there is no wheezing, rhonchi, or rales. Abdomen:Abdomen is soft, nontender, nondistended. Musculoskeletal: Ambulatory  Back: No midline or bony tenderness. Dermatology: Skin is warm and dry  Psych: Mentation is grossly normal, cognition is grossly normal. Affect is appropriate.       DIAGNOSTIC RESULTS       RADIOLOGY:   Non-plain film images such as CT, Ultrasoundand MRI are read by the radiologist. Plain radiographic images are visualized and preliminarily interpreted by the emergency physician with the below findings:      [] Radiologist's Report Reviewed:  No orders to display         ED BEDSIDE ULTRASOUND:   Performed by ED Physician - none    LABS:  Labs Reviewed   RAPID INFLUENZA A/B ANTIGENS    Narrative:     Performed at:  [address] [facility]  [address],  [place], [place]   Phone [phone], RAPID    Narrative:     Performed at:  [address] [facility]  [address],  [place], [place] [address]   Phone [phone]       I have reviewed and interpreted all of the currently available lab resultsfrom this visit (if applicable):  Results for orders placed or performed during the hospital encounter of 09/06/21   Rapid influenza A/B antigens    Specimen: Nares   Result Value Ref Range    Rapid Influenza A Ag Negative Negative    Rapid Influenza B Ag Negative Negative   COVID-19, Rapid    Specimen: Nasopharyngeal Swab   Result Value Ref Range    SARS-CoV-2, NAAT Not Detected Not Detected        All other labs were within normal range or not returned as of this dictation. EMERGENCY DEPARTMENT COURSE and DIFFERENTIAL DIAGNOSIS/MDM:   Vitals:    Vitals:    09/06/21 2254   BP: 128/81   Pulse: 74   Resp: 16   Temp: 98.3 °F (36.8 °C)   TempSrc: Oral   SpO2: 98%   Weight: 187 lb (84.8 kg)   Height: 5' 1\" (1.549 m)           The patient will follow-up with their PCP in 1-2 days for reevaluation. If the patient or family members have any further concerns or any worsening symptoms they will return to the ED for reevaluation. CONSULTS:  None    PROCEDURES:  Procedures    CRITICAL CARE TIME    Total Critical Care time was 0 minutes, excluding separately reportable procedures. There was a high probability of clinically significant/life threatening deterioration in the patient's condition which required my urgent intervention. FINAL IMPRESSION      1. Viral syndrome    2.  COVID-19 virus antibody negative          DISPOSITION/PLAN   DISPOSITION Decision To Discharge 09/06/2021 11:26:04 PM      PATIENT REFERRED TO:  KARMA Sarmiento - [address]  [phone]    Schedule an appointment as soon as possible for a visit in 2 days  As needed      DISCHARGE MEDICATIONS:  New Prescriptions    No medications on file       Comment: Please note this report has been produced using speech recognition software and may contain errors related tothat system including errors in grammar, punctuation, and spelling, as well as words and phrases that may be inappropriate. If there are any questions or concerns please feel free to contact the dictating provider forclarification.     Adilson Whyte MD  Attending Emergency Physician                  Adilson Whyte MD  09/06/21 5036

## 2021-09-08 ENCOUNTER — HOSPITAL ENCOUNTER (OUTPATIENT)
Dept: PHYSICAL THERAPY | Facility: HOSPITAL | Age: 66
Setting detail: THERAPIES SERIES
Discharge: HOME | End: 2021-09-08
Payer: MEDICAID

## 2021-09-08 PROCEDURE — 97140 MANUAL THERAPY 1/> REGIONS: CPT

## 2021-09-08 PROCEDURE — 97110 THERAPEUTIC EXERCISES: CPT

## 2021-09-08 NOTE — FLOWSHEET NOTE
Physical Therapy Daily Treatment / Reassessment Note  Date:  2021    TIme In:  1404                 Time Out:  3624    Patient Name:  George Sahu    :  1955  MRN: [id number]    Restrictions/Precautions:    Pertinent Medical History:  Medical/Treatment Diagnosis Information:  ·   s/p left TKA; keft knee pain, joint stiffness, muscle weakness, abnormality of gait     Insurance/Certification information:    Myla Railing Medicaid  Physician Information:    Matt Sun MD  Plan of care signed (Y/N):    Visit# / total visits:         G-Code (if applicable):      Date / Visit # G-Code Applied:         Progress Note: []  Yes  [x]  No  Next due by: 21      Pain level:   0 /10    Subjective: Pt reports: feels better today, no pain c/o at present time; \"whole leg feels better\". Objective:   Observation: mild edema L knee and lower leg. Gait: decreased hip and knee flexion, very mild antalgia   Test measurements:  LEFS: 26/80. L knee PROM:  0-100 deg. MMT - 4+/5 in flexion and extension   Palpation: (+) tenderness pes anserine    Exercises:  Exercise Resistance/Repetitions Other comments   Nustep L4 x 5' 8   Seated Heel slides 3x3' 8   St marching with support 6x30 8   St TKE 3x15 L 8   St HR/TR with support 3x15 8   GS 2x20 8   QS 2x20 8   Supine HS 2x20 8   AP's 2x30 8   Achilles st with strap 10x10\" 8        Heel prop 5' 8                    Other Therapeutic Activities:     Manual Treatments:  Patella mobs, grade 1-2 mobs, PROM L knee, gentle STM x 15'    Modalities:  Game Ready Vasopneumatic ice x15 min, light compression to address LLE edema.  x10 min - declined today      Timed Code Treatment Minutes:    54'      Total Treatment Minutes:     64'    Treatment/Activity Tolerance:  [x] Patient tolerated treatment well [] Patient limited by fatigue  [] Patient limited by pain  [] Patient limited by other medical complications  [x] Other:       Pain after treatment:      0/10 Prognosis: [x] Good [] Fair  [] Poor    Patient Requires Follow-up: [x] Yes  [] No    Plan:   [x] Continue per plan of care [] Alter current plan (see comments)  [] Plan of care initiated [] Hold pending MD visit [] Discharge    Plan for Next Session:      Goals  Short term goals  Time Frame for Short term goals: 3-4 weeks  Short term goal 1: Patient to be independent with HEP. -MET  Short term goal 2: Patient to achieve Left knee AROM: 0-105, with PROM to 110. Short term goal 3: Achieve 4-/5 left knee and hip strength in MMG's. -MET  Short term goal 4: Ambulate with normal gait pattern with st. cane on level surfaces. -partially met  Short term goal 5: Patient achieve ability to perform ADL's with reported no more than minimal difficulty and < 1-2/10 pain. Long term goals  Time Frame for Long term goals : 8-10 weeks  Long term goal 1: Patient to achieve Left knee AROM: 0-115, with PROM to 120-125. - not met but improving  Long term goal 2: Achieve 4+/5 left knee and hip strength in MMG's. - met  Long term goal 3: Achieve normal gait pattern on level and unlevel surfaces without assistive device. - not met but improving  Long term goal 4: Achieve ability to ambulate community distances and terrains with reported no more than minimal difficulty and < 1/10 pain. Long term goal 5: Patient achieve ability to perform ADL's and I-ADL's, and light social, recreational activities with reported no more than minimal difficulty and < 1/10 pain.     Since last assessment, she demonstrate a significant decrease in her knee pain, a 7 degree increase in flexion ROM; she continues to have deficits with knee flexion ROM that will inhibit her functional capacity; she will need continued skilled PT of progressive exercises and manual therapy to help her achieve her goals and maximum medical.       Electronically signed by:  Ramya Coulter, PT

## 2021-09-14 ENCOUNTER — HOSPITAL ENCOUNTER (OUTPATIENT)
Dept: PHYSICAL THERAPY | Facility: HOSPITAL | Age: 66
Setting detail: THERAPIES SERIES
Discharge: HOME | End: 2021-09-14
Payer: MEDICAID

## 2021-09-14 PROCEDURE — 97140 MANUAL THERAPY 1/> REGIONS: CPT

## 2021-09-14 PROCEDURE — 97110 THERAPEUTIC EXERCISES: CPT

## 2021-09-14 NOTE — FLOWSHEET NOTE
well [] Patient limited by fatigue  [] Patient limited by pain  [] Patient limited by other medical complications  [x] Other:   Pt completed tx with no pain and possible infection noted in proximal incision. Pain after treatment:      0/10     Prognosis: [x] Good [] Fair  [] Poor    Patient Requires Follow-up: [x] Yes  [] No    Plan:   [x] Continue per plan of care [] Alter current plan (see comments)  [] Plan of care initiated [] Hold pending MD visit [] Discharge    Plan for Next Session:      Goals  Short term goals  Time Frame for Short term goals: 3-4 weeks  Short term goal 1: Patient to be independent with HEP. -MET  Short term goal 2: Patient to achieve Left knee AROM: 0-105, with PROM to 110. Short term goal 3: Achieve 4-/5 left knee and hip strength in MMG's. -MET  Short term goal 4: Ambulate with normal gait pattern with st. cane on level surfaces. -partially met  Short term goal 5: Patient achieve ability to perform ADL's with reported no more than minimal difficulty and < 1-2/10 pain. Long term goals  Time Frame for Long term goals : 8-10 weeks  Long term goal 1: Patient to achieve Left knee AROM: 0-115, with PROM to 120-125. - not met but improving  Long term goal 2: Achieve 4+/5 left knee and hip strength in MMG's. - met  Long term goal 3: Achieve normal gait pattern on level and unlevel surfaces without assistive device. - not met but improving  Long term goal 4: Achieve ability to ambulate community distances and terrains with reported no more than minimal difficulty and < 1/10 pain.   Long term goal 5: Patient achieve ability to perform ADL's and I-ADL's, and light social, recreational activities with reported no more than minimal difficulty and < 1/10 pain.       Electronically signed by:  Will Cantrell PTA

## 2021-09-16 ENCOUNTER — HOSPITAL ENCOUNTER (OUTPATIENT)
Dept: PHYSICAL THERAPY | Facility: HOSPITAL | Age: 66
Setting detail: THERAPIES SERIES
End: 2021-09-16
Payer: MEDICAID

## 2021-09-20 ENCOUNTER — HOSPITAL ENCOUNTER (OUTPATIENT)
Dept: PHYSICAL THERAPY | Facility: HOSPITAL | Age: 66
Setting detail: THERAPIES SERIES
Discharge: HOME | End: 2021-09-20
Payer: MEDICAID

## 2021-09-20 PROCEDURE — 97110 THERAPEUTIC EXERCISES: CPT

## 2021-09-20 PROCEDURE — 97140 MANUAL THERAPY 1/> REGIONS: CPT

## 2021-09-20 NOTE — FLOWSHEET NOTE
Physical Therapy Daily Treatment   Date:  2021    TIme In:   1540                Time Out: 1626    Patient Name:  Jae Basilio    :  1955  MRN: [id number]    Restrictions/Precautions:    Pertinent Medical History:  Medical/Treatment Diagnosis Information:  ·   s/p left TKA; keft knee pain, joint stiffness, muscle weakness, abnormality of gait     Insurance/Certification information:    [employer]  Physician Information:    Tanvi Martins MD  Plan of care signed (Y/N):    Visit# / total visits:     15 /    G-Code (if applicable):      Date / Visit # G-Code Applied:         Progress Note: []  Yes  [x]  No  Next due by: 21      Pain level:    0/10    Subjective: Pt reports she is doing good today. She expressed that her knee is doing a lot better and she is on antibiotics now. Objective:   Observation: mild edema L knee and lower leg. Gait: decreased hip and knee flexion, very mild antalgia   Test measurements:  LEFS: 26/80. L knee PROM:  0-100 today. MMT - 4+/5 in flexion and extension   Palpation: (+) tenderness pes anserine    Exercises:  Exercise Resistance/Repetitions Other comments   Nustep L5 x 5' 20   Seated Heel slides 3x3' 20   St marching with support 6x30 20   St TKE 3x15 L 20   St HR/TR with support 3x15 20   GS 2x20 20   QS 2x20 20   Supine HS 2x20 20   AP's 2x30 20   Achilles st with strap 10x10\" 20        Heel prop 5' 20                    Other Therapeutic Activities:     Manual Treatments:  Patella mobs, grade 1-2 mobs, PROM L knee, gentle STM x 15'    Modalities:  Game Ready Vasopneumatic ice x15 min, light compression to address LLE edema.  x10 min - declined today      Timed Code Treatment Minutes:   44      Total Treatment Minutes:    46    Treatment/Activity Tolerance:  [x] Patient tolerated treatment well [] Patient limited by fatigue  [] Patient limited by pain  [] Patient limited by other medical complications  [x] Other:   Pt completed tx with no pain and improved 2 deg today. Pain after treatment:      0/10     Prognosis: [x] Good [] Fair  [] Poor    Patient Requires Follow-up: [x] Yes  [] No    Plan:   [x] Continue per plan of care [] Alter current plan (see comments)  [] Plan of care initiated [] Hold pending MD visit [] Discharge    Plan for Next Session:      Goals  Short term goals  Time Frame for Short term goals: 3-4 weeks  Short term goal 1: Patient to be independent with HEP. -MET  Short term goal 2: Patient to achieve Left knee AROM: 0-105, with PROM to 110. Short term goal 3: Achieve 4-/5 left knee and hip strength in MMG's. -MET  Short term goal 4: Ambulate with normal gait pattern with st. cane on level surfaces. -partially met  Short term goal 5: Patient achieve ability to perform ADL's with reported no more than minimal difficulty and < 1-2/10 pain. Long term goals  Time Frame for Long term goals : 8-10 weeks  Long term goal 1: Patient to achieve Left knee AROM: 0-115, with PROM to 120-125. - not met but improving  Long term goal 2: Achieve 4+/5 left knee and hip strength in MMG's. - met  Long term goal 3: Achieve normal gait pattern on level and unlevel surfaces without assistive device. - not met but improving  Long term goal 4: Achieve ability to ambulate community distances and terrains with reported no more than minimal difficulty and < 1/10 pain.   Long term goal 5: Patient achieve ability to perform ADL's and I-ADL's, and light social, recreational activities with reported no more than minimal difficulty and < 1/10 pain.       Electronically signed by:  Liliana Cantrell PTA

## 2021-09-23 ENCOUNTER — HOSPITAL ENCOUNTER (OUTPATIENT)
Dept: PHYSICAL THERAPY | Facility: HOSPITAL | Age: 66
Setting detail: THERAPIES SERIES
Discharge: HOME | End: 2021-09-23
Payer: MEDICAID

## 2021-09-23 PROCEDURE — 97110 THERAPEUTIC EXERCISES: CPT

## 2021-09-23 PROCEDURE — 97140 MANUAL THERAPY 1/> REGIONS: CPT

## 2021-09-23 NOTE — FLOWSHEET NOTE
Physical Therapy Daily Treatment / Reassessment  Date:  2021    TIme In:   1140                Time Out:   2337    Patient Name:  Brittaney Delaney    :  1955  MRN: [id number]    Restrictions/Precautions:    Pertinent Medical History:  Medical/Treatment Diagnosis Information:  ·   s/p left TKA; keft knee pain, joint stiffness, muscle weakness, abnormality of gait     Insurance/Certification information:    [employer]  Physician Information:    Achille Felty, MD  Plan of care signed (Y/N):    Visit# / total visits:     14 /    G-Code (if applicable):      Date / Visit # G-Code Applied:         Progress Note: [x]  Yes  []  No  Next due by: 21      Pain level:    0/10     Subjective:   Pt reports she is doing good today. She expressed that her knee is doing a lot better and she is on antibiotics now. Objective:   Observation: mild edema L knee and lower leg. Gait: decreased hip and knee flexion, very mild antalgia   Test measurements:  LEFS: 41/80 (was 26/80). L knee AROM: 0- 95; PROM:  0-103 today. MMT - 4+/5 in flexion and extension   Palpation: (+) tenderness pes anserine    Exercises:  Exercise Resistance/Repetitions Other comments   Nustep L5 x 9' 23   Seated Heel slides 3x3' 23   St marching with support 6x30 23   St TKE 3x15 L 23   St HR/TR with support 3x15 23   GS 2x20 23   QS 2x20 23   Supine HS 2x20 23   AP's 2x30 23   Achilles st with strap 10x10\" 23        Heel prop 5' 23        Mini squats * 30x 23          Other Therapeutic Activities:     Manual Treatments:  Patella mobs, grade 1-2 mobs, PROM L knee, gentle STM x 15'    Modalities:  Game Ready Vasopneumatic ice x 15 min, light compression to address LLE edema.  X 10 min - declined today      Timed Code Treatment Minutes:   62'      Total Treatment Minutes:    58'      Treatment/Activity Tolerance:  [x] Patient tolerated treatment well [] Patient limited by fatigue  [] Patient limited by pain  [] Patient limited by other medical complications  [x] Other:       Pain after treatment:      0/10     Prognosis: [x] Good [] Fair  [] Poor    Patient Requires Follow-up: [x] Yes  [] No    Plan:   [x] Continue per plan of care [] Alter current plan (see comments)  [] Plan of care initiated [] Hold pending MD visit [] Discharge    Plan for Next Session:      Goals  Short term goals  Time Frame for Short term goals: 3-4 weeks  Short term goal 1: Patient to be independent with HEP. -MET  Short term goal 2: Patient to achieve Left knee AROM: 0-105, with PROM to 110. - not met but gradually improving  Short term goal 3: Achieve 4-/5 left knee and hip strength in MMG's. -MET  Short term goal 4: Ambulate with normal gait pattern with st. cane on level surfaces. - not met but improved  Short term goal 5: Patient achieve ability to perform ADL's with reported no more than minimal difficulty and < 1-2/10 pain. Long term goals  Time Frame for Long term goals : 8-10 weeks  Long term goal 1: Patient to achieve Left knee AROM: 0-115, with PROM to 120-125. - not met but improving  Long term goal 2: Achieve 4+/5 left knee and hip strength in MMG's. - met  Long term goal 3: Achieve normal gait pattern on level and unlevel surfaces without assistive device. - not met but improving  Long term goal 4: Achieve ability to ambulate community distances and terrains with reported no more than minimal difficulty and < 1/10 pain. Long term goal 5: Patient achieve ability to perform ADL's and I-ADL's, and light social, recreational activities with reported no more than minimal difficulty and < 1/10 pain.     Patient continues to show slow but gradual progress; her pain is less; ROM and strength are improved; overall functional capacity is improving; she will need continued PT to help her achieve her goals and maximum functional and medical benefit.           Electronically signed by:  Trino Jovel, PT

## 2021-09-27 ENCOUNTER — APPOINTMENT (OUTPATIENT)
Dept: PHYSICAL THERAPY | Facility: HOSPITAL | Age: 66
End: 2021-09-27
Payer: MEDICAID

## 2021-09-28 ENCOUNTER — HOSPITAL ENCOUNTER (OUTPATIENT)
Dept: PHYSICAL THERAPY | Facility: HOSPITAL | Age: 66
Setting detail: THERAPIES SERIES
Discharge: HOME | End: 2021-09-28
Payer: MEDICAID

## 2021-09-28 PROCEDURE — 97110 THERAPEUTIC EXERCISES: CPT

## 2021-09-28 PROCEDURE — 97140 MANUAL THERAPY 1/> REGIONS: CPT

## 2021-09-28 NOTE — FLOWSHEET NOTE
Physical Therapy Daily Treatment   Date:  2021    TIme In:   1108                Time Out:  1362    Patient Name:  Tomeka Moura    :  1955  MRN: [id number]    Restrictions/Precautions:    Pertinent Medical History:  Medical/Treatment Diagnosis Information:  ·   s/p left TKA; keft knee pain, joint stiffness, muscle weakness, abnormality of gait     Insurance/Certification information:    [employer]  Physician Information:    Clarice Perales MD  Plan of care signed (Y/N):     Visit# / total visits:     15 /    G-Code (if applicable):      Date / Visit # G-Code Applied:         Progress Note: []  Yes  [x]  No  Next due by: 21      Pain level:    0/10     Subjective:   Pt reports her knee is just stiff but otherwise healing good. Objective:   Observation: mild edema L knee and lower leg. Gait: decreased hip and knee flexion, very mild antalgia   Test measurements:  LEFS: 41/80 (was 26/80). L knee AROM: 0- 95; PROM:  0-103 today. MMT - 4+/5 in flexion and extension   Palpation: (+) tenderness pes anserine    Exercises:  Exercise Resistance/Repetitions Other comments   Nustep L5 x 9' 28   Seated Heel slides 3x3' 28   St marching with support 6x30 28   St TKE 3x15 L 28   St HR/TR with support 3x15 28   GS 2x20 28   QS 2x20 28   Supine HS 2x20 28   AP's 2x30 28   Achilles st with strap 10x10\" 28   Heel prop 5' 28   Mini squats 30x 28          Other Therapeutic Activities:     Manual Treatments:  Patella mobs, grade 1-2 mobs, PROM L knee, gentle STM x 15'    Modalities:  Game Ready Vasopneumatic ice x 15 min, light compression to address LLE edema.  X 10 min - declined today      Timed Code Treatment Minutes:   45      Total Treatment Minutes:   48      Treatment/Activity Tolerance:  [x] Patient tolerated treatment well [] Patient limited by fatigue  [] Patient limited by pain  [] Patient limited by other medical complications  [x] Other:   Pt completed tx with no pain but continues to have limited knee flex PROM. Pain after treatment:      0/10     Prognosis: [x] Good [] Fair  [] Poor    Patient Requires Follow-up: [x] Yes  [] No    Plan:   [x] Continue per plan of care [] Alter current plan (see comments)  [] Plan of care initiated [] Hold pending MD visit [] Discharge    Plan for Next Session:      Goals  Short term goals  Time Frame for Short term goals: 3-4 weeks  Short term goal 1: Patient to be independent with HEP. -MET  Short term goal 2: Patient to achieve Left knee AROM: 0-105, with PROM to 110. - not met but gradually improving  Short term goal 3: Achieve 4-/5 left knee and hip strength in MMG's. -MET  Short term goal 4: Ambulate with normal gait pattern with st. cane on level surfaces. - not met but improved  Short term goal 5: Patient achieve ability to perform ADL's with reported no more than minimal difficulty and < 1-2/10 pain. Long term goals  Time Frame for Long term goals : 8-10 weeks  Long term goal 1: Patient to achieve Left knee AROM: 0-115, with PROM to 120-125. - not met but improving  Long term goal 2: Achieve 4+/5 left knee and hip strength in MMG's. - met  Long term goal 3: Achieve normal gait pattern on level and unlevel surfaces without assistive device. - not met but improving  Long term goal 4: Achieve ability to ambulate community distances and terrains with reported no more than minimal difficulty and < 1/10 pain.   Long term goal 5: Patient achieve ability to perform ADL's and I-ADL's, and light social, recreational activities with reported no more than minimal difficulty and < 1/10 pain.       Electronically signed by:  Tracee Cantrell PTA

## 2021-10-01 ENCOUNTER — HOSPITAL ENCOUNTER (OUTPATIENT)
Dept: PHYSICAL THERAPY | Facility: HOSPITAL | Age: 66
Setting detail: THERAPIES SERIES
Discharge: HOME | End: 2021-10-01
Payer: MEDICAID

## 2021-10-01 ENCOUNTER — HOSPITAL ENCOUNTER (EMERGENCY)
Facility: HOSPITAL | Age: 66
Discharge: LEFT BEFORE DISCHARGE | End: 2021-10-01
Payer: MEDICAID

## 2021-10-01 VITALS
HEART RATE: 70 BPM | RESPIRATION RATE: 18 BRPM | TEMPERATURE: 98.9 F | DIASTOLIC BLOOD PRESSURE: 100 MMHG | OXYGEN SATURATION: 98 % | SYSTOLIC BLOOD PRESSURE: 127 MMHG

## 2021-10-01 LAB — SARS-COV-2, NAAT: NOT DETECTED

## 2021-10-01 PROCEDURE — 97140 MANUAL THERAPY 1/> REGIONS: CPT

## 2021-10-01 PROCEDURE — 97110 THERAPEUTIC EXERCISES: CPT

## 2021-10-01 PROCEDURE — 4500000002 HC ER NO CHARGE

## 2021-10-01 PROCEDURE — 87635 SARS-COV-2 COVID-19 AMP PRB: CPT

## 2021-10-01 NOTE — FLOWSHEET NOTE
Physical Therapy Daily Treatment   Date:  10/1/2021    TIme In:   8039               Time Out:  [address]    Patient Name:  Olvin Alejandro    :  1955  MRN: [id number]    Restrictions/Precautions:    Pertinent Medical History:  Medical/Treatment Diagnosis Information:  ·   s/p left TKA; keft knee pain, joint stiffness, muscle weakness, abnormality of gait     Insurance/Certification information:    [employer]  Physician Information:    Cee Christiansen MD  Plan of care signed (Y/N):     Visit# / total visits:     12 /    G-Code (if applicable):      Date / Visit # G-Code Applied:         Progress Note: []  Yes  [x]  No  Next due by: 21      Pain level:    0/10     Subjective:   Pt reports: her knee is sore, feels more tender today. Objective:   Observation: mild edema L knee and lower leg. Gait: decreased hip and knee flexion, very mild antalgia   Test measurements:  LEFS: 41/80 (was 26/80). L knee AROM: 0- 95; PROM:  0-100 today - (+) pain with overpressure at end-range of motion. MMT - 4+/5 in flexion and extension   Palpation: (+) tenderness pes anserine    Exercises:  Exercise Resistance/Repetitions Other comments   Nustep L5 x 9' 1   Seated Heel slides 3x3' 1   St marching with support 6x30 1   St TKE 3x15 L 1   St HR/TR with support 3x15 1   GS 2x20 1   QS 2x20 1   Supine HS 2x20 1   AP's 2x30 1   Achilles st with strap 10x10\" 1   Heel prop 5' 1   Mini squats 30x 1          Other Therapeutic Activities:     Manual Treatments:  Patella mobs, grade 1-2 mobs, PROM L knee, gentle STM x 15'    Modalities:  Game Ready Vasopneumatic ice x 15 min, light compression to address LLE edema.  X 10 min - declined today      Timed Code Treatment Minutes:   47'      Total Treatment Minutes:   64'      Treatment/Activity Tolerance:  [x] Patient tolerated treatment well [] Patient limited by fatigue  [] Patient limited by pain  [] Patient limited by other medical complications  [x] Other:   Pt completed tx with no pain but continues to have limited knee flex PROM. Pain after treatment:      0/10     Prognosis: [x] Good [] Fair  [] Poor    Patient Requires Follow-up: [x] Yes  [] No    Plan:   [x] Continue per plan of care [] Alter current plan (see comments)  [] Plan of care initiated [] Hold pending MD visit [] Discharge    Plan for Next Session:      Goals  Short term goals  Time Frame for Short term goals: 3-4 weeks  Short term goal 1: Patient to be independent with HEP. -MET  Short term goal 2: Patient to achieve Left knee AROM: 0-105, with PROM to 110. - not met but gradually improving  Short term goal 3: Achieve 4-/5 left knee and hip strength in MMG's. -MET  Short term goal 4: Ambulate with normal gait pattern with st. cane on level surfaces. - not met but improved  Short term goal 5: Patient achieve ability to perform ADL's with reported no more than minimal difficulty and < 1-2/10 pain. Long term goals  Time Frame for Long term goals : 8-10 weeks  Long term goal 1: Patient to achieve Left knee AROM: 0-115, with PROM to 120-125. - not met but improving  Long term goal 2: Achieve 4+/5 left knee and hip strength in MMG's. - met  Long term goal 3: Achieve normal gait pattern on level and unlevel surfaces without assistive device. - not met but improving  Long term goal 4: Achieve ability to ambulate community distances and terrains with reported no more than minimal difficulty and < 1/10 pain.   Long term goal 5: Patient achieve ability to perform ADL's and I-ADL's, and light social, recreational activities with reported no more than minimal difficulty and < 1/10 pain.       Electronically signed by:  Jose R Copeland PT

## 2021-10-05 ENCOUNTER — HOSPITAL ENCOUNTER (OUTPATIENT)
Dept: PHYSICAL THERAPY | Facility: HOSPITAL | Age: 66
Setting detail: THERAPIES SERIES
Discharge: HOME | End: 2021-10-05
Payer: MEDICAID

## 2021-10-05 PROCEDURE — 97140 MANUAL THERAPY 1/> REGIONS: CPT

## 2021-10-05 PROCEDURE — 97110 THERAPEUTIC EXERCISES: CPT

## 2021-10-05 NOTE — FLOWSHEET NOTE
no pain but continues to have limited knee flex PROM. Pain after treatment:      0/10     Prognosis: [x] Good [] Fair  [] Poor    Patient Requires Follow-up: [x] Yes  [] No    Plan:   [x] Continue per plan of care [] Alter current plan (see comments)  [] Plan of care initiated [] Hold pending MD visit [] Discharge    Plan for Next Session:      Goals  Short term goals  Time Frame for Short term goals: 3-4 weeks  Short term goal 1: Patient to be independent with HEP. -MET  Short term goal 2: Patient to achieve Left knee AROM: 0-105, with PROM to 110. - not met but gradually improving  Short term goal 3: Achieve 4-/5 left knee and hip strength in MMG's. -MET  Short term goal 4: Ambulate with normal gait pattern with st. cane on level surfaces. - not met but improved  Short term goal 5: Patient achieve ability to perform ADL's with reported no more than minimal difficulty and < 1-2/10 pain. Long term goals  Time Frame for Long term goals : 8-10 weeks  Long term goal 1: Patient to achieve Left knee AROM: 0-115, with PROM to 120-125. - not met but improving  Long term goal 2: Achieve 4+/5 left knee and hip strength in MMG's. - met  Long term goal 3: Achieve normal gait pattern on level and unlevel surfaces without assistive device. - not met but improving  Long term goal 4: Achieve ability to ambulate community distances and terrains with reported no more than minimal difficulty and < 1/10 pain.   Long term goal 5: Patient achieve ability to perform ADL's and I-ADL's, and light social, recreational activities with reported no more than minimal difficulty and < 1/10 pain.           Electronically signed by:  Estephania Turcios, PT

## 2021-10-07 ENCOUNTER — HOSPITAL ENCOUNTER (OUTPATIENT)
Dept: PHYSICAL THERAPY | Facility: HOSPITAL | Age: 66
Setting detail: THERAPIES SERIES
Discharge: HOME | End: 2021-10-07
Payer: MEDICAID

## 2021-10-07 PROCEDURE — 97110 THERAPEUTIC EXERCISES: CPT

## 2021-10-07 PROCEDURE — 97140 MANUAL THERAPY 1/> REGIONS: CPT

## 2021-10-07 NOTE — FLOWSHEET NOTE
Physical Therapy Daily Treatment   Date:  10/7/2021    TIme In:    0274              Time Out:  1089    Patient Name:  Sophia Olivo    :  1955  MRN: [id number]    Restrictions/Precautions:    Pertinent Medical History:  Medical/Treatment Diagnosis Information:  ·   s/p left TKA; keft knee pain, joint stiffness, muscle weakness, abnormality of gait     Insurance/Certification information:    [employer]  Physician Information:    Alan Lake MD  Plan of care signed (Y/N):     Visit# / total visits:     25 /    G-Code (if applicable):      Date / Visit # G-Code Applied:         Progress Note: []  Yes  [x]  No  Next due by: 21      Pain level:    0/10     Subjective:   Pt reports: her knee is sore, feels more tender today. Objective:   Observation: mild edema L knee and lower leg. Gait: decreased hip and knee flexion, very mild antalgia   Test measurements:  LEFS: 41/80 (was 26/80). L knee AROM: 0- 95; PROM:  0-100 today - (+) pain with overpressure at end-range of motion. MMT - 4+/5 in flexion and extension   Palpation: (+) tenderness pes anserine    Exercises:  Exercise Resistance/Repetitions Other comments   Nustep L5 x 9' 7   Seated Heel slides 3x3' 7   St marching with support 6x30 7   St TKE 3x15 L 7   St HR/TR with support 3x15 7   GS 2x20 7   QS 2x20 7   Supine HS 2x20 7   AP's 2x30 7   Achilles st with strap 10x10\" 7   Heel prop 5' 7   Mini squats 30x 7          Other Therapeutic Activities:     Manual Treatments:  Patella mobs, grade 1-2 mobs, PROM L knee, gentle STM x 15'    Modalities:  Game Ready Vasopneumatic ice x 15 min, light compression to address LLE edema.  X 10 min - declined today      Timed Code Treatment Minutes:   62'      Total Treatment Minutes:   72'      Treatment/Activity Tolerance:  [x] Patient tolerated treatment well [] Patient limited by fatigue  [] Patient limited by pain  [] Patient limited by other medical complications  [x] Other:   Pt completed tx with no pain but continues to have limited knee flex PROM. Pain after treatment:      0/10     Prognosis: [x] Good [] Fair  [] Poor    Patient Requires Follow-up: [x] Yes  [] No    Plan:   [x] Continue per plan of care [] Alter current plan (see comments)  [] Plan of care initiated [] Hold pending MD visit [] Discharge    Plan for Next Session:      Goals  Short term goals  Time Frame for Short term goals: 3-4 weeks  Short term goal 1: Patient to be independent with HEP. -MET  Short term goal 2: Patient to achieve Left knee AROM: 0-105, with PROM to 110. - not met but gradually improving  Short term goal 3: Achieve 4-/5 left knee and hip strength in MMG's. -MET  Short term goal 4: Ambulate with normal gait pattern with st. cane on level surfaces. - not met but improved  Short term goal 5: Patient achieve ability to perform ADL's with reported no more than minimal difficulty and < 1-2/10 pain. Long term goals  Time Frame for Long term goals : 8-10 weeks  Long term goal 1: Patient to achieve Left knee AROM: 0-115, with PROM to 120-125. - not met but improving  Long term goal 2: Achieve 4+/5 left knee and hip strength in MMG's. - met  Long term goal 3: Achieve normal gait pattern on level and unlevel surfaces without assistive device. - not met but improving  Long term goal 4: Achieve ability to ambulate community distances and terrains with reported no more than minimal difficulty and < 1/10 pain.   Long term goal 5: Patient achieve ability to perform ADL's and I-ADL's, and light social, recreational activities with reported no more than minimal difficulty and < 1/10 pain.           Electronically signed by:  Harmony Thompson, PT

## 2021-10-11 ENCOUNTER — HOSPITAL ENCOUNTER (OUTPATIENT)
Dept: PHYSICAL THERAPY | Facility: HOSPITAL | Age: 66
Setting detail: THERAPIES SERIES
Discharge: HOME | End: 2021-10-11
Payer: MEDICAID

## 2021-10-11 PROCEDURE — 97140 MANUAL THERAPY 1/> REGIONS: CPT

## 2021-10-11 PROCEDURE — 97110 THERAPEUTIC EXERCISES: CPT

## 2021-10-11 NOTE — FLOWSHEET NOTE
Physical Therapy Daily Treatment   Date:  10/11/2021    TIme In:   9756               Time Out:   1005    Patient Name:  Nolberto Barthel    :  1955  MRN: [id number]    Restrictions/Precautions:    Pertinent Medical History:  Medical/Treatment Diagnosis Information:  ·   s/p left TKA; keft knee pain, joint stiffness, muscle weakness, abnormality of gait     Insurance/Certification information:    [employer]  Physician Information:    Andrey Watkins MD  Plan of care signed (Y/N):     Visit# / total visits:     19/    G-Code (if applicable):      Date / Visit # G-Code Applied:         Progress Note: []  Yes  [x]  No  Next due by: 21      Pain level:    0/10     Subjective:   Pt reports her knee is moving better today and she has been taking her fluid pills to keep the swelling down. Objective:   Observation: mild edema L knee and lower leg. Gait: decreased hip and knee flexion, very mild antalgia   Test measurements:  LEFS: 41/80 (was 26/80). L knee AROM: 0- 95; PROM:  0-100 today - (+) pain with overpressure at end-range of motion. MMT - 4+/5 in flexion and extension   Palpation: (+) tenderness pes anserine    Exercises:  Exercise Resistance/Repetitions Other comments   Nustep L5 x 9' 11   Seated Heel slides 3x3' 11   St marching with support 6x30 11   St TKE 3x15 L 11   St HR/TR with support 3x15 11   GS 2x20 11   QS 2x20 11   Supine HS 2x20 11   AP's 2x30 11   Achilles st with strap 10x10\" 11   Heel prop 5' 11   Mini squats 30x 11          Other Therapeutic Activities:     Manual Treatments:  Patella mobs, grade 1-2 mobs, PROM L knee, gentle STM x 15'    Modalities:  Game Ready Vasopneumatic ice x 15 min, light compression to address LLE edema.  X 10 min - declined today      Timed Code Treatment Minutes:   55      Total Treatment Minutes:   60      Treatment/Activity Tolerance:  [x] Patient tolerated treatment well [] Patient limited by fatigue  [] Patient limited by pain  [] Patient limited by other medical complications  [x] Other:   Pt completed tx with no pain and is maintaining knee PROM at 100 deg with significant resistance at end range. Pain after treatment:      0/10     Prognosis: [x] Good [] Fair  [] Poor    Patient Requires Follow-up: [x] Yes  [] No    Plan:   [x] Continue per plan of care [] Alter current plan (see comments)  [] Plan of care initiated [] Hold pending MD visit [] Discharge    Plan for Next Session:      Goals  Short term goals  Time Frame for Short term goals: 3-4 weeks  Short term goal 1: Patient to be independent with HEP. -MET  Short term goal 2: Patient to achieve Left knee AROM: 0-105, with PROM to 110. - not met but gradually improving  Short term goal 3: Achieve 4-/5 left knee and hip strength in MMG's. -MET  Short term goal 4: Ambulate with normal gait pattern with st. cane on level surfaces. - not met but improved  Short term goal 5: Patient achieve ability to perform ADL's with reported no more than minimal difficulty and < 1-2/10 pain. Long term goals  Time Frame for Long term goals : 8-10 weeks  Long term goal 1: Patient to achieve Left knee AROM: 0-115, with PROM to 120-125. - not met but improving  Long term goal 2: Achieve 4+/5 left knee and hip strength in MMG's. - met  Long term goal 3: Achieve normal gait pattern on level and unlevel surfaces without assistive device. - not met but improving  Long term goal 4: Achieve ability to ambulate community distances and terrains with reported no more than minimal difficulty and < 1/10 pain.   Long term goal 5: Patient achieve ability to perform ADL's and I-ADL's, and light social, recreational activities with reported no more than minimal difficulty and < 1/10 pain.           Electronically signed by:  Win Cantrell PTA

## 2021-10-13 ENCOUNTER — APPOINTMENT (OUTPATIENT)
Dept: PHYSICAL THERAPY | Facility: HOSPITAL | Age: 66
End: 2021-10-13
Payer: MEDICAID

## 2021-10-14 ENCOUNTER — HOSPITAL ENCOUNTER (OUTPATIENT)
Dept: PHYSICAL THERAPY | Facility: HOSPITAL | Age: 66
Setting detail: THERAPIES SERIES
Discharge: HOME | End: 2021-10-14
Payer: MEDICAID

## 2021-10-14 PROCEDURE — 97140 MANUAL THERAPY 1/> REGIONS: CPT

## 2021-10-14 PROCEDURE — 97110 THERAPEUTIC EXERCISES: CPT

## 2021-10-14 NOTE — FLOWSHEET NOTE
Physical Therapy Daily Treatment   Date:  10/14/2021    TIme In:   3878              Time Out:  1369    Patient Name:  Yanna Garcia    :  1955  MRN: [id number]    Restrictions/Precautions:    Pertinent Medical History:  Medical/Treatment Diagnosis Information:  ·   s/p left TKA; keft knee pain, joint stiffness, muscle weakness, abnormality of gait     Insurance/Certification information:    [employer]  Physician Information:    Freda Maier MD  Plan of care signed (Y/N):     Visit# / total visits:     20/    G-Code (if applicable):      Date / Visit # G-Code Applied:         Progress Note: []  Yes  [x]  No  Next due by: 21      Pain level:    0/10     Subjective:   Pt reports her knee is doing better than it was last visit. Objective:   Observation: mild edema L knee and lower leg. Gait: decreased hip and knee flexion, very mild antalgia   Test measurements:  LEFS: 41/80 (was 26/80). L knee AROM: 0- 95; PROM:  0-100 today - (+) pain with overpressure at end-range of motion. MMT - 4+/5 in flexion and extension   Palpation: (+) tenderness pes anserine    Exercises:  Exercise Resistance/Repetitions Other comments   Nustep L5 x 9' 14   Seated Heel slides 3x3' 14   St marching with support 6x30 14   St TKE 3x15 L 14   St HR/TR with support 3x15 14   GS 2x20 14   QS 2x20 14   Supine HS 2x20 14   AP's 2x30 14   Achilles st with strap 10x10\" 14   Heel prop 5' 14   Mini squats 30x 14          Other Therapeutic Activities:     Manual Treatments:  Patella mobs, grade 1-2 mobs, PROM L knee, gentle STM x 15'    Modalities:  Game Ready Vasopneumatic ice x 15 min, light compression to address LLE edema.  X 10 min - declined today      Timed Code Treatment Minutes:   45      Total Treatment Minutes:   51      Treatment/Activity Tolerance:  [x] Patient tolerated treatment well [] Patient limited by fatigue  [] Patient limited by pain  [] Patient limited by other medical complications  [x] Other: Pt completed tx with no pain but did have pain at end range during manual tx. Pain after treatment:      0/10     Prognosis: [x] Good [] Fair  [] Poor    Patient Requires Follow-up: [x] Yes  [] No    Plan:   [x] Continue per plan of care [] Alter current plan (see comments)  [] Plan of care initiated [] Hold pending MD visit [] Discharge    Plan for Next Session:      Goals  Short term goals  Time Frame for Short term goals: 3-4 weeks  Short term goal 1: Patient to be independent with HEP. -MET  Short term goal 2: Patient to achieve Left knee AROM: 0-105, with PROM to 110. - not met but gradually improving  Short term goal 3: Achieve 4-/5 left knee and hip strength in MMG's. -MET  Short term goal 4: Ambulate with normal gait pattern with st. cane on level surfaces. - not met but improved  Short term goal 5: Patient achieve ability to perform ADL's with reported no more than minimal difficulty and < 1-2/10 pain. Long term goals  Time Frame for Long term goals : 8-10 weeks  Long term goal 1: Patient to achieve Left knee AROM: 0-115, with PROM to 120-125. - not met but improving  Long term goal 2: Achieve 4+/5 left knee and hip strength in MMG's. - met  Long term goal 3: Achieve normal gait pattern on level and unlevel surfaces without assistive device. - not met but improving  Long term goal 4: Achieve ability to ambulate community distances and terrains with reported no more than minimal difficulty and < 1/10 pain.   Long term goal 5: Patient achieve ability to perform ADL's and I-ADL's, and light social, recreational activities with reported no more than minimal difficulty and < 1/10 pain.           Electronically signed by:  Amish Cantrell PTA

## 2021-10-19 ENCOUNTER — APPOINTMENT (OUTPATIENT)
Dept: PHYSICAL THERAPY | Facility: HOSPITAL | Age: 66
End: 2021-10-19
Payer: MEDICAID

## 2021-10-23 ENCOUNTER — HOSPITAL ENCOUNTER (OUTPATIENT)
Facility: HOSPITAL | Age: 66
Discharge: HOME | End: 2021-10-23
Payer: MEDICAID

## 2021-10-23 LAB
A/G RATIO: 1.5 (ref 0.8–2)
ALBUMIN SERPL-MCNC: 3.9 G/DL (ref 3.4–4.8)
ALP BLD-CCNC: 72 U/L (ref 25–100)
ALT SERPL-CCNC: 8 U/L (ref 4–36)
ANION GAP SERPL CALCULATED.3IONS-SCNC: 10 MMOL/L (ref 3–16)
AST SERPL-CCNC: 10 U/L (ref 8–33)
BILIRUB SERPL-MCNC: 0.3 MG/DL (ref 0.3–1.2)
BILIRUBIN URINE: NEGATIVE
BLOOD, URINE: NEGATIVE
BUN BLDV-MCNC: 13 MG/DL (ref 6–20)
CALCIUM SERPL-MCNC: 8.8 MG/DL (ref 8.5–10.5)
CHLORIDE BLD-SCNC: 109 MMOL/L (ref 98–107)
CLARITY: CLEAR
CO2: 23 MMOL/L (ref 20–30)
COLOR: YELLOW
CREAT SERPL-MCNC: 0.8 MG/DL (ref 0.4–1.2)
GFR AFRICAN AMERICAN: >59
GFR NON-AFRICAN AMERICAN: >60
GLOBULIN: 2.6 G/DL
GLUCOSE BLD-MCNC: 93 MG/DL (ref 74–106)
GLUCOSE URINE: NEGATIVE MG/DL
HCT VFR BLD CALC: 37.7 % (ref 37–47)
HEMOGLOBIN: 11.7 G/DL (ref 11.5–16.5)
KETONES, URINE: ABNORMAL MG/DL
LEUKOCYTE ESTERASE, URINE: NEGATIVE
MCH RBC QN AUTO: 30.1 PG (ref 27–32)
MCHC RBC AUTO-ENTMCNC: 31 G/DL (ref 31–35)
MCV RBC AUTO: 96.9 FL (ref 80–100)
MICROSCOPIC EXAMINATION: ABNORMAL
NITRITE, URINE: NEGATIVE
PDW BLD-RTO: 13.1 % (ref 11–16)
PH UA: 5 (ref 5–8)
PLATELET # BLD: 282 K/UL (ref 150–400)
PMV BLD AUTO: 10.4 FL (ref 6–10)
POTASSIUM SERPL-SCNC: 4.5 MMOL/L (ref 3.4–5.1)
PROTEIN UA: NEGATIVE MG/DL
RBC # BLD: 3.89 M/UL (ref 3.8–5.8)
SODIUM BLD-SCNC: 142 MMOL/L (ref 136–145)
SPECIFIC GRAVITY UA: 1.02 (ref 1–1.03)
TOTAL PROTEIN: 6.5 G/DL (ref 6.4–8.3)
URINE TYPE: ABNORMAL
UROBILINOGEN, URINE: 0.2 E.U./DL
WBC # BLD: 5.6 K/UL (ref 4–11)

## 2021-10-23 PROCEDURE — 81003 URINALYSIS AUTO W/O SCOPE: CPT

## 2021-10-23 PROCEDURE — 36415 COLL VENOUS BLD VENIPUNCTURE: CPT

## 2021-10-23 PROCEDURE — 85027 COMPLETE CBC AUTOMATED: CPT

## 2021-10-23 PROCEDURE — 80053 COMPREHEN METABOLIC PANEL: CPT

## 2021-10-23 PROCEDURE — 87086 URINE CULTURE/COLONY COUNT: CPT

## 2021-10-23 PROCEDURE — U0005 INFEC AGEN DETEC AMPLI PROBE: HCPCS

## 2021-10-23 PROCEDURE — U0003 INFECTIOUS AGENT DETECTION BY NUCLEIC ACID (DNA OR RNA); SEVERE ACUTE RESPIRATORY SYNDROME CORONAVIRUS 2 (SARS-COV-2) (CORONAVIRUS DISEASE [COVID-19]), AMPLIFIED PROBE TECHNIQUE, MAKING USE OF HIGH THROUGHPUT TECHNOLOGIES AS DESCRIBED BY CMS-2020-01-R: HCPCS

## 2021-10-25 LAB
SARS-COV-2, PCR: NOT DETECTED
URINE CULTURE, ROUTINE: NORMAL

## 2021-10-27 ENCOUNTER — HOSPITAL ENCOUNTER (OUTPATIENT)
Dept: PHYSICAL THERAPY | Facility: HOSPITAL | Age: 66
Setting detail: THERAPIES SERIES
End: 2021-10-27
Payer: MEDICAID

## 2021-11-01 ENCOUNTER — HOSPITAL ENCOUNTER (OUTPATIENT)
Dept: PHYSICAL THERAPY | Facility: HOSPITAL | Age: 66
Setting detail: THERAPIES SERIES
Discharge: HOME | End: 2021-11-01
Payer: MEDICAID

## 2021-11-01 PROCEDURE — 97140 MANUAL THERAPY 1/> REGIONS: CPT

## 2021-11-01 PROCEDURE — 97110 THERAPEUTIC EXERCISES: CPT

## 2021-11-01 NOTE — FLOWSHEET NOTE
Physical Therapy Daily Treatment / Re-Assessment   Date:  2021    TIme In:   3166              Time Out:  1550    Patient Name:  Divya Carty    :  1955  MRN: [id number]    Restrictions/Precautions:    Pertinent Medical History:  Medical/Treatment Diagnosis Information:  ·   s/p left TKA; keft knee pain, joint stiffness, muscle weakness, abnormality of gait     Insurance/Certification information:    [employer]  Physician Information:    Fan Delcid MD  Plan of care signed (Y/N):     Visit# / total visits:     21/    G-Code (if applicable):      Date / Visit # G-Code Applied:         Progress Note: [x]  Yes  []  No  Next due by: 21      Pain level:    0/10     Subjective:   Pt reports her knee is improving but is still having some swelling. Pt states one of her MD's may be referring her to a cardiac MD to address her swelling. Objective:   Observation: mild edema L knee and lower leg. Gait: decreased hip and knee flexion, very mild antalgia   Test measurements:  LEFS: 40/80 (was 41/80 last re-assess, 26/80 prior to that). L knee AROM: 0- 95; PROM:  0-106 today - (+) mild to mod pain with overpressure at end-range of motion. MMT: L hip flex: 4+/5, knee ext: 4+/5, knee flex: 5/5, Ankle DF: 5/5    Palpation: (+) tenderness pes anserine    Exercises:  Exercise Resistance/Repetitions Other comments   Nustep L5 x 9' 1   Seated Heel slides 3x3' 1   St marching with support 6x30 1   St TKE 3x15 L 1   St HR/TR with support 3x15 1   GS 2x20 1   QS 2x20 1   Supine HS 2x20 1   AP's 2x30 1   Achilles st with strap 10x10\" 1   Heel prop 5' 1   Mini squats 30x 1   Seated HS curls 3x10 OTB 1     Other Therapeutic Activities:     Manual Treatments:  Patella mobs, grade 1-2 mobs, PROM L knee, gentle STM x 15'    Modalities:  Game Ready Vasopneumatic ice x 15 min, light compression to address LLE edema.  X 10 min - declined today      Timed Code Treatment Minutes:  65      Total Treatment Minutes:  75      Treatment/Activity Tolerance:  [x] Patient tolerated treatment well [] Patient limited by fatigue  [] Patient limited by pain  [] Patient limited by other medical complications  [x] Other:   Pt demonstrates an increase in L knee PROM flexion to 106 deg today. She achieves full knee ext and demonstrates a good quad contraction. Knee strength has also improved. Pt demonstrates L knee stiffness during ambulation but is improving. Pt was given further verbal cues today to encourage knee flexion during swing phase of gait. Pt will benefit from 2-3 more weeks of PT to further address achieving improved end ROM and to normalize gait. Pt is progressing toward established goals. She has met 3/4 STG and 1/5 LTG. Pain after treatment:      0/10     Prognosis: [x] Good [] Fair  [] Poor    Patient Requires Follow-up: [x] Yes  [] No    Plan:   [x] Continue per plan of care [] Alter current plan (see comments)  [] Plan of care initiated [] Hold pending MD visit [] Discharge    Plan for Next Session:      Goals  Short term goals  Time Frame for Short term goals: 3-4 weeks  Short term goal 1: Patient to be independent with HEP. -MET  Short term goal 2: Patient to achieve Left knee AROM: 0-105, with PROM to 110. - not met but gradually improving  Short term goal 3: Achieve 4-/5 left knee and hip strength in MMG's. -MET  Short term goal 4: Ambulate with normal gait pattern with st. cane on level surfaces. - not met but improved  Short term goal 5: Patient achieve ability to perform ADL's with reported no more than minimal difficulty and < 1-2/10 pain. -MET  Long term goals  Time Frame for Long term goals : 8-10 weeks  Long term goal 1: Patient to achieve Left knee AROM: 0-115, with PROM to 120-125. - not met but improving  Long term goal 2: Achieve 4+/5 left knee and hip strength in MMG's. - met  Long term goal 3: Achieve normal gait pattern on level and unlevel surfaces without assistive device.  - not met but improving  Long term goal 4: Achieve ability to ambulate community distances and terrains with reported no more than minimal difficulty and < 1/10 pain. Long term goal 5: Patient achieve ability to perform ADL's and I-ADL's, and light social, recreational activities with reported no more than minimal difficulty and < 1/10 pain.           Electronically signed by:  Cher Gomez PT        Certification of Medical Necessity: It will be understood that this treatment plan is certified medically necessary by the documenting therapist and referring physician mentioned in this report. Unless the physician indicated otherwise through written correspondence with our office, all further referrals will act as certification of medical necessity on this treatment plan. Thank you for this referral.  If you have questions regarding this plan of care, please call [phone].           Revisions to this plan (optional):                     Please sign and return this plan to:   FAX: [phone]      Signature:                                 Date:

## 2021-11-03 ENCOUNTER — APPOINTMENT (OUTPATIENT)
Dept: PHYSICAL THERAPY | Facility: HOSPITAL | Age: 66
End: 2021-11-03
Payer: MEDICAID

## 2021-11-04 ENCOUNTER — HOSPITAL ENCOUNTER (OUTPATIENT)
Dept: PHYSICAL THERAPY | Facility: HOSPITAL | Age: 66
Setting detail: THERAPIES SERIES
Discharge: HOME | End: 2021-11-04
Payer: MEDICAID

## 2021-11-04 PROCEDURE — 97140 MANUAL THERAPY 1/> REGIONS: CPT

## 2021-11-04 PROCEDURE — 97110 THERAPEUTIC EXERCISES: CPT

## 2021-11-04 NOTE — FLOWSHEET NOTE
Physical Therapy Daily Treatment   Date:  2021    TIme In:   8986              Time Out:  1780    Patient Name:  Bran Lai    :  1955  MRN: [id number]    Restrictions/Precautions:    Pertinent Medical History:  Medical/Treatment Diagnosis Information:  ·   s/p left TKA; keft knee pain, joint stiffness, muscle weakness, abnormality of gait     Insurance/Certification information:    [employer]  Physician Information:    Aníbal Butts MD  Plan of care signed (Y/N):     Visit# / total visits:     22/    G-Code (if applicable):      Date / Visit # G-Code Applied:         Progress Note: []  Yes  [x]  No  Next due by: 21      Pain level:    0/10     Subjective:   Pt reports no new complaints today; no pain currently. Objective:   Observation: mild edema L knee and lower leg. Gait: decreased hip and knee flexion, very mild antalgia   Test measurements:  LEFS: 40/80 (was 41/80 last re-assess, 26/80 prior to that). L knee AROM: 0- 95; PROM:  0-106 today - (+) mild to mod pain with overpressure at end-range of motion. MMT: L hip flex: 4+/5, knee ext: 4+/5, knee flex: 5/5, Ankle DF: 5/5    Palpation: (+) tenderness pes anserine    Exercises:  Exercise Resistance/Repetitions Other comments   Nustep L5 x 9' 4   Seated Heel slides 3x3' 4   St marching with support 6x30 4   St TKE 3x15 L 4   St HR/TR with support 3x15 4   GS 2x20 4   QS 2x20 4   Supine HS 2x20 4   AP's 2x30 4   Achilles st with strap 10x10\" 4   Heel prop 5' 4   Mini squats 30x 4   Seated HS curls 3x10 OTB 4     Other Therapeutic Activities:     Manual Treatments:  Patella mobs, grade 1-2 mobs, PROM L knee, gentle STM x 15' -performed by Anni Nix PT    Modalities:  Game Ready Vasopneumatic ice x 15 min, light compression to address LLE edema.  X 10 min - declined today      Timed Code Treatment Minutes:  75      Total Treatment Minutes: 86       Treatment/Activity Tolerance:  [x] Patient tolerated treatment well [] Patient limited by fatigue  [] Patient limited by pain  [] Patient limited by other medical complications  [] Other:       Pain after treatment:      0/10     Prognosis: [x] Good [] Fair  [] Poor    Patient Requires Follow-up: [x] Yes  [] No    Plan:   [x] Continue per plan of care [] Alter current plan (see comments)  [] Plan of care initiated [] Hold pending MD visit [] Discharge    Plan for Next Session:      Goals  Short term goals  Time Frame for Short term goals: 3-4 weeks  Short term goal 1: Patient to be independent with HEP. -MET  Short term goal 2: Patient to achieve Left knee AROM: 0-105, with PROM to 110. - not met but gradually improving  Short term goal 3: Achieve 4-/5 left knee and hip strength in MMG's. -MET  Short term goal 4: Ambulate with normal gait pattern with st. cane on level surfaces. - not met but improved  Short term goal 5: Patient achieve ability to perform ADL's with reported no more than minimal difficulty and < 1-2/10 pain. -MET  Long term goals  Time Frame for Long term goals : 8-10 weeks  Long term goal 1: Patient to achieve Left knee AROM: 0-115, with PROM to 120-125. - not met but improving  Long term goal 2: Achieve 4+/5 left knee and hip strength in MMG's. - met  Long term goal 3: Achieve normal gait pattern on level and unlevel surfaces without assistive device. - not met but improving  Long term goal 4: Achieve ability to ambulate community distances and terrains with reported no more than minimal difficulty and < 1/10 pain.   Long term goal 5: Patient achieve ability to perform ADL's and I-ADL's, and light social, recreational activities with reported no more than minimal difficulty and < 1/10 pain.           Electronically signed by:  Martha Fowler, PT

## 2021-11-08 ENCOUNTER — HOSPITAL ENCOUNTER (OUTPATIENT)
Dept: PHYSICAL THERAPY | Facility: HOSPITAL | Age: 66
Setting detail: THERAPIES SERIES
Discharge: HOME | End: 2021-11-08
Payer: MEDICAID

## 2021-11-08 PROCEDURE — 97140 MANUAL THERAPY 1/> REGIONS: CPT

## 2021-11-08 PROCEDURE — 97110 THERAPEUTIC EXERCISES: CPT

## 2021-11-08 NOTE — FLOWSHEET NOTE
Physical Therapy Daily Treatment   Date:  2021    TIme In:  0832               Time Out:  2892    Patient Name:  Edward Calvert    :  1955  MRN: [id number]    Restrictions/Precautions:    Pertinent Medical History:  Medical/Treatment Diagnosis Information:  ·   s/p left TKA; keft knee pain, joint stiffness, muscle weakness, abnormality of gait     Insurance/Certification information:    [facility] Medicaid  Physician Information:    Rosetta Marte MD  Plan of care signed (Y/N):     Visit# / total visits:     23/    G-Code (if applicable):      Date / Visit # G-Code Applied:         Progress Note: []  Yes  [x]  No  Next due by: 21      Pain level:    0/10     Subjective:   Pt reports no pain in her knee today. Objective:   Observation: mild edema L knee and lower leg. Gait: decreased hip and knee flexion, very mild antalgia   Test measurements:  LEFS: 40/80 (was 41/80 last re-assess, 26/80 prior to that). L knee AROM: 0- 95; PROM:  0-108 today - (+) mild to mod pain with overpressure at end-range of motion. MMT: L hip flex: 4+/5, knee ext: 4+/5, knee flex: 5/5, Ankle DF: 5/5    Palpation: (+) tenderness pes anserine    Exercises:  Exercise Resistance/Repetitions Other comments   Nustep L5 x 9' 8   Seated Heel slides 3x3' 8   St marching with support 6x30 8   St TKE 3x15 L 8   St HR/TR with support 3x15 8   GS 2x20 8   QS 2x20 8   Supine HS 2x20 8   AP's 2x30 8   Achilles st with strap 10x10\" 8   Heel prop 5' 8   Mini squats 30x 8   Seated HS curls 3x10 OTB 8     Other Therapeutic Activities:     Manual Treatments:  Patella mobs, grade 1-2 mobs, PROM L knee, gentle STM x 15'     Modalities:  Game Ready Vasopneumatic ice x 15 min, light compression to address LLE edema.  X 10 min - declined today      Timed Code Treatment Minutes:  60      Total Treatment Minutes: 66      Treatment/Activity Tolerance:  [x] Patient tolerated treatment well [] Patient limited by fatigue  [] Patient limited by pain  [] Patient limited by other medical complications  [x] Other: Pt improved to 108 deg L knee flexion PROM today. Pain after treatment:      0/10     Prognosis: [x] Good [] Fair  [] Poor    Patient Requires Follow-up: [x] Yes  [] No    Plan:   [x] Continue per plan of care [] Alter current plan (see comments)  [] Plan of care initiated [] Hold pending MD visit [] Discharge    Plan for Next Session:      Goals  Short term goals  Time Frame for Short term goals: 3-4 weeks  Short term goal 1: Patient to be independent with HEP. -MET  Short term goal 2: Patient to achieve Left knee AROM: 0-105, with PROM to 110. - not met but gradually improving  Short term goal 3: Achieve 4-/5 left knee and hip strength in MMG's. -MET  Short term goal 4: Ambulate with normal gait pattern with st. cane on level surfaces. - not met but improved  Short term goal 5: Patient achieve ability to perform ADL's with reported no more than minimal difficulty and < 1-2/10 pain. -MET  Long term goals  Time Frame for Long term goals : 8-10 weeks  Long term goal 1: Patient to achieve Left knee AROM: 0-115, with PROM to 120-125. - not met but improving  Long term goal 2: Achieve 4+/5 left knee and hip strength in MMG's. - met  Long term goal 3: Achieve normal gait pattern on level and unlevel surfaces without assistive device. - not met but improving  Long term goal 4: Achieve ability to ambulate community distances and terrains with reported no more than minimal difficulty and < 1/10 pain.   Long term goal 5: Patient achieve ability to perform ADL's and I-ADL's, and light social, recreational activities with reported no more than minimal difficulty and < 1/10 pain.           Electronically signed by:  Gela Parmar PT

## 2021-11-10 ENCOUNTER — HOSPITAL ENCOUNTER (OUTPATIENT)
Dept: PHYSICAL THERAPY | Facility: HOSPITAL | Age: 66
Setting detail: THERAPIES SERIES
Discharge: HOME | End: 2021-11-10
Payer: MEDICAID

## 2021-11-10 PROCEDURE — 97140 MANUAL THERAPY 1/> REGIONS: CPT

## 2021-11-10 PROCEDURE — 97110 THERAPEUTIC EXERCISES: CPT

## 2021-11-10 NOTE — FLOWSHEET NOTE
Physical Therapy Daily Treatment   Date:  11/10/2021    TIme In:      1057               Time Out:  [address] [employer]    Patient Name:  Kajal Nielson    :  1955  MRN: [id number]    Restrictions/Precautions:    Pertinent Medical History:  Medical/Treatment Diagnosis Information:  ·   s/p left TKA; keft knee pain, joint stiffness, muscle weakness, abnormality of gait     Insurance/Certification information:    [place]  Physician Information:    Merissa Silva MD  Plan of care signed (Y/N):     Visit# / total visits:     24 /    G-Code (if applicable):      Date / Visit # G-Code Applied:         Progress Note: []  Yes  [x]  No  Next due by: 21      Pain level:    0/10     Subjective:   Pt reports: feeling ok today; no pain at present time; no new c/o. Objective:   Observation: mild edema L knee and lower leg. Gait: decreased hip and knee flexion, very mild antalgia   Test measurements:  LEFS: 40/80 (was 41/80 last re-assess, 26/80 prior to that). L knee AROM: 0- 95; PROM:  0-108 today - (+) mild to mod pain with overpressure at end-range of motion. MMT: L hip flex: 4+/5, knee ext: 4+/5, knee flex: 5/5, Ankle DF: 5/5    Palpation: (+) tenderness pes anserine    Exercises:  Exercise Resistance/Repetitions Other comments   Nustep L5 x 9' 10   Seated Heel slides 3x3' 10   St marching with support 6x30 10   St TKE 3x15 L 10   St HR/TR with support 3x15 10   GS 2x20 10   QS 2x20 10   Supine HS 2x20 10   AP's 2x30 10   Achilles st with strap 10x10\" 10   Heel prop 5' 10   Mini squats 30x 10   Seated HS curls 3 x 10 OTB 10                    Other Therapeutic Activities:     Manual Treatments:  Patella mobs, grade 1-2 mobs, PROM L knee, gentle STM x 15'     Modalities:  Game Ready Vasopneumatic ice x 15 min, light compression to address LLE edema.  X 10 min - declined today      Timed Code Treatment Minutes:  54'      Total Treatment Minutes:      62'      Treatment/Activity Tolerance:  [x] Patient

## 2021-11-12 ENCOUNTER — APPOINTMENT (OUTPATIENT)
Dept: PHYSICAL THERAPY | Facility: HOSPITAL | Age: 66
End: 2021-11-12
Payer: MEDICAID

## 2021-11-16 ENCOUNTER — HOSPITAL ENCOUNTER (OUTPATIENT)
Dept: PHYSICAL THERAPY | Facility: HOSPITAL | Age: 66
Setting detail: THERAPIES SERIES
Discharge: HOME | End: 2021-11-16
Payer: MEDICAID

## 2021-11-16 PROCEDURE — 97140 MANUAL THERAPY 1/> REGIONS: CPT

## 2021-11-16 PROCEDURE — 97110 THERAPEUTIC EXERCISES: CPT

## 2021-11-16 NOTE — FLOWSHEET NOTE
Physical Therapy Daily Treatment   Date:  2021    TIme In:   0832                  Time Out: 8377    Patient Name:  Brenna Brenner    :  1955  MRN: [id number]    Restrictions/Precautions:    Pertinent Medical History:  Medical/Treatment Diagnosis Information:  ·   s/p left TKA; keft knee pain, joint stiffness, muscle weakness, abnormality of gait     Insurance/Certification information:    [employer]  Physician Information:    August Osborn MD  Plan of care signed (Y/N):     Visit# / total visits:     25 /    G-Code (if applicable):      Date / Visit # G-Code Applied:         Progress Note: []  Yes  [x]  No  Next due by: 21      Pain level:   0/10     Subjective:   Pt reports her knee is less swollen and doing really well. Objective:   Observation: mild edema L knee and lower leg. Gait: decreased hip and knee flexion, very mild antalgia   Test measurements:  LEFS: 40/80 (was 41/80 last re-assess, 26/80 prior to that). L knee AROM: 0- 95; PROM:  0-108 today - (+) mild to mod pain with overpressure at end-range of motion. MMT: L hip flex: 4+/5, knee ext: 4+/5, knee flex: 5/5, Ankle DF: 5/5    Palpation: (+) tenderness pes anserine    Exercises:  Exercise Resistance/Repetitions Other comments   Nustep L5 x 9' 16   Seated Heel slides 3x3' 16   St marching with support 6x30 16   St TKE 3x15 L 16   St HR/TR with support 3x15 16   GS 2x20 16   QS 2x20 16   Supine HS 2x20 16   AP's 2x30 16   Achilles st with strap 10x10\" 16   Heel prop 5' 16   Mini squats 30x 16   Seated HS curls 3 x 10 OTB 16                    Other Therapeutic Activities:     Manual Treatments:  Patella mobs, grade 1-2 mobs, PROM L knee, gentle STM x 15'     Modalities:  Game Ready Vasopneumatic ice x 15 min, light compression to address LLE edema.  X 10 min - declined today      Timed Code Treatment Minutes:  60      Total Treatment Minutes:     66      Treatment/Activity Tolerance:  [x] Patient tolerated

## 2021-11-18 ENCOUNTER — HOSPITAL ENCOUNTER (OUTPATIENT)
Dept: PHYSICAL THERAPY | Facility: HOSPITAL | Age: 66
Setting detail: THERAPIES SERIES
Discharge: HOME | End: 2021-11-18
Payer: MEDICAID

## 2021-11-18 PROCEDURE — 97140 MANUAL THERAPY 1/> REGIONS: CPT

## 2021-11-18 PROCEDURE — 97110 THERAPEUTIC EXERCISES: CPT

## 2021-11-18 NOTE — FLOWSHEET NOTE
Physical Therapy Daily Treatment   Date:  2021    TIme In:   830                  Time Out: 451 [facility]    Patient Name:  Nolberto Barthel    :  1955  MRN: [id number]    Restrictions/Precautions:    Pertinent Medical History:  Medical/Treatment Diagnosis Information:  ·   s/p left TKA; keft knee pain, joint stiffness, muscle weakness, abnormality of gait     Insurance/Certification information:    [employer]  Physician Information:    Andrey Watkins MD  Plan of care signed (Y/N):     Visit# / total visits:     26 /    G-Code (if applicable):      Date / Visit # G-Code Applied:         Progress Note: []  Yes  [x]  No  Next due by: 21      Pain level:  0/10     Subjective:   Pt reports her knee is feeling a lot better but is still stiff. Objective:   Observation: mild edema L knee and lower leg. Gait: decreased hip and knee flexion, very mild antalgia   Test measurements:  LEFS: 40/80 (was 41/80 last re-assess, 26/80 prior to that). L knee AROM: 0- 95; PROM:  0-108 today - (+) mild to mod pain with overpressure at end-range of motion. MMT: L hip flex: 4+/5, knee ext: 4+/5, knee flex: 5/5, Ankle DF: 5/5    Palpation: (+) tenderness pes anserine    Exercises:  Exercise Resistance/Repetitions Other comments   Nustep L5 x 9' 18   Seated Heel slides 2x3' 18   St marching with support 6x30 18   St TKE 3x15 L 18   St HR/TR with support 3x15 18   GS 2x20 18   QS 2x30 18   Supine HS 2x20 18   AP's 2x30 18   Achilles st with strap 10x10\" 18   Heel prop 5' 18   Mini squats 30x 18   Seated HS curls 3 x 10 OTB 18                    Other Therapeutic Activities:     Manual Treatments:  Patella mobs, grade 1-2 mobs, PROM L knee, gentle STM x 15'     Modalities:  Game Ready Vasopneumatic ice x 15 min, light compression to address LLE edema.  X 10 min - declined today      Timed Code Treatment Minutes:  55      Total Treatment Minutes:     65      Treatment/Activity Tolerance:  [x] Patient tolerated

## 2021-11-22 ENCOUNTER — HOSPITAL ENCOUNTER (OUTPATIENT)
Dept: PHYSICAL THERAPY | Facility: HOSPITAL | Age: 66
Setting detail: THERAPIES SERIES
Discharge: HOME | End: 2021-11-22
Payer: MEDICAID

## 2021-11-22 PROCEDURE — 97140 MANUAL THERAPY 1/> REGIONS: CPT

## 2021-11-22 PROCEDURE — 97110 THERAPEUTIC EXERCISES: CPT

## 2021-11-22 NOTE — FLOWSHEET NOTE
Physical Therapy Daily Treatment   Date:  2021    TIme In:  831                   Time Out: [place]    Patient Name:  Katherine Carcamo    :  1955  MRN: [id number]    Restrictions/Precautions:    Pertinent Medical History:  Medical/Treatment Diagnosis Information:  ·   s/p left TKA; keft knee pain, joint stiffness, muscle weakness, abnormality of gait     Insurance/Certification information:    [employer]  Physician Information:    Raghavendra Whaley MD  Plan of care signed (Y/N):     Visit# / total visits:     27/    G-Code (if applicable):      Date / Visit # G-Code Applied:         Progress Note: []  Yes  [x]  No  Next due by: 21      Pain level:  0/10     Subjective:   Pt reports she done a lot of squatting this weekend while doing yard work and she thinks she over done it. Objective:   Observation: mild edema L knee and lower leg. Gait: decreased hip and knee flexion, very mild antalgia   Test measurements:  LEFS: 40/80 (was 41/80 last re-assess, 26/80 prior to that). L knee AROM: 0- 95; PROM:  0-108 (103 today) - (+) mild to mod pain with overpressure at end-range of motion. MMT: L hip flex: 4+/5, knee ext: 4+/5, knee flex: 5/5, Ankle DF: 5/5    Palpation: (+) tenderness pes anserine    Exercises:  Exercise Resistance/Repetitions Other comments   Nustep L5 x 9' 22   Seated Heel slides 2x3' 22   St marching with support 6x30 22   St TKE 3x15 L 22   St HR/TR with support 3x15 22   GS 2x20 22   QS 2x30 22   Supine HS 2x20 22   AP's 2x30 22   Achilles st with strap 10x10\" 22   Heel prop 5' 22   Mini squats 30x 22   Seated HS curls 3 x 10 OTB 22                    Other Therapeutic Activities:     Manual Treatments:  Patella mobs, grade 1-2 mobs, PROM L knee, gentle STM x 15'     Modalities:  Game Ready Vasopneumatic ice x 15 min, light compression to address LLE edema.  X 10 min - declined today      Timed Code Treatment Minutes:  55      Total Treatment Minutes: 61      Treatment/Activity Tolerance:  [x] Patient tolerated treatment well [] Patient limited by fatigue  [] Patient limited by pain  [] Patient limited by other medical complications  [x] Other: Pt completed tx with no pain and slight decrease in PROM today. Pain after treatment:      0/10     Prognosis: [x] Good [] Fair  [] Poor    Patient Requires Follow-up: [x] Yes  [] No    Plan:   [x] Continue per plan of care [] Alter current plan (see comments)  [] Plan of care initiated [] Hold pending MD visit [] Discharge    Plan for Next Session:      Goals  Short term goals  Time Frame for Short term goals: 3-4 weeks  Short term goal 1: Patient to be independent with HEP. -MET  Short term goal 2: Patient to achieve Left knee AROM: 0-105, with PROM to 110. - not met but gradually improving  Short term goal 3: Achieve 4-/5 left knee and hip strength in MMG's. -MET  Short term goal 4: Ambulate with normal gait pattern with st. cane on level surfaces. - not met but improved  Short term goal 5: Patient achieve ability to perform ADL's with reported no more than minimal difficulty and < 1-2/10 pain. -MET  Long term goals  Time Frame for Long term goals : 8-10 weeks  Long term goal 1: Patient to achieve Left knee AROM: 0-115, with PROM to 120-125. - not met but improving  Long term goal 2: Achieve 4+/5 left knee and hip strength in MMG's. - met  Long term goal 3: Achieve normal gait pattern on level and unlevel surfaces without assistive device. - not met but improving  Long term goal 4: Achieve ability to ambulate community distances and terrains with reported no more than minimal difficulty and < 1/10 pain.   Long term goal 5: Patient achieve ability to perform ADL's and I-ADL's, and light social, recreational activities with reported no more than minimal difficulty and < 1/10 pain.           Electronically signed by:  Wendy Cantrell PTA

## 2021-11-26 ENCOUNTER — APPOINTMENT (OUTPATIENT)
Dept: PHYSICAL THERAPY | Facility: HOSPITAL | Age: 66
End: 2021-11-26
Payer: MEDICAID

## 2021-11-29 ENCOUNTER — HOSPITAL ENCOUNTER (OUTPATIENT)
Dept: PHYSICAL THERAPY | Facility: HOSPITAL | Age: 66
Setting detail: THERAPIES SERIES
Discharge: HOME | End: 2021-11-29
Payer: MEDICAID

## 2021-11-29 PROCEDURE — 97110 THERAPEUTIC EXERCISES: CPT

## 2021-11-29 PROCEDURE — 97140 MANUAL THERAPY 1/> REGIONS: CPT

## 2021-11-29 NOTE — FLOWSHEET NOTE
Physical Therapy Daily Treatment / Reassessment  Date:  2021    TIme In:    4359                   Time Out:   1100    Patient Name:  Yanna Garcia    :  1955  MRN: [id number]    Restrictions/Precautions:    Pertinent Medical History:  Medical/Treatment Diagnosis Information:  ·   s/p left TKA; keft knee pain, joint stiffness, muscle weakness, abnormality of gait     Insurance/Certification information:    [facility]  Physician Information:    Freda Maier MD  Plan of care signed (Y/N):     Visit# / total visits:     27/    G-Code (if applicable):      Date / Visit # G-Code Applied:         Progress Note: [x]  Yes  []  No  Next due by: 21      Pain level:  0/10     Subjective:   Pt reports: having some less pain overall;      Objective:   Observation: mild edema L knee and lower leg. Gait: decreased hip and knee flexion, very mild antalgia   Test measurements:  LEFS: 35/80 (was 40/80 last re-assess, 26/80 prior to that). L knee AROM: 0- 95; PROM:  0-110 with overpressure  - (+) mild to mod pain with overpressure at end-range of motion. MMT: L hip flex: 5-/5, knee ext: 5-/5, knee flex: 5/5, Ankle DF: 5/5    Palpation: (+) tenderness pes anserine    Exercises:  Exercise Resistance/Repetitions Other comments   Nustep L5 x 9' 29   Seated Heel slides 2x3' 29   St marching with support 6x30 29   St TKE 3x15 L 29   St HR/TR with support 3x15 29   GS 2x20 29   QS 2x30 29   Supine HS 2x20 29   AP's 2x30 29   Achilles st with strap 10x10\" 29   Heel prop 5' 29   Mini squats 30x 29   Seated HS curls 3 x 10 OTB 29                    Other Therapeutic Activities:     Manual Treatments:  Patella mobs, grade 1-2 mobs, PROM L knee, gentle STM x 15'     Modalities:  Game Ready Vasopneumatic ice x 15 min, light compression to address LLE edema.  X 10 min - declined today      Timed Code Treatment Minutes:    58'      Total Treatment Minutes:     68'      Treatment/Activity Tolerance:  [x] Patient tolerated treatment well [] Patient limited by fatigue  [] Patient limited by pain  [] Patient limited by other medical complications  [x] Other: Pt completed tx with no pain and increased PROM- (+) pain with overpressure    Pain after treatment:      0/10     Prognosis: [x] Good [] Fair  [] Poor    Patient Requires Follow-up: [x] Yes  [] No    Plan:   [x] Continue per plan of care [] Alter current plan (see comments)  [] Plan of care initiated [] Hold pending MD visit [] Discharge    Plan for Next Session:      Goals  Short term goals  Time Frame for Short term goals: 3-4 weeks  Short term goal 1: Patient to be independent with HEP. -MET  Short term goal 2: Patient to achieve Left knee AROM: 0-105, with PROM to 110. - met  Short term goal 3: Achieve 4-/5 left knee and hip strength in MMG's. -MET  Short term goal 4: Ambulate with normal gait pattern with st. cane on level surfaces. - met except for decreased hip and knee flexion  Short term goal 5: Patient achieve ability to perform ADL's with reported no more than minimal difficulty and < 1-2/10 pain. -MET  Long term goals  Time Frame for Long term goals : 8-10 weeks  Long term goal 1: Patient to achieve Left knee AROM: 0-115, with PROM to 120-125. - not met but improving  Long term goal 2: Achieve 4+/5 left knee and hip strength in MMG's. - met  Long term goal 3: Achieve normal gait pattern on level and unlevel surfaces without assistive device. - not met but improving  Long term goal 4: Achieve ability to ambulate community distances and terrains with reported no more than minimal difficulty and < 1/10 pain. Long term goal 5: Patient achieve ability to perform ADL's and I-ADL's, and light social, recreational activities with reported no more than minimal difficulty and < 1/10 pain. Patient is showing gradual progress with ROM and functional abilities; her ROM and strength have increased since last assessment; he pain levels also seem improved.          Electronically signed by:  Anni Nix PT

## 2021-12-02 ENCOUNTER — HOSPITAL ENCOUNTER (OUTPATIENT)
Dept: PHYSICAL THERAPY | Facility: HOSPITAL | Age: 66
Setting detail: THERAPIES SERIES
Discharge: HOME | End: 2021-12-02
Payer: MEDICAID

## 2021-12-02 PROCEDURE — 97110 THERAPEUTIC EXERCISES: CPT

## 2021-12-02 PROCEDURE — 97140 MANUAL THERAPY 1/> REGIONS: CPT

## 2021-12-02 NOTE — FLOWSHEET NOTE
60      Total Treatment Minutes:    66      Treatment/Activity Tolerance:  [x] Patient tolerated treatment well [] Patient limited by fatigue  [] Patient limited by pain  [] Patient limited by other medical complications  [x] Other: Pt completed tx with no pain and is maintaining ROM at 110 deg of left knee flex. Pain after treatment:      0/10     Prognosis: [x] Good [] Fair  [] Poor    Patient Requires Follow-up: [x] Yes  [] No    Plan:   [x] Continue per plan of care [] Alter current plan (see comments)  [] Plan of care initiated [] Hold pending MD visit [] Discharge    Plan for Next Session:      Goals  Short term goals  Time Frame for Short term goals: 3-4 weeks  Short term goal 1: Patient to be independent with HEP. -MET  Short term goal 2: Patient to achieve Left knee AROM: 0-105, with PROM to 110. - met  Short term goal 3: Achieve 4-/5 left knee and hip strength in MMG's. -MET  Short term goal 4: Ambulate with normal gait pattern with st. cane on level surfaces. - met except for decreased hip and knee flexion  Short term goal 5: Patient achieve ability to perform ADL's with reported no more than minimal difficulty and < 1-2/10 pain. -MET  Long term goals  Time Frame for Long term goals : 8-10 weeks  Long term goal 1: Patient to achieve Left knee AROM: 0-115, with PROM to 120-125. - not met but improving  Long term goal 2: Achieve 4+/5 left knee and hip strength in MMG's. - met  Long term goal 3: Achieve normal gait pattern on level and unlevel surfaces without assistive device. - not met but improving  Long term goal 4: Achieve ability to ambulate community distances and terrains with reported no more than minimal difficulty and < 1/10 pain.   Long term goal 5: Patient achieve ability to perform ADL's and I-ADL's, and light social, recreational activities with reported no more than minimal difficulty and < 1/10 pain.         Electronically signed by:  Yunier Cantrell PTA

## 2021-12-06 ENCOUNTER — HOSPITAL ENCOUNTER (OUTPATIENT)
Dept: PHYSICAL THERAPY | Facility: HOSPITAL | Age: 66
Setting detail: THERAPIES SERIES
Discharge: HOME | End: 2021-12-06
Payer: MEDICAID

## 2021-12-06 PROCEDURE — 97140 MANUAL THERAPY 1/> REGIONS: CPT

## 2021-12-06 PROCEDURE — 97110 THERAPEUTIC EXERCISES: CPT

## 2021-12-06 NOTE — FLOWSHEET NOTE
Physical Therapy Daily Treatment   Date:  2021    TIme In:     0906                 Time Out:  1011    Patient Name:  Stefania Cevallos    :  1955  MRN: [id number]    Restrictions/Precautions:    Pertinent Medical History:  Medical/Treatment Diagnosis Information:  ·   s/p left TKA; keft knee pain, joint stiffness, muscle weakness, abnormality of gait     Insurance[facility]Certification information:    [facility]  Physician Information:    Richie Engel MD  Plan of care signed (Y/N):     Visit# / total visits:     29/    G-Code (if applicable):      Date / Visit # G-Code Applied:         Progress Note: []  Yes  [x]  No  Next due by: 21      Pain level:  0/10     Subjective:   Pt reports she went to her MD this past Friday and had blood work; pt states her BP was low at the visit. Pt also reports that she fell this weekend while out in her yard raking leaves. She states she slipped on wet leaves and landed face forward. She notes she does not have specific injury from her fall but her knees are sore. Objective:   Observation: mild edema L knee and lower leg. Gait: decreased hip and knee flexion, very mild antalgia   Test measurements:  LEFS: 35/80 (was 40/80 last re-assess, 26/80 prior to that). L knee AROM: 0- 95; PROM:  0-110 with overpressure  - (+) mild to mod pain with overpressure at end-range of motion.    MMT: L hip flex: 5-/5, knee ext: 5-/5, knee flex: 5/5, Ankle DF: 5/5    Palpation: (+) tenderness pes anserine    Exercises:  Exercise Resistance/Repetitions Other comments   Nustep L5 x 9' 6   Seated Heel slides 2x3' 6   St marching with support 6x30(partial) 6   St TKE 3x15 L 6   St HR/TR with support 3x15 6   GS 2x20 6   QS 2x30 6   Supine HS 2x20 6   AP's 2x30 6   Achilles st with strap 10x10\" 6   Heel prop 5' 6   Mini squats 30x 6   Seated HS curls 3 x 10 OTB 6                    Other Therapeutic Activities:     Manual Treatments:  Patella mobs, grade 1-2 mobs, PROM L knee, gentle STM x 15'     Modalities:  Game Ready Vasopneumatic ice x 15 min, light compression to address LLE edema. X 10 min - declined today      Timed Code Treatment Minutes:   60      Total Treatment Minutes:   65       Treatment/Activity Tolerance:  [x] Patient tolerated treatment well [] Patient limited by fatigue  [] Patient limited by pain  [] Patient limited by other medical complications  [x] Other: Pt responded well to manual therapy with decrease in reports of knee soreness. She achieved 107 deg PROM flexion today. Pain after treatment:      0/10     Prognosis: [x] Good [] Fair  [] Poor    Patient Requires Follow-up: [x] Yes  [] No    Plan:   [x] Continue per plan of care [] Alter current plan (see comments)  [] Plan of care initiated [] Hold pending MD visit [] Discharge    Plan for Next Session:      Goals  Short term goals  Time Frame for Short term goals: 3-4 weeks  Short term goal 1: Patient to be independent with HEP. -MET  Short term goal 2: Patient to achieve Left knee AROM: 0-105, with PROM to 110. - met  Short term goal 3: Achieve 4-/5 left knee and hip strength in MMG's. -MET  Short term goal 4: Ambulate with normal gait pattern with st. cane on level surfaces. - met except for decreased hip and knee flexion  Short term goal 5: Patient achieve ability to perform ADL's with reported no more than minimal difficulty and < 1-2/10 pain. -MET  Long term goals  Time Frame for Long term goals : 8-10 weeks  Long term goal 1: Patient to achieve Left knee AROM: 0-115, with PROM to 120-125. - not met but improving  Long term goal 2: Achieve 4+/5 left knee and hip strength in MMG's. - met  Long term goal 3: Achieve normal gait pattern on level and unlevel surfaces without assistive device. - not met but improving  Long term goal 4: Achieve ability to ambulate community distances and terrains with reported no more than minimal difficulty and < 1/10 pain.   Long term goal 5: Patient achieve ability to

## 2021-12-09 ENCOUNTER — HOSPITAL ENCOUNTER (OUTPATIENT)
Facility: HOSPITAL | Age: 66
Discharge: HOME | End: 2021-12-09
Payer: MEDICAID

## 2021-12-09 ENCOUNTER — HOSPITAL ENCOUNTER (OUTPATIENT)
Dept: PHYSICAL THERAPY | Facility: HOSPITAL | Age: 66
Setting detail: THERAPIES SERIES
Discharge: HOME | End: 2021-12-09
Payer: MEDICAID

## 2021-12-09 LAB
A/G RATIO: 1.7 (ref 0.8–2)
ALBUMIN SERPL-MCNC: 3.8 G/DL (ref 3.4–4.8)
ALP BLD-CCNC: 64 U/L (ref 25–100)
ALT SERPL-CCNC: 7 U/L (ref 4–36)
ANION GAP SERPL CALCULATED.3IONS-SCNC: 10 MMOL/L (ref 3–16)
AST SERPL-CCNC: 8 U/L (ref 8–33)
BASOPHILS ABSOLUTE: 0.1 K/UL (ref 0–0.1)
BASOPHILS RELATIVE PERCENT: 1.4 %
BILIRUB SERPL-MCNC: 0.5 MG/DL (ref 0.3–1.2)
BUN BLDV-MCNC: 13 MG/DL (ref 6–20)
CALCIUM SERPL-MCNC: 8.7 MG/DL (ref 8.5–10.5)
CHLORIDE BLD-SCNC: 105 MMOL/L (ref 98–107)
CHOLESTEROL, TOTAL: 170 MG/DL (ref 0–200)
CO2: 24 MMOL/L (ref 20–30)
CREAT SERPL-MCNC: 0.8 MG/DL (ref 0.4–1.2)
EOSINOPHILS ABSOLUTE: 0.3 K/UL (ref 0–0.4)
EOSINOPHILS RELATIVE PERCENT: 5.9 %
FOLATE: 7.56 NG/ML
GFR AFRICAN AMERICAN: >59
GFR NON-AFRICAN AMERICAN: >60
GLOBULIN: 2.3 G/DL
GLUCOSE BLD-MCNC: 102 MG/DL (ref 74–106)
HBA1C MFR BLD: 5.7 %
HCT VFR BLD CALC: 37.7 % (ref 37–47)
HDLC SERPL-MCNC: 56 MG/DL (ref 40–60)
HEMOGLOBIN: 11.7 G/DL (ref 11.5–16.5)
IMMATURE GRANULOCYTES #: 0 K/UL
IMMATURE GRANULOCYTES %: 0.2 % (ref 0–5)
LDL CHOLESTEROL CALCULATED: 81 MG/DL
LYMPHOCYTES ABSOLUTE: 1.1 K/UL (ref 1.5–4)
LYMPHOCYTES RELATIVE PERCENT: 24.3 %
MCH RBC QN AUTO: 29.5 PG (ref 27–32)
MCHC RBC AUTO-ENTMCNC: 31 G/DL (ref 31–35)
MCV RBC AUTO: 95.2 FL (ref 80–100)
MONOCYTES ABSOLUTE: 0.4 K/UL (ref 0.2–0.8)
MONOCYTES RELATIVE PERCENT: 8.9 %
NEUTROPHILS ABSOLUTE: 2.6 K/UL (ref 2–7.5)
NEUTROPHILS RELATIVE PERCENT: 59.3 %
PDW BLD-RTO: 13.1 % (ref 11–16)
PLATELET # BLD: 263 K/UL (ref 150–400)
PMV BLD AUTO: 10.4 FL (ref 6–10)
POTASSIUM SERPL-SCNC: 4.3 MMOL/L (ref 3.4–5.1)
RBC # BLD: 3.96 M/UL (ref 3.8–5.8)
SODIUM BLD-SCNC: 139 MMOL/L (ref 136–145)
TOTAL PROTEIN: 6.1 G/DL (ref 6.4–8.3)
TRIGL SERPL-MCNC: 167 MG/DL (ref 0–249)
TSH SERPL DL<=0.05 MIU/L-ACNC: 3.91 UIU/ML (ref 0.27–4.2)
VITAMIN B-12: 283 PG/ML (ref 211–911)
VLDLC SERPL CALC-MCNC: 33 MG/DL
WBC # BLD: 4.4 K/UL (ref 4–11)

## 2021-12-09 PROCEDURE — 83036 HEMOGLOBIN GLYCOSYLATED A1C: CPT

## 2021-12-09 PROCEDURE — 97140 MANUAL THERAPY 1/> REGIONS: CPT

## 2021-12-09 PROCEDURE — 97110 THERAPEUTIC EXERCISES: CPT

## 2021-12-09 PROCEDURE — 80053 COMPREHEN METABOLIC PANEL: CPT

## 2021-12-09 PROCEDURE — 85025 COMPLETE CBC W/AUTO DIFF WBC: CPT

## 2021-12-09 PROCEDURE — 84443 ASSAY THYROID STIM HORMONE: CPT

## 2021-12-09 PROCEDURE — 80061 LIPID PANEL: CPT

## 2021-12-09 PROCEDURE — 82746 ASSAY OF FOLIC ACID SERUM: CPT

## 2021-12-09 PROCEDURE — 82607 VITAMIN B-12: CPT

## 2021-12-09 PROCEDURE — 36415 COLL VENOUS BLD VENIPUNCTURE: CPT

## 2021-12-09 NOTE — FLOWSHEET NOTE
Treatment/Activity Tolerance:  [x] Patient tolerated treatment well [] Patient limited by fatigue  [] Patient limited by pain  [] Patient limited by other medical complications  [x] Other: Pt responded well to manual therapy with decrease in reports of knee soreness. She achieved 107 deg PROM flexion today. Pain after treatment:      \"Not good\" /10     Prognosis: [x] Good [] Fair  [] Poor    Patient Requires Follow-up: [x] Yes  [] No    Plan:   [x] Continue per plan of care [] Alter current plan (see comments)  [] Plan of care initiated [] Hold pending MD visit [] Discharge    Plan for Next Session:      Goals  Short term goals  Time Frame for Short term goals: 3-4 weeks  Short term goal 1: Patient to be independent with HEP. -MET  Short term goal 2: Patient to achieve Left knee AROM: 0-105, with PROM to 110. - met  Short term goal 3: Achieve 4-/5 left knee and hip strength in MMG's. -MET  Short term goal 4: Ambulate with normal gait pattern with st. cane on level surfaces. - met except for decreased hip and knee flexion  Short term goal 5: Patient achieve ability to perform ADL's with reported no more than minimal difficulty and < 1-2/10 pain. -MET  Long term goals  Time Frame for Long term goals : 8-10 weeks  Long term goal 1: Patient to achieve Left knee AROM: 0-115, with PROM to 120-125. - not met but improving  Long term goal 2: Achieve 4+/5 left knee and hip strength in MMG's. - met  Long term goal 3: Achieve normal gait pattern on level and unlevel surfaces without assistive device. - not met but improving  Long term goal 4: Achieve ability to ambulate community distances and terrains with reported no more than minimal difficulty and < 1/10 pain.   Long term goal 5: Patient achieve ability to perform ADL's and I-ADL's, and light social, recreational activities with reported no more than minimal difficulty and < 1/10 pain.         Electronically signed by:  Brenna Bourne, PT

## 2021-12-11 ENCOUNTER — HOSPITAL ENCOUNTER (EMERGENCY)
Facility: HOSPITAL | Age: 66
Discharge: HOME | End: 2021-12-12
Attending: FAMILY MEDICINE
Payer: MEDICAID

## 2021-12-11 DIAGNOSIS — M79.604 BILATERAL LOWER EXTREMITY PAIN: ICD-10-CM

## 2021-12-11 DIAGNOSIS — M79.605 BILATERAL LOWER EXTREMITY PAIN: ICD-10-CM

## 2021-12-11 DIAGNOSIS — R60.9 PERIPHERAL EDEMA: Primary | ICD-10-CM

## 2021-12-11 LAB
BASOPHILS ABSOLUTE: 0.1 K/UL (ref 0–0.1)
BASOPHILS RELATIVE PERCENT: 1 %
EOSINOPHILS ABSOLUTE: 0.3 K/UL (ref 0–0.4)
EOSINOPHILS RELATIVE PERCENT: 4.3 %
HCT VFR BLD CALC: 38.7 % (ref 37–47)
HEMOGLOBIN: 11.9 G/DL (ref 11.5–16.5)
IMMATURE GRANULOCYTES #: 0 K/UL
IMMATURE GRANULOCYTES %: 0.4 % (ref 0–5)
LYMPHOCYTES ABSOLUTE: 1.8 K/UL (ref 1.5–4)
LYMPHOCYTES RELATIVE PERCENT: 27 %
MCH RBC QN AUTO: 29.5 PG (ref 27–32)
MCHC RBC AUTO-ENTMCNC: 30.7 G/DL (ref 31–35)
MCV RBC AUTO: 96 FL (ref 80–100)
MONOCYTES ABSOLUTE: 0.6 K/UL (ref 0.2–0.8)
MONOCYTES RELATIVE PERCENT: 9 %
NEUTROPHILS ABSOLUTE: 3.9 K/UL (ref 2–7.5)
NEUTROPHILS RELATIVE PERCENT: 58.3 %
PDW BLD-RTO: 13 % (ref 11–16)
PLATELET # BLD: 276 K/UL (ref 150–400)
PMV BLD AUTO: 10.4 FL (ref 6–10)
RBC # BLD: 4.03 M/UL (ref 3.8–5.8)
WBC # BLD: 6.7 K/UL (ref 4–11)

## 2021-12-11 PROCEDURE — 84484 ASSAY OF TROPONIN QUANT: CPT

## 2021-12-11 PROCEDURE — 6370000000 HC RX 637 (ALT 250 FOR IP)

## 2021-12-11 PROCEDURE — 99283 EMERGENCY DEPT VISIT LOW MDM: CPT

## 2021-12-11 PROCEDURE — 80053 COMPREHEN METABOLIC PANEL: CPT

## 2021-12-11 PROCEDURE — 83880 ASSAY OF NATRIURETIC PEPTIDE: CPT

## 2021-12-11 PROCEDURE — 85025 COMPLETE CBC W/AUTO DIFF WBC: CPT

## 2021-12-11 PROCEDURE — 36415 COLL VENOUS BLD VENIPUNCTURE: CPT

## 2021-12-11 RX ORDER — FUROSEMIDE 40 MG/1
20 TABLET ORAL DAILY
Status: DISCONTINUED | OUTPATIENT
Start: 2021-12-12 | End: 2021-12-12 | Stop reason: HOSPADM

## 2021-12-11 RX ORDER — FUROSEMIDE 40 MG/1
TABLET ORAL
Status: COMPLETED
Start: 2021-12-11 | End: 2021-12-11

## 2021-12-11 RX ADMIN — FUROSEMIDE 20 MG: 40 TABLET ORAL at 23:22

## 2021-12-11 ASSESSMENT — ENCOUNTER SYMPTOMS: NAUSEA: 1

## 2021-12-11 ASSESSMENT — PAIN SCALES - GENERAL: PAINLEVEL_OUTOF10: 7

## 2021-12-12 VITALS
SYSTOLIC BLOOD PRESSURE: 156 MMHG | DIASTOLIC BLOOD PRESSURE: 79 MMHG | TEMPERATURE: 98.4 F | RESPIRATION RATE: 20 BRPM | OXYGEN SATURATION: 96 % | HEART RATE: 78 BPM

## 2021-12-12 LAB
A/G RATIO: 1.3 (ref 0.8–2)
ALBUMIN SERPL-MCNC: 3.7 G/DL (ref 3.4–4.8)
ALP BLD-CCNC: 65 U/L (ref 25–100)
ALT SERPL-CCNC: 7 U/L (ref 4–36)
ANION GAP SERPL CALCULATED.3IONS-SCNC: 9 MMOL/L (ref 3–16)
AST SERPL-CCNC: 10 U/L (ref 8–33)
BILIRUB SERPL-MCNC: 0.4 MG/DL (ref 0.3–1.2)
BUN BLDV-MCNC: 14 MG/DL (ref 6–20)
CALCIUM SERPL-MCNC: 9.2 MG/DL (ref 8.5–10.5)
CHLORIDE BLD-SCNC: 106 MMOL/L (ref 98–107)
CO2: 26 MMOL/L (ref 20–30)
CREAT SERPL-MCNC: 0.8 MG/DL (ref 0.4–1.2)
GFR AFRICAN AMERICAN: >59
GFR NON-AFRICAN AMERICAN: >60
GLOBULIN: 2.8 G/DL
GLUCOSE BLD-MCNC: 105 MG/DL (ref 74–106)
POTASSIUM REFLEX MAGNESIUM: 4.4 MMOL/L (ref 3.4–5.1)
PRO-BNP: 168 PG/ML (ref 0–1800)
SODIUM BLD-SCNC: 141 MMOL/L (ref 136–145)
TOTAL PROTEIN: 6.5 G/DL (ref 6.4–8.3)
TROPONIN: <0.3 NG/ML

## 2021-12-12 RX ORDER — FUROSEMIDE 20 MG/1
20 TABLET ORAL DAILY
Qty: 20 TABLET | Refills: 0 | Status: SHIPPED | OUTPATIENT
Start: 2021-12-12

## 2021-12-12 RX ORDER — POTASSIUM CHLORIDE 750 MG/1
10 TABLET, EXTENDED RELEASE ORAL DAILY
Qty: 20 TABLET | Refills: 0 | Status: SHIPPED | OUTPATIENT
Start: 2021-12-12

## 2021-12-12 NOTE — ED PROVIDER NOTES
[address]  eMERGENCY dEPARTMENT eNCOUnter      Pt Name: Nemesio Hamilton  MRN: [id number]  Johntrongfniels 1955  Date of evaluation: 12/11/2021  Provider: Twila Tubbs [facility]       Chief Complaint   Patient presents with    Leg Swelling     C/o bilat. leg swelling,onset 3 wks. HISTORY OF PRESENT ILLNESS   (Location/Symptom, Timing/Onset, Context/Setting, Quality, Duration, Modifying Factors, Severity)  Note limiting factors. Nemesio Hamilton is a 61 y.o. female who presents to the emergency department for having leg swelling, pain to both legs marika in the calf areas, some bloating to her stomach. She states that she has been having the leg swelling ever since she had the knee replacement back in Aug. She states that it has been worse in the past 3 weeks, marika the pain in her legs. She saw Dr. Rufus Medel on tues of this week because of blood pressure problems. She said that when she was at the heart doctor her BP was normal. She said that they placed a Holter monitor on her. She is here for the swelling. Nursing Notes were reviewed. REVIEW OF SYSTEMS    (2-9 systems for level 4, 10 or more forlevel 5)     Review of Systems   Constitutional:        Weight gain   Cardiovascular: Positive for leg swelling. Gastrointestinal: Positive for nausea. Abdominal bloating   All other systems reviewed and are negative.           PAST MEDICAL HISTORY     Past Medical History:   Diagnosis Date    Arthritis     Depression     Hypertension     Thyroid disease     UTI (urinary tract infection)          SURGICAL HISTORY       Past Surgical History:   Procedure Laterality Date    APPENDECTOMY      CARPAL TUNNEL RELEASE Bilateral     CHOLECYSTECTOMY      GASTRIC BYPASS SURGERY      JOINT REPLACEMENT Right     DC COLONOSCOPY FLX DX W/COLLJ SPEC WHEN PFRMD N/A 8/2/2018    COLONOSCOPY DIAGNOSTIC OR SCREENING performed by Tanya Gavin MD at [address] [place] [address] TOTAL KNEE ARTHROPLASTY Left 07/14/2021    TUBAL LIGATION           CURRENT MEDICATIONS       Previous Medications    ESTRADIOL (ESTRACE) 1 MG TABLET    Take 1 mg by mouth daily    FLUOXETINE (PROZAC) 20 MG CAPSULE    Take 40 mg by mouth daily    GABAPENTIN (NEURONTIN) 800 MG TABLET    Take 800 mg by mouth 3 times daily. IPRATROPIUM (ATROVENT) 0.03 % NASAL SPRAY    1 spray by Nasal route 2 times daily    LANSOPRAZOLE (PREVACID) 30 MG DELAYED RELEASE CAPSULE    Take 30 mg by mouth daily    LEVOTHYROXINE (SYNTHROID) 50 MCG TABLET    Take 50 mcg by mouth Daily    MEDROXYPROGESTERONE (PROVERA) 2.5 MG TABLET    Take 2.5 mg by mouth daily    MELOXICAM (MOBIC) 15 MG TABLET    Take 15 mg by mouth daily    MONTELUKAST (SINGULAIR) 10 MG TABLET    Take 10 mg by mouth daily    OXYBUTYNIN CHLORIDE PO    Take by mouth    OXYCODONE-ACETAMINOPHEN (PERCOCET) 5-325 MG PER TABLET    Take 1 tablet by mouth every 4 hours as needed for Pain. PRAMIPEXOLE (MIRAPEX) 1 MG TABLET    Take 1 mg by mouth nightly    TRAMADOL (ULTRAM) 50 MG TABLET    Take 50 mg by mouth every 6 hours as needed for Pain. TRAZODONE (DESYREL) 100 MG TABLET    Take 100 mg by mouth nightly       ALLERGIES     Patient has no known allergies. FAMILY HISTORY     No family history on file.        SOCIAL HISTORY       Social History     Socioeconomic History    Marital status: Legally      Spouse name: Not on file    Number of children: Not on file    Years of education: Not on file    Highest education level: Not on file   Occupational History    Not on file   Tobacco Use    Smoking status: Never Smoker    Smokeless tobacco: Never Used   Vaping Use    Vaping Use: Never used   Substance and Sexual Activity    Alcohol use: No    Drug use: No    Sexual activity: Not on file   Other Topics Concern    Not on file   Social History Narrative    Not on file     Social Determinants of Health     Financial Resource Strain:     Difficulty of Paying bilaterally  Pulmonary:      Effort: Pulmonary effort is normal.      Breath sounds: Normal breath sounds. No rales. Abdominal:      General: Bowel sounds are normal. There is no distension. Palpations: Abdomen is soft. Tenderness: There is no abdominal tenderness. Musculoskeletal:         General: Normal range of motion. Cervical back: Neck supple. Right lower leg: Edema present. Left lower leg: Edema present. Comments: Pt with + 2 lower extremity edema up to bilateral tibial tuberosities  No direct calf tenderness but more entire lower leg swelling, without palpable cords to calf areas, no cyanosis or dusking   Skin:     General: Skin is warm and dry. Neurological:      Mental Status: She is alert and oriented to person, place, and time. Psychiatric:         Mood and Affect: Mood is depressed.          DIAGNOSTIC RESULTS     EKG: All EKG's are interpreted by the Emergency Department Physician who either signs or Co-signs this chart in the absence of a cardiologist.    None    RADIOLOGY:   Non-plain film images such as CT, Ultrasound and MRI are read by the radiologist. Plain radiographic images are visualized andpreliminarily interpreted by the emergency physician with the below findings:    CXR - See Below    Interpretationper the Radiologist below, if available at the time of this note:    No orders to display         ED BEDSIDE ULTRASOUND:   Performed by ED Physician - none    LABS:  Labs Reviewed   CBC WITH AUTO DIFFERENTIAL - Abnormal; Notable for the following components:       Result Value    MCHC 30.7 (*)     MPV 10.4 (*)     All other components within normal limits    Narrative:     Performed at:  [address] [place] [address] Laboratory  [address] [place] [address], [address]   Phone [phone]   COMPREHENSIVE METABOLIC PANEL W/ REFLEX TO MG FOR LOW K    Narrative:     Performed at:  [address] [place] [address] Laboratory  [address] [facility] [address]   Phone [phone]    Narrative:     Performed at:  [address] [facility]  [facility] [address]   Phone [phone]   TROPONIN    Narrative:     Performed at:  [address] [facility]  [facility] [address]   Phone [phone]       All other labs were within normal range or not returned as of this dictation. EMERGENCY DEPARTMENT COURSE and DIFFERENTIAL DIAGNOSIS/MDM:   Vitals:    Vitals:    12/11/21 2135   BP: (!) 156/79   Pulse: 78   Resp: 20   Temp: 98.4 °F (36.9 °C)   TempSrc: Oral   SpO2: 96%           CRITICAL CARE TIME   Total Critical Care time was 0 minutes, excluding separatelyreportable procedures. There was a high probability ofclinically significant/life threatening deterioration in the patient's condition which required my urgent intervention. CONSULTS:  None    PROCEDURES:  None    PROGRESS NOTES:  Pt with edema to legs. Pt doesn't smoke. No history of CHF. Pt without PAD/PVD. No cellulitis to legs. Pt without history of DVT and had ultrasound in the past. Will obtain baseline labs to marika check renal function, BNP. Pt with essentially normal labs without any abnormalities. Most likely her edema is related to her marked weight/size for height 5'1\" in addition to having marked knee OA with history of knee replacement. Will start pt on Lasix 2- mg daily and 10 meq potassium. Pt to see PCP in 1-2 weeks. FINAL IMPRESSION      1. Peripheral edema New Problem   2. Bilateral lower extremity pain New Problem         DISPOSITION/PLAN   DISPOSITION Decision To Discharge 12/12/2021 12:35:25 AM      PATIENT REFERRED TO:    Pt to see PCP in next 1-2 weeks; pt to start fluid pills to get fluid off ankles/feet/legs.  Pt to take potassium so the water pill doesn't flush the potassium out of her system          DISCHARGE MEDICATIONS:  New Prescriptions    FUROSEMIDE (LASIX) 20 MG TABLET    Take 1 tablet by mouth daily    POTASSIUM CHLORIDE (KLOR-CON M) 10 MEQ EXTENDED RELEASE TABLET    Take 1 tablet by mouth daily       (Please note that portions of this note were completed with a voice recognition program.  Efforts were made to edit the dictations but occasionallywords are mis-transcribed.)    Darrin Carver DO (electronically signed)  Attending Emergency Physician          Darrin Carver DO  12/12/21 0037

## 2021-12-13 ENCOUNTER — HOSPITAL ENCOUNTER (OUTPATIENT)
Dept: PHYSICAL THERAPY | Facility: HOSPITAL | Age: 66
Setting detail: THERAPIES SERIES
Discharge: HOME | End: 2021-12-13
Payer: MEDICAID

## 2021-12-13 PROCEDURE — 97110 THERAPEUTIC EXERCISES: CPT

## 2021-12-13 PROCEDURE — 97140 MANUAL THERAPY 1/> REGIONS: CPT

## 2021-12-13 NOTE — FLOWSHEET NOTE
today      Timed Code Treatment Minutes:   72'      Total Treatment Minutes:   79'       Treatment/Activity Tolerance:  [x] Patient tolerated treatment well [] Patient limited by fatigue  [] Patient limited by pain  [] Patient limited by other medical complications  [x] Other:  Pt had moderate edema at LLE . Pt had pain at end ROM but was able to obtain 108 PROM flexion today. Pt was advised that if her swelling persisted she should follow up with her surgeon's office. Pain after treatment:      0/10     Prognosis: [x] Good [] Fair  [] Poor    Patient Requires Follow-up: [x] Yes  [] No    Plan:   [x] Continue per plan of care [] Alter current plan (see comments)  [] Plan of care initiated [] Hold pending MD visit [] Discharge    Plan for Next Session:      Goals  Short term goals  Time Frame for Short term goals: 3-4 weeks  Short term goal 1: Patient to be independent with HEP. -MET  Short term goal 2: Patient to achieve Left knee AROM: 0-105, with PROM to 110. - met  Short term goal 3: Achieve 4-/5 left knee and hip strength in MMG's. -MET  Short term goal 4: Ambulate with normal gait pattern with st. cane on level surfaces. - met except for decreased hip and knee flexion  Short term goal 5: Patient achieve ability to perform ADL's with reported no more than minimal difficulty and < 1-2/10 pain. -MET  Long term goals  Time Frame for Long term goals : 8-10 weeks  Long term goal 1: Patient to achieve Left knee AROM: 0-115, with PROM to 120-125. - not met but improving  Long term goal 2: Achieve 4+/5 left knee and hip strength in MMG's. - met  Long term goal 3: Achieve normal gait pattern on level and unlevel surfaces without assistive device. - not met but improving  Long term goal 4: Achieve ability to ambulate community distances and terrains with reported no more than minimal difficulty and < 1/10 pain.   Long term goal 5: Patient achieve ability to perform ADL's and I-ADL's, and light social, recreational activities with reported no more than minimal difficulty and < 1/10 pain.         Electronically signed by:  Kiersten Huang PT

## 2021-12-16 ENCOUNTER — HOSPITAL ENCOUNTER (OUTPATIENT)
Dept: PHYSICAL THERAPY | Facility: HOSPITAL | Age: 66
Setting detail: THERAPIES SERIES
Discharge: HOME | End: 2021-12-16
Payer: MEDICAID

## 2021-12-16 PROCEDURE — 97110 THERAPEUTIC EXERCISES: CPT

## 2021-12-16 PROCEDURE — 97140 MANUAL THERAPY 1/> REGIONS: CPT

## 2021-12-16 NOTE — FLOWSHEET NOTE
Physical Therapy Daily Treatment   Date:  2021    TIme In:     4259                 Time Out:  4916    Patient Name:  Nolberto Barthel    :  1955  MRN: [id number]    Restrictions/Precautions:    Pertinent Medical History:  Medical/Treatment Diagnosis Information:  ·   s/p left TKA; keft knee pain, joint stiffness, muscle weakness, abnormality of gait     Insurance/Certification information:    [facility] [employer]  Physician Information:    Andrey Watkins MD  Plan of care signed (Y/N):     Visit# / total visits:     28 /    G-Code (if applicable):      Date / Visit # G-Code Applied:         Progress Note: []  Yes  [x]  No  Next due by: 21      Pain level:  0/10     Subjective:   Pt reports she has an apt to return to her Md about her back pain and the swelling in her leg. She expresses that her swelling isn't going down even with a fluid pill. Objective:   Observation: mild edema L knee and lower leg. Gait: decreased hip and knee flexion, very mild antalgia   Test measurements:  LEFS: 35/80 (was 40/80 last re-assess, 26/80 prior to that). L knee AROM: 0- 105; PROM:  0-107 with overpressure  - (+) mild to mod pain with overpressure at end-range of motion. MMT: L hip flex: 5-/5, knee ext: 5-/5, knee flex: 5/5, Ankle DF: 5/5    Palpation: (+) tenderness pes anserine    Exercises:  Exercise Resistance/Repetitions Other comments   Nustep L5 x 9' 16   Seated Heel slides 2x3' 16   St marching with support 6x30(partial) 16   St TKE 3x15 L 16   St HR/TR with support 3x15 16   GS 2x20 16   QS 2x30 16   Supine HS 2x20 16   AP's 2x30 16   Achilles st with strap 10x10\" 16   Mini squats 30x 16   Seated HS curls 3 x 10 OTB 16   SAQ 3x10 16               Other Therapeutic Activities:     Manual Treatments:  Patella mobs, grade 1-2 mobs, PROM L knee, gentle STM x 15'     Modalities:  Game Ready Vasopneumatic ice x 15 min, light compression to address LLE edema.  X 10 min - declined today      Timed Code Treatment Minutes:  55      Total Treatment Minutes:   57    Treatment/Activity Tolerance:  [x] Patient tolerated treatment well [] Patient limited by fatigue  [] Patient limited by pain  [] Patient limited by other medical complications  [x] Other:  Pt completed tx with no pain and demonstrated an increase in PROM as compared to previous visit. Pain after treatment:      0/10     Prognosis: [x] Good [] Fair  [] Poor    Patient Requires Follow-up: [x] Yes  [] No    Plan:   [x] Continue per plan of care [] Alter current plan (see comments)  [] Plan of care initiated [] Hold pending MD visit [] Discharge    Plan for Next Session:      Goals  Short term goals  Time Frame for Short term goals: 3-4 weeks  Short term goal 1: Patient to be independent with HEP. -MET  Short term goal 2: Patient to achieve Left knee AROM: 0-105, with PROM to 110. - met  Short term goal 3: Achieve 4-/5 left knee and hip strength in MMG's. -MET  Short term goal 4: Ambulate with normal gait pattern with st. cane on level surfaces. - met except for decreased hip and knee flexion  Short term goal 5: Patient achieve ability to perform ADL's with reported no more than minimal difficulty and < 1-2/10 pain. -MET  Long term goals  Time Frame for Long term goals : 8-10 weeks  Long term goal 1: Patient to achieve Left knee AROM: 0-115, with PROM to 120-125. - not met but improving  Long term goal 2: Achieve 4+/5 left knee and hip strength in MMG's. - met  Long term goal 3: Achieve normal gait pattern on level and unlevel surfaces without assistive device. - not met but improving  Long term goal 4: Achieve ability to ambulate community distances and terrains with reported no more than minimal difficulty and < 1/10 pain.   Long term goal 5: Patient achieve ability to perform ADL's and I-ADL's, and light social, recreational activities with reported no more than minimal difficulty and < 1/10 pain.         Electronically signed by:  Olimpia Machado PTA

## 2021-12-20 ENCOUNTER — APPOINTMENT (OUTPATIENT)
Dept: PHYSICAL THERAPY | Facility: HOSPITAL | Age: 66
End: 2021-12-20
Payer: MEDICAID

## 2021-12-27 ENCOUNTER — HOSPITAL ENCOUNTER (OUTPATIENT)
Dept: PHYSICAL THERAPY | Facility: HOSPITAL | Age: 66
Setting detail: THERAPIES SERIES
End: 2021-12-27
Payer: MEDICAID

## 2021-12-28 ENCOUNTER — HOSPITAL ENCOUNTER (OUTPATIENT)
Facility: HOSPITAL | Age: 66
Discharge: HOME | End: 2021-12-28
Payer: MEDICAID

## 2021-12-28 ENCOUNTER — HOSPITAL ENCOUNTER (OUTPATIENT)
Dept: GENERAL RADIOLOGY | Facility: HOSPITAL | Age: 66
Discharge: HOME | End: 2021-12-28
Payer: MEDICAID

## 2021-12-28 DIAGNOSIS — N30.20 ACUTE ON CHRONIC CYSTITIS: ICD-10-CM

## 2021-12-28 DIAGNOSIS — N30.00 ACUTE ON CHRONIC CYSTITIS: ICD-10-CM

## 2021-12-28 LAB
BILIRUBIN URINE: NEGATIVE
BLOOD, URINE: NEGATIVE
CLARITY: CLEAR
COLOR: YELLOW
GLUCOSE URINE: NEGATIVE MG/DL
KETONES, URINE: NEGATIVE MG/DL
LEUKOCYTE ESTERASE, URINE: NEGATIVE
MICROSCOPIC EXAMINATION: NORMAL
NITRITE, URINE: NEGATIVE
PH UA: 5.5 (ref 5–8)
PROTEIN UA: NEGATIVE MG/DL
SPECIFIC GRAVITY UA: >=1.03 (ref 1–1.03)
URINE REFLEX TO CULTURE: NORMAL
URINE TYPE: NORMAL
UROBILINOGEN, URINE: 0.2 E.U./DL

## 2021-12-28 PROCEDURE — 87186 SC STD MICRODIL/AGAR DIL: CPT

## 2021-12-28 PROCEDURE — 87086 URINE CULTURE/COLONY COUNT: CPT

## 2021-12-28 PROCEDURE — 74018 RADEX ABDOMEN 1 VIEW: CPT

## 2021-12-28 PROCEDURE — 87077 CULTURE AEROBIC IDENTIFY: CPT

## 2021-12-28 PROCEDURE — 81003 URINALYSIS AUTO W/O SCOPE: CPT

## 2021-12-29 ENCOUNTER — APPOINTMENT (OUTPATIENT)
Dept: PHYSICAL THERAPY | Facility: HOSPITAL | Age: 66
End: 2021-12-29
Payer: MEDICAID

## 2022-01-01 LAB
ORGANISM: ABNORMAL
URINE CULTURE, ROUTINE: ABNORMAL

## 2022-01-04 ENCOUNTER — HOSPITAL ENCOUNTER (OUTPATIENT)
Dept: PHYSICAL THERAPY | Facility: HOSPITAL | Age: 67
Setting detail: THERAPIES SERIES
Discharge: HOME | End: 2022-01-04
Payer: MEDICAID

## 2022-01-04 PROCEDURE — 97140 MANUAL THERAPY 1/> REGIONS: CPT

## 2022-01-04 PROCEDURE — 97110 THERAPEUTIC EXERCISES: CPT

## 2022-01-04 NOTE — FLOWSHEET NOTE
Physical Therapy Daily Treatment / Reassessment   Date:  2022    TIme In:    5375                 Time Out: 2287    Patient Name:  Jonnie Butler    :  1955  MRN: [id number]    Restrictions/Precautions:    Pertinent Medical History:  Medical/Treatment Diagnosis Information:  ·   s/p left TKA; keft knee pain, joint stiffness, muscle weakness, abnormality of gait     Insurance/Certification information:    [employer]  Physician Information:    Jennifer Roque MD  Plan of care signed (Y/N):     Visit# / total visits:     33/    G-Code (if applicable):      Date / Visit # G-Code Applied:         Progress Note: []  Yes  [x]  No  Next due by: 21      Pain level:  0/10     Subjective:   Pt reports she has been sick and unable to come for a while. She expressed that her leg has been swelling worse. She has started back on her arthritis meds since last visit. Objective:   Observation: mild edema L knee and lower leg. Gait: decreased hip and knee flexion, very mild antalgia   Test measurements:  LEFS: 32/80 (was 35/80 last re-assess, 40/80 prior to that). L knee AROM: 0- 105; PROM:  0-102(107 previously) with overpressure  - (+) mild to mod pain with overpressure at end-range of motion. MMT: L hip flex: 5-/5, knee ext: 5-/5, knee flex: 5/5, Ankle DF: 5/5    Palpation: (+) tenderness pes anserine    Exercises:  Exercise Resistance/Repetitions Other comments   Nustep L5 x 9' 4   Seated Heel slides 2x3' 4   St marching with support 6x30(partial) 4   St TKE 3x15 L BTB 4   St HR/TR with support 3x15 4   GS 2x20 4   QS 2x30 4   Supine HS 2x20 4   AP's 2x30 4   Achilles st with strap 10x10\" 4   Mini squats 30x 4   Seated HS curls 3 x 10 OTB 4   SAQ 3x10 4               Other Therapeutic Activities: Re-assessment performed by Consuelo Lockett, PT.     Manual Treatments:  Patella mobs, grade 1-2 mobs, PROM L knee, gentle STM x 15'     Modalities:  Game Ready Vasopneumatic ice x 15 min, light compression to address LLE edema. X 10 min - declined today      Timed Code Treatment Minutes:  45      Total Treatment Minutes:   50     Treatment/Activity Tolerance:  [x] Patient tolerated treatment well [] Patient limited by fatigue  [] Patient limited by pain  [] Patient limited by other medical complications  [x] Other:  Pt completed tx with no pain and decreased ROM as compared to previous visit. Pain after treatment:      0/10     Prognosis: [x] Good [] Fair  [] Poor    Patient Requires Follow-up: [x] Yes  [] No    Plan:   [x] Continue per plan of care [] Alter current plan (see comments)  [] Plan of care initiated [] Hold pending MD visit [] Discharge    Plan for Next Session:      Goals  Short term goals  Time Frame for Short term goals: 3-4 weeks  Short term goal 1: Patient to be independent with HEP. -MET  Short term goal 2: Patient to achieve Left knee AROM: 0-105, with PROM to 110. - met  Short term goal 3: Achieve 4-/5 left knee and hip strength in MMG's. -MET  Short term goal 4: Ambulate with normal gait pattern with st. cane on level surfaces. - met except for decreased hip and knee flexion  Short term goal 5: Patient achieve ability to perform ADL's with reported no more than minimal difficulty and < 1-2/10 pain. -MET  Long term goals  Time Frame for Long term goals : 8-10 weeks  Long term goal 1: Patient to achieve Left knee AROM: 0-115, with PROM to 120-125. - not met but improving  Long term goal 2: Achieve 4+/5 left knee and hip strength in MMG's. - met  Long term goal 3: Achieve normal gait pattern on level and unlevel surfaces without assistive device. - not met but improving  Long term goal 4: Achieve ability to ambulate community distances and terrains with reported no more than minimal difficulty and < 1/10 pain.   Long term goal 5: Patient achieve ability to perform ADL's and I-ADL's, and light social, recreational activities with reported no more than minimal difficulty and < 1/10 pain.    Patient continues to present with left knee ROM, strength, and functional deficits; she has missed regular therapy visits, and thus shows a decline in her ROM since her last assessment; her self-assessment of functional capacity has declined as well; she has had a challenging rehab following surgery; she will benefit from resuming therapy, and receiving progressive ROM and strengthening; manual therapy will also help reduce her current deficits, and to enhance her functional abilities.         Electronically signed by:  Judith Keenan PTA      Electronically signed by Macie Romero PT on 1/4/2022 at 11:51 AM

## 2022-01-06 ENCOUNTER — APPOINTMENT (OUTPATIENT)
Dept: PHYSICAL THERAPY | Facility: HOSPITAL | Age: 67
End: 2022-01-06
Payer: MEDICAID

## 2022-01-10 ENCOUNTER — APPOINTMENT (OUTPATIENT)
Dept: PHYSICAL THERAPY | Facility: HOSPITAL | Age: 67
End: 2022-01-10
Payer: MEDICAID

## 2022-01-13 ENCOUNTER — HOSPITAL ENCOUNTER (OUTPATIENT)
Dept: PHYSICAL THERAPY | Facility: HOSPITAL | Age: 67
Setting detail: THERAPIES SERIES
Discharge: HOME | End: 2022-01-13
Payer: MEDICAID

## 2022-01-13 PROCEDURE — 97140 MANUAL THERAPY 1/> REGIONS: CPT

## 2022-01-13 PROCEDURE — 97110 THERAPEUTIC EXERCISES: CPT

## 2022-01-13 NOTE — FLOWSHEET NOTE
Physical Therapy Daily Treatment  Date:  2022    TIme In:    1513                 Time Out:  1024    Patient Name:  Milli Griffin    :  1955  MRN: [id number]    Restrictions/Precautions:    Pertinent Medical History:  Medical/Treatment Diagnosis Information:  ·   s/p left TKA; keft knee pain, joint stiffness, muscle weakness, abnormality of gait     Insurance/Certification information:    [facility] Medicaid  Physician Information:    Osvaldo Rubalcava MD  Plan of care signed (Y/N):     Visit# / total visits:     34/    G-Code (if applicable):      Date / Visit # G-Code Applied:         Progress Note: []  Yes  [x]  No  Next due by: 21      Pain level:  0/10     Subjective:   Pt reports she had a fall on Tuesday while carrying groceries into her house, falling face forward. Pt reports having an abrasion on her lip and her L knee. She notes she does not think she injured her knee. Objective:   Observation: mild edema L knee and lower leg. Gait: decreased hip and knee flexion, very mild antalgia   Test measurements:  LEFS: 32/80 (was 35/80 last re-assess, 40/80 prior to that). L knee AROM: 0- 105; PROM:  0-102(107 previously) with overpressure  - (+) mild to mod pain with overpressure at end-range of motion.    MMT: L hip flex: 5-/5, knee ext: 5-/5, knee flex: 5/5, Ankle DF: 5/5    Palpation: (+) tenderness pes anserine    Exercises:  Exercise Resistance/Repetitions Other comments   Nustep L5 x 9' 13   Seated Heel slides 2x3' 13   St marching with support 6x30(partial) 13   St TKE 3x15 L BTB 13   St HR/TR with support 3x15 13   GS 2x20 13   QS 2x30 13   Supine HS 2x20 13   AP's 2x30 13   Achilles st with strap 10x10\" 13   Mini squats 30x 13   Seated HS curls 3 x 10 OTB 13   SAQ 3x10 13               Other Therapeutic Activities:     Manual Treatments:  Patella mobs, grade 1-2 mobs, PROM L knee, gentle STM x 15'     Modalities:  Game Ready Vasopneumatic ice x 15 min, light compression to address LLE edema. X 10 min - declined today      Timed Code Treatment Minutes:  55      Total Treatment Minutes:  57     Treatment/Activity Tolerance:  [x] Patient tolerated treatment well [] Patient limited by fatigue  [] Patient limited by pain  [] Patient limited by other medical complications  [x] Other:  Abrasion on knee was observed and appeared to be mostly superficial.  Pt progressed through exercises and tolerated ROM well. A bandage was placed on the knee abrasion to protect it as mild bleeding was noted with ROM. Pain after treatment:      0/10     Prognosis: [x] Good [] Fair  [] Poor    Patient Requires Follow-up: [x] Yes  [] No    Plan:   [x] Continue per plan of care [] Alter current plan (see comments)  [] Plan of care initiated [] Hold pending MD visit [] Discharge    Plan for Next Session:      Goals  Short term goals  Time Frame for Short term goals: 3-4 weeks  Short term goal 1: Patient to be independent with HEP. -MET  Short term goal 2: Patient to achieve Left knee AROM: 0-105, with PROM to 110. - met  Short term goal 3: Achieve 4-/5 left knee and hip strength in MMG's. -MET  Short term goal 4: Ambulate with normal gait pattern with st. cane on level surfaces. - met except for decreased hip and knee flexion  Short term goal 5: Patient achieve ability to perform ADL's with reported no more than minimal difficulty and < 1-2/10 pain. -MET  Long term goals  Time Frame for Long term goals : 8-10 weeks  Long term goal 1: Patient to achieve Left knee AROM: 0-115, with PROM to 120-125. - not met but improving  Long term goal 2: Achieve 4+/5 left knee and hip strength in MMG's. - met  Long term goal 3: Achieve normal gait pattern on level and unlevel surfaces without assistive device. - not met but improving  Long term goal 4: Achieve ability to ambulate community distances and terrains with reported no more than minimal difficulty and < 1/10 pain.   Long term goal 5: Patient achieve ability to perform ADL's and I-ADL's, and light social, recreational activities with reported no more than minimal difficulty and < 1/10 pain. Patient continues to present with left knee ROM, strength, and functional deficits; she has missed regular therapy visits, and thus shows a decline in her ROM since her last assessment; her self-assessment of functional capacity has declined as well; she has had a challenging rehab following surgery; she will benefit from resuming therapy, and receiving progressive ROM and strengthening; manual therapy will also help reduce her current deficits, and to enhance her functional abilities.   1/4/22        Electronically signed by:  Katty Molina PT      Electronically signed by Katty Molina PT on 1/28/0728 at 9:57 AM

## 2022-01-19 ENCOUNTER — HOSPITAL ENCOUNTER (OUTPATIENT)
Dept: PHYSICAL THERAPY | Facility: HOSPITAL | Age: 67
Setting detail: THERAPIES SERIES
Discharge: HOME | End: 2022-01-19
Payer: MEDICAID

## 2022-01-19 PROCEDURE — 97140 MANUAL THERAPY 1/> REGIONS: CPT

## 2022-01-19 PROCEDURE — 97110 THERAPEUTIC EXERCISES: CPT

## 2022-01-19 NOTE — FLOWSHEET NOTE
Physical Therapy Daily Treatment  Date:  2022    TIme In:    0858                 Time Out:  9806    Patient Name:  Karen Roca    :  1955  MRN: [id number]    Restrictions/Precautions:    Pertinent Medical History:  Medical/Treatment Diagnosis Information:  ·   s/p left TKA; keft knee pain, joint stiffness, muscle weakness, abnormality of gait     Insurance/Certification information:    [employer] Andreas Medicaid  Physician Information:    Chet Hope MD  Plan of care signed (Y/N):     Visit# / total visits:     28 /    G-Code (if applicable):      Date / Visit # G-Code Applied:         Progress Note: []  Yes  [x]  No  Next due by: 21      Pain level:  0/10     Subjective:   Pt reports: doing ok after recent fall; has to cancel MD appt due to bad weather; expresses concern over stiffness in knee-- causing difficulty with getting in/out of vehicle, up/down from chairs, etc.      Objective:   Observation: mild edema L knee and lower leg. Gait: decreased hip and knee flexion, very mild antalgia   Test measurements:  LEFS: 32/80 (was 35/80 last re-assess, 40/80 prior to that). L knee AROM: 0- 105; PROM:  0-102(107 previously) with overpressure  - (+) mild to mod pain with overpressure at end-range of motion.    MMT: L hip flex: 5-/5, knee ext: 5-/5, knee flex: 5/5, Ankle DF: 5/5    Palpation: (+) tenderness pes anserine    Exercises:  Exercise Resistance/Repetitions Other comments   Nustep L5 x 9' 19   Seated Heel slides 2x3' 19   St marching with support 6x30(partial) 19   St TKE 3x15 L BTB 19   St HR/TR with support 3x15 19   GS 2x20 19   QS 2x30 19   Supine HS 2x20 19   AP's 2x30 19   Achilles st with strap 10x10\" 19   Mini squats 30x 19   Seated HS curls 3 x 10 OTB 19   SAQ 3x10 19               Other Therapeutic Activities:     Manual Treatments:  Patella mobs, grade 1-2 mobs, PROM L knee, gentle STM x 15'     Modalities:  Game Ready Vasopneumatic ice x 15 min, light compression to address LLE edema. X 10 min - declined today      Timed Code Treatment Minutes:  62'      Total Treatment Minutes:     77'    Treatment/Activity Tolerance:  [x] Patient tolerated treatment well [] Patient limited by fatigue  [] Patient limited by pain  [] Patient limited by other medical complications  [x] Other:  Abrasion on knee was observed and appeared to be mostly superficial.  Pt progressed through exercises and tolerated ROM well. A bandage was placed on the knee abrasion to protect it as mild bleeding was noted with ROM. Pain after treatment:      0/10     Prognosis: [x] Good [] Fair  [] Poor    Patient Requires Follow-up: [x] Yes  [] No    Plan:   [x] Continue per plan of care [] Alter current plan (see comments)  [] Plan of care initiated [] Hold pending MD visit [] Discharge    Plan for Next Session:      Goals  Short term goals  Time Frame for Short term goals: 3-4 weeks  Short term goal 1: Patient to be independent with HEP. -MET  Short term goal 2: Patient to achieve Left knee AROM: 0-105, with PROM to 110. - met  Short term goal 3: Achieve 4-/5 left knee and hip strength in MMG's. -MET  Short term goal 4: Ambulate with normal gait pattern with st. cane on level surfaces. - met except for decreased hip and knee flexion  Short term goal 5: Patient achieve ability to perform ADL's with reported no more than minimal difficulty and < 1-2/10 pain. -MET  Long term goals  Time Frame for Long term goals : 8-10 weeks  Long term goal 1: Patient to achieve Left knee AROM: 0-115, with PROM to 120-125. - not met but improving  Long term goal 2: Achieve 4+/5 left knee and hip strength in MMG's. - met  Long term goal 3: Achieve normal gait pattern on level and unlevel surfaces without assistive device. - not met but improving  Long term goal 4: Achieve ability to ambulate community distances and terrains with reported no more than minimal difficulty and < 1/10 pain.   Long term goal 5: Patient achieve ability to perform ADL's and I-ADL's, and light social, recreational activities with reported no more than minimal difficulty and < 1/10 pain. Patient continues to present with left knee ROM, strength, and functional deficits; she has missed regular therapy visits, and thus shows a decline in her ROM since her last assessment; her self-assessment of functional capacity has declined as well; she has had a challenging rehab following surgery; she will benefit from resuming therapy, and receiving progressive ROM and strengthening; manual therapy will also help reduce her current deficits, and to enhance her functional abilities.   1/4/22        Electronically signed by:  Dorie Paez PT      Electronically signed by Dorie Paez PT on 1/19/2022 at 10:20 AM

## 2022-01-21 ENCOUNTER — HOSPITAL ENCOUNTER (OUTPATIENT)
Dept: PHYSICAL THERAPY | Facility: HOSPITAL | Age: 67
Setting detail: THERAPIES SERIES
End: 2022-01-21
Payer: MEDICAID

## 2022-01-25 ENCOUNTER — OFFICE VISIT (OUTPATIENT)
Dept: OBSTETRICS AND GYNECOLOGY | Facility: CLINIC | Age: 67
End: 2022-01-25

## 2022-01-25 VITALS
HEIGHT: 61 IN | DIASTOLIC BLOOD PRESSURE: 70 MMHG | BODY MASS INDEX: 46.07 KG/M2 | WEIGHT: 244 LBS | SYSTOLIC BLOOD PRESSURE: 122 MMHG

## 2022-01-25 DIAGNOSIS — Z79.890 HORMONE REPLACEMENT THERAPY (HRT): Primary | ICD-10-CM

## 2022-01-25 PROCEDURE — 99203 OFFICE O/P NEW LOW 30 MIN: CPT | Performed by: OBSTETRICS & GYNECOLOGY

## 2022-01-25 RX ORDER — TAMSULOSIN HYDROCHLORIDE 0.4 MG/1
CAPSULE ORAL
Status: ON HOLD | COMMUNITY
Start: 2021-11-21 | End: 2023-03-25

## 2022-01-25 RX ORDER — VIBEGRON 75 MG/1
TABLET, FILM COATED ORAL
Status: ON HOLD | COMMUNITY
Start: 2022-01-17 | End: 2023-03-25

## 2022-01-25 RX ORDER — PRAMIPEXOLE DIHYDROCHLORIDE 1 MG/1
1 TABLET ORAL
Status: ON HOLD | COMMUNITY
End: 2023-03-25

## 2022-01-25 RX ORDER — GABAPENTIN 800 MG/1
800 TABLET ORAL 3 TIMES DAILY
COMMUNITY
Start: 2022-01-02

## 2022-01-25 RX ORDER — MONTELUKAST SODIUM 10 MG/1
10 TABLET ORAL NIGHTLY
COMMUNITY
Start: 2022-01-10

## 2022-01-25 NOTE — PROGRESS NOTES
Subjective   Chief Complaint   Patient presents with   • Follow-up     Patient wants to discuss about taking hormones     Jessica Flowers is a 66 y.o. year old  ( x 3)  No LMP recorded. Patient is postmenopausal.  She presents to be seen because of HRT.  Patient's been on 1 mg of estradiol and 2.5 mg of Provera for many years through Dr. Jones's office.  She is had no vaginal bleeding.  She states her mammograms are up-to-date and she gets them regularly through [facility].  Primary care is noted in chart and labs were reviewed and noted to be normal essentially..     OTHER COMPLAINTS:  Nothing else    The following portions of the patient's history were reviewed and updated as appropriate:  She  has a past medical history of Abnormal CXR, Allergic rhinitis, Anxiety, Carpal tunnel syndrome, Chronic narcotic use, Depression, Dyspnea on exertion, Fatigue, GERD (gastroesophageal reflux disease), Glucose intolerance (impaired glucose tolerance), Hiatal hernia with gastroesophageal reflux, MRSA infection, Hyperlipidemia, Hypertension, Hypertriglyceridemia, Hypothyroid, Insomnia, Joint pain, Morbid obesity (HCC), OAB (overactive bladder), Osteoarthritis of knees, bilateral, Plantar fasciitis, Postmenopausal HRT (hormone replacement therapy), Psoriasis, and Vitamin D deficiency.  She does not have any pertinent problems on file.  She  has a past surgical history that includes Appendectomy (); Laparoscopic tubal ligation (); Dilation and curettage of uterus (, ); Carpal tunnel release; Cholecystectomy open (); Other surgical history; pr lap,esophagogast fundoplasty (N/A, 2017); and Gastric Sleeve (N/A, 2017).  Her family history includes Cancer in her father and sister; Diabetes in her brother and father; Heart attack in her brother and father; Hypertension in her mother.  She  reports that she has never smoked. She has never used smokeless tobacco. She reports that she does not drink alcohol  and does not use drugs.  Current Outpatient Medications   Medication Sig Dispense Refill   • aspirin 81 MG chewable tablet Chew 81 mg Daily.     • doxycycline (VIBRAMYCIN) 100 MG capsule Take 100 mg by mouth 2 (Two) Times a Day.     • estradiol (ESTRACE) 1 MG tablet take 1 tablet by mouth once daily  0   • FLUoxetine (PROzac) 20 MG capsule Take 40 mg by mouth Daily.     • gabapentin (NEURONTIN) 800 MG tablet      • Gemtesa 75 MG tablet      • ipratropium (ATROVENT) 0.03 % nasal spray 2 sprays into each nostril Every 12 (Twelve) Hours.     • lansoprazole (PREVACID) 30 MG capsule   0   • levothyroxine (SYNTHROID, LEVOTHROID) 100 MCG tablet Take 50 mcg by mouth Daily.     • medroxyPROGESTERone (PROVERA) 2.5 MG tablet take 1 tablet by mouth once daily  0   • montelukast (SINGULAIR) 10 MG tablet      • oxybutynin XL (DITROPAN-XL) 10 MG 24 hr tablet Take 10 mg by mouth Daily.     • pramipexole (MIRAPEX) 1 MG tablet Take 1 mg by mouth.     • tamsulosin (FLOMAX) 0.4 MG capsule 24 hr capsule      • traZODone (DESYREL) 100 MG tablet Take 100 mg by mouth Every Night.     • cefdinir (OMNICEF) 300 MG capsule Take 300 mg by mouth 2 (Two) Times a Day.     • furosemide (LASIX) 20 MG tablet Take 20 mg by mouth 2 (Two) Times a Day.     • RA VITAMIN D-3 5000 UNITS capsule 5,000 Units Daily.  0     No current facility-administered medications for this visit.     Current Outpatient Medications on File Prior to Visit   Medication Sig   • aspirin 81 MG chewable tablet Chew 81 mg Daily.   • doxycycline (VIBRAMYCIN) 100 MG capsule Take 100 mg by mouth 2 (Two) Times a Day.   • estradiol (ESTRACE) 1 MG tablet take 1 tablet by mouth once daily   • FLUoxetine (PROzac) 20 MG capsule Take 40 mg by mouth Daily.   • gabapentin (NEURONTIN) 800 MG tablet    • Gemtesa 75 MG tablet    • ipratropium (ATROVENT) 0.03 % nasal spray 2 sprays into each nostril Every 12 (Twelve) Hours.   • lansoprazole (PREVACID) 30 MG capsule    • levothyroxine (SYNTHROID,  "LEVOTHROID) 100 MCG tablet Take 50 mcg by mouth Daily.   • medroxyPROGESTERone (PROVERA) 2.5 MG tablet take 1 tablet by mouth once daily   • montelukast (SINGULAIR) 10 MG tablet    • oxybutynin XL (DITROPAN-XL) 10 MG 24 hr tablet Take 10 mg by mouth Daily.   • pramipexole (MIRAPEX) 1 MG tablet Take 1 mg by mouth.   • tamsulosin (FLOMAX) 0.4 MG capsule 24 hr capsule    • traZODone (DESYREL) 100 MG tablet Take 100 mg by mouth Every Night.   • cefdinir (OMNICEF) 300 MG capsule Take 300 mg by mouth 2 (Two) Times a Day.   • furosemide (LASIX) 20 MG tablet Take 20 mg by mouth 2 (Two) Times a Day.   • RA VITAMIN D-3 5000 UNITS capsule 5,000 Units Daily.     No current facility-administered medications on file prior to visit.     She has No Known Allergies.    Social History    Tobacco Use      Smoking status: Never Smoker      Smokeless tobacco: Never Used    Review of Systems  Consitutional POS: nothing reported    NEG: anorexia or night sweats   Gastointestinal POS: nothing reported    NEG: bloating, change in bowel habits, melena or reflux symptoms   Genitourinary POS: nothing reported    NEG: dysuria or hematuria   Integument POS: nothing reported    NEG: moles that are changing in size, shape, color or rashes   Breast POS: nothing reported    NEG: persistent breast lump, skin dimpling or nipple discharge         Pertinent items are noted in HPI.          Objective   /70   Ht 154.9 cm (61\")   Wt 111 kg (244 lb)   BMI 46.10 kg/m²     General:  well developed; well nourished  no acute distress  appears older than stated age  obese - Body mass index is 46.1 kg/m².   Skin:  No suspicious lesions seen   Thyroid: normal to inspection and palpation   Lungs:  breathing is unlabored   Heart:  Not performed.   Breasts:  Not performed.   Abdomen: soft, non-tender; no masses  no umbilical or inguinal hernias are present  no hepato-splenomegaly   Pelvis: Not performed.     Psychiatric: Alert and oriented ×3, mood and " affect appropriate  HEENT: Atraumatic, normocephalic, normal scleral icterus  Extremities: 2+ pulses bilaterally, no edema      Lab Review   CBC, CMP, lipids, TSH within normal limits last month    Imaging   No data reviewed        Assessment   1. HRT: Discussed HRT risk benefits and alternatives.  This point patient states she is unsure whether she really needs it and given her age I think it is reasonable to try to wean her off and see how she does.     Plan   1. Go down to 1/2 tablet estradiol (0.5mg) for 2 weeks with the provera 2.5mg still daily, then d/c and see how symptoms are, I do not think patient needs to be on HRT  2. MMG report from [place]    No orders of the defined types were placed in this encounter.         This note was electronically signed.      January 25, 2022

## 2022-02-01 ENCOUNTER — HOSPITAL ENCOUNTER (OUTPATIENT)
Dept: PHYSICAL THERAPY | Facility: HOSPITAL | Age: 67
Setting detail: THERAPIES SERIES
Discharge: HOME | End: 2022-02-01
Payer: MEDICAID

## 2022-02-01 PROCEDURE — 97140 MANUAL THERAPY 1/> REGIONS: CPT

## 2022-02-01 PROCEDURE — 97110 THERAPEUTIC EXERCISES: CPT

## 2022-02-01 NOTE — FLOWSHEET NOTE
Physical Therapy Daily Treatment / Reassessment  Date:  2022    TIme In:   0752                 Time Out:  5212    Patient Name:  Page Hickman    :  1955  MRN: [id number]    Restrictions/Precautions:    Pertinent Medical History:  Medical/Treatment Diagnosis Information:  ·   s/p left TKA; keft knee pain, joint stiffness, muscle weakness, abnormality of gait     Insurance/Certification information:    [employer]ene Burkitt Medicaid  Physician Information:    Lee Silva MD  Plan of care signed (Y/N):     Visit# / total visits:     39 /    G-Code (if applicable):      Date / Visit # G-Code Applied:         Progress Note: []  Yes  [x]  No  Next due by: 21      Pain level: 0 /10     Subjective:   Pt reports her doc thinks that her veins in her legs aren't working the way they should and that its causing the swelling in her legs. She goes for further testing on the . Objective:   Observation: mild edema L knee and lower leg. Gait: decreased hip and knee flexion, very mild antalgia   Test measurements:  LEFS: 41/80 (was 32/80 last re-assess). L knee AROM: 0- 105; PROM:  0-101(102 previously) with overpressure  - (+) mild to mod pain with overpressure at end-range of motion. MMT: L hip flex: 5-/5, knee ext: 5-/5, knee flex: 5/5, Ankle DF: 5/5    Palpation: (+) tenderness pes anserine    Exercises:  Exercise Resistance/Repetitions Other comments   Nustep L5 x 9' 1   Seated Heel slides 2x3' 1   St marching with support 6x30(partial) 1   St TKE 3x15 L BTB 1   St HR/TR with support 3x15 1   GS 2x20 1   QS 2x30 1   Supine HS 2x20 1   AP's 2x30 1   Achilles st with strap 10x10\" 1   Mini squats 30x 1   Seated HS curls 3 x 10 OTB 1   SAQ 3x10 1               Other Therapeutic Activities: Re-assess performed by Channing Closs, PT.     Manual Treatments:  Patella mobs, grade 1-2 mobs, PROM L knee, gentle STM x 10'     Modalities:  Game Ready Vasopneumatic ice x 15 min, light compression to address LLE edema. X 10 min - declined today      Timed Code Treatment Minutes:  55      Total Treatment Minutes:    66    Treatment/Activity Tolerance:  [x] Patient tolerated treatment well [] Patient limited by fatigue  [] Patient limited by pain  [] Patient limited by other medical complications  [x] Other:  Pt completed tx with mod c/o soreness during manual and demonstrated decreased ROM as compared to previous measures. Pain after treatment:      0/10     Prognosis: [x] Good [] Fair  [] Poor    Patient Requires Follow-up: [x] Yes  [] No    Plan:   [x] Continue per plan of care [] Alter current plan (see comments)  [] Plan of care initiated [] Hold pending MD visit [] Discharge    Plan for Next Session:      Goals  Short term goals  Time Frame for Short term goals: 3-4 weeks  Short term goal 1: Patient to be independent with HEP. -MET  Short term goal 2: Patient to achieve Left knee AROM: 0-105, with PROM to 110. - met  Short term goal 3: Achieve 4-/5 left knee and hip strength in MMG's. -MET  Short term goal 4: Ambulate with normal gait pattern with st. cane on level surfaces. - met except for decreased hip and knee flexion  Short term goal 5: Patient achieve ability to perform ADL's with reported no more than minimal difficulty and < 1-2/10 pain. -MET  Long term goals  Time Frame for Long term goals : 8-10 weeks  Long term goal 1: Patient to achieve Left knee AROM: 0-115, with PROM to 120-125. - not met but improving  Long term goal 2: Achieve 4+/5 left knee and hip strength in MMG's. - met  Long term goal 3: Achieve normal gait pattern on level and unlevel surfaces without assistive device. - not met but improving  Long term goal 4: Achieve ability to ambulate community distances and terrains with reported no more than minimal difficulty and < 1/10 pain.   Long term goal 5: Patient achieve ability to perform ADL's and I-ADL's, and light social, recreational activities with reported no more than minimal difficulty and < 1/10 pain. Patient continues to present with left knee ROM, strength, and functional deficits; she has missed regular therapy visits, and thus shows a decline in her ROM since her last assessment; her self-assessment of functional capacity has declined as well; she has had a challenging rehab following surgery; she will benefit from resuming therapy, and receiving progressive ROM and strengthening; manual therapy will also help reduce her current deficits, and to enhance her functional abilities.       Electronically signed by:  Radhika Butts PTA      Electronically signed by Radhika Butts PTA on 2/1/2022 at 2:28 PM

## 2022-02-03 ENCOUNTER — HOSPITAL ENCOUNTER (OUTPATIENT)
Facility: HOSPITAL | Age: 67
Discharge: HOME | End: 2022-02-03
Payer: MEDICAID

## 2022-02-03 ENCOUNTER — APPOINTMENT (OUTPATIENT)
Dept: PHYSICAL THERAPY | Facility: HOSPITAL | Age: 67
End: 2022-02-03
Payer: MEDICAID

## 2022-02-03 ENCOUNTER — HOSPITAL ENCOUNTER (OUTPATIENT)
Dept: GENERAL RADIOLOGY | Facility: HOSPITAL | Age: 67
Discharge: HOME | End: 2022-02-03
Payer: MEDICAID

## 2022-02-03 DIAGNOSIS — N30.20 ACUTE ON CHRONIC CYSTITIS: ICD-10-CM

## 2022-02-03 DIAGNOSIS — N30.00 ACUTE ON CHRONIC CYSTITIS: ICD-10-CM

## 2022-02-03 LAB
BACTERIA: ABNORMAL /HPF
BILIRUBIN URINE: NEGATIVE
BLOOD, URINE: NEGATIVE
CLARITY: CLEAR
COLOR: YELLOW
EPITHELIAL CELLS, UA: ABNORMAL /HPF (ref 0–5)
GLUCOSE URINE: NEGATIVE MG/DL
KETONES, URINE: ABNORMAL MG/DL
LEUKOCYTE ESTERASE, URINE: ABNORMAL
MICROSCOPIC EXAMINATION: YES
NITRITE, URINE: NEGATIVE
PH UA: 5.5 (ref 5–8)
PROTEIN UA: NEGATIVE MG/DL
RBC UA: ABNORMAL /HPF (ref 0–4)
SPECIFIC GRAVITY UA: >=1.03 (ref 1–1.03)
URINE REFLEX TO CULTURE: ABNORMAL
URINE TYPE: ABNORMAL
UROBILINOGEN, URINE: 0.2 E.U./DL
WBC UA: ABNORMAL /HPF (ref 0–5)

## 2022-02-03 PROCEDURE — 81001 URINALYSIS AUTO W/SCOPE: CPT

## 2022-02-03 PROCEDURE — 74018 RADEX ABDOMEN 1 VIEW: CPT

## 2022-02-06 ENCOUNTER — APPOINTMENT (OUTPATIENT)
Dept: GENERAL RADIOLOGY | Facility: HOSPITAL | Age: 67
End: 2022-02-06
Payer: MEDICAID

## 2022-02-06 ENCOUNTER — HOSPITAL ENCOUNTER (EMERGENCY)
Facility: HOSPITAL | Age: 67
Discharge: HOME | End: 2022-02-06
Attending: EMERGENCY MEDICINE
Payer: MEDICAID

## 2022-02-06 VITALS
WEIGHT: 180 LBS | TEMPERATURE: 98.2 F | RESPIRATION RATE: 18 BRPM | HEART RATE: 80 BPM | OXYGEN SATURATION: 100 % | BODY MASS INDEX: 33.99 KG/M2 | DIASTOLIC BLOOD PRESSURE: 79 MMHG | HEIGHT: 61 IN | SYSTOLIC BLOOD PRESSURE: 131 MMHG

## 2022-02-06 DIAGNOSIS — J06.9 UPPER RESPIRATORY TRACT INFECTION DUE TO COVID-19 VIRUS: Primary | ICD-10-CM

## 2022-02-06 DIAGNOSIS — U07.1 UPPER RESPIRATORY TRACT INFECTION DUE TO COVID-19 VIRUS: Primary | ICD-10-CM

## 2022-02-06 LAB
A/G RATIO: 1.3 (ref 0.8–2)
ALBUMIN SERPL-MCNC: 3.6 G/DL (ref 3.4–4.8)
ALP BLD-CCNC: 70 U/L (ref 25–100)
ALT SERPL-CCNC: 8 U/L (ref 4–36)
ANION GAP SERPL CALCULATED.3IONS-SCNC: 12 MMOL/L (ref 3–16)
APTT: 36.3 SEC (ref 22.5–34.9)
AST SERPL-CCNC: 11 U/L (ref 8–33)
BASOPHILS ABSOLUTE: 0 K/UL (ref 0–0.1)
BASOPHILS RELATIVE PERCENT: 0.6 %
BILIRUB SERPL-MCNC: 0.5 MG/DL (ref 0.3–1.2)
BUN BLDV-MCNC: 12 MG/DL (ref 6–20)
C-REACTIVE PROTEIN: 41.5 MG/L (ref 0–5.1)
CALCIUM SERPL-MCNC: 8.6 MG/DL (ref 8.5–10.5)
CHLORIDE BLD-SCNC: 103 MMOL/L (ref 98–107)
CO2: 24 MMOL/L (ref 20–30)
CREAT SERPL-MCNC: 0.7 MG/DL (ref 0.4–1.2)
D DIMER: 0.57 UG/ML FEU (ref 0–0.6)
EOSINOPHILS ABSOLUTE: 0.2 K/UL (ref 0–0.4)
EOSINOPHILS RELATIVE PERCENT: 2.6 %
GFR AFRICAN AMERICAN: >59
GFR NON-AFRICAN AMERICAN: >60
GLOBULIN: 2.7 G/DL
GLUCOSE BLD-MCNC: 179 MG/DL (ref 74–106)
HCT VFR BLD CALC: 38.9 % (ref 37–47)
HEMOGLOBIN: 11.9 G/DL (ref 11.5–16.5)
IMMATURE GRANULOCYTES #: 0.1 K/UL
IMMATURE GRANULOCYTES %: 1.1 % (ref 0–5)
INR BLD: 1.05 (ref 0.88–1.11)
LACTIC ACID, SEPSIS: 2.7 MMOL/L (ref 0.4–1.9)
LYMPHOCYTES ABSOLUTE: 0.9 K/UL (ref 1.5–4)
LYMPHOCYTES RELATIVE PERCENT: 13.1 %
MCH RBC QN AUTO: 29 PG (ref 27–32)
MCHC RBC AUTO-ENTMCNC: 30.6 G/DL (ref 31–35)
MCV RBC AUTO: 94.9 FL (ref 80–100)
MONOCYTES ABSOLUTE: 0.6 K/UL (ref 0.2–0.8)
MONOCYTES RELATIVE PERCENT: 9.8 %
NEUTROPHILS ABSOLUTE: 4.7 K/UL (ref 2–7.5)
NEUTROPHILS RELATIVE PERCENT: 72.8 %
PDW BLD-RTO: 12.4 % (ref 11–16)
PLATELET # BLD: 225 K/UL (ref 150–400)
PMV BLD AUTO: 11.1 FL (ref 6–10)
POTASSIUM REFLEX MAGNESIUM: 4.2 MMOL/L (ref 3.4–5.1)
PRO-BNP: 149 PG/ML (ref 0–1800)
PROTHROMBIN TIME: 13.2 SEC (ref 11.6–13.8)
RAPID INFLUENZA  B AGN: NEGATIVE
RAPID INFLUENZA A AGN: NEGATIVE
RBC # BLD: 4.1 M/UL (ref 3.8–5.8)
SARS-COV-2, NAAT: DETECTED
SODIUM BLD-SCNC: 139 MMOL/L (ref 136–145)
TOTAL PROTEIN: 6.3 G/DL (ref 6.4–8.3)
TROPONIN: <0.3 NG/ML
WBC # BLD: 6.5 K/UL (ref 4–11)

## 2022-02-06 PROCEDURE — 99283 EMERGENCY DEPT VISIT LOW MDM: CPT

## 2022-02-06 PROCEDURE — 85025 COMPLETE CBC W/AUTO DIFF WBC: CPT

## 2022-02-06 PROCEDURE — 87804 INFLUENZA ASSAY W/OPTIC: CPT

## 2022-02-06 PROCEDURE — 84484 ASSAY OF TROPONIN QUANT: CPT

## 2022-02-06 PROCEDURE — 36415 COLL VENOUS BLD VENIPUNCTURE: CPT

## 2022-02-06 PROCEDURE — 71045 X-RAY EXAM CHEST 1 VIEW: CPT

## 2022-02-06 PROCEDURE — 83880 ASSAY OF NATRIURETIC PEPTIDE: CPT

## 2022-02-06 PROCEDURE — 86140 C-REACTIVE PROTEIN: CPT

## 2022-02-06 PROCEDURE — 83605 ASSAY OF LACTIC ACID: CPT

## 2022-02-06 PROCEDURE — 85379 FIBRIN DEGRADATION QUANT: CPT

## 2022-02-06 PROCEDURE — 80053 COMPREHEN METABOLIC PANEL: CPT

## 2022-02-06 PROCEDURE — 85610 PROTHROMBIN TIME: CPT

## 2022-02-06 PROCEDURE — 6370000000 HC RX 637 (ALT 250 FOR IP): Performed by: EMERGENCY MEDICINE

## 2022-02-06 PROCEDURE — 87635 SARS-COV-2 COVID-19 AMP PRB: CPT

## 2022-02-06 PROCEDURE — 85730 THROMBOPLASTIN TIME PARTIAL: CPT

## 2022-02-06 RX ORDER — GUAIFENESIN 100 MG/5ML
200 SOLUTION ORAL ONCE
Status: COMPLETED | OUTPATIENT
Start: 2022-02-06 | End: 2022-02-06

## 2022-02-06 RX ORDER — AZITHROMYCIN 250 MG/1
TABLET, FILM COATED ORAL
Qty: 6 TABLET | Refills: 0 | Status: SHIPPED | OUTPATIENT
Start: 2022-02-06 | End: 2022-02-11

## 2022-02-06 RX ORDER — AZITHROMYCIN 250 MG/1
500 TABLET, FILM COATED ORAL ONCE
Status: COMPLETED | OUTPATIENT
Start: 2022-02-06 | End: 2022-02-06

## 2022-02-06 RX ORDER — DEXTROMETHORPHAN HYDROBROMIDE, GUAIFENESIN 5; 100 MG/5ML; MG/5ML
20 LIQUID ORAL EVERY 4 HOURS PRN
Qty: 118 ML | Refills: 0 | Status: SHIPPED | OUTPATIENT
Start: 2022-02-06 | End: 2022-02-13

## 2022-02-06 RX ADMIN — GUAIFENESIN 200 MG: 200 SOLUTION ORAL at 14:25

## 2022-02-06 RX ADMIN — AZITHROMYCIN MONOHYDRATE 500 MG: 250 TABLET ORAL at 15:22

## 2022-02-06 ASSESSMENT — PAIN DESCRIPTION - PAIN TYPE: TYPE: ACUTE PAIN

## 2022-02-06 ASSESSMENT — PAIN DESCRIPTION - DESCRIPTORS: DESCRIPTORS: ACHING

## 2022-02-06 ASSESSMENT — PAIN DESCRIPTION - LOCATION: LOCATION: GENERALIZED

## 2022-02-06 ASSESSMENT — PAIN SCALES - GENERAL: PAINLEVEL_OUTOF10: 6

## 2022-02-06 NOTE — ED PROVIDER NOTES
Yeny Herrera [address] [facility] ENCOUNTER      Pt Name: Frank Dorsey  MRN: [id number]  YOB: 1955  Date of evaluation: 2/6/2022  Provider: MD Elisabeth Balderrama [place]       Chief Complaint   Patient presents with    Cough    Shortness of Breath    Pharyngitis    Nasal Congestion         HISTORY OF PRESENT ILLNESS  (Location/Symptom, Timing/Onset, Context/Setting, Quality, Duration, Modifying Factors, Severity.)   Frank Dorsey is a 61 y.o. female who presents to the emergency department with 2 days of cough sneezing sore throat left thoracic back pain head congestion with rhinorrhea headache and shortness of breath the cough is dry she not had a fever she has not had any nausea vomiting or diarrhea she has been vaccinated for COVID-19 including the booster she has not been exposed to anyone that she knows of. Nursing notes were reviewed. REVIEW OFSYSTEMS    (2-9 systems for level 4, 10 or more for level 5)   ROS:  General:  No fevers, no chills, no weakness  Cardiovascular:  No chest pain, no palpitations  Respiratory:  + shortness of breath, + cough, no wheezing  Gastrointestinal:  No pain, no nausea, no vomiting, no diarrhea  Musculoskeletal:  No muscle pain, no joint pain  Skin:  No rash, no easy bruising  Neurologic:  No speech problems, no headache, no extremity weakness  Psychiatric:  No anxiety  Genitourinary:  No dysuria, no hematuria    Except as noted above the remainder of the review of systems was reviewed and negative.        PAST MEDICAL HISTORY     Past Medical History:   Diagnosis Date    Arthritis     Depression     Hypertension     Thyroid disease     UTI (urinary tract infection)          SURGICAL HISTORY       Past Surgical History:   Procedure Laterality Date    APPENDECTOMY      CARPAL TUNNEL RELEASE Bilateral     CHOLECYSTECTOMY      GASTRIC BYPASS SURGERY      JOINT REPLACEMENT Right     TN COLONOSCOPY FLX DX W/JOEL SPEC WHEN PFRMD N/A 8/2/2018    COLONOSCOPY DIAGNOSTIC OR SCREENING performed by Aneesh Summers MD at [facility] [address] Left 07/14/2021    TUBAL LIGATION           CURRENT MEDICATIONS       Previous Medications    ESTRADIOL (ESTRACE) 1 MG TABLET    Take 1 mg by mouth daily    FLUOXETINE (PROZAC) 20 MG CAPSULE    Take 40 mg by mouth daily    FUROSEMIDE (LASIX) 20 MG TABLET    Take 1 tablet by mouth daily    GABAPENTIN (NEURONTIN) 800 MG TABLET    Take 800 mg by mouth 3 times daily. IPRATROPIUM (ATROVENT) 0.03 % NASAL SPRAY    1 spray by Nasal route 2 times daily    LANSOPRAZOLE (PREVACID) 30 MG DELAYED RELEASE CAPSULE    Take 30 mg by mouth daily    LEVOTHYROXINE (SYNTHROID) 50 MCG TABLET    Take 50 mcg by mouth Daily    MEDROXYPROGESTERONE (PROVERA) 2.5 MG TABLET    Take 2.5 mg by mouth daily    MELOXICAM (MOBIC) 15 MG TABLET    Take 15 mg by mouth daily    MONTELUKAST (SINGULAIR) 10 MG TABLET    Take 10 mg by mouth daily    OXYBUTYNIN CHLORIDE PO    Take by mouth    OXYCODONE-ACETAMINOPHEN (PERCOCET) 5-325 MG PER TABLET    Take 1 tablet by mouth every 4 hours as needed for Pain. POTASSIUM CHLORIDE (KLOR-CON M) 10 MEQ EXTENDED RELEASE TABLET    Take 1 tablet by mouth daily    PRAMIPEXOLE (MIRAPEX) 1 MG TABLET    Take 1 mg by mouth nightly    TRAMADOL (ULTRAM) 50 MG TABLET    Take 50 mg by mouth every 6 hours as needed for Pain. TRAZODONE (DESYREL) 100 MG TABLET    Take 100 mg by mouth nightly       ALLERGIES     Patient has no known allergies. FAMILY HISTORY     History reviewed. No pertinent family history.        SOCIAL HISTORY       Social History     Socioeconomic History    Marital status: Legally      Spouse name: None    Number of children: None    Years of education: None    Highest education level: None   Occupational History    None   Tobacco Use    Smoking status: Never Smoker    Smokeless tobacco: Never Used   Vaping Use    Vaping Use: Never used   Substance and Sexual Activity    Alcohol use: No    Drug use: No    Sexual activity: None   Other Topics Concern    None   Social History Narrative    None     Social Determinants of Health     Financial Resource Strain:     Difficulty of Paying Living Expenses: Not on file   Food Insecurity:     Worried About Running Out of Food in the Last Year: Not on file    Matthew of Food in the Last Year: Not on file   Transportation Needs:     Lack of Transportation (Medical): Not on file    Lack of Transportation (Non-Medical): Not on file   Physical Activity:     Days of Exercise per Week: Not on file    Minutes of Exercise per Session: Not on file   Stress:     Feeling of Stress : Not on file   Social Connections:     Frequency of Communication with Friends and Family: Not on file    Frequency of Social Gatherings with Friends and Family: Not on file    Attends Spiritism Services: Not on file    Active Member of [address] [place] [address] [employer] or Organizations: Not on file    Attends Club or Organization Meetings: Not on file    Marital Status: Not on file   Intimate Partner Violence:     Fear of Current or Ex-Partner: Not on file    Emotionally Abused: Not on file    Physically Abused: Not on file    Sexually Abused: Not on file   Housing Stability:     Unable to Pay for Housing in the Last Year: Not on file    Number of [place] in the Last Year: Not on file    Unstable Housing in the Last Year: Not on file         PHYSICAL EXAM    (up to 7 for level 4, 8 or more for level 5)     ED Triage Vitals [02/06/22 1359]   BP Temp Temp Source Pulse Resp SpO2 Height Weight   (!) 142/80 98.2 °F (36.8 °C) Oral 81 18 98 % 5' 1\" (1.549 m) 180 lb (81.6 kg)       Physical Exam  General :Patient is awake, alert, oriented, in no acute distress, nontoxic appearing  HEENT: Pupils are equally round and reactive to light, EOMI, conjunctivae clear.     Neck: Neck is supple, full range of motion, trachea midline  Cardiac: Heart regular rate, rhythm, no murmurs, rubs, or gallops  Lungs: Lungs are clear to auscultation, there is no wheezing, rhonchi, or rales. There is no use of accessory muscles. Chest wall: There is no tenderness to palpation over the chest wall or over ribs  Abdomen: Abdomen is soft, nontender, nondistended. There is no firm or pulsatile masses, no rebound rigidity or guarding. Musculoskeletal: 5 out of 5 strength in all 4 extremities. No focal muscle deficits are appreciated  Neuro: Motor intact, sensory intact, level of consciousness is normal,Dermatology: Skin is warm and dry  Psych: Mentation is grossly normal, cognition is grossly normal. Affect is appropriate.       DIAGNOSTIC RESULTS     EKG: All EKG's are interpreted by the Emergency Department Physician who either signs or Co-signs this chart in the [address] a cardiologist.        RADIOLOGY:   Non-plain film images such as CT, Ultrasound and MRI are read by the radiologist. Plain radiographic images are visualized and preliminarily interpreted by the emergency physician with the below findings:      ? Radiologist's Report Reviewed:  XR CHEST PORTABLE   Final Result      No acute disease            ED BEDSIDE ULTRASOUND:   Performed by ED Physician - none    LABS:    I have reviewed and interpreted all of the currently available lab results from this visit (ifapplicable):  Results for orders placed or performed during the hospital encounter of 02/06/22   COVID-19, Rapid    Specimen: Nasopharyngeal Swab   Result Value Ref Range    SARS-CoV-2, NAAT DETECTED (AA) Not Detected   Rapid Influenza A/B Antigens    Specimen: Nasopharyngeal   Result Value Ref Range    Rapid Influenza A Ag Negative Negative    Rapid Influenza B Ag Negative Negative   CBC Auto Differential   Result Value Ref Range    WBC 6.5 4.0 - 11.0 K/uL    RBC 4.10 3.80 - 5.80 M/uL    Hemoglobin 11.9 11.5 - 16.5 g/dL    Hematocrit 38.9 37.0 - 47.0 %    MCV 94.9 80.0 - 100.0 fL    MCH 29.0 27.0 - 32.0 pg    MCHC 30.6 (L) 31.0 - 35.0 g/dL    RDW 12.4 11.0 - 16.0 %    Platelets 999 610 - 202 K/uL    MPV 11.1 (H) 6.0 - 10.0 fL    Neutrophils % 72.8 %    Immature Granulocytes % 1.1 0.0 - 5.0 %    Lymphocytes % 13.1 %    Monocytes % 9.8 %    Eosinophils % 2.6 %    Basophils % 0.6 %    Neutrophils Absolute 4.7 2.0 - 7.5 K/uL    Immature Granulocytes # 0.1 K/uL    Lymphocytes Absolute 0.9 (L) 1.5 - 4.0 K/uL    Monocytes Absolute 0.6 0.2 - 0.8 K/uL    Eosinophils Absolute 0.2 0.0 - 0.4 K/uL    Basophils Absolute 0.0 0.0 - 0.1 K/uL   Comprehensive Metabolic Panel w/ Reflex to MG   Result Value Ref Range    Sodium 139 136 - 145 mmol/L    Potassium reflex Magnesium 4.2 3.4 - 5.1 mmol/L    Chloride 103 98 - 107 mmol/L    CO2 24 20 - 30 mmol/L    Anion Gap 12 3 - 16    Glucose 179 (H) 74 - 106 mg/dL    BUN 12 6 - 20 mg/dL    CREATININE 0.7 0.4 - 1.2 mg/dL    GFR Non-African American >60 >59    GFR African American >59 >59    Calcium 8.6 8.5 - 10.5 mg/dL    Total Protein 6.3 (L) 6.4 - 8.3 g/dL    Albumin 3.6 3.4 - 4.8 g/dL    Albumin/Globulin Ratio 1.3 0.8 - 2.0    Total Bilirubin 0.5 0.3 - 1.2 mg/dL    Alkaline Phosphatase 70 25 - 100 U/L    ALT 8 4 - 36 U/L    AST 11 8 - 33 U/L    Globulin 2.7 Not Established g/dL   Troponin   Result Value Ref Range    Troponin <0.30 <0.30 ng/mL   Brain Natriuretic Peptide   Result Value Ref Range    Pro- 0 - 1,800 pg/mL   Protime-INR   Result Value Ref Range    Protime 13.2 11.6 - 13.8 sec    INR 1.05 0.88 - 1.11   APTT   Result Value Ref Range    aPTT 36.3 (H) 22.5 - 34.9 sec   D-Dimer, Quantitative   Result Value Ref Range    D-Dimer, Quant 0.57 0.00 - 0.60 ug/mL FEU   C-Reactive Protein   Result Value Ref Range    CRP 41.5 (H) 0.0 - 5.1 mg/L   Lactate, Sepsis   Result Value Ref Range    Lactic Acid, Sepsis 2.7 (H) 0.4 - 1.9 mmol/L        All other labs were within normal range or not returned as of this dictation.     EMERGENCY DEPARTMENT COURSE and DIFFERENTIAL DIAGNOSIS/MDM: Vitals:    Vitals:    02/06/22 1359 02/06/22 1430 02/06/22 1431 02/06/22 1432   BP: (!) 142/80 (!) 146/91     Pulse: 81      Resp: 18      Temp: 98.2 °F (36.8 °C)      TempSrc: Oral      SpO2: 98%  99% 99%   Weight: 180 lb (81.6 kg)      Height: 5' 1\" (1.549 m)          MEDICATIONS ADMINISTERED IN ED:  Medications   azithromycin (ZITHROMAX) tablet 500 mg (has no administration in time range)   guaiFENesin (ROBITUSSIN) 100 MG/5ML oral solution 200 mg (200 mg Oral Given 2/6/22 1425)       Has been stable is doing well with O2 sats of 99%. She is positive for COVID-19 her CRP is elevated 41.5 lactic is elevated 2.7 remainder of her lab work looks good chest x-ray is clear so I will start her on Zithromax and discharged to home. The patient will follow-up with their PCP in 1-2 days for reevaluation. If the patient or family members have anyfurther concerns or any worsening symptoms they will return to the ED for reevaluation. CONSULTS:  None    PROCEDURES:  Procedures    CRITICAL CARE TIME    Total Critical Care time was 0 minutes, excluding separately reportable procedures. There was a high probability of clinically significant/life threatening deterioration in the patient's condition which required my urgent intervention. FINAL IMPRESSION      1. Upper respiratory tract infection due to COVID-19 virus New Problem         DISPOSITION/PLAN   DISPOSITION    Stable discharge to home    PATIENT REFERRED TO:  Delphina Olszewski, [address]  [phone]    Call in 2 days        DISCHARGE MEDICATIONS:  New Prescriptions    AZITHROMYCIN (ZITHROMAX) 250 MG TABLET    Take 2 tablets by mouth daily for 1 day, THEN 1 tablet daily for 4 days.     DEXTROMETHORPHAN-GUAIFENESIN (ROBITUSSIN COUGH+CHEST RUBY DM)  MG/20ML LIQD    Take 20 mLs by mouth every 4 hours as needed (Cough)       Comment: Please note this report has been produced using speech recognition software and may contain errorsrelated to that system including errors in grammar, punctuation, and spelling, as well as words and phrases that may be inappropriate. If there are any questions or concerns please feel free to contact the dictating providerfor clarification.     Kristi Poe MD  Attending Emergency Physician              Kristi Poe MD  02/06/22 7255

## 2022-02-06 NOTE — ED NOTES
Discharge instructions reviewed and medications discussed with verbalized understanding from patient. Patient had no further questions or concerns.         Kateryna Huber RN  02/06/22 1057

## 2022-02-23 ENCOUNTER — HOSPITAL ENCOUNTER (OUTPATIENT)
Dept: GENERAL RADIOLOGY | Facility: HOSPITAL | Age: 67
Discharge: HOME | End: 2022-02-23
Payer: MEDICAID

## 2022-02-23 ENCOUNTER — HOSPITAL ENCOUNTER (OUTPATIENT)
Facility: HOSPITAL | Age: 67
Discharge: HOME | End: 2022-02-23
Payer: MEDICAID

## 2022-02-23 DIAGNOSIS — R05.1 ACUTE COUGH: ICD-10-CM

## 2022-02-23 PROCEDURE — 71046 X-RAY EXAM CHEST 2 VIEWS: CPT

## 2022-03-13 ENCOUNTER — HOSPITAL ENCOUNTER (EMERGENCY)
Facility: HOSPITAL | Age: 67
Discharge: HOME | End: 2022-03-13
Attending: EMERGENCY MEDICINE
Payer: MEDICAID

## 2022-03-13 ENCOUNTER — APPOINTMENT (OUTPATIENT)
Dept: CT IMAGING | Facility: HOSPITAL | Age: 67
End: 2022-03-13
Payer: MEDICAID

## 2022-03-13 ENCOUNTER — APPOINTMENT (OUTPATIENT)
Dept: GENERAL RADIOLOGY | Facility: HOSPITAL | Age: 67
End: 2022-03-13
Payer: MEDICAID

## 2022-03-13 VITALS
SYSTOLIC BLOOD PRESSURE: 138 MMHG | HEART RATE: 69 BPM | HEIGHT: 61 IN | DIASTOLIC BLOOD PRESSURE: 90 MMHG | TEMPERATURE: 98.1 F | BODY MASS INDEX: 43.43 KG/M2 | RESPIRATION RATE: 16 BRPM | WEIGHT: 230 LBS | OXYGEN SATURATION: 98 %

## 2022-03-13 DIAGNOSIS — J18.9 PNEUMONIA OF RIGHT UPPER LOBE DUE TO INFECTIOUS ORGANISM: Primary | ICD-10-CM

## 2022-03-13 DIAGNOSIS — M79.89 LEFT LEG SWELLING: ICD-10-CM

## 2022-03-13 LAB
A/G RATIO: 1.3 (ref 0.8–2)
ALBUMIN SERPL-MCNC: 3.6 G/DL (ref 3.4–4.8)
ALP BLD-CCNC: 58 U/L (ref 25–100)
ALT SERPL-CCNC: 7 U/L (ref 4–36)
ANION GAP SERPL CALCULATED.3IONS-SCNC: 10 MMOL/L (ref 3–16)
AST SERPL-CCNC: 11 U/L (ref 8–33)
BASOPHILS ABSOLUTE: 0.1 K/UL (ref 0–0.1)
BASOPHILS RELATIVE PERCENT: 1.1 %
BILIRUB SERPL-MCNC: 0.4 MG/DL (ref 0.3–1.2)
BUN BLDV-MCNC: 12 MG/DL (ref 6–20)
CALCIUM SERPL-MCNC: 8.5 MG/DL (ref 8.5–10.5)
CHLORIDE BLD-SCNC: 107 MMOL/L (ref 98–107)
CO2: 24 MMOL/L (ref 20–30)
CREAT SERPL-MCNC: 0.8 MG/DL (ref 0.4–1.2)
D DIMER: 1.05 UG/ML FEU (ref 0–0.6)
EOSINOPHILS ABSOLUTE: 0.3 K/UL (ref 0–0.4)
EOSINOPHILS RELATIVE PERCENT: 4.5 %
GFR AFRICAN AMERICAN: >59
GFR NON-AFRICAN AMERICAN: >60
GLOBULIN: 2.8 G/DL
GLUCOSE BLD-MCNC: 98 MG/DL (ref 74–106)
HCT VFR BLD CALC: 37.1 % (ref 37–47)
HEMOGLOBIN: 11.5 G/DL (ref 11.5–16.5)
IMMATURE GRANULOCYTES #: 0 K/UL
IMMATURE GRANULOCYTES %: 0.4 % (ref 0–5)
LYMPHOCYTES ABSOLUTE: 1.6 K/UL (ref 1.5–4)
LYMPHOCYTES RELATIVE PERCENT: 27.9 %
MCH RBC QN AUTO: 29.1 PG (ref 27–32)
MCHC RBC AUTO-ENTMCNC: 31 G/DL (ref 31–35)
MCV RBC AUTO: 93.9 FL (ref 80–100)
MONOCYTES ABSOLUTE: 0.5 K/UL (ref 0.2–0.8)
MONOCYTES RELATIVE PERCENT: 8.1 %
NEUTROPHILS ABSOLUTE: 3.2 K/UL (ref 2–7.5)
NEUTROPHILS RELATIVE PERCENT: 58 %
PDW BLD-RTO: 13.4 % (ref 11–16)
PLATELET # BLD: 258 K/UL (ref 150–400)
PMV BLD AUTO: 10.5 FL (ref 6–10)
POTASSIUM SERPL-SCNC: 4.3 MMOL/L (ref 3.4–5.1)
PRO-BNP: 262 PG/ML (ref 0–1800)
RBC # BLD: 3.95 M/UL (ref 3.8–5.8)
SODIUM BLD-SCNC: 141 MMOL/L (ref 136–145)
TOTAL PROTEIN: 6.4 G/DL (ref 6.4–8.3)
TROPONIN: <0.3 NG/ML
WBC # BLD: 5.6 K/UL (ref 4–11)

## 2022-03-13 PROCEDURE — 99283 EMERGENCY DEPT VISIT LOW MDM: CPT

## 2022-03-13 PROCEDURE — 80053 COMPREHEN METABOLIC PANEL: CPT

## 2022-03-13 PROCEDURE — 83880 ASSAY OF NATRIURETIC PEPTIDE: CPT

## 2022-03-13 PROCEDURE — 84484 ASSAY OF TROPONIN QUANT: CPT

## 2022-03-13 PROCEDURE — 36415 COLL VENOUS BLD VENIPUNCTURE: CPT

## 2022-03-13 PROCEDURE — 6370000000 HC RX 637 (ALT 250 FOR IP): Performed by: EMERGENCY MEDICINE

## 2022-03-13 PROCEDURE — 85379 FIBRIN DEGRADATION QUANT: CPT

## 2022-03-13 PROCEDURE — 6360000004 HC RX CONTRAST MEDICATION: Performed by: EMERGENCY MEDICINE

## 2022-03-13 PROCEDURE — 71260 CT THORAX DX C+: CPT

## 2022-03-13 PROCEDURE — 71045 X-RAY EXAM CHEST 1 VIEW: CPT

## 2022-03-13 PROCEDURE — 85025 COMPLETE CBC W/AUTO DIFF WBC: CPT

## 2022-03-13 RX ORDER — DOXYCYCLINE HYCLATE 100 MG
100 TABLET ORAL 2 TIMES DAILY
Qty: 14 TABLET | Refills: 0 | Status: SHIPPED | OUTPATIENT
Start: 2022-03-13 | End: 2022-03-20

## 2022-03-13 RX ORDER — HYDROXYZINE PAMOATE 25 MG/1
50 CAPSULE ORAL ONCE
Status: COMPLETED | OUTPATIENT
Start: 2022-03-13 | End: 2022-03-13

## 2022-03-13 RX ORDER — LEVOFLOXACIN 500 MG/1
500 TABLET, FILM COATED ORAL ONCE
Status: COMPLETED | OUTPATIENT
Start: 2022-03-13 | End: 2022-03-13

## 2022-03-13 RX ORDER — ONDANSETRON 4 MG/1
4 TABLET, ORALLY DISINTEGRATING ORAL EVERY 8 HOURS PRN
Qty: 12 TABLET | Refills: 0 | Status: SHIPPED | OUTPATIENT
Start: 2022-03-13

## 2022-03-13 RX ADMIN — HYDROXYZINE PAMOATE 50 MG: 25 CAPSULE ORAL at 23:19

## 2022-03-13 RX ADMIN — LEVOFLOXACIN 500 MG: 500 TABLET, FILM COATED ORAL at 23:12

## 2022-03-13 RX ADMIN — IOPAMIDOL 100 ML: 755 INJECTION, SOLUTION INTRAVENOUS at 22:28

## 2022-03-13 ASSESSMENT — PAIN SCALES - GENERAL: PAINLEVEL_OUTOF10: 8

## 2022-03-13 ASSESSMENT — PAIN DESCRIPTION - ORIENTATION: ORIENTATION: LEFT

## 2022-03-13 ASSESSMENT — PAIN DESCRIPTION - LOCATION: LOCATION: LEG

## 2022-03-13 ASSESSMENT — PAIN DESCRIPTION - PAIN TYPE: TYPE: ACUTE PAIN

## 2022-03-13 ASSESSMENT — PAIN DESCRIPTION - FREQUENCY: FREQUENCY: CONTINUOUS

## 2022-03-13 NOTE — ED NOTES
Patient with c/o leg swelling for awhile, patient with c/o lung pain, nausea, vomiting and diarrhea since last night.      Birdie Schafer RN  03/13/22 1935

## 2022-03-14 NOTE — ED PROVIDER NOTES
[address] [place] [address] Court  eMERGENCY dEPARTMENT eNCOUnter      Pt Name: Megan Gaston  MRN: [id number]  YOB: 1955  Date ofevaluation: 8/47/9030  Provider: Farnaz Elkins MD    [address] [place] [address]       Chief Complaint   Patient presents with    Leg Pain    Leg Swelling    Shortness of Breath    Diarrhea    Emesis         HISTORY OF PRESENT ILLNESS  (Location/Symptom, Timing/Onset, Context/Setting, Quality, Duration, Modifying Factors, Severity.)   Megan Gaston is a 61 y.o. female who presents to the emergency department with LLE swelling and pain which has been ongoing for a couple of months. She has seen a cardiologist for this already but hasn't found a reason she decided to come here. Pain in her left calf became \"unbearable\" and she was up most of the night. She also has mild shortness of breath with pain in lungs \"when I breathe\". She's had this problem for a couple of months but seems to be worse over the last 24 hours. She used her inhaler but it didn't help. Today, she had some diarrhea and vomited once last night. However, she has been able to drink some fluids today. No diagnosis of CHF per her cardiologist.  No diagnosis of COPD. She is not a smoker. She had COVID a couple of months ago. Nursing notes were reviewed. REVIEW OF SYSTEMS    (2-9systems for level 4, 10 or more for level 5)   ROS:  General:  No fevers, no chills, no weakness  HEENT: No sore throat, runny nose or ear pain  Cardiovascular:  No chest pain, no palpitations  Respiratory:  + shortness of breath, no cough, no wheezing  Gastrointestinal:  No pain, no nausea, + vomiting, + diarrhea  Musculoskeletal:  + left calf pain. No muscle pain, no joint pain  Skin:  No rash, no easy bruising  Genitourinary:  No dysuria, no hematuria    Except as noted above theremainder of the review of systems was reviewed and negative.        PASTMEDICAL HISTORY     Past Medical History:   Diagnosis Date    Arthritis     Depression     Hypertension     Thyroid disease     UTI (urinary tract infection)          SURGICAL HISTORY       Past Surgical History:   Procedure Laterality Date    APPENDECTOMY      CARPAL TUNNEL RELEASE Bilateral     CHOLECYSTECTOMY      GASTRIC BYPASS SURGERY      JOINT REPLACEMENT Right     [place] COLONOSCOPY FLX DX W/COLLJ SPEC WHEN PFRMD N/A 8/2/2018    COLONOSCOPY DIAGNOSTIC OR SCREENING performed by Meche Rose MD at [facility] 07/14/2021    TUBAL LIGATION           CURRENT MEDICATIONS       Previous Medications    ESTRADIOL (ESTRACE) 1 MG TABLET    Take 1 mg by mouth daily    FLUOXETINE (PROZAC) 20 MG CAPSULE    Take 40 mg by mouth daily    FUROSEMIDE (LASIX) 20 MG TABLET    Take 1 tablet by mouth daily    GABAPENTIN (NEURONTIN) 800 MG TABLET    Take 800 mg by mouth 3 times daily. IPRATROPIUM (ATROVENT) 0.03 % NASAL SPRAY    1 spray by Nasal route 2 times daily    LANSOPRAZOLE (PREVACID) 30 MG DELAYED RELEASE CAPSULE    Take 30 mg by mouth daily    LEVOTHYROXINE (SYNTHROID) 50 MCG TABLET    Take 50 mcg by mouth Daily    MEDROXYPROGESTERONE (PROVERA) 2.5 MG TABLET    Take 2.5 mg by mouth daily    MELOXICAM (MOBIC) 15 MG TABLET    Take 15 mg by mouth daily    MONTELUKAST (SINGULAIR) 10 MG TABLET    Take 10 mg by mouth daily    OXYBUTYNIN CHLORIDE PO    Take by mouth    OXYCODONE-ACETAMINOPHEN (PERCOCET) 5-325 MG PER TABLET    Take 1 tablet by mouth every 4 hours as needed for Pain. POTASSIUM CHLORIDE (KLOR-CON M) 10 MEQ EXTENDED RELEASE TABLET    Take 1 tablet by mouth daily    PRAMIPEXOLE (MIRAPEX) 1 MG TABLET    Take 1 mg by mouth nightly    TRAMADOL (ULTRAM) 50 MG TABLET    Take 50 mg by mouth every 6 hours as needed for Pain. TRAZODONE (DESYREL) 100 MG TABLET    Take 100 mg by mouth nightly       ALLERGIES     Patient has no known allergies. FAMILY HISTORY     History reviewed. No pertinent family history.        SOCIAL HISTORY       Social History     Socioeconomic History    Marital status: Legally      Spouse name: None    Number of children: None    Years of education: None    Highest education level: None   Occupational History    None   Tobacco Use    Smoking status: Never Smoker    Smokeless tobacco: Never Used   Vaping Use    Vaping Use: Never used   Substance and Sexual Activity    Alcohol use: No    Drug use: No    Sexual activity: None   Other Topics Concern    None   Social History Narrative    None     Social Determinants of Health     Financial Resource Strain:     Difficulty of Paying Living Expenses: Not on file   Food Insecurity:     Worried About Running Out of Food in the Last Year: Not on file    Matthew of Food in the Last Year: Not on file   Transportation Needs:     Lack of Transportation (Medical): Not on file    Lack of Transportation (Non-Medical):  Not on file   Physical Activity:     Days of Exercise per Week: Not on file    Minutes of Exercise per Session: Not on file   Stress:     Feeling of Stress : Not on file   Social Connections:     Frequency of Communication with Friends and Family: Not on file    Frequency of Social Gatherings with Friends and Family: Not on file    Attends Baptism Services: Not on file    Active Member of [address] [place] [address] or Organizations: Not on file    Attends Club or Organization Meetings: Not on file    Marital Status: Not on file   Intimate Partner Violence:     Fear of Current or Ex-Partner: Not on file    Emotionally Abused: Not on file    Physically Abused: Not on file    Sexually Abused: Not on file   Housing Stability:     Unable to Pay for Housing in the Last Year: Not on file    Number of [place] in the Last Year: Not on file    Unstable Housing in the Last Year: Not on file         PHYSICAL EXAM    (up to 7 forlevel 4, 8 or more for level 5)     ED Triage Vitals [03/13/22 1934]   BP Temp Temp Source Pulse Resp SpO2 Height Weight   (!) 151/83 98.1 °F (36.7 °C) Oral 78 24 97 % 5' 1\" (1.549 m) 230 lb (104.3 kg)       Physical Exam  General :Patient is awake, alert, oriented, in no acute distress, nontoxic appearing  HEENT: Pupils are equally round and reactive to light, EOMI. Cardiac: Heart regular rate, rhythm, no murmurs, rubs, or gallops  Lungs: Lungs are clear to auscultation, there is no wheezing, rhonchi, or rales. Abdomen: Abdomen is soft, nontender, nondistended. Musculoskeletal: Ambulatory;   Back: No midline or bony tenderness. Dermatology: Skin is warm and dry  Psych: Mentation is grossly normal, cognition is grossly normal. Affect is appropriate. DIAGNOSTIC RESULTS     EKG:  NSR  Rate of 66  NSST changes    RADIOLOGY:   Non-plain film images such as CT, Ultrasoundand MRI are read by the radiologist. Plain radiographic images are visualized and preliminarily interpreted by the emergency physician with the below findings:      [] Radiologist's Report Reviewed:  CT CHEST PULMONARY EMBOLISM W CONTRAST   Final Result   1. Minimal patchy right upper lobe airspace disease consistent with pneumonia. 2. No evidence of pulmonary embolism. XR CHEST PORTABLE   Final Result   Impression:   1. No acute cardiopulmonary disease.             ED BEDSIDE ULTRASOUND:   Performed by ED Physician - none    LABS:  Labs Reviewed   D-DIMER, QUANTITATIVE - Abnormal; Notable for the following components:       Result Value    D-Dimer, Quant 1.05 (*)     All other components within normal limits    Narrative:     Anton Cruz tel. [phone],  Coag results called to and read back by Marley Blank RN, 03/13/2022 21:29, by  HIXTI   CBC WITH AUTO DIFFERENTIAL - Abnormal; Notable for the following components:    MPV 10.5 (*)     All other components within normal limits   BRAIN NATRIURETIC PEPTIDE   TROPONIN   COMPREHENSIVE METABOLIC PANEL       I have reviewed and interpreted all of the currently available lab resultsfrom this visit (if applicable):  Results for orders placed or performed during the hospital encounter of 03/13/22   Brain Natriuretic Peptide   Result Value Ref Range    Pro- 0 - 1,800 pg/mL   Troponin   Result Value Ref Range    Troponin <0.30 <0.30 ng/mL   D-Dimer, Quantitative   Result Value Ref Range    D-Dimer, Quant 1.05 (HH) 0.00 - 0.60 ug/mL FEU   CBC with Auto Differential   Result Value Ref Range    WBC 5.6 4.0 - 11.0 K/uL    RBC 3.95 3.80 - 5.80 M/uL    Hemoglobin 11.5 11.5 - 16.5 g/dL    Hematocrit 37.1 37.0 - 47.0 %    MCV 93.9 80.0 - 100.0 fL    MCH 29.1 27.0 - 32.0 pg    MCHC 31.0 31.0 - 35.0 g/dL    RDW 13.4 11.0 - 16.0 %    Platelets 934 555 - 545 K/uL    MPV 10.5 (H) 6.0 - 10.0 fL    Neutrophils % 58.0 %    Immature Granulocytes % 0.4 0.0 - 5.0 %    Lymphocytes % 27.9 %    Monocytes % 8.1 %    Eosinophils % 4.5 %    Basophils % 1.1 %    Neutrophils Absolute 3.2 2.0 - 7.5 K/uL    Immature Granulocytes # 0.0 K/uL    Lymphocytes Absolute 1.6 1.5 - 4.0 K/uL    Monocytes Absolute 0.5 0.2 - 0.8 K/uL    Eosinophils Absolute 0.3 0.0 - 0.4 K/uL    Basophils Absolute 0.1 0.0 - 0.1 K/uL   Comprehensive Metabolic Panel   Result Value Ref Range    Sodium 141 136 - 145 mmol/L    Potassium 4.3 3.4 - 5.1 mmol/L    Chloride 107 98 - 107 mmol/L    CO2 24 20 - 30 mmol/L    Anion Gap 10 3 - 16    Glucose 98 74 - 106 mg/dL    BUN 12 6 - 20 mg/dL    CREATININE 0.8 0.4 - 1.2 mg/dL    GFR Non-African American >60 >59    GFR African American >59 >59    Calcium 8.5 8.5 - 10.5 mg/dL    Total Protein 6.4 6.4 - 8.3 g/dL    Albumin 3.6 3.4 - 4.8 g/dL    Albumin/Globulin Ratio 1.3 0.8 - 2.0    Total Bilirubin 0.4 0.3 - 1.2 mg/dL    Alkaline Phosphatase 58 25 - 100 U/L    ALT 7 4 - 36 U/L    AST 11 8 - 33 U/L    Globulin 2.8 Not Established g/dL        All other labs were within normal range or not returned as of this dictation.     EMERGENCY DEPARTMENT COURSE and DIFFERENTIAL DIAGNOSIS/MDM:   Vitals:    Vitals:    03/13/22 1945 03/13/22 2000 03/13/22 2015 03/13/22 2045   BP: 131/81 119/84 135/87 (!) 154/94   Pulse:    65   Resp:    17   Temp:       TempSrc:       SpO2: 96% 94% 100% 98%   Weight:       Height:           I explained to the patient that I thought she needed an US of her LLE to rule out DVT but we do not have [address] [facility] availability. Patient said she would like to come back tomorrow to get this done. CBC normal  CMP normal  BNP normal  Troponin negative  D-dimer was slightly elevated at 1.05 so CT PE protocol was ordered. Chest x-ray was normal but the CT showed minimal right upper lobe airspace disease consistent with pneumonia. No evidence of PE. Will treat with antibiotics as an outpatient. I am going to provide the patient with a left lower extremity ultrasound to rule out DVT. I still think the patient could have a DVT and this in and of itself can cause an elevated dimer. Those results will be sent to [address] [place] [address]. The patient will follow-up with their PCP in 1-2 days for reevaluation. If the patient or family members have any further concerns or any worsening symptoms they will return to the ED for reevaluation. CONSULTS:  None    PROCEDURES:  Procedures    CRITICAL CARE TIME    Total Critical Care time was 0 minutes, excluding separately reportable procedures. There was a high probability of clinically significant/life threatening deterioration in the patient's condition which required my urgent intervention. FINAL IMPRESSION      1. Pneumonia of right upper lobe due to infectious organism    2.  Left leg swelling          DISPOSITION/PLAN   DISPOSITION Decision To Discharge 03/13/2022 10:51:35 PM      PATIENT REFERRED TO:  KARMA Cloud - [address]  [phone]    Schedule an appointment as soon as possible for a visit in 2 days  As needed      DISCHARGE MEDICATIONS:  New Prescriptions    DOXYCYCLINE HYCLATE (VIBRA-TABS) 100 MG TABLET    Take 1 tablet by mouth 2 times daily for 7 days    ONDANSETRON (ZOFRAN ODT) 4 MG DISINTEGRATING TABLET    Take 1 tablet by mouth every 8 hours as needed for Nausea or Vomiting       Comment: Please note this report has been produced using speech recognition software and may contain errors related tothat system including errors in grammar, punctuation, and spelling, as well as words and phrases that may be inappropriate. If there are any questions or concerns please feel free to contact the dictating provider forclarification.     Bud Melgar MD  Attending Emergency Physician                  Bud Melgar MD  03/13/22 1439

## 2022-03-14 NOTE — ED NOTES
Pt verbalized understanding of DC and FU instructions. Pt left ambulating well. No questions at the time of DC.       Latesha Pina, LIZET  03/13/22 0367

## 2022-03-21 ENCOUNTER — TELEPHONE (OUTPATIENT)
Dept: OBSTETRICS AND GYNECOLOGY | Facility: CLINIC | Age: 67
End: 2022-03-21

## 2022-03-21 RX ORDER — MEDROXYPROGESTERONE ACETATE 2.5 MG/1
2.5 TABLET ORAL DAILY
Qty: 30 TABLET | Refills: 12 | Status: SHIPPED | OUTPATIENT
Start: 2022-03-21 | End: 2023-02-01 | Stop reason: SDUPTHER

## 2022-03-21 RX ORDER — ESTRADIOL 1 MG/1
1 TABLET ORAL DAILY
Qty: 30 TABLET | Refills: 12 | Status: SHIPPED | OUTPATIENT
Start: 2022-03-21 | End: 2023-02-01 | Stop reason: SDUPTHER

## 2022-04-17 ENCOUNTER — HOSPITAL ENCOUNTER (OUTPATIENT)
Dept: GENERAL RADIOLOGY | Facility: HOSPITAL | Age: 67
Discharge: HOME | End: 2022-04-17
Payer: MEDICAID

## 2022-04-17 ENCOUNTER — HOSPITAL ENCOUNTER (OUTPATIENT)
Facility: HOSPITAL | Age: 67
Discharge: HOME | End: 2022-04-17
Payer: MEDICAID

## 2022-04-17 DIAGNOSIS — R52 PAIN: ICD-10-CM

## 2022-04-17 LAB
BACTERIA: ABNORMAL /HPF
BILIRUBIN URINE: NEGATIVE
BLOOD, URINE: ABNORMAL
CLARITY: CLEAR
COLOR: YELLOW
EPITHELIAL CELLS, UA: ABNORMAL /HPF (ref 0–5)
GLUCOSE URINE: NEGATIVE MG/DL
KETONES, URINE: NEGATIVE MG/DL
LEUKOCYTE ESTERASE, URINE: NEGATIVE
MICROSCOPIC EXAMINATION: YES
MUCUS: ABNORMAL /LPF
NITRITE, URINE: NEGATIVE
PH UA: 5 (ref 5–8)
PROTEIN UA: NEGATIVE MG/DL
RBC UA: ABNORMAL /HPF (ref 0–4)
SPECIFIC GRAVITY UA: >=1.03 (ref 1–1.03)
URINE TYPE: ABNORMAL
UROBILINOGEN, URINE: 0.2 E.U./DL
WBC UA: ABNORMAL /HPF (ref 0–5)

## 2022-04-17 PROCEDURE — 87086 URINE CULTURE/COLONY COUNT: CPT

## 2022-04-17 PROCEDURE — 81001 URINALYSIS AUTO W/SCOPE: CPT

## 2022-04-17 PROCEDURE — 74018 RADEX ABDOMEN 1 VIEW: CPT

## 2022-04-19 LAB — URINE CULTURE, ROUTINE: NORMAL

## 2022-04-26 ENCOUNTER — HOSPITAL ENCOUNTER (OUTPATIENT)
Facility: HOSPITAL | Age: 67
Discharge: HOME | End: 2022-04-26
Payer: MEDICAID

## 2022-04-26 PROCEDURE — 85025 COMPLETE CBC W/AUTO DIFF WBC: CPT

## 2022-04-26 PROCEDURE — 82607 VITAMIN B-12: CPT

## 2022-04-26 PROCEDURE — 80061 LIPID PANEL: CPT

## 2022-04-26 PROCEDURE — 82306 VITAMIN D 25 HYDROXY: CPT

## 2022-04-26 PROCEDURE — 84443 ASSAY THYROID STIM HORMONE: CPT

## 2022-04-26 PROCEDURE — 36415 COLL VENOUS BLD VENIPUNCTURE: CPT

## 2022-04-26 PROCEDURE — 80053 COMPREHEN METABOLIC PANEL: CPT

## 2022-04-26 PROCEDURE — 83036 HEMOGLOBIN GLYCOSYLATED A1C: CPT

## 2022-04-26 PROCEDURE — 82746 ASSAY OF FOLIC ACID SERUM: CPT

## 2022-04-27 LAB
A/G RATIO: 1.2 (ref 0.8–2)
ALBUMIN SERPL-MCNC: 3.8 G/DL (ref 3.4–4.8)
ALP BLD-CCNC: 65 U/L (ref 25–100)
ALT SERPL-CCNC: 9 U/L (ref 4–36)
ANION GAP SERPL CALCULATED.3IONS-SCNC: 12 MMOL/L (ref 3–16)
AST SERPL-CCNC: 11 U/L (ref 8–33)
BASOPHILS ABSOLUTE: 0.1 K/UL (ref 0–0.1)
BASOPHILS RELATIVE PERCENT: 1.3 %
BILIRUB SERPL-MCNC: 0.5 MG/DL (ref 0.3–1.2)
BUN BLDV-MCNC: 11 MG/DL (ref 6–20)
CALCIUM SERPL-MCNC: 9.7 MG/DL (ref 8.5–10.5)
CHLORIDE BLD-SCNC: 107 MMOL/L (ref 98–107)
CHOLESTEROL, TOTAL: 188 MG/DL (ref 0–200)
CO2: 24 MMOL/L (ref 20–30)
CREAT SERPL-MCNC: 0.8 MG/DL (ref 0.4–1.2)
EOSINOPHILS ABSOLUTE: 0.4 K/UL (ref 0–0.4)
EOSINOPHILS RELATIVE PERCENT: 6.3 %
FOLATE: 7.44 NG/ML
GFR AFRICAN AMERICAN: >59
GFR NON-AFRICAN AMERICAN: >60
GLOBULIN: 3.1 G/DL
GLUCOSE BLD-MCNC: 89 MG/DL (ref 74–106)
HBA1C MFR BLD: 5.8 %
HCT VFR BLD CALC: 39.4 % (ref 37–47)
HDLC SERPL-MCNC: 73 MG/DL (ref 40–60)
HEMOGLOBIN: 12.3 G/DL (ref 11.5–16.5)
IMMATURE GRANULOCYTES #: 0.1 K/UL
IMMATURE GRANULOCYTES %: 0.9 % (ref 0–5)
LDL CHOLESTEROL CALCULATED: 87 MG/DL
LYMPHOCYTES ABSOLUTE: 1.6 K/UL (ref 1.5–4)
LYMPHOCYTES RELATIVE PERCENT: 28.4 %
MCH RBC QN AUTO: 29.4 PG (ref 27–32)
MCHC RBC AUTO-ENTMCNC: 31.2 G/DL (ref 31–35)
MCV RBC AUTO: 94.3 FL (ref 80–100)
MONOCYTES ABSOLUTE: 0.5 K/UL (ref 0.2–0.8)
MONOCYTES RELATIVE PERCENT: 8.6 %
NEUTROPHILS ABSOLUTE: 3 K/UL (ref 2–7.5)
NEUTROPHILS RELATIVE PERCENT: 54.5 %
PDW BLD-RTO: 13.9 % (ref 11–16)
PLATELET # BLD: 249 K/UL (ref 150–400)
PMV BLD AUTO: 11.7 FL (ref 6–10)
POTASSIUM SERPL-SCNC: 4.8 MMOL/L (ref 3.4–5.1)
RBC # BLD: 4.18 M/UL (ref 3.8–5.8)
SODIUM BLD-SCNC: 143 MMOL/L (ref 136–145)
TOTAL PROTEIN: 6.9 G/DL (ref 6.4–8.3)
TRIGL SERPL-MCNC: 140 MG/DL (ref 0–249)
TSH SERPL DL<=0.05 MIU/L-ACNC: 3.78 UIU/ML (ref 0.27–4.2)
VITAMIN B-12: 302 PG/ML (ref 211–911)
VITAMIN D 25-HYDROXY: 31.6 (ref 32–100)
VLDLC SERPL CALC-MCNC: 28 MG/DL
WBC # BLD: 5.6 K/UL (ref 4–11)

## 2022-05-31 ENCOUNTER — HOSPITAL ENCOUNTER (OUTPATIENT)
Dept: GENERAL RADIOLOGY | Facility: HOSPITAL | Age: 67
Discharge: HOME | End: 2022-05-31
Payer: MEDICAID

## 2022-05-31 ENCOUNTER — HOSPITAL ENCOUNTER (OUTPATIENT)
Facility: HOSPITAL | Age: 67
Discharge: HOME | End: 2022-05-31
Payer: MEDICAID

## 2022-05-31 DIAGNOSIS — R05.9 COUGH: ICD-10-CM

## 2022-05-31 PROCEDURE — 71046 X-RAY EXAM CHEST 2 VIEWS: CPT

## 2022-06-13 ENCOUNTER — HOSPITAL ENCOUNTER (OUTPATIENT)
Dept: GENERAL RADIOLOGY | Facility: HOSPITAL | Age: 67
Discharge: HOME | End: 2022-06-13
Payer: MEDICAID

## 2022-06-13 ENCOUNTER — HOSPITAL ENCOUNTER (OUTPATIENT)
Facility: HOSPITAL | Age: 67
Discharge: HOME | End: 2022-06-13
Payer: MEDICAID

## 2022-06-13 DIAGNOSIS — N30.20 CHRONIC CYSTITIS: ICD-10-CM

## 2022-06-13 LAB
BILIRUBIN URINE: NEGATIVE
BLOOD, URINE: NEGATIVE
CLARITY: CLEAR
COLOR: YELLOW
EPITHELIAL CELLS, UA: ABNORMAL /HPF (ref 0–5)
GLUCOSE URINE: NEGATIVE MG/DL
KETONES, URINE: NEGATIVE MG/DL
LEUKOCYTE ESTERASE, URINE: NEGATIVE
MICROSCOPIC EXAMINATION: YES
MUCUS: ABNORMAL /LPF
NITRITE, URINE: NEGATIVE
PH UA: 5.5 (ref 5–8)
PROTEIN UA: ABNORMAL MG/DL
RBC UA: ABNORMAL /HPF (ref 0–4)
SPECIFIC GRAVITY UA: >=1.03 (ref 1–1.03)
URINE TYPE: ABNORMAL
UROBILINOGEN, URINE: 1 E.U./DL
WBC UA: ABNORMAL /HPF (ref 0–5)

## 2022-06-13 PROCEDURE — 81001 URINALYSIS AUTO W/SCOPE: CPT

## 2022-06-13 PROCEDURE — 87086 URINE CULTURE/COLONY COUNT: CPT

## 2022-06-13 PROCEDURE — 74018 RADEX ABDOMEN 1 VIEW: CPT

## 2022-06-14 LAB — URINE CULTURE, ROUTINE: NORMAL

## 2022-07-18 ENCOUNTER — HOSPITAL ENCOUNTER (OUTPATIENT)
Facility: HOSPITAL | Age: 67
Discharge: HOME | End: 2022-07-18
Payer: MEDICAID

## 2022-07-18 ENCOUNTER — HOSPITAL ENCOUNTER (OUTPATIENT)
Dept: GENERAL RADIOLOGY | Facility: HOSPITAL | Age: 67
Discharge: HOME | End: 2022-07-18
Payer: MEDICAID

## 2022-07-18 DIAGNOSIS — N30.20 ACUTE ON CHRONIC CYSTITIS: ICD-10-CM

## 2022-07-18 DIAGNOSIS — N30.00 ACUTE ON CHRONIC CYSTITIS: ICD-10-CM

## 2022-07-18 PROCEDURE — 81003 URINALYSIS AUTO W/O SCOPE: CPT

## 2022-07-18 PROCEDURE — 87086 URINE CULTURE/COLONY COUNT: CPT

## 2022-07-18 PROCEDURE — 74018 RADEX ABDOMEN 1 VIEW: CPT

## 2022-07-19 LAB — URINE CULTURE, ROUTINE: NORMAL

## 2022-08-02 ENCOUNTER — HOSPITAL ENCOUNTER (OUTPATIENT)
Facility: HOSPITAL | Age: 67
Discharge: HOME | End: 2022-08-02
Payer: MEDICARE

## 2022-08-02 LAB
A/G RATIO: 1.7 (ref 0.8–2)
ALBUMIN SERPL-MCNC: 3.8 G/DL (ref 3.4–4.8)
ALP BLD-CCNC: 67 U/L (ref 25–100)
ALT SERPL-CCNC: <5 U/L (ref 4–36)
ANION GAP SERPL CALCULATED.3IONS-SCNC: 11 MMOL/L (ref 3–16)
AST SERPL-CCNC: 11 U/L (ref 8–33)
BASOPHILS ABSOLUTE: 0.1 K/UL (ref 0–0.1)
BASOPHILS RELATIVE PERCENT: 1 %
BILIRUB SERPL-MCNC: 0.3 MG/DL (ref 0.3–1.2)
BUN BLDV-MCNC: 14 MG/DL (ref 6–20)
CALCIUM SERPL-MCNC: 8.6 MG/DL (ref 8.5–10.5)
CHLORIDE BLD-SCNC: 106 MMOL/L (ref 98–107)
CHOLESTEROL, TOTAL: 183 MG/DL (ref 0–200)
CO2: 24 MMOL/L (ref 20–30)
CREAT SERPL-MCNC: 0.8 MG/DL (ref 0.4–1.2)
EOSINOPHILS ABSOLUTE: 0.4 K/UL (ref 0–0.4)
EOSINOPHILS RELATIVE PERCENT: 6.2 %
FOLATE: 7.19 NG/ML
GFR AFRICAN AMERICAN: >59
GFR NON-AFRICAN AMERICAN: >60
GLOBULIN: 2.3 G/DL
GLUCOSE BLD-MCNC: 145 MG/DL (ref 74–106)
HBA1C MFR BLD: 6.1 %
HCT VFR BLD CALC: 36.1 % (ref 37–47)
HDLC SERPL-MCNC: 58 MG/DL (ref 40–60)
HEMOGLOBIN: 11.5 G/DL (ref 11.5–16.5)
IMMATURE GRANULOCYTES #: 0 K/UL
IMMATURE GRANULOCYTES %: 0.3 % (ref 0–5)
LDL CHOLESTEROL CALCULATED: 82 MG/DL
LYMPHOCYTES ABSOLUTE: 1.2 K/UL (ref 1.5–4)
LYMPHOCYTES RELATIVE PERCENT: 19.7 %
MCH RBC QN AUTO: 29.6 PG (ref 27–32)
MCHC RBC AUTO-ENTMCNC: 31.9 G/DL (ref 31–35)
MCV RBC AUTO: 93 FL (ref 80–100)
MONOCYTES ABSOLUTE: 0.3 K/UL (ref 0.2–0.8)
MONOCYTES RELATIVE PERCENT: 5.3 %
NEUTROPHILS ABSOLUTE: 4 K/UL (ref 2–7.5)
NEUTROPHILS RELATIVE PERCENT: 67.5 %
PDW BLD-RTO: 12.9 % (ref 11–16)
PLATELET # BLD: 296 K/UL (ref 150–400)
PMV BLD AUTO: 10.8 FL (ref 6–10)
POTASSIUM SERPL-SCNC: 4.2 MMOL/L (ref 3.4–5.1)
RBC # BLD: 3.88 M/UL (ref 3.8–5.8)
SODIUM BLD-SCNC: 141 MMOL/L (ref 136–145)
TOTAL PROTEIN: 6.1 G/DL (ref 6.4–8.3)
TRIGL SERPL-MCNC: 216 MG/DL (ref 0–249)
TSH SERPL DL<=0.05 MIU/L-ACNC: 2.21 UIU/ML (ref 0.27–4.2)
VITAMIN B-12: 299 PG/ML (ref 211–911)
VITAMIN D 25-HYDROXY: 31.8 (ref 32–100)
VLDLC SERPL CALC-MCNC: 43 MG/DL
WBC # BLD: 5.9 K/UL (ref 4–11)

## 2022-08-02 PROCEDURE — 85025 COMPLETE CBC W/AUTO DIFF WBC: CPT

## 2022-08-02 PROCEDURE — 84443 ASSAY THYROID STIM HORMONE: CPT

## 2022-08-02 PROCEDURE — 82306 VITAMIN D 25 HYDROXY: CPT

## 2022-08-02 PROCEDURE — 82607 VITAMIN B-12: CPT

## 2022-08-02 PROCEDURE — 83036 HEMOGLOBIN GLYCOSYLATED A1C: CPT

## 2022-08-02 PROCEDURE — 36415 COLL VENOUS BLD VENIPUNCTURE: CPT

## 2022-08-02 PROCEDURE — 82746 ASSAY OF FOLIC ACID SERUM: CPT

## 2022-08-02 PROCEDURE — 80053 COMPREHEN METABOLIC PANEL: CPT

## 2022-08-02 PROCEDURE — 80061 LIPID PANEL: CPT

## 2022-08-12 ENCOUNTER — HOSPITAL ENCOUNTER (OUTPATIENT)
Dept: MAMMOGRAPHY | Facility: HOSPITAL | Age: 67
Discharge: HOME | End: 2022-08-12
Payer: MEDICARE

## 2022-08-12 VITALS — WEIGHT: 230 LBS | BODY MASS INDEX: 43.46 KG/M2

## 2022-08-12 DIAGNOSIS — Z12.31 BREAST CANCER SCREENING BY MAMMOGRAM: ICD-10-CM

## 2022-08-12 PROCEDURE — 77067 SCR MAMMO BI INCL CAD: CPT

## 2022-08-25 ENCOUNTER — HOSPITAL ENCOUNTER (OUTPATIENT)
Dept: MAMMOGRAPHY | Facility: HOSPITAL | Age: 67
Discharge: HOME | End: 2022-08-25
Payer: MEDICARE

## 2022-08-25 ENCOUNTER — HOSPITAL ENCOUNTER (OUTPATIENT)
Dept: ULTRASOUND IMAGING | Facility: HOSPITAL | Age: 67
Discharge: HOME | End: 2022-08-25
Payer: MEDICARE

## 2022-08-25 DIAGNOSIS — R92.8 ABNORMAL MAMMOGRAM: ICD-10-CM

## 2022-08-25 PROCEDURE — 76642 ULTRASOUND BREAST LIMITED: CPT

## 2022-08-25 PROCEDURE — G0279 TOMOSYNTHESIS, MAMMO: HCPCS

## 2022-09-06 ENCOUNTER — HOSPITAL ENCOUNTER (OUTPATIENT)
Dept: GENERAL RADIOLOGY | Facility: HOSPITAL | Age: 67
Discharge: HOME | End: 2022-09-06
Payer: MEDICARE

## 2022-09-06 ENCOUNTER — HOSPITAL ENCOUNTER (OUTPATIENT)
Facility: HOSPITAL | Age: 67
Discharge: HOME | End: 2022-09-06
Payer: MEDICARE

## 2022-09-06 DIAGNOSIS — H30.20: ICD-10-CM

## 2022-09-06 PROCEDURE — 74018 RADEX ABDOMEN 1 VIEW: CPT

## 2022-09-06 PROCEDURE — 81003 URINALYSIS AUTO W/O SCOPE: CPT

## 2022-09-06 PROCEDURE — 87086 URINE CULTURE/COLONY COUNT: CPT

## 2022-10-12 ENCOUNTER — HOSPITAL ENCOUNTER (OUTPATIENT)
Dept: GENERAL RADIOLOGY | Facility: HOSPITAL | Age: 67
Discharge: HOME | End: 2022-10-12
Payer: MEDICARE

## 2022-10-12 DIAGNOSIS — Z78.0 ASYMPTOMATIC POSTMENOPAUSAL STATE: ICD-10-CM

## 2022-10-12 PROCEDURE — 77080 DXA BONE DENSITY AXIAL: CPT

## 2022-10-18 ENCOUNTER — HOSPITAL ENCOUNTER (OUTPATIENT)
Dept: GENERAL RADIOLOGY | Facility: HOSPITAL | Age: 67
Discharge: HOME | End: 2022-10-18
Payer: MEDICARE

## 2022-10-18 ENCOUNTER — HOSPITAL ENCOUNTER (OUTPATIENT)
Facility: HOSPITAL | Age: 67
Discharge: HOME | End: 2022-10-18
Payer: MEDICARE

## 2022-10-18 DIAGNOSIS — N30.20 ACUTE ON CHRONIC CYSTITIS: ICD-10-CM

## 2022-10-18 DIAGNOSIS — N30.00 ACUTE ON CHRONIC CYSTITIS: ICD-10-CM

## 2022-10-18 PROCEDURE — 87086 URINE CULTURE/COLONY COUNT: CPT

## 2022-10-18 PROCEDURE — 74018 RADEX ABDOMEN 1 VIEW: CPT

## 2022-11-15 ENCOUNTER — HOSPITAL ENCOUNTER (OUTPATIENT)
Facility: HOSPITAL | Age: 67
Discharge: HOME | End: 2022-11-15
Payer: MEDICARE

## 2022-11-15 ENCOUNTER — HOSPITAL ENCOUNTER (OUTPATIENT)
Dept: GENERAL RADIOLOGY | Facility: HOSPITAL | Age: 67
Discharge: HOME | End: 2022-11-15
Payer: MEDICARE

## 2022-11-15 DIAGNOSIS — N30.20 ACUTE ON CHRONIC CYSTITIS: ICD-10-CM

## 2022-11-15 DIAGNOSIS — N30.00 ACUTE ON CHRONIC CYSTITIS: ICD-10-CM

## 2022-11-15 PROCEDURE — 74018 RADEX ABDOMEN 1 VIEW: CPT

## 2022-11-15 PROCEDURE — 87086 URINE CULTURE/COLONY COUNT: CPT

## 2022-11-15 PROCEDURE — 81003 URINALYSIS AUTO W/O SCOPE: CPT

## 2022-11-16 LAB — URINE CULTURE, ROUTINE: NORMAL

## 2022-12-02 ENCOUNTER — HOSPITAL ENCOUNTER (EMERGENCY)
Facility: HOSPITAL | Age: 67
Discharge: HOME | End: 2022-12-02
Attending: EMERGENCY MEDICINE
Payer: MEDICARE

## 2022-12-02 ENCOUNTER — APPOINTMENT (OUTPATIENT)
Dept: ULTRASOUND IMAGING | Facility: HOSPITAL | Age: 67
End: 2022-12-02
Payer: MEDICARE

## 2022-12-02 VITALS
DIASTOLIC BLOOD PRESSURE: 90 MMHG | HEART RATE: 63 BPM | SYSTOLIC BLOOD PRESSURE: 128 MMHG | BODY MASS INDEX: 37.57 KG/M2 | WEIGHT: 199 LBS | OXYGEN SATURATION: 96 % | HEIGHT: 61 IN | TEMPERATURE: 98.8 F | RESPIRATION RATE: 20 BRPM

## 2022-12-02 DIAGNOSIS — R60.9 PERIPHERAL EDEMA: Primary | ICD-10-CM

## 2022-12-02 PROCEDURE — 93971 EXTREMITY STUDY: CPT

## 2022-12-02 PROCEDURE — 99284 EMERGENCY DEPT VISIT MOD MDM: CPT

## 2022-12-02 ASSESSMENT — PAIN - FUNCTIONAL ASSESSMENT: PAIN_FUNCTIONAL_ASSESSMENT: 0-10

## 2022-12-02 ASSESSMENT — LIFESTYLE VARIABLES: HOW OFTEN DO YOU HAVE A DRINK CONTAINING ALCOHOL: NEVER

## 2022-12-02 NOTE — ED NOTES
Reviewed discharge plan with Raciel Arroyo. Encouraged her to f/u with KARMA Varner Res, CNP and she understood. NAD noted on discharge, gait steady.       Electronically signed by Trace Jones RN on 12/2/2022 at 2:04 PM       Trace Jones RN  12/02/22 0043

## 2022-12-02 NOTE — ED PROVIDER NOTES
[facility] [address] [facility] ENCOUNTER      Pt Name: Hermelinda Carcamo  MRN: [id number]  YOB: 1955  Date of evaluation: 12/2/2022  Provider: David hWitaker MD    [address] [place] [address]       Chief Complaint   Patient presents with    Leg Pain    Leg Swelling     C/o pain to left calf muscle with swelling, redness, and warm  to the touch for 3 days- denies injury          HISTORY OF PRESENT ILLNESS  (Location/Symptom, Timing/Onset, Context/Setting, Quality, Duration, Modifying Factors, Severity.)   Hermelinda Friend is a 74 y.o. female who presents to the emergency department complaining of left calf swelling for the last 2 days it feels tight she is having some calf she denies any long trips where she has been sedentary for long periods of time she denies any history of DVT denies numbness or loss of motor function to the leg she said he thinks he might be a little bit increased redness to the skin she has not noticed any wounds on the leg or or shortness of breath. Nursing notes were reviewed. REVIEW OFSYSTEMS    (2-9 systems for level 4, 10 or more for level 5)   ROS:  General:  No fevers, no chills, no weakness  Cardiovascular:  No chest pain, no palpitations  Respiratory:  No shortness of breath, no cough, no wheezing  Gastrointestinal:  No pain, no nausea, no vomiting, no diarrhea  Musculoskeletal:  +muscle pain, no joint pain  Skin:  No rash, no easy bruising  Neurologic:  No speech problems, no headache, no extremity weakness  Psychiatric:  No anxiety  Genitourinary:  No dysuria, no hematuria    Except as noted above the remainder of the review of systems was reviewed and negative.        PAST MEDICAL HISTORY     Past Medical History:   Diagnosis Date    Arthritis     Depression     Thyroid disease     UTI (urinary tract infection)          SURGICAL HISTORY       Past Surgical History:   Procedure Laterality Date    APPENDECTOMY      CARPAL TUNNEL RELEASE Bilateral     CHOLECYSTECTOMY      GASTRIC BYPASS SURGERY      JOINT REPLACEMENT Right     [place] COLONOSCOPY FLX DX W/COLLJ SPEC WHEN PFRMD N/A 08/02/2018    COLONOSCOPY DIAGNOSTIC OR SCREENING performed by Nikita Almazan MD at [facility] 07/14/2021    TUBAL LIGATION           CURRENT MEDICATIONS       Previous Medications    ESTRADIOL (ESTRACE) 1 MG TABLET    Take 1 mg by mouth daily    FLUOXETINE (PROZAC) 20 MG CAPSULE    Take 40 mg by mouth daily    FUROSEMIDE (LASIX) 20 MG TABLET    Take 1 tablet by mouth daily    GABAPENTIN (NEURONTIN) 800 MG TABLET    Take 800 mg by mouth 3 times daily. IPRATROPIUM (ATROVENT) 0.03 % NASAL SPRAY    1 spray by Nasal route 2 times daily    LANSOPRAZOLE (PREVACID) 30 MG DELAYED RELEASE CAPSULE    Take 30 mg by mouth daily    LEVOTHYROXINE (SYNTHROID) 50 MCG TABLET    Take 50 mcg by mouth Daily    MEDROXYPROGESTERONE (PROVERA) 2.5 MG TABLET    Take 2.5 mg by mouth daily    MELOXICAM (MOBIC) 15 MG TABLET    Take 15 mg by mouth daily    MONTELUKAST (SINGULAIR) 10 MG TABLET    Take 10 mg by mouth daily    ONDANSETRON (ZOFRAN ODT) 4 MG DISINTEGRATING TABLET    Take 1 tablet by mouth every 8 hours as needed for Nausea or Vomiting    OXYBUTYNIN CHLORIDE PO    Take by mouth    OXYCODONE-ACETAMINOPHEN (PERCOCET) 5-325 MG PER TABLET    Take 1 tablet by mouth every 4 hours as needed for Pain. POTASSIUM CHLORIDE (KLOR-CON M) 10 MEQ EXTENDED RELEASE TABLET    Take 1 tablet by mouth daily    PRAMIPEXOLE (MIRAPEX) 1 MG TABLET    Take 1 mg by mouth nightly    TRAMADOL (ULTRAM) 50 MG TABLET    Take 50 mg by mouth every 6 hours as needed for Pain. TRAZODONE (DESYREL) 100 MG TABLET    Take 100 mg by mouth nightly       ALLERGIES     Patient has no known allergies. FAMILY HISTORY     History reviewed. No pertinent family history.        SOCIAL HISTORY       Social History     Socioeconomic History    Marital status: Legally      Spouse name: None Number of children: None    Years of education: None    Highest education level: None   Tobacco Use    Smoking status: Never    Smokeless tobacco: Never   Vaping Use    Vaping Use: Never used   Substance and Sexual Activity    Alcohol use: No    Drug use: No         PHYSICAL EXAM    (up to 7 for level 4, 8 or more for level 5)     ED Triage Vitals [12/02/22 1043]   BP Temp Temp Source Heart Rate Resp SpO2 Height Weight   136/71 98.8 °F (37.1 °C) Oral 63 20 97 % 5' 1\" (1.549 m) 199 lb (90.3 kg)       Physical Exam  General :Patient is awake, alert, oriented, in no acute distress, nontoxic appearing  HEENT: Pupils are equally round and reactive to light, EOMI, conjunctivae clear. Neck: Neck is supple, full range of motion, trachea midline    Musculoskeletal: 5 out of 5 strength in all 4 extremities. No focal muscle deficits are appreciated left leg with 1+ popliteal posterior tib and dorsal pedal pulse the left leg is larger than the right minimal pitting. Sensation and motor function are intact throughout the leg. Neuro: Motor intact, sensory intact, level of consciousness is normal  Dermatology: Skin is warm and dry  Psych: Mentation is grossly normal, cognition is grossly normal. Affect is appropriate. DIAGNOSTIC RESULTS     EKG: All EKG's are interpreted by the Emergency Department Physician who either signs or Co-signs this chart in the [address] a cardiologist.    The EKG interpreted by me shows     RADIOLOGY:   Non-plain film images such as CT, Ultrasound and MRI are read by the radiologist. Plain radiographic images are visualized and preliminarily interpreted by the emergency physician with the below findings:      [] Radiologist's Report Reviewed:  US DUP LOWER EXTREMITY LEFT NINO   Final Result      No evidence of DVT in the visualized portions of the deep venous system of the left lower extremity.                ED BEDSIDE ULTRASOUND:   Performed by ED Physician - none    LABS:    I have reviewed and interpreted all of the currently available lab results from this visit (ifapplicable):  No results found for this visit on 12/02/22. All other labs were within normal range or not returned as of this dictation. EMERGENCY DEPARTMENT COURSE and DIFFERENTIAL DIAGNOSIS/MDM:   Vitals:    Vitals:    12/02/22 1043 12/02/22 1047 12/02/22 1102   BP: 136/71 129/72 (!) 125/57   Pulse: 63     Resp: 20     Temp: 98.8 °F (37.1 °C)     TempSrc: Oral     SpO2: 97% 98% 100%   Weight: 199 lb (90.3 kg)     Height: 5' 1\" (1.549 m)         MEDICATIONS ADMINISTERED IN ED:  Medications - No data to display    Has remained stable the ultrasound of her left leg is negative for any deep venous thrombosis. As I discussed with patient this is most likely just dependent edema which is worse on that leg possibly from her sitting on that leg or keeping it in an odd position. I have instructed her to follow-up with her family physician if she has any further trouble. The patient will follow-up with their PCP in 1-2 days for reevaluation. If the patient or family members have anyfurther concerns or any worsening symptoms they will return to the ED for reevaluation. Is this patient to be included in the SEP-1 Core Measure due to severe sepsis or septic shock? No   Exclusion criteria - the patient is NOT to be included for SEP-1 Core Measure due to: Alternative explanation for abnormal labs/vitals that do not relate to sepsis, see MDM for further explanation          CONSULTS:  None    PROCEDURES:  Procedures    CRITICAL CARE TIME    Total Critical Care time was 0 minutes, excluding separately reportable procedures. There was a high probability of clinically significant/life threatening deterioration in the patient's condition which required my urgent intervention. FINAL IMPRESSION      1.  Peripheral edema New Problem         DISPOSITION/PLAN   DISPOSITION Decision To Discharge 12/02/2022 01:46:55 PM   discharge to home    PATIENT REFERRED TO:  KARMA Mendez - [address]  [phone]    Schedule an appointment as soon as possible for a visit in 3 days      DISCHARGE MEDICATIONS:  New Prescriptions    No medications on file       Comment: Please note this report has been produced using speech recognition software and may contain errorsrelated to that system including errors in grammar, punctuation, and spelling, as well as words and phrases that may be inappropriate. If there are any questions or concerns please feel free to contact the dictating providerfor clarification.     Javier Rose MD  Attending Emergency Physician               Javier Rose MD  12/02/22 5182

## 2022-12-02 NOTE — ED NOTES
Pt presented to ER after being seen virtual by ARNJEFF- pt c/o left calf muscle swelling, pain, and redness x 3days- denies any injury      Cassandra Decker RN  12/02/22 2901

## 2022-12-02 NOTE — ED NOTES
Edwardo Martins from [facility] called to report she was sending patient up here for evaluation- states patient was seen via tele health and c/o Left calf swollen, red, warm to touch and painful for three days- sending for evaluation for blood clot     Michelle Ortega RN  12/02/22 1013

## 2023-02-01 ENCOUNTER — OFFICE VISIT (OUTPATIENT)
Dept: OBSTETRICS AND GYNECOLOGY | Facility: CLINIC | Age: 68
End: 2023-02-01
Payer: MEDICARE

## 2023-02-01 VITALS — WEIGHT: 249.6 LBS | BODY MASS INDEX: 47.16 KG/M2

## 2023-02-01 DIAGNOSIS — Z01.419 ENCOUNTER FOR GYNECOLOGICAL EXAMINATION WITHOUT ABNORMAL FINDING: Primary | ICD-10-CM

## 2023-02-01 DIAGNOSIS — Z12.31 ENCOUNTER FOR SCREENING MAMMOGRAM FOR MALIGNANT NEOPLASM OF BREAST: ICD-10-CM

## 2023-02-01 PROCEDURE — 99397 PER PM REEVAL EST PAT 65+ YR: CPT | Performed by: OBSTETRICS & GYNECOLOGY

## 2023-02-01 RX ORDER — NITROFURANTOIN 25; 75 MG/1; MG/1
100 CAPSULE ORAL 2 TIMES DAILY
Qty: 14 CAPSULE | Refills: 0 | Status: ON HOLD | OUTPATIENT
Start: 2023-02-01 | End: 2023-03-25

## 2023-02-01 RX ORDER — ATORVASTATIN CALCIUM 20 MG/1
20 TABLET, FILM COATED ORAL DAILY
COMMUNITY
Start: 2022-11-16

## 2023-02-01 RX ORDER — ESTRADIOL 1 MG/1
1 TABLET ORAL DAILY
Qty: 30 TABLET | Refills: 12 | Status: SHIPPED | OUTPATIENT
Start: 2023-02-01

## 2023-02-01 RX ORDER — MEDROXYPROGESTERONE ACETATE 2.5 MG/1
2.5 TABLET ORAL DAILY
Qty: 30 TABLET | Refills: 12 | Status: SHIPPED | OUTPATIENT
Start: 2023-02-01

## 2023-02-01 NOTE — PROGRESS NOTES
Subjective   Chief Complaint   Patient presents with   • Gynecologic Exam     Yearly exam and pap smear     Jessica Flowers is a 67 y.o. year old No obstetric history on file..  No LMP recorded. Patient is postmenopausal.  She presents to be seen because of annual exam.  Sees Jesus for I.C.- recent procedure--patient unsure of what was done and now having lower back pain and suprapubic discomfort     OTHER COMPLAINTS:  Nothing else    The following portions of the patient's history were reviewed and updated as appropriate:  She  has a past medical history of Abnormal CXR, Allergic rhinitis, Anxiety, Carpal tunnel syndrome, Chronic narcotic use, Depression, Dyspnea on exertion, Fatigue, GERD (gastroesophageal reflux disease), Glucose intolerance (impaired glucose tolerance), Hiatal hernia with gastroesophageal reflux, MRSA infection, Hyperlipidemia, Hypertension, Hypertriglyceridemia, Hypothyroid, Insomnia, Joint pain, Morbid obesity (HCC), OAB (overactive bladder), Osteoarthritis of knees, bilateral, Plantar fasciitis, Postmenopausal HRT (hormone replacement therapy), Psoriasis, and Vitamin D deficiency.  She does not have any pertinent problems on file.  She  has a past surgical history that includes Appendectomy (1989); Laparoscopic tubal ligation (1988); Dilation and curettage of uterus (1990, 2015); Carpal tunnel release; Cholecystectomy open (1980); Other surgical history; pr laps surg esopg/gstr fundoplasty (N/A, 7/5/2017); and Gastric Sleeve (N/A, 7/5/2017).  Her family history includes Cancer in her father and sister; Diabetes in her brother and father; Heart attack in her brother and father; Hypertension in her mother.  She  reports that she has never smoked. She has never used smokeless tobacco. She reports that she does not drink alcohol and does not use drugs.  Current Outpatient Medications   Medication Sig Dispense Refill   • aspirin 81 MG chewable tablet Chew 81 mg Daily.     • atorvastatin (LIPITOR)  20 MG tablet Take 20 mg by mouth Daily.     • estradiol (ESTRACE) 1 MG tablet Take 1 tablet by mouth Daily. 30 tablet 12   • FLUoxetine (PROzac) 20 MG capsule Take 40 mg by mouth Daily.     • furosemide (LASIX) 20 MG tablet Take 20 mg by mouth 2 (Two) Times a Day.     • gabapentin (NEURONTIN) 800 MG tablet      • ipratropium (ATROVENT) 0.03 % nasal spray 2 sprays into each nostril Every 12 (Twelve) Hours.     • lansoprazole (PREVACID) 30 MG capsule   0   • levothyroxine (SYNTHROID, LEVOTHROID) 100 MCG tablet Take 50 mcg by mouth Daily.     • medroxyPROGESTERone (PROVERA) 2.5 MG tablet Take 1 tablet by mouth Daily. 30 tablet 12   • montelukast (SINGULAIR) 10 MG tablet      • tamsulosin (FLOMAX) 0.4 MG capsule 24 hr capsule      • traZODone (DESYREL) 100 MG tablet Take 100 mg by mouth Every Night.     • cefdinir (OMNICEF) 300 MG capsule Take 300 mg by mouth 2 (Two) Times a Day.     • doxycycline (VIBRAMYCIN) 100 MG capsule Take 100 mg by mouth 2 (Two) Times a Day.     • Gemtesa 75 MG tablet      • oxybutynin XL (DITROPAN-XL) 10 MG 24 hr tablet Take 10 mg by mouth Daily.     • pramipexole (MIRAPEX) 1 MG tablet Take 1 mg by mouth.     • RA VITAMIN D-3 5000 UNITS capsule 5,000 Units Daily.  0     No current facility-administered medications for this visit.     Current Outpatient Medications on File Prior to Visit   Medication Sig   • aspirin 81 MG chewable tablet Chew 81 mg Daily.   • atorvastatin (LIPITOR) 20 MG tablet Take 20 mg by mouth Daily.   • estradiol (ESTRACE) 1 MG tablet Take 1 tablet by mouth Daily.   • FLUoxetine (PROzac) 20 MG capsule Take 40 mg by mouth Daily.   • furosemide (LASIX) 20 MG tablet Take 20 mg by mouth 2 (Two) Times a Day.   • gabapentin (NEURONTIN) 800 MG tablet    • ipratropium (ATROVENT) 0.03 % nasal spray 2 sprays into each nostril Every 12 (Twelve) Hours.   • lansoprazole (PREVACID) 30 MG capsule    • levothyroxine (SYNTHROID, LEVOTHROID) 100 MCG tablet Take 50 mcg by mouth Daily.   •  medroxyPROGESTERone (PROVERA) 2.5 MG tablet Take 1 tablet by mouth Daily.   • montelukast (SINGULAIR) 10 MG tablet    • tamsulosin (FLOMAX) 0.4 MG capsule 24 hr capsule    • traZODone (DESYREL) 100 MG tablet Take 100 mg by mouth Every Night.   • cefdinir (OMNICEF) 300 MG capsule Take 300 mg by mouth 2 (Two) Times a Day.   • doxycycline (VIBRAMYCIN) 100 MG capsule Take 100 mg by mouth 2 (Two) Times a Day.   • Gemtesa 75 MG tablet    • oxybutynin XL (DITROPAN-XL) 10 MG 24 hr tablet Take 10 mg by mouth Daily.   • pramipexole (MIRAPEX) 1 MG tablet Take 1 mg by mouth.   • RA VITAMIN D-3 5000 UNITS capsule 5,000 Units Daily.     No current facility-administered medications on file prior to visit.     She has No Known Allergies.    Social History    Tobacco Use      Smoking status: Never      Smokeless tobacco: Never    Review of Systems  Consitutional POS: nothing reported    NEG: anorexia or night sweats   Gastointestinal POS: nothing reported    NEG: bloating, change in bowel habits, melena or reflux symptoms   Genitourinary POS: nothing reported    NEG: dysuria or hematuria   Integument POS: nothing reported    NEG: moles that are changing in size, shape, color or rashes   Breast POS: nothing reported    NEG: persistent breast lump, skin dimpling or nipple discharge         Respiratory: negative  Cardiovascular: negative          Objective   Wt 113 kg (249 lb 9.6 oz)   BMI 47.16 kg/m²     General:  well developed; well nourished  no acute distress  obese - Body mass index is 47.16 kg/m².   Skin:  No suspicious lesions seen   Thyroid: normal to inspection and palpation   Lungs:  breathing is unlabored  clear to auscultation bilaterally   Heart:  regular rate and rhythm, S1, S2 normal, no murmur, click, rub or gallop   Breasts:  Examined in supine position  Symmetric without masses or skin dimpling  Nipples normal without inversion, lesions or discharge  There are no palpable axillary nodes   Abdomen: soft, non-tender;  no masses  no umbilical or inguinal hernias are present  no hepato-splenomegaly   Pelvis: Clinical staff was present for exam  External genitalia:  normal appearance of the external genitalia including Bartholin's and [place]'s glands.  :  urethral meatus normal;  Vaginal:  normal pink mucosa without prolapse or lesions.  Cervix:  normal appearance.  Uterus:  normal size, shape and consistency.  Adnexa:  normal bimanual exam of the adnexa.  Rectal:  digital rectal exam not performed; anus visually normal appearing.     Psychiatric: Alert and oriented ×3, mood and affect appropriate  HEENT: Atraumatic, normocephalic, normal scleral icterus  Extremities: 2+ pulses bilaterally, no edema      Lab Review   No data reviewed    Imaging   SAMEERA CARMITA DIGITAL DIAGNOSTIC UNILATERAL LEFT (08/25/2022 10:47)         Assessment   1. Normal PE  2. ? UTI after cysto-- unable to give urine today     Plan   1. PAP done  2. MMG UTD and WNL  3. Macrobid one po BID x 7 days  4. Estradiol 1mg op qday and Proven 2.5mg op qday refilled-- couldn't tolerate being off HRT. R/B A  5. Diet/exercise    No orders of the defined types were placed in this encounter.         This note was electronically signed.      February 1, 2023

## 2023-02-04 ENCOUNTER — HOSPITAL ENCOUNTER (OUTPATIENT)
Facility: HOSPITAL | Age: 68
Discharge: HOME | End: 2023-02-04
Payer: MEDICARE

## 2023-02-04 ENCOUNTER — HOSPITAL ENCOUNTER (OUTPATIENT)
Dept: GENERAL RADIOLOGY | Facility: HOSPITAL | Age: 68
Discharge: HOME | End: 2023-02-04
Payer: MEDICARE

## 2023-02-04 DIAGNOSIS — N30.00 ACUTE ON CHRONIC CYSTITIS: ICD-10-CM

## 2023-02-04 DIAGNOSIS — N30.20 ACUTE ON CHRONIC CYSTITIS: ICD-10-CM

## 2023-02-04 PROCEDURE — 74018 RADEX ABDOMEN 1 VIEW: CPT

## 2023-02-07 LAB — REF LAB TEST METHOD: NORMAL

## 2023-02-15 ENCOUNTER — HOSPITAL ENCOUNTER (OUTPATIENT)
Facility: HOSPITAL | Age: 68
Discharge: HOME | End: 2023-02-15
Payer: MEDICARE

## 2023-02-15 LAB
A/G RATIO: 1.4 (ref 0.8–2)
ALBUMIN SERPL-MCNC: 4 G/DL (ref 3.4–4.8)
ALP BLD-CCNC: 82 U/L (ref 25–100)
ALT SERPL-CCNC: 7 U/L (ref 4–36)
ANION GAP SERPL CALCULATED.3IONS-SCNC: 12 MMOL/L (ref 3–16)
AST SERPL-CCNC: 7 U/L (ref 8–33)
BASOPHILS ABSOLUTE: 0 K/UL (ref 0–0.1)
BASOPHILS RELATIVE PERCENT: 0.3 %
BILIRUB SERPL-MCNC: 0.5 MG/DL (ref 0.3–1.2)
BUN BLDV-MCNC: 20 MG/DL (ref 6–20)
CALCIUM SERPL-MCNC: 9.2 MG/DL (ref 8.5–10.5)
CHLORIDE BLD-SCNC: 104 MMOL/L (ref 98–107)
CHOLESTEROL, TOTAL: 170 MG/DL (ref 0–200)
CO2: 23 MMOL/L (ref 20–30)
CREAT SERPL-MCNC: 0.8 MG/DL (ref 0.4–1.2)
EOSINOPHILS ABSOLUTE: 0 K/UL (ref 0–0.4)
EOSINOPHILS RELATIVE PERCENT: 0.2 %
FOLATE: 5.71 NG/ML
GFR SERPL CREATININE-BSD FRML MDRD: >60 ML/MIN/{1.73_M2}
GLOBULIN: 2.8 G/DL
GLUCOSE BLD-MCNC: 131 MG/DL (ref 74–106)
HBA1C MFR BLD: 6.7 %
HCT VFR BLD CALC: 39.6 % (ref 37–47)
HDLC SERPL-MCNC: 69 MG/DL (ref 40–60)
HEMOGLOBIN: 12.4 G/DL (ref 11.5–16.5)
IMMATURE GRANULOCYTES #: 0.1 K/UL
IMMATURE GRANULOCYTES %: 0.8 % (ref 0–5)
LDL CHOLESTEROL CALCULATED: 74 MG/DL
LYMPHOCYTES ABSOLUTE: 2.7 K/UL (ref 1.5–4)
LYMPHOCYTES RELATIVE PERCENT: 21.8 %
MCH RBC QN AUTO: 29.4 PG (ref 27–32)
MCHC RBC AUTO-ENTMCNC: 31.3 G/DL (ref 31–35)
MCV RBC AUTO: 93.8 FL (ref 80–100)
MONOCYTES ABSOLUTE: 0.6 K/UL (ref 0.2–0.8)
MONOCYTES RELATIVE PERCENT: 5.2 %
NEUTROPHILS ABSOLUTE: 8.7 K/UL (ref 2–7.5)
NEUTROPHILS RELATIVE PERCENT: 71.7 %
PDW BLD-RTO: 13.1 % (ref 11–16)
PLATELET # BLD: 330 K/UL (ref 150–400)
PMV BLD AUTO: 10.8 FL (ref 6–10)
POTASSIUM SERPL-SCNC: 4.4 MMOL/L (ref 3.4–5.1)
RBC # BLD: 4.22 M/UL (ref 3.8–5.8)
SODIUM BLD-SCNC: 139 MMOL/L (ref 136–145)
TOTAL PROTEIN: 6.8 G/DL (ref 6.4–8.3)
TRIGL SERPL-MCNC: 135 MG/DL (ref 0–249)
TSH SERPL DL<=0.05 MIU/L-ACNC: 1.5 UIU/ML (ref 0.27–4.2)
VITAMIN B-12: 373 PG/ML (ref 211–911)
VLDLC SERPL CALC-MCNC: 27 MG/DL
WBC # BLD: 12.2 K/UL (ref 4–11)

## 2023-02-15 PROCEDURE — 80061 LIPID PANEL: CPT

## 2023-02-15 PROCEDURE — 85025 COMPLETE CBC W/AUTO DIFF WBC: CPT

## 2023-02-15 PROCEDURE — 84443 ASSAY THYROID STIM HORMONE: CPT

## 2023-02-15 PROCEDURE — 83036 HEMOGLOBIN GLYCOSYLATED A1C: CPT

## 2023-02-15 PROCEDURE — 80053 COMPREHEN METABOLIC PANEL: CPT

## 2023-02-15 PROCEDURE — 82607 VITAMIN B-12: CPT

## 2023-02-15 PROCEDURE — 82746 ASSAY OF FOLIC ACID SERUM: CPT

## 2023-03-17 ENCOUNTER — HOSPITAL ENCOUNTER (OUTPATIENT)
Dept: GENERAL RADIOLOGY | Facility: HOSPITAL | Age: 68
Discharge: HOME | End: 2023-03-17
Payer: MEDICARE

## 2023-03-17 ENCOUNTER — HOSPITAL ENCOUNTER (OUTPATIENT)
Facility: HOSPITAL | Age: 68
Discharge: HOME | End: 2023-03-17
Payer: MEDICARE

## 2023-03-17 DIAGNOSIS — N30.00 ACUTE ON CHRONIC CYSTITIS: ICD-10-CM

## 2023-03-17 DIAGNOSIS — N30.20 ACUTE ON CHRONIC CYSTITIS: ICD-10-CM

## 2023-03-17 LAB
BILIRUB UR QL STRIP.AUTO: NEGATIVE
CLARITY UR: CLEAR
COLOR UR: YELLOW
EPI CELLS #/AREA URNS HPF: ABNORMAL /HPF (ref 0–5)
GLUCOSE UR STRIP.AUTO-MCNC: NEGATIVE MG/DL
HGB UR QL STRIP.AUTO: NEGATIVE
KETONES UR STRIP.AUTO-MCNC: NEGATIVE MG/DL
LEUKOCYTE ESTERASE UR QL STRIP.AUTO: NEGATIVE
NITRITE UR QL STRIP.AUTO: NEGATIVE
PH UR STRIP.AUTO: 5.5 [PH] (ref 5–8)
PROT UR STRIP.AUTO-MCNC: NEGATIVE MG/DL
RBC #/AREA URNS HPF: ABNORMAL /HPF (ref 0–4)
SP GR UR STRIP.AUTO: 1.02 (ref 1–1.03)
UA DIPSTICK W REFLEX MICRO PNL UR: ABNORMAL
URN SPEC COLLECT METH UR: ABNORMAL
UROBILINOGEN UR STRIP-ACNC: 0.2 E.U./DL
WBC #/AREA URNS HPF: ABNORMAL /HPF (ref 0–5)

## 2023-03-17 PROCEDURE — 87077 CULTURE AEROBIC IDENTIFY: CPT

## 2023-03-17 PROCEDURE — 87086 URINE CULTURE/COLONY COUNT: CPT

## 2023-03-17 PROCEDURE — 81001 URINALYSIS AUTO W/SCOPE: CPT

## 2023-03-17 PROCEDURE — 87186 SC STD MICRODIL/AGAR DIL: CPT

## 2023-03-17 PROCEDURE — 74018 RADEX ABDOMEN 1 VIEW: CPT

## 2023-03-19 LAB
BACTERIA UR CULT: ABNORMAL
BACTERIA UR CULT: ABNORMAL
ORGANISM: ABNORMAL
ORGANISM: ABNORMAL

## 2023-03-21 ENCOUNTER — HOSPITAL ENCOUNTER (INPATIENT)
Facility: HOSPITAL | Age: 68
LOS: 2 days | Discharge: HOME | DRG: 189 | End: 2023-03-24
Attending: FAMILY MEDICINE | Admitting: INTERNAL MEDICINE
Payer: MEDICARE

## 2023-03-21 ENCOUNTER — APPOINTMENT (OUTPATIENT)
Dept: CT IMAGING | Facility: HOSPITAL | Age: 68
DRG: 189 | End: 2023-03-21
Payer: MEDICARE

## 2023-03-21 ENCOUNTER — APPOINTMENT (OUTPATIENT)
Dept: GENERAL RADIOLOGY | Facility: HOSPITAL | Age: 68
DRG: 189 | End: 2023-03-21
Payer: MEDICARE

## 2023-03-21 DIAGNOSIS — R09.02 HYPOXIA: Primary | ICD-10-CM

## 2023-03-21 DIAGNOSIS — R07.2 PRECORDIAL PAIN: ICD-10-CM

## 2023-03-21 DIAGNOSIS — J81.0 ACUTE PULMONARY EDEMA (HCC): ICD-10-CM

## 2023-03-21 PROBLEM — E83.42 HYPOMAGNESEMIA: Status: ACTIVE | Noted: 2023-03-21

## 2023-03-21 PROBLEM — J81.1 PULMONARY EDEMA: Status: ACTIVE | Noted: 2023-03-21

## 2023-03-21 PROBLEM — R91.8 PULMONARY NODULES: Status: ACTIVE | Noted: 2023-03-21

## 2023-03-21 LAB
ALBUMIN SERPL-MCNC: 3.6 G/DL (ref 3.4–4.8)
ALBUMIN/GLOB SERPL: 1.3 {RATIO} (ref 0.8–2)
ALP SERPL-CCNC: 70 U/L (ref 25–100)
ALT SERPL-CCNC: 8 U/L (ref 4–36)
ANION GAP SERPL CALCULATED.3IONS-SCNC: 13 MMOL/L (ref 3–16)
AST SERPL-CCNC: 9 U/L (ref 8–33)
BASOPHILS # BLD: 0 K/UL (ref 0–0.1)
BASOPHILS NFR BLD: 0.2 %
BILIRUB SERPL-MCNC: 0.9 MG/DL (ref 0.3–1.2)
BUN SERPL-MCNC: 13 MG/DL (ref 6–20)
CALCIUM SERPL-MCNC: 8.5 MG/DL (ref 8.5–10.5)
CHLORIDE SERPL-SCNC: 105 MMOL/L (ref 98–107)
CO2 SERPL-SCNC: 21 MMOL/L (ref 20–30)
CREAT SERPL-MCNC: 0.9 MG/DL (ref 0.4–1.2)
EOSINOPHIL # BLD: 0.1 K/UL (ref 0–0.4)
EOSINOPHIL NFR BLD: 2.1 %
ERYTHROCYTE [DISTWIDTH] IN BLOOD BY AUTOMATED COUNT: 12.5 % (ref 11–16)
FLUAV AG NPH QL: NEGATIVE
FLUBV AG NPH QL: NEGATIVE
GFR SERPLBLD CREATININE-BSD FMLA CKD-EPI: >60 ML/MIN/{1.73_M2}
GLOBULIN SER CALC-MCNC: 2.7 G/DL
GLUCOSE SERPL-MCNC: 143 MG/DL (ref 74–106)
HCT VFR BLD AUTO: 39 % (ref 37–47)
HGB BLD-MCNC: 12.3 G/DL (ref 11.5–16.5)
IMM GRANULOCYTES # BLD: 0 K/UL
IMM GRANULOCYTES NFR BLD: 0.2 % (ref 0–5)
LIPASE SERPL-CCNC: 36 U/L (ref 5.6–51.3)
LV EF: 63 %
LVEF MODALITY: NORMAL
LYMPHOCYTES # BLD: 0.6 K/UL (ref 1.5–4)
LYMPHOCYTES NFR BLD: 11.5 %
MAGNESIUM SERPL-MCNC: 1.6 MG/DL (ref 1.7–2.4)
MCH RBC QN AUTO: 29.8 PG (ref 27–32)
MCHC RBC AUTO-ENTMCNC: 31.5 G/DL (ref 31–35)
MCV RBC AUTO: 94.4 FL (ref 80–100)
MONOCYTES # BLD: 0.1 K/UL (ref 0.2–0.8)
MONOCYTES NFR BLD: 1.2 %
NEUTROPHILS # BLD: 4.1 K/UL (ref 2–7.5)
NEUTS SEG NFR BLD: 84.8 %
NT-PROBNP SERPL-MCNC: 172 PG/ML (ref 0–1800)
PLATELET # BLD AUTO: 234 K/UL (ref 150–400)
PMV BLD AUTO: 10.3 FL (ref 6–10)
POTASSIUM SERPL-SCNC: 3.5 MMOL/L (ref 3.4–5.1)
PROT SERPL-MCNC: 6.3 G/DL (ref 6.4–8.3)
RBC # BLD AUTO: 4.13 M/UL (ref 3.8–5.8)
SARS-COV-2 RDRP RESP QL NAA+PROBE: NOT DETECTED
SODIUM SERPL-SCNC: 139 MMOL/L (ref 136–145)
TROPONIN I SERPL-MCNC: <0.3 NG/ML
WBC # BLD AUTO: 4.9 K/UL (ref 4–11)

## 2023-03-21 PROCEDURE — G0378 HOSPITAL OBSERVATION PER HR: HCPCS

## 2023-03-21 PROCEDURE — 6360000004 HC RX CONTRAST MEDICATION: Performed by: FAMILY MEDICINE

## 2023-03-21 PROCEDURE — 2500000003 HC RX 250 WO HCPCS: Performed by: FAMILY MEDICINE

## 2023-03-21 PROCEDURE — 87804 INFLUENZA ASSAY W/OPTIC: CPT

## 2023-03-21 PROCEDURE — 6360000002 HC RX W HCPCS: Performed by: PHYSICIAN ASSISTANT

## 2023-03-21 PROCEDURE — 97535 SELF CARE MNGMENT TRAINING: CPT

## 2023-03-21 PROCEDURE — 87635 SARS-COV-2 COVID-19 AMP PRB: CPT

## 2023-03-21 PROCEDURE — 96366 THER/PROPH/DIAG IV INF ADDON: CPT

## 2023-03-21 PROCEDURE — 71045 X-RAY EXAM CHEST 1 VIEW: CPT

## 2023-03-21 PROCEDURE — 6360000002 HC RX W HCPCS

## 2023-03-21 PROCEDURE — 96375 TX/PRO/DX INJ NEW DRUG ADDON: CPT

## 2023-03-21 PROCEDURE — 80053 COMPREHEN METABOLIC PANEL: CPT

## 2023-03-21 PROCEDURE — 97161 PT EVAL LOW COMPLEX 20 MIN: CPT

## 2023-03-21 PROCEDURE — 6370000000 HC RX 637 (ALT 250 FOR IP): Performed by: FAMILY MEDICINE

## 2023-03-21 PROCEDURE — 93005 ELECTROCARDIOGRAM TRACING: CPT

## 2023-03-21 PROCEDURE — 36415 COLL VENOUS BLD VENIPUNCTURE: CPT

## 2023-03-21 PROCEDURE — A4216 STERILE WATER/SALINE, 10 ML: HCPCS | Performed by: FAMILY MEDICINE

## 2023-03-21 PROCEDURE — 2580000003 HC RX 258: Performed by: FAMILY MEDICINE

## 2023-03-21 PROCEDURE — 99285 EMERGENCY DEPT VISIT HI MDM: CPT

## 2023-03-21 PROCEDURE — 96361 HYDRATE IV INFUSION ADD-ON: CPT

## 2023-03-21 PROCEDURE — 83735 ASSAY OF MAGNESIUM: CPT

## 2023-03-21 PROCEDURE — 83880 ASSAY OF NATRIURETIC PEPTIDE: CPT

## 2023-03-21 PROCEDURE — 97165 OT EVAL LOW COMPLEX 30 MIN: CPT

## 2023-03-21 PROCEDURE — 85025 COMPLETE CBC W/AUTO DIFF WBC: CPT

## 2023-03-21 PROCEDURE — 96374 THER/PROPH/DIAG INJ IV PUSH: CPT

## 2023-03-21 PROCEDURE — 94618 PULMONARY STRESS TESTING: CPT

## 2023-03-21 PROCEDURE — 96365 THER/PROPH/DIAG IV INF INIT: CPT

## 2023-03-21 PROCEDURE — 83690 ASSAY OF LIPASE: CPT

## 2023-03-21 PROCEDURE — 96372 THER/PROPH/DIAG INJ SC/IM: CPT

## 2023-03-21 PROCEDURE — 74177 CT ABD & PELVIS W/CONTRAST: CPT

## 2023-03-21 PROCEDURE — 71275 CT ANGIOGRAPHY CHEST: CPT

## 2023-03-21 PROCEDURE — 93306 TTE W/DOPPLER COMPLETE: CPT

## 2023-03-21 PROCEDURE — 6360000002 HC RX W HCPCS: Performed by: FAMILY MEDICINE

## 2023-03-21 PROCEDURE — 6370000000 HC RX 637 (ALT 250 FOR IP): Performed by: PHYSICIAN ASSISTANT

## 2023-03-21 PROCEDURE — 84484 ASSAY OF TROPONIN QUANT: CPT

## 2023-03-21 RX ORDER — BUDESONIDE AND FORMOTEROL FUMARATE DIHYDRATE 160; 4.5 UG/1; UG/1
2 AEROSOL RESPIRATORY (INHALATION) 2 TIMES DAILY
COMMUNITY

## 2023-03-21 RX ORDER — LEVOCETIRIZINE DIHYDROCHLORIDE 5 MG/1
5 TABLET, FILM COATED ORAL NIGHTLY
COMMUNITY

## 2023-03-21 RX ORDER — TRAZODONE HYDROCHLORIDE 50 MG/1
100 TABLET ORAL NIGHTLY
Status: DISCONTINUED | OUTPATIENT
Start: 2023-03-21 | End: 2023-03-24 | Stop reason: HOSPADM

## 2023-03-21 RX ORDER — GABAPENTIN 400 MG/1
800 CAPSULE ORAL 3 TIMES DAILY
Status: DISCONTINUED | OUTPATIENT
Start: 2023-03-21 | End: 2023-03-24 | Stop reason: HOSPADM

## 2023-03-21 RX ORDER — ACETAMINOPHEN 325 MG/1
650 TABLET ORAL EVERY 6 HOURS PRN
Status: DISCONTINUED | OUTPATIENT
Start: 2023-03-21 | End: 2023-03-24 | Stop reason: HOSPADM

## 2023-03-21 RX ORDER — FLUOXETINE HYDROCHLORIDE 20 MG/1
40 CAPSULE ORAL DAILY
Status: DISCONTINUED | OUTPATIENT
Start: 2023-03-21 | End: 2023-03-24 | Stop reason: HOSPADM

## 2023-03-21 RX ORDER — PANTOPRAZOLE SODIUM 40 MG/1
40 TABLET, DELAYED RELEASE ORAL
Status: DISCONTINUED | OUTPATIENT
Start: 2023-03-21 | End: 2023-03-24 | Stop reason: HOSPADM

## 2023-03-21 RX ORDER — ONDANSETRON 2 MG/ML
INJECTION INTRAMUSCULAR; INTRAVENOUS
Status: COMPLETED
Start: 2023-03-21 | End: 2023-03-21

## 2023-03-21 RX ORDER — MEDROXYPROGESTERONE ACETATE 2.5 MG/1
2.5 TABLET ORAL DAILY
Status: DISCONTINUED | OUTPATIENT
Start: 2023-03-21 | End: 2023-03-24 | Stop reason: HOSPADM

## 2023-03-21 RX ORDER — PRAMIPEXOLE DIHYDROCHLORIDE 1 MG/1
1 TABLET ORAL NIGHTLY
Status: DISCONTINUED | OUTPATIENT
Start: 2023-03-21 | End: 2023-03-24 | Stop reason: HOSPADM

## 2023-03-21 RX ORDER — DOXYCYCLINE 100 MG/1
100 CAPSULE ORAL EVERY 12 HOURS SCHEDULED
Status: DISCONTINUED | OUTPATIENT
Start: 2023-03-21 | End: 2023-03-24 | Stop reason: HOSPADM

## 2023-03-21 RX ORDER — FUROSEMIDE 10 MG/ML
40 INJECTION INTRAMUSCULAR; INTRAVENOUS ONCE
Status: COMPLETED | OUTPATIENT
Start: 2023-03-21 | End: 2023-03-21

## 2023-03-21 RX ORDER — ALBUTEROL SULFATE 90 UG/1
2 AEROSOL, METERED RESPIRATORY (INHALATION) EVERY 6 HOURS PRN
COMMUNITY

## 2023-03-21 RX ORDER — FUROSEMIDE 20 MG/1
20 TABLET ORAL DAILY
Status: DISCONTINUED | OUTPATIENT
Start: 2023-03-21 | End: 2023-03-24 | Stop reason: HOSPADM

## 2023-03-21 RX ORDER — LEVOTHYROXINE SODIUM 0.05 MG/1
50 TABLET ORAL DAILY
Status: DISCONTINUED | OUTPATIENT
Start: 2023-03-21 | End: 2023-03-24 | Stop reason: HOSPADM

## 2023-03-21 RX ORDER — MONTELUKAST SODIUM 10 MG/1
10 TABLET ORAL DAILY
Status: DISCONTINUED | OUTPATIENT
Start: 2023-03-21 | End: 2023-03-24 | Stop reason: HOSPADM

## 2023-03-21 RX ORDER — ONDANSETRON 4 MG/1
4 TABLET, ORALLY DISINTEGRATING ORAL EVERY 8 HOURS PRN
Status: DISCONTINUED | OUTPATIENT
Start: 2023-03-21 | End: 2023-03-24 | Stop reason: HOSPADM

## 2023-03-21 RX ORDER — IPRATROPIUM BROMIDE 21 UG/1
1 SPRAY, METERED NASAL 2 TIMES DAILY
Status: DISCONTINUED | OUTPATIENT
Start: 2023-03-21 | End: 2023-03-24 | Stop reason: HOSPADM

## 2023-03-21 RX ORDER — MELOXICAM 7.5 MG/1
15 TABLET ORAL DAILY
Status: DISCONTINUED | OUTPATIENT
Start: 2023-03-21 | End: 2023-03-24 | Stop reason: HOSPADM

## 2023-03-21 RX ORDER — ENOXAPARIN SODIUM 100 MG/ML
40 INJECTION SUBCUTANEOUS DAILY
Status: DISCONTINUED | OUTPATIENT
Start: 2023-03-21 | End: 2023-03-22

## 2023-03-21 RX ORDER — MAGNESIUM SULFATE IN WATER 40 MG/ML
2000 INJECTION, SOLUTION INTRAVENOUS ONCE
Status: COMPLETED | OUTPATIENT
Start: 2023-03-21 | End: 2023-03-21

## 2023-03-21 RX ORDER — OXYBUTYNIN CHLORIDE 5 MG/1
5 TABLET ORAL 2 TIMES DAILY
Status: DISCONTINUED | OUTPATIENT
Start: 2023-03-21 | End: 2023-03-24 | Stop reason: HOSPADM

## 2023-03-21 RX ORDER — ONDANSETRON 2 MG/ML
4 INJECTION INTRAMUSCULAR; INTRAVENOUS ONCE
Status: COMPLETED | OUTPATIENT
Start: 2023-03-21 | End: 2023-03-21

## 2023-03-21 RX ORDER — IBUPROFEN 800 MG/1
800 TABLET ORAL EVERY 6 HOURS PRN
Status: ON HOLD | COMMUNITY
End: 2023-03-24 | Stop reason: HOSPADM

## 2023-03-21 RX ORDER — ACETAMINOPHEN 650 MG/1
650 SUPPOSITORY RECTAL EVERY 6 HOURS PRN
Status: DISCONTINUED | OUTPATIENT
Start: 2023-03-21 | End: 2023-03-24 | Stop reason: HOSPADM

## 2023-03-21 RX ORDER — POTASSIUM CHLORIDE 750 MG/1
10 TABLET, EXTENDED RELEASE ORAL DAILY
Status: DISCONTINUED | OUTPATIENT
Start: 2023-03-21 | End: 2023-03-24 | Stop reason: HOSPADM

## 2023-03-21 RX ORDER — ONDANSETRON 2 MG/ML
4 INJECTION INTRAMUSCULAR; INTRAVENOUS EVERY 6 HOURS PRN
Status: DISCONTINUED | OUTPATIENT
Start: 2023-03-21 | End: 2023-03-24 | Stop reason: HOSPADM

## 2023-03-21 RX ORDER — KETOROLAC TROMETHAMINE 30 MG/ML
30 INJECTION, SOLUTION INTRAMUSCULAR; INTRAVENOUS ONCE
Status: COMPLETED | OUTPATIENT
Start: 2023-03-21 | End: 2023-03-21

## 2023-03-21 RX ORDER — ATORVASTATIN CALCIUM 20 MG/1
20 TABLET, FILM COATED ORAL DAILY
COMMUNITY

## 2023-03-21 RX ORDER — HYDROCODONE BITARTRATE AND ACETAMINOPHEN 5; 325 MG/1; MG/1
1 TABLET ORAL ONCE
Status: COMPLETED | OUTPATIENT
Start: 2023-03-21 | End: 2023-03-21

## 2023-03-21 RX ORDER — FLUTICASONE PROPIONATE 50 MCG
1 SPRAY, SUSPENSION (ML) NASAL DAILY
COMMUNITY

## 2023-03-21 RX ORDER — TRAMADOL HYDROCHLORIDE 50 MG/1
50 TABLET ORAL EVERY 6 HOURS PRN
Status: DISCONTINUED | OUTPATIENT
Start: 2023-03-21 | End: 2023-03-24 | Stop reason: HOSPADM

## 2023-03-21 RX ORDER — POLYETHYLENE GLYCOL 3350 17 G/17G
17 POWDER, FOR SOLUTION ORAL DAILY PRN
Status: DISCONTINUED | OUTPATIENT
Start: 2023-03-21 | End: 2023-03-24 | Stop reason: HOSPADM

## 2023-03-21 RX ORDER — ESTRADIOL 0.5 MG/1
1 TABLET ORAL DAILY
Status: DISCONTINUED | OUTPATIENT
Start: 2023-03-21 | End: 2023-03-24 | Stop reason: HOSPADM

## 2023-03-21 RX ORDER — CELECOXIB 200 MG/1
200 CAPSULE ORAL 2 TIMES DAILY
Status: ON HOLD | COMMUNITY
End: 2023-03-24 | Stop reason: HOSPADM

## 2023-03-21 RX ADMIN — ONDANSETRON 4 MG: 2 INJECTION INTRAMUSCULAR; INTRAVENOUS at 01:33

## 2023-03-21 RX ADMIN — ESTRADIOL 1 MG: 0.5 TABLET ORAL at 08:20

## 2023-03-21 RX ADMIN — MAGNESIUM SULFATE HEPTAHYDRATE 2000 MG: 40 INJECTION, SOLUTION INTRAVENOUS at 09:37

## 2023-03-21 RX ADMIN — TRAMADOL HYDROCHLORIDE 50 MG: 50 TABLET, COATED ORAL at 21:01

## 2023-03-21 RX ADMIN — GABAPENTIN 800 MG: 400 CAPSULE ORAL at 08:20

## 2023-03-21 RX ADMIN — OXYBUTYNIN CHLORIDE 5 MG: 5 TABLET ORAL at 21:01

## 2023-03-21 RX ADMIN — IOPAMIDOL 100 ML: 755 INJECTION, SOLUTION INTRAVENOUS at 02:20

## 2023-03-21 RX ADMIN — MELOXICAM 15 MG: 7.5 TABLET ORAL at 08:21

## 2023-03-21 RX ADMIN — TRAMADOL HYDROCHLORIDE 50 MG: 50 TABLET, COATED ORAL at 08:20

## 2023-03-21 RX ADMIN — HYDROCODONE BITARTRATE AND ACETAMINOPHEN 1 TABLET: 5; 325 TABLET ORAL at 04:48

## 2023-03-21 RX ADMIN — DOXYCYCLINE 100 MG: 100 CAPSULE ORAL at 21:01

## 2023-03-21 RX ADMIN — FLUOXETINE 40 MG: 20 CAPSULE ORAL at 08:20

## 2023-03-21 RX ADMIN — FAMOTIDINE 20 MG: 10 INJECTION, SOLUTION INTRAVENOUS at 01:15

## 2023-03-21 RX ADMIN — TRAZODONE HYDROCHLORIDE 100 MG: 50 TABLET ORAL at 21:01

## 2023-03-21 RX ADMIN — MONTELUKAST SODIUM 10 MG: 10 TABLET, COATED ORAL at 08:21

## 2023-03-21 RX ADMIN — FUROSEMIDE 40 MG: 10 INJECTION, SOLUTION INTRAMUSCULAR; INTRAVENOUS at 06:52

## 2023-03-21 RX ADMIN — HYDROMORPHONE HYDROCHLORIDE 0.5 MG: 1 INJECTION, SOLUTION INTRAMUSCULAR; INTRAVENOUS; SUBCUTANEOUS at 01:15

## 2023-03-21 RX ADMIN — ENOXAPARIN SODIUM 40 MG: 100 INJECTION SUBCUTANEOUS at 08:21

## 2023-03-21 RX ADMIN — LEVOTHYROXINE SODIUM 50 MCG: 0.05 TABLET ORAL at 09:30

## 2023-03-21 RX ADMIN — PANTOPRAZOLE SODIUM 40 MG: 40 TABLET, DELAYED RELEASE ORAL at 09:30

## 2023-03-21 RX ADMIN — PRAMIPEXOLE DIHYDROCHLORIDE 1 MG: 1 TABLET ORAL at 21:01

## 2023-03-21 RX ADMIN — OXYBUTYNIN CHLORIDE 5 MG: 5 TABLET ORAL at 08:21

## 2023-03-21 RX ADMIN — KETOROLAC TROMETHAMINE 30 MG: 30 INJECTION, SOLUTION INTRAMUSCULAR at 01:15

## 2023-03-21 RX ADMIN — MEDROXYPROGESTERONE ACETATE 2.5 MG: 2.5 TABLET ORAL at 08:21

## 2023-03-21 RX ADMIN — GABAPENTIN 800 MG: 400 CAPSULE ORAL at 13:35

## 2023-03-21 RX ADMIN — GABAPENTIN 800 MG: 400 CAPSULE ORAL at 21:01

## 2023-03-21 RX ADMIN — POTASSIUM CHLORIDE 10 MEQ: 750 TABLET, EXTENDED RELEASE ORAL at 08:21

## 2023-03-21 RX ADMIN — DOXYCYCLINE 100 MG: 100 CAPSULE ORAL at 09:29

## 2023-03-21 RX ADMIN — FUROSEMIDE 20 MG: 20 TABLET ORAL at 08:21

## 2023-03-21 ASSESSMENT — PAIN SCALES - GENERAL
PAINLEVEL_OUTOF10: 5
PAINLEVEL_OUTOF10: 10
PAINLEVEL_OUTOF10: 6

## 2023-03-21 ASSESSMENT — ENCOUNTER SYMPTOMS: SHORTNESS OF BREATH: 1

## 2023-03-21 ASSESSMENT — PAIN DESCRIPTION - LOCATION
LOCATION: CHEST;STERNUM
LOCATION: HEAD;CHEST;ABDOMEN
LOCATION: ABDOMEN

## 2023-03-21 ASSESSMENT — PAIN DESCRIPTION - ORIENTATION
ORIENTATION: MID;UPPER
ORIENTATION: MID

## 2023-03-21 ASSESSMENT — PAIN - FUNCTIONAL ASSESSMENT: PAIN_FUNCTIONAL_ASSESSMENT: 0-10

## 2023-03-21 ASSESSMENT — PAIN DESCRIPTION - DESCRIPTORS
DESCRIPTORS: STABBING
DESCRIPTORS: SHARP
DESCRIPTORS: ACHING;THROBBING

## 2023-03-21 ASSESSMENT — HEART SCORE: ECG: 0

## 2023-03-21 NOTE — ED TRIAGE NOTES
Pt arrives to ED via [facility] with complaints of mid-sternal, epigastric and pain. Pt initially called EMS and reported RLQ pain to R chest pain but is now complaining of mid-sternal. Pt states she bent over cleaning her cabinets and when she stood up she had a sharp mid-sternal pain and was unable to catch her breath. Initial O2 sat 87% on scene, pt placed on 3 L NC and O2 sat raised to 90%. O2 increased to 6 L NC by EMS and sat increased to 94%. Pt reports she wore oxygen for a short time after COVID in 2021 but has not wore oxygen since.

## 2023-03-21 NOTE — ED NOTES
Report to Wilbert Sotelo RN at this time.      Yris Araiza RN  03/21/23 0142     Spoke with pt - LMP 07/23  For the past week she has had random spotting  Only saw when she wiped - would be a light pink/off white  No blood in toilet and not passing clots  No pain or discomfort  Keeping herself hydrated  Did have intercourse 2 days ago  Explained to the pt that this is normal  Advised her that if the bleeding becomes heavier and she is passing clots to go to the ER  Patient is RH negative  She stated she understood

## 2023-03-21 NOTE — H&P
furosemide (LASIX) 20 MG tablet Take 1 tablet by mouth daily 12/12/21   Jean Desouza DO   potassium chloride (KLOR-CON M) 10 MEQ extended release tablet Take 1 tablet by mouth daily 12/12/21   Jean Desouza DO   traMADol (ULTRAM) 50 MG tablet Take 50 mg by mouth every 6 hours as needed for Pain. Historical Provider, MD   gabapentin (NEURONTIN) 800 MG tablet Take 800 mg by mouth 3 times daily. Historical Provider, MD   OXYBUTYNIN CHLORIDE PO Take by mouth    Historical Provider, MD   FLUoxetine (PROZAC) 20 MG capsule Take 40 mg by mouth daily    Historical Provider, MD   levothyroxine (SYNTHROID) 50 MCG tablet Take 50 mcg by mouth Daily    Historical Provider, MD   meloxicam (MOBIC) 15 MG tablet Take 15 mg by mouth daily    Historical Provider, MD   lansoprazole (PREVACID) 30 MG delayed release capsule Take 30 mg by mouth daily    Historical Provider, MD   traZODone (DESYREL) 100 MG tablet Take 100 mg by mouth nightly    Historical Provider, MD   ipratropium (ATROVENT) 0.03 % nasal spray 1 spray by Nasal route 2 times daily    Historical Provider, MD   montelukast (SINGULAIR) 10 MG tablet Take 10 mg by mouth daily    Historical Provider, MD   medroxyPROGESTERone (PROVERA) 2.5 MG tablet Take 2.5 mg by mouth daily    Historical Provider, MD   estradiol (ESTRACE) 1 MG tablet Take 1 mg by mouth daily    Historical Provider, MD   pramipexole (MIRAPEX) 1 MG tablet Take 1 mg by mouth nightly    Historical Provider, MD       Allergies:  Patient has no known allergies. Social History:   TOBACCO:   reports that she has never smoked. She has never used smokeless tobacco.  ETOH:   reports no history of alcohol use. OCCUPATION:  None     Family History:   History reviewed. No pertinent family history. Review of system  Constitutional:  Denies fever or chills   Eyes:  Denies eye pain or redness  HENT:  Denies nasal congestion or sore throat   Respiratory:  Denies cough. Positive for SOA.    Cardiovascular:  Denies

## 2023-03-21 NOTE — CARDIO/PULMONARY
Karen Parsons underwent a 6 min walk test. Her initial O2  saturation was 94 %  on room air and her baseline heart rate was 95 BPM. She walked 250 ft. Her post  O2  saturation was 90 % on room air and her post walk heart rate was 101 BPM. Her recovery O2 sat on room air was 94%.  DW,RRT

## 2023-03-21 NOTE — ACP (ADVANCE CARE PLANNING)
Advance Care Planning     General Advance Care Planning (ACP) Conversation    Date of Conversation: 3/21/2023  Conducted with: Patient with Decision Making Capacity    Healthcare Decision Maker:    Primary Decision Maker: Gaye - Child - [phone]    Primary Decision Maker: Ina Davis - Child - [phone]  Click here to complete Healthcare Decision Makers including selection of the Healthcare Decision Maker Relationship (ie \"Primary\"). Today we documented Decision Maker(s) consistent with Legal Next of Kin hierarchy.     Content/Action Overview:  Has NO ACP documents/care preferences - information provided, considering goals and options  Reviewed DNR/DNI and patient elects Full Code (Attempt Resuscitation)  artificial nutrition, ventilation preferences, and resuscitation preferences      Length of Voluntary ACP Conversation in minutes:  <16 minutes (Non-Billable)    Shweta Mcgraw

## 2023-03-21 NOTE — ED PROVIDER NOTES
GASTRIC BYPASS SURGERY      JOINT REPLACEMENT Right     ID COLONOSCOPY FLX DX W/COLLJ SPEC WHEN PFRMD N/A 08/02/2018    COLONOSCOPY DIAGNOSTIC OR SCREENING performed by Jonathan Sanchez MD at [facility] 07/14/2021    TUBAL LIGATION         CURRENTMEDICATIONS       Previous Medications    ESTRADIOL (ESTRACE) 1 MG TABLET    Take 1 mg by mouth daily    FLUOXETINE (PROZAC) 20 MG CAPSULE    Take 40 mg by mouth daily    FUROSEMIDE (LASIX) 20 MG TABLET    Take 1 tablet by mouth daily    GABAPENTIN (NEURONTIN) 800 MG TABLET    Take 800 mg by mouth 3 times daily. IPRATROPIUM (ATROVENT) 0.03 % NASAL SPRAY    1 spray by Nasal route 2 times daily    LANSOPRAZOLE (PREVACID) 30 MG DELAYED RELEASE CAPSULE    Take 30 mg by mouth daily    LEVOTHYROXINE (SYNTHROID) 50 MCG TABLET    Take 50 mcg by mouth Daily    MEDROXYPROGESTERONE (PROVERA) 2.5 MG TABLET    Take 2.5 mg by mouth daily    MELOXICAM (MOBIC) 15 MG TABLET    Take 15 mg by mouth daily    MONTELUKAST (SINGULAIR) 10 MG TABLET    Take 10 mg by mouth daily    ONDANSETRON (ZOFRAN ODT) 4 MG DISINTEGRATING TABLET    Take 1 tablet by mouth every 8 hours as needed for Nausea or Vomiting    OXYBUTYNIN CHLORIDE PO    Take by mouth    POTASSIUM CHLORIDE (KLOR-CON M) 10 MEQ EXTENDED RELEASE TABLET    Take 1 tablet by mouth daily    PRAMIPEXOLE (MIRAPEX) 1 MG TABLET    Take 1 mg by mouth nightly    TRAMADOL (ULTRAM) 50 MG TABLET    Take 50 mg by mouth every 6 hours as needed for Pain. TRAZODONE (DESYREL) 100 MG TABLET    Take 100 mg by mouth nightly       ALLERGIES     Patient has no known allergies. FAMILYHISTORY     History reviewed. No pertinent family history.      SOCIAL HISTORY       Social History     Tobacco Use    Smoking status: Never    Smokeless tobacco: Never   Vaping Use    Vaping Use: Never used   Substance Use Topics    Alcohol use: No    Drug use: No       SCREENINGS        [place] Coma Scale  Eye Opening:

## 2023-03-21 NOTE — PLAN OF CARE
Problem: Discharge Planning  Goal: Discharge to home or other facility with appropriate resources  Outcome: Progressing  Flowsheets  Taken 3/21/2023 1002  Discharge to home or other facility with appropriate resources: Identify barriers to discharge with patient and caregiver  Taken 3/21/2023 0921  Discharge to home or other facility with appropriate resources:   Identify barriers to discharge with patient and caregiver   Arrange for needed discharge resources and transportation as appropriate   Identify discharge learning needs (meds, wound care, etc)   Refer to discharge planning if patient needs post-hospital services based on physician order or complex needs related to functional status, cognitive ability or social support system     Problem: Pain  Goal: Verbalizes/displays adequate comfort level or baseline comfort level  Outcome: Progressing     Problem: Chronic Conditions and Co-morbidities  Goal: Patient's chronic conditions and co-morbidity symptoms are monitored and maintained or improved  Outcome: Progressing  Flowsheets (Taken 3/21/2023 1002)  Care Plan - Patient's Chronic Conditions and Co-Morbidity Symptoms are Monitored and Maintained or Improved: Monitor and assess patient's chronic conditions and comorbid symptoms for stability, deterioration, or improvement     Problem: Safety - Adult  Goal: Free from fall injury  Outcome: Progressing

## 2023-03-22 ENCOUNTER — APPOINTMENT (OUTPATIENT)
Dept: GENERAL RADIOLOGY | Facility: HOSPITAL | Age: 68
DRG: 189 | End: 2023-03-22
Payer: MEDICARE

## 2023-03-22 PROBLEM — I50.1 PULMONARY EDEMA W/CONGESTIVE HEART FAILURE W/PRESERVED LV FUNCTION (HCC): Status: ACTIVE | Noted: 2023-03-22

## 2023-03-22 LAB
ALBUMIN SERPL-MCNC: 3.2 G/DL (ref 3.4–4.8)
ALBUMIN/GLOB SERPL: 1.2 {RATIO} (ref 0.8–2)
ALP SERPL-CCNC: 68 U/L (ref 25–100)
ALT SERPL-CCNC: 7 U/L (ref 4–36)
ANION GAP SERPL CALCULATED.3IONS-SCNC: 8 MMOL/L (ref 3–16)
AST SERPL-CCNC: 9 U/L (ref 8–33)
BILIRUB SERPL-MCNC: 1.4 MG/DL (ref 0.3–1.2)
BUN SERPL-MCNC: 19 MG/DL (ref 6–20)
CALCIUM SERPL-MCNC: 7.9 MG/DL (ref 8.5–10.5)
CHLORIDE SERPL-SCNC: 105 MMOL/L (ref 98–107)
CO2 SERPL-SCNC: 27 MMOL/L (ref 20–30)
CREAT SERPL-MCNC: 0.9 MG/DL (ref 0.4–1.2)
ERYTHROCYTE [DISTWIDTH] IN BLOOD BY AUTOMATED COUNT: 13 % (ref 11–16)
GFR SERPLBLD CREATININE-BSD FMLA CKD-EPI: >60 ML/MIN/{1.73_M2}
GLOBULIN SER CALC-MCNC: 2.6 G/DL
GLUCOSE SERPL-MCNC: 119 MG/DL (ref 74–106)
HCT VFR BLD AUTO: 35.2 % (ref 37–47)
HGB BLD-MCNC: 10.8 G/DL (ref 11.5–16.5)
MCH RBC QN AUTO: 29.3 PG (ref 27–32)
MCHC RBC AUTO-ENTMCNC: 30.7 G/DL (ref 31–35)
MCV RBC AUTO: 95.4 FL (ref 80–100)
NT-PROBNP SERPL-MCNC: 502 PG/ML (ref 0–1800)
PLATELET # BLD AUTO: 216 K/UL (ref 150–400)
PMV BLD AUTO: 11 FL (ref 6–10)
POTASSIUM SERPL-SCNC: 3.7 MMOL/L (ref 3.4–5.1)
PROT SERPL-MCNC: 5.8 G/DL (ref 6.4–8.3)
RBC # BLD AUTO: 3.69 M/UL (ref 3.8–5.8)
SODIUM SERPL-SCNC: 140 MMOL/L (ref 136–145)
TROPONIN I SERPL-MCNC: <0.3 NG/ML
TSH SERPL-MCNC: 1.9 UIU/ML (ref 0.27–4.2)
WBC # BLD AUTO: 15.4 K/UL (ref 4–11)

## 2023-03-22 PROCEDURE — 96372 THER/PROPH/DIAG INJ SC/IM: CPT

## 2023-03-22 PROCEDURE — 6360000002 HC RX W HCPCS: Performed by: PHYSICIAN ASSISTANT

## 2023-03-22 PROCEDURE — 83880 ASSAY OF NATRIURETIC PEPTIDE: CPT

## 2023-03-22 PROCEDURE — 36415 COLL VENOUS BLD VENIPUNCTURE: CPT

## 2023-03-22 PROCEDURE — 71046 X-RAY EXAM CHEST 2 VIEWS: CPT

## 2023-03-22 PROCEDURE — 84443 ASSAY THYROID STIM HORMONE: CPT

## 2023-03-22 PROCEDURE — 84484 ASSAY OF TROPONIN QUANT: CPT

## 2023-03-22 PROCEDURE — 99232 SBSQ HOSP IP/OBS MODERATE 35: CPT | Performed by: INTERNAL MEDICINE

## 2023-03-22 PROCEDURE — 6360000002 HC RX W HCPCS: Performed by: INTERNAL MEDICINE

## 2023-03-22 PROCEDURE — 80053 COMPREHEN METABOLIC PANEL: CPT

## 2023-03-22 PROCEDURE — 94761 N-INVAS EAR/PLS OXIMETRY MLT: CPT

## 2023-03-22 PROCEDURE — 2580000003 HC RX 258: Performed by: PHYSICIAN ASSISTANT

## 2023-03-22 PROCEDURE — 1200000000 HC SEMI PRIVATE

## 2023-03-22 PROCEDURE — 85027 COMPLETE CBC AUTOMATED: CPT

## 2023-03-22 PROCEDURE — 2500000003 HC RX 250 WO HCPCS: Performed by: PHYSICIAN ASSISTANT

## 2023-03-22 PROCEDURE — 6370000000 HC RX 637 (ALT 250 FOR IP): Performed by: PHYSICIAN ASSISTANT

## 2023-03-22 RX ORDER — NITROGLYCERIN 0.4 MG/1
0.4 TABLET SUBLINGUAL ONCE
Status: DISCONTINUED | OUTPATIENT
Start: 2023-03-22 | End: 2023-03-24 | Stop reason: HOSPADM

## 2023-03-22 RX ORDER — BUMETANIDE 0.25 MG/ML
INJECTION INTRAMUSCULAR; INTRAVENOUS
Status: DISCONTINUED
Start: 2023-03-22 | End: 2023-03-23 | Stop reason: ALTCHOICE

## 2023-03-22 RX ORDER — FUROSEMIDE 10 MG/ML
40 INJECTION INTRAMUSCULAR; INTRAVENOUS 2 TIMES DAILY
Status: DISCONTINUED | OUTPATIENT
Start: 2023-03-22 | End: 2023-03-22

## 2023-03-22 RX ORDER — ALPRAZOLAM 0.25 MG/1
0.25 TABLET ORAL 2 TIMES DAILY PRN
Status: DISCONTINUED | OUTPATIENT
Start: 2023-03-22 | End: 2023-03-24 | Stop reason: HOSPADM

## 2023-03-22 RX ORDER — ENOXAPARIN SODIUM 100 MG/ML
30 INJECTION SUBCUTANEOUS 2 TIMES DAILY
Status: DISCONTINUED | OUTPATIENT
Start: 2023-03-22 | End: 2023-03-24 | Stop reason: HOSPADM

## 2023-03-22 RX ORDER — MIDODRINE HYDROCHLORIDE 5 MG/1
5 TABLET ORAL
Status: DISCONTINUED | OUTPATIENT
Start: 2023-03-22 | End: 2023-03-23

## 2023-03-22 RX ADMIN — TRAMADOL HYDROCHLORIDE 50 MG: 50 TABLET, COATED ORAL at 06:04

## 2023-03-22 RX ADMIN — BUMETANIDE 3 MG: 0.25 INJECTION, SOLUTION INTRAMUSCULAR; INTRAVENOUS at 13:48

## 2023-03-22 RX ADMIN — PANTOPRAZOLE SODIUM 40 MG: 40 TABLET, DELAYED RELEASE ORAL at 06:05

## 2023-03-22 RX ADMIN — POTASSIUM CHLORIDE 10 MEQ: 750 TABLET, EXTENDED RELEASE ORAL at 08:33

## 2023-03-22 RX ADMIN — MELOXICAM 15 MG: 7.5 TABLET ORAL at 08:32

## 2023-03-22 RX ADMIN — MIDODRINE HYDROCHLORIDE 5 MG: 5 TABLET ORAL at 17:07

## 2023-03-22 RX ADMIN — ENOXAPARIN SODIUM 40 MG: 100 INJECTION SUBCUTANEOUS at 08:31

## 2023-03-22 RX ADMIN — ESTRADIOL 1 MG: 0.5 TABLET ORAL at 08:32

## 2023-03-22 RX ADMIN — MEDROXYPROGESTERONE ACETATE 2.5 MG: 2.5 TABLET ORAL at 08:32

## 2023-03-22 RX ADMIN — DOXYCYCLINE 100 MG: 100 CAPSULE ORAL at 08:33

## 2023-03-22 RX ADMIN — FLUOXETINE 40 MG: 20 CAPSULE ORAL at 08:32

## 2023-03-22 RX ADMIN — ONDANSETRON 4 MG: 4 TABLET, ORALLY DISINTEGRATING ORAL at 12:19

## 2023-03-22 RX ADMIN — MONTELUKAST SODIUM 10 MG: 10 TABLET, COATED ORAL at 08:32

## 2023-03-22 RX ADMIN — GABAPENTIN 800 MG: 400 CAPSULE ORAL at 20:42

## 2023-03-22 RX ADMIN — ENOXAPARIN SODIUM 30 MG: 100 INJECTION SUBCUTANEOUS at 20:43

## 2023-03-22 RX ADMIN — GABAPENTIN 800 MG: 400 CAPSULE ORAL at 08:33

## 2023-03-22 RX ADMIN — TRAZODONE HYDROCHLORIDE 100 MG: 50 TABLET ORAL at 20:43

## 2023-03-22 RX ADMIN — DOXYCYCLINE 100 MG: 100 CAPSULE ORAL at 20:43

## 2023-03-22 RX ADMIN — TRAMADOL HYDROCHLORIDE 50 MG: 50 TABLET, COATED ORAL at 12:19

## 2023-03-22 RX ADMIN — FUROSEMIDE 20 MG: 20 TABLET ORAL at 08:33

## 2023-03-22 RX ADMIN — PRAMIPEXOLE DIHYDROCHLORIDE 1 MG: 1 TABLET ORAL at 20:43

## 2023-03-22 RX ADMIN — MIDODRINE HYDROCHLORIDE 5 MG: 5 TABLET ORAL at 13:47

## 2023-03-22 RX ADMIN — OXYBUTYNIN CHLORIDE 5 MG: 5 TABLET ORAL at 08:33

## 2023-03-22 RX ADMIN — LEVOTHYROXINE SODIUM 50 MCG: 0.05 TABLET ORAL at 06:05

## 2023-03-22 RX ADMIN — GABAPENTIN 800 MG: 400 CAPSULE ORAL at 14:48

## 2023-03-22 RX ADMIN — OXYBUTYNIN CHLORIDE 5 MG: 5 TABLET ORAL at 20:43

## 2023-03-22 ASSESSMENT — PAIN SCALES - GENERAL
PAINLEVEL_OUTOF10: 5
PAINLEVEL_OUTOF10: 6
PAINLEVEL_OUTOF10: 5
PAINLEVEL_OUTOF10: 6
PAINLEVEL_OUTOF10: 6
PAINLEVEL_OUTOF10: 5
PAINLEVEL_OUTOF10: 5

## 2023-03-22 ASSESSMENT — PAIN DESCRIPTION - ORIENTATION
ORIENTATION: UPPER

## 2023-03-22 ASSESSMENT — PAIN DESCRIPTION - LOCATION
LOCATION: ABDOMEN;CHEST
LOCATION: ABDOMEN
LOCATION: ABDOMEN;CHEST
LOCATION: CHEST;ABDOMEN

## 2023-03-22 NOTE — PLAN OF CARE
Problem: Discharge Planning  Goal: Discharge to home or other facility with appropriate resources  3/22/2023 1321 by Christian Marroquin RN  Outcome: Progressing  3/22/2023 0038 by Sebastian Garcia RN  Outcome: Progressing     Problem: Pain  Goal: Verbalizes/displays adequate comfort level or baseline comfort level  3/22/2023 1321 by Christian Marroquin RN  Outcome: Progressing  3/22/2023 0038 by Sebastian Garcia RN  Outcome: Progressing     Problem: Chronic Conditions and Co-morbidities  Goal: Patient's chronic conditions and co-morbidity symptoms are monitored and maintained or improved  Outcome: Progressing     Problem: Safety - Adult  Goal: Free from fall injury  Outcome: Progressing

## 2023-03-23 PROBLEM — J96.01 ACUTE RESPIRATORY FAILURE WITH HYPOXIA (HCC): Status: ACTIVE | Noted: 2023-03-23

## 2023-03-23 LAB
ALBUMIN SERPL-MCNC: 3.3 G/DL (ref 3.4–4.8)
ALBUMIN/GLOB SERPL: 1.2 {RATIO} (ref 0.8–2)
ALP SERPL-CCNC: 83 U/L (ref 25–100)
ALT SERPL-CCNC: 6 U/L (ref 4–36)
ANION GAP SERPL CALCULATED.3IONS-SCNC: 11 MMOL/L (ref 3–16)
AST SERPL-CCNC: 8 U/L (ref 8–33)
BILIRUB SERPL-MCNC: 1.7 MG/DL (ref 0.3–1.2)
BUN SERPL-MCNC: 20 MG/DL (ref 6–20)
CALCIUM SERPL-MCNC: 8.1 MG/DL (ref 8.5–10.5)
CHLORIDE SERPL-SCNC: 104 MMOL/L (ref 98–107)
CO2 SERPL-SCNC: 26 MMOL/L (ref 20–30)
CREAT SERPL-MCNC: 0.9 MG/DL (ref 0.4–1.2)
ERYTHROCYTE [DISTWIDTH] IN BLOOD BY AUTOMATED COUNT: 13.2 % (ref 11–16)
GFR SERPLBLD CREATININE-BSD FMLA CKD-EPI: >60 ML/MIN/{1.73_M2}
GLOBULIN SER CALC-MCNC: 2.8 G/DL
GLUCOSE SERPL-MCNC: 129 MG/DL (ref 74–106)
HCT VFR BLD AUTO: 33.4 % (ref 37–47)
HGB BLD-MCNC: 10.4 G/DL (ref 11.5–16.5)
MAGNESIUM SERPL-MCNC: 2 MG/DL (ref 1.7–2.4)
MCH RBC QN AUTO: 29.6 PG (ref 27–32)
MCHC RBC AUTO-ENTMCNC: 31.1 G/DL (ref 31–35)
MCV RBC AUTO: 95.2 FL (ref 80–100)
PLATELET # BLD AUTO: 221 K/UL (ref 150–400)
PMV BLD AUTO: 10.9 FL (ref 6–10)
POTASSIUM SERPL-SCNC: 3.2 MMOL/L (ref 3.4–5.1)
PROT SERPL-MCNC: 6.1 G/DL (ref 6.4–8.3)
RBC # BLD AUTO: 3.51 M/UL (ref 3.8–5.8)
SODIUM SERPL-SCNC: 141 MMOL/L (ref 136–145)
WBC # BLD AUTO: 9.3 K/UL (ref 4–11)

## 2023-03-23 PROCEDURE — 6370000000 HC RX 637 (ALT 250 FOR IP): Performed by: PHYSICIAN ASSISTANT

## 2023-03-23 PROCEDURE — 1200000000 HC SEMI PRIVATE

## 2023-03-23 PROCEDURE — 2580000003 HC RX 258: Performed by: PHYSICIAN ASSISTANT

## 2023-03-23 PROCEDURE — 6360000002 HC RX W HCPCS: Performed by: INTERNAL MEDICINE

## 2023-03-23 PROCEDURE — 99232 SBSQ HOSP IP/OBS MODERATE 35: CPT | Performed by: INTERNAL MEDICINE

## 2023-03-23 PROCEDURE — 80053 COMPREHEN METABOLIC PANEL: CPT

## 2023-03-23 PROCEDURE — 85027 COMPLETE CBC AUTOMATED: CPT

## 2023-03-23 PROCEDURE — 36415 COLL VENOUS BLD VENIPUNCTURE: CPT

## 2023-03-23 PROCEDURE — 2700000000 HC OXYGEN THERAPY PER DAY

## 2023-03-23 PROCEDURE — 94761 N-INVAS EAR/PLS OXIMETRY MLT: CPT

## 2023-03-23 PROCEDURE — 83735 ASSAY OF MAGNESIUM: CPT

## 2023-03-23 PROCEDURE — 2500000003 HC RX 250 WO HCPCS: Performed by: PHYSICIAN ASSISTANT

## 2023-03-23 RX ORDER — POTASSIUM CHLORIDE 750 MG/1
40 CAPSULE, EXTENDED RELEASE ORAL EVERY 4 HOURS
Status: COMPLETED | OUTPATIENT
Start: 2023-03-23 | End: 2023-03-23

## 2023-03-23 RX ORDER — MIDODRINE HYDROCHLORIDE 5 MG/1
10 TABLET ORAL
Status: DISCONTINUED | OUTPATIENT
Start: 2023-03-23 | End: 2023-03-24 | Stop reason: HOSPADM

## 2023-03-23 RX ADMIN — LEVOTHYROXINE SODIUM 50 MCG: 0.05 TABLET ORAL at 06:15

## 2023-03-23 RX ADMIN — FLUOXETINE 40 MG: 20 CAPSULE ORAL at 09:05

## 2023-03-23 RX ADMIN — TRAZODONE HYDROCHLORIDE 100 MG: 50 TABLET ORAL at 21:01

## 2023-03-23 RX ADMIN — POTASSIUM CHLORIDE 10 MEQ: 750 TABLET, EXTENDED RELEASE ORAL at 09:04

## 2023-03-23 RX ADMIN — MONTELUKAST SODIUM 10 MG: 10 TABLET, COATED ORAL at 09:04

## 2023-03-23 RX ADMIN — BUMETANIDE 4 MG: 0.25 INJECTION INTRAMUSCULAR; INTRAVENOUS at 11:28

## 2023-03-23 RX ADMIN — MEDROXYPROGESTERONE ACETATE 2.5 MG: 2.5 TABLET ORAL at 09:05

## 2023-03-23 RX ADMIN — GABAPENTIN 800 MG: 400 CAPSULE ORAL at 21:01

## 2023-03-23 RX ADMIN — TRAMADOL HYDROCHLORIDE 50 MG: 50 TABLET, COATED ORAL at 02:11

## 2023-03-23 RX ADMIN — DOXYCYCLINE 100 MG: 100 CAPSULE ORAL at 09:05

## 2023-03-23 RX ADMIN — MIDODRINE HYDROCHLORIDE 10 MG: 5 TABLET ORAL at 17:43

## 2023-03-23 RX ADMIN — ESTRADIOL 1 MG: 0.5 TABLET ORAL at 09:04

## 2023-03-23 RX ADMIN — POTASSIUM CHLORIDE 40 MEQ: 10 CAPSULE, COATED, EXTENDED RELEASE ORAL at 13:41

## 2023-03-23 RX ADMIN — GABAPENTIN 800 MG: 400 CAPSULE ORAL at 13:41

## 2023-03-23 RX ADMIN — OXYBUTYNIN CHLORIDE 5 MG: 5 TABLET ORAL at 21:01

## 2023-03-23 RX ADMIN — PRAMIPEXOLE DIHYDROCHLORIDE 1 MG: 1 TABLET ORAL at 21:01

## 2023-03-23 RX ADMIN — PANTOPRAZOLE SODIUM 40 MG: 40 TABLET, DELAYED RELEASE ORAL at 06:15

## 2023-03-23 RX ADMIN — MIDODRINE HYDROCHLORIDE 5 MG: 5 TABLET ORAL at 09:05

## 2023-03-23 RX ADMIN — POTASSIUM CHLORIDE 40 MEQ: 10 CAPSULE, COATED, EXTENDED RELEASE ORAL at 09:04

## 2023-03-23 RX ADMIN — GABAPENTIN 800 MG: 400 CAPSULE ORAL at 09:05

## 2023-03-23 RX ADMIN — MELOXICAM 15 MG: 7.5 TABLET ORAL at 09:05

## 2023-03-23 RX ADMIN — MIDODRINE HYDROCHLORIDE 5 MG: 5 TABLET ORAL at 11:28

## 2023-03-23 RX ADMIN — ENOXAPARIN SODIUM 30 MG: 100 INJECTION SUBCUTANEOUS at 21:01

## 2023-03-23 RX ADMIN — DOXYCYCLINE 100 MG: 100 CAPSULE ORAL at 21:01

## 2023-03-23 RX ADMIN — OXYBUTYNIN CHLORIDE 5 MG: 5 TABLET ORAL at 09:04

## 2023-03-23 RX ADMIN — ENOXAPARIN SODIUM 30 MG: 100 INJECTION SUBCUTANEOUS at 09:05

## 2023-03-23 ASSESSMENT — PAIN SCALES - GENERAL
PAINLEVEL_OUTOF10: 5
PAINLEVEL_OUTOF10: 0

## 2023-03-23 NOTE — CONSULTS
Cardiology Consultation    Patient: Liat Spencer  1955  [phone]      PCP:KARMA Sparks CNP  3/21/2023    DATE OF CONSULTATION:3/23/026926:43 PM    IDENTIFICATION:A 74 y.o. female     REASON FOR CONSULTATION: Hypoxic respiratory failure    ASSESSMENT/PLAN:  Hypoxic respiratory failure: Appears noncardiac in etiology. Associated atypical chest pain. Echo revealing normal systolic function, no evidence of underlying valvular heart disease, and unremarkable diastolic parameters. No evidence of ACS. Unremarkable proBNP. No further cardiac testing or medication titration recommended at this time. History of present illness:  79-year-old female presenting with hypoxic respiratory failure and precordial chest discomfort after cleaning her cabinets at home. Oxygen level on initial evaluation by EMS of 87%. Required 3 L nasal cannula to improve sats to 90%. CTA chest revealed: Multifocal areas of interstitial thickening with subpleural ground glass opacity. Findings most likely indicate CHF/pulmonary edema. Atypical pneumonia is also a consideration. Personally reviewed transthoracic echo imaging. Serial troponins negative. Afterload has been well controlled throughout patient's presentation. No significant tacky or bradycardia arrhythmias have been uncovered. TTE this admission   Overall left ventricular function is normal.   Ejection fraction is visually estimated to be 60-65 %. Normal diastolic function. OBJECTIVE:  Vitals reviewed in EHR  I/O last 3 completed shifts:   In: 46 [P.O.:947]  Out: -   I/O this shift:  In: 26 [P.O.:237]  Out: -   PHYSICAL EXAMINATION:    General appearance: Alert, cooperative, NAD, appears stated age  Head:   Normocephalic, atraumatic  Eyes:   Conjunctiva and cornea clear, EOM intact bilaterally  Ears:    External pinna normal  Nose:   Normal nares, septum midline, no drainage  Throat:   Lips normal, mucosa and tongue normal  Neck:      Supple, no

## 2023-03-24 ENCOUNTER — APPOINTMENT (OUTPATIENT)
Dept: GENERAL RADIOLOGY | Facility: HOSPITAL | Age: 68
DRG: 189 | End: 2023-03-24
Payer: MEDICARE

## 2023-03-24 VITALS
HEART RATE: 58 BPM | DIASTOLIC BLOOD PRESSURE: 77 MMHG | SYSTOLIC BLOOD PRESSURE: 117 MMHG | RESPIRATION RATE: 16 BRPM | BODY MASS INDEX: 46.1 KG/M2 | OXYGEN SATURATION: 95 % | WEIGHT: 244 LBS | TEMPERATURE: 97.5 F

## 2023-03-24 LAB
ALBUMIN SERPL-MCNC: 3.5 G/DL (ref 3.4–4.8)
ALBUMIN/GLOB SERPL: 1.2 {RATIO} (ref 0.8–2)
ALP SERPL-CCNC: 69 U/L (ref 25–100)
ALT SERPL-CCNC: 6 U/L (ref 4–36)
ANION GAP SERPL CALCULATED.3IONS-SCNC: 11 MMOL/L (ref 3–16)
AST SERPL-CCNC: 8 U/L (ref 8–33)
BILIRUB SERPL-MCNC: 0.9 MG/DL (ref 0.3–1.2)
BILIRUB UR QL STRIP.AUTO: NEGATIVE
BUN SERPL-MCNC: 17 MG/DL (ref 6–20)
CALCIUM SERPL-MCNC: 8.4 MG/DL (ref 8.5–10.5)
CHLORIDE SERPL-SCNC: 99 MMOL/L (ref 98–107)
CLARITY UR: NORMAL
CO2 SERPL-SCNC: 29 MMOL/L (ref 20–30)
COLOR UR: YELLOW
CREAT SERPL-MCNC: 1 MG/DL (ref 0.4–1.2)
ERYTHROCYTE [DISTWIDTH] IN BLOOD BY AUTOMATED COUNT: 12.9 % (ref 11–16)
GFR SERPLBLD CREATININE-BSD FMLA CKD-EPI: >60 ML/MIN/{1.73_M2}
GLOBULIN SER CALC-MCNC: 3 G/DL
GLUCOSE SERPL-MCNC: 110 MG/DL (ref 74–106)
GLUCOSE UR STRIP.AUTO-MCNC: NEGATIVE MG/DL
HCT VFR BLD AUTO: 34.8 % (ref 37–47)
HGB BLD-MCNC: 10.9 G/DL (ref 11.5–16.5)
HGB UR QL STRIP.AUTO: NEGATIVE
KETONES UR STRIP.AUTO-MCNC: NEGATIVE MG/DL
LEUKOCYTE ESTERASE UR QL STRIP.AUTO: NEGATIVE
MCH RBC QN AUTO: 29.6 PG (ref 27–32)
MCHC RBC AUTO-ENTMCNC: 31.3 G/DL (ref 31–35)
MCV RBC AUTO: 94.6 FL (ref 80–100)
NITRITE UR QL STRIP.AUTO: NEGATIVE
PH UR STRIP.AUTO: 5.5 [PH] (ref 5–8)
PLATELET # BLD AUTO: 237 K/UL (ref 150–400)
PMV BLD AUTO: 10.4 FL (ref 6–10)
POTASSIUM SERPL-SCNC: 3.9 MMOL/L (ref 3.4–5.1)
PROT SERPL-MCNC: 6.5 G/DL (ref 6.4–8.3)
PROT UR STRIP.AUTO-MCNC: NEGATIVE MG/DL
RBC # BLD AUTO: 3.68 M/UL (ref 3.8–5.8)
SODIUM SERPL-SCNC: 139 MMOL/L (ref 136–145)
SP GR UR STRIP.AUTO: 1.02 (ref 1–1.03)
UA COMPLETE W REFLEX CULTURE PNL UR: NORMAL
UA DIPSTICK W REFLEX MICRO PNL UR: NORMAL
URN SPEC COLLECT METH UR: NORMAL
UROBILINOGEN UR STRIP-ACNC: 0.2 E.U./DL
WBC # BLD AUTO: 5.7 K/UL (ref 4–11)

## 2023-03-24 PROCEDURE — 99238 HOSP IP/OBS DSCHRG MGMT 30/<: CPT | Performed by: INTERNAL MEDICINE

## 2023-03-24 PROCEDURE — 2700000000 HC OXYGEN THERAPY PER DAY

## 2023-03-24 PROCEDURE — 6360000002 HC RX W HCPCS: Performed by: INTERNAL MEDICINE

## 2023-03-24 PROCEDURE — 80053 COMPREHEN METABOLIC PANEL: CPT

## 2023-03-24 PROCEDURE — 85027 COMPLETE CBC AUTOMATED: CPT

## 2023-03-24 PROCEDURE — 94618 PULMONARY STRESS TESTING: CPT

## 2023-03-24 PROCEDURE — 36415 COLL VENOUS BLD VENIPUNCTURE: CPT

## 2023-03-24 PROCEDURE — 81003 URINALYSIS AUTO W/O SCOPE: CPT

## 2023-03-24 PROCEDURE — 6370000000 HC RX 637 (ALT 250 FOR IP): Performed by: PHYSICIAN ASSISTANT

## 2023-03-24 PROCEDURE — 71046 X-RAY EXAM CHEST 2 VIEWS: CPT

## 2023-03-24 RX ORDER — POTASSIUM CHLORIDE 750 MG/1
10 TABLET, EXTENDED RELEASE ORAL DAILY
Qty: 20 TABLET | Refills: 0 | Status: SHIPPED | OUTPATIENT
Start: 2023-03-24

## 2023-03-24 RX ORDER — FUROSEMIDE 20 MG/1
20 TABLET ORAL DAILY
Qty: 20 TABLET | Refills: 0 | Status: SHIPPED | OUTPATIENT
Start: 2023-03-24

## 2023-03-24 RX ADMIN — POTASSIUM CHLORIDE 10 MEQ: 750 TABLET, EXTENDED RELEASE ORAL at 08:47

## 2023-03-24 RX ADMIN — GABAPENTIN 800 MG: 400 CAPSULE ORAL at 08:46

## 2023-03-24 RX ADMIN — FLUOXETINE 40 MG: 20 CAPSULE ORAL at 08:47

## 2023-03-24 RX ADMIN — MELOXICAM 15 MG: 7.5 TABLET ORAL at 08:47

## 2023-03-24 RX ADMIN — OXYBUTYNIN CHLORIDE 5 MG: 5 TABLET ORAL at 08:46

## 2023-03-24 RX ADMIN — ENOXAPARIN SODIUM 30 MG: 100 INJECTION SUBCUTANEOUS at 08:45

## 2023-03-24 RX ADMIN — MEDROXYPROGESTERONE ACETATE 2.5 MG: 2.5 TABLET ORAL at 08:46

## 2023-03-24 RX ADMIN — DOXYCYCLINE 100 MG: 100 CAPSULE ORAL at 08:45

## 2023-03-24 RX ADMIN — MIDODRINE HYDROCHLORIDE 10 MG: 5 TABLET ORAL at 08:46

## 2023-03-24 RX ADMIN — LEVOTHYROXINE SODIUM 50 MCG: 0.05 TABLET ORAL at 06:20

## 2023-03-24 RX ADMIN — ONDANSETRON 4 MG: 4 TABLET, ORALLY DISINTEGRATING ORAL at 08:55

## 2023-03-24 RX ADMIN — PANTOPRAZOLE SODIUM 40 MG: 40 TABLET, DELAYED RELEASE ORAL at 06:20

## 2023-03-24 RX ADMIN — ESTRADIOL 1 MG: 0.5 TABLET ORAL at 08:46

## 2023-03-24 RX ADMIN — MONTELUKAST SODIUM 10 MG: 10 TABLET, COATED ORAL at 08:47

## 2023-03-24 ASSESSMENT — PAIN SCALES - GENERAL: PAINLEVEL_OUTOF10: 5

## 2023-03-24 ASSESSMENT — PAIN DESCRIPTION - LOCATION: LOCATION: BACK

## 2023-03-24 ASSESSMENT — PAIN DESCRIPTION - ORIENTATION: ORIENTATION: LOWER

## 2023-03-24 NOTE — DISCHARGE INSTR - DIET

## 2023-03-24 NOTE — FLOWSHEET NOTE
03/24/23 0844   Assessment   Charting Type Shift assessment   Psychosocial   Psychosocial (WDL) WDL   Neurological   Neuro (WDL) WDL   Level of Consciousness 0   [place] Coma Scale   Eye Opening 4   Best Verbal Response 5   Best Motor Response 6   [place] Coma Scale Score 15   HEENT (Head, Ears, Eyes, Nose, & Throat)   HEENT (WDL) X   Teeth Dentures upper;Dentures lower  (Left at home)   Respiratory   Respiratory (WDL) X   Respiratory Interventions Cough & deep breathe   Breath Sounds   Right Upper Lobe Clear   Right Middle Lobe Clear   Right Lower Lobe Diminished   Left Upper Lobe Clear   Left Lower Lobe Diminished   Cardiac   Cardiac (WDL) X   Cardiac Monitor   Telemetry Box Number [id number]   Telemetry Monitor Alarm Parameters    Gastrointestinal   Abdominal (WDL) WDL   Abdomen Inspection Soft;Rounded   Tenderness Nontender   Genitourinary   Genitourinary (WDL) WDL   Peripheral Vascular   Edema Right lower extremity; Left lower extremity   RLE Edema +2   LLE Edema +2   Skin Integumentary    Skin Integumentary (WDL) WDL   Musculoskeletal   Musculoskeletal (WDL) X
Discharge Planning Goals   Discharge to home or other facility with appropriate resources Arrange for needed discharge resources and transportation as appropriate

## 2023-03-24 NOTE — CARDIO/PULMONARY
6 MINUTE WALK TEST    Exercise type:  Hallway at patient's own pace without assistance.      O2 sat RA/O2LPM RR RAI HR BP   Rest 92 RA 18 0 81    1 min 90 RA  1 83    2 min 91 RA 20 2 83    3 min 92 RA  2 88    4 min 90 RA 24 3 91    5 min 91 RA  4 99    6 min 90 RA 24 4 98    Recovery 92 RA 18 0 80      Distance walked: 240 feet    Breath sounds/patterns:  [id number]    Comments:  Patient did not meet criteria for supplemental home oxygen

## 2023-03-24 NOTE — DISCHARGE SUMMARY
oriented x 3, no apparent focal deficits noted. Psychiatric:  Speech and behavior appropriate. Activity: activity as tolerated  Diet: regular diet, low sodium  Follow Up: Primary Care Physician in 1 week and with  in 1-2 weeks    Labs: For convenience and continuity at follow-up the following most recent labs are provided:    CBC:   Lab Results   Component Value Date/Time    WBC 5.7 03/24/2023 05:20 AM    HGB 10.9 03/24/2023 05:20 AM    HCT 34.8 03/24/2023 05:20 AM     03/24/2023 05:20 AM       RENAL:   Lab Results   Component Value Date/Time     03/24/2023 05:20 AM    K 3.9 03/24/2023 05:20 AM    CL 99 03/24/2023 05:20 AM    CO2 29 03/24/2023 05:20 AM    BUN 17 03/24/2023 05:20 AM    CREATININE 1.0 03/24/2023 05:20 AM         Discharge Medications:     Current Discharge Medication List             Details   furosemide (LASIX) 20 MG tablet Take 1 tablet by mouth daily  Qty: 20 tablet, Refills: 0      potassium chloride (KLOR-CON M) 10 MEQ extended release tablet Take 1 tablet by mouth daily  Qty: 20 tablet, Refills: 0                Details   levocetirizine (XYZAL) 5 MG tablet Take 5 mg by mouth nightly      budesonide-formoterol (SYMBICORT) 160-4.5 MCG/ACT AERO Inhale 2 puffs into the lungs 2 times daily      albuterol sulfate HFA (VENTOLIN HFA) 108 (90 Base) MCG/ACT inhaler Inhale 2 puffs into the lungs every 6 hours as needed for Wheezing      fluticasone (FLONASE) 50 MCG/ACT nasal spray 1 spray by Each Nostril route daily      atorvastatin (LIPITOR) 20 MG tablet Take 20 mg by mouth daily      ondansetron (ZOFRAN ODT) 4 MG disintegrating tablet Take 1 tablet by mouth every 8 hours as needed for Nausea or Vomiting  Qty: 12 tablet, Refills: 0      gabapentin (NEURONTIN) 800 MG tablet Take 800 mg by mouth 3 times daily.       OXYBUTYNIN CHLORIDE PO Take 15 mg by mouth daily      FLUoxetine (PROZAC) 40 MG capsule Take 40 mg by mouth daily      levothyroxine (SYNTHROID) 50 MCG tablet Take

## 2023-03-24 NOTE — PLAN OF CARE
Problem: Discharge Planning  Goal: Discharge to home or other facility with appropriate resources  Outcome: Completed     Problem: Pain  Goal: Verbalizes/displays adequate comfort level or baseline comfort level  Outcome: Completed     Problem: Chronic Conditions and Co-morbidities  Goal: Patient's chronic conditions and co-morbidity symptoms are monitored and maintained or improved  3/24/2023 1148 by Jayson Menendez RN  Outcome: Completed  3/24/2023 1041 by Jayson Menendez RN  Outcome: Progressing     Problem: Safety - Adult  Goal: Free from fall injury  Outcome: Completed     Problem: Skin/Tissue Integrity  Goal: Absence of new skin breakdown  Description: 1. Monitor for areas of redness and/or skin breakdown  2. Assess vascular access sites hourly  3. Every 4-6 hours minimum:  Change oxygen saturation probe site  4. Every 4-6 hours:  If on nasal continuous positive airway pressure, respiratory therapy assess nares and determine need for appliance change or resting period.   Outcome: Completed

## 2023-03-24 NOTE — CARE COORDINATION
Lives With: Other (comment) (grandson)  Type of Home: Apartment  Home Layout: One level  Home Access: Level entry  Bathroom Shower/Tub: Tub/Shower unit  Bathroom Toilet: Handicap height  Bathroom Equipment: Grab bars in shower, Grab bars around toilet  Bathroom Accessibility: Walker accessible  Home Equipment: Walker, rolling  Has the patient had two or more falls in the past year or any fall with injury in the past year?: Yes  ADL Assistance: [address] [place] [address] Avenue: Independent  Homemaking Responsibilities: Yes  Ambulation Assistance: Independent  Transfer Assistance: Independent  Active : Yes     Lives with grandson. Has RW. I at baseline. No DME needs. Passed 6 minute walk.
Yes

## 2023-03-24 NOTE — PROGRESS NOTES
CLINICAL PHARMACY NOTE: MEDS TO BEDS    Total # of Prescriptions Filled: 1   The following medications were delivered to the patient:  Current Discharge Medication List      Furosemide 20mg tablet Take 1 tablet by mouth once daily  Qty: 20 Tablets Refill: 0    Additional Documentation:   Medication was delivered to patient's bedside and signed for by patient.
Checked in with pt at discharge. Pt states she used Meds to Beds and understood medications. Pt stated all staff was kind. Pt will not be going home with any home health services at this time. No other questions or concerns at this time.
Medication Reconciliation completed with fill history from [facility] in [place] and patient interview.   -not taking Potassium Cl 10 meq (last filled 2021)  -not taking Furosemide 40 mg (last filled 2021)  -not taking Ipratropium nasal spray  -not taking Tramadol 50 mg qd  -added Levocetirizine 5 mg qd  -added Symbicort 160/4.5 mcg bid  -added Ibuprofen 800 mg tid prn  -added Metformin 500 mg qd  -added Albuterol  q4h prn  -added Fluticasone nasal spray  -added Atorvastatin 20 mg qd  -added Celecoxib 200 mg qd    Patient stated she does take Levothyroxine 50 mcg qd but the last fill I could find was 4/13/2022 30 supply and Oxybutynin 15 mg qd last fill 2022. Patient did say she wasn't taking Furosemide and Potassium.
Offered spiritual care to patient. Told pt to reach out if they needed anything further.
PIV and telemetry removed. Discharged home. Pt received meds to beds and verbalized understanding of discharge instructions. Accompanied to exit.
Physical Therapy  Facility/Department: [facility] MED SUR[facility]  Physical Therapy Initial Assessment/Discharge Summary    Name: Harini Gutierrez  : 1955  MRN: [id number]  Date of Service: 3/21/2023    Discharge Recommendations:  Home with assist PRN          Patient Diagnosis(es): The primary encounter diagnosis was Hypoxia. Diagnoses of Acute pulmonary edema (HCC) and Precordial pain were also pertinent to this visit. Past Medical History:  has a past medical history of Arthritis, Depression, Thyroid disease, and UTI (urinary tract infection). Past Surgical History:  has a past surgical history that includes Appendectomy; Cholecystectomy; Tubal ligation; joint replacement (Right); Carpal tunnel release (Bilateral); Gastric bypass surgery; pr colonoscopy flx dx w/collj spec when pfrmd (N/A, 2018); and Total knee arthroplasty (Left, 2021). Assessment   Assessment: PT eval completed on this patient admitted to hospital with primary diagnosis of hypoxia. She is received supine in bed on 2 LPM O2. NAD. Pleasant and cooperative. Patient is able to perform bed mobiltiy with mod I. Able to sit to stand, transfer and ambulate 50' in room with SBA. O2 sats noted to vary between 88-92% with activity. May require O2 upon D/C. Patient notes she has had several falls at home. Also educated patient on the possible benefit of using a straight cane to improve endurance, balance and safety with patient voicing understanding and stating she has a cane at home. Patient appears to be at her baseline functional level and does not require continued skileld PT intervention.   Therapy Prognosis: Good  Decision Making: Low Complexity  Requires PT Follow-Up: No  Activity Tolerance  Activity Tolerance: Patient tolerated evaluation without incident;Patient tolerated treatment well     Plan   Physcial Therapy Plan  General Plan: Discharge with evaluation only  Safety Devices  Type of Devices: Call light within reach, Left in bed
[facility] notified pharmacy will not have bumex drip ready until 330. [facility] replied override and mix.
Lalita. Electronically signed by FRIDA Flynn on 3/22/2023 at 10:02 AM  Assessment and Plan:     Patient was seen and examined with FRIDA Flynn. Agree with note as above. Assessment and plan was done by me as below. Active Hospital Problems    Diagnosis Date Noted    Hypoxia [R09.02]  Improved but persistent pulmonary edema on repeat chest x-ray. Continue to diurese as her blood pressure allows. Echocardiogram was done. Results still pending. CTA chest negative for PE. Cardiology consult when available. 03/21/2023         Hypomagnesemia [E83.42]  Continue to monitor magnesium level and replace if needed. 03/21/2023         Pulmonary edema [J81.1]  Persist but improved on chest x-ray. Continue to require oxygen. Continue to diurese as blood pressure allows. Echocardiogram was done. Results still pending. Cardiology consult when available. Try to improve her tachycardia if blood pressure allows. 03/21/2023         Pulmonary nodules [R91.8]  Need repeat CT scan of the chest in 1 year for further evaluation. 03/21/2023         Pulmonary edema w/congestive heart failure w/preserved LV function ([address] [I50.1]  Last echocardiogram in 2019 showed normal ejection fraction with grade 1 diastolic dysfunction. Treat as mentioned above.   03/22/2023    Hypothyroidism [E03.9]  Most recent TSH within normal limit. Continue current dose of Synthroid. 02/17/2019    Benign essential HTN [I10]  Blood pressure on the low side. Monitor closely specially with the need to diurese. Support with midodrine for now.    02/17/2019           Electronically signed by Ana Luisa Amato MD on 3/22/2023 at 9:32 PM
Modified independent  Scooting: Modified independent  Transfers  Stand Pivot Transfers: Independent  Sit to stand: Independent  Stand to sit: Independent  Vision  Vision: Impaired  Vision Exceptions: Wears glasses for distance  Hearing  Hearing: Within functional limits  Cognition  Overall Cognitive Status: WFL  Orientation  Overall Orientation Status: Within Functional Limits      Therapy Time   Individual Concurrent Group Co-treatment   Time In 0850         Time Out 0914         Minutes 24          This note serves as a DC summary in the event of pt discharge.     Karrie Asher OTR/L
replaced. Monitor level. 03/21/2023         Pulmonary edema [J81.1]  Responding to diuresis and feeling better. Unfortunately echocardiogram was unremarkable and difficult to explain why this happened. Cardiology following. 03/21/2023         Pulmonary nodules [R91.8]  We will need repeat CT scan in 1 year. 03/21/2023         Acute respiratory failure with hypoxia ([facility] [address] [J96.01]  Seems to be related to pulmonary edema unknown etiology. Reviewed CTA chest and echocardiogram.  Continue to diurese. Monitor oxygen requirements closely. 03/23/2023    Hypothyroidism [E03.9]  Most recent TSH within normal limits. Continue current dose of levothyroxine. 02/17/2019    Benign essential HTN [I10]  Blood pressure has been within normal limit and occasionally slightly on the low side. We will continue to monitor closely.    02/17/2019       Electronically signed by Sadiq Fuller MD on 3/23/2023 at 9:40 PM

## 2023-03-25 ENCOUNTER — HOSPITAL ENCOUNTER (INPATIENT)
Facility: HOSPITAL | Age: 68
LOS: 7 days | Discharge: HOME | DRG: 193 | End: 2023-04-01
Attending: EMERGENCY MEDICINE | Admitting: STUDENT IN AN ORGANIZED HEALTH CARE EDUCATION/TRAINING PROGRAM
Payer: MEDICARE

## 2023-03-25 ENCOUNTER — APPOINTMENT (OUTPATIENT)
Dept: GENERAL RADIOLOGY | Facility: HOSPITAL | Age: 68
DRG: 193 | End: 2023-03-25
Payer: MEDICARE

## 2023-03-25 DIAGNOSIS — G47.33 OSA (OBSTRUCTIVE SLEEP APNEA): ICD-10-CM

## 2023-03-25 DIAGNOSIS — J81.0 ACUTE PULMONARY EDEMA: ICD-10-CM

## 2023-03-25 DIAGNOSIS — J96.01 ACUTE RESPIRATORY FAILURE WITH HYPOXIA: Primary | ICD-10-CM

## 2023-03-25 DIAGNOSIS — J15.9 BACTERIAL PNEUMONIA: ICD-10-CM

## 2023-03-25 LAB
A-A DO2: 566.1 MMHG
ALBUMIN SERPL-MCNC: 2.8 G/DL (ref 3.5–5.2)
ALBUMIN/GLOB SERPL: 0.9 G/DL
ALP SERPL-CCNC: 68 U/L (ref 39–117)
ALT SERPL W P-5'-P-CCNC: 7 U/L (ref 1–33)
ANION GAP SERPL CALCULATED.3IONS-SCNC: 13.3 MMOL/L (ref 5–15)
ARTERIAL PATENCY WRIST A: POSITIVE
AST SERPL-CCNC: 15 U/L (ref 1–32)
ATMOSPHERIC PRESS: 721 MMHG
BASE EXCESS BLDA CALC-SCNC: 0.5 MMOL/L (ref 0–2)
BASOPHILS # BLD AUTO: 0.03 10*3/MM3 (ref 0–0.2)
BASOPHILS NFR BLD AUTO: 0.8 % (ref 0–1.5)
BDY SITE: ABNORMAL
BILIRUB SERPL-MCNC: 0.8 MG/DL (ref 0–1.2)
BUN SERPL-MCNC: 21 MG/DL (ref 8–23)
BUN/CREAT SERPL: 21.9 (ref 7–25)
CALCIUM SPEC-SCNC: 8 MG/DL (ref 8.6–10.5)
CHLORIDE SERPL-SCNC: 105 MMOL/L (ref 98–107)
CO2 SERPL-SCNC: 22.7 MMOL/L (ref 22–29)
COHGB MFR BLD: 1 % (ref 0–2)
CREAT SERPL-MCNC: 0.96 MG/DL (ref 0.57–1)
DEPRECATED RDW RBC AUTO: 42.9 FL (ref 37–54)
EGFRCR SERPLBLD CKD-EPI 2021: 65.8 ML/MIN/1.73
EOSINOPHIL # BLD AUTO: 0.23 10*3/MM3 (ref 0–0.4)
EOSINOPHIL NFR BLD AUTO: 6.5 % (ref 0.3–6.2)
ERYTHROCYTE [DISTWIDTH] IN BLOOD BY AUTOMATED COUNT: 12.8 % (ref 12.3–15.4)
FLUAV SUBTYP SPEC NAA+PROBE: NOT DETECTED
FLUBV RNA ISLT QL NAA+PROBE: NOT DETECTED
GLOBULIN UR ELPH-MCNC: 3 GM/DL
GLUCOSE SERPL-MCNC: 109 MG/DL (ref 65–99)
HCO3 BLDA-SCNC: 25.8 MMOL/L (ref 22–28)
HCT VFR BLD AUTO: 36 % (ref 34–46.6)
HCT VFR BLD CALC: 35.9 %
HGB BLD-MCNC: 11.5 G/DL (ref 12–15.9)
HOLD SPECIMEN: NORMAL
HOLD SPECIMEN: NORMAL
IMM GRANULOCYTES # BLD AUTO: 0.03 10*3/MM3 (ref 0–0.05)
IMM GRANULOCYTES NFR BLD AUTO: 0.8 % (ref 0–0.5)
INHALED O2 CONCENTRATION: 100 %
LYMPHOCYTES # BLD AUTO: 0.45 10*3/MM3 (ref 0.7–3.1)
LYMPHOCYTES NFR BLD AUTO: 12.7 % (ref 19.6–45.3)
Lab: ABNORMAL
MCH RBC QN AUTO: 29.5 PG (ref 26.6–33)
MCHC RBC AUTO-ENTMCNC: 31.9 G/DL (ref 31.5–35.7)
MCV RBC AUTO: 92.3 FL (ref 79–97)
METHGB BLD QL: 0.5 % (ref 0–1.5)
MODALITY: ABNORMAL
MONOCYTES # BLD AUTO: 0.09 10*3/MM3 (ref 0.1–0.9)
MONOCYTES NFR BLD AUTO: 2.5 % (ref 5–12)
MRSA DNA SPEC QL NAA+PROBE: NORMAL
NEUTROPHILS NFR BLD AUTO: 2.72 10*3/MM3 (ref 1.7–7)
NEUTROPHILS NFR BLD AUTO: 76.7 % (ref 42.7–76)
NOTE: ABNORMAL
NRBC BLD AUTO-RTO: 0 /100 WBC (ref 0–0.2)
NT-PROBNP SERPL-MCNC: 144.1 PG/ML (ref 0–900)
OXYHGB MFR BLDV: 90.8 % (ref 94–99)
PCO2 BLDA: 43.2 MM HG (ref 35–45)
PCO2 TEMP ADJ BLD: ABNORMAL MM[HG]
PH BLDA: 7.38 PH UNITS (ref 7.35–7.45)
PH, TEMP CORRECTED: ABNORMAL
PLATELET # BLD AUTO: 237 10*3/MM3 (ref 140–450)
PMV BLD AUTO: 10.7 FL (ref 6–12)
PO2 BLDA: 64.9 MM HG (ref 75–100)
PO2 TEMP ADJ BLD: ABNORMAL MM[HG]
POTASSIUM SERPL-SCNC: 3.3 MMOL/L (ref 3.5–5.2)
POTASSIUM SERPL-SCNC: 3.5 MMOL/L (ref 3.5–5.2)
POTASSIUM SERPL-SCNC: 4.5 MMOL/L (ref 3.5–5.2)
PROCALCITONIN SERPL-MCNC: 13.29 NG/ML (ref 0–0.25)
PROT SERPL-MCNC: 5.8 G/DL (ref 6–8.5)
RBC # BLD AUTO: 3.9 10*6/MM3 (ref 3.77–5.28)
SAO2 % BLDCOA: 92.2 % (ref 94–100)
SARS-COV-2 RNA RESP QL NAA+PROBE: NOT DETECTED
SODIUM SERPL-SCNC: 141 MMOL/L (ref 136–145)
TROPONIN T SERPL HS-MCNC: 11 NG/L
TROPONIN T SERPL HS-MCNC: 11 NG/L
VENTILATOR MODE: ABNORMAL
WBC NRBC COR # BLD: 3.55 10*3/MM3 (ref 3.4–10.8)
WHOLE BLOOD HOLD COAG: NORMAL
WHOLE BLOOD HOLD SPECIMEN: NORMAL

## 2023-03-25 PROCEDURE — 94761 N-INVAS EAR/PLS OXIMETRY MLT: CPT

## 2023-03-25 PROCEDURE — 80053 COMPREHEN METABOLIC PANEL: CPT | Performed by: EMERGENCY MEDICINE

## 2023-03-25 PROCEDURE — 99285 EMERGENCY DEPT VISIT HI MDM: CPT

## 2023-03-25 PROCEDURE — 25010000002 PIPERACILLIN SOD-TAZOBACTAM PER 1 G: Performed by: STUDENT IN AN ORGANIZED HEALTH CARE EDUCATION/TRAINING PROGRAM

## 2023-03-25 PROCEDURE — 94660 CPAP INITIATION&MGMT: CPT

## 2023-03-25 PROCEDURE — 25010000002 FUROSEMIDE PER 20 MG: Performed by: STUDENT IN AN ORGANIZED HEALTH CARE EDUCATION/TRAINING PROGRAM

## 2023-03-25 PROCEDURE — 25010000002 ENOXAPARIN PER 10 MG: Performed by: STUDENT IN AN ORGANIZED HEALTH CARE EDUCATION/TRAINING PROGRAM

## 2023-03-25 PROCEDURE — 85025 COMPLETE CBC W/AUTO DIFF WBC: CPT | Performed by: EMERGENCY MEDICINE

## 2023-03-25 PROCEDURE — 93005 ELECTROCARDIOGRAM TRACING: CPT | Performed by: STUDENT IN AN ORGANIZED HEALTH CARE EDUCATION/TRAINING PROGRAM

## 2023-03-25 PROCEDURE — 83880 ASSAY OF NATRIURETIC PEPTIDE: CPT | Performed by: EMERGENCY MEDICINE

## 2023-03-25 PROCEDURE — 71045 X-RAY EXAM CHEST 1 VIEW: CPT

## 2023-03-25 PROCEDURE — 87636 SARSCOV2 & INF A&B AMP PRB: CPT | Performed by: EMERGENCY MEDICINE

## 2023-03-25 PROCEDURE — 82375 ASSAY CARBOXYHB QUANT: CPT

## 2023-03-25 PROCEDURE — 87040 BLOOD CULTURE FOR BACTERIA: CPT | Performed by: STUDENT IN AN ORGANIZED HEALTH CARE EDUCATION/TRAINING PROGRAM

## 2023-03-25 PROCEDURE — 94799 UNLISTED PULMONARY SVC/PX: CPT

## 2023-03-25 PROCEDURE — 25010000002 VANCOMYCIN 5 G RECONSTITUTED SOLUTION: Performed by: STUDENT IN AN ORGANIZED HEALTH CARE EDUCATION/TRAINING PROGRAM

## 2023-03-25 PROCEDURE — 83050 HGB METHEMOGLOBIN QUAN: CPT

## 2023-03-25 PROCEDURE — 36415 COLL VENOUS BLD VENIPUNCTURE: CPT

## 2023-03-25 PROCEDURE — 84145 PROCALCITONIN (PCT): CPT | Performed by: EMERGENCY MEDICINE

## 2023-03-25 PROCEDURE — 84484 ASSAY OF TROPONIN QUANT: CPT | Performed by: EMERGENCY MEDICINE

## 2023-03-25 PROCEDURE — 36600 WITHDRAWAL OF ARTERIAL BLOOD: CPT

## 2023-03-25 PROCEDURE — 84484 ASSAY OF TROPONIN QUANT: CPT | Performed by: STUDENT IN AN ORGANIZED HEALTH CARE EDUCATION/TRAINING PROGRAM

## 2023-03-25 PROCEDURE — 87641 MR-STAPH DNA AMP PROBE: CPT | Performed by: STUDENT IN AN ORGANIZED HEALTH CARE EDUCATION/TRAINING PROGRAM

## 2023-03-25 PROCEDURE — 93005 ELECTROCARDIOGRAM TRACING: CPT | Performed by: EMERGENCY MEDICINE

## 2023-03-25 PROCEDURE — 84132 ASSAY OF SERUM POTASSIUM: CPT | Performed by: STUDENT IN AN ORGANIZED HEALTH CARE EDUCATION/TRAINING PROGRAM

## 2023-03-25 PROCEDURE — 99223 1ST HOSP IP/OBS HIGH 75: CPT | Performed by: STUDENT IN AN ORGANIZED HEALTH CARE EDUCATION/TRAINING PROGRAM

## 2023-03-25 PROCEDURE — 82805 BLOOD GASES W/O2 SATURATION: CPT

## 2023-03-25 RX ORDER — BUDESONIDE AND FORMOTEROL FUMARATE DIHYDRATE 160; 4.5 UG/1; UG/1
2 AEROSOL RESPIRATORY (INHALATION)
COMMUNITY

## 2023-03-25 RX ORDER — SODIUM CHLORIDE 9 MG/ML
40 INJECTION, SOLUTION INTRAVENOUS AS NEEDED
Status: DISCONTINUED | OUTPATIENT
Start: 2023-03-25 | End: 2023-04-01 | Stop reason: HOSPADM

## 2023-03-25 RX ORDER — SODIUM CHLORIDE 0.9 % (FLUSH) 0.9 %
10 SYRINGE (ML) INJECTION AS NEEDED
Status: DISCONTINUED | OUTPATIENT
Start: 2023-03-25 | End: 2023-04-01 | Stop reason: HOSPADM

## 2023-03-25 RX ORDER — ACETAMINOPHEN 160 MG/5ML
650 SOLUTION ORAL EVERY 4 HOURS PRN
Status: DISCONTINUED | OUTPATIENT
Start: 2023-03-25 | End: 2023-04-01 | Stop reason: HOSPADM

## 2023-03-25 RX ORDER — BUMETANIDE 0.25 MG/ML
1 INJECTION INTRAMUSCULAR; INTRAVENOUS ONCE
Status: COMPLETED | OUTPATIENT
Start: 2023-03-25 | End: 2023-03-25

## 2023-03-25 RX ORDER — FUROSEMIDE 10 MG/ML
40 INJECTION INTRAMUSCULAR; INTRAVENOUS EVERY 6 HOURS
Status: COMPLETED | OUTPATIENT
Start: 2023-03-25 | End: 2023-03-25

## 2023-03-25 RX ORDER — POTASSIUM CHLORIDE 750 MG/1
40 CAPSULE, EXTENDED RELEASE ORAL EVERY 4 HOURS
Status: COMPLETED | OUTPATIENT
Start: 2023-03-25 | End: 2023-03-25

## 2023-03-25 RX ORDER — SODIUM CHLORIDE 0.9 % (FLUSH) 0.9 %
10 SYRINGE (ML) INJECTION AS NEEDED
Status: DISCONTINUED | OUTPATIENT
Start: 2023-03-25 | End: 2023-03-25 | Stop reason: SDUPTHER

## 2023-03-25 RX ORDER — ACETAMINOPHEN 650 MG/1
650 SUPPOSITORY RECTAL EVERY 4 HOURS PRN
Status: DISCONTINUED | OUTPATIENT
Start: 2023-03-25 | End: 2023-03-25

## 2023-03-25 RX ORDER — ALBUTEROL SULFATE 90 UG/1
2 AEROSOL, METERED RESPIRATORY (INHALATION) EVERY 4 HOURS PRN
COMMUNITY

## 2023-03-25 RX ORDER — ATORVASTATIN CALCIUM 20 MG/1
20 TABLET, FILM COATED ORAL DAILY
Status: DISCONTINUED | OUTPATIENT
Start: 2023-03-25 | End: 2023-04-01 | Stop reason: HOSPADM

## 2023-03-25 RX ORDER — BUDESONIDE AND FORMOTEROL FUMARATE DIHYDRATE 160; 4.5 UG/1; UG/1
2 AEROSOL RESPIRATORY (INHALATION)
Status: DISCONTINUED | OUTPATIENT
Start: 2023-03-25 | End: 2023-03-30

## 2023-03-25 RX ORDER — ASPIRIN 81 MG/1
81 TABLET, CHEWABLE ORAL DAILY
Status: DISCONTINUED | OUTPATIENT
Start: 2023-03-25 | End: 2023-04-01 | Stop reason: HOSPADM

## 2023-03-25 RX ORDER — MONTELUKAST SODIUM 10 MG/1
10 TABLET ORAL NIGHTLY
Status: DISCONTINUED | OUTPATIENT
Start: 2023-03-25 | End: 2023-04-01 | Stop reason: HOSPADM

## 2023-03-25 RX ORDER — IPRATROPIUM BROMIDE AND ALBUTEROL SULFATE 2.5; .5 MG/3ML; MG/3ML
3 SOLUTION RESPIRATORY (INHALATION) EVERY 4 HOURS PRN
Status: DISCONTINUED | OUTPATIENT
Start: 2023-03-25 | End: 2023-04-01 | Stop reason: HOSPADM

## 2023-03-25 RX ORDER — GABAPENTIN 400 MG/1
800 CAPSULE ORAL EVERY 8 HOURS SCHEDULED
Status: DISCONTINUED | OUTPATIENT
Start: 2023-03-25 | End: 2023-03-28

## 2023-03-25 RX ORDER — ACETAMINOPHEN 160 MG/5ML
650 SOLUTION ORAL EVERY 4 HOURS PRN
Status: DISCONTINUED | OUTPATIENT
Start: 2023-03-25 | End: 2023-03-25

## 2023-03-25 RX ORDER — ACETAMINOPHEN 325 MG/1
650 TABLET ORAL EVERY 4 HOURS PRN
Status: DISCONTINUED | OUTPATIENT
Start: 2023-03-25 | End: 2023-04-01 | Stop reason: HOSPADM

## 2023-03-25 RX ORDER — ENOXAPARIN SODIUM 100 MG/ML
40 INJECTION SUBCUTANEOUS DAILY
Status: DISCONTINUED | OUTPATIENT
Start: 2023-03-25 | End: 2023-03-25 | Stop reason: SDUPTHER

## 2023-03-25 RX ORDER — HYDROXYZINE PAMOATE 50 MG/1
50 CAPSULE ORAL 3 TIMES DAILY PRN
Status: DISCONTINUED | OUTPATIENT
Start: 2023-03-25 | End: 2023-04-01 | Stop reason: HOSPADM

## 2023-03-25 RX ORDER — TRAZODONE HYDROCHLORIDE 50 MG/1
100 TABLET ORAL NIGHTLY
Status: DISCONTINUED | OUTPATIENT
Start: 2023-03-25 | End: 2023-04-01 | Stop reason: HOSPADM

## 2023-03-25 RX ORDER — SODIUM CHLORIDE 9 MG/ML
40 INJECTION, SOLUTION INTRAVENOUS AS NEEDED
Status: DISCONTINUED | OUTPATIENT
Start: 2023-03-25 | End: 2023-03-25 | Stop reason: SDUPTHER

## 2023-03-25 RX ORDER — ENOXAPARIN SODIUM 100 MG/ML
40 INJECTION SUBCUTANEOUS EVERY 12 HOURS
Status: DISCONTINUED | OUTPATIENT
Start: 2023-03-25 | End: 2023-04-01 | Stop reason: HOSPADM

## 2023-03-25 RX ORDER — ONDANSETRON 2 MG/ML
4 INJECTION INTRAMUSCULAR; INTRAVENOUS EVERY 6 HOURS PRN
Status: DISCONTINUED | OUTPATIENT
Start: 2023-03-25 | End: 2023-04-01 | Stop reason: HOSPADM

## 2023-03-25 RX ORDER — ACETAMINOPHEN 650 MG/1
650 SUPPOSITORY RECTAL EVERY 4 HOURS PRN
Status: DISCONTINUED | OUTPATIENT
Start: 2023-03-25 | End: 2023-04-01 | Stop reason: HOSPADM

## 2023-03-25 RX ORDER — LEVOTHYROXINE SODIUM 0.05 MG/1
50 TABLET ORAL
Status: DISCONTINUED | OUTPATIENT
Start: 2023-03-25 | End: 2023-04-01 | Stop reason: HOSPADM

## 2023-03-25 RX ORDER — ENOXAPARIN SODIUM 100 MG/ML
40 INJECTION SUBCUTANEOUS DAILY
Status: DISCONTINUED | OUTPATIENT
Start: 2023-03-25 | End: 2023-03-25

## 2023-03-25 RX ORDER — VANCOMYCIN 2 GRAM/500 ML IN 0.9 % SODIUM CHLORIDE INTRAVENOUS
2000 ONCE
Status: COMPLETED | OUTPATIENT
Start: 2023-03-25 | End: 2023-03-25

## 2023-03-25 RX ORDER — SODIUM CHLORIDE 0.9 % (FLUSH) 0.9 %
10 SYRINGE (ML) INJECTION EVERY 12 HOURS SCHEDULED
Status: DISCONTINUED | OUTPATIENT
Start: 2023-03-25 | End: 2023-04-01 | Stop reason: HOSPADM

## 2023-03-25 RX ORDER — ALBUTEROL SULFATE 2.5 MG/3ML
2.5 SOLUTION RESPIRATORY (INHALATION) EVERY 6 HOURS PRN
Status: DISCONTINUED | OUTPATIENT
Start: 2023-03-25 | End: 2023-04-01 | Stop reason: HOSPADM

## 2023-03-25 RX ORDER — ONDANSETRON 2 MG/ML
4 INJECTION INTRAMUSCULAR; INTRAVENOUS EVERY 6 HOURS PRN
Status: DISCONTINUED | OUTPATIENT
Start: 2023-03-25 | End: 2023-03-25 | Stop reason: SDUPTHER

## 2023-03-25 RX ORDER — SODIUM CHLORIDE 0.9 % (FLUSH) 0.9 %
10 SYRINGE (ML) INJECTION EVERY 12 HOURS SCHEDULED
Status: DISCONTINUED | OUTPATIENT
Start: 2023-03-25 | End: 2023-03-30 | Stop reason: SDUPTHER

## 2023-03-25 RX ORDER — FUROSEMIDE 10 MG/ML
40 INJECTION INTRAMUSCULAR; INTRAVENOUS
Status: DISCONTINUED | OUTPATIENT
Start: 2023-03-26 | End: 2023-03-26

## 2023-03-25 RX ORDER — PANTOPRAZOLE SODIUM 40 MG/1
40 TABLET, DELAYED RELEASE ORAL
Status: DISCONTINUED | OUTPATIENT
Start: 2023-03-26 | End: 2023-04-01 | Stop reason: HOSPADM

## 2023-03-25 RX ORDER — ACETAMINOPHEN 325 MG/1
650 TABLET ORAL EVERY 4 HOURS PRN
Status: DISCONTINUED | OUTPATIENT
Start: 2023-03-25 | End: 2023-03-25

## 2023-03-25 RX ORDER — FLUOXETINE HYDROCHLORIDE 20 MG/1
40 CAPSULE ORAL DAILY
Status: DISCONTINUED | OUTPATIENT
Start: 2023-03-25 | End: 2023-04-01 | Stop reason: HOSPADM

## 2023-03-25 RX ADMIN — VANCOMYCIN HYDROCHLORIDE 2000 MG: 500 INJECTION, POWDER, LYOPHILIZED, FOR SOLUTION INTRAVENOUS at 11:55

## 2023-03-25 RX ADMIN — ACETAMINOPHEN 650 MG: 325 TABLET, FILM COATED ORAL at 20:09

## 2023-03-25 RX ADMIN — GABAPENTIN 800 MG: 400 CAPSULE ORAL at 15:00

## 2023-03-25 RX ADMIN — FLUOXETINE 40 MG: 20 CAPSULE ORAL at 15:00

## 2023-03-25 RX ADMIN — Medication 10 ML: at 09:56

## 2023-03-25 RX ADMIN — ATORVASTATIN CALCIUM 20 MG: 20 TABLET, FILM COATED ORAL at 15:00

## 2023-03-25 RX ADMIN — TAZOBACTAM SODIUM AND PIPERACILLIN SODIUM 3.38 G: 375; 3 INJECTION, SOLUTION INTRAVENOUS at 20:10

## 2023-03-25 RX ADMIN — TAZOBACTAM SODIUM AND PIPERACILLIN SODIUM 3.38 G: 375; 3 INJECTION, SOLUTION INTRAVENOUS at 11:55

## 2023-03-25 RX ADMIN — LEVOTHYROXINE SODIUM 50 MCG: 0.05 TABLET ORAL at 15:00

## 2023-03-25 RX ADMIN — ASPIRIN 81 MG CHEWABLE TABLET 81 MG: 81 TABLET CHEWABLE at 15:00

## 2023-03-25 RX ADMIN — ENOXAPARIN SODIUM 40 MG: 100 INJECTION SUBCUTANEOUS at 09:56

## 2023-03-25 RX ADMIN — POTASSIUM CHLORIDE 40 MEQ: 750 CAPSULE, EXTENDED RELEASE ORAL at 15:00

## 2023-03-25 RX ADMIN — Medication 10 ML: at 16:26

## 2023-03-25 RX ADMIN — FUROSEMIDE 40 MG: 10 INJECTION, SOLUTION INTRAMUSCULAR; INTRAVENOUS at 09:56

## 2023-03-25 RX ADMIN — ACETAMINOPHEN 650 MG: 325 TABLET, FILM COATED ORAL at 13:35

## 2023-03-25 RX ADMIN — ENOXAPARIN SODIUM 40 MG: 100 INJECTION SUBCUTANEOUS at 21:48

## 2023-03-25 RX ADMIN — FUROSEMIDE 40 MG: 10 INJECTION, SOLUTION INTRAMUSCULAR; INTRAVENOUS at 16:26

## 2023-03-25 RX ADMIN — POTASSIUM CHLORIDE 40 MEQ: 750 CAPSULE, EXTENDED RELEASE ORAL at 11:55

## 2023-03-25 RX ADMIN — GABAPENTIN 800 MG: 400 CAPSULE ORAL at 21:48

## 2023-03-25 RX ADMIN — BUMETANIDE 1 MG: 0.25 INJECTION, SOLUTION INTRAMUSCULAR; INTRAVENOUS at 05:55

## 2023-03-25 RX ADMIN — MONTELUKAST 10 MG: 10 TABLET, FILM COATED ORAL at 20:09

## 2023-03-25 RX ADMIN — TRAZODONE HYDROCHLORIDE 100 MG: 50 TABLET ORAL at 20:09

## 2023-03-25 NOTE — ED PROVIDER NOTES
Subjective   History of Present Illness  67-year-old female presents to ED with chief complaint of shortness of breath.  Patient does not wear oxygen at home.  Recent hospitalization for pulmonary edema and hypoxia at an outside facility.  She states that she was discharged approximately 20 hours ago.  EMS was called secondary to shortness of breath and on their initial evaluation the patient's pulse ox was 58% on room air.  She was placed on a nonrebreather and transported to the ED        Review of Systems   Constitutional: Negative for fatigue and fever.   Respiratory: Positive for shortness of breath.    Cardiovascular: Positive for leg swelling. Negative for chest pain.   All other systems reviewed and are negative.      Past Medical History:   Diagnosis Date   • Abnormal CXR     bronchitis poss, [facility]   • Allergic rhinitis     uses inhalers prn, denies asthma   • Anxiety    • Carpal tunnel syndrome    • Chronic narcotic use     HC   • Depression    • Dyspnea on exertion    • Fatigue    • GERD (gastroesophageal reflux disease)     on Prilosec + BID Zantac   • Glucose intolerance (impaired glucose tolerance)    • Hiatal hernia with gastroesophageal reflux     EGD [facility] 1/17, 39 cm, path DE mild nonspec changes.  serum h. pyl neg   • Hx MRSA infection     2010 on abdomen, tx w/ Bactrim   • Hyperlipidemia    • Hypertension    • Hypertriglyceridemia    • Hypothyroid    • Insomnia     uses Trazodone   • Joint pain    • Morbid obesity (HCC)    • OAB (overactive bladder)     tx w/ botox injections   • Osteoarthritis of knees, bilateral     steroid injxns as above, supposed to get synvisc soon.  Says bone on bone, needs TKR, but ortho surgeon wants her to have WLS first   • Plantar fasciitis    • Postmenopausal HRT (hormone replacement therapy)    • Psoriasis    • Vitamin D deficiency        No Known Allergies    Past Surgical History:   Procedure Laterality Date   • APPENDECTOMY  1989    emergent, open   • CARPAL  TUNNEL RELEASE      (B)   • CHOLECYSTECTOMY OPEN  1980    [facility]   • DILATATION AND CURETTAGE  1990, 2015   • GASTRIC SLEEVE LAPAROSCOPIC N/A 7/5/2017    Procedure: GASTRIC SLEEVE LAPAROSCOPIC, ;  Surgeon: Cj Gregg MD;  Location:  [facility];  Service:    • LAPAROSCOPIC TUBAL LIGATION  1988   • OTHER SURGICAL HISTORY      denies anesthesia issues   • VT LAPS SURG ESOPG/GSTR FUNDOPLASTY N/A 7/5/2017    Procedure: HIATAL HERNIA REPAIR LAPAROSCOPIC;  Surgeon: Cj Gregg MD;  Location:  [facility];  Service: Bariatric       Family History   Problem Relation Age of Onset   • Hypertension Mother    • Diabetes Father    • Cancer Father         brain   • Heart attack Father    • Cancer Sister         uterine   • Diabetes Brother    • Heart attack Brother        Social History     Socioeconomic History   • Marital status: Legally    Tobacco Use   • Smoking status: Never   • Smokeless tobacco: Never   Vaping Use   • Vaping Use: Never used   Substance and Sexual Activity   • Alcohol use: No   • Drug use: No   • Sexual activity: Yes     Partners: Male     Comment: spouse           Objective   Physical Exam  Vitals and nursing note reviewed.   Constitutional:       Appearance: She is obese. She is ill-appearing.   HENT:      Head: Normocephalic and atraumatic.   Cardiovascular:      Rate and Rhythm: Normal rate and regular rhythm.   Pulmonary:      Effort: No respiratory distress.      Comments: Mild tachypnea.  Coarse breath sounds with rhonchi in the bilateral lung fields.  Musculoskeletal:      Right lower leg: Edema present.      Left lower leg: Edema present.   Neurological:      Mental Status: She is alert.         Procedures           ED Course  ED Course as of 03/27/23 1404   Sat Mar 25, 2023   0605 EKG interpreted by me.  Sinus rhythm.  Rate of 96.   No ST segment or T wave changes.  Normal EKG [CG]      ED Course User Index  [CG] Arturo Bridges DO                                            MDM  Chief complaint: Shortness of breath    Initial impression of presenting illness: Ill-appearing 67-year-old female with acute hypoxia and shortness of breath    Comorbidities requiring consideration and/or management: Depression GERD hypertension hyperlipidemia morbid obesity, etc.    Differential diagnosis includes but not limited to: Acute pulmonary edema, pneumonia, hypertensive emergency, STEMI, NSTEMI, other    Patient arrives hemodynamically stable, afebrile, without respiratory distress with initial vitals interpreted by myself.  Pertinent initial vitals include pulse ox 83% on 2 L nasal cannula.  /70, temp 99.  Heart rate 92 respiratory rate of 26    Pertinent features from physical exam:  Physical Exam  Vitals and nursing note reviewed.   Constitutional:       Appearance: She is obese. She is ill-appearing.   HENT:      Head: Normocephalic and atraumatic.   Cardiovascular:      Rate and Rhythm: Normal rate and regular rhythm.   Pulmonary:      Effort: No respiratory distress.      Comments: Mild tachypnea.  Coarse breath sounds with rhonchi in the bilateral lung fields.  Musculoskeletal:      Right lower leg: Edema present.      Left lower leg: Edema present.   Neurological:      Mental Status: She is alert.         Initial diagnostic plan: CBC, CMP, chest x-ray, procalcitonin, lactic acid, troponin, BNP, EKG    Results from initial plan were reviewed and interpreted by myself and include: EKG nonischemic, ABG shows mild hypoxia, CBC is reassuring with normal white blood cell count minimally decreased hemoglobin, normal platelets.  Chest x-ray shows bilateral pleural effusions and infiltrates consistent with likely pulmonary edema      Diagnostic information from other sources includes: Review of previous visits, prior labs, prior imaging, available notes from prior evaluations or visits with specialists, medication list, allergies, past medical history, past surgical history    Interventions  in the ED included: Bumex IV, BiPAP,    Reevaluation: Improved respiratory status on BiPAP    Results/clinical rationale were discussed with patient     Consultations and discussions of results with other physicians: Hospitalist for admission    Discussion of results/management/plan:    Disposition plan:   Admission    Critical Care  Performed by: Arturo Bridges DO  Authorized by: Arturo Bridges DO     Critical care provider statement:     Critical care time (minutes): 40    Critical care time was exclusive of:  Separately billable procedures and treating other patients    Critical care was necessary to treat or prevent imminent or life-threatening deterioration of the following conditions: Acute respiratory failure, acute pulmonary edema, acute CHF exacerbation, other    Critical care was time spent personally by me on the following activities:  Ordering and performing treatments and interventions, development of treatment plan with patient or surrogate, discussions with consultants, evaluation of patient's response to treatment, examination of patient, ordering and review of laboratory studies, ordering and review of radiographic studies, pulse oximetry, re-evaluation of patient's condition and review of old charts      Final diagnoses:   Acute respiratory failure with hypoxia (HCC)   Acute pulmonary edema (HCC)       ED Disposition  ED Disposition     ED Disposition   Decision to Admit    Condition   --    Comment   Level of Care: Telemetry [5]   Diagnosis: Acute respiratory failure with hypoxia (HCC) [284684]   Admitting Physician: KERLEY, BRIAN JOSEPH [[id number]]   Attending Physician: KERLEY, BRIAN JOSEPH [[id number]]   Certification: I Certify That Inpatient Hospital Services Are Medically Necessary For Greater Than 2 Midnights               No follow-up provider specified.       Medication List      ASK your doctor about these medications    pramipexole 1 MG tablet  Commonly known as: MIRAPEX  Ask about: Which  instructions should I use?             Arturo Bridges, DO  03/27/23 1403

## 2023-03-25 NOTE — PHARMACY RECOMMENDATION
"Pharmacy Consult - Vancomycin Dosing    Pharmacy was consulted to dose vancomycin for  Jessica Flowers, a 67 y.o. female  154.9 cm (61\") 109 kg (240 lb)    Indication: pneumonia  Consulting Provider: Dr. Kerley    Goal AUC: 400-600 mg/L*hr.    Labs  Results from last 7 days   Lab Units 03/25/23  0552 03/25/23  0514 03/24/23  0520 03/23/23  0507   WBC 10*3/mm3  --  3.55 5.7 9.3   CREATININE mg/dL 0.96  --   --   --       Estimated Creatinine Clearance: 66.7 mL/min (by C-G formula based on SCr of 0.96 mg/dL).  Temp Readings from Last 1 Encounters:   03/25/23 99 °F (37.2 °C) (Axillary)         Other Antimicrobials    Zosyn 3.375 g IV every 8 hours     InsightRX AUC Calculation    New dose: 1000 mg IV every 18 hours   Predicted Steady State AUC on New Dose: 504 mg/L*hr    Assessment/Plan    Patient received loading dose of vancomycin 2000 mg IV. Initiate maintenance dose of vancomycin 1000 mg IV every 18 hours   Assess clearance by vancomycin level on 3/27 @ 0600.  Pharmacy will continue to monitor renal function, cultures and sensitivities, and clinical status to adjust regimen as necessary.      Thank you,  Kyung Whitmore, PharmD, BCPS  03/25/23 10:55 EDT    "

## 2023-03-25 NOTE — ED NOTES
Per Rachel, House Supervisor, pt bed assignment to CV has been delayed to accomodate another pt's admission to the ICU.

## 2023-03-25 NOTE — PLAN OF CARE
Goal Outcome Evaluation:               Pt admitted today with acute resp failure w/ hypoxia.  Requiring 15L HFNC or bipap FIO2 75%.  IV antibiotics started, receiving Lasix.  Good UOP. Denies chest pain, SOA noted with exertion. VSS

## 2023-03-25 NOTE — PLAN OF CARE
Goal Outcome Evaluation:               Monitor patient response, such as gas exchange and ventilatory parameters, neurologic and hemodynamic status, flow and pressure waveforms, to determine interface tolerance; recognize need for intubation.  Acknowledge, normalize and validate the intensity of fear and anxiety associated with the use of noninvasive ventilation therapy.  Adjust pressure support gradually to promote tolerance and comfort; provide coaching and reassurance.  Keep exhalation port free from obstruction to maintain carbon dioxide elimination; port may be in the circuit or in the interface device.  Ensure that nasal prongs, mask or helmet fit properly; adjust strap tension to avoid leaks without overtightening.  Consider heated humidification for prolonged use of positive pressure, thick secretions, mucosal dryness or comfort.  Assess pressure points regularly to promote skin and mucous membrane integrity; provide frequent oral care.  Maintain head of bed elevation to promote ventilation-perfusion and reduce risk of aspiration.  Evaluate nutrition status; consider nutritional supplementation.  Promote pulmonary hygiene regularly to promote mucociliary clearance and prevent sputum retention.  Utilize nonpharmacologic measures, such as music or presence at the bedside and medication, to reduce pain and anxiety; monitor medication effects closely for respiratory depression.

## 2023-03-25 NOTE — PAYOR COMM NOTE
"Larissa Flowers (67 y.o. Female)     Date of Birth   1955    Social Security Number       Address   [address] [place] [address]    Home Phone   [phone]    MRN   [id number]       Tenriism   Non-Gnosticist    Marital Status   Legally                             Admission Date   3/25/23    Admission Type   Emergency    Admitting Provider   Kerley, Brian Joseph, DO    Attending Provider   Kerley, Brian Joseph, DO    [facility]       Discharge Date       Discharge Disposition       Discharge Destination                               Attending Provider: Kerley, Brian Joseph, DO    Allergies: No Known Allergies    Isolation: None   Infection: COVID (rule out) (03/25/23)   Code Status: No CPR    Ht: 154.9 cm (61\")   Wt: 111 kg (245 lb 6 oz)    Admission Cmt: None   Principal Problem: Acute respiratory failure with hypoxia (HCC) [J96.01]                 Active Insurance as of 3/25/2023     Primary Coverage     Payor Plan Insurance Group Employer/Plan Group    [facility] MEDICARE REPLACEMENT [facility] DUAL COMPLETE MEDICARE REPLACEMENT [id number]     Payor Plan Address Payor Plan Phone Number Payor Plan Fax Number Effective Dates    [address] [phone]  1/1/2023 - None Entered    [facility] [id number]       Subscriber Name Subscriber Birth Date Member ID       LARISSA FLOWERS 1955 [id number]           Secondary Coverage     Payor Plan Insurance Group Employer/Plan Group    [facility] MEDICAID      Payor Plan Address Payor Plan Phone Number Payor Plan Fax Number Effective Dates    [address] [address] [phone]  12/23/2016 - None Entered    [facility] [address]       Subscriber Name Subscriber Birth Date Member ID       LARISSA FLOWERS 1955 [id number]                 Emergency Contacts      (Rel.) Home Phone Work Phone Mobile Phone    Jourdan Flowers (Son) [phone] -- " "[phone]    GREGG FLOWERS (Daughter) -- -- [phone]        ICD 10 code:  J96.01,  J18.9    Dr Kerley NPI:  [id number]       History & Physical      KerleyDenton, DO at 23 0716            [facility]   HISTORY AND PHYSICAL      Name:  Jessica Flowers   Age:  67 y.o.  Sex:  female  :  1955  MRN:  [id number]   Visit Number:  [id number]  Admission Date:  3/25/2023  Date Of Service:  23  Primary Care Physician:  Karley Jolly APRN    Chief Complaint:     Shortness of air.    History Of Presenting Illness:      Patient is a 67-year-old woman with past medical history of heart failure with preserved ejection fraction, hypertension, dyslipidemia, hypothyroidism, vitamin D deficiency, psoriasis, GERD, fatigue, anxiety and depression, allergic rhinitis, postmenopausal hormone replacement therapy.  Presented to [facility] ED on 3/25/2023 with concern for shortness of air.  Had a recent hospitalization at outside hospital for pulmonary edema and hypoxia, was discharged day prior.  Patient was brought to ED by EMS, who were called because she was 58% on room air.  EMS had placed her on a nonrebreather.  She said that she has been having some chills, fever \"off and on\", nonproductive cough since she got home from the hospital day prior.  She thinks they \"did not get all the fluid off\".    ED summary: Afebrile, requiring BiPAP, vital signs otherwise stable.  ABG with hypoxia, otherwise unremarkable.  EKG sinus rhythm, no ST elevations or depressions.  High-sensitivity troponin slight increase 11, ACS not suspected.  proBNP not elevated.  Blood glucose 109, potassium 3.3.  No leukocytosis.  COVID/flu negative.  CXR cardiomegaly, left lower field opacities, radiology interpretation pending.  Presumed hypoxia from volume overload, provided Bumex 1 mg IV.    Review Of Systems:    All systems were reviewed and negative except as mentioned in history of presenting illness, " assessment and plan.    Past Medical History: Patient  has a past medical history of Abnormal CXR, Allergic rhinitis, Anxiety, Carpal tunnel syndrome, Chronic narcotic use, Depression, Dyspnea on exertion, Fatigue, GERD (gastroesophageal reflux disease), Glucose intolerance (impaired glucose tolerance), Hiatal hernia with gastroesophageal reflux, MRSA infection, Hyperlipidemia, Hypertension, Hypertriglyceridemia, Hypothyroid, Insomnia, Joint pain, Morbid obesity (HCC), OAB (overactive bladder), Osteoarthritis of knees, bilateral, Plantar fasciitis, Postmenopausal HRT (hormone replacement therapy), Psoriasis, and Vitamin D deficiency.    Past Surgical History: Patient  has a past surgical history that includes Appendectomy (1989); Laparoscopic tubal ligation (1988); Dilation and curettage of uterus (1990, 2015); Carpal tunnel release; Cholecystectomy open (1980); Other surgical history; pr laps surg esopg/gstr fundoplasty (N/A, 7/5/2017); and Gastric Sleeve (N/A, 7/5/2017).    Social History: Patient  reports that she has never smoked. She has never used smokeless tobacco. She reports that she does not drink alcohol and does not use drugs.    Family History:  Patient's family history has been reviewed and found to be noncontributory.     Allergies:      Patient has no known allergies.    Home Medications:    Prior to Admission Medications     Prescriptions Last Dose Informant Patient Reported? Taking?    aspirin 81 MG chewable tablet   Yes No    Chew 81 mg Daily.    atorvastatin (LIPITOR) 20 MG tablet   Yes No    Take 20 mg by mouth Daily.    cefdinir (OMNICEF) 300 MG capsule   Yes No    Take 300 mg by mouth 2 (Two) Times a Day.    doxycycline (VIBRAMYCIN) 100 MG capsule   Yes No    Take 100 mg by mouth 2 (Two) Times a Day.    estradiol (ESTRACE) 1 MG tablet   No No    Take 1 tablet by mouth Daily.    FLUoxetine (PROzac) 20 MG capsule  Medication Bottle Yes No    Take 40 mg by mouth Daily.    furosemide (LASIX) 20 MG  "tablet   Yes No    Take 20 mg by mouth 2 (Two) Times a Day.    gabapentin (NEURONTIN) 800 MG tablet   Yes No    Gemtesa 75 MG tablet   Yes No    ipratropium (ATROVENT) 0.03 % nasal spray  Medication Bottle Yes No    2 sprays into each nostril Every 12 (Twelve) Hours.    lansoprazole (PREVACID) 30 MG capsule   Yes No    levothyroxine (SYNTHROID, LEVOTHROID) 100 MCG tablet  Medication Bottle Yes No    Take 50 mcg by mouth Daily.    medroxyPROGESTERone (PROVERA) 2.5 MG tablet   No No    Take 1 tablet by mouth Daily.    montelukast (SINGULAIR) 10 MG tablet   Yes No    nitrofurantoin, macrocrystal-monohydrate, (Macrobid) 100 MG capsule   No No    Take 1 capsule by mouth 2 (Two) Times a Day.    oxybutynin XL (DITROPAN-XL) 10 MG 24 hr tablet  Medication Bottle Yes No    Take 10 mg by mouth Daily.    pramipexole (MIRAPEX) 1 MG tablet   Yes No    Take 1 mg by mouth.    RA VITAMIN D-3 5000 UNITS capsule   Yes No    5,000 Units Daily.    tamsulosin (FLOMAX) 0.4 MG capsule 24 hr capsule   Yes No    traZODone (DESYREL) 100 MG tablet  Medication Bottle Yes No    Take 100 mg by mouth Every Night.        ED Medications:    Medications   sodium chloride 0.9 % flush 10 mL (has no administration in time range)   bumetanide (BUMEX) injection 1 mg (1 mg Intravenous Given 3/25/23 0555)     Vital Signs:  Temp:  [99 °F (37.2 °C)] 99 °F (37.2 °C)  Heart Rate:  [70-92] 70  Resp:  [26] 26  BP: (104-123)/(55-70) 110/69        03/25/23  0505   Weight: 109 kg (240 lb)     Body mass index is 45.35 kg/m².    Physical Exam:     Most recent vital Signs: /69   Pulse 70   Temp 99 °F (37.2 °C) (Axillary)   Resp 26   Ht 154.9 cm (61\")   Wt 109 kg (240 lb)   SpO2 94%   BMI 45.35 kg/m²     Physical Exam  Constitutional:       General: She is not in acute distress.     Appearance: She is ill-appearing. She is not toxic-appearing.   HENT:      Mouth/Throat:      Mouth: Mucous membranes are moist.   Eyes:      Extraocular Movements: Extraocular " movements intact.   Cardiovascular:      Rate and Rhythm: Normal rate and regular rhythm.      Pulses: Normal pulses.      Heart sounds: Normal heart sounds.   Pulmonary:      Breath sounds: Normal breath sounds.      Comments: Mildly increased work of breathing while on BiPAP, able to speak in complete sentences, no wheezing, no coarse breath sounds, no crackles  Abdominal:      Palpations: Abdomen is soft.      Tenderness: There is no abdominal tenderness.   Musculoskeletal:      Right lower leg: Edema present.      Left lower leg: Edema present.   Skin:     General: Skin is warm.   Neurological:      General: No focal deficit present.      Mental Status: She is alert and oriented to person, place, and time.   Psychiatric:         Mood and Affect: Mood normal.         Thought Content: Thought content normal.         Laboratory data:    I have reviewed the labs done in the emergency room.    Results from last 7 days   Lab Units 03/25/23  0552   SODIUM mmol/L 141   POTASSIUM mmol/L 3.3*   CHLORIDE mmol/L 105   CO2 mmol/L 22.7   BUN mg/dL 21   CREATININE mg/dL 0.96   CALCIUM mg/dL 8.0*   BILIRUBIN mg/dL 0.8   ALK PHOS U/L 68   ALT (SGPT) U/L 7   AST (SGOT) U/L 15   GLUCOSE mg/dL 109*     Results from last 7 days   Lab Units 03/25/23  0514 03/24/23  0520 03/23/23  0507   WBC 10*3/mm3 3.55 5.7 9.3   HEMOGLOBIN g/dL 11.5* 10.9* 10.4*   HEMATOCRIT % 36.0 34.8* 33.4*   PLATELETS 10*3/mm3 237 237 221         Results from last 7 days   Lab Units 03/25/23  0552 03/22/23  1930 03/22/23  1608 03/22/23  0433   TROPONIN I ng/mL  --  <0.30 <0.30 <0.30   HSTROP T ng/L 11*  --   --   --      Results from last 7 days   Lab Units 03/25/23  0552   PROBNP pg/mL 144.1         Results from last 7 days   Lab Units 03/21/23  0042   LIPASE U/L 36.0     Results from last 7 days   Lab Units 03/25/23  0511   PH, ARTERIAL pH units 7.384   PO2 ART mm Hg 64.9*   PCO2, ARTERIAL mm Hg 43.2   HCO3 ART mmol/L 25.8     Results from last 7 days   Lab  Units 03/24/23  1040   COLOR UA  Yellow   GLUCOSE UR mg/dL Negative   LEUKOCYTES UA  Negative   EXTERNAL BILIRUBIN, URINE  Negative   UROBILINOGEN UA E.U./dL 0.2       Pain Management Panel    There is no flowsheet data to display.         EKG:      EKG sinus rhythm, no ST elevations or depressions.     Radiology:    XR CHEST (2 VW)    Result Date: 3/24/2023  EXAM: XR CHEST (2 VW) INDICATION: SOA and hypoxia COMPARISON: 3/22/2023 FINDINGS: Medical Devices: None. Lungs: Interval decrease in pulmonary opacities. Persistent linear opacities of the left midlung. Pleura: No pneumothorax or pleural effusion. Heart and Mediastinum: The cardiomediastinal silhouette is within normal limits. Bones: No acute suspicious abnormality.    1.  Improving pulmonary edema. 2.  Persistent left midlung atelectasis or scarring.    XR CHEST (2 VW)    Result Date: 3/22/2023  Chest PA and lateral HISTORY: Short of breath    Moderate pulmonary edema with improvement since 3/21/2023. Stable cardiomediastinal silhouette.      Assessment/Plan:    Inpatient general floor admission 3/25/2023 with acute respiratory failure with hypoxia requiring BiPAP secondary to acute exacerbation of heart failure with preserved ejection fraction.    Acute respiratory failure with hypoxia  Acute exacerbation of heart failure with preserved ejection fraction  Bacterial pneumonia  Supplemental oxygen as needed keep saturation above 90%.  Requiring BiPAP upon presentation.  IV diuretics.  Vancomycin and Zosyn started 3/25.  MRSA swab ordered.  Blood cultures ordered.  Procalcitonin significantly elevated.    Chronic:  heart failure with preserved ejection fraction, hypertension, dyslipidemia, hypothyroidism, vitamin D deficiency, psoriasis, GERD, fatigue, anxiety and depression, allergic rhinitis, postmenopausal hormone replacement therapy.    Continue home medications, reconciliation pending.    Risk Assessment: High  DVT Prophylaxis: Lovenox  Code Status:  DNR/DNI  Diet: Cardiac    Advance Care Planning   ACP discussion was held with the patient during this visit. Patient does not have an advance directive, declines further assistance.          Brian Joseph Kerley, DO  03/25/23  07:16 EDT    Dictated utilizing Dragon dictation.    Electronically signed by Kerley, Brian Joseph, DO at 03/25/23 1339          Emergency Department Notes      Alexandre Larios at 03/25/23 0655        SDS overflow 3 assigned     Electronically signed by Alexandre Larios at 03/25/23 0656     Samina Sandra, RN at 03/25/23 0805        Attempted to call report at this time. Nurse asked me to call back in about 10 min.     Electronically signed by Samina Sandra RN at 03/25/23 1026     Samina Sandra RN at 03/25/23 0818        Attempted to call report to [facility], nurse unable to take report at this time.     Electronically signed by Samina Sandra RN at 03/25/23 0822     Tomeka Allan PCT at 03/25/23 0855     Summary:Bed Assignment Delay             Per Keo Ramos Supervisor, pt bed assignment to [facility] has been delayed to accomodate another pt's admission to the ICU.     Electronically signed by Tomeka Allan PCT at 03/25/23 0901     Tomeka Allan PCT at 03/25/23 1031     Summary:Bed Assignment             Pt has been assigned to [facility] at this time per Keo Ramos Supervisor.     Electronically signed by Tomeka Allan PCT at 03/25/23 1031       Oxygen Therapy (last day)     Date/Time SpO2 Device (Oxygen Therapy) Flow (L/min) Oxygen Concentration (%) ETCO2 (mmHg)    03/25/23 1205 -- NPPV/NIV -- 75 --    03/25/23 1200 -- nonrebreather mask 15 -- --    03/25/23 1153 89 nonrebreather mask 15 -- --    03/25/23 1035 93 -- -- -- --    03/25/23 1000 92 -- -- -- --    03/25/23 0931 90 -- -- -- --    03/25/23 0900 89 -- -- -- --    03/25/23 0859 90 -- -- -- --    03/25/23 0830 91 -- -- -- --    03/25/23 0800 96 -- -- -- --    03/25/23 0730 93 -- -- -- --    03/25/23 0700 94 -- -- -- --    03/25/23 0655  "91 NPPV/NIV -- 75  --    03/25/23 0650 88 NPPV/NIV -- -- --    03/25/23 0649 86 -- -- -- --    03/25/23 0630 96 -- -- -- --    03/25/23 0600 90 -- -- -- --    03/25/23 0541 -- -- -- 60 --    03/25/23 0530 99 -- -- -- --    03/25/23 05:10:17 91 nonrebreather mask 15 -- --    03/25/23 0505 83 nonrebreather mask 15 -- --            Lab Results (last 24 hours)     Procedure Component Value Units Date/Time    MRSA Screen, PCR (Inpatient) - Swab, Nares [[id number]] Collected: 03/25/23 1151    Specimen: Swab from Nares Updated: 03/25/23 1444    Potassium [[id number]]  (Normal) Collected: 03/25/23 0820    Specimen: Blood Updated: 03/25/23 1227     Potassium 3.5 mmol/L     Blood Culture - Blood, Arm, Left [[id number]] Collected: 03/25/23 1110    Specimen: Blood from Arm, Left Updated: 03/25/23 1119    Blood Culture - Blood, Arm, Right [[id number]] Collected: 03/25/23 1115    Specimen: Blood from Arm, Right Updated: 03/25/23 1119    Procalcitonin [[id number]]  (Abnormal) Collected: 03/25/23 0820    Specimen: Blood Updated: 03/25/23 0909     Procalcitonin 13.29 ng/mL     Narrative:      As a Marker for Sepsis (Non-Neonates):    1. <0.5 ng/mL represents a low risk of severe sepsis and/or septic shock.  2. >2 ng/mL represents a high risk of severe sepsis and/or septic shock.    As a Marker for Lower Respiratory Tract Infections that require antibiotic therapy:    PCT on Admission    Antibiotic Therapy       6-12 Hrs later    >0.5                Strongly Recommended  >0.25 - <0.5        Recommended   0.1 - 0.25          Discouraged              Remeasure/reassess PCT  <0.1                Strongly Discouraged     Remeasure/reassess PCT    As 28 day mortality risk marker: \"Change in Procalcitonin Result\" (>80% or <=80%) if Day 0 (or Day 1) and Day 4 values are available. Refer to http://www.[facility] [place]-pct-calculator.com    Change in PCT <=80%  A decrease of PCT levels below or equal to 80% defines a positive change in PCT test result " representing a higher risk for 28-day all-cause mortality of patients diagnosed with severe sepsis for septic shock.    Change in PCT >80%  A decrease of PCT levels of more than 80% defines a negative change in PCT result representing a lower risk for 28-day all-cause mortality of patients diagnosed with severe sepsis or septic shock.       Single High Sensitivity Troponin T [[id number]]  (Abnormal) Collected: 03/25/23 0820    Specimen: Blood Updated: 03/25/23 0902     HS Troponin T 11 ng/L     Narrative:      High Sensitive Troponin T Reference Range:  <10.0 ng/L- Negative Female for AMI  <15.0 ng/L- Negative Male for AMI  >=10 - Abnormal Female indicating possible myocardial injury.  >=15 - Abnormal Male indicating possible myocardial injury.   Clinicians would have to utilize clinical acumen, EKG, Troponin, and serial changes to determine if it is an Acute Myocardial Infarction or myocardial injury due to an underlying chronic condition.         COVID-19 and FLU A/B PCR - Swab, Nasopharynx [[id number]]  (Normal) Collected: 03/25/23 0622    Specimen: Swab from Nasopharynx Updated: 03/25/23 0652     COVID19 Not Detected     Influenza A PCR Not Detected     Influenza B PCR Not Detected    Narrative:      Fact sheet for providers: https://www.fda.gov/media/956920/download    Fact sheet for patients: https://www.fda.gov/media/445440/download    Test performed by PCR.    BNP [[id number]]  (Normal) Collected: 03/25/23 0552    Specimen: Blood Updated: 03/25/23 0619     proBNP 144.1 pg/mL     Narrative:      Among patients with dyspnea, NT-proBNP is highly sensitive for the detection of acute congestive heart failure. In addition NT-proBNP of <300 pg/ml effectively rules out acute congestive heart failure with 99% negative predictive value.    Results may be falsely decreased if patient taking Biotin.      Single High Sensitivity Troponin T [[id number]]  (Abnormal) Collected: 03/25/23 0552    Specimen: Blood Updated: 03/25/23  0619     HS Troponin T 11 ng/L     Narrative:      High Sensitive Troponin T Reference Range:  <10.0 ng/L- Negative Female for AMI  <15.0 ng/L- Negative Male for AMI  >=10 - Abnormal Female indicating possible myocardial injury.  >=15 - Abnormal Male indicating possible myocardial injury.   Clinicians would have to utilize clinical acumen, EKG, Troponin, and serial changes to determine if it is an Acute Myocardial Infarction or myocardial injury due to an underlying chronic condition.         Comprehensive Metabolic Panel [[id number]]  (Abnormal) Collected: 03/25/23 0552    Specimen: Blood Updated: 03/25/23 0617     Glucose 109 mg/dL      BUN 21 mg/dL      Creatinine 0.96 mg/dL      Sodium 141 mmol/L      Potassium 3.3 mmol/L      Comment: Slight hemolysis detected by analyzer. Results may be affected.        Chloride 105 mmol/L      CO2 22.7 mmol/L      Calcium 8.0 mg/dL      Total Protein 5.8 g/dL      Albumin 2.8 g/dL      ALT (SGPT) 7 U/L      AST (SGOT) 15 U/L      Alkaline Phosphatase 68 U/L      Total Bilirubin 0.8 mg/dL      Globulin 3.0 gm/dL      A/G Ratio 0.9 g/dL      BUN/Creatinine Ratio 21.9     Anion Gap 13.3 mmol/L      eGFR 65.8 mL/min/1.73     Narrative:      GFR Normal >60  Chronic Kidney Disease <60  Kidney Failure <15      [place] Draw [[id number]] Collected: 03/25/23 0514    Specimen: Blood Updated: 03/25/23 0615    Narrative:      The following orders were created for panel order [place] Draw.  Procedure                               Abnormality         Status                     ---------                               -----------         ------                     Green Top (Gel)[[id number]]                                  Final result               Lavender Top[[id number]]                                     Final result               Gold Top - SST[[id number]]                                   Final result               Light Blue Top[[id number]]                                   Final result                  Please view results for these tests on the individual orders.    Green Top (Gel) [[id number]] Collected: 03/25/23 0514    Specimen: Blood Updated: 03/25/23 0615     Extra Tube Hold for add-ons.     Comment: Auto resulted.       Lavender Top [[id number]] Collected: 03/25/23 0514    Specimen: Blood Updated: 03/25/23 0615     Extra Tube hold for add-on     Comment: Auto resulted       Gold Top - SST [[id number]] Collected: 03/25/23 0514    Specimen: Blood Updated: 03/25/23 0615     Extra Tube Hold for add-ons.     Comment: Auto resulted.       Light Blue Top [[id number]] Collected: 03/25/23 0514    Specimen: Blood Updated: 03/25/23 0615     Extra Tube Hold for add-ons.     Comment: Auto resulted       CBC & Differential [[id number]]  (Abnormal) Collected: 03/25/23 0514    Specimen: Blood Updated: 03/25/23 0521    Narrative:      The following orders were created for panel order CBC & Differential.  Procedure                               Abnormality         Status                     ---------                               -----------         ------                     CBC Auto Differential[[id number]]        Abnormal            Final result                 Please view results for these tests on the individual orders.    CBC Auto Differential [[id number]]  (Abnormal) Collected: 03/25/23 0514    Specimen: Blood Updated: 03/25/23 0521     WBC 3.55 10*3/mm3      RBC 3.90 10*6/mm3      Hemoglobin 11.5 g/dL      Hematocrit 36.0 %      MCV 92.3 fL      MCH 29.5 pg      MCHC 31.9 g/dL      RDW 12.8 %      RDW-SD 42.9 fl      MPV 10.7 fL      Platelets 237 10*3/mm3      Neutrophil % 76.7 %      Lymphocyte % 12.7 %      Monocyte % 2.5 %      Eosinophil % 6.5 %      Basophil % 0.8 %      Immature Grans % 0.8 %      Neutrophils, Absolute 2.72 10*3/mm3      Lymphocytes, Absolute 0.45 10*3/mm3      Monocytes, Absolute 0.09 10*3/mm3      Eosinophils, Absolute 0.23 10*3/mm3      Basophils, Absolute 0.03 10*3/mm3      Immature  Grans, Absolute 0.03 10*3/mm3      nRBC 0.0 /100 WBC     Blood Gas, Arterial With Co-Ox [[id number]]  (Abnormal) Collected: 03/25/23 0511    Specimen: Arterial Blood Updated: 03/25/23 0511     Site Right Radial     Harshil's Test Positive     pH, Arterial 7.384 pH units      pCO2, Arterial 43.2 mm Hg      pO2, Arterial 64.9 mm Hg      Comment: 84 Value below reference range        HCO3, Arterial 25.8 mmol/L      Base Excess, Arterial 0.5 mmol/L      O2 Saturation, Arterial 92.2 %      Comment: 84 Value below reference range        Hematocrit, Blood Gas 35.9 %      Comment: 84 Value below reference range        Oxyhemoglobin 90.8 %      Comment: 84 Value below reference range        Methemoglobin 0.50 %      Carboxyhemoglobin 1.0 %      A-a DO2 566.1 mmHg      Barometric Pressure for Blood Gas 721 mmHg      Modality NRB     FIO2 100 %      Ventilator Mode NA     Note --     Collected by      Comment: Meter: [id number]     :  [id number]        pH, Temp Corrected --     pCO2, Temperature Corrected --     pO2, Temperature Corrected --        Imaging Results (Last 24 Hours)     Procedure Component Value Units Date/Time    XR Chest 1 View [[id number]] Collected: 03/25/23 0851     Updated: 03/25/23 0906    Narrative:      PROCEDURE: XR CHEST 1 VW-     HISTORY: [facility] Triage Protocol        COMPARISON: November 25, 2015.     FINDINGS:  The heart size is normal. The mediastinum is normal. There is  pulmonary vascular congestion. There are bilateral pulmonary opacities  that may represent pneumonia or atelectasis. There is no pneumothorax.  The bony thorax in intact.        Impression:      Worsening pulmonary vascular congestion.     Bilateral pulmonary opacities consistent with pneumonia or atelectasis.     This report was signed and finalized on 3/25/2023 9:04 AM by Maurice Friedman MD.        Ventilator/Non-Invasive Ventilation Settings (From admission, onward)     Start     Ordered    03/25/23 0541  NIPPV (CPAP or  BIPAP)  Until Discontinued        Question Answer Comment   Indication: Acute Respiratory Failure    Type: BIPAP    IPAP 12    EPAP 6    Breath Rate 18    Titrate Oxygen for SpO2 90% - 95%        03/25/23 0541                Physician Progress Notes (last 24 hours)  Notes from 03/24/23 1510 through 03/25/23 1510   No notes of this type exist for this encounter.         Consult Notes (last 24 hours)  Notes from 03/24/23 1510 through 03/25/23 1510   No notes of this type exist for this encounter.

## 2023-03-25 NOTE — H&P
"  [facility]      Name:  Jessica Flowers   Age:  67 y.o.  Sex:  female  :  1955  MRN:  [id number]   Visit Number:  [id number]  Admission Date:  3/25/2023  Date Of Service:  23  Primary Care Physician:  Karley Jolly APRN    Chief Complaint:     Shortness of air.    History Of Presenting Illness:      Patient is a 67-year-old woman with past medical history of heart failure with preserved ejection fraction, hypertension, dyslipidemia, hypothyroidism, vitamin D deficiency, psoriasis, GERD, fatigue, anxiety and depression, allergic rhinitis, postmenopausal hormone replacement therapy.  Presented to [facility] ED on 3/25/2023 with concern for shortness of air.  Had a recent hospitalization at outside hospital for pulmonary edema and hypoxia, was discharged day prior.  Patient was brought to ED by EMS, who were called because she was 58% on room air.  EMS had placed her on a nonrebreather.  She said that she has been having some chills, fever \"off and on\", nonproductive cough since she got home from the hospital day prior.  She thinks they \"did not get all the fluid off\".    ED summary: Afebrile, requiring BiPAP, vital signs otherwise stable.  ABG with hypoxia, otherwise unremarkable.  EKG sinus rhythm, no ST elevations or depressions.  High-sensitivity troponin slight increase 11, ACS not suspected.  proBNP not elevated.  Blood glucose 109, potassium 3.3.  No leukocytosis.  COVID/flu negative.  CXR cardiomegaly, left lower field opacities, radiology interpretation pending.  Presumed hypoxia from volume overload, provided Bumex 1 mg IV.    Review Of Systems:    All systems were reviewed and negative except as mentioned in history of presenting illness, assessment and plan.    Past Medical History: Patient  has a past medical history of Abnormal CXR, Allergic rhinitis, Anxiety, Carpal tunnel syndrome, Chronic narcotic use, Depression, Dyspnea on exertion, Fatigue, " GERD (gastroesophageal reflux disease), Glucose intolerance (impaired glucose tolerance), Hiatal hernia with gastroesophageal reflux, MRSA infection, Hyperlipidemia, Hypertension, Hypertriglyceridemia, Hypothyroid, Insomnia, Joint pain, Morbid obesity (HCC), OAB (overactive bladder), Osteoarthritis of knees, bilateral, Plantar fasciitis, Postmenopausal HRT (hormone replacement therapy), Psoriasis, and Vitamin D deficiency.    Past Surgical History: Patient  has a past surgical history that includes Appendectomy (1989); Laparoscopic tubal ligation (1988); Dilation and curettage of uterus (1990, 2015); Carpal tunnel release; Cholecystectomy open (1980); Other surgical history; pr laps surg esopg/gstr fundoplasty (N/A, 7/5/2017); and Gastric Sleeve (N/A, 7/5/2017).    Social History: Patient  reports that she has never smoked. She has never used smokeless tobacco. She reports that she does not drink alcohol and does not use drugs.    Family History:  Patient's family history has been reviewed and found to be noncontributory.     Allergies:      Patient has no known allergies.    Home Medications:    Prior to Admission Medications     Prescriptions Last Dose Informant Patient Reported? Taking?    aspirin 81 MG chewable tablet   Yes No    Chew 81 mg Daily.    atorvastatin (LIPITOR) 20 MG tablet   Yes No    Take 20 mg by mouth Daily.    cefdinir (OMNICEF) 300 MG capsule   Yes No    Take 300 mg by mouth 2 (Two) Times a Day.    doxycycline (VIBRAMYCIN) 100 MG capsule   Yes No    Take 100 mg by mouth 2 (Two) Times a Day.    estradiol (ESTRACE) 1 MG tablet   No No    Take 1 tablet by mouth Daily.    FLUoxetine (PROzac) 20 MG capsule  Medication Bottle Yes No    Take 40 mg by mouth Daily.    furosemide (LASIX) 20 MG tablet   Yes No    Take 20 mg by mouth 2 (Two) Times a Day.    gabapentin (NEURONTIN) 800 MG tablet   Yes No    Gemtesa 75 MG tablet   Yes No    ipratropium (ATROVENT) 0.03 % nasal spray  Medication Bottle Yes No  "   2 sprays into each nostril Every 12 (Twelve) Hours.    lansoprazole (PREVACID) 30 MG capsule   Yes No    levothyroxine (SYNTHROID, LEVOTHROID) 100 MCG tablet  Medication Bottle Yes No    Take 50 mcg by mouth Daily.    medroxyPROGESTERone (PROVERA) 2.5 MG tablet   No No    Take 1 tablet by mouth Daily.    montelukast (SINGULAIR) 10 MG tablet   Yes No    nitrofurantoin, macrocrystal-monohydrate, (Macrobid) 100 MG capsule   No No    Take 1 capsule by mouth 2 (Two) Times a Day.    oxybutynin XL (DITROPAN-XL) 10 MG 24 hr tablet  Medication Bottle Yes No    Take 10 mg by mouth Daily.    pramipexole (MIRAPEX) 1 MG tablet   Yes No    Take 1 mg by mouth.    RA VITAMIN D-3 5000 UNITS capsule   Yes No    5,000 Units Daily.    tamsulosin (FLOMAX) 0.4 MG capsule 24 hr capsule   Yes No    traZODone (DESYREL) 100 MG tablet  Medication Bottle Yes No    Take 100 mg by mouth Every Night.        ED Medications:    Medications   sodium chloride 0.9 % flush 10 mL (has no administration in time range)   bumetanide (BUMEX) injection 1 mg (1 mg Intravenous Given 3/25/23 0555)     Vital Signs:  Temp:  [99 °F (37.2 °C)] 99 °F (37.2 °C)  Heart Rate:  [70-92] 70  Resp:  [26] 26  BP: (104-123)/(55-70) 110/69        03/25/23  0505   Weight: 109 kg (240 lb)     Body mass index is 45.35 kg/m².    Physical Exam:     Most recent vital Signs: /69   Pulse 70   Temp 99 °F (37.2 °C) (Axillary)   Resp 26   Ht 154.9 cm (61\")   Wt 109 kg (240 lb)   SpO2 94%   BMI 45.35 kg/m²     Physical Exam  Constitutional:       General: She is not in acute distress.     Appearance: She is ill-appearing. She is not toxic-appearing.   HENT:      Mouth/Throat:      Mouth: Mucous membranes are moist.   Eyes:      Extraocular Movements: Extraocular movements intact.   Cardiovascular:      Rate and Rhythm: Normal rate and regular rhythm.      Pulses: Normal pulses.      Heart sounds: Normal heart sounds.   Pulmonary:      Breath sounds: Normal breath sounds. "      Comments: Mildly increased work of breathing while on BiPAP, able to speak in complete sentences, no wheezing, no coarse breath sounds, no crackles  Abdominal:      Palpations: Abdomen is soft.      Tenderness: There is no abdominal tenderness.   Musculoskeletal:      Right lower leg: Edema present.      Left lower leg: Edema present.   Skin:     General: Skin is warm.   Neurological:      General: No focal deficit present.      Mental Status: She is alert and oriented to person, place, and time.   Psychiatric:         Mood and Affect: Mood normal.         Thought Content: Thought content normal.         Laboratory data:    I have reviewed the labs done in the emergency room.    Results from last 7 days   Lab Units 03/25/23  0552   SODIUM mmol/L 141   POTASSIUM mmol/L 3.3*   CHLORIDE mmol/L 105   CO2 mmol/L 22.7   BUN mg/dL 21   CREATININE mg/dL 0.96   CALCIUM mg/dL 8.0*   BILIRUBIN mg/dL 0.8   ALK PHOS U/L 68   ALT (SGPT) U/L 7   AST (SGOT) U/L 15   GLUCOSE mg/dL 109*     Results from last 7 days   Lab Units 03/25/23  0514 03/24/23  0520 03/23/23  0507   WBC 10*3/mm3 3.55 5.7 9.3   HEMOGLOBIN g/dL 11.5* 10.9* 10.4*   HEMATOCRIT % 36.0 34.8* 33.4*   PLATELETS 10*3/mm3 237 237 221         Results from last 7 days   Lab Units 03/25/23  0552 03/22/23  1930 03/22/23  1608 03/22/23  0433   TROPONIN I ng/mL  --  <0.30 <0.30 <0.30   HSTROP T ng/L 11*  --   --   --      Results from last 7 days   Lab Units 03/25/23  0552   PROBNP pg/mL 144.1         Results from last 7 days   Lab Units 03/21/23  0042   LIPASE U/L 36.0     Results from last 7 days   Lab Units 03/25/23  0511   PH, ARTERIAL pH units 7.384   PO2 ART mm Hg 64.9*   PCO2, ARTERIAL mm Hg 43.2   HCO3 ART mmol/L 25.8     Results from last 7 days   Lab Units 03/24/23  1040   COLOR UA  Yellow   GLUCOSE UR mg/dL Negative   LEUKOCYTES UA  Negative   EXTERNAL BILIRUBIN, URINE  Negative   UROBILINOGEN UA E.U./dL 0.2       Pain Management Panel    There is no flowsheet  data to display.         EKG:      EKG sinus rhythm, no ST elevations or depressions.     Radiology:    XR CHEST (2 VW)    Result Date: 3/24/2023  EXAM: XR CHEST (2 VW) INDICATION: SOA and hypoxia COMPARISON: 3/22/2023 FINDINGS: Medical Devices: None. Lungs: Interval decrease in pulmonary opacities. Persistent linear opacities of the left midlung. Pleura: No pneumothorax or pleural effusion. Heart and Mediastinum: The cardiomediastinal silhouette is within normal limits. Bones: No acute suspicious abnormality.    1.  Improving pulmonary edema. 2.  Persistent left midlung atelectasis or scarring.    XR CHEST (2 VW)    Result Date: 3/22/2023  Chest PA and lateral HISTORY: Short of breath    Moderate pulmonary edema with improvement since 3/21/2023. Stable cardiomediastinal silhouette.      Assessment/Plan:    Inpatient general floor admission 3/25/2023 with acute respiratory failure with hypoxia requiring BiPAP secondary to acute exacerbation of heart failure with preserved ejection fraction.    Acute respiratory failure with hypoxia  Acute exacerbation of heart failure with preserved ejection fraction  Bacterial pneumonia  Supplemental oxygen as needed keep saturation above 90%.  Requiring BiPAP upon presentation.  IV diuretics.  Vancomycin and Zosyn started 3/25.  MRSA swab ordered.  Blood cultures ordered.  Procalcitonin significantly elevated.    Chronic:  heart failure with preserved ejection fraction, hypertension, dyslipidemia, hypothyroidism, vitamin D deficiency, psoriasis, GERD, fatigue, anxiety and depression, allergic rhinitis, postmenopausal hormone replacement therapy.    Continue home medications, reconciliation pending.    Risk Assessment: High  DVT Prophylaxis: Lovenox  Code Status: DNR/DNI  Diet: Cardiac    Advance Care Planning   ACP discussion was held with the patient during this visit. Patient does not have an advance directive, declines further assistance.           Brian Joseph Kerley,  DO  03/25/23  07:16 EDT    Dictated utilizing Dragon dictation.

## 2023-03-26 ENCOUNTER — APPOINTMENT (OUTPATIENT)
Dept: CARDIOLOGY | Facility: HOSPITAL | Age: 68
DRG: 193 | End: 2023-03-26
Payer: MEDICARE

## 2023-03-26 ENCOUNTER — APPOINTMENT (OUTPATIENT)
Dept: GENERAL RADIOLOGY | Facility: HOSPITAL | Age: 68
DRG: 193 | End: 2023-03-26
Payer: MEDICARE

## 2023-03-26 ENCOUNTER — APPOINTMENT (OUTPATIENT)
Dept: CT IMAGING | Facility: HOSPITAL | Age: 68
DRG: 193 | End: 2023-03-26
Payer: MEDICARE

## 2023-03-26 PROBLEM — N17.9 AKI (ACUTE KIDNEY INJURY): Status: ACTIVE | Noted: 2023-03-26

## 2023-03-26 PROBLEM — I50.30 (HFPEF) HEART FAILURE WITH PRESERVED EJECTION FRACTION: Status: ACTIVE | Noted: 2023-03-26

## 2023-03-26 PROBLEM — J15.9 BACTERIAL PNEUMONIA: Status: ACTIVE | Noted: 2023-03-26

## 2023-03-26 PROBLEM — I50.9 ACUTE EXACERBATION OF CHF (CONGESTIVE HEART FAILURE): Status: ACTIVE | Noted: 2023-03-26

## 2023-03-26 LAB
ANION GAP SERPL CALCULATED.3IONS-SCNC: 10.6 MMOL/L (ref 5–15)
BASOPHILS # BLD AUTO: 0.06 10*3/MM3 (ref 0–0.2)
BASOPHILS NFR BLD AUTO: 0.4 % (ref 0–1.5)
BH CV ECHO MEAS - AO MAX PG: 15.4 MMHG
BH CV ECHO MEAS - AO MEAN PG: 9 MMHG
BH CV ECHO MEAS - AO ROOT DIAM: 2.7 CM
BH CV ECHO MEAS - AO V2 MAX: 196 CM/SEC
BH CV ECHO MEAS - AO V2 VTI: 39.5 CM
BH CV ECHO MEAS - EDV(CUBED): 105.8 ML
BH CV ECHO MEAS - EDV(MOD-SP2): 59.1 ML
BH CV ECHO MEAS - EDV(MOD-SP4): 104 ML
BH CV ECHO MEAS - EF(MOD-BP): 62 %
BH CV ECHO MEAS - EF(MOD-SP2): 51.1 %
BH CV ECHO MEAS - EF(MOD-SP4): 68.7 %
BH CV ECHO MEAS - ESV(CUBED): 23.4 ML
BH CV ECHO MEAS - ESV(MOD-SP2): 28.9 ML
BH CV ECHO MEAS - ESV(MOD-SP4): 32.6 ML
BH CV ECHO MEAS - FS: 39.5 %
BH CV ECHO MEAS - IVS/LVPW: 1 CM
BH CV ECHO MEAS - IVSD: 1.14 CM
BH CV ECHO MEAS - LA DIMENSION: 3.3 CM
BH CV ECHO MEAS - LAT PEAK E' VEL: 8.6 CM/SEC
BH CV ECHO MEAS - LV DIASTOLIC VOL/BSA (35-75): 50.5 CM2
BH CV ECHO MEAS - LV MASS(C)D: 199.2 GRAMS
BH CV ECHO MEAS - LV MAX PG: 8.8 MMHG
BH CV ECHO MEAS - LV MEAN PG: 5 MMHG
BH CV ECHO MEAS - LV SYSTOLIC VOL/BSA (12-30): 15.8 CM2
BH CV ECHO MEAS - LV V1 MAX: 148 CM/SEC
BH CV ECHO MEAS - LV V1 VTI: 32 CM
BH CV ECHO MEAS - LVIDD: 4.7 CM
BH CV ECHO MEAS - LVIDS: 2.9 CM
BH CV ECHO MEAS - LVPWD: 1.14 CM
BH CV ECHO MEAS - MED PEAK E' VEL: 8.3 CM/SEC
BH CV ECHO MEAS - MV A MAX VEL: 141 CM/SEC
BH CV ECHO MEAS - MV DEC TIME: 0.24 MSEC
BH CV ECHO MEAS - MV E MAX VEL: 134 CM/SEC
BH CV ECHO MEAS - MV E/A: 0.95
BH CV ECHO MEAS - MV MAX PG: 8.9 MMHG
BH CV ECHO MEAS - MV MEAN PG: 4 MMHG
BH CV ECHO MEAS - MV V2 VTI: 46.3 CM
BH CV ECHO MEAS - PA ACC TIME: 0.09 SEC
BH CV ECHO MEAS - PA PR(ACCEL): 39.4 MMHG
BH CV ECHO MEAS - PA V2 MAX: 103 CM/SEC
BH CV ECHO MEAS - RAP SYSTOLE: 8 MMHG
BH CV ECHO MEAS - SI(MOD-SP2): 14.7 ML/M2
BH CV ECHO MEAS - SI(MOD-SP4): 34.7 ML/M2
BH CV ECHO MEAS - SV(MOD-SP2): 30.2 ML
BH CV ECHO MEAS - SV(MOD-SP4): 71.4 ML
BH CV ECHO MEAS - TAPSE (>1.6): 2.47 CM
BH CV ECHO MEASUREMENTS AVERAGE E/E' RATIO: 15.86
BH CV XLRA - RV BASE: 4 CM
BH CV XLRA - RV MID: 3.6 CM
BH CV XLRA - TDI S': 10.7 CM/SEC
BUN SERPL-MCNC: 24 MG/DL (ref 8–23)
BUN/CREAT SERPL: 19.7 (ref 7–25)
CALCIUM SPEC-SCNC: 8.4 MG/DL (ref 8.6–10.5)
CHLORIDE SERPL-SCNC: 104 MMOL/L (ref 98–107)
CO2 SERPL-SCNC: 28.4 MMOL/L (ref 22–29)
CREAT SERPL-MCNC: 1.22 MG/DL (ref 0.57–1)
DEPRECATED RDW RBC AUTO: 46.4 FL (ref 37–54)
EGFRCR SERPLBLD CKD-EPI 2021: 49.3 ML/MIN/1.73
EOSINOPHIL # BLD AUTO: 0.16 10*3/MM3 (ref 0–0.4)
EOSINOPHIL NFR BLD AUTO: 1.2 % (ref 0.3–6.2)
ERYTHROCYTE [DISTWIDTH] IN BLOOD BY AUTOMATED COUNT: 13 % (ref 12.3–15.4)
GLUCOSE SERPL-MCNC: 140 MG/DL (ref 65–99)
HCT VFR BLD AUTO: 33.2 % (ref 34–46.6)
HGB BLD-MCNC: 10.2 G/DL (ref 12–15.9)
IMM GRANULOCYTES # BLD AUTO: 0.15 10*3/MM3 (ref 0–0.05)
IMM GRANULOCYTES NFR BLD AUTO: 1.1 % (ref 0–0.5)
LEFT ATRIUM VOLUME INDEX: 23 ML/M2
LYMPHOCYTES # BLD AUTO: 0.82 10*3/MM3 (ref 0.7–3.1)
LYMPHOCYTES NFR BLD AUTO: 6.1 % (ref 19.6–45.3)
MAXIMAL PREDICTED HEART RATE: 155 BPM
MCH RBC QN AUTO: 29.7 PG (ref 26.6–33)
MCHC RBC AUTO-ENTMCNC: 30.7 G/DL (ref 31.5–35.7)
MCV RBC AUTO: 96.8 FL (ref 79–97)
MONOCYTES # BLD AUTO: 0.35 10*3/MM3 (ref 0.1–0.9)
MONOCYTES NFR BLD AUTO: 2.6 % (ref 5–12)
NEUTROPHILS NFR BLD AUTO: 12.01 10*3/MM3 (ref 1.7–7)
NEUTROPHILS NFR BLD AUTO: 88.6 % (ref 42.7–76)
NRBC BLD AUTO-RTO: 0 /100 WBC (ref 0–0.2)
PLATELET # BLD AUTO: 240 10*3/MM3 (ref 140–450)
PMV BLD AUTO: 11 FL (ref 6–12)
POTASSIUM SERPL-SCNC: 4.2 MMOL/L (ref 3.5–5.2)
PROCALCITONIN SERPL-MCNC: 16.64 NG/ML (ref 0–0.25)
PROCALCITONIN SERPL-MCNC: 24.01 NG/ML (ref 0–0.25)
RBC # BLD AUTO: 3.43 10*6/MM3 (ref 3.77–5.28)
SODIUM SERPL-SCNC: 143 MMOL/L (ref 136–145)
STRESS TARGET HR: 132 BPM
WBC NRBC COR # BLD: 13.55 10*3/MM3 (ref 3.4–10.8)

## 2023-03-26 PROCEDURE — 94799 UNLISTED PULMONARY SVC/PX: CPT

## 2023-03-26 PROCEDURE — 94761 N-INVAS EAR/PLS OXIMETRY MLT: CPT

## 2023-03-26 PROCEDURE — 99232 SBSQ HOSP IP/OBS MODERATE 35: CPT | Performed by: STUDENT IN AN ORGANIZED HEALTH CARE EDUCATION/TRAINING PROGRAM

## 2023-03-26 PROCEDURE — 84145 PROCALCITONIN (PCT): CPT | Performed by: STUDENT IN AN ORGANIZED HEALTH CARE EDUCATION/TRAINING PROGRAM

## 2023-03-26 PROCEDURE — 25010000002 ONDANSETRON PER 1 MG: Performed by: STUDENT IN AN ORGANIZED HEALTH CARE EDUCATION/TRAINING PROGRAM

## 2023-03-26 PROCEDURE — 25010000002 ENOXAPARIN PER 10 MG: Performed by: STUDENT IN AN ORGANIZED HEALTH CARE EDUCATION/TRAINING PROGRAM

## 2023-03-26 PROCEDURE — 85025 COMPLETE CBC W/AUTO DIFF WBC: CPT | Performed by: STUDENT IN AN ORGANIZED HEALTH CARE EDUCATION/TRAINING PROGRAM

## 2023-03-26 PROCEDURE — 25010000002 METHYLPREDNISOLONE PER 40 MG: Performed by: STUDENT IN AN ORGANIZED HEALTH CARE EDUCATION/TRAINING PROGRAM

## 2023-03-26 PROCEDURE — 93306 TTE W/DOPPLER COMPLETE: CPT

## 2023-03-26 PROCEDURE — 70450 CT HEAD/BRAIN W/O DYE: CPT

## 2023-03-26 PROCEDURE — 93306 TTE W/DOPPLER COMPLETE: CPT | Performed by: INTERNAL MEDICINE

## 2023-03-26 PROCEDURE — 94640 AIRWAY INHALATION TREATMENT: CPT

## 2023-03-26 PROCEDURE — 25010000002 MORPHINE PER 10 MG: Performed by: STUDENT IN AN ORGANIZED HEALTH CARE EDUCATION/TRAINING PROGRAM

## 2023-03-26 PROCEDURE — 94660 CPAP INITIATION&MGMT: CPT

## 2023-03-26 PROCEDURE — 71045 X-RAY EXAM CHEST 1 VIEW: CPT

## 2023-03-26 PROCEDURE — 94664 DEMO&/EVAL PT USE INHALER: CPT

## 2023-03-26 PROCEDURE — 25010000002 FUROSEMIDE PER 20 MG: Performed by: STUDENT IN AN ORGANIZED HEALTH CARE EDUCATION/TRAINING PROGRAM

## 2023-03-26 PROCEDURE — 94760 N-INVAS EAR/PLS OXIMETRY 1: CPT

## 2023-03-26 PROCEDURE — 25010000002 PIPERACILLIN SOD-TAZOBACTAM PER 1 G: Performed by: STUDENT IN AN ORGANIZED HEALTH CARE EDUCATION/TRAINING PROGRAM

## 2023-03-26 PROCEDURE — 80048 BASIC METABOLIC PNL TOTAL CA: CPT | Performed by: STUDENT IN AN ORGANIZED HEALTH CARE EDUCATION/TRAINING PROGRAM

## 2023-03-26 PROCEDURE — 25010000002 VANCOMYCIN 1 G RECONSTITUTED SOLUTION: Performed by: STUDENT IN AN ORGANIZED HEALTH CARE EDUCATION/TRAINING PROGRAM

## 2023-03-26 RX ORDER — METHYLPREDNISOLONE SODIUM SUCCINATE 40 MG/ML
40 INJECTION, POWDER, LYOPHILIZED, FOR SOLUTION INTRAMUSCULAR; INTRAVENOUS DAILY
Status: DISCONTINUED | OUTPATIENT
Start: 2023-03-27 | End: 2023-03-29

## 2023-03-26 RX ORDER — MORPHINE SULFATE 2 MG/ML
1 INJECTION, SOLUTION INTRAMUSCULAR; INTRAVENOUS ONCE
Status: COMPLETED | OUTPATIENT
Start: 2023-03-26 | End: 2023-03-26

## 2023-03-26 RX ORDER — HYDROCODONE BITARTRATE AND ACETAMINOPHEN 5; 325 MG/1; MG/1
1 TABLET ORAL ONCE AS NEEDED
Status: COMPLETED | OUTPATIENT
Start: 2023-03-26 | End: 2023-03-26

## 2023-03-26 RX ORDER — METHYLPREDNISOLONE SODIUM SUCCINATE 40 MG/ML
40 INJECTION, POWDER, LYOPHILIZED, FOR SOLUTION INTRAMUSCULAR; INTRAVENOUS EVERY 8 HOURS
Status: DISCONTINUED | OUTPATIENT
Start: 2023-03-26 | End: 2023-03-26

## 2023-03-26 RX ORDER — PRAMIPEXOLE DIHYDROCHLORIDE 1 MG/1
1 TABLET ORAL NIGHTLY
COMMUNITY

## 2023-03-26 RX ADMIN — PANTOPRAZOLE SODIUM 40 MG: 40 TABLET, DELAYED RELEASE ORAL at 06:07

## 2023-03-26 RX ADMIN — Medication 10 ML: at 08:02

## 2023-03-26 RX ADMIN — ONDANSETRON 4 MG: 2 INJECTION INTRAMUSCULAR; INTRAVENOUS at 07:03

## 2023-03-26 RX ADMIN — TRAZODONE HYDROCHLORIDE 100 MG: 50 TABLET ORAL at 21:51

## 2023-03-26 RX ADMIN — GABAPENTIN 800 MG: 400 CAPSULE ORAL at 21:51

## 2023-03-26 RX ADMIN — ACETAMINOPHEN 650 MG: 325 TABLET, FILM COATED ORAL at 03:13

## 2023-03-26 RX ADMIN — ENOXAPARIN SODIUM 40 MG: 100 INJECTION SUBCUTANEOUS at 21:51

## 2023-03-26 RX ADMIN — MONTELUKAST 10 MG: 10 TABLET, FILM COATED ORAL at 22:09

## 2023-03-26 RX ADMIN — Medication 10 ML: at 21:52

## 2023-03-26 RX ADMIN — MORPHINE SULFATE 1 MG: 2 INJECTION, SOLUTION INTRAMUSCULAR; INTRAVENOUS at 15:18

## 2023-03-26 RX ADMIN — TAZOBACTAM SODIUM AND PIPERACILLIN SODIUM 3.38 G: 375; 3 INJECTION, SOLUTION INTRAVENOUS at 11:55

## 2023-03-26 RX ADMIN — BUDESONIDE AND FORMOTEROL FUMARATE DIHYDRATE 2 PUFF: 160; 4.5 AEROSOL RESPIRATORY (INHALATION) at 02:15

## 2023-03-26 RX ADMIN — ACETAMINOPHEN 650 MG: 325 TABLET, FILM COATED ORAL at 12:05

## 2023-03-26 RX ADMIN — ENOXAPARIN SODIUM 40 MG: 100 INJECTION SUBCUTANEOUS at 09:37

## 2023-03-26 RX ADMIN — TAZOBACTAM SODIUM AND PIPERACILLIN SODIUM 3.38 G: 375; 3 INJECTION, SOLUTION INTRAVENOUS at 03:15

## 2023-03-26 RX ADMIN — TAZOBACTAM SODIUM AND PIPERACILLIN SODIUM 3.38 G: 375; 3 INJECTION, SOLUTION INTRAVENOUS at 19:35

## 2023-03-26 RX ADMIN — FLUOXETINE 40 MG: 20 CAPSULE ORAL at 08:02

## 2023-03-26 RX ADMIN — ASPIRIN 81 MG CHEWABLE TABLET 81 MG: 81 TABLET CHEWABLE at 08:02

## 2023-03-26 RX ADMIN — METHYLPREDNISOLONE SODIUM SUCCINATE 40 MG: 40 INJECTION, POWDER, FOR SOLUTION INTRAMUSCULAR; INTRAVENOUS at 08:02

## 2023-03-26 RX ADMIN — LEVOTHYROXINE SODIUM 50 MCG: 0.05 TABLET ORAL at 06:07

## 2023-03-26 RX ADMIN — GABAPENTIN 800 MG: 400 CAPSULE ORAL at 14:27

## 2023-03-26 RX ADMIN — ATORVASTATIN CALCIUM 20 MG: 20 TABLET, FILM COATED ORAL at 08:02

## 2023-03-26 RX ADMIN — HYDROCODONE BITARTRATE AND ACETAMINOPHEN 1 TABLET: 5; 325 TABLET ORAL at 22:08

## 2023-03-26 RX ADMIN — BUDESONIDE AND FORMOTEROL FUMARATE DIHYDRATE 2 PUFF: 160; 4.5 AEROSOL RESPIRATORY (INHALATION) at 19:35

## 2023-03-26 RX ADMIN — GABAPENTIN 800 MG: 400 CAPSULE ORAL at 06:07

## 2023-03-26 RX ADMIN — FUROSEMIDE 40 MG: 10 INJECTION, SOLUTION INTRAMUSCULAR; INTRAVENOUS at 06:07

## 2023-03-26 RX ADMIN — BUDESONIDE AND FORMOTEROL FUMARATE DIHYDRATE 2 PUFF: 160; 4.5 AEROSOL RESPIRATORY (INHALATION) at 07:10

## 2023-03-26 RX ADMIN — ONDANSETRON 4 MG: 2 INJECTION INTRAMUSCULAR; INTRAVENOUS at 21:51

## 2023-03-26 RX ADMIN — HYDROXYZINE PAMOATE 50 MG: 25 CAPSULE ORAL at 18:02

## 2023-03-26 RX ADMIN — SODIUM CHLORIDE 1000 MG: 900 INJECTION, SOLUTION INTRAVENOUS at 06:08

## 2023-03-26 NOTE — PLAN OF CARE
Problem: Noninvasive Ventilation Acute  Goal: Effective Unassisted Ventilation and Oxygenation  Outcome: Ongoing, Progressing   Goal Outcome Evaluation:

## 2023-03-26 NOTE — PROGRESS NOTES
"    [facility]    Name:  Jessica Flowers   Age:  67 y.o.  Sex:  female  :  1955  MRN:  [id number]   Visit Number:  [id number]  Admission Date:  3/25/2023  Date Of Service:  23  Primary Care Physician:  Karley Jolly APRN     LOS: 1 day :    Chief Complaint:      Follow-up; shortness of air.    Subjective:    No further chest pain, breathing feels okay, still having headache. No neck pain.     Hospital Course:    Patient is a 67-year-old woman with past medical history of heart failure with preserved ejection fraction, hypertension, dyslipidemia, hypothyroidism, vitamin D deficiency, psoriasis, GERD, fatigue, anxiety and depression, allergic rhinitis, postmenopausal hormone replacement therapy, EZEQUIEL in the process of being arranged for home CPAP.  Follows with Dr. Giraldo for cardiology. Presented to [facility] ED on 3/25/2023 with concern for shortness of air.  Had a recent hospitalization at outside hospital for pulmonary edema and hypoxia, was discharged day prior.  Patient was brought to ED by EMS, who were called because she was 58% on room air.  EMS had placed her on a nonrebreather.  She said that she has been having some chills, fever \"off and on\", nonproductive cough since she got home from the hospital day prior.  She thinks they \"did not get all the fluid off\".     ED summary: Afebrile, requiring BiPAP, vital signs otherwise stable.  ABG with hypoxia, otherwise unremarkable.  EKG sinus rhythm, no ST elevations or depressions.  High-sensitivity troponin slight increase 11, ACS not suspected.  proBNP not elevated.  Blood glucose 109, potassium 3.3.  No leukocytosis.  COVID/flu negative.  CXR cardiomegaly, left lower field opacities, radiology interpretation pending.  Presumed hypoxia from volume overload, provided Bumex 1 mg IV.    Inpatient general floor admission 3/25/2023 with acute respiratory failure with hypoxia requiring BiPAP secondary to acute " exacerbation of heart failure with preserved ejection fraction.    Review of Systems:     All systems were reviewed and negative except as mentioned in subjective, assessment and plan.    Vital Signs:    Temp:  [96.3 °F (35.7 °C)-98.1 °F (36.7 °C)] 97.7 °F (36.5 °C)  Heart Rate:  [69-83] 70  Resp:  [18-32] 32  BP: (102-134)/(60-87) 104/66    Intake and output:    I/O last 3 completed shifts:  In: 600 [P.O.:600]  Out: 500 [Urine:500]  No intake/output data recorded.    Physical Examination:    General Appearance:  Alert and cooperative.    Head:  Atraumatic and normocephalic. No neck pain, tenderness, or stiffness.    Eyes: Conjunctivae and sclerae normal, no icterus. No pallor.   Throat: No oral lesions, no thrush, oral mucosa moist.   Neck: Supple, trachea midline, no thyromegaly.   Lungs:   Breath sounds heard bilaterally equally.  No wheezing or crackles. No Pleural rub or bronchial breathing.   Heart:  Normal S1 and S2, no murmur, no gallop, no rub. No JVD.   Abdomen:   Normal bowel sounds, no masses, no organomegaly. Soft, nontender, nondistended, no rebound tenderness.   Extremities: Supple, no edema, no cyanosis, no clubbing.   Skin: warm   Neurologic: Alert and oriented x 3. No facial asymmetry. Moves all four limbs. No tremors.      Laboratory results:    Results from last 7 days   Lab Units 03/26/23  0618 03/25/23  1850 03/25/23  0820 03/25/23  0552   SODIUM mmol/L 143  --   --  141   POTASSIUM mmol/L 4.2 4.5 3.5 3.3*   CHLORIDE mmol/L 104  --   --  105   CO2 mmol/L 28.4  --   --  22.7   BUN mg/dL 24*  --   --  21   CREATININE mg/dL 1.22*  --   --  0.96   CALCIUM mg/dL 8.4*  --   --  8.0*   BILIRUBIN mg/dL  --   --   --  0.8   ALK PHOS U/L  --   --   --  68   ALT (SGPT) U/L  --   --   --  7   AST (SGOT) U/L  --   --   --  15   GLUCOSE mg/dL 140*  --   --  109*     Results from last 7 days   Lab Units 03/26/23  0618 03/25/23  0514 03/24/23  0520   WBC 10*3/mm3 13.55* 3.55 5.7   HEMOGLOBIN g/dL 10.2* 11.5*  10.9*   HEMATOCRIT % 33.2* 36.0 34.8*   PLATELETS 10*3/mm3 240 237 237         Results from last 7 days   Lab Units 03/25/23  0820 03/25/23  0552 03/22/23  1930 03/22/23  1608 03/22/23  0433   TROPONIN I ng/mL  --   --  <0.30 <0.30 <0.30   HSTROP T ng/L 11* 11*  --   --   --          Recent Labs     03/25/23  0511   PHART 7.384   [id number] 43.2   PO2ART 64.9*   [id number] 25.8   BASEEXCESS 0.5      I have reviewed the patient's laboratory results.    Radiology results:    XR Chest 1 View    Result Date: 3/25/2023  PROCEDURE: XR CHEST 1 VW-  HISTORY: SOA Triage Protocol   COMPARISON: November 25, 2015.  FINDINGS:  The heart size is normal. The mediastinum is normal. There is pulmonary vascular congestion. There are bilateral pulmonary opacities that may represent pneumonia or atelectasis. There is no pneumothorax. The bony thorax in intact.      Impression: Worsening pulmonary vascular congestion.  Bilateral pulmonary opacities consistent with pneumonia or atelectasis.  This report was signed and finalized on 3/25/2023 9:04 AM by Maurice Friedman MD.    XR CHEST (2 VW)    Result Date: 3/24/2023  EXAM: XR CHEST (2 VW) INDICATION: SOA and hypoxia COMPARISON: 3/22/2023 FINDINGS: Medical Devices: None. Lungs: Interval decrease in pulmonary opacities. Persistent linear opacities of the left midlung. Pleura: No pneumothorax or pleural effusion. Heart and Mediastinum: The cardiomediastinal silhouette is within normal limits. Bones: No acute suspicious abnormality.    Impression: 1.  Improving pulmonary edema. 2.  Persistent left midlung atelectasis or scarring.    I have reviewed the patient's radiology reports.    Medication Review:     I have reviewed the patient's active and prn medications.     Problem List:      Acute respiratory failure with hypoxia (HCC)      Assessment/Plan:      Acute respiratory failure with hypoxia, POA  Acute exacerbation of HFpEF, suspected, POA  Bacterial pneumonia, POA  Supplemental oxygen as  needed keep saturation above 90%.  Requiring BiPAP upon presentation.  IV diuretics initially provided. Zosyn started 3/25.  Blood cultures 3/25.  Echocardiogram.  Solu-Medrol, plan for taper.  Bronchodilators.    Procalcitonin trending up, which can occur for up to 18 hours after appropriate antibiotic coverage.  Vancomycin discontinued, MRSA swab negative.  Respiratory failure may be predominantly from the pneumonia as patient just recently was released from another hospital with significant diuresis and resolution of hypoxia at the time.    Elevated Creatinine  Urinary Retention, POA  Bear placed 3/26. Monitoring.       Chronic:  heart failure with preserved ejection fraction, hypertension, dyslipidemia, hypothyroidism, vitamin D deficiency, psoriasis, GERD, fatigue, anxiety and depression, allergic rhinitis, postmenopausal hormone replacement therapy, EZEQUIEL in the process of being arranged for home CPAP.  Follows with Dr. Giraldo for cardiology.     Continue home medications, reconciliation pending.     Risk Assessment: High  DVT Prophylaxis: Lovenox  Code Status: DNR/DNI  Diet: Cardiac  Discharge Plan: Anticipate at least 2 more days depending on clinical course.    Brian Joseph Kerley, DO  03/26/23  07:39 EDT    Dictated utilizing Dragon dictation.

## 2023-03-26 NOTE — PLAN OF CARE
Problem: Noninvasive Ventilation Acute  Goal: Effective Unassisted Ventilation and Oxygenation  3/26/2023 1151 by Haroon Schafer, RRT  Outcome: Ongoing, Progressing     Problem: Noninvasive Ventilation Acute  Goal: Effective Unassisted Ventilation and Oxygenation  Intervention: Monitor and Manage Noninvasive Ventilation  3/26/2023 1151 by Haroon Schafer, RRT  Flowsheets  Taken 3/26/2023 1150 by Haroon Schafer, RRT  Airway/Ventilation Management: airway patency maintained  Taken 3/25/2023 2009 by Racheal Humphries, RN  NPPV/CPAP Maintenance: mask adjusted  3/26/2023 1150 by Haroon Schafer, RRT  Flowsheets  Taken 3/26/2023 1150 by Haroon Schafer, RRT  Airway/Ventilation Management: airway patency maintained  Taken 3/25/2023 2009 by Racheal Humphries, RN  NPPV/CPAP Maintenance: mask adjusted     Problem: Gas Exchange Impaired  Goal: Optimal Gas Exchange  Outcome: Ongoing, Progressing  Intervention: Optimize Oxygenation and Ventilation  Flowsheets  Taken 3/26/2023 1151  Head of Bed (HOB) Positioning: HOB elevated  Taken 3/26/2023 1150  Airway/Ventilation Management: airway patency maintained   Goal Outcome Evaluation:

## 2023-03-26 NOTE — PLAN OF CARE
Goal Outcome Evaluation:  Plan of Care Reviewed With: patient        Progress: no change  Outcome Evaluation: VSS, requiring bipap to maintain o2 sats >90%, continues to be SOA with little activity, complaints of headache controlled with PRN tylenol.

## 2023-03-27 ENCOUNTER — APPOINTMENT (OUTPATIENT)
Dept: GENERAL RADIOLOGY | Facility: HOSPITAL | Age: 68
DRG: 193 | End: 2023-03-27
Payer: MEDICARE

## 2023-03-27 LAB
ANION GAP SERPL CALCULATED.3IONS-SCNC: 10 MMOL/L (ref 5–15)
BASOPHILS # BLD AUTO: 0.04 10*3/MM3 (ref 0–0.2)
BASOPHILS NFR BLD AUTO: 0.3 % (ref 0–1.5)
BUN SERPL-MCNC: 21 MG/DL (ref 8–23)
BUN/CREAT SERPL: 20.2 (ref 7–25)
CALCIUM SPEC-SCNC: 8.7 MG/DL (ref 8.6–10.5)
CHLORIDE SERPL-SCNC: 103 MMOL/L (ref 98–107)
CO2 SERPL-SCNC: 28 MMOL/L (ref 22–29)
CREAT SERPL-MCNC: 1.04 MG/DL (ref 0.57–1)
DEPRECATED RDW RBC AUTO: 44.6 FL (ref 37–54)
EGFRCR SERPLBLD CKD-EPI 2021: 59.8 ML/MIN/1.73
EOSINOPHIL # BLD AUTO: 0.06 10*3/MM3 (ref 0–0.4)
EOSINOPHIL NFR BLD AUTO: 0.4 % (ref 0.3–6.2)
ERYTHROCYTE [DISTWIDTH] IN BLOOD BY AUTOMATED COUNT: 12.8 % (ref 12.3–15.4)
GLUCOSE SERPL-MCNC: 131 MG/DL (ref 65–99)
HCT VFR BLD AUTO: 32.4 % (ref 34–46.6)
HGB BLD-MCNC: 10.2 G/DL (ref 12–15.9)
IMM GRANULOCYTES # BLD AUTO: 0.17 10*3/MM3 (ref 0–0.05)
IMM GRANULOCYTES NFR BLD AUTO: 1.1 % (ref 0–0.5)
LYMPHOCYTES # BLD AUTO: 1.04 10*3/MM3 (ref 0.7–3.1)
LYMPHOCYTES NFR BLD AUTO: 6.9 % (ref 19.6–45.3)
MCH RBC QN AUTO: 30 PG (ref 26.6–33)
MCHC RBC AUTO-ENTMCNC: 31.5 G/DL (ref 31.5–35.7)
MCV RBC AUTO: 95.3 FL (ref 79–97)
MONOCYTES # BLD AUTO: 0.26 10*3/MM3 (ref 0.1–0.9)
MONOCYTES NFR BLD AUTO: 1.7 % (ref 5–12)
NEUTROPHILS NFR BLD AUTO: 13.44 10*3/MM3 (ref 1.7–7)
NEUTROPHILS NFR BLD AUTO: 89.6 % (ref 42.7–76)
NRBC BLD AUTO-RTO: 0 /100 WBC (ref 0–0.2)
PLATELET # BLD AUTO: 244 10*3/MM3 (ref 140–450)
PMV BLD AUTO: 11.4 FL (ref 6–12)
POTASSIUM SERPL-SCNC: 3.5 MMOL/L (ref 3.5–5.2)
POTASSIUM SERPL-SCNC: 4.7 MMOL/L (ref 3.5–5.2)
PROCALCITONIN SERPL-MCNC: 11.98 NG/ML (ref 0–0.25)
RBC # BLD AUTO: 3.4 10*6/MM3 (ref 3.77–5.28)
SODIUM SERPL-SCNC: 141 MMOL/L (ref 136–145)
WBC NRBC COR # BLD: 15.01 10*3/MM3 (ref 3.4–10.8)

## 2023-03-27 PROCEDURE — 25010000002 ENOXAPARIN PER 10 MG: Performed by: STUDENT IN AN ORGANIZED HEALTH CARE EDUCATION/TRAINING PROGRAM

## 2023-03-27 PROCEDURE — 84132 ASSAY OF SERUM POTASSIUM: CPT | Performed by: STUDENT IN AN ORGANIZED HEALTH CARE EDUCATION/TRAINING PROGRAM

## 2023-03-27 PROCEDURE — 94660 CPAP INITIATION&MGMT: CPT

## 2023-03-27 PROCEDURE — 84145 PROCALCITONIN (PCT): CPT | Performed by: STUDENT IN AN ORGANIZED HEALTH CARE EDUCATION/TRAINING PROGRAM

## 2023-03-27 PROCEDURE — 94664 DEMO&/EVAL PT USE INHALER: CPT

## 2023-03-27 PROCEDURE — 25010000002 PIPERACILLIN SOD-TAZOBACTAM PER 1 G: Performed by: STUDENT IN AN ORGANIZED HEALTH CARE EDUCATION/TRAINING PROGRAM

## 2023-03-27 PROCEDURE — 94799 UNLISTED PULMONARY SVC/PX: CPT

## 2023-03-27 PROCEDURE — 71045 X-RAY EXAM CHEST 1 VIEW: CPT

## 2023-03-27 PROCEDURE — 85025 COMPLETE CBC W/AUTO DIFF WBC: CPT | Performed by: STUDENT IN AN ORGANIZED HEALTH CARE EDUCATION/TRAINING PROGRAM

## 2023-03-27 PROCEDURE — 94761 N-INVAS EAR/PLS OXIMETRY MLT: CPT

## 2023-03-27 PROCEDURE — 25010000002 METHYLPREDNISOLONE PER 40 MG: Performed by: STUDENT IN AN ORGANIZED HEALTH CARE EDUCATION/TRAINING PROGRAM

## 2023-03-27 PROCEDURE — 80048 BASIC METABOLIC PNL TOTAL CA: CPT | Performed by: STUDENT IN AN ORGANIZED HEALTH CARE EDUCATION/TRAINING PROGRAM

## 2023-03-27 PROCEDURE — 99232 SBSQ HOSP IP/OBS MODERATE 35: CPT | Performed by: STUDENT IN AN ORGANIZED HEALTH CARE EDUCATION/TRAINING PROGRAM

## 2023-03-27 RX ORDER — SODIUM CHLORIDE 9 MG/ML
100 INJECTION, SOLUTION INTRAVENOUS CONTINUOUS
Status: DISCONTINUED | OUTPATIENT
Start: 2023-03-27 | End: 2023-03-28

## 2023-03-27 RX ORDER — PRAMIPEXOLE DIHYDROCHLORIDE 1 MG/1
1 TABLET ORAL NIGHTLY
Status: DISCONTINUED | OUTPATIENT
Start: 2023-03-27 | End: 2023-04-01 | Stop reason: HOSPADM

## 2023-03-27 RX ORDER — HYDROCODONE BITARTRATE AND ACETAMINOPHEN 5; 325 MG/1; MG/1
1 TABLET ORAL EVERY 6 HOURS PRN
Status: DISCONTINUED | OUTPATIENT
Start: 2023-03-27 | End: 2023-04-01 | Stop reason: HOSPADM

## 2023-03-27 RX ORDER — POTASSIUM CHLORIDE 750 MG/1
40 CAPSULE, EXTENDED RELEASE ORAL EVERY 4 HOURS
Status: COMPLETED | OUTPATIENT
Start: 2023-03-27 | End: 2023-03-27

## 2023-03-27 RX ADMIN — LEVOTHYROXINE SODIUM 50 MCG: 0.05 TABLET ORAL at 05:16

## 2023-03-27 RX ADMIN — Medication 10 ML: at 20:41

## 2023-03-27 RX ADMIN — POTASSIUM CHLORIDE 40 MEQ: 750 CAPSULE, EXTENDED RELEASE ORAL at 05:57

## 2023-03-27 RX ADMIN — PANTOPRAZOLE SODIUM 40 MG: 40 TABLET, DELAYED RELEASE ORAL at 05:16

## 2023-03-27 RX ADMIN — SODIUM CHLORIDE 100 ML/HR: 9 INJECTION, SOLUTION INTRAVENOUS at 21:10

## 2023-03-27 RX ADMIN — MONTELUKAST 10 MG: 10 TABLET, FILM COATED ORAL at 20:23

## 2023-03-27 RX ADMIN — ENOXAPARIN SODIUM 40 MG: 100 INJECTION SUBCUTANEOUS at 21:08

## 2023-03-27 RX ADMIN — METHYLPREDNISOLONE SODIUM SUCCINATE 40 MG: 40 INJECTION, POWDER, FOR SOLUTION INTRAMUSCULAR; INTRAVENOUS at 09:52

## 2023-03-27 RX ADMIN — ASPIRIN 81 MG CHEWABLE TABLET 81 MG: 81 TABLET CHEWABLE at 09:52

## 2023-03-27 RX ADMIN — PRAMIPEXOLE DIHYDROCHLORIDE 1 MG: 1 TABLET ORAL at 20:22

## 2023-03-27 RX ADMIN — BUDESONIDE AND FORMOTEROL FUMARATE DIHYDRATE 2 PUFF: 160; 4.5 AEROSOL RESPIRATORY (INHALATION) at 06:52

## 2023-03-27 RX ADMIN — HYDROCODONE BITARTRATE AND ACETAMINOPHEN 1 TABLET: 5; 325 TABLET ORAL at 11:33

## 2023-03-27 RX ADMIN — SODIUM CHLORIDE 100 ML/HR: 9 INJECTION, SOLUTION INTRAVENOUS at 09:51

## 2023-03-27 RX ADMIN — ACETAMINOPHEN 650 MG: 325 TABLET, FILM COATED ORAL at 03:45

## 2023-03-27 RX ADMIN — GABAPENTIN 800 MG: 400 CAPSULE ORAL at 05:16

## 2023-03-27 RX ADMIN — ATORVASTATIN CALCIUM 20 MG: 20 TABLET, FILM COATED ORAL at 09:51

## 2023-03-27 RX ADMIN — HYDROCODONE BITARTRATE AND ACETAMINOPHEN 1 TABLET: 5; 325 TABLET ORAL at 20:30

## 2023-03-27 RX ADMIN — POTASSIUM CHLORIDE 40 MEQ: 750 CAPSULE, EXTENDED RELEASE ORAL at 09:52

## 2023-03-27 RX ADMIN — GABAPENTIN 800 MG: 400 CAPSULE ORAL at 21:08

## 2023-03-27 RX ADMIN — FLUOXETINE 40 MG: 20 CAPSULE ORAL at 09:52

## 2023-03-27 RX ADMIN — Medication 10 ML: at 09:52

## 2023-03-27 RX ADMIN — BUDESONIDE AND FORMOTEROL FUMARATE DIHYDRATE 2 PUFF: 160; 4.5 AEROSOL RESPIRATORY (INHALATION) at 20:02

## 2023-03-27 RX ADMIN — TAZOBACTAM SODIUM AND PIPERACILLIN SODIUM 3.38 G: 375; 3 INJECTION, SOLUTION INTRAVENOUS at 03:41

## 2023-03-27 RX ADMIN — TAZOBACTAM SODIUM AND PIPERACILLIN SODIUM 4.5 G: 500; 4 INJECTION, SOLUTION INTRAVENOUS at 13:03

## 2023-03-27 RX ADMIN — TRAZODONE HYDROCHLORIDE 100 MG: 50 TABLET ORAL at 20:23

## 2023-03-27 RX ADMIN — GABAPENTIN 800 MG: 400 CAPSULE ORAL at 14:51

## 2023-03-27 RX ADMIN — ENOXAPARIN SODIUM 40 MG: 100 INJECTION SUBCUTANEOUS at 09:51

## 2023-03-27 RX ADMIN — TAZOBACTAM SODIUM AND PIPERACILLIN SODIUM 4.5 G: 500; 4 INJECTION, SOLUTION INTRAVENOUS at 20:21

## 2023-03-27 NOTE — CASE MANAGEMENT/SOCIAL WORK
2132 Taran spoke with staff at [facility], PCP office to obtain facts regarding Cpap. She states she is not showing that pt has had a sleep study and  Has no orders for CPAP. She states Karley usually has sleep studies done in the hospital or [facility] will do them in the home.

## 2023-03-27 NOTE — CASE MANAGEMENT/SOCIAL WORK
"Discharge Planning Assessment  [facility]     Patient Name: Jessica Piedra  MRN: [id number]  Today's Date: 3/27/2023    Admit Date: 3/25/2023    Plan: Taran spoke with pt stanton/Marion/[phone] who states DCp is for pt to return to apt at [place]. Marion will transport her home. No known needs at this time.   Discharge Needs Assessment     Row Name 03/27/23 1402       Living Environment    People in Home grandchild(nicole)    Name(s) of People in Home 16 yr old grandson    Current Living Arrangements apartment    Duration at Residence \"few yrs\"    Potentially Unsafe Housing Conditions unable to assess    Primary Care Provided by self    Provides Primary Care For no one    Family Caregiver if Needed child(nicole), adult    Family Caregiver Names Marion [phone]    Quality of Family Relationships unable to assess    Able to Return to Prior Arrangements yes    Living Arrangement Comments pt lives in ground floor apt       Resource/Environmental Concerns    Resource/Environmental Concerns none    Transportation Concerns none       Food Insecurity    Within the past 12 months, you worried that your food would run out before you got the money to buy more. Never true    Within the past 12 months, the food you bought just didn't last and you didn't have money to get more. Never true       Transition Planning    Patient/Family Anticipates Transition to home with family    Patient/Family Anticipated Services at Transition none    Transportation Anticipated family or friend will provide       Discharge Needs Assessment    Readmission Within the Last 30 Days no previous admission in last 30 days    Concerns Comments note in EMR states \"outpatient sleep study completed home CPAP in progress.\"    Anticipated Changes Related to Illness other (see comments)  TBD    Equipment Needed After Discharge other (see comments)  TBD               Discharge Plan     Row Name 03/27/23 1405       Plan    Plan Cm spoke with pt " stanton/Marion/[phone] who states DCp is for pat to return to apt at [place]. She will transport her home. No known needs at this time.    Plan Comments Pt currently wearing Bipap at 85% FiO2. Cm called and spoke with dtr, Marion, to complete DCP. Marion verifies pt name, , phone number and address. PCP is JOEL lewis and she uses [facility]. Pt lives in ground floor apt with her 16 yr old jennie and functions indepedently. Pt has not used hh svc. Walks without assist. Has a pulse ox and scales. EMR states she completed sleep study and in process of getting CPAP. Pt has reliable transportation and drives herself to appts. Marion lives about 5 min from her. Marion is unable to identify dc needs at this time. She ramone transport pt home at [place].              Continued Care and Services - Admitted Since 3/25/2023    Coordination has not been started for this encounter.          Demographic Summary     Row Name [id number]       General Information    Admission Type inpatient    Arrived From emergency department    Referral Source admission list    Reason for Consult discharge planning       Contact Information    Permission Granted to Share Info With family/designee    Contact Information Obtained for     Contact Information Comments Marion [phone]               Functional Status     Row Name 23 1359       Functional Status    Usual Activity Tolerance good    Current Activity Tolerance poor    Functional Status Comments dtr states pt is independent at baseline       Physical Activity    On average, how many days per week do you engage in moderate to strenuous exercise (like a brisk walk)? 0 days    On average, how many minutes do you engage in exercise at this level? 0 min    Number of minutes of exercise per week 0       Functional Status, IADL    Medications independent    Meal Preparation independent    Housekeeping independent    Laundry independent    Shopping independent        Mental Status    General Appearance WDL WDL       Mental Status Summary    Recent Changes in Mental Status/Cognitive Functioning unable to assess    Mental Status Comments pt on PAP device       Employment/    Current or Previous Occupation not applicable               Psychosocial     Row Name 03/27/23 1401       Developmental Stage (Eriksson's)    Developmental Stage Stage 8 (67 years-death/Late Adulthood) Integrity vs. Despair               Abuse/Neglect    No documentation.                Legal    No documentation.                Substance Abuse    No documentation.                Patient Forms    No documentation.                   Twila Murray, APRN

## 2023-03-27 NOTE — PROGRESS NOTES
"    [facility]    Name:  Jessica Flowers   Age:  67 y.o.  Sex:  female  :  1955  MRN:  [id number]   Visit Number:  [id number]  Admission Date:  3/25/2023  Date Of Service:  23  Primary Care Physician:  Karley Jolly APRN     LOS: 2 days :    Chief Complaint:      Follow-up; shortness of air.    Subjective:    No further chest pain, breathing feels okay, still having headache. No neck pain.     Hospital Course:    Patient is a 67-year-old woman with past medical history of heart failure with preserved ejection fraction, hypertension, dyslipidemia, hypothyroidism, vitamin D deficiency, psoriasis, GERD, fatigue, anxiety and depression, allergic rhinitis, postmenopausal hormone replacement therapy, EZEQUIEL in the process of being arranged for home CPAP.  Follows with Dr. Giraldo for cardiology. Presented to [facility] ED on 3/25/2023 with concern for shortness of air.  Had a recent hospitalization at outside hospital for pulmonary edema and hypoxia, was discharged day prior.  Patient was brought to ED by EMS, who were called because she was 58% on room air.  EMS had placed her on a nonrebreather.  She said that she has been having some chills, fever \"off and on\", nonproductive cough since she got home from the hospital day prior.  She thinks they \"did not get all the fluid off\".     ED summary: Afebrile, requiring BiPAP, vital signs otherwise stable.  ABG with hypoxia, otherwise unremarkable.  EKG sinus rhythm, no ST elevations or depressions.  High-sensitivity troponin slight increase 11, ACS not suspected.  proBNP not elevated.  Blood glucose 109, potassium 3.3.  No leukocytosis.  COVID/flu negative.  CXR cardiomegaly, left lower field opacities, radiology interpretation pending.  Presumed hypoxia from volume overload, provided Bumex 1 mg IV.    Inpatient general floor admission 3/25/2023 with acute respiratory failure with hypoxia requiring BiPAP secondary to bacterial " pneumonia requiring IV antibiotics.  Do not suspect predominantly volume overload as patient had just been recently discharged from hospital after significant diuresis and was on room air at that time.    Review of Systems:     All systems were reviewed and negative except as mentioned in subjective, assessment and plan.    Vital Signs:    Temp:  [97 °F (36.1 °C)-99.3 °F (37.4 °C)] 99.3 °F (37.4 °C)  Heart Rate:  [61-76] 61  Resp:  [22-31] 24  BP: ()/(51-83) 92/61    Intake and output:    I/O last 3 completed shifts:  In: 1253 [P.O.:1135; I.V.:118]  Out: 3020 [Urine:3020]  No intake/output data recorded.    Physical Examination:    General Appearance:  Alert and cooperative.    Head:  Atraumatic and normocephalic. No neck pain, tenderness, or stiffness.    Eyes: Conjunctivae and sclerae normal, no icterus. No pallor.   Throat: No oral lesions, no thrush, oral mucosa moist.   Neck: Supple, trachea midline, no thyromegaly.   Lungs:   Breath sounds heard bilaterally equally.  No wheezing or crackles. No Pleural rub or bronchial breathing.   Heart:  Normal S1 and S2, no murmur, no gallop, no rub. No JVD.   Abdomen:   Normal bowel sounds, no masses, no organomegaly. Soft, nontender, nondistended, no rebound tenderness.   Extremities: Supple, no edema, no cyanosis, no clubbing.   Skin: warm   Neurologic: Alert and oriented x 3. No facial asymmetry. Moves all four limbs. No tremors.      Laboratory results:    Results from last 7 days   Lab Units 03/27/23  0407 03/26/23  0618 03/25/23  1850 03/25/23  0820 03/25/23  0552   SODIUM mmol/L 141 143  --   --  141   POTASSIUM mmol/L 3.5 4.2 4.5   < > 3.3*   CHLORIDE mmol/L 103 104  --   --  105   CO2 mmol/L 28.0 28.4  --   --  22.7   BUN mg/dL 21 24*  --   --  21   CREATININE mg/dL 1.04* 1.22*  --   --  0.96   CALCIUM mg/dL 8.7 8.4*  --   --  8.0*   BILIRUBIN mg/dL  --   --   --   --  0.8   ALK PHOS U/L  --   --   --   --  68   ALT (SGPT) U/L  --   --   --   --  7   AST  (SGOT) U/L  --   --   --   --  15   GLUCOSE mg/dL 131* 140*  --   --  109*    < > = values in this interval not displayed.     Results from last 7 days   Lab Units 03/27/23  0407 03/26/23  0618 03/25/23  0514   WBC 10*3/mm3 15.01* 13.55* 3.55   HEMOGLOBIN g/dL 10.2* 10.2* 11.5*   HEMATOCRIT % 32.4* 33.2* 36.0   PLATELETS 10*3/mm3 244 240 237         Results from last 7 days   Lab Units 03/25/23  0820 03/25/23  0552 03/22/23  1930 03/22/23  1608 03/22/23  0433   TROPONIN I ng/mL  --   --  <0.30 <0.30 <0.30   HSTROP T ng/L 11* 11*  --   --   --      Results from last 7 days   Lab Units 03/25/23  1115 03/25/23  1110   BLOODCX  No growth at 24 hours No growth at 24 hours     Recent Labs     03/25/23  0511   PHART 7.384   NJT1IJF 43.2   PO2ART 64.9*   APJ7OGO 25.8   BASEEXCESS 0.5      I have reviewed the patient's laboratory results.    Radiology results:    Adult Transthoracic Echo Complete W/ Cont if Necessary Per Protocol    Result Date: 3/26/2023  •  Left ventricular systolic function is normal. Calculated left ventricular EF = 62% •  Left ventricular wall thickness is consistent with mild concentric hypertrophy. •  Left ventricular diastolic function is consistent with (grade I) impaired relaxation. •  No hemodynamically significant valvular pathology.     CT Head Without Contrast    Result Date: 3/27/2023  FINAL REPORT TECHNIQUE: Axial CT images were performed through the head. Coronal reformatted images were submitted. This study was performed with techniques to keep radiation doses as low as reasonably achievable (ALARA). Individualized dose reduction techniques using automated exposure control or adjustment of mA and/or kV according to the patient's size were employed. CLINICAL HISTORY: Headache, new or worsening (Age >= 50y) FINDINGS: The ventricles are normal in size.  There is no evidence of hemorrhage.  There is no mass or edema identified.  There is no abnormal extra-axial fluid seen.  The sinuses are well  aerated.     Impression: No acute intracranial process. Authenticated and Electronically Signed by Haroon Garay MD on 03/27/2023 12:02:14 AM    XR Chest 1 View    Result Date: 3/26/2023  PORTABLE CHEST  3/26/2023 11:10 AM  HISTORY: Worsening shortness of breath  COMPARISON:   None  FINDINGS: The cardiac silhouette is possibly enlarged. Persistent pulmonary vascular congestion.  Diffuse interstitial and alveolar nodular opacities, unchanged from prior study. There is no pneumothorax. The visualized osseous structures demonstrate no acute abnormalities.      Impression: No significant interval change.      This report was signed and finalized on 3/26/2023 11:20 AM by Rakel Martins MD.    I have reviewed the patient's radiology reports.    Medication Review:     I have reviewed the patient's active and prn medications.     Problem List:      Acute respiratory failure with hypoxia (HCC)    Postmenopausal HRT (hormone replacement therapy)    OAB (overactive bladder)    Hypothyroid    Hypertension    Hyperlipidemia    GERD (gastroesophageal reflux disease)    Depression    Anxiety    Glucose intolerance (impaired glucose tolerance)    Acute exacerbation of CHF (congestive heart failure) (HCC)    Bacterial pneumonia    DORENE (acute kidney injury) (HCC)    (HFpEF) heart failure with preserved ejection fraction (HCC)      Assessment/Plan:      Acute respiratory failure with hypoxia, POA  Acute exacerbation of HFpEF, suspected, POA  Bacterial pneumonia, POA  Supplemental oxygen as needed keep saturation above 90%.  Requiring BiPAP upon presentation.  Zosyn started 3/25.  Blood cultures 3/25.  Echocardiogram.  Solu-Medrol, plan for taper.  Bronchodilators.    Continuous BiPAP, 85% FiO2. Blood pressure soft.  Procalcitonin improving.    Vancomycin discontinued, MRSA swab negative.  Respiratory failure may be predominantly from the pneumonia as patient just recently was released from another hospital after significant  diuresis and resolution of hypoxia at the time.    Elevated Creatinine  Urinary Retention, POA  Bear placed 3/26. Monitoring.      Improved after bladder decompressed with Bear placement.     Chronic:  heart failure with preserved ejection fraction, hypertension, dyslipidemia, hypothyroidism, vitamin D deficiency, psoriasis, GERD, fatigue, anxiety and depression, allergic rhinitis, postmenopausal hormone replacement therapy, EZEQUIEL in the process of being arranged for home CPAP.  Follows with Dr. Giraldo for cardiology.     Continue home medications.     Risk Assessment: High  DVT Prophylaxis: Lovenox  Code Status: DNR/DNI  Diet: Cardiac  Discharge Plan: Anticipate at least 2 more days depending on clinical course.    Brian Joseph Kerley, DO  03/27/23  09:06 EDT    Dictated utilizing Dragon dictation.

## 2023-03-27 NOTE — PLAN OF CARE
Goal Outcome Evaluation:              Outcome Evaluation: Unable to titrate O2 flow down--85% flow jo-ann.  Complaints of H/A overnight x 2...states she get's thses at home sometimes.  AM lab s drawn/pending.  Tolerating pap device.

## 2023-03-28 ENCOUNTER — APPOINTMENT (OUTPATIENT)
Dept: GENERAL RADIOLOGY | Facility: HOSPITAL | Age: 68
DRG: 193 | End: 2023-03-28
Payer: MEDICARE

## 2023-03-28 ENCOUNTER — APPOINTMENT (OUTPATIENT)
Dept: CT IMAGING | Facility: HOSPITAL | Age: 68
DRG: 193 | End: 2023-03-28
Payer: MEDICARE

## 2023-03-28 LAB
ANION GAP SERPL CALCULATED.3IONS-SCNC: 14.2 MMOL/L (ref 5–15)
ANION GAP SERPL CALCULATED.3IONS-SCNC: 9.1 MMOL/L (ref 5–15)
BASOPHILS # BLD AUTO: 0.04 10*3/MM3 (ref 0–0.2)
BASOPHILS NFR BLD AUTO: 0.4 % (ref 0–1.5)
BUN SERPL-MCNC: 22 MG/DL (ref 8–23)
BUN SERPL-MCNC: 22 MG/DL (ref 8–23)
BUN/CREAT SERPL: 23.7 (ref 7–25)
BUN/CREAT SERPL: 24.7 (ref 7–25)
CALCIUM SPEC-SCNC: 8.4 MG/DL (ref 8.6–10.5)
CALCIUM SPEC-SCNC: 9.1 MG/DL (ref 8.6–10.5)
CHLORIDE SERPL-SCNC: 107 MMOL/L (ref 98–107)
CHLORIDE SERPL-SCNC: 94 MMOL/L (ref 98–107)
CO2 SERPL-SCNC: 26.9 MMOL/L (ref 22–29)
CO2 SERPL-SCNC: 30.8 MMOL/L (ref 22–29)
CREAT SERPL-MCNC: 0.89 MG/DL (ref 0.57–1)
CREAT SERPL-MCNC: 0.93 MG/DL (ref 0.57–1)
DEPRECATED RDW RBC AUTO: 44.4 FL (ref 37–54)
EGFRCR SERPLBLD CKD-EPI 2021: 68.3 ML/MIN/1.73
EGFRCR SERPLBLD CKD-EPI 2021: 72.1 ML/MIN/1.73
EOSINOPHIL # BLD AUTO: 0.08 10*3/MM3 (ref 0–0.4)
EOSINOPHIL NFR BLD AUTO: 0.8 % (ref 0.3–6.2)
ERYTHROCYTE [DISTWIDTH] IN BLOOD BY AUTOMATED COUNT: 12.5 % (ref 12.3–15.4)
GLUCOSE SERPL-MCNC: 108 MG/DL (ref 65–99)
GLUCOSE SERPL-MCNC: 155 MG/DL (ref 65–99)
HCT VFR BLD AUTO: 31.4 % (ref 34–46.6)
HGB BLD-MCNC: 9.7 G/DL (ref 12–15.9)
IMM GRANULOCYTES # BLD AUTO: 0.12 10*3/MM3 (ref 0–0.05)
IMM GRANULOCYTES NFR BLD AUTO: 1.2 % (ref 0–0.5)
LYMPHOCYTES # BLD AUTO: 1.05 10*3/MM3 (ref 0.7–3.1)
LYMPHOCYTES NFR BLD AUTO: 10.8 % (ref 19.6–45.3)
MCH RBC QN AUTO: 29.7 PG (ref 26.6–33)
MCHC RBC AUTO-ENTMCNC: 30.9 G/DL (ref 31.5–35.7)
MCV RBC AUTO: 96 FL (ref 79–97)
MONOCYTES # BLD AUTO: 0.21 10*3/MM3 (ref 0.1–0.9)
MONOCYTES NFR BLD AUTO: 2.2 % (ref 5–12)
NEUTROPHILS NFR BLD AUTO: 8.26 10*3/MM3 (ref 1.7–7)
NEUTROPHILS NFR BLD AUTO: 84.6 % (ref 42.7–76)
NRBC BLD AUTO-RTO: 0 /100 WBC (ref 0–0.2)
NT-PROBNP SERPL-MCNC: 917.6 PG/ML (ref 0–900)
PLATELET # BLD AUTO: 224 10*3/MM3 (ref 140–450)
PMV BLD AUTO: 11.1 FL (ref 6–12)
POTASSIUM SERPL-SCNC: 3.9 MMOL/L (ref 3.5–5.2)
POTASSIUM SERPL-SCNC: 4.2 MMOL/L (ref 3.5–5.2)
RBC # BLD AUTO: 3.27 10*6/MM3 (ref 3.77–5.28)
SODIUM SERPL-SCNC: 139 MMOL/L (ref 136–145)
SODIUM SERPL-SCNC: 143 MMOL/L (ref 136–145)
WBC NRBC COR # BLD: 9.76 10*3/MM3 (ref 3.4–10.8)

## 2023-03-28 PROCEDURE — 94799 UNLISTED PULMONARY SVC/PX: CPT

## 2023-03-28 PROCEDURE — 25010000002 ENOXAPARIN PER 10 MG: Performed by: STUDENT IN AN ORGANIZED HEALTH CARE EDUCATION/TRAINING PROGRAM

## 2023-03-28 PROCEDURE — 94761 N-INVAS EAR/PLS OXIMETRY MLT: CPT

## 2023-03-28 PROCEDURE — 74176 CT ABD & PELVIS W/O CONTRAST: CPT

## 2023-03-28 PROCEDURE — 99223 1ST HOSP IP/OBS HIGH 75: CPT | Performed by: INTERNAL MEDICINE

## 2023-03-28 PROCEDURE — 25010000002 ONDANSETRON PER 1 MG: Performed by: STUDENT IN AN ORGANIZED HEALTH CARE EDUCATION/TRAINING PROGRAM

## 2023-03-28 PROCEDURE — 71045 X-RAY EXAM CHEST 1 VIEW: CPT

## 2023-03-28 PROCEDURE — 94660 CPAP INITIATION&MGMT: CPT

## 2023-03-28 PROCEDURE — 94664 DEMO&/EVAL PT USE INHALER: CPT

## 2023-03-28 PROCEDURE — 99233 SBSQ HOSP IP/OBS HIGH 50: CPT | Performed by: FAMILY MEDICINE

## 2023-03-28 PROCEDURE — 80048 BASIC METABOLIC PNL TOTAL CA: CPT | Performed by: INTERNAL MEDICINE

## 2023-03-28 PROCEDURE — 25010000002 METHYLPREDNISOLONE PER 40 MG: Performed by: STUDENT IN AN ORGANIZED HEALTH CARE EDUCATION/TRAINING PROGRAM

## 2023-03-28 PROCEDURE — 80048 BASIC METABOLIC PNL TOTAL CA: CPT | Performed by: STUDENT IN AN ORGANIZED HEALTH CARE EDUCATION/TRAINING PROGRAM

## 2023-03-28 PROCEDURE — 25010000002 FUROSEMIDE PER 20 MG: Performed by: INTERNAL MEDICINE

## 2023-03-28 PROCEDURE — 85025 COMPLETE CBC W/AUTO DIFF WBC: CPT | Performed by: STUDENT IN AN ORGANIZED HEALTH CARE EDUCATION/TRAINING PROGRAM

## 2023-03-28 PROCEDURE — 83880 ASSAY OF NATRIURETIC PEPTIDE: CPT | Performed by: FAMILY MEDICINE

## 2023-03-28 PROCEDURE — 25010000002 PIPERACILLIN SOD-TAZOBACTAM PER 1 G: Performed by: STUDENT IN AN ORGANIZED HEALTH CARE EDUCATION/TRAINING PROGRAM

## 2023-03-28 RX ORDER — FLUTICASONE PROPIONATE 50 MCG
2 SPRAY, SUSPENSION (ML) NASAL DAILY
Status: DISCONTINUED | OUTPATIENT
Start: 2023-03-28 | End: 2023-04-01 | Stop reason: HOSPADM

## 2023-03-28 RX ORDER — IBUPROFEN 800 MG/1
800 TABLET ORAL EVERY 8 HOURS PRN
COMMUNITY

## 2023-03-28 RX ORDER — LEVOCETIRIZINE DIHYDROCHLORIDE 5 MG/1
5 TABLET, FILM COATED ORAL EVERY EVENING
COMMUNITY

## 2023-03-28 RX ORDER — FLUTICASONE PROPIONATE 50 MCG
2 SPRAY, SUSPENSION (ML) NASAL DAILY PRN
COMMUNITY

## 2023-03-28 RX ORDER — GABAPENTIN 400 MG/1
400 CAPSULE ORAL EVERY 8 HOURS SCHEDULED
Status: DISCONTINUED | OUTPATIENT
Start: 2023-03-28 | End: 2023-04-01 | Stop reason: HOSPADM

## 2023-03-28 RX ORDER — BUTALBITAL, ACETAMINOPHEN AND CAFFEINE 50; 325; 40 MG/1; MG/1; MG/1
1 TABLET ORAL ONCE AS NEEDED
Status: COMPLETED | OUTPATIENT
Start: 2023-03-28 | End: 2023-03-29

## 2023-03-28 RX ADMIN — FLUOXETINE 40 MG: 20 CAPSULE ORAL at 09:22

## 2023-03-28 RX ADMIN — ASPIRIN 81 MG CHEWABLE TABLET 81 MG: 81 TABLET CHEWABLE at 09:22

## 2023-03-28 RX ADMIN — Medication 10 ML: at 11:16

## 2023-03-28 RX ADMIN — METHYLPREDNISOLONE SODIUM SUCCINATE 40 MG: 40 INJECTION, POWDER, FOR SOLUTION INTRAMUSCULAR; INTRAVENOUS at 09:22

## 2023-03-28 RX ADMIN — ONDANSETRON 4 MG: 2 INJECTION INTRAMUSCULAR; INTRAVENOUS at 14:16

## 2023-03-28 RX ADMIN — ENOXAPARIN SODIUM 40 MG: 100 INJECTION SUBCUTANEOUS at 09:22

## 2023-03-28 RX ADMIN — PRAMIPEXOLE DIHYDROCHLORIDE 1 MG: 1 TABLET ORAL at 21:05

## 2023-03-28 RX ADMIN — GABAPENTIN 400 MG: 400 CAPSULE ORAL at 14:10

## 2023-03-28 RX ADMIN — ONDANSETRON 4 MG: 2 INJECTION INTRAMUSCULAR; INTRAVENOUS at 22:56

## 2023-03-28 RX ADMIN — ACETAMINOPHEN 650 MG: 325 TABLET, FILM COATED ORAL at 13:15

## 2023-03-28 RX ADMIN — SODIUM CHLORIDE 40 ML: 9 INJECTION, SOLUTION INTRAVENOUS at 08:41

## 2023-03-28 RX ADMIN — Medication 10 ML: at 09:23

## 2023-03-28 RX ADMIN — ENOXAPARIN SODIUM 40 MG: 100 INJECTION SUBCUTANEOUS at 21:22

## 2023-03-28 RX ADMIN — HYDROCODONE BITARTRATE AND ACETAMINOPHEN 1 TABLET: 5; 325 TABLET ORAL at 16:44

## 2023-03-28 RX ADMIN — PANTOPRAZOLE SODIUM 40 MG: 40 TABLET, DELAYED RELEASE ORAL at 05:19

## 2023-03-28 RX ADMIN — FUROSEMIDE 100 MG: 100 INJECTION, SOLUTION INTRAMUSCULAR; INTRAVENOUS at 19:49

## 2023-03-28 RX ADMIN — LEVOTHYROXINE SODIUM 50 MCG: 0.05 TABLET ORAL at 05:19

## 2023-03-28 RX ADMIN — Medication 20 ML: at 20:47

## 2023-03-28 RX ADMIN — HYDROCODONE BITARTRATE AND ACETAMINOPHEN 1 TABLET: 5; 325 TABLET ORAL at 09:26

## 2023-03-28 RX ADMIN — TAZOBACTAM SODIUM AND PIPERACILLIN SODIUM 4.5 G: 500; 4 INJECTION, SOLUTION INTRAVENOUS at 20:48

## 2023-03-28 RX ADMIN — GABAPENTIN 800 MG: 400 CAPSULE ORAL at 05:19

## 2023-03-28 RX ADMIN — BUDESONIDE AND FORMOTEROL FUMARATE DIHYDRATE 2 PUFF: 160; 4.5 AEROSOL RESPIRATORY (INHALATION) at 18:48

## 2023-03-28 RX ADMIN — TRAZODONE HYDROCHLORIDE 100 MG: 50 TABLET ORAL at 20:46

## 2023-03-28 RX ADMIN — GABAPENTIN 400 MG: 400 CAPSULE ORAL at 21:05

## 2023-03-28 RX ADMIN — HYDROCODONE BITARTRATE AND ACETAMINOPHEN 1 TABLET: 5; 325 TABLET ORAL at 22:25

## 2023-03-28 RX ADMIN — TAZOBACTAM SODIUM AND PIPERACILLIN SODIUM 4.5 G: 500; 4 INJECTION, SOLUTION INTRAVENOUS at 03:00

## 2023-03-28 RX ADMIN — ATORVASTATIN CALCIUM 20 MG: 20 TABLET, FILM COATED ORAL at 09:22

## 2023-03-28 RX ADMIN — FLUTICASONE PROPIONATE 2 SPRAY: 50 SPRAY, METERED NASAL at 15:25

## 2023-03-28 RX ADMIN — FUROSEMIDE 100 MG: 100 INJECTION, SOLUTION INTRAMUSCULAR; INTRAVENOUS at 11:15

## 2023-03-28 RX ADMIN — MONTELUKAST 10 MG: 10 TABLET, FILM COATED ORAL at 20:46

## 2023-03-28 RX ADMIN — BUDESONIDE AND FORMOTEROL FUMARATE DIHYDRATE 2 PUFF: 160; 4.5 AEROSOL RESPIRATORY (INHALATION) at 06:44

## 2023-03-28 RX ADMIN — TAZOBACTAM SODIUM AND PIPERACILLIN SODIUM 4.5 G: 500; 4 INJECTION, SOLUTION INTRAVENOUS at 11:33

## 2023-03-28 NOTE — PLAN OF CARE
Goal Outcome Evaluation:              Outcome Evaluation: O2 titrated back up to 85%.  Pulm. hygiene encouraged.  AM labs drawn/pending.  Has complained of H/A earlier in shift relieved with norco.  Currently no complaints.

## 2023-03-28 NOTE — PROGRESS NOTES
[facility]    Name:  Jessica Flowers   Age:  67 y.o.  Sex:  female  :  1955  MRN:  [id number]   Visit Number:  [id number]  Admission Date:  3/25/2023  Date Of Service:  23  Primary Care Physician:  Karley Jolly APRN     LOS: 3 days :    Chief Complaint:      Shortness of breath    Subjective:    Patient seen this morning and she was currently on BiPAP.  She notes she has been short of breath for a little while, her brother was at bedside.  She has had issues since having her bariatric surgery in regards to dysphagia but she states    Hospital Course:    The patient is a 67-year-old female with a history of apparent preserved ejection fraction heart failure, hypertension, dyslipidemia, hypothyroidism, morbid obesity, obstructive sleep apnea, who presented to the emergency room with increasing shortness of breath.  She had apparently had a recent hospitalization due to pulmonary edema and was on diuretic therapy and was discharged less than 48 hours ago.  Her initial pulse ox was in the 50s per EMS.    In the ER, the patient was requiring BiPAP initially.  Her vital signs were showing stable blood pressures.  ABG with hypoxia noted.  She had mild elevated high-sensitivity troponin.  proBNP within normal limits.  COVID and flu testing were negative.  Chest x-ray was showing cardiomegaly with concern for pneumonia.  She was given Bumex.  She continued to have respiratory failure and was started on broad-spectrum antibiotics due to the elevated procalcitonin.    Pulmonology consult was requested on 3/28/2023 due to the degree of hypoxemia    Review of Systems:     All systems were reviewed and negative except as mentioned in subjective, assessment and plan.    Vital Signs:    Temp:  [98.1 °F (36.7 °C)-99.9 °F (37.7 °C)] 98.1 °F (36.7 °C)  Heart Rate:  [46-63] 61  Resp:  [19-27] 19  BP: ()/(56-88) 116/71    Intake and output:    I/O last 3 completed  shifts:  In: 3458 [P.O.:1403; I.V.:1955; IV Piggyback:100]  Out: 1500 [Urine:1500]  I/O this shift:  In: -   Out: 1850 [Urine:1850]    Physical Examination:    General Appearance:  Alert and cooperative.  BiPAP in place   Head:  Atraumatic and normocephalic.   Eyes: Conjunctivae and sclerae normal, no icterus. No pallor.   Throat: No oral lesions, no thrush, oral mucosa moist.   Neck: Supple, trachea midline, no thyromegaly.   Lungs:    Scattered rhonchi present.  Nontachypneic at rest   Heart:  Normal S1 and S2, no murmur, no gallop, no rub. No JVD.   Abdomen:   Normal bowel sounds, no masses, no organomegaly. Soft, nontender, nondistended, no rebound tenderness.  Obese abdomen   Extremities: Supple, no significant lower edema, no cyanosis, no clubbing.   Skin: No bleeding or rash.   Neurologic: Alert and oriented x 3. No facial asymmetry. Moves all four limbs. No tremors.      Laboratory results:    Results from last 7 days   Lab Units 03/28/23  0418 03/27/23  1436 03/27/23  0407 03/26/23  0618 03/25/23  0820 03/25/23  0552   SODIUM mmol/L 143  --  141 143  --  141   POTASSIUM mmol/L 4.2 4.7 3.5 4.2   < > 3.3*   CHLORIDE mmol/L 107  --  103 104  --  105   CO2 mmol/L 26.9  --  28.0 28.4  --  22.7   BUN mg/dL 22  --  21 24*  --  21   CREATININE mg/dL 0.89  --  1.04* 1.22*  --  0.96   CALCIUM mg/dL 8.4*  --  8.7 8.4*  --  8.0*   BILIRUBIN mg/dL  --   --   --   --   --  0.8   ALK PHOS U/L  --   --   --   --   --  68   ALT (SGPT) U/L  --   --   --   --   --  7   AST (SGOT) U/L  --   --   --   --   --  15   GLUCOSE mg/dL 108*  --  131* 140*  --  109*    < > = values in this interval not displayed.     Results from last 7 days   Lab Units 03/28/23  0418 03/27/23  0407 03/26/23  0618   WBC 10*3/mm3 9.76 15.01* 13.55*   HEMOGLOBIN g/dL 9.7* 10.2* 10.2*   HEMATOCRIT % 31.4* 32.4* 33.2*   PLATELETS 10*3/mm3 224 244 240         Results from last 7 days   Lab Units 03/25/23  0820 03/25/23  0552 03/22/23  1930 03/22/23  1608  03/22/23  0433   TROPONIN I ng/mL  --   --  <0.30 <0.30 <0.30   HSTROP T ng/L 11* 11*  --   --   --      Results from last 7 days   Lab Units 03/25/23  1115 03/25/23  1110   BLOODCX  No growth at 3 days No growth at 3 days     Recent Labs     03/25/23  0511   PHART 7.384   [id number] 43.2   PO2ART 64.9*   GPE5NDF 25.8   BASEEXCESS 0.5      I have reviewed the patient's laboratory results.    Radiology results:    CT Head Without Contrast    Result Date: 3/27/2023  FINAL REPORT TECHNIQUE: Axial CT images were performed through the head. Coronal reformatted images were submitted. This study was performed with techniques to keep radiation doses as low as reasonably achievable (ALARA). Individualized dose reduction techniques using automated exposure control or adjustment of mA and/or kV according to the patient's size were employed. CLINICAL HISTORY: Headache, new or worsening (Age >= 50y) FINDINGS: The ventricles are normal in size.  There is no evidence of hemorrhage.  There is no mass or edema identified.  There is no abnormal extra-axial fluid seen.  The sinuses are well aerated.     Impression: No acute intracranial process. Authenticated and Electronically Signed by Haroon Garay MD on 03/27/2023 12:02:14 AM    XR Chest 1 View    Result Date: 3/28/2023  PROCEDURE: XR CHEST 1 VW-    HISTORY: f/u hypoxia; J96.01-Acute respiratory failure with hypoxia; J81.0-Acute pulmonary edema  COMPARISON: One day prior.  FINDINGS: The patient is rotated to the right. The heart is stable in size. The mediastinum is stable. There has been slight interval improvement in diffuse bilateral airspace disease consistent with improving edema or pneumonia. There is no pneumothorax.      Impression: Slight interval improvement in diffuse bilateral airspace disease consistent with improving edema or pneumonia.        Images were reviewed, interpreted, and dictated by Dr. Belinda Selby MD Transcribed by Tracy Mena PA-C.  This report was  signed and finalized on 3/28/2023 12:07 PM by Belinda Selby MD.    XR Chest 1 View    Result Date: 3/27/2023  PROCEDURE: XR CHEST 1 VW-  HISTORY: resp fail f/u; J96.01-Acute respiratory failure with hypoxia; J81.0-Acute pulmonary edema  COMPARISON: 1 day prior.  FINDINGS: The heart is stable in size.. There is fairly diffuse, bilateral airspace disease mildly worsened from prior exam consistent with worsening pneumonia or edema.. The mediastinum is unremarkable. There is no pneumothorax.  There are no acute osseous abnormalities.      Impression: Mildly worsened bilateral airspace disease, consider worsening pneumonia or edema..    This report was signed and finalized on 3/27/2023 7:09 PM by Belinda Selby MD.    I have reviewed the patient's radiology reports.    Medication Review:     I have reviewed the patient's active and prn medications.     Problem List:      Acute respiratory failure with hypoxia (HCC)    Postmenopausal HRT (hormone replacement therapy)    OAB (overactive bladder)    Hypothyroid    Hypertension    Hyperlipidemia    GERD (gastroesophageal reflux disease)    Depression    Anxiety    Glucose intolerance (impaired glucose tolerance)    Acute exacerbation of CHF (congestive heart failure) (HCC)    Bacterial pneumonia    DORENE (acute kidney injury) (HCC)    (HFpEF) heart failure with preserved ejection fraction (HCC)      Assessment:    Acute respiratory failure with hypoxia (HCC)    Postmenopausal HRT (hormone replacement therapy)    OAB (overactive bladder)    Hypothyroid    Hypertension    Hyperlipidemia    GERD (gastroesophageal reflux disease)    Depression    Anxiety    Glucose intolerance (impaired glucose tolerance)    Acute exacerbation of CHF (congestive heart failure) (HCC)    Bacterial pneumonia    DORENE (acute kidney injury) (HCC)    (HFpEF) heart failure with preserved ejection fraction (HCC)    Plan:    Acute respiratory failure with hypoxia, POA  Acute exacerbation of HFpEF, suspected,  POA  Bacterial pneumonia, POA  Still is requiring intermittent BiPAP.  Will have a trial of Vapotherm or high flow nasal cannula.  Have asked Dr. Calero to see in consult due to degree of hypoxemia.  She has been on Zosyn due to elevated procalcitonin and pneumonia.  MRSA swab was negative.  Cultures have been negative.  She has been on Solu-Medrol    2D echo showing EF of 62% with diastolic dysfunction noted.    We will continue with intermittent IV diuretics.     Elevated Creatinine  Urinary Retention, POA  Bear placed 3/26. Monitoring.       Improved after bladder decompressed with Bear placement.     Chronic:  heart failure with preserved ejection fraction, hypertension, dyslipidemia, hypothyroidism, vitamin D deficiency, psoriasis, GERD, fatigue, anxiety and depression, allergic rhinitis, postmenopausal hormone replacement therapy, EZEQUIEL in the process of being arranged for home CPAP.  Follows with Dr. Giraldo for cardiology.       DVT Prophylaxis: Lovenox  Code Status: Full code after discussion today  Diet: Cardiac when able  Discharge Plan: KENDELL Cervantes,   03/28/23  15:17 EDT    Dictated utilizing Dragon dictation.

## 2023-03-28 NOTE — CONSULTS
Date of admission:  3/25/2023    Date of consultation:   March 28, 2023    Requested by:   Hospitalist Service.     PCP: Karley Jolly APRN    Reason:  Acute Respiratory Failure.     History of Present Illness:  67 y.o. female with past medical history significant for HFpEF, hypertension, hypothyroidism, GERD, anxiety and depression who presented to the ER with worsening shortness of breath.  The patient was found to have an O2 saturation of 58% on room air by EMS and they placed on nonrebreather mask.    she was evaluated by the ER and was found to have hypoxemia on evaluation.  The patient was placed on PAP device and admitted to the hospital.    Pulmonary consultation was requested for further recommendations.     No further history is available from the patient, as she is on the PAP device.    As per the history, the patient did complain of fever off and on and some chills along with nonproductive cough.  It also appears that the patient was at another local hospital just 1 day prior to getting admitted to our facility.    Review of System: Could not be obtained, as the patient is on PAP device.     Past Medical History: Pertinent history reviewed, as appropriate. Negative, except noted below or in HPI.  If this history could not be obtained due to a reason, the reason is listed in the HPI.  Past Medical History:   Diagnosis Date   • Abnormal CXR     bronchitis poss, [facility]   • Allergic rhinitis     uses inhalers prn, denies asthma   • Anxiety    • Carpal tunnel syndrome    • Chronic narcotic use     [facility]   • Depression    • Dyspnea on exertion    • Fatigue    • GERD (gastroesophageal reflux disease)     on Prilosec + BID Zantac   • Glucose intolerance (impaired glucose tolerance)    • Hiatal hernia with gastroesophageal reflux     EGD [facility] 1/17, 39 cm, path DE mild nonspec changes.  serum h. pyl neg   • Hx MRSA infection     2010 on abdomen, tx w/ Bactrim   • Hyperlipidemia    • Hypertension    •  Hypertriglyceridemia    • Hypothyroid    • Insomnia     uses Trazodone   • Joint pain    • Morbid obesity (HCC)    • OAB (overactive bladder)     tx w/ botox injections   • Osteoarthritis of knees, bilateral     steroid injxns as above, supposed to get synvisc soon.  Says bone on bone, needs TKR, but ortho surgeon wants her to have WLS first   • Plantar fasciitis    • Postmenopausal HRT (hormone replacement therapy)    • Psoriasis    • Vitamin D deficiency          Past Surgical History: Pertinent history reviewed, as appropriate. Negative, except noted below or in HPI. If this history could not be obtained due to a reason, the reason is listed in the HPI.  Past Surgical History:   Procedure Laterality Date   • APPENDECTOMY  1989    emergent, open   • CARPAL TUNNEL RELEASE      (B)   • CHOLECYSTECTOMY OPEN  1980    [facility]   • DILATATION AND CURETTAGE  1990, 2015   • GASTRIC SLEEVE LAPAROSCOPIC N/A 7/5/2017    Procedure: GASTRIC SLEEVE LAPAROSCOPIC, ;  Surgeon: Cj Gregg MD;  [facility];  Service:    • LAPAROSCOPIC TUBAL LIGATION  1988   • OTHER SURGICAL HISTORY      denies anesthesia issues   • NE LAPS SURG ESOPG/GSTR FUNDOPLASTY N/A 7/5/2017    Procedure: HIATAL HERNIA REPAIR LAPAROSCOPIC;  Surgeon: Cj Gregg MD;  Location: [facility] OR;  Service: Bariatric         Family History: Pertinent history reviewed, as appropriate. Negative, except noted below or in HPI. If this history could not be obtained due to a reason, the reason is listed in the HPI.  Family History   Problem Relation Age of Onset   • Hypertension Mother    • Diabetes Father    • Cancer Father         brain   • Heart attack Father    • Cancer Sister         uterine   • Diabetes Brother    • Heart attack Brother          Social History: Pertinent history reviewed, as appropriate. Negative, except noted below or in HPI. If this history could not be obtained due to a reason, the reason is listed in the HPI.  Social History  "    Socioeconomic History   • Marital status: Legally    Tobacco Use   • Smoking status: Never   • Smokeless tobacco: Never   Vaping Use   • Vaping Use: Never used   Substance and Sexual Activity   • Alcohol use: No   • Drug use: No   • Sexual activity: Yes     Partners: Male     Comment: spouse             Physical Exam:  /76   Pulse 51   Temp 98.2 °F (36.8 °C) (Axillary)   Resp 27   Ht 154.9 cm (60.98\")   Wt 109 kg (239 lb 13.8 oz)   SpO2 97%   BMI 45.35 kg/m²     Constitutional:            Vital signs reviewed            Patient is currently on PAP device    Eyes:            Extraocular movement was intact.            Pupils appeared equal            Conjunctiva: Pink    ENT:             Patient was on the PAP device      Neck:             Supple. +ve JVD noted.              Thyroid gland did not seem to be enlarged    Cardiovascular:              S1 + S2. Mildly bradycardic    Lungs/Respiratory:            Transmitted breath sounds bilaterally with somewhat decreased air entry             Percussion could not be performed at this time.            Decreased Air Entry Bilaterally with crackles heard.    Abdomen/GI:            Obese but soft.  Bowel sounds sluggishly positive. No obvious organomegaly noted.    Musculoskeletal/Extremities:             Gait could not be assessed at this time.              No clubbing in the upper extremities             No clubbing, cyanosis noted in the upper extremities.             1+ edema noted in the lower extremities bilaterally.    Neurologic:             Was able to follow simple commands              Exam was limited since the patient was on PAP device.    Psychiatric:             Awake, alert and oriented x 3.             Exam was limited since the patient was on PAP device.    Skin:             No obvious rash noted.             Warm and dry.      Labs: Reviewed. Pertinent labs were noted.   Results from last 7 days   Lab Units 03/28/23  5558 " 03/27/23  0407 03/26/23  0618 03/25/23  0514 03/24/23  0520   WBC 10*3/mm3 9.76 15.01* 13.55* 3.55 5.7   HEMOGLOBIN g/dL 9.7* 10.2* 10.2* 11.5* 10.9*   HEMATOCRIT % 31.4* 32.4* 33.2* 36.0 34.8*   PLATELETS 10*3/mm3 224 244 240 237 237   NEUTROPHIL % % 84.6* 89.6* 88.6* 76.7*  --    NEUTROS ABS 10*3/mm3 8.26* 13.44* 12.01* 2.72  --    EOSINOPHIL % % 0.8 0.4 1.2 6.5*  --    EOS ABS 10*3/mm3 0.08 0.06 0.16 0.23  --    LYMPHOCYTE % % 10.8* 6.9* 6.1* 12.7*  --    LYMPHS ABS 10*3/mm3 1.05 1.04 0.82 0.45*  --        Lab Results   Component Value Date    PROCALCITO 11.98 (H) 03/27/2023    PROCALCITO 16.64 (H) 03/26/2023    PROCALCITO 24.01 (H) 03/26/2023       Lab Results   Component Value Date    CRP 41.5 (H) 02/06/2022       No results found for: SEDRATE    Lab Results   Component Value Date    PROBNP 917.6 (H) 03/28/2023    PROBNP 144.1 03/25/2023    PROBNP 502 03/22/2023       Results from last 7 days   Lab Units 03/28/23  0418 03/27/23  1436 03/27/23  0407 03/26/23  0618 03/25/23  0820 03/25/23  0552   SODIUM mmol/L 143  --  141 143  --  141   POTASSIUM mmol/L 4.2 4.7 3.5 4.2   < > 3.3*   CHLORIDE mmol/L 107  --  103 104  --  105   CO2 mmol/L 26.9  --  28.0 28.4  --  22.7   BUN mg/dL 22  --  21 24*  --  21   CREATININE mg/dL 0.89  --  1.04* 1.22*  --  0.96   CALCIUM mg/dL 8.4*  --  8.7 8.4*  --  8.0*   ANION GAP mmol/L 9.1  --  10.0 10.6  --  13.3   BILIRUBIN mg/dL  --   --   --   --   --  0.8   ALK PHOS U/L  --   --   --   --   --  68   ALT (SGPT) U/L  --   --   --   --   --  7   AST (SGOT) U/L  --   --   --   --   --  15   GLUCOSE mg/dL 108*  --  131* 140*  --  109*   TOTAL PROTEIN g/dL  --   --   --   --   --  5.8*   ALBUMIN g/dL  --   --   --   --   --  2.8*    < > = values in this interval not displayed.             Lab Results   Component Value Date    TSH 1.9 03/22/2023    TSH 1.50 02/15/2023    TSH 2.21 08/02/2022       No results found for: FREET4    Lab Results   Component Value Date    INR 1.05 02/06/2022        No results found for: CKTOTAL    No components found for: HSTROPT    Lab Results   Component Value Date    DDIMER 1.05 (C) 03/13/2022    DDIMER 0.57 02/06/2022    DDIMER 610 (C) 02/27/2019       Brief Urine Lab Results  (Last result in the past 365 days)      Color   Clarity   Blood   Leuk Est   Nitrite   Protein   CREAT   Urine HCG        03/24/23 1040 Yellow   SLCLOUDY   Negative   Negative   Negative                     Micro: As of March 28, 2023   No results found for: RESPCX  No results found for: BCIDPCR  Lab Results   Component Value Date    BLOODCX No growth at 2 days 03/25/2023    BLOODCX No growth at 2 days 03/25/2023     No results found for: URINECX  Lab Results   Component Value Date    MRSACX  07/02/2017     No Methicillin Resistant Staphylococcus aureus isolated     Lab Results   Component Value Date    MRSAPCR No MRSA Detected 03/25/2023     Lab Results   Component Value Date    URCX Not Indicated 03/24/2023    URCX Not Indicated 02/03/2022    URCX Not Indicated 12/28/2021     No components found for: LOWRESPCF  No results found for: THROATCX  No results found for: CULTURES  No components found for: STREPBCX  No results found for: STREPPNEUAG  No results found for: LEGIONELLA  No results found for: MYCOPLASCX  No results found for: GCCX  No results found for: WOUNDCX  No results found for: BODYFLDCX    No results found for: FLU    No results found for: ADENOVIRUS  No results found for: ZM244C  No results found for: CVHKU1  No results found for: CVNL63  No results found for: CVOC43  No results found for: HUMETPNEVS  No results found for: HURVEV  Lab Results   Component Value Date    FLUBPCR Not Detected 03/25/2023     No results found for: PARAINFLUE  No results found for: PARAFLUV2  No results found for: PARAFLUV3  No results found for: PARAFLUV4  No results found for: BPERTPCR  No results found for: [id number]  No results found for: CPNEUPCR  No results found for: MPNEUMO  Lab Results    Component Value Date    FLUAPCR Not Detected 03/25/2023     No results found for: FLUAH3  No results found for: FLUAH1  No results found for: RSV  No results found for: BPARAPCR    COVID 19:  Lab Results   Component Value Date    COVID19 Not Detected 03/25/2023       No results found for: THCURSCR  No results found for: PCPUR  No results found for: COCAINEUR  No results found for: METAMPSCNUR  No results found for: LABOPIASCN  No results found for: AMPHETSCREEN  No results found for: LABBENZSCN  No results found for: TRICYCLICSCN  No results found for: LABMETHSCN  No results found for: BARBITSCNUR  No results found for: OXYCODONESCN  No results found for: PROPOXSCN  No results found for: BUPRENORSCNU  No results found for: ETHANOLMGDL  No results found for: ETOHPCT      ABG: Reviewed  Recent Labs     03/25/23  0511   PHART 7.384   BVK5GQE 43.2   PO2ART 64.9*   HBU2SXK 25.8   BASEEXCESS 0.5       No results found for: LACTATE    Imaging Study: Latest imaging studies was reviewed personally.   Imaging Results (Last 72 Hours)     Procedure Component Value Units Date/Time    XR Chest 1 View [[id number]] Resulted: 03/28/23 0842     Updated: 03/28/23 0852    XR Chest 1 View [[id number]] Collected: 03/27/23 1908     Updated: 03/27/23 1911    Narrative:      PROCEDURE: XR CHEST 1 VW-     HISTORY: resp fail f/u; J96.01-Acute respiratory failure with hypoxia;  J81.0-Acute pulmonary edema     COMPARISON: 1 day prior.     FINDINGS: The heart is stable in size.. There is fairly diffuse,  bilateral airspace disease mildly worsened from prior exam consistent  with worsening pneumonia or edema.. The mediastinum is unremarkable.  There is no pneumothorax.  There are no acute osseous abnormalities.       Impression:      Mildly worsened bilateral airspace disease, consider  worsening pneumonia or edema..           This report was signed and finalized on 3/27/2023 7:09 PM by Belinda Selby MD.    CT Head Without Contrast [[id number]]  Collected: 03/27/23 0002     Updated: 03/27/23 0004    Narrative:      FINAL REPORT    TECHNIQUE:  Axial CT images were performed through the head. Coronal  reformatted images were submitted. This study was performed with  techniques to keep radiation doses as low as reasonably  achievable (ALARA). Individualized dose reduction techniques  using automated exposure control or adjustment of mA and/or kV  according to the patient's size were employed.    CLINICAL HISTORY:  Headache, new or worsening (Age >= 50y)    FINDINGS:  The ventricles are normal in size.  There is no evidence of  hemorrhage.  There is no mass or edema identified.  There is no  abnormal extra-axial fluid seen.  The sinuses are well aerated.      Impression:      No acute intracranial process.    Authenticated and Electronically Signed by Haroon Garay MD  on 03/27/2023 12:02:14 AM    XR Chest 1 View [[id number]] Collected: 03/26/23 1118     Updated: 03/26/23 1122    Narrative:      PORTABLE CHEST  3/26/2023 11:10 AM     HISTORY: Worsening shortness of breath     COMPARISON:   None     FINDINGS: The cardiac silhouette is possibly enlarged. Persistent  pulmonary vascular congestion.  Diffuse interstitial and alveolar  nodular opacities, unchanged from prior study. There is no pneumothorax.  The visualized osseous structures demonstrate no acute abnormalities.       Impression:      No significant interval change.                 This report was signed and finalized on 3/26/2023 11:20 AM by Rakel Martins MD.          ECHO:  Results for orders placed during the hospital encounter of 03/25/23    Adult Transthoracic Echo Complete W/ Cont if Necessary Per Protocol    Interpretation Summary  •  Left ventricular systolic function is normal. Calculated left ventricular EF = 62%  •  Left ventricular wall thickness is consistent with mild concentric hypertrophy.  •  Left ventricular diastolic function is consistent with (grade I) impaired  relaxation.  •  No hemodynamically significant valvular pathology.      Results for orders placed in visit on 02/18/19    SCANNED - ECHOCARDIOGRAM        Pharmacy to Dose Zosyn,             Assessment:  1.  Acute Respiratory Failure.  2.  Pneumonia  3.  Pulmonary edema?  4.  Decompensated diastolic heart failure?    Discussion/Recommendations:   I have adjusted the patient's BiPAP to a setting of 14/8 with respiratory rate of 15.    I will also decrease FiO2 to 75% and assess for therapist and nursing staff to decrease FiO2 every 2-4 hours and once she is able to tolerate FiO2 of 60%, we may be able to try Airvo.    ABG will be repeated as clinically appropriate.      Chest X Ray will be ordered as appropriate.     Nebulized treatments have been adjusted.    We will adjust the dose of Solu-Medrol.    The patient radiological and clinical findings could also be from pneumonia therefore I do agree with broad-spectrum antibiotics especially given elevated procalcitonin levels.    Review of old records  The patient's CT of the chest with PE protocol from March 21, 2023 showed multifocal areas of interstitial thickening with subpleural groundglass opacities. Echocardiogram was essentially unremarkable with normal ejection fraction and diastolic function.  CBC showed a white blood cell count of 15, hemoglobin level of 10.8 and platelets of 216.  Influenza and COVID-19 testing was negative.  TSH was 1.9.  Discharge summary was reviewed and it appeared to suggest improvement with diuresis.  It also mentioned encouragement for the patient to follow-up with Dr. Giraldo, cardiology, upon discharge.    The plan was discussed with the patient.      The plan was discussed with the nursing staff.    Recommendations were also discussed with the referring provider.     I would like to thank you for the opportunity to participate in the care of this patient.  We will communicate changes and recommendations, if and when  necessary.      This document was electronically signed by Sebas Calero MD on 03/28/23 at 10:36 EDT      Dictated utilizing Dragon dictation.

## 2023-03-28 NOTE — PLAN OF CARE
Goal Outcome Evaluation:   PRN pain med given x 2 for head, back, and neck pain. Lasix gtt started and cont for 10 hrs. Over 2 L uop out this shift. Pt to ct scan abd and pelvis for complications with eating at home.

## 2023-03-28 NOTE — CASE MANAGEMENT/SOCIAL WORK
1020 Pt remains on Bipap 85%. Cm spoke with her twin brother,Bryce who was visiting. He states pt has experienced a lot of loss over the years. Her daughter passed away un- expectantly 4 years ago. Pt then took her twin 18 yr old granddaughters and grandson to raise and one of the grand-daughters  under tragic circumstances 2 years ago. He states pt functions independently at baseline and lives with adult grandson and grand-daughter. She has reliable transportation. Cm will continue to follow. Dr Kamara advised Cm spoke with JOEL Jolly's office and they have no record of sleep study or pap device being ordered.     1148 Spoke with Dr Giraldo's office who states pt had overnight pulse oximetry 3/2022 with recommendation for nocturnal oxygen. She is unsure if O2 was set up as Dr Giraldo referred pt to Dr BERNARDO Hein, ENT 2022.    1528 Cm spoke with pt at bedside. She states she does not have oxygen or cpap. She states that Dr Grubbs/ENT/[facility]/[phone] advised her they got her Cpap approved. She estimates this was  2-3 mos ago, but she never followed up. Cm called Dr Mi's office to inquire. Left  requesting call back. Pt states she had nighttime oxygen at one time and returned it because she didn't need it. She is unable to recall DME company. Pt says she has glucometer and received food stamps for her 17 yo grandson and granddaughter. She confirms she is independent at baseline. Pt currently 40L/66% airvo. Cm following.    1555 Cm spoke with [place]/Mary who states if pt sleep study is over one year old and she has not been issued a PAP device, then the sleep study will need to be repeated before insurance will cover it.     1608 Cm spoke with dr Lara office who states pt had a sleep study done at [facility] (uncertain of date) but they never received records and pt never followed up. Pt will need documented overnight oximetry once stabilized to determine and validate her need for PAP device.

## 2023-03-29 ENCOUNTER — APPOINTMENT (OUTPATIENT)
Dept: GENERAL RADIOLOGY | Facility: HOSPITAL | Age: 68
DRG: 193 | End: 2023-03-29
Payer: MEDICARE

## 2023-03-29 ENCOUNTER — APPOINTMENT (OUTPATIENT)
Dept: ULTRASOUND IMAGING | Facility: HOSPITAL | Age: 68
DRG: 193 | End: 2023-03-29
Payer: MEDICARE

## 2023-03-29 LAB
ALBUMIN SERPL-MCNC: 3.1 G/DL (ref 3.5–5.2)
ALBUMIN/GLOB SERPL: 0.9 G/DL
ALP SERPL-CCNC: 74 U/L (ref 39–117)
ALT SERPL W P-5'-P-CCNC: 5 U/L (ref 1–33)
ANION GAP SERPL CALCULATED.3IONS-SCNC: 12 MMOL/L (ref 5–15)
AST SERPL-CCNC: 10 U/L (ref 1–32)
BASOPHILS # BLD AUTO: 0.03 10*3/MM3 (ref 0–0.2)
BASOPHILS NFR BLD AUTO: 0.4 % (ref 0–1.5)
BILIRUB SERPL-MCNC: 0.6 MG/DL (ref 0–1.2)
BUN SERPL-MCNC: 24 MG/DL (ref 8–23)
BUN/CREAT SERPL: 22.4 (ref 7–25)
CALCIUM SPEC-SCNC: 9 MG/DL (ref 8.6–10.5)
CHLORIDE SERPL-SCNC: 97 MMOL/L (ref 98–107)
CO2 SERPL-SCNC: 32 MMOL/L (ref 22–29)
CREAT SERPL-MCNC: 1.07 MG/DL (ref 0.57–1)
DEPRECATED RDW RBC AUTO: 40.2 FL (ref 37–54)
EGFRCR SERPLBLD CKD-EPI 2021: 57.8 ML/MIN/1.73
EOSINOPHIL # BLD AUTO: 0.28 10*3/MM3 (ref 0–0.4)
EOSINOPHIL NFR BLD AUTO: 3.9 % (ref 0.3–6.2)
ERYTHROCYTE [DISTWIDTH] IN BLOOD BY AUTOMATED COUNT: 12 % (ref 12.3–15.4)
GLOBULIN UR ELPH-MCNC: 3.6 GM/DL
GLUCOSE SERPL-MCNC: 98 MG/DL (ref 65–99)
HCT VFR BLD AUTO: 32.8 % (ref 34–46.6)
HGB BLD-MCNC: 10.5 G/DL (ref 12–15.9)
IMM GRANULOCYTES # BLD AUTO: 0.23 10*3/MM3 (ref 0–0.05)
IMM GRANULOCYTES NFR BLD AUTO: 3.2 % (ref 0–0.5)
LYMPHOCYTES # BLD AUTO: 1.37 10*3/MM3 (ref 0.7–3.1)
LYMPHOCYTES NFR BLD AUTO: 18.9 % (ref 19.6–45.3)
MAGNESIUM SERPL-MCNC: 2.2 MG/DL (ref 1.6–2.4)
MCH RBC QN AUTO: 29.1 PG (ref 26.6–33)
MCHC RBC AUTO-ENTMCNC: 32 G/DL (ref 31.5–35.7)
MCV RBC AUTO: 90.9 FL (ref 79–97)
MONOCYTES # BLD AUTO: 0.2 10*3/MM3 (ref 0.1–0.9)
MONOCYTES NFR BLD AUTO: 2.8 % (ref 5–12)
NEUTROPHILS NFR BLD AUTO: 5.15 10*3/MM3 (ref 1.7–7)
NEUTROPHILS NFR BLD AUTO: 70.8 % (ref 42.7–76)
NRBC BLD AUTO-RTO: 0 /100 WBC (ref 0–0.2)
PHOSPHATE SERPL-MCNC: 5.8 MG/DL (ref 2.5–4.5)
PLATELET # BLD AUTO: 278 10*3/MM3 (ref 140–450)
PMV BLD AUTO: 11.5 FL (ref 6–12)
POTASSIUM SERPL-SCNC: 3.2 MMOL/L (ref 3.5–5.2)
POTASSIUM SERPL-SCNC: 4 MMOL/L (ref 3.5–5.2)
PROT SERPL-MCNC: 6.7 G/DL (ref 6–8.5)
RBC # BLD AUTO: 3.61 10*6/MM3 (ref 3.77–5.28)
SODIUM SERPL-SCNC: 141 MMOL/L (ref 136–145)
WBC NRBC COR # BLD: 7.26 10*3/MM3 (ref 3.4–10.8)

## 2023-03-29 PROCEDURE — 99232 SBSQ HOSP IP/OBS MODERATE 35: CPT | Performed by: FAMILY MEDICINE

## 2023-03-29 PROCEDURE — 94761 N-INVAS EAR/PLS OXIMETRY MLT: CPT

## 2023-03-29 PROCEDURE — 85025 COMPLETE CBC W/AUTO DIFF WBC: CPT | Performed by: STUDENT IN AN ORGANIZED HEALTH CARE EDUCATION/TRAINING PROGRAM

## 2023-03-29 PROCEDURE — 25010000002 ENOXAPARIN PER 10 MG: Performed by: STUDENT IN AN ORGANIZED HEALTH CARE EDUCATION/TRAINING PROGRAM

## 2023-03-29 PROCEDURE — 94799 UNLISTED PULMONARY SVC/PX: CPT

## 2023-03-29 PROCEDURE — 94660 CPAP INITIATION&MGMT: CPT

## 2023-03-29 PROCEDURE — 25010000002 METHYLPREDNISOLONE PER 40 MG: Performed by: STUDENT IN AN ORGANIZED HEALTH CARE EDUCATION/TRAINING PROGRAM

## 2023-03-29 PROCEDURE — 80053 COMPREHEN METABOLIC PANEL: CPT | Performed by: INTERNAL MEDICINE

## 2023-03-29 PROCEDURE — 99291 CRITICAL CARE FIRST HOUR: CPT | Performed by: INTERNAL MEDICINE

## 2023-03-29 PROCEDURE — 71045 X-RAY EXAM CHEST 1 VIEW: CPT

## 2023-03-29 PROCEDURE — 83735 ASSAY OF MAGNESIUM: CPT | Performed by: INTERNAL MEDICINE

## 2023-03-29 PROCEDURE — 76857 US EXAM PELVIC LIMITED: CPT

## 2023-03-29 PROCEDURE — 25010000002 METHYLPREDNISOLONE PER 40 MG: Performed by: INTERNAL MEDICINE

## 2023-03-29 PROCEDURE — 25010000002 FUROSEMIDE PER 20 MG: Performed by: INTERNAL MEDICINE

## 2023-03-29 PROCEDURE — 25010000002 PIPERACILLIN SOD-TAZOBACTAM PER 1 G: Performed by: STUDENT IN AN ORGANIZED HEALTH CARE EDUCATION/TRAINING PROGRAM

## 2023-03-29 PROCEDURE — 84100 ASSAY OF PHOSPHORUS: CPT | Performed by: INTERNAL MEDICINE

## 2023-03-29 PROCEDURE — 94664 DEMO&/EVAL PT USE INHALER: CPT

## 2023-03-29 PROCEDURE — 84132 ASSAY OF SERUM POTASSIUM: CPT | Performed by: FAMILY MEDICINE

## 2023-03-29 RX ORDER — POTASSIUM CHLORIDE 750 MG/1
40 CAPSULE, EXTENDED RELEASE ORAL EVERY 4 HOURS
Status: COMPLETED | OUTPATIENT
Start: 2023-03-29 | End: 2023-03-29

## 2023-03-29 RX ORDER — BUTALBITAL, ACETAMINOPHEN AND CAFFEINE 50; 325; 40 MG/1; MG/1; MG/1
1 TABLET ORAL EVERY 6 HOURS PRN
Status: DISCONTINUED | OUTPATIENT
Start: 2023-03-29 | End: 2023-04-01 | Stop reason: HOSPADM

## 2023-03-29 RX ORDER — METHYLPREDNISOLONE SODIUM SUCCINATE 40 MG/ML
40 INJECTION, POWDER, LYOPHILIZED, FOR SOLUTION INTRAMUSCULAR; INTRAVENOUS EVERY 12 HOURS
Status: DISCONTINUED | OUTPATIENT
Start: 2023-03-29 | End: 2023-03-30

## 2023-03-29 RX ADMIN — LEVOTHYROXINE SODIUM 50 MCG: 0.05 TABLET ORAL at 05:25

## 2023-03-29 RX ADMIN — MONTELUKAST 10 MG: 10 TABLET, FILM COATED ORAL at 21:00

## 2023-03-29 RX ADMIN — ENOXAPARIN SODIUM 40 MG: 100 INJECTION SUBCUTANEOUS at 09:17

## 2023-03-29 RX ADMIN — Medication 10 ML: at 21:01

## 2023-03-29 RX ADMIN — FUROSEMIDE 60 MG: 10 INJECTION, SOLUTION INTRAMUSCULAR; INTRAVENOUS at 11:18

## 2023-03-29 RX ADMIN — POTASSIUM CHLORIDE 40 MEQ: 750 CAPSULE, EXTENDED RELEASE ORAL at 06:27

## 2023-03-29 RX ADMIN — TAZOBACTAM SODIUM AND PIPERACILLIN SODIUM 4.5 G: 500; 4 INJECTION, SOLUTION INTRAVENOUS at 04:02

## 2023-03-29 RX ADMIN — FLUOXETINE 40 MG: 20 CAPSULE ORAL at 08:31

## 2023-03-29 RX ADMIN — ENOXAPARIN SODIUM 40 MG: 100 INJECTION SUBCUTANEOUS at 21:00

## 2023-03-29 RX ADMIN — PANTOPRAZOLE SODIUM 40 MG: 40 TABLET, DELAYED RELEASE ORAL at 05:25

## 2023-03-29 RX ADMIN — GABAPENTIN 400 MG: 400 CAPSULE ORAL at 21:00

## 2023-03-29 RX ADMIN — FLUTICASONE PROPIONATE 2 SPRAY: 50 SPRAY, METERED NASAL at 08:30

## 2023-03-29 RX ADMIN — GABAPENTIN 400 MG: 400 CAPSULE ORAL at 14:26

## 2023-03-29 RX ADMIN — POTASSIUM CHLORIDE 40 MEQ: 750 CAPSULE, EXTENDED RELEASE ORAL at 14:26

## 2023-03-29 RX ADMIN — ASPIRIN 81 MG CHEWABLE TABLET 81 MG: 81 TABLET CHEWABLE at 08:31

## 2023-03-29 RX ADMIN — BUDESONIDE AND FORMOTEROL FUMARATE DIHYDRATE 2 PUFF: 160; 4.5 AEROSOL RESPIRATORY (INHALATION) at 18:40

## 2023-03-29 RX ADMIN — BUDESONIDE AND FORMOTEROL FUMARATE DIHYDRATE 2 PUFF: 160; 4.5 AEROSOL RESPIRATORY (INHALATION) at 06:49

## 2023-03-29 RX ADMIN — Medication 10 ML: at 08:31

## 2023-03-29 RX ADMIN — BUTALBITAL, ACETAMINOPHEN, AND CAFFEINE 1 TABLET: 50; 325; 40 TABLET ORAL at 08:41

## 2023-03-29 RX ADMIN — Medication 10 ML: at 21:00

## 2023-03-29 RX ADMIN — ATORVASTATIN CALCIUM 20 MG: 20 TABLET, FILM COATED ORAL at 08:31

## 2023-03-29 RX ADMIN — TRAZODONE HYDROCHLORIDE 100 MG: 50 TABLET ORAL at 21:00

## 2023-03-29 RX ADMIN — PRAMIPEXOLE DIHYDROCHLORIDE 1 MG: 1 TABLET ORAL at 21:00

## 2023-03-29 RX ADMIN — METHYLPREDNISOLONE SODIUM SUCCINATE 40 MG: 40 INJECTION, POWDER, FOR SOLUTION INTRAMUSCULAR; INTRAVENOUS at 08:30

## 2023-03-29 RX ADMIN — POTASSIUM CHLORIDE 40 MEQ: 750 CAPSULE, EXTENDED RELEASE ORAL at 11:58

## 2023-03-29 RX ADMIN — METHYLPREDNISOLONE SODIUM SUCCINATE 40 MG: 40 INJECTION, POWDER, FOR SOLUTION INTRAMUSCULAR; INTRAVENOUS at 20:12

## 2023-03-29 RX ADMIN — TAZOBACTAM SODIUM AND PIPERACILLIN SODIUM 4.5 G: 500; 4 INJECTION, SOLUTION INTRAVENOUS at 11:58

## 2023-03-29 RX ADMIN — BUTALBITAL, ACETAMINOPHEN, AND CAFFEINE 1 TABLET: 50; 325; 40 TABLET ORAL at 20:12

## 2023-03-29 RX ADMIN — HYDROCODONE BITARTRATE AND ACETAMINOPHEN 1 TABLET: 5; 325 TABLET ORAL at 22:48

## 2023-03-29 RX ADMIN — TAZOBACTAM SODIUM AND PIPERACILLIN SODIUM 4.5 G: 500; 4 INJECTION, SOLUTION INTRAVENOUS at 20:12

## 2023-03-29 RX ADMIN — GABAPENTIN 400 MG: 400 CAPSULE ORAL at 05:25

## 2023-03-29 NOTE — PROGRESS NOTES
[facility]    Name:  Jessica Flowers   Age:  67 y.o.  Sex:  female  :  1955  MRN:  [id number]   Visit Number:  [id number]  Admission Date:  3/25/2023  Date Of Service:  23  Primary Care Physician:  Karley Jolly APRN     LOS: 4 days :    Chief Complaint:      Shortness of breath    Subjective:    Patient seen this morning and overall is doing better.  She is going to try to eat something today.  She diuresed very well on the Lasix over 3-1/2 to 4 L per report.  I did talk with pulmonology at bedside.    Hospital Course:    The patient is a 67-year-old female with a history of apparent preserved ejection fraction heart failure, hypertension, dyslipidemia, hypothyroidism, morbid obesity, obstructive sleep apnea, who presented to the emergency room with increasing shortness of breath.  She had apparently had a recent hospitalization due to pulmonary edema and was on diuretic therapy and was discharged less than 48 hours ago.  Her initial pulse ox was in the 50s per EMS.    In the ER, the patient was requiring BiPAP initially.  Her vital signs were showing stable blood pressures.  ABG with hypoxia noted.  She had mild elevated high-sensitivity troponin.  proBNP within normal limits.  COVID and flu testing were negative.  Chest x-ray was showing cardiomegaly with concern for pneumonia.  She was given Bumex.  She continued to have respiratory failure and was started on broad-spectrum antibiotics due to the elevated procalcitonin.    Pulmonology consult was requested on 3/28/2023 due to the degree of hypoxemia.  She was started on Lasix drip and had around 4 L of output.  She has been on antibiotics due to concern for the pneumonia.  She did have some abnormal findings on the CT scan abdomen pelvis and a pelvic ultrasound was ordered.  She likely has some degree of enterocolitis.    Review of Systems:     All systems were reviewed and negative except as  mentioned in subjective, assessment and plan.    Vital Signs:    Temp:  [97.4 °F (36.3 °C)-98.5 °F (36.9 °C)] 97.6 °F (36.4 °C)  Heart Rate:  [46-63] 51  Resp:  [16-28] 16  BP: ()/(52-84) 116/81    Intake and output:    I/O last 3 completed shifts:  In: 2830 [P.O.:970; I.V.:1526; IV Piggyback:334]  Out: 5390 [Urine:5390]  I/O this shift:  In: 50 [P.O.:50]  Out: 385 [Urine:385]    Physical Examination:    General Appearance:  Alert and cooperative.  High flow oxygen in place   Head:  Atraumatic and normocephalic.   Eyes: Conjunctivae and sclerae normal, no icterus. No pallor.   Throat: No oral lesions, no thrush, oral mucosa moist.   Neck: Supple, trachea midline, no thyromegaly.   Lungs:    Still with scattered rhonchi and crackles present.  Nontachypneic at rest currently   Heart:  Normal S1 and S2, no murmur, no gallop, no rub. No JVD.   Abdomen:   Normal bowel sounds, no masses, no organomegaly. Soft, nontender, nondistended, no rebound tenderness.  Obese abdomen   Extremities: Supple, no significant lower edema on exam, no cyanosis, no clubbing.   Skin: No bleeding or rash.   Neurologic: Alert and oriented x 3. No facial asymmetry. Moves all four limbs. No tremors.      Laboratory results:    Results from last 7 days   Lab Units 03/29/23  0445 03/28/23  1845 03/28/23  0418 03/25/23  0820 03/25/23  0552   SODIUM mmol/L 141 139 143   < > 141   POTASSIUM mmol/L 3.2* 3.9 4.2   < > 3.3*   CHLORIDE mmol/L 97* 94* 107   < > 105   CO2 mmol/L 32.0* 30.8* 26.9   < > 22.7   BUN mg/dL 24* 22 22   < > 21   CREATININE mg/dL 1.07* 0.93 0.89   < > 0.96   CALCIUM mg/dL 9.0 9.1 8.4*   < > 8.0*   BILIRUBIN mg/dL 0.6  --   --   --  0.8   ALK PHOS U/L 74  --   --   --  68   ALT (SGPT) U/L 5  --   --   --  7   AST (SGOT) U/L 10  --   --   --  15   GLUCOSE mg/dL 98 155* 108*   < > 109*    < > = values in this interval not displayed.     Results from last 7 days   Lab Units 03/29/23  0445 03/28/23  0418 03/27/23  0407   WBC  10*3/mm3 7.26 9.76 15.01*   HEMOGLOBIN g/dL 10.5* 9.7* 10.2*   HEMATOCRIT % 32.8* 31.4* 32.4*   PLATELETS 10*3/mm3 278 224 244         Results from last 7 days   Lab Units 03/25/23  0820 03/25/23  0552 03/22/23  1930 03/22/23  1608   TROPONIN I ng/mL  --   --  <0.30 <0.30   HSTROP T ng/L 11* 11*  --   --      Results from last 7 days   Lab Units 03/25/23  1115 03/25/23  1110   BLOODCX  No growth at 4 days No growth at 4 days     Recent Labs     03/25/23  0511   PHART 7.384   LRT9SEX 43.2   PO2ART 64.9*   PJA9JMA 25.8   BASEEXCESS 0.5      I have reviewed the patient's laboratory results.    Radiology results:    CT Abdomen Pelvis Without Contrast    Result Date: 3/29/2023  CT ABDOMEN AND PELVIS WITHOUT CONTRAST  HISTORY: Gastroparesis/dysmotility (Ped 0-17years).  COMPARISON: None.  PROCEDURE: Axial images were obtained from the lung bases to the pubic symphysis by computed tomography. This study was performed with techniques to keep radiation doses as low as reasonably achievable, (ALARA). Individualized dose reduction techniques using automated exposure control or adjustment of mA and/or kV according to the patient size were employed.  FINDINGS:  ABDOMEN:  There are fairly diffuse ground glass opacities involving both visualized lower lung fields with some peripheral sparing.  Minimal left pleural effusion identified. The heart is enlarged, consider congestive heart failure. The limited non-contrast images of the liver demonstrates no focal abnormality but the liver is enlarged at 19.6 cm. Gallbladder is not identified. No metallic clips are seen in the right upper quadrant. There is postsurgical change in the stomach suggesting previous gastric sleeve. There appears to be a small hiatal hernia. Vascular calcification is noted. The spleen is unremarkable. No adrenal masses are seen. The aorta is normal in caliber. There is no significant free fluid or adenopathy.  There is no nephrolithiasis. There is no  hydronephrosis.  PELVIS: The GI tract demonstrate no obstruction. The appendix is not identified, no secondary signs of appendicitis seen.. The urinary bladder is decompressed by a Bear catheter and an air-fluid level is noted. Uterus is midline. There is a rounded low-attenuation structure which is immediately adjacent to the fundus of the uterus and measures 26 Hounsfield units. This may be part of the sigmoid colon with mild focal dilatation. Ovarian origin is another consideration. Consider pelvic ultrasound for further evaluation. Air-fluid levels are identified in nondistended small and large bowel, consider enteritis. There is no fluid or adenopathy.      Impression: No hydronephrosis or nephrolithiasis.  Air-fluid levels in nondistended small and large bowel, possible enteritis.  Postsurgical change of gastric sleeve and possible small hiatal hernia, endoscopy may be helpful.  5 cm area of decreased attenuation in the pelvis adjacent to the fundus of the uterus which may represent focally dilated sigmoid colon or ovarian lesion, recommend pelvic ultrasound.  Fairly diffuse bilateral basilar airspace disease with cardiomegaly and minimal left effusion, consider congestive heart failure.  This report was signed and finalized on 3/29/2023 6:37 AM by Belinda Selby MD.    XR Chest 1 View    Result Date: 3/29/2023  PROCEDURE: XR CHEST 1 VW-  HISTORY: Resp Failure.; J96.01-Acute respiratory failure with hypoxia; J81.0-Acute pulmonary edema  COMPARISON: 1 day prior.  FINDINGS: The heart is mildly enlarged, stable.. There has been mild interval worsening in the bilateral airspace disease compared to the prior exam consistent with worsening edema or pneumonia.. The mediastinum is unremarkable. There is no pneumothorax.  There are no acute osseous abnormalities.      Impression: Worsened bilateral airspace disease compared to prior, recommend continued follow-up..    This report was signed and finalized on 3/29/2023  7:40 AM by Belinda Selby MD.    XR Chest 1 View    Result Date: 3/28/2023  PROCEDURE: XR CHEST 1 VW-    HISTORY: f/u hypoxia; J96.01-Acute respiratory failure with hypoxia; J81.0-Acute pulmonary edema  COMPARISON: One day prior.  FINDINGS: The patient is rotated to the right. The heart is stable in size. The mediastinum is stable. There has been slight interval improvement in diffuse bilateral airspace disease consistent with improving edema or pneumonia. There is no pneumothorax.      Impression: Slight interval improvement in diffuse bilateral airspace disease consistent with improving edema or pneumonia.        Images were reviewed, interpreted, and dictated by Dr. Belinda Selby MD Transcribed by Tracy Mena PA-C.  This report was signed and finalized on 3/28/2023 12:07 PM by Belinda Selby MD.    I have reviewed the patient's radiology reports.    Medication Review:     I have reviewed the patient's active and prn medications.     Problem List:      Acute respiratory failure with hypoxia (HCC)    Postmenopausal HRT (hormone replacement therapy)    OAB (overactive bladder)    Hypothyroid    Hypertension    Hyperlipidemia    GERD (gastroesophageal reflux disease)    Depression    Anxiety    Glucose intolerance (impaired glucose tolerance)    Acute exacerbation of CHF (congestive heart failure) (HCC)    Bacterial pneumonia    DORENE (acute kidney injury) (HCC)    (HFpEF) heart failure with preserved ejection fraction (HCC)      Assessment:    Acute respiratory failure with hypoxia (HCC)    Postmenopausal HRT (hormone replacement therapy)    OAB (overactive bladder)    Hypothyroid    Hypertension    Hyperlipidemia    GERD (gastroesophageal reflux disease)    Depression    Anxiety    Glucose intolerance (impaired glucose tolerance)    Acute exacerbation of CHF (congestive heart failure) (HCC)    Bacterial pneumonia    DORENE (acute kidney injury) (HCC)    (HFpEF) heart failure with preserved ejection fraction  ([facility])    Plan:    Acute respiratory failure with hypoxia, POA  Acute exacerbation of HFpEF, suspected, POA  Bacterial pneumonia, POA  Still is requiring intermittent BiPAP.  Will have a trial of Vapotherm or high flow nasal cannula.  Have asked Dr. Calero to see in consult due to degree of hypoxemia.  She has been on Zosyn due to elevated procalcitonin and pneumonia.  MRSA swab was negative.  Cultures have been negative.  She has been on Solu-Medrol    2D echo showing EF of 62% with diastolic dysfunction noted.    Was placed on Lasix drip on 3/20/2023 with nearly 4 L urine output.  Remains on antibiotics.     Elevated Creatinine  Urinary Retention, POA  Bear placed 3/26. Monitoring.       Improved after bladder decompressed with Bear placement.     Chronic:  heart failure with preserved ejection fraction, hypertension, dyslipidemia, hypothyroidism, vitamin D deficiency, psoriasis, GERD, fatigue, anxiety and depression, allergic rhinitis, postmenopausal hormone replacement therapy, EZEQUIEL in the process of being arranged for home CPAP.  Follows with Dr. Giraldo for cardiology.       DVT Prophylaxis: Lovenox  Code Status: Full code   Diet: Cardiac clear  Discharge Plan: KENDELL Cervantes DO  03/29/23  12:39 EDT    Dictated utilizing Dragon dictation.

## 2023-03-29 NOTE — CASE MANAGEMENT/SOCIAL WORK
1023 Cm spoke to pt at bedside. She states she is feeling better. She is currently on AIRVO/40L/51% O2. Cm advised her that as her oxygen needs decrease, therapy will be able to work with her and make appropriate recommendations for DME and DC disposition. Pt verbalizes understanding. Cm following.

## 2023-03-29 NOTE — PROGRESS NOTES
"  CC: Acute respiratory failure.    S: Currently on high flow nasal cannula.  More awake and interactive today.  Does continue to have shortness of breath although feels somewhat better today.  Was able to tell me that she was not discharged on oxygen from the last admission at a local facility.  Says that she felt short of breath within 2-3 hours of being discharged, when she was at home.  Also reported worsening lower extremity edema.    ROS: Positive for shortness of breath, mild anxiety. Denies chest pain, diarrhea or fever.    O:Vital signs reviewed.   BP 92/52 (BP Location: Left arm, Patient Position: Lying)   Pulse (!) 48   Temp 97.8 °F (36.6 °C) (Axillary)   Resp 16   Ht 154.9 cm (60.98\")   Wt 105 kg (231 lb 4.2 oz)   SpO2 97%   BMI 43.72 kg/m²     Temp (24hrs), Av.9 °F (36.6 °C), Min:97.4 °F (36.3 °C), Max:98.5 °F (36.9 °C)        I & Os reviewed.   Intake/Output       23 0700 - 23 0659 23 0700 - 23 0659    Intake (ml) 1033 50    Output (ml) 4840 175    Net (ml) -3807 -125    Last Weight 105 kg (231 lb 4.2 oz) --          Net IO Since Admission: -4,374 mL [23 1016]    General/Constitutional: Mild acute distress noted.  Eyes: PERRL. EOMI  Neck:?  Positive JVD. No obvious masses noted.   Cardiovascular: S1 + S2.  Bradycardic.  Lungs/Respiratory: Mild respiratory distress noted.  No significant wheezing heard.  Bilateral crackles noted  GI/Abdomen: Somewhat distended but soft. Bowel sounds hypoactive.  No obvious organomegaly  Musculoskeletal/Extremities: 1+ edema noted. Gait could not be assessed at this time, as the patient was laying in bed.   Neurologic: Was able to follow commands.    Psych: AAOx3. Seemed more interactive today.  Skin: Appeared somewhat dry but without any overt rashes    Labs: Reviewed.   Results from last 7 days   Lab Units 23  0445 23  0418 23  0407 23  0618 23  0514   WBC 10*3/mm3 7.26 9.76 15.01* 13.55* 3.55 "   HEMOGLOBIN g/dL 10.5* 9.7* 10.2* 10.2* 11.5*   HEMATOCRIT % 32.8* 31.4* 32.4* 33.2* 36.0   PLATELETS 10*3/mm3 278 224 244 240 237   NEUTROPHIL % % 70.8 84.6* 89.6* 88.6* 76.7*   NEUTROS ABS 10*3/mm3 5.15 8.26* 13.44* 12.01* 2.72   EOSINOPHIL % % 3.9 0.8 0.4 1.2 6.5*   EOS ABS 10*3/mm3 0.28 0.08 0.06 0.16 0.23   LYMPHOCYTE % % 18.9* 10.8* 6.9* 6.1* 12.7*   LYMPHS ABS 10*3/mm3 1.37 1.05 1.04 0.82 0.45*             Lab Results   Component Value Date    PROCALCITO 11.98 (H) 03/27/2023    PROCALCITO 16.64 (H) 03/26/2023    PROCALCITO 24.01 (H) 03/26/2023       Lab Results   Component Value Date    CRP 41.5 (H) 02/06/2022       No results found for: SEDRATE    Lab Results   Component Value Date    PROBNP 917.6 (H) 03/28/2023    PROBNP 144.1 03/25/2023    PROBNP 502 03/22/2023       Results from last 7 days   Lab Units 03/29/23  0445 03/28/23  1845 03/28/23  0418 03/25/23  0820 03/25/23  0552   SODIUM mmol/L 141 139 143   < > 141   POTASSIUM mmol/L 3.2* 3.9 4.2   < > 3.3*   CHLORIDE mmol/L 97* 94* 107   < > 105   CO2 mmol/L 32.0* 30.8* 26.9   < > 22.7   BUN mg/dL 24* 22 22   < > 21   CREATININE mg/dL 1.07* 0.93 0.89   < > 0.96   CALCIUM mg/dL 9.0 9.1 8.4*   < > 8.0*   ANION GAP mmol/L 12.0 14.2 9.1   < > 13.3   BILIRUBIN mg/dL 0.6  --   --   --  0.8   ALK PHOS U/L 74  --   --   --  68   ALT (SGPT) U/L 5  --   --   --  7   AST (SGOT) U/L 10  --   --   --  15   GLUCOSE mg/dL 98 155* 108*   < > 109*   TOTAL PROTEIN g/dL 6.7  --   --   --  5.8*   ALBUMIN g/dL 3.1*  --   --   --  2.8*    < > = values in this interval not displayed.       Results from last 7 days   Lab Units 03/29/23  0445   MAGNESIUM mg/dL 2.2   PHOSPHORUS mg/dL 5.8*             No results found for: CKTOTAL    No components found for: HSTROPT    Lab Results   Component Value Date    TROPONINT 11 (H) 03/25/2023    TROPONINT 11 (H) 03/25/2023       Lab Results   Component Value Date    DDIMER 1.05 (C) 03/13/2022    DDIMER 0.57 02/06/2022    DDIMER 610 (C)  02/27/2019       Brief Urine Lab Results  (Last result in the past 365 days)      Color   Clarity   Blood   Leuk Est   Nitrite   Protein   CREAT   Urine HCG        03/24/23 1040 Yellow   SLCLOUDY   Negative   Negative   Negative                   Lab Results   Component Value Date    TSH 1.9 03/22/2023    TSH 1.50 02/15/2023    TSH 2.21 08/02/2022       No results found for: FREET4      Micro: As of March 29, 2023   No results found for: RESPCX  No results found for: BCIDPCR  Lab Results   Component Value Date    BLOODCX No growth at 3 days 03/25/2023    BLOODCX No growth at 3 days 03/25/2023     No results found for: URINECX  Lab Results   Component Value Date    MRSACX  07/02/2017     No Methicillin Resistant Staphylococcus aureus isolated     Lab Results   Component Value Date    MRSAPCR No MRSA Detected 03/25/2023     Lab Results   Component Value Date    URCX Not Indicated 03/24/2023    URCX Not Indicated 02/03/2022    URCX Not Indicated 12/28/2021     No components found for: LOWRESPCF  No results found for: THROATCX  No results found for: CULTURES  No components found for: STREPBCX  No results found for: STREPPNEUAG  No results found for: LEGIONELLA  No results found for: MYCOPLASCX  No results found for: GCCX  No results found for: WOUNDCX  No results found for: BODYFLDCX    No results found for: FLU    No results found for: ADENOVIRUS  No results found for: ZY518A  No results found for: CVHKU1  No results found for: CVNL63  No results found for: CVOC43  No results found for: HUMETPNEVS  No results found for: HURVEV  Lab Results   Component Value Date    FLUBPCR Not Detected 03/25/2023     No results found for: PARAINFLUE  No results found for: PARAFLUV2  No results found for: PARAFLUV3  No results found for: PARAFLUV4  No results found for: BPERTPCR  No results found for: [id number]  No results found for: CPNEUPCR  No results found for: MPNEUMO  Lab Results   Component Value Date    FLUAPCR Not Detected  03/25/2023     No results found for: FLUAH3  No results found for: FLUAH1  No results found for: RSV  No results found for: BPARAPCR    COVID 19:  Lab Results   Component Value Date    COVID19 Not Detected 03/25/2023       ABG: Reviewed  Recent Labs     03/25/23  0511   PHART 7.384   CBU6TOO 43.2   PO2ART 64.9*   BRM2CJK 25.8   BASEEXCESS 0.5     No results found for: LACTATE    Echo: Results for orders placed during the hospital encounter of 03/25/23    Adult Transthoracic Echo Complete W/ Cont if Necessary Per Protocol    Interpretation Summary  •  Left ventricular systolic function is normal. Calculated left ventricular EF = 62%  •  Left ventricular wall thickness is consistent with mild concentric hypertrophy.  •  Left ventricular diastolic function is consistent with (grade I) impaired relaxation.  •  No hemodynamically significant valvular pathology.      Results for orders placed in visit on 02/18/19    SCANNED - ECHOCARDIOGRAM        Pharmacy to Dose Zosyn,         Imaging: Latest imaging study was reviewed personally.   Imaging Results (Last 24 Hours)     Procedure Component Value Units Date/Time    XR Chest 1 View [[id number]] Collected: 03/29/23 0740     Updated: 03/29/23 0743    Narrative:      PROCEDURE: XR CHEST 1 VW-     HISTORY: Resp Failure.; J96.01-Acute respiratory failure with hypoxia;  J81.0-Acute pulmonary edema     COMPARISON: 1 day prior.     FINDINGS: The heart is mildly enlarged, stable.. There has been mild  interval worsening in the bilateral airspace disease compared to the  prior exam consistent with worsening edema or pneumonia.. The  mediastinum is unremarkable. There is no pneumothorax.  There are no  acute osseous abnormalities.       Impression:      Worsened bilateral airspace disease compared to prior,  recommend continued follow-up..           This report was signed and finalized on 3/29/2023 7:40 AM by Belinda Selby MD.    CT Abdomen Pelvis Without Contrast [[id number]]  Collected: 03/28/23 2052     Updated: 03/29/23 0639    Narrative:      CT ABDOMEN AND PELVIS WITHOUT CONTRAST     HISTORY: Gastroparesis/dysmotility (Ped 0-17years).     COMPARISON: None.     PROCEDURE: Axial images were obtained from the lung bases to the pubic  symphysis by computed tomography. This study was performed with  techniques to keep radiation doses as low as reasonably achievable,  (ALARA). Individualized dose reduction techniques using automated  exposure control or adjustment of mA and/or kV according to the patient  size were employed.     FINDINGS:      ABDOMEN:  There are fairly diffuse ground glass opacities involving both  visualized lower lung fields with some peripheral sparing.  Minimal left  pleural effusion identified. The heart is enlarged, consider congestive  heart failure. The limited non-contrast images of the liver demonstrates  no focal abnormality but the liver is enlarged at 19.6 cm. Gallbladder  is not identified. No metallic clips are seen in the right upper  quadrant. There is postsurgical change in the stomach suggesting  previous gastric sleeve. There appears to be a small hiatal hernia.  Vascular calcification is noted. The spleen is unremarkable. No adrenal  masses are seen. The aorta is normal in caliber. There is no significant  free fluid or adenopathy.  There is no nephrolithiasis. There is no  hydronephrosis.     PELVIS: The GI tract demonstrate no obstruction. The appendix is not  identified, no secondary signs of appendicitis seen.. The urinary  bladder is decompressed by a Bear catheter and an air-fluid level is  noted. Uterus is midline. There is a rounded low-attenuation structure  which is immediately adjacent to the fundus of the uterus and measures  26 Hounsfield units. This may be part of the sigmoid colon with mild  focal dilatation. Ovarian origin is another consideration. Consider  pelvic ultrasound for further evaluation. Air-fluid levels are  identified in  nondistended small and large bowel, consider enteritis.  There is no fluid or adenopathy.        Impression:      No hydronephrosis or nephrolithiasis.     Air-fluid levels in nondistended small and large bowel, possible  enteritis.     Postsurgical change of gastric sleeve and possible small hiatal hernia,  endoscopy may be helpful.     5 cm area of decreased attenuation in the pelvis adjacent to the fundus  of the uterus which may represent focally dilated sigmoid colon or  ovarian lesion, recommend pelvic ultrasound.     Fairly diffuse bilateral basilar airspace disease with cardiomegaly and  minimal left effusion, consider congestive heart failure.     This report was signed and finalized on 3/29/2023 6:37 AM by Belinda Selby MD.    XR Chest 1 View [[id number]] Collected: 03/28/23 1157     Updated: 03/28/23 1209    Narrative:      PROCEDURE: XR CHEST 1 VW-        HISTORY: f/u hypoxia; J96.01-Acute respiratory failure with hypoxia;  J81.0-Acute pulmonary edema     COMPARISON: One day prior.     FINDINGS: The patient is rotated to the right. The heart is stable in  size. The mediastinum is stable. There has been slight interval  improvement in diffuse bilateral airspace disease consistent with  improving edema or pneumonia. There is no pneumothorax.       Impression:      Slight interval improvement in diffuse bilateral airspace  disease consistent with improving edema or pneumonia.                       Images were reviewed, interpreted, and dictated by Dr. Belinda Selby MD  Transcribed by Tracy Mena PA-C.     This report was signed and finalized on 3/28/2023 12:07 PM by Belinda Selby MD.          Assessment & Recommendations/Plan:   1.  Acute respiratory failure  Continues to require high flow nasal cannula at 60 to 70% FiO2.  Will ask the respiratory therapist to decrease FiO2 as tolerated.  I have asked the nursing staff to make sure that the patient uses BiPAP 2 hours on and 2 hours off throughout the  day.  Chest x-ray shows worsening  We will repeat chest x-ray in the morning  We will repeat ABG in the morning  We will change Solu-Medrol to twice daily dosing, given worsening chest x-ray    2.  Pneumonia  Continue antibiotics    3.  Pulmonary edema?/Decompensated diastolic heart failure?  Patient had significant urine output with Lasix  Given chest x-ray that has worsened today, will continue diuresis with Lasix drip again today.  CT abdomen findings, also suggest the possibility of congestive heart failure given the finding of bilateral pleural effusions.    4.  ?  COPD  Continue Symbicort  We will add Spiriva to optimize treatment  Nebulized treatments will be adjusted as appropriate.    5.  Hypokalemia  On replacement therapy.  We will extend replacement for 2 more doses, given the need for Lasix which may further cause hypokalemia    6.  Fluid status  Net IO Since Admission: -4,374 mL [03/29/23 1052]  Lab Results   Component Value Date    PROBNP 917.6 (H) 03/28/2023    PROBNP 144.1 03/25/2023    PROBNP 502 03/22/2023     7.  Morbid obesity  Does complicate all aspects of care.    8.  Enteritis/abnormal CT of the abdomen  On antibiotics  Being managed by hospitalist service    9.  DVT and GI prophylaxis  Per admitting provider.    I have discussed the situation with Dr. Cervantes at bedside.  He agrees that the patient is critical.    Critical Care time spent in direct patient care: 40 minutes including high complexity decision making to assess, manipulate, and support vital organ system failure in this individual who has impairment of one or more vital organ systems such that there is a high probability of imminent or life threatening deterioration in the patient’s condition.  This time includes multiple reassessments throughout the day, if needed and as appropriate.  This time excludes other billable procedures. Time does include preparation of documents, review of old records, coordinating care with other  providers and direct bedside care.    We have reviewed patient's current orders and changes, if any, have been suggested to primary care team. Plan was also discussed with nursing staff, as necessary.     This document was electronically signed by Sebas Calero MD on 03/29/23 at 10:16 EDT      Dictated utilizing Dragon dictation.

## 2023-03-30 ENCOUNTER — APPOINTMENT (OUTPATIENT)
Dept: GENERAL RADIOLOGY | Facility: HOSPITAL | Age: 68
DRG: 193 | End: 2023-03-30
Payer: MEDICARE

## 2023-03-30 LAB
A-A DO2: ABNORMAL
ANION GAP SERPL CALCULATED.3IONS-SCNC: 9.1 MMOL/L (ref 5–15)
ARTERIAL PATENCY WRIST A: POSITIVE
ATMOSPHERIC PRESS: 740 MMHG
BACTERIA SPEC AEROBE CULT: NORMAL
BACTERIA SPEC AEROBE CULT: NORMAL
BASE EXCESS BLDA CALC-SCNC: 9 MMOL/L (ref 0–2)
BASOPHILS # BLD AUTO: 0.02 10*3/MM3 (ref 0–0.2)
BASOPHILS NFR BLD AUTO: 0.3 % (ref 0–1.5)
BDY SITE: ABNORMAL
BUN SERPL-MCNC: 26 MG/DL (ref 8–23)
BUN/CREAT SERPL: 26 (ref 7–25)
CALCIUM SPEC-SCNC: 8.9 MG/DL (ref 8.6–10.5)
CHLORIDE SERPL-SCNC: 96 MMOL/L (ref 98–107)
CO2 SERPL-SCNC: 29.9 MMOL/L (ref 22–29)
COHGB MFR BLD: <0.7 % (ref 0–2)
CREAT SERPL-MCNC: 1 MG/DL (ref 0.57–1)
DEPRECATED RDW RBC AUTO: 40.4 FL (ref 37–54)
EGFRCR SERPLBLD CKD-EPI 2021: 62.6 ML/MIN/1.73
EOSINOPHIL # BLD AUTO: 0.01 10*3/MM3 (ref 0–0.4)
EOSINOPHIL NFR BLD AUTO: 0.1 % (ref 0.3–6.2)
ERYTHROCYTE [DISTWIDTH] IN BLOOD BY AUTOMATED COUNT: 11.9 % (ref 12.3–15.4)
GAS FLOW AIRWAY: 8 LPM
GLUCOSE SERPL-MCNC: 200 MG/DL (ref 65–99)
HCO3 BLDA-SCNC: 32.9 MMOL/L (ref 22–28)
HCT VFR BLD AUTO: 34.8 % (ref 34–46.6)
HCT VFR BLD CALC: 35.4 %
HGB BLD-MCNC: 11.2 G/DL (ref 12–15.9)
IMM GRANULOCYTES # BLD AUTO: 0.12 10*3/MM3 (ref 0–0.05)
IMM GRANULOCYTES NFR BLD AUTO: 1.7 % (ref 0–0.5)
LYMPHOCYTES # BLD AUTO: 0.93 10*3/MM3 (ref 0.7–3.1)
LYMPHOCYTES NFR BLD AUTO: 13.5 % (ref 19.6–45.3)
Lab: ABNORMAL
MCH RBC QN AUTO: 29.8 PG (ref 26.6–33)
MCHC RBC AUTO-ENTMCNC: 32.2 G/DL (ref 31.5–35.7)
MCV RBC AUTO: 92.6 FL (ref 79–97)
METHGB BLD QL: 0.4 % (ref 0–1.5)
MODALITY: ABNORMAL
MONOCYTES # BLD AUTO: 0.13 10*3/MM3 (ref 0.1–0.9)
MONOCYTES NFR BLD AUTO: 1.9 % (ref 5–12)
NEUTROPHILS NFR BLD AUTO: 5.67 10*3/MM3 (ref 1.7–7)
NEUTROPHILS NFR BLD AUTO: 82.5 % (ref 42.7–76)
NOTE: ABNORMAL
NRBC BLD AUTO-RTO: 0 /100 WBC (ref 0–0.2)
OXYHGB MFR BLDV: 98.3 % (ref 94–99)
PCO2 BLDA: 41.3 MM HG (ref 35–45)
PCO2 TEMP ADJ BLD: ABNORMAL MM[HG]
PH BLDA: 7.51 PH UNITS (ref 7.35–7.45)
PH, TEMP CORRECTED: ABNORMAL
PLATELET # BLD AUTO: 284 10*3/MM3 (ref 140–450)
PMV BLD AUTO: 11.6 FL (ref 6–12)
PO2 BLDA: 146 MM HG (ref 75–100)
PO2 TEMP ADJ BLD: ABNORMAL MM[HG]
POTASSIUM SERPL-SCNC: 3.9 MMOL/L (ref 3.5–5.2)
RBC # BLD AUTO: 3.76 10*6/MM3 (ref 3.77–5.28)
SAO2 % BLDCOA: 98.7 % (ref 94–100)
SODIUM SERPL-SCNC: 135 MMOL/L (ref 136–145)
VENTILATOR MODE: ABNORMAL
WBC NRBC COR # BLD: 6.88 10*3/MM3 (ref 3.4–10.8)

## 2023-03-30 PROCEDURE — 36600 WITHDRAWAL OF ARTERIAL BLOOD: CPT

## 2023-03-30 PROCEDURE — 85025 COMPLETE CBC W/AUTO DIFF WBC: CPT | Performed by: STUDENT IN AN ORGANIZED HEALTH CARE EDUCATION/TRAINING PROGRAM

## 2023-03-30 PROCEDURE — 94664 DEMO&/EVAL PT USE INHALER: CPT

## 2023-03-30 PROCEDURE — 99233 SBSQ HOSP IP/OBS HIGH 50: CPT | Performed by: FAMILY MEDICINE

## 2023-03-30 PROCEDURE — 25010000002 PIPERACILLIN SOD-TAZOBACTAM PER 1 G: Performed by: FAMILY MEDICINE

## 2023-03-30 PROCEDURE — 82805 BLOOD GASES W/O2 SATURATION: CPT

## 2023-03-30 PROCEDURE — 94799 UNLISTED PULMONARY SVC/PX: CPT

## 2023-03-30 PROCEDURE — 80048 BASIC METABOLIC PNL TOTAL CA: CPT | Performed by: STUDENT IN AN ORGANIZED HEALTH CARE EDUCATION/TRAINING PROGRAM

## 2023-03-30 PROCEDURE — 71045 X-RAY EXAM CHEST 1 VIEW: CPT

## 2023-03-30 PROCEDURE — 99233 SBSQ HOSP IP/OBS HIGH 50: CPT | Performed by: INTERNAL MEDICINE

## 2023-03-30 PROCEDURE — 25010000002 ENOXAPARIN PER 10 MG: Performed by: FAMILY MEDICINE

## 2023-03-30 PROCEDURE — 83050 HGB METHEMOGLOBIN QUAN: CPT

## 2023-03-30 PROCEDURE — 25010000002 ENOXAPARIN PER 10 MG: Performed by: STUDENT IN AN ORGANIZED HEALTH CARE EDUCATION/TRAINING PROGRAM

## 2023-03-30 PROCEDURE — 25010000002 PIPERACILLIN SOD-TAZOBACTAM PER 1 G: Performed by: STUDENT IN AN ORGANIZED HEALTH CARE EDUCATION/TRAINING PROGRAM

## 2023-03-30 PROCEDURE — 94761 N-INVAS EAR/PLS OXIMETRY MLT: CPT

## 2023-03-30 PROCEDURE — 25010000002 METHYLPREDNISOLONE PER 40 MG: Performed by: INTERNAL MEDICINE

## 2023-03-30 PROCEDURE — 82375 ASSAY CARBOXYHB QUANT: CPT

## 2023-03-30 PROCEDURE — 94660 CPAP INITIATION&MGMT: CPT

## 2023-03-30 PROCEDURE — 94762 N-INVAS EAR/PLS OXIMTRY CONT: CPT

## 2023-03-30 RX ORDER — CALCIUM CARBONATE 200(500)MG
2 TABLET,CHEWABLE ORAL 3 TIMES DAILY PRN
Status: DISCONTINUED | OUTPATIENT
Start: 2023-03-30 | End: 2023-04-01 | Stop reason: HOSPADM

## 2023-03-30 RX ORDER — FUROSEMIDE 40 MG/1
40 TABLET ORAL DAILY
Status: COMPLETED | OUTPATIENT
Start: 2023-03-30 | End: 2023-04-01

## 2023-03-30 RX ORDER — PREDNISONE 20 MG/1
40 TABLET ORAL
Status: DISCONTINUED | OUTPATIENT
Start: 2023-03-31 | End: 2023-04-01 | Stop reason: HOSPADM

## 2023-03-30 RX ADMIN — GABAPENTIN 400 MG: 400 CAPSULE ORAL at 15:27

## 2023-03-30 RX ADMIN — FLUTICASONE PROPIONATE 2 SPRAY: 50 SPRAY, METERED NASAL at 12:23

## 2023-03-30 RX ADMIN — ASPIRIN 81 MG CHEWABLE TABLET 81 MG: 81 TABLET CHEWABLE at 08:23

## 2023-03-30 RX ADMIN — LEVOTHYROXINE SODIUM 50 MCG: 0.05 TABLET ORAL at 06:13

## 2023-03-30 RX ADMIN — BUDESONIDE AND FORMOTEROL FUMARATE DIHYDRATE 2 PUFF: 160; 4.5 AEROSOL RESPIRATORY (INHALATION) at 07:18

## 2023-03-30 RX ADMIN — GABAPENTIN 400 MG: 400 CAPSULE ORAL at 21:06

## 2023-03-30 RX ADMIN — ATORVASTATIN CALCIUM 20 MG: 20 TABLET, FILM COATED ORAL at 08:23

## 2023-03-30 RX ADMIN — TAZOBACTAM SODIUM AND PIPERACILLIN SODIUM 4.5 G: 500; 4 INJECTION, SOLUTION INTRAVENOUS at 12:23

## 2023-03-30 RX ADMIN — MONTELUKAST 10 MG: 10 TABLET, FILM COATED ORAL at 21:06

## 2023-03-30 RX ADMIN — FUROSEMIDE 40 MG: 40 TABLET ORAL at 12:23

## 2023-03-30 RX ADMIN — ENOXAPARIN SODIUM 40 MG: 100 INJECTION SUBCUTANEOUS at 21:07

## 2023-03-30 RX ADMIN — Medication 10 ML: at 08:23

## 2023-03-30 RX ADMIN — TRAZODONE HYDROCHLORIDE 100 MG: 50 TABLET ORAL at 21:06

## 2023-03-30 RX ADMIN — Medication 10 ML: at 21:07

## 2023-03-30 RX ADMIN — ENOXAPARIN SODIUM 40 MG: 100 INJECTION SUBCUTANEOUS at 10:38

## 2023-03-30 RX ADMIN — TAZOBACTAM SODIUM AND PIPERACILLIN SODIUM 4.5 G: 500; 4 INJECTION, SOLUTION INTRAVENOUS at 21:18

## 2023-03-30 RX ADMIN — GABAPENTIN 400 MG: 400 CAPSULE ORAL at 06:13

## 2023-03-30 RX ADMIN — PRAMIPEXOLE DIHYDROCHLORIDE 1 MG: 1 TABLET ORAL at 21:06

## 2023-03-30 RX ADMIN — METHYLPREDNISOLONE SODIUM SUCCINATE 40 MG: 40 INJECTION, POWDER, FOR SOLUTION INTRAMUSCULAR; INTRAVENOUS at 08:23

## 2023-03-30 RX ADMIN — TAZOBACTAM SODIUM AND PIPERACILLIN SODIUM 4.5 G: 500; 4 INJECTION, SOLUTION INTRAVENOUS at 03:57

## 2023-03-30 RX ADMIN — CALCIUM CARBONATE (ANTACID) CHEW TAB 500 MG 2 TABLET: 500 CHEW TAB at 15:29

## 2023-03-30 RX ADMIN — FLUOXETINE 40 MG: 20 CAPSULE ORAL at 08:23

## 2023-03-30 RX ADMIN — PANTOPRAZOLE SODIUM 40 MG: 40 TABLET, DELAYED RELEASE ORAL at 06:13

## 2023-03-30 RX ADMIN — TIOTROPIUM BROMIDE INHALATION SPRAY 2 PUFF: 3.12 SPRAY, METERED RESPIRATORY (INHALATION) at 07:20

## 2023-03-30 NOTE — PROGRESS NOTES
[facility]    [facility]    Name:  Jessica Flowers   Age:  67 y.o.  Sex:  female  :  1955  MRN:  [id number]   Visit Number:  [id number]  Admission Date:  3/25/2023  Date Of Service:  23  Primary Care Physician:  Karley Jolly APRN     LOS: 5 days :    Chief Complaint:      Shortness of breath    Subjective:    Patient appears to be doing better today.  She is sitting up and breathing comfortably on about 5 L of oxygen.  I discussed awaiting on her pelvic ultrasound results but otherwise everything seems to be significantly better.  We will plan on moving her out of the ICU.    Hospital Course:    The patient is a 67-year-old female with a history of apparent preserved ejection fraction heart failure, hypertension, dyslipidemia, hypothyroidism, morbid obesity, obstructive sleep apnea, who presented to the emergency room with increasing shortness of breath.  She had apparently had a recent hospitalization due to pulmonary edema and was on diuretic therapy and was discharged less than 48 hours ago.  Her initial pulse ox was in the 50s per EMS.    In the ER, the patient was requiring BiPAP initially.  Her vital signs were showing stable blood pressures.  ABG with hypoxia noted.  She had mild elevated high-sensitivity troponin.  proBNP within normal limits.  COVID and flu testing were negative.  Chest x-ray was showing cardiomegaly with concern for pneumonia.  She was given Bumex.  She continued to have respiratory failure and was started on broad-spectrum antibiotics due to the elevated procalcitonin.    Pulmonology consult was requested on 3/28/2023 due to the degree of hypoxemia.  She was started on Lasix drip and had around 4 L of output.  She has been on antibiotics due to concern for the pneumonia.  She did have some abnormal findings on the CT scan abdomen pelvis and a pelvic ultrasound was ordered.  She likely has some degree of enterocolitis.    Patient has  continued to improve with diuretics and antibiotics.    Review of Systems:     All systems were reviewed and negative except as mentioned in subjective, assessment and plan.    Vital Signs:    Temp:  [97 °F (36.1 °C)-98.7 °F (37.1 °C)] 97 °F (36.1 °C)  Heart Rate:  [43-70] 50  Resp:  [11-18] 17  BP: ()/(60-84) 117/77    Intake and output:    I/O last 3 completed shifts:  In: 1125 [P.O.:827; I.V.:163; IV Piggyback:135]  Out: 4730 [Urine:4730]  No intake/output data recorded.    Physical Examination:    General Appearance:  Alert and cooperative.  Nasal cannula in place   Head:  Atraumatic and normocephalic.   Eyes: Conjunctivae and sclerae normal, no icterus. No pallor.   Throat: No oral lesions, no thrush, oral mucosa moist.   Neck: Supple, trachea midline, no thyromegaly.   Lungs:    Some scattered wheezes but no significant crackles today nontachypneic at rest currently   Heart:  Normal S1 and S2, no murmur, no gallop, no rub. No JVD.   Abdomen:   Normal bowel sounds, no masses, no organomegaly. Soft, nontender, nondistended, no rebound tenderness.  Obese abdomen   Extremities: Supple, no significant lower edema on exam, no cyanosis, no clubbing.   Skin: No bleeding or rash.   Neurologic: Alert and oriented x 3. No facial asymmetry. Moves all four limbs. No tremors.      Laboratory results:    Results from last 7 days   Lab Units 03/30/23  0450 03/29/23  1452 03/29/23  0445 03/28/23  1845 03/25/23  0820 03/25/23  0552   SODIUM mmol/L 135*  --  141 139   < > 141   POTASSIUM mmol/L 3.9 4.0 3.2* 3.9   < > 3.3*   CHLORIDE mmol/L 96*  --  97* 94*   < > 105   CO2 mmol/L 29.9*  --  32.0* 30.8*   < > 22.7   BUN mg/dL 26*  --  24* 22   < > 21   CREATININE mg/dL 1.00  --  1.07* 0.93   < > 0.96   CALCIUM mg/dL 8.9  --  9.0 9.1   < > 8.0*   BILIRUBIN mg/dL  --   --  0.6  --   --  0.8   ALK PHOS U/L  --   --  74  --   --  68   ALT (SGPT) U/L  --   --  5  --   --  7   AST (SGOT) U/L  --   --  10  --   --  15   GLUCOSE  mg/dL 200*  --  98 155*   < > 109*    < > = values in this interval not displayed.     Results from last 7 days   Lab Units 03/30/23  0450 03/29/23  0445 03/28/23  0418   WBC 10*3/mm3 6.88 7.26 9.76   HEMOGLOBIN g/dL 11.2* 10.5* 9.7*   HEMATOCRIT % 34.8 32.8* 31.4*   PLATELETS 10*3/mm3 284 278 224         Results from last 7 days   Lab Units 03/25/23  0820 03/25/23  0552   HSTROP T ng/L 11* 11*     Results from last 7 days   Lab Units 03/25/23  1115 03/25/23  1110   BLOODCX  No growth at 4 days No growth at 4 days     Recent Labs     03/25/23  0511 03/30/23  0734   PHART 7.384 7.509*   TXW9AFP 43.2 41.3   PO2ART 64.9* 146.0*   JEM0TGA 25.8 32.9*   BASEEXCESS 0.5 9.0*      I have reviewed the patient's laboratory results.    Radiology results:    CT Abdomen Pelvis Without Contrast    Result Date: 3/29/2023  CT ABDOMEN AND PELVIS WITHOUT CONTRAST  HISTORY: Gastroparesis/dysmotility (Ped 0-17years).  COMPARISON: None.  PROCEDURE: Axial images were obtained from the lung bases to the pubic symphysis by computed tomography. This study was performed with techniques to keep radiation doses as low as reasonably achievable, (ALARA). Individualized dose reduction techniques using automated exposure control or adjustment of mA and/or kV according to the patient size were employed.  FINDINGS:  ABDOMEN:  There are fairly diffuse ground glass opacities involving both visualized lower lung fields with some peripheral sparing.  Minimal left pleural effusion identified. The heart is enlarged, consider congestive heart failure. The limited non-contrast images of the liver demonstrates no focal abnormality but the liver is enlarged at 19.6 cm. Gallbladder is not identified. No metallic clips are seen in the right upper quadrant. There is postsurgical change in the stomach suggesting previous gastric sleeve. There appears to be a small hiatal hernia. Vascular calcification is noted. The spleen is unremarkable. No adrenal masses are  seen. The aorta is normal in caliber. There is no significant free fluid or adenopathy.  There is no nephrolithiasis. There is no hydronephrosis.  PELVIS: The GI tract demonstrate no obstruction. The appendix is not identified, no secondary signs of appendicitis seen.. The urinary bladder is decompressed by a Bear catheter and an air-fluid level is noted. Uterus is midline. There is a rounded low-attenuation structure which is immediately adjacent to the fundus of the uterus and measures 26 Hounsfield units. This may be part of the sigmoid colon with mild focal dilatation. Ovarian origin is another consideration. Consider pelvic ultrasound for further evaluation. Air-fluid levels are identified in nondistended small and large bowel, consider enteritis. There is no fluid or adenopathy.      Impression: No hydronephrosis or nephrolithiasis.  Air-fluid levels in nondistended small and large bowel, possible enteritis.  Postsurgical change of gastric sleeve and possible small hiatal hernia, endoscopy may be helpful.  5 cm area of decreased attenuation in the pelvis adjacent to the fundus of the uterus which may represent focally dilated sigmoid colon or ovarian lesion, recommend pelvic ultrasound.  Fairly diffuse bilateral basilar airspace disease with cardiomegaly and minimal left effusion, consider congestive heart failure.  This report was signed and finalized on 3/29/2023 6:37 AM by Belinda Selby MD.    XR Chest 1 View    Result Date: 3/29/2023  PROCEDURE: XR CHEST 1 VW-  HISTORY: Resp Failure.; J96.01-Acute respiratory failure with hypoxia; J81.0-Acute pulmonary edema  COMPARISON: 1 day prior.  FINDINGS: The heart is mildly enlarged, stable.. There has been mild interval worsening in the bilateral airspace disease compared to the prior exam consistent with worsening edema or pneumonia.. The mediastinum is unremarkable. There is no pneumothorax.  There are no acute osseous abnormalities.      Impression: Worsened  bilateral airspace disease compared to prior, recommend continued follow-up..    This report was signed and finalized on 3/29/2023 7:40 AM by Belinda Selby MD.    I have reviewed the patient's radiology reports.    Medication Review:     I have reviewed the patient's active and prn medications.     Problem List:      Acute respiratory failure with hypoxia (HCC)    Postmenopausal HRT (hormone replacement therapy)    OAB (overactive bladder)    Hypothyroid    Hypertension    Hyperlipidemia    GERD (gastroesophageal reflux disease)    Depression    Anxiety    Glucose intolerance (impaired glucose tolerance)    Acute exacerbation of CHF (congestive heart failure) (HCC)    Bacterial pneumonia    DORENE (acute kidney injury) (HCC)    (HFpEF) heart failure with preserved ejection fraction (HCC)      Assessment:    Acute respiratory failure with hypoxia (HCC)    Postmenopausal HRT (hormone replacement therapy)    OAB (overactive bladder)    Hypothyroid    Hypertension    Hyperlipidemia    GERD (gastroesophageal reflux disease)    Depression    Anxiety    Glucose intolerance (impaired glucose tolerance)    Acute exacerbation of CHF (congestive heart failure) (HCC)    Bacterial pneumonia    DORENE (acute kidney injury) (HCC)    (HFpEF) heart failure with preserved ejection fraction (HCC)    Plan:    Acute respiratory failure with hypoxia, POA  Acute exacerbation of HFpEF, suspected, POA  Bacterial pneumonia, POA  Still is requiring intermittent BiPAP.  Will have a trial of Vapotherm or high flow nasal cannula.  Have asked Dr. Calero to see in consult due to degree of hypoxemia.  She has been on Zosyn due to elevated procalcitonin and pneumonia.  MRSA swab was negative.  Cultures have been negative.  She has been on Solu-Medrol    2D echo showing EF of 62% with diastolic dysfunction noted.    Patient had significant urinary output while on Lasix drip and overall has improved.  She is down to about 5 L of oxygen now.  She has been on  antibiotics and will repeat procalcitonin within 24 hours.     Elevated Creatinine  Urinary Retention, POA  Bear placed 3/26. Monitoring.       Improved after bladder decompressed with Bear placement.     Chronic:  heart failure with preserved ejection fraction, hypertension, dyslipidemia, hypothyroidism, vitamin D deficiency, psoriasis, GERD, fatigue, anxiety and depression, allergic rhinitis, postmenopausal hormone replacement therapy, EZEQUIEL in the process of being arranged for home CPAP.  Follows with Dr. Giraldo for cardiology.     Awaiting to follow-up on pelvic ultrasound results.  Otherwise I think she can be stepdown to telemetry    DVT Prophylaxis: Lovenox  Code Status: Full code   Diet: Cardiac clear  Discharge Plan: She may need short-term rehab, will need PT and OT today    Gifty Cervantes DO  03/30/23  09:20 EDT    Dictated utilizing Dragon dictation.

## 2023-03-30 NOTE — PROGRESS NOTES
Enter Query Response Below      Query Response:     Bacterial Pneumonia, unknown organism Electronically signed by Gifty Cervantes, , 23, 2:37 PM EDT.           If applicable, please update the problem list.     Patient: Jessica Flowers        : 1955  Account: [id number]           Admit Date:         How to Respond to this query:       a. Click New Note     b. Answer query within the yellow box.                c. Update the Problem List, if applicable        67 year old female with diastolic HF, HTN was admitted 3/25 for Acute hypoxic Respiratory failure, decompensated HF, and Bacterial PNA.  Patient has been treated with Vancomycin(3/25, 3/26), Zosyn( 3/25-3/30).    Please further clarify the Pneumonia as:    - Gram Negative Pneumonia (excluding Haemophilus influenzae)  - Bacterial Pneumonia, unknown organism  - Other_______( please specify)  - Unable to determine      By submitting this query, we are merely seeking further clarification of documentation to accurately reflect all conditions that you are monitoring, evaluating, treating or that extend the hospitalization or utilize additional resources of care. Please utilize your independent clinical judgment when addressing the question(s) above.     This query and your response, once completed, will be entered into the legal medical record.    Sincerely,  Farhad Flores RN CDI  Clinical Documentation Integrity Program   [email or web address]

## 2023-03-30 NOTE — PROGRESS NOTES
"  CC: Acute respiratory failure.    S: Currently on high flow nasal cannula.  Continues to be more awake.     Upon questioning, the patient denies any significant history of smoking.  She is not aware of having being diagnosed with asthma or COPD.  She does remember being told about obstructive sleep apnea, 2-3 years ago by an ENT provider who shares office-based with Dr. Giraldo.  She says that she was told that she has sleep apnea but never got a device delivered to the house?    ROS: Positive for shortness of breath, mild anxiety. Denies chest pain, diarrhea or fever.    O:Vital signs reviewed.   /77 (BP Location: Left arm, Patient Position: Lying)   Pulse 50   Temp 97 °F (36.1 °C) (Temporal)   Resp 17   Ht 154.9 cm (60.98\")   Wt 104 kg (228 lb 2.8 oz)   SpO2 96%   BMI 43.14 kg/m²     Temp (24hrs), Av °F (36.7 °C), Min:97 °F (36.1 °C), Max:98.7 °F (37.1 °C)        I & Os reviewed.   Intake/Output       23 0700 - 23 0659    Intake (ml) 627    Output (ml) 2550    Net (ml) -1923    Last Weight 104 kg (228 lb 2.8 oz)          Net IO Since Admission: -6,172 mL [23 1005]    General/Constitutional: Mild acute distress noted.  Eyes: PERRL. EOMI  Neck:?  Positive JVD. No obvious masses noted.   Cardiovascular: S1 + S2.  Bradycardic.  Lungs/Respiratory: Mild respiratory distress noted.  No significant wheezing heard.  Bilateral crackles noted  GI/Abdomen: Somewhat distended but soft. Bowel sounds hypoactive.  No obvious organomegaly  Musculoskeletal/Extremities: 1+ edema noted. Gait could not be assessed at this time, as the patient was laying in bed.   Neurologic: Was able to follow commands.    Psych: AAOx3. Seemed more interactive today.  Skin: Appeared somewhat dry but without any overt rashes    Labs: Reviewed.   Results from last 7 days   Lab Units 23  0450 23  0445 23  0418 23  0407 23  0618   WBC 10*3/mm3 6.88 7.26 9.76 15.01* 13.55*   HEMOGLOBIN g/dL " 11.2* 10.5* 9.7* 10.2* 10.2*   HEMATOCRIT % 34.8 32.8* 31.4* 32.4* 33.2*   PLATELETS 10*3/mm3 284 278 224 244 240   NEUTROPHIL % % 82.5* 70.8 84.6* 89.6* 88.6*   NEUTROS ABS 10*3/mm3 5.67 5.15 8.26* 13.44* 12.01*   EOSINOPHIL % % 0.1* 3.9 0.8 0.4 1.2   EOS ABS 10*3/mm3 0.01 0.28 0.08 0.06 0.16   LYMPHOCYTE % % 13.5* 18.9* 10.8* 6.9* 6.1*   LYMPHS ABS 10*3/mm3 0.93 1.37 1.05 1.04 0.82             Lab Results   Component Value Date    PROCALCITO 11.98 (H) 03/27/2023    PROCALCITO 16.64 (H) 03/26/2023    PROCALCITO 24.01 (H) 03/26/2023       Lab Results   Component Value Date    CRP 41.5 (H) 02/06/2022       No results found for: SEDRATE    Lab Results   Component Value Date    PROBNP 917.6 (H) 03/28/2023    PROBNP 144.1 03/25/2023    PROBNP 502 03/22/2023       Results from last 7 days   Lab Units 03/30/23  0450 03/29/23  1452 03/29/23  0445 03/28/23  1845 03/25/23  0820 03/25/23  0552   SODIUM mmol/L 135*  --  141 139   < > 141   POTASSIUM mmol/L 3.9 4.0 3.2* 3.9   < > 3.3*   CHLORIDE mmol/L 96*  --  97* 94*   < > 105   CO2 mmol/L 29.9*  --  32.0* 30.8*   < > 22.7   BUN mg/dL 26*  --  24* 22   < > 21   CREATININE mg/dL 1.00  --  1.07* 0.93   < > 0.96   CALCIUM mg/dL 8.9  --  9.0 9.1   < > 8.0*   ANION GAP mmol/L 9.1  --  12.0 14.2   < > 13.3   BILIRUBIN mg/dL  --   --  0.6  --   --  0.8   ALK PHOS U/L  --   --  74  --   --  68   ALT (SGPT) U/L  --   --  5  --   --  7   AST (SGOT) U/L  --   --  10  --   --  15   GLUCOSE mg/dL 200*  --  98 155*   < > 109*   TOTAL PROTEIN g/dL  --   --  6.7  --   --  5.8*   ALBUMIN g/dL  --   --  3.1*  --   --  2.8*    < > = values in this interval not displayed.       Results from last 7 days   Lab Units 03/29/23  0445   MAGNESIUM mg/dL 2.2   PHOSPHORUS mg/dL 5.8*             No results found for: CKTOTAL    No components found for: HSTROPT    Lab Results   Component Value Date    TROPONINT 11 (H) 03/25/2023    TROPONINT 11 (H) 03/25/2023       Lab Results   Component Value Date     DDIMER 1.05 (C) 03/13/2022    DDIMER 0.57 02/06/2022    DDIMER 610 (C) 02/27/2019       Brief Urine Lab Results  (Last result in the past 365 days)      Color   Clarity   Blood   Leuk Est   Nitrite   Protein   CREAT   Urine HCG        03/24/23 1040 Yellow   SLCLOUDY   Negative   Negative   Negative                   Lab Results   Component Value Date    TSH 1.9 03/22/2023    TSH 1.50 02/15/2023    TSH 2.21 08/02/2022       No results found for: FREET4      Micro: As of March 30, 2023   No results found for: RESPCX  No results found for: BCIDPCR  Lab Results   Component Value Date    BLOODCX No growth at 4 days 03/25/2023    BLOODCX No growth at 4 days 03/25/2023     No results found for: URINECX  Lab Results   Component Value Date    MRSACX  07/02/2017     No Methicillin Resistant Staphylococcus aureus isolated     Lab Results   Component Value Date    MRSAPCR No MRSA Detected 03/25/2023     Lab Results   Component Value Date    URCX Not Indicated 03/24/2023    URCX Not Indicated 02/03/2022    URCX Not Indicated 12/28/2021     No components found for: LOWRESPCF  No results found for: THROATCX  No results found for: CULTURES  No components found for: STREPBCX  No results found for: STREPPNEUAG  No results found for: LEGIONELLA  No results found for: MYCOPLASCX  No results found for: GCCX  No results found for: WOUNDCX  No results found for: BODYFLDCX    No results found for: FLU    No results found for: ADENOVIRUS  No results found for: LU505K  No results found for: CVHKU1  No results found for: CVNL63  No results found for: CVOC43  No results found for: HUMETPNEVS  No results found for: HURVEV  Lab Results   Component Value Date    FLUBPCR Not Detected 03/25/2023     No results found for: PARAINFLUE  No results found for: PARAFLUV2  No results found for: PARAFLUV3  No results found for: PARAFLUV4  No results found for: BPERTPCR  No results found for: [id number]  No results found for: CPNEUPCR  No results found for:  MPNEUMO  Lab Results   Component Value Date    FLUAPCR Not Detected 03/25/2023     No results found for: FLUAH3  No results found for: FLUAH1  No results found for: RSV  No results found for: BPARAPCR    COVID 19:  Lab Results   Component Value Date    COVID19 Not Detected 03/25/2023       ABG: Reviewed  Recent Labs     03/25/23  0511 03/30/23  0734   PHART 7.384 7.509*   MWO5ICW 43.2 41.3   PO2ART 64.9* 146.0*   ULN8LXC 25.8 32.9*   BASEEXCESS 0.5 9.0*     No results found for: LACTATE    Echo: Results for orders placed during the hospital encounter of 03/25/23    Adult Transthoracic Echo Complete W/ Cont if Necessary Per Protocol    Interpretation Summary  •  Left ventricular systolic function is normal. Calculated left ventricular EF = 62%  •  Left ventricular wall thickness is consistent with mild concentric hypertrophy.  •  Left ventricular diastolic function is consistent with (grade I) impaired relaxation.  •  No hemodynamically significant valvular pathology.      Results for orders placed in visit on 02/18/19    SCANNED - ECHOCARDIOGRAM        Pharmacy to Dose Zosyn,         Imaging: Latest imaging study was reviewed personally.   Imaging Results (Last 24 Hours)     Procedure Component Value Units Date/Time    XR Chest 1 View [[id number]] Resulted: 03/30/23 0503     Updated: 03/30/23 0504    US Pelvis Limited [[id number]] Resulted: 03/29/23 1549     Updated: 03/29/23 1550          Assessment & Recommendations/Plan:   1.  Acute respiratory failure  Continues to require high flow nasal cannula at 5-10 L/min  Will ask the respiratory therapist to decrease FiO2 as tolerated.  Will change her to CPAP. Use at night.  Today's Chest x-ray shows improvement compared to 3/29/2023.  We will repeat chest x-ray, as clinically appropriate  We will start her on Oral Prednisone for 3-5 days.     2.  Pneumonia  Continue antibiotics  Can D/C after 5-7 days.   Will repeat ProCal in Am.     3.  Pulmonary edema?/Decompensated  diastolic heart failure?  Patient had significant urine output with Lasix drip over 2 days.  We will continue oral Lasix for 3 more days.  Will benefit from being discharged on PO lasix, till she can be evaluated by her cardiologist, Dr. Giraldo.   CT abdomen findings, also suggest the possibility of congestive heart failure given the finding of bilateral pleural effusions.    4.  ?  COPD  History is not suggestive  We will discontinue Symbicort and Spiriva  We will readjust medications as clinically indicated  Nebulized treatments will be adjusted as appropriate.    5.  EZEQUIEL?  Will try to get sleep study from 2-3 year ago  Will need to be setup with CPAP.    6.  Fluid status  Net IO Since Admission: -6,172 mL [03/30/23 1005]  Lab Results   Component Value Date    PROBNP 917.6 (H) 03/28/2023    PROBNP 144.1 03/25/2023    PROBNP 502 03/22/2023     7.  Morbid obesity  Does complicate all aspects of care.    8.  Enteritis/abnormal CT of the abdomen  On antibiotics  Being managed by hospitalist service    9.  DVT and GI prophylaxis  Per admitting provider.    10.  Discharge recommendations  Will need to be followed by her cardiologist  Will need to be followed by her sleep physician, Dr. Sorensen?  Follow-up with Dr. Calero or Samina MARIE, in 3-4 months.  Already ordered    We have reviewed patient's current orders and changes, if any, have been suggested to primary care team. Plan was also discussed with nursing staff, as necessary.     This document was electronically signed by Sebas Calero MD on 03/30/23 at 10:05 EDT      Dictated utilizing Dragon dictation.

## 2023-03-30 NOTE — CASE MANAGEMENT/SOCIAL WORK
1130 Cm spoke with pt at bedside to discuss DCP. Pt states she is feeling better and may be discharged tomorrow. She is currently wearing 5L NC with sat 95%. Pt states she is really weak and would be agreeable to go to  swing bed for Skilled rehab if therapy feels she needs to go. She does not want [facility]. She is also agreeable to . She states she would have help from her 15 yo grandson who lives with her and from her dtr/granddaughter that live close by. Cm agrees to sent referral to  swing bed if therapy recommends skilled rehab for her.     Per Elie RT, they can do overnight oximetry that can qualify pt for PAP device at discharge if MD feels this is appropriate. Secure chat sent to Dr Cervantes requesting Order for therapy eval and overnight oximetry if he feels it is appropriate. Cm following.

## 2023-03-30 NOTE — PROGRESS NOTES
"Enter Query Response Below      Query Response:     No, DORENE not supported Electronically signed by Gifty Cervantes DO, 23, 2:37 PM EDT.           If applicable, please update the problem list.     Patient: Jessica Flowers        : 1955  Account: [id number]           Admit Date:         How to Respond to this query:       a. Click New Note     b. Answer query within the yellow box.                c. Update the Problem List, if applicable.    ,    67 year old female with diastolic HF, HTN discharged 20 hrs prior to this admission for pulmonary edema and hypoxia was admitted 3/25 for Acute hypoxic Respiratory failure, decompensated HF, and Bacterial PNA.  Hospitalist 3/30 \"DORENE.\"  Creatinine levels this admission:  3/25 - 0.96  3/26 - 1.22  3/27 - 1.04  3/28 - 0.89, 0.93  3/29 - 1.07  3/30 - 1.00  Patient has been treated with Lasix IV/PO and Lasix infusion, Bumex IV and NS IV fluids.    After study, was Acute kidney injury clinically supported during this admission?    - Yes, please include additional clinical indicators:____________  - No  - Other- specify________  - Unable to determine     [employer] KDIGO conference defines DORENE as any of the following:   -Increase in creatinine level to > 1.5x baseline (historical or measure), which is known or presumed to have occurred within the prior 7 days; or   -Increase in creatinine > 0.3 mg/dl from a measured baseline within 48 hours or less; or   -Urine output < 0.5 ml/kg/hr for 6 hrs   When baseline creatinine is unknown, KDIGO advises: The lowest SCr obtained during a hospitalization is usually equal to or greater than the baseline. This SCr should be used to diagnose (and stage) DORENE    By submitting this query, we are merely seeking further clarification of documentation to accurately reflect all conditions that you are monitoring, evaluating, treating or that extend the hospitalization or utilize additional resources of care. " Please utilize your independent clinical judgment when addressing the question(s) above.     This query and your response, once completed, will be entered into the legal medical record.    Sincerely,  Farhad Flores RN CDI  Clinical Documentation Integrity Program   [email or web address]

## 2023-03-30 NOTE — PLAN OF CARE
Goal Outcome Evaluation:  Plan of Care Reviewed With: patient        Progress: improving  Outcome Evaluation: Patient wore bipap for most of the shift, productive cough, some c/o pain relieved with prn meds, other VSS

## 2023-03-31 PROBLEM — J18.9 PNEUMONIA OF BOTH LOWER LOBES DUE TO INFECTIOUS ORGANISM: Status: ACTIVE | Noted: 2023-03-31

## 2023-03-31 LAB
ALBUMIN SERPL-MCNC: 3 G/DL (ref 3.5–5.2)
ALBUMIN/GLOB SERPL: 0.9 G/DL
ALP SERPL-CCNC: 57 U/L (ref 39–117)
ALT SERPL W P-5'-P-CCNC: 7 U/L (ref 1–33)
ANION GAP SERPL CALCULATED.3IONS-SCNC: 11.3 MMOL/L (ref 5–15)
AST SERPL-CCNC: 9 U/L (ref 1–32)
BASOPHILS # BLD AUTO: 0.03 10*3/MM3 (ref 0–0.2)
BASOPHILS NFR BLD AUTO: 0.4 % (ref 0–1.5)
BILIRUB SERPL-MCNC: 0.5 MG/DL (ref 0–1.2)
BUN SERPL-MCNC: 24 MG/DL (ref 8–23)
BUN/CREAT SERPL: 23.5 (ref 7–25)
CALCIUM SPEC-SCNC: 9.1 MG/DL (ref 8.6–10.5)
CHLORIDE SERPL-SCNC: 97 MMOL/L (ref 98–107)
CO2 SERPL-SCNC: 29.7 MMOL/L (ref 22–29)
CREAT SERPL-MCNC: 1.02 MG/DL (ref 0.57–1)
DEPRECATED RDW RBC AUTO: 40.5 FL (ref 37–54)
EGFRCR SERPLBLD CKD-EPI 2021: 61.2 ML/MIN/1.73
EOSINOPHIL # BLD AUTO: 0.36 10*3/MM3 (ref 0–0.4)
EOSINOPHIL NFR BLD AUTO: 4.6 % (ref 0.3–6.2)
ERYTHROCYTE [DISTWIDTH] IN BLOOD BY AUTOMATED COUNT: 12.1 % (ref 12.3–15.4)
GLOBULIN UR ELPH-MCNC: 3.4 GM/DL
GLUCOSE SERPL-MCNC: 98 MG/DL (ref 65–99)
HCT VFR BLD AUTO: 35.5 % (ref 34–46.6)
HGB BLD-MCNC: 11.3 G/DL (ref 12–15.9)
IMM GRANULOCYTES # BLD AUTO: 0.29 10*3/MM3 (ref 0–0.05)
IMM GRANULOCYTES NFR BLD AUTO: 3.7 % (ref 0–0.5)
LYMPHOCYTES # BLD AUTO: 2.12 10*3/MM3 (ref 0.7–3.1)
LYMPHOCYTES NFR BLD AUTO: 27 % (ref 19.6–45.3)
MAGNESIUM SERPL-MCNC: 2.2 MG/DL (ref 1.6–2.4)
MCH RBC QN AUTO: 29.1 PG (ref 26.6–33)
MCHC RBC AUTO-ENTMCNC: 31.8 G/DL (ref 31.5–35.7)
MCV RBC AUTO: 91.5 FL (ref 79–97)
MONOCYTES # BLD AUTO: 0.42 10*3/MM3 (ref 0.1–0.9)
MONOCYTES NFR BLD AUTO: 5.4 % (ref 5–12)
NEUTROPHILS NFR BLD AUTO: 4.63 10*3/MM3 (ref 1.7–7)
NEUTROPHILS NFR BLD AUTO: 58.9 % (ref 42.7–76)
NRBC BLD AUTO-RTO: 0 /100 WBC (ref 0–0.2)
PLATELET # BLD AUTO: 311 10*3/MM3 (ref 140–450)
PMV BLD AUTO: 11.7 FL (ref 6–12)
POTASSIUM SERPL-SCNC: 3.1 MMOL/L (ref 3.5–5.2)
POTASSIUM SERPL-SCNC: 4.4 MMOL/L (ref 3.5–5.2)
PROCALCITONIN SERPL-MCNC: 2.09 NG/ML (ref 0–0.25)
PROT SERPL-MCNC: 6.4 G/DL (ref 6–8.5)
RBC # BLD AUTO: 3.88 10*6/MM3 (ref 3.77–5.28)
SODIUM SERPL-SCNC: 138 MMOL/L (ref 136–145)
WBC NRBC COR # BLD: 7.85 10*3/MM3 (ref 3.4–10.8)

## 2023-03-31 PROCEDURE — 83735 ASSAY OF MAGNESIUM: CPT | Performed by: STUDENT IN AN ORGANIZED HEALTH CARE EDUCATION/TRAINING PROGRAM

## 2023-03-31 PROCEDURE — 25010000002 ENOXAPARIN PER 10 MG: Performed by: FAMILY MEDICINE

## 2023-03-31 PROCEDURE — 97165 OT EVAL LOW COMPLEX 30 MIN: CPT

## 2023-03-31 PROCEDURE — 80053 COMPREHEN METABOLIC PANEL: CPT | Performed by: FAMILY MEDICINE

## 2023-03-31 PROCEDURE — 99233 SBSQ HOSP IP/OBS HIGH 50: CPT | Performed by: INTERNAL MEDICINE

## 2023-03-31 PROCEDURE — 85025 COMPLETE CBC W/AUTO DIFF WBC: CPT | Performed by: FAMILY MEDICINE

## 2023-03-31 PROCEDURE — 63710000001 PREDNISONE PER 1 MG: Performed by: FAMILY MEDICINE

## 2023-03-31 PROCEDURE — 97161 PT EVAL LOW COMPLEX 20 MIN: CPT

## 2023-03-31 PROCEDURE — 84132 ASSAY OF SERUM POTASSIUM: CPT | Performed by: INTERNAL MEDICINE

## 2023-03-31 PROCEDURE — 25010000002 PIPERACILLIN SOD-TAZOBACTAM PER 1 G: Performed by: FAMILY MEDICINE

## 2023-03-31 PROCEDURE — 94799 UNLISTED PULMONARY SVC/PX: CPT

## 2023-03-31 PROCEDURE — 84145 PROCALCITONIN (PCT): CPT | Performed by: FAMILY MEDICINE

## 2023-03-31 PROCEDURE — 94660 CPAP INITIATION&MGMT: CPT

## 2023-03-31 RX ORDER — L.ACID,PARA/B.BIFIDUM/S.THERM 8B CELL
1 CAPSULE ORAL 2 TIMES DAILY
Status: DISCONTINUED | OUTPATIENT
Start: 2023-03-31 | End: 2023-04-01 | Stop reason: HOSPADM

## 2023-03-31 RX ORDER — FLUTICASONE PROPIONATE 50 MCG
2 SPRAY, SUSPENSION (ML) NASAL DAILY PRN
Status: DISCONTINUED | OUTPATIENT
Start: 2023-03-31 | End: 2023-04-01 | Stop reason: HOSPADM

## 2023-03-31 RX ORDER — POTASSIUM CHLORIDE 750 MG/1
40 CAPSULE, EXTENDED RELEASE ORAL EVERY 4 HOURS
Status: COMPLETED | OUTPATIENT
Start: 2023-03-31 | End: 2023-03-31

## 2023-03-31 RX ADMIN — PREDNISONE 40 MG: 20 TABLET ORAL at 08:35

## 2023-03-31 RX ADMIN — ENOXAPARIN SODIUM 40 MG: 100 INJECTION SUBCUTANEOUS at 22:17

## 2023-03-31 RX ADMIN — GABAPENTIN 400 MG: 400 CAPSULE ORAL at 22:16

## 2023-03-31 RX ADMIN — Medication 1 CAPSULE: at 20:25

## 2023-03-31 RX ADMIN — ACETAMINOPHEN 650 MG: 325 TABLET, FILM COATED ORAL at 20:24

## 2023-03-31 RX ADMIN — Medication 10 ML: at 20:24

## 2023-03-31 RX ADMIN — FUROSEMIDE 40 MG: 40 TABLET ORAL at 08:35

## 2023-03-31 RX ADMIN — TAZOBACTAM SODIUM AND PIPERACILLIN SODIUM 4.5 G: 500; 4 INJECTION, SOLUTION INTRAVENOUS at 20:24

## 2023-03-31 RX ADMIN — ASPIRIN 81 MG CHEWABLE TABLET 81 MG: 81 TABLET CHEWABLE at 08:35

## 2023-03-31 RX ADMIN — TAZOBACTAM SODIUM AND PIPERACILLIN SODIUM 4.5 G: 500; 4 INJECTION, SOLUTION INTRAVENOUS at 11:58

## 2023-03-31 RX ADMIN — POTASSIUM CHLORIDE 40 MEQ: 750 CAPSULE, EXTENDED RELEASE ORAL at 08:35

## 2023-03-31 RX ADMIN — PANTOPRAZOLE SODIUM 40 MG: 40 TABLET, DELAYED RELEASE ORAL at 05:19

## 2023-03-31 RX ADMIN — ATORVASTATIN CALCIUM 20 MG: 20 TABLET, FILM COATED ORAL at 08:35

## 2023-03-31 RX ADMIN — LEVOTHYROXINE SODIUM 50 MCG: 0.05 TABLET ORAL at 05:19

## 2023-03-31 RX ADMIN — Medication 10 ML: at 08:35

## 2023-03-31 RX ADMIN — ENOXAPARIN SODIUM 40 MG: 100 INJECTION SUBCUTANEOUS at 09:14

## 2023-03-31 RX ADMIN — GABAPENTIN 400 MG: 400 CAPSULE ORAL at 05:19

## 2023-03-31 RX ADMIN — MONTELUKAST 10 MG: 10 TABLET, FILM COATED ORAL at 20:25

## 2023-03-31 RX ADMIN — FLUOXETINE 40 MG: 20 CAPSULE ORAL at 08:35

## 2023-03-31 RX ADMIN — Medication 1 CAPSULE: at 10:03

## 2023-03-31 RX ADMIN — TRAZODONE HYDROCHLORIDE 100 MG: 50 TABLET ORAL at 22:16

## 2023-03-31 RX ADMIN — POTASSIUM CHLORIDE 40 MEQ: 750 CAPSULE, EXTENDED RELEASE ORAL at 15:53

## 2023-03-31 RX ADMIN — POTASSIUM CHLORIDE 40 MEQ: 750 CAPSULE, EXTENDED RELEASE ORAL at 11:58

## 2023-03-31 RX ADMIN — PRAMIPEXOLE DIHYDROCHLORIDE 1 MG: 1 TABLET ORAL at 22:16

## 2023-03-31 RX ADMIN — TAZOBACTAM SODIUM AND PIPERACILLIN SODIUM 4.5 G: 500; 4 INJECTION, SOLUTION INTRAVENOUS at 03:47

## 2023-03-31 RX ADMIN — GABAPENTIN 400 MG: 400 CAPSULE ORAL at 13:45

## 2023-03-31 RX ADMIN — FLUTICASONE PROPIONATE 2 SPRAY: 50 SPRAY, METERED NASAL at 08:36

## 2023-03-31 NOTE — THERAPY EVALUATION
Patient Name: Jessica Flowers  : 1955    MRN: [id number]                              Today's Date: 3/31/2023       Admit Date: 3/25/2023    Visit Dx:     ICD-10-CM ICD-9-CM   1. Acute respiratory failure with hypoxia ([facility])  J96.01 518.81   2. Acute pulmonary edema ([facility])  J81.0 518.4     Patient Active Problem List   Diagnosis   • Vitamin D deficiency   • Psoriasis   • Postmenopausal HRT (hormone replacement therapy)   • Plantar fasciitis   • OAB (overactive bladder)   • Joint pain   • Insomnia   • Hypothyroid   • Hypertension   • Hyperlipidemia   • GERD (gastroesophageal reflux disease)   • Dyspnea on exertion   • Fatigue   • Depression   • Allergic rhinitis   • Anxiety   • Glucose intolerance (impaired glucose tolerance)   • Acute respiratory failure with hypoxia ([facility])   • Acute exacerbation of CHF (congestive heart failure) ([facility])   • Bacterial pneumonia   • DORENE (acute kidney injury) ([facility])   • (HFpEF) heart failure with preserved ejection fraction ([facility])     Past Medical History:   Diagnosis Date   • Abnormal CXR     bronchitis poss, [facility]   • Allergic rhinitis     uses inhalers prn, denies asthma   • Anxiety    • Carpal tunnel syndrome    • Chronic narcotic use     HC   • Depression    • Dyspnea on exertion    • Fatigue    • GERD (gastroesophageal reflux disease)     on Prilosec + BID Zantac   • Glucose intolerance (impaired glucose tolerance)    • Hiatal hernia with gastroesophageal reflux     EGD GDW , 39 cm, path DE mild nonspec changes.  serum h. pyl neg   • Hx MRSA infection      on abdomen, tx w/ Bactrim   • Hyperlipidemia    • Hypertension    • Hypertriglyceridemia    • Hypothyroid    • Insomnia     uses Trazodone   • Joint pain    • Morbid obesity ([facility])    • OAB (overactive bladder)     tx w/ botox injections   • Osteoarthritis of knees, bilateral     steroid injxns as above, supposed to get synvisc soon.  Says bone on bone, needs TKR, but ortho surgeon wants her to have WLS first   •  Plantar fasciitis    • Postmenopausal HRT (hormone replacement therapy)    • Psoriasis    • Vitamin D deficiency      Past Surgical History:   Procedure Laterality Date   • APPENDECTOMY  1989    emergent, open   • CARPAL TUNNEL RELEASE      (B)   • CHOLECYSTECTOMY OPEN  1980    [facility]   • DILATATION AND CURETTAGE  1990, 2015   • GASTRIC SLEEVE LAPAROSCOPIC N/A 7/5/2017    Procedure: GASTRIC SLEEVE LAPAROSCOPIC, ;  Surgeon: Cj Gregg MD;  Location: [facility] OR;  Service:    • LAPAROSCOPIC TUBAL LIGATION  1988   • OTHER SURGICAL HISTORY      denies anesthesia issues   • KS LAPS SURG ESOPG/GSTR FUNDOPLASTY N/A 7/5/2017    Procedure: HIATAL HERNIA REPAIR LAPAROSCOPIC;  Surgeon: Cj Gregg MD;  Location: [facility] OR;  Service: Bariatric      General Information     Row Name 03/31/23 1154          Physical Therapy Time and Intention    Document Type evaluation  -MS     Mode of Treatment physical therapy  -MS     Row Name 03/31/23 1154          General Information    Patient Profile Reviewed yes  -MS     Prior Level of Function independent:;all household mobility  -MS     Barriers to Rehab medically complex;previous functional deficit  -MS     Row Name 03/31/23 1154          Living Environment    People in Home grandchild(nicole)  -MS     Row Name 03/31/23 1154          Home Main Entrance    Number of Stairs, Main Entrance none  -MS     Row Name 03/31/23 1154          Stairs Within Home, Primary    Number of Stairs, Within Home, Primary none  -MS     Row Name 03/31/23 1154          Cognition    Orientation Status (Cognition) oriented x 4  -MS     Row Name 03/31/23 1154          Safety Issues, Functional Mobility    Impairments Affecting Function (Mobility) balance;endurance/activity tolerance  -MS           User Key  (r) = Recorded By, (t) = Taken By, (c) = Cosigned By    Initials Name Provider Type    MS Db Aguilera PT Physical Therapist               Mobility     Row Name 03/31/23 1155          Bed  Mobility    Bed Mobility bed mobility (all) activities  -MS     All Activities, [place] (Bed Mobility) modified independence  -MS     Assistive Device (Bed Mobility) bed rails;draw sheet;head of bed elevated  -MS     Row Name 03/31/23 1155          Sit-Stand Transfer    Sit-Stand [place] (Transfers) standby assist  -MS     Assistive Device (Sit-Stand Transfers) walker, front-wheeled  -MS     Row Name 03/31/23 1155          Gait/Stairs (Locomotion)    [place] Level (Gait) standby assist  -MS     Assistive Device (Gait) walker, front-wheeled  -MS     Distance in Feet (Gait) 140  -MS     Deviations/Abnormal Patterns (Gait) jorge decreased;festinating/shuffling  -MS           User Key  (r) = Recorded By, (t) = Taken By, (c) = Cosigned By    Initials Name Provider Type    Db Lutz, PT Physical Therapist               Obj/Interventions     Row Name 03/31/23 1156          Range of Motion Comprehensive    General Range of Motion bilateral lower extremity ROM WFL  -MS     Row Name 03/31/23 1156          Strength Comprehensive (MMT)    General Manual Muscle Testing (MMT) Assessment lower extremity strength deficits identified  -MS           User Key  (r) = Recorded By, (t) = Taken By, (c) = Cosigned By    Initials Name Provider Type    Db Lutz, PT Physical Therapist               Goals/Plan     Row Name 03/31/23 1158          Transfer Goal 1 (PT)    Activity/Assistive Device (Transfer Goal 1, PT) transfers, all;sit-to-stand/stand-to-sit  -MS     [place] Level/Cues Needed (Transfer Goal 1, PT) modified independence  -MS     Time Frame (Transfer Goal 1, PT) long term goal (LTG);2 weeks  -MS     Row Name 03/31/23 1158          Gait Training Goal 1 (PT)    Activity/Assistive Device (Gait Training Goal 1, PT) gait (walking locomotion);assistive device use  -MS     [place] Level (Gait Training Goal 1, PT) modified independence  -MS     Distance (Gait Training Goal 1, PT) 250 ft  -MS      Time Frame (Gait Training Goal 1, PT) long term goal (LTG);2 weeks  -MS     Row Name 03/31/23 1158          Patient Education Goal (PT)    Activity (Patient Education Goal, PT) ther exer x15 reps ea  -MS     [place]/Cues/Accuracy (Memory Goal 2, PT) demonstrates adequately  -MS     Time Frame (Patient Education Goal, PT) long term goal (LTG);2 weeks  -MS     Row Name 03/31/23 1158          Therapy Assessment/Plan (PT)    Planned Therapy Interventions (PT) balance training;bed mobility training;gait training;home exercise program;transfer training;ROM (range of motion);stair training;strengthening;patient/family education  -MS           User Key  (r) = Recorded By, (t) = Taken By, (c) = Cosigned By    Initials Name Provider Type    Db Lutz, PT Physical Therapist               Clinical Impression     Row Name 03/31/23 1156          Pain    Pretreatment Pain Rating 0/10 - no pain  -MS     Posttreatment Pain Rating 0/10 - no pain  -MS     Row Name 03/31/23 1156          Plan of Care Review    Plan of Care Reviewed With patient  -MS     Progress improving  -MS     Outcome Evaluation Pt participated in PT eval this date. Pt transferred to B with MI. Pt ambulated 140 ft with SBA and Rwx on 3l O2. O2 sats 94% throughout. Pt is expected to benefit from skilled PTx during this inpatient stay. Pt would benefit from rollator at discharge.  -MS     Row Name 03/31/23 1156          Therapy Assessment/Plan (PT)    Rehab Potential (PT) good, to achieve stated therapy goals  -MS     Criteria for Skilled Interventions Met (PT) yes;meets criteria  -MS     Therapy Frequency (PT) 5 times/wk  -MS     Row Name 03/31/23 1156          Vital Signs    Pre SpO2 (%) 94  -MS     O2 Delivery Pre Treatment supplemental O2  -MS     O2 Delivery Intra Treatment supplemental O2  -MS     Post SpO2 (%) 94  -MS     O2 Delivery Post Treatment supplemental O2  -MS     Pre Patient Position Supine  -MS     Intra Patient Position  Standing  -MS     Post Patient Position Sitting  -MS     Row Name 03/31/23 1156          Positioning and Restraints    Pre-Treatment Position in bed  -MS     Post Treatment Position chair  -MS     In Chair reclined;call light within reach;encouraged to call for assist;exit alarm on  -MS           User Key  (r) = Recorded By, (t) = Taken By, (c) = Cosigned By    Initials Name Provider Type    Db Lutz, PT Physical Therapist               Outcome Measures     Row Name 03/31/23 1158 03/31/23 0800       How much help from another person do you currently need...    Turning from your back to your side while in flat bed without using bedrails? 4  -MS 3  -EC    Moving from lying on back to sitting on the side of a flat bed without bedrails? 4  -MS 3  -EC    Moving to and from a bed to a chair (including a wheelchair)? 3  -MS 3  -EC    Standing up from a chair using your arms (e.g., wheelchair, bedside chair)? 3  -MS 3  -EC    Climbing 3-5 steps with a railing? 3  -MS 2  -EC    To walk in hospital room? 3  -MS 3  -EC    AM-PAC 6 Clicks Score (PT) 20  -MS 17  -EC    Highest level of mobility 6 --> Walked 10 steps or more  -MS 5 --> Static standing  -EC          User Key  (r) = Recorded By, (t) = Taken By, (c) = Cosigned By    Initials Name Provider Type    Db Lutz, PT Physical Therapist    Carol Toribio RN Registered Nurse                             Physical Therapy Education     Title: PT OT SLP Therapies (In Progress)     Topic: Physical Therapy (In Progress)     Point: Mobility training (Done)     Learning Progress Summary           Patient Acceptance, E, VU by MS at 3/31/2023 1159    Comment: importance of mobility                   Point: Home exercise program (Done)     Learning Progress Summary           Patient Acceptance, E, VU by MS at 3/31/2023 1159    Comment: importance of mobility                   Point: Body mechanics (Not Started)     Learner Progress:  Not documented in this  visit.          Point: Precautions (Not Started)     Learner Progress:  Not documented in this visit.                      User Key     Initials Effective Dates Name Provider Type Discipline    MS 07/01/22 -  Db Aguilera PT Physical Therapist PT              PT Recommendation and Plan  Planned Therapy Interventions (PT): balance training, bed mobility training, gait training, home exercise program, transfer training, ROM (range of motion), stair training, strengthening, patient/family education  Plan of Care Reviewed With: patient  Progress: improving  Outcome Evaluation: Pt participated in PT eval this date. Pt transferred to EOB with MI. Pt ambulated 140 ft with SBA and Rwx on 3l O2. O2 sats 94% throughout. Pt is expected to benefit from skilled PTx during this inpatient stay. Pt would benefit from rollator at discharge.     Time Calculation:    PT Charges     Row Name 03/31/23 1159             Time Calculation    Start Time 1118  -MS      PT Received On 03/31/23  -MS      PT Goal Re-Cert Due Date 04/10/23  -MS         Untimed Charges    PT Eval/Re-eval Minutes 45  -MS         Total Minutes    Untimed Charges Total Minutes 45  -MS       Total Minutes 45  -MS            User Key  (r) = Recorded By, (t) = Taken By, (c) = Cosigned By    Initials Name Provider Type    MS Db Aguilera PT Physical Therapist              Therapy Charges for Today     Code Description Service Date Service Provider Modifiers Qty    [id number] HC PT EVAL LOW COMPLEXITY 3 3/31/2023 Db Aguilera PT GP 1          PT G-Codes  AM-PAC 6 Clicks Score (PT): 20  PT Discharge Summary  Anticipated Discharge Disposition (PT): home with home health, home with assist    Db Aguilera PT  3/31/2023

## 2023-03-31 NOTE — PROGRESS NOTES
"  CC: Acute respiratory failure.    S: Currently on high flow nasal cannula.  Is a lot more awake and conversant.     Upon questioning, the patient denies any significant history of smoking.  She is not aware of having being diagnosed with asthma or COPD.  She does remember being told about obstructive sleep apnea, 2-3 years ago by an ENT provider who shares office-based with Dr. Giraldo.  She says that she was told that she has sleep apnea but never got a device delivered to the house?    ROS: Positive for shortness of breath, mild anxiety. Denies chest pain, diarrhea or fever.    O:Vital signs reviewed.   /64 (BP Location: Left arm, Patient Position: Lying)   Pulse 51   Temp 98.1 °F (36.7 °C) (Oral)   Resp 18   Ht 154.9 cm (61\")   Wt 106 kg (233 lb 14.5 oz)   SpO2 90%   BMI 44.20 kg/m²     Temp (24hrs), Av.9 °F (36.6 °C), Min:97.3 °F (36.3 °C), Max:99.7 °F (37.6 °C)      I & Os reviewed.   Intake/Output       23 0700 - 23 0659    Intake (ml) 480    Output (ml) --    Net (ml) 480    Last Weight 106 kg (233 lb 14.5 oz)          Net IO Since Admission: -5,692 mL [23 1030]    General/Constitutional: No obvious acute distress noted.  Eyes: PERRL. EOMI  Neck: No significant JVD. No obvious masses noted.   Cardiovascular: S1 + S2.  Bradycardic.  Lungs/Respiratory: Minimal respiratory distress noted.  No significant wheezing heard.  Minimal basal crackles noted  GI/Abdomen: Somewhat distended but soft. Bowel sounds hypoactive.  No obvious organomegaly  Musculoskeletal/Extremities: Trace edema noted. Gait could not be assessed at this time, as the patient was laying in bed.   Neurologic: Was able to follow commands.    Psych: AAOx3. Seemed more interactive today.  Skin: Appeared somewhat dry but without any overt rashes    Labs: Reviewed.   Results from last 7 days   Lab Units 23  0548 23  0450 23  0445 23  0418 23  0407   WBC 10*3/mm3 7.85 6.88 7.26 9.76 " 15.01*   HEMOGLOBIN g/dL 11.3* 11.2* 10.5* 9.7* 10.2*   HEMATOCRIT % 35.5 34.8 32.8* 31.4* 32.4*   PLATELETS 10*3/mm3 311 284 278 224 244   NEUTROPHIL % % 58.9 82.5* 70.8 84.6* 89.6*   NEUTROS ABS 10*3/mm3 4.63 5.67 5.15 8.26* 13.44*   EOSINOPHIL % % 4.6 0.1* 3.9 0.8 0.4   EOS ABS 10*3/mm3 0.36 0.01 0.28 0.08 0.06   LYMPHOCYTE % % 27.0 13.5* 18.9* 10.8* 6.9*   LYMPHS ABS 10*3/mm3 2.12 0.93 1.37 1.05 1.04             Lab Results   Component Value Date    PROCALCITO 2.09 (H) 03/31/2023    PROCALCITO 11.98 (H) 03/27/2023    PROCALCITO 16.64 (H) 03/26/2023       Lab Results   Component Value Date    CRP 41.5 (H) 02/06/2022       No results found for: SEDRATE    Lab Results   Component Value Date    PROBNP 917.6 (H) 03/28/2023    PROBNP 144.1 03/25/2023    PROBNP 502 03/22/2023       Results from last 7 days   Lab Units 03/31/23  0548 03/30/23  0450 03/29/23  1452 03/29/23  0445 03/25/23  0820 03/25/23  0552   SODIUM mmol/L 138 135*  --  141   < > 141   POTASSIUM mmol/L 3.1* 3.9 4.0 3.2*   < > 3.3*   CHLORIDE mmol/L 97* 96*  --  97*   < > 105   CO2 mmol/L 29.7* 29.9*  --  32.0*   < > 22.7   BUN mg/dL 24* 26*  --  24*   < > 21   CREATININE mg/dL 1.02* 1.00  --  1.07*   < > 0.96   CALCIUM mg/dL 9.1 8.9  --  9.0   < > 8.0*   ANION GAP mmol/L 11.3 9.1  --  12.0   < > 13.3   BILIRUBIN mg/dL 0.5  --   --  0.6  --  0.8   ALK PHOS U/L 57  --   --  74  --  68   ALT (SGPT) U/L 7  --   --  5  --  7   AST (SGOT) U/L 9  --   --  10  --  15   GLUCOSE mg/dL 98 200*  --  98   < > 109*   TOTAL PROTEIN g/dL 6.4  --   --  6.7  --  5.8*   ALBUMIN g/dL 3.0*  --   --  3.1*  --  2.8*    < > = values in this interval not displayed.       Results from last 7 days   Lab Units 03/31/23  0548 03/29/23  0445   MAGNESIUM mg/dL 2.2 2.2   PHOSPHORUS mg/dL  --  5.8*             No results found for: CKTOTAL    No components found for: HSTROPT    Lab Results   Component Value Date    TROPONINT 11 (H) 03/25/2023    TROPONINT 11 (H) 03/25/2023       Lab  Results   Component Value Date    DDIMER 1.05 (C) 03/13/2022    DDIMER 0.57 02/06/2022    DDIMER 610 (C) 02/27/2019       Brief Urine Lab Results  (Last result in the past 365 days)      Color   Clarity   Blood   Leuk Est   Nitrite   Protein   CREAT   Urine HCG        03/24/23 1040 Yellow   SLCLOUDY   Negative   Negative   Negative                   Lab Results   Component Value Date    TSH 1.9 03/22/2023    TSH 1.50 02/15/2023    TSH 2.21 08/02/2022       No results found for: FREET4      Micro: As of March 31, 2023   No results found for: RESPCX  No results found for: BCIDPCR  Lab Results   Component Value Date    BLOODCX No growth at 5 days 03/25/2023    BLOODCX No growth at 5 days 03/25/2023     No results found for: URINECX  Lab Results   Component Value Date    MRSACX  07/02/2017     No Methicillin Resistant Staphylococcus aureus isolated     Lab Results   Component Value Date    MRSAPCR No MRSA Detected 03/25/2023     Lab Results   Component Value Date    URCX Not Indicated 03/24/2023    URCX Not Indicated 02/03/2022    URCX Not Indicated 12/28/2021     No components found for: LOWRESPCF  No results found for: THROATCX  No results found for: CULTURES  No components found for: STREPBCX  No results found for: STREPPNEUAG  No results found for: LEGIONELLA  No results found for: MYCOPLASCX  No results found for: GCCX  No results found for: WOUNDCX  No results found for: BODYFLDCX    No results found for: FLU    No results found for: ADENOVIRUS  No results found for: MW390W  No results found for: CVHKU1  No results found for: CVNL63  No results found for: CVOC43  No results found for: HUMETPNEVS  No results found for: HURVEV  Lab Results   Component Value Date    FLUBPCR Not Detected 03/25/2023     No results found for: PARAINFLUE  No results found for: PARAFLUV2  No results found for: PARAFLUV3  No results found for: PARAFLUV4  No results found for: BPERTPCR  No results found for: [id number]  No results found  for: CPNEUPCR  No results found for: MPNEUMO  Lab Results   Component Value Date    FLUAPCR Not Detected 03/25/2023     No results found for: FLUAH3  No results found for: FLUAH1  No results found for: RSV  No results found for: BPARAPCR    COVID 19:  Lab Results   Component Value Date    COVID19 Not Detected 03/25/2023       ABG: Reviewed  Recent Labs     03/25/23  0511 03/30/23  0734   PHART 7.384 7.509*   QID3UVX 43.2 41.3   PO2ART 64.9* 146.0*   CXF9RXI 25.8 32.9*   BASEEXCESS 0.5 9.0*     No results found for: LACTATE    Echo: Results for orders placed during the hospital encounter of 03/25/23    Adult Transthoracic Echo Complete W/ Cont if Necessary Per Protocol    Interpretation Summary  •  Left ventricular systolic function is normal. Calculated left ventricular EF = 62%  •  Left ventricular wall thickness is consistent with mild concentric hypertrophy.  •  Left ventricular diastolic function is consistent with (grade I) impaired relaxation.  •  No hemodynamically significant valvular pathology.      Results for orders placed in visit on 02/18/19    SCANNED - ECHOCARDIOGRAM        Pharmacy to Dose OggiFinogi,         Imaging: Latest imaging study was reviewed personally.   Imaging Results (Last 24 Hours)     Procedure Component Value Units Date/Time    XR Chest 1 View [[id number]] Collected: 03/30/23 1141     Updated: 03/30/23 1143    Narrative:      PROCEDURE: XR CHEST 1 VW-     HISTORY: Resp Failure.; J96.01-Acute respiratory failure with hypoxia;  J81.0-Acute pulmonary edema     COMPARISON: 1 day prior.     FINDINGS: The heart is upper limits of normal. The diffuse, bilateral  airspace disease has improved compared to the prior exam.. . The  mediastinum is unremarkable. There is no pneumothorax.  There are no  acute osseous abnormalities.       Impression:      Diffusely improved bilateral airspace disease compared to  one day prior..           This report was signed and finalized on 3/30/2023 11:41 AM by  Belinda Selby MD.    US Pelvis Limited [[id number]] Collected: 03/30/23 1137     Updated: 03/30/23 1143    Narrative:      PROCEDURE: US PELVIS LIMITED-     HISTORY: Pelvic pain.     PROCEDURE: Transabdominal images of the pelvis were obtained.     FINDINGS: The uterus measures 8.1 day 4.3 x 5.0 cm. Uterus is normal in  size without focal mass. The endometrium is abnormally thickened at 8 mm  or 67-year-old postmenopausal female, recommend GYN consultation to  exclude endometrial malignancy. No fundal mass is seen to correlate with  the CT finding, suspect this is of segment of focally dilated sigmoid  colon, consider colonoscopy. The right ovary  is normal . The left ovary   is normal . Blood flow identified in both ovaries. No ovarian masses  identified. No free fluid seen..       Impression:      No uterine mass to correlate with CT finding, suspect  focally dilated segment of sigmoid colon, recommend colonoscopy.     Prominent endometrium for postmenopausal female, recommend GYN  consultation..     This report was signed and finalized on 3/30/2023 11:41 AM by Belinda Selby MD.          Assessment & Recommendations/Plan:   1.  Acute respiratory failure  Continues to require high flow nasal cannula at 4-6 L/min  Will need to be discharged on oxygen for home use.  Will ask the respiratory therapist to decrease FiO2 as tolerated.  Changed her to CPAP on 3/30/2023. Use at night.  Latest chest x-ray shows improvement compared to 3/29/2023.  We will repeat chest x-ray, as clinically appropriate  Started on Oral Prednisone for 3-5 days, on 3/30/2023.     2.  Pneumonia  Continue antibiotics  Can D/C after 5-7 days.   Procalcitonin has trended down appreciably    3.  Pulmonary edema?/Decompensated diastolic heart failure?  Patient had significant urine output with Lasix drip over 2 days.  We will continue oral Lasix for 3 more days.  Will benefit from being discharged on PO lasix, till she can be evaluated by her  cardiologist, Dr. Giraldo.   CT abdomen findings, also suggest the possibility of congestive heart failure given the finding of bilateral pleural effusions.    4.  ?  COPD  History is not suggestive  We will discontinue Symbicort and Spiriva  We will readjust medications as clinically indicated  Nebulized treatments will be adjusted as appropriate.    5.  EZEQUIEL?  Will try to get sleep study from 2-3 year ago  Tolerated CPAP at a pressure of 12 last night without much difficulty.  I reviewed case management documentation/communication and it appears that the patient will need a sleep study.  This will be scheduled upon discharge.  Patient will be evaluated for CPAP based on the results of sleep study and does not have to be discharged on CPAP at this time, especially since she has not been on CPAP at home.    6.  Fluid status  Net IO Since Admission: -5,692 mL [03/31/23 1030]  Lab Results   Component Value Date    PROBNP 917.6 (H) 03/28/2023    PROBNP 144.1 03/25/2023    PROBNP 502 03/22/2023     7.  Morbid obesity  Does complicate all aspects of care.    8.  Enteritis/abnormal CT of the abdomen  On antibiotics  Being managed by hospitalist service    9.  Hypokalemia  On replacement therapy  Will need to be monitored    10.  DVT and GI prophylaxis  Per admitting provider.    11.  Discharge recommendations  Will need to be followed by her cardiologist  Will need to be followed by her sleep physician, Dr. Sorensen?  Follow-up with Dr. Calero or Samina MARIE, in 3-4 months.  Already ordered  Home sleep study soon after discharge. Already ordered    We have reviewed patient's current orders and changes, if any, have been suggested to primary care team. Plan was also discussed with nursing staff, as necessary.     We have updated the admitting attending and nursing staff, as appropriate, on the patient's current status and plan. I will be going off shift tonight and will be unavailable. Please contact chelsey  pulmonologist, if available.      This document was electronically signed by Sebas Calero MD on 03/31/23 at 10:30 EDT      Dictated utilizing Dragon dictation.

## 2023-03-31 NOTE — CASE MANAGEMENT/SOCIAL WORK
1705 Taran spoke with Liliya/Mary,  who is agreeable to Loan pt a CPAP until her sleep study scheduled for 4/4/23 can be completed.She states Mon,4/3/23 will be the soonest they will be able to set it up. She states they will just need a CPAP order and settings.. Taran advised her that I will forward information to Dr Boyle and let him decide if he wants to hold her over the weekend for CPAP set up. ADRIANE Valente has pt on caseload and agrees to call Mary with update on Mon. Dr Boyle and Carol Diaz updated on above information by secure chat.    1713 Taran called  to obtain sleep study results per Dr Kamara request. Office closed at 430 pm. Taran unable to speak with anyone in medical records.

## 2023-03-31 NOTE — PROGRESS NOTES
[facility]    Name:  Jessica Flowers   Age:  67 y.o.  Sex:  female  :  1955  MRN:  [id number]   Visit Number:  [id number]  Admission Date:  3/25/2023  Date Of Service:  23  Primary Care Physician:  Karley Jolly APRN     LOS: 6 days :    Chief Complaint:      Cough and shortness of breath.    Subjective:    Ms. Flowers was seen and examined this morning.  She is currently sitting up on the bed and is feeling much better compared to yesterday.  She is still short of breath on minimal exertion.  She is currently on 3 L of nasal cannula oxygen.  She does not use oxygen at home and denies any prior history of COPD.  She lives alone and her 16-year-old grandson lives with her.  Previous physician documentation, laboratory and imaging data have been reviewed.    Hospital Course:    Ms. Flowers is a 67-year-old female with a history of hypertension, dyslipidemia, hypothyroidism, morbid obesity, obstructive sleep apnea (currently awaiting CPAP) was admitted from the emergency room with increasing shortness of breath.  She had apparently had a recent hospitalization at [facility] due to pulmonary edema and was on diuretic therapy and was discharged less than 48 hours ago. Her initial pulse ox was in the 50s as per EMS.     In the ER, she was hemodynamically stable except for hypoxia requiring BiPAP initially.  ABG showed hypoxia noted.  She had mild elevated high-sensitivity troponin.  proBNP within normal limits.  COVID and flu testing were negative.  Chest x-ray was showing cardiomegaly with concern for pneumonia.    CT of the abdomen and pelvis showed air-fluid levels in the nondistended small and large bowels suggestive of enteritis.  Postsurgical change of gastric sleeve and possible small hiatal hernia noted.  5 cm area of decreased attenuation noted in the pelvis adjacent to the fundus of the uterus.  Fairly diffuse bilateral airspace  disease with cardiomegaly noted.  She was given Bumex.  She was started on Zosyn due to the elevated procalcitonin.     Patient was consulted by Dr. Calero from pulmonology due to significant hypoxemia.  She was started on Lasix drip and had around 4 L of output.  She was continued on Zosyn for pneumonia and her repeat chest x-ray showed improvement.  She did have some abnormal findings on the CT scan abdomen pelvis and a pelvic ultrasound was ordered which did not show any evidence of uterine mass but recommended outpatient GYN follow-up due to prominent endometrium.     Review of Systems:     All systems were reviewed and negative except as mentioned in subjective, assessment and plan.    Vital Signs:    Temp:  [97.3 °F (36.3 °C)-99.7 °F (37.6 °C)] 98.3 °F (36.8 °C)  Heart Rate:  [50-95] 65  Resp:  [16-20] 16  BP: (101-163)/(63-86) 101/74    Intake and output:    I/O last 3 completed shifts:  In: 580 [P.O.:480; IV Piggyback:100]  Out: 650 [Urine:650]  I/O this shift:  In: 360 [P.O.:360]  Out: -     Physical Examination:    General Appearance:  Alert and cooperative.   Head:  Atraumatic and normocephalic.   Eyes: Conjunctivae and sclerae normal, no icterus. No pallor.   Throat: No oral lesions, no thrush, oral mucosa moist.   Neck: Supple, trachea midline, no thyromegaly.   Lungs:   Breath sounds heard bilaterally equally.  No wheezing.  Bilateral scattered crackles. No Pleural rub or bronchial breathing.   Heart:  Normal S1 and S2, no murmur, no gallop, no rub. No JVD.   Abdomen:   Normal bowel sounds, no masses, no organomegaly. Soft, nontender, obese, no rebound tenderness.   Extremities: Supple, no edema, no cyanosis, no clubbing.  Bilateral knee joint surgical scars noted.   Skin: No bleeding or rash.   Neurologic: Alert and oriented x 3. No facial asymmetry. Moves all four limbs. No tremors.      Laboratory results:    Results from last 7 days   Lab Units 03/31/23  0548 03/30/23  0450 03/29/23  1040  03/29/23  0445 03/25/23  0820 03/25/23  0552   SODIUM mmol/L 138 135*  --  141   < > 141   POTASSIUM mmol/L 3.1* 3.9 4.0 3.2*   < > 3.3*   CHLORIDE mmol/L 97* 96*  --  97*   < > 105   CO2 mmol/L 29.7* 29.9*  --  32.0*   < > 22.7   BUN mg/dL 24* 26*  --  24*   < > 21   CREATININE mg/dL 1.02* 1.00  --  1.07*   < > 0.96   CALCIUM mg/dL 9.1 8.9  --  9.0   < > 8.0*   BILIRUBIN mg/dL 0.5  --   --  0.6  --  0.8   ALK PHOS U/L 57  --   --  74  --  68   ALT (SGPT) U/L 7  --   --  5  --  7   AST (SGOT) U/L 9  --   --  10  --  15   GLUCOSE mg/dL 98 200*  --  98   < > 109*    < > = values in this interval not displayed.     Results from last 7 days   Lab Units 03/31/23  0548 03/30/23  0450 03/29/23  0445   WBC 10*3/mm3 7.85 6.88 7.26   HEMOGLOBIN g/dL 11.3* 11.2* 10.5*   HEMATOCRIT % 35.5 34.8 32.8*   PLATELETS 10*3/mm3 311 284 278         Results from last 7 days   Lab Units 03/25/23  0820 03/25/23  0552   HSTROP T ng/L 11* 11*     Results from last 7 days   Lab Units 03/25/23  1115 03/25/23  1110   BLOODCX  No growth at 5 days No growth at 5 days     Recent Labs     03/25/23  0511 03/30/23  0734   PHART 7.384 7.509*   GRW2JTX 43.2 41.3   PO2ART 64.9* 146.0*   VJZ1KJR 25.8 32.9*   BASEEXCESS 0.5 9.0*      I have reviewed the patient's laboratory results.    Radiology results:    XR Chest 1 View    Result Date: 3/30/2023  PROCEDURE: XR CHEST 1 VW-  HISTORY: Resp Failure.; J96.01-Acute respiratory failure with hypoxia; J81.0-Acute pulmonary edema  COMPARISON: 1 day prior.  FINDINGS: The heart is upper limits of normal. The diffuse, bilateral airspace disease has improved compared to the prior exam.. . The mediastinum is unremarkable. There is no pneumothorax.  There are no acute osseous abnormalities.      Impression: Diffusely improved bilateral airspace disease compared to one day prior..    This report was signed and finalized on 3/30/2023 11:41 AM by Belinda Selby MD.    I have reviewed the patient's radiology  reports.    Medication Review:     I have reviewed the patient's active and prn medications.     Problem List:      Pneumonia of both lower lobes due to infectious organism    Acute respiratory failure with hypoxia (HCC)    Acute exacerbation of CHF (congestive heart failure) (HCC)    Bacterial pneumonia    Postmenopausal HRT (hormone replacement therapy)    OAB (overactive bladder)    Hypothyroid    Hypertension    GERD (gastroesophageal reflux disease)    Depression    (HFpEF) heart failure with preserved ejection fraction (HCC)    Assessment:    1. Acute hypoxic respiratory failure, secondary to #2, POA, improving.  2. Bilateral bacterial pneumonia, unable to classify further, POA.  3. Acute on chronic diastolic heart failure, POA, improved.  4. No evidence of acute renal failure.  5. Essential hypertension.  6. Gastroesophageal reflux disease.  7. Obesity with a BMI of 44.  8. Prominent endometrium noted on CT abdomen- needs outpatient GYN follow-up.  9. Obstructive sleep apnea-awaiting CPAP therapy.    Plan:    Respiratory failure/bilateral pneumonia/diastolic heart failure.  - Patient has significantly improved with IV antibody therapy with Zosyn, oxygen therapy as well as bronchodilators.  - She also improved with IV diuretic therapy and is currently on Lasix 40 mg daily.  - 2D echocardiogram showed normal left ventricular ejection fraction at 60% but showed grade 1 diastolic dysfunction.  - Patient was seen by Dr. Calero from pulmonology service during this hospitalization.  - Blood cultures and nasal swab for MRSA have been negative.  - Lactobacillus supplements.    Obstructive sleep apnea.  - Patient is currently awaiting CPAP at home.    Continue physical and occupational therapy.  Discussed with nursing staff and multidisciplinary team.    DVT Prophylaxis: Enoxaparin  Code Status: DNR  Diet: Cardiac  Discharge Plan: Hopefully, home with home health in 2-3 days.  Patient does not have home oxygen and may  benefit from walking oximetry prior to discharge.    Natalio Boyle MD  03/31/23  16:14 EDT    Dictated utilizing Dragon dictation.

## 2023-03-31 NOTE — PLAN OF CARE
Goal Outcome Evaluation:  Plan of Care Reviewed With: patient        Progress: improving   Plan of care discussed with patient. Vs noted, patient is on 3L nc. Previous sleep study results from Trish garcia in chart. Patient has sat up in chair majority of the day and has worked with PT.

## 2023-03-31 NOTE — PLAN OF CARE
Goal Outcome Evaluation:           Progress: improving  Outcome Evaluation: No acute events overnight. VSS with no complaints of pain. Non-productive cough, and maintined O2 well on HFNC. Wore BiPAP throughout night and tolerated well. Patient with good urinary output post catheter removal. No concerns or complaints at this time.

## 2023-03-31 NOTE — PLAN OF CARE
Goal Outcome Evaluation:  Plan of Care Reviewed With: patient        Progress: no change  Outcome Evaluation: OT eval completed. Patient supine in bed, on 3L O2 satting 94%. Patient moved to EOB with mod ind, performed LBD with SBA. Patient performed sit to stand with and functional mobility x 140' using RW with SBA. Patient satting 93% following activity. Patient able to perform ADLs with Sup-SBA. Patient's daughter is contacting her insurance company to get a shower chair. Patient would benefit from  services and a rollator at [place]. Patient reports she is comfortable going home, states she has plenty of help.

## 2023-03-31 NOTE — CASE MANAGEMENT/SOCIAL WORK
Case Management/Social Work    Patient Name:  Jessica Flowers  YOB: 1955  MRN: [id number]  Admit Date:  3/25/2023      Reached out to [facility] in [place]. Spoke to Felecia. Asked for a referral to be sent over with orders included.  Will reach back out and let Sw know if pt will be accepted for hh or not.  Asked if no one answered phone, to please leave a message on the voice mail.  Sw to follow.  Erica contacted Stephon at [facility] regarding pt's CPAP needs. States he will get into pt's chart and see if he can find documentation on the sleep study.  If necessary documentation is not in pt's file, Stephon will reach out to Dr. Calero's office and request needed documentation.          Electronically signed by:  Sidra Flores  03/31/23 16:03 EDT

## 2023-03-31 NOTE — PLAN OF CARE
Goal Outcome Evaluation:  Plan of Care Reviewed With: patient        Progress: improving  Outcome Evaluation: Pt participated in PT eval this date. Pt transferred to EOB with MI. Pt ambulated 140 ft with SBA and Rwx on 3l O2. O2 sats 94% throughout. Pt is expected to benefit from skilled PTx during this inpatient stay. Pt would benefit from rollator at discharge.

## 2023-03-31 NOTE — THERAPY EVALUATION
Patient Name: Jessica Flowers  : 1955    MRN: [id number]                              Today's Date: 3/31/2023       Admit Date: 3/25/2023    Visit Dx:     ICD-10-CM ICD-9-CM   1. Acute respiratory failure with hypoxia (Formerly [facility])  J96.01 518.81   2. Acute pulmonary edema (Formerly [facility])  J81.0 518.4     Patient Active Problem List   Diagnosis   • Vitamin D deficiency   • Psoriasis   • Postmenopausal HRT (hormone replacement therapy)   • Plantar fasciitis   • OAB (overactive bladder)   • Joint pain   • Insomnia   • Hypothyroid   • Hypertension   • Hyperlipidemia   • GERD (gastroesophageal reflux disease)   • Dyspnea on exertion   • Fatigue   • Depression   • Allergic rhinitis   • Anxiety   • Glucose intolerance (impaired glucose tolerance)   • Acute respiratory failure with hypoxia (Formerly [facility])   • Acute exacerbation of CHF (congestive heart failure) (Formerly [facility])   • Bacterial pneumonia   • DORENE (acute kidney injury) (Formerly [facility])   • (HFpEF) heart failure with preserved ejection fraction (Formerly [facility])     Past Medical History:   Diagnosis Date   • Abnormal CXR     bronchitis poss, [facility]   • Allergic rhinitis     uses inhalers prn, denies asthma   • Anxiety    • Carpal tunnel syndrome    • Chronic narcotic use     HC   • Depression    • Dyspnea on exertion    • Fatigue    • GERD (gastroesophageal reflux disease)     on Prilosec + BID Zantac   • Glucose intolerance (impaired glucose tolerance)    • Hiatal hernia with gastroesophageal reflux     EGD GDW , 39 cm, path DE mild nonspec changes.  serum h. pyl neg   • Hx MRSA infection      on abdomen, tx w/ Bactrim   • Hyperlipidemia    • Hypertension    • Hypertriglyceridemia    • Hypothyroid    • Insomnia     uses Trazodone   • Joint pain    • Morbid obesity (Formerly [facility])    • OAB (overactive bladder)     tx w/ botox injections   • Osteoarthritis of knees, bilateral     steroid injxns as above, supposed to get synvisc soon.  Says bone on bone, needs TKR, but ortho surgeon wants her to have WLS first   •  Plantar fasciitis    • Postmenopausal HRT (hormone replacement therapy)    • Psoriasis    • Vitamin D deficiency      Past Surgical History:   Procedure Laterality Date   • APPENDECTOMY  1989    emergent, open   • CARPAL TUNNEL RELEASE      (B)   • CHOLECYSTECTOMY OPEN  1980    [facility]   • DILATATION AND CURETTAGE  1990, 2015   • GASTRIC SLEEVE LAPAROSCOPIC N/A 7/5/2017    Procedure: GASTRIC SLEEVE LAPAROSCOPIC, ;  Surgeon: Cj Gregg MD;  Location: [facility] OR;  Service:    • LAPAROSCOPIC TUBAL LIGATION  1988   • OTHER SURGICAL HISTORY      denies anesthesia issues   • [place] LAPS SURG ESOPG/GSTR FUNDOPLASTY N/A 7/5/2017    Procedure: HIATAL HERNIA REPAIR LAPAROSCOPIC;  Surgeon: Cj Gregg MD;  Location: [facility] OR;  Service: Bariatric      General Information     Row Name 03/31/23 1334          OT Time and Intention    Document Type evaluation  -SD     Mode of Treatment occupational therapy  -SD     Row Name 03/31/23 1334          General Information    Patient Profile Reviewed yes  -SD     Prior Level of Function independent:;all household mobility;community mobility;ADL's  Lives with grandson, daughter close by. No DME previously  -SD     Existing Precautions/Restrictions fall;oxygen therapy device and L/min  -SD     Barriers to Rehab medically complex;previous functional deficit  -SD     Row Name 03/31/23 1334          Living Environment    People in Home grandchild(nicole)  -SD     Row Name 03/31/23 1334          Home Main Entrance    Number of Stairs, Main Entrance none  -SD     Row Name 03/31/23 1334          Stairs Within Home, Primary    Number of Stairs, Within Home, Primary none  -SD     Row Name 03/31/23 1334          Cognition    Orientation Status (Cognition) oriented x 4  -SD     Row Name 03/31/23 1334          Safety Issues, Functional Mobility    Safety Issues Affecting Function (Mobility) safety precautions follow-through/compliance;safety precaution awareness  -SD     Impairments  Affecting Function (Mobility) balance;endurance/activity tolerance;shortness of breath;strength  -SD           User Key  (r) = Recorded By, (t) = Taken By, (c) = Cosigned By    Initials Name Provider Type    SD Cristin Metz OT Occupational Therapist                 Mobility/ADL's     Row Name 03/31/23 1335          Bed Mobility    Bed Mobility supine-sit  -SD     Supine-Sit [place] (Bed Mobility) modified independence  -SD     Assistive Device (Bed Mobility) bed rails;head of bed elevated  -SD     Row Name 03/31/23 1335          Transfers    Transfers sit-stand transfer  -SD     Row Name 03/31/23 1335          Sit-Stand Transfer    Sit-Stand [place] (Transfers) standby assist  -SD     Assistive Device (Sit-Stand Transfers) walker, front-wheeled  -SD     [facility] Name 03/31/23 1335          Functional Mobility    Functional Mobility- Ind. Level standby assist  -SD     Functional Mobility- Device walker, front-wheeled  -SD     Functional Mobility-Distance (Feet) 140  -SD     Functional Mobility- Safety Issues supplemental O2  -SD     Row Name 03/31/23 1335          Activities of Daily Living    BADL Assessment/Intervention bathing;upper body dressing;lower body dressing;grooming;feeding;toileting  -SD     [facility] Name 03/31/23 1335          Bathing Assessment/Intervention    [place] Level (Bathing) standby assist;contact guard assist  -SD     [facility] Name 03/31/23 1335          Upper Body Dressing Assessment/Training    [place] Level (Upper Body Dressing) set up  -SD     [facility] Name 03/31/23 1335          Lower Body Dressing Assessment/Training    [place] Level (Lower Body Dressing) don;socks;standby assist  -SD     Row Name 03/31/23 1335          Grooming Assessment/Training    [place] Level (Grooming) set up  -SD     Row Name 03/31/23 1335          Self-Feeding Assessment/Training    [place] Level (Feeding) set up  -SD     [facility] Name 03/31/23 1335          Toileting Assessment/Training     [place] Level (Toileting) standby assist  -SD     Assistive Devices (Toileting) commode  -SD           User Key  (r) = Recorded By, (t) = Taken By, (c) = Cosigned By    Initials Name Provider Type    Cristin Krishnamurthy OT Occupational Therapist               Obj/Interventions     Row Name 03/31/23 1336          Range of Motion Comprehensive    General Range of Motion bilateral upper extremity ROM WFL  -SD     Row Name 03/31/23 1336          Strength Comprehensive (MMT)    General Manual Muscle Testing (MMT) Assessment upper extremity strength deficits identified  -SD     Comment, General Manual Muscle Testing (MMT) Assessment UB 4/5  -SD           User Key  (r) = Recorded By, (t) = Taken By, (c) = Cosigned By    Initials Name Provider Type    Cristin Krishnamurthy OT Occupational Therapist               Goals/Plan     Row Name 03/31/23 1341          Transfer Goal 1 (OT)    Activity/Assistive Device (Transfer Goal 1, OT) sit-to-stand/stand-to-sit;walker, rolling  -SD     [place] Level/Cues Needed (Transfer Goal 1, OT) modified independence  -SD     Time Frame (Transfer Goal 1, OT) long term goal (LTG)  -SD     Progress/Outcome (Transfer Goal 1, OT) goal ongoing  -SD     [facility] Name 03/31/23 1341          Dressing Goal 1 (OT)    Activity/Device (Dressing Goal 1, OT) dressing skills, all  -SD     [place]/Cues Needed (Dressing Goal 1, OT) set-up required  -SD     Time Frame (Dressing Goal 1, OT) 2 weeks  -SD     Progress/Outcome (Dressing Goal 1, OT) goal ongoing  -SD     Row Name 03/31/23 1341          Toileting Goal 1 (OT)    Activity/Device (Toileting Goal 1, OT) toileting skills, all;commode  -SD     [place] Level/Cues Needed (Toileting Goal 1, OT) set-up required  -SD     Time Frame (Toileting Goal 1, OT) 2 weeks  -SD     Progress/Outcome (Toileting Goal 1, OT) goal ongoing  -SD     [facility] Name 03/31/23 1341          Strength Goal 1 (OT)    Strength Goal 1 (OT) Patient to perform UB ther ex as  tolerated  -SD     Time Frame (Strength Goal 1, OT) long term goal (LTG)  -SD     Progress/Outcome (Strength Goal 1, OT) goal ongoing  -SD     Row Name 03/31/23 1341          Therapy Assessment/Plan (OT)    Planned Therapy Interventions (OT) activity tolerance training;adaptive equipment training;BADL retraining;patient/caregiver education/training;ROM/therapeutic exercise;strengthening exercise;transfer/mobility retraining  -SD           User Key  (r) = Recorded By, (t) = Taken By, (c) = Cosigned By    Initials Name Provider Type    Cristin Krishnamurthy OT Occupational Therapist               Clinical Impression     Row Name 03/31/23 1336          Pain Assessment    Pretreatment Pain Rating 0/10 - no pain  -SD     Posttreatment Pain Rating 0/10 - no pain  -SD     Row Name 03/31/23 1336          Plan of Care Review    Plan of Care Reviewed With patient  -SD     Progress no change  -SD     Outcome Evaluation OT eval completed. Patient supine in bed, on 3L O2 satting 94%. Patient moved to EOB with mod ind, performed LBD with SBA. Patient performed sit to stand with and functional mobility x 140' using RW with SBA. Patient satting 93% following activity. Patient able to perform ADLs with Sup-SBA. Patient's daughter is contacting her insurance company to get a shower chair. Patient would benefit from  services and a rollator at [place]. Patient reports she is comfortable going home, states she has plenty of help.  -SD     Row Name 03/31/23 8902          Therapy Assessment/Plan (OT)    Patient/Family Therapy Goal Statement (OT) home  -SD     Rehab Potential (OT) good, to achieve stated therapy goals  -SD     Criteria for Skilled Therapeutic Interventions Met (OT) skilled treatment is necessary  -SD     Therapy Frequency (OT) 3 times/wk  5 times if indicated  -SD     Row Name 03/31/23 4852          Therapy Plan Review/Discharge Plan (OT)    Equipment Needs Upon Discharge (OT) walker, rolling;tub bench  -SD     Anticipated  Discharge Disposition (OT) home with home health;home with assist  -SD     Row Name 03/31/23 1336          Vital Signs    Pre SpO2 (%) 94  -SD     O2 Delivery Pre Treatment supplemental O2  -SD     O2 Delivery Intra Treatment supplemental O2  -SD     Post SpO2 (%) 93  -SD     O2 Delivery Post Treatment supplemental O2  -SD     Row Name 03/31/23 1336          Positioning and Restraints    Pre-Treatment Position in bed  -SD     Post Treatment Position chair  -SD     In Chair reclined;call light within reach;encouraged to call for assist;exit alarm on  -SD           User Key  (r) = Recorded By, (t) = Taken By, (c) = Cosigned By    Initials Name Provider Type    Cristin Krishnamurthy OT Occupational Therapist               Outcome Measures     Row Name 03/31/23 1341          How much help from another is currently needed...    Putting on and taking off regular lower body clothing? 3  -SD     Bathing (including washing, rinsing, and drying) 3  -SD     Toileting (which includes using toilet bed pan or urinal) 3  -SD     Putting on and taking off regular upper body clothing 4  -SD     Taking care of personal grooming (such as brushing teeth) 4  -SD     Eating meals 4  -SD     AM-PAC 6 Clicks Score (OT) 21  -SD     Row Name 03/31/23 1158 03/31/23 0800       How much help from another person do you currently need...    Turning from your back to your side while in flat bed without using bedrails? 4  -MS 3  -EC    Moving from lying on back to sitting on the side of a flat bed without bedrails? 4  -MS 3  -EC    Moving to and from a bed to a chair (including a wheelchair)? 3  -MS 3  -EC    Standing up from a chair using your arms (e.g., wheelchair, bedside chair)? 3  -MS 3  -EC    Climbing 3-5 steps with a railing? 3  -MS 2  -EC    To walk in hospital room? 3  -MS 3  -EC    AM-PAC 6 Clicks Score (PT) 20  -MS 17  -EC    Highest level of mobility 6 --> Walked 10 steps or more  -MS 5 --> Static standing  -EC    Row Name 03/31/23  1341          Functional Assessment    Outcome Measure Options AM-PAC 6 Clicks Daily Activity (OT)  -SD           User Key  (r) = Recorded By, (t) = Taken By, (c) = Cosigned By    Initials Name Provider Type    SD Cristin Metz, OT Occupational Therapist    Db Lutz, PT Physical Therapist    Carol Toribio, RN Registered Nurse                Occupational Therapy Education     Title: PT OT SLP Therapies (In Progress)     Topic: Occupational Therapy (In Progress)     Point: ADL training (Done)     Description:   Instruct learner(s) on proper safety adaptation and remediation techniques during self care or transfers.   Instruct in proper use of assistive devices.              Learning Progress Summary           Patient Acceptance, E,TB, VU by SD at 3/31/2023 1342    Comment: OT POC                   Point: Home exercise program (Not Started)     Description:   Instruct learner(s) on appropriate technique for monitoring, assisting and/or progressing therapeutic exercises/activities.              Learner Progress:  Not documented in this visit.          Point: Precautions (Not Started)     Description:   Instruct learner(s) on prescribed precautions during self-care and functional transfers.              Learner Progress:  Not documented in this visit.          Point: Body mechanics (Not Started)     Description:   Instruct learner(s) on proper positioning and spine alignment during self-care, functional mobility activities and/or exercises.              Learner Progress:  Not documented in this visit.                      User Key     Initials Effective Dates Name Provider Type Discipline    SD 06/16/21 -  Cristin Metz OT Occupational Therapist OT              OT Recommendation and Plan  Planned Therapy Interventions (OT): activity tolerance training, adaptive equipment training, BADL retraining, patient/caregiver education/training, ROM/therapeutic exercise, strengthening exercise,  transfer/mobility retraining  Therapy Frequency (OT): 3 times/wk (5 times if indicated)  Plan of Care Review  Plan of Care Reviewed With: patient  Progress: no change  Outcome Evaluation: OT eval completed. Patient supine in bed, on 3L O2 satting 94%. Patient moved to EOB with mod ind, performed LBD with SBA. Patient performed sit to stand with and functional mobility x 140' using RW with SBA. Patient satting 93% following activity. Patient able to perform ADLs with Sup-SBA. Patient's daughter is contacting her insurance company to get a shower chair. Patient would benefit from  services and a rollator at [place]. Patient reports she is comfortable going home, states she has plenty of help.     Time Calculation:    Time Calculation- OT     Row Name 03/31/23 1342             Time Calculation- OT    OT Start Time 1117  -SD      OT Received On 03/31/23  -SD      OT Goal Re-Cert Due Date 04/12/23  -SD         Untimed Charges    OT Eval/Re-eval Minutes 45  -SD         Total Minutes    Untimed Charges Total Minutes 45  -SD       Total Minutes 45  -SD            User Key  (r) = Recorded By, (t) = Taken By, (c) = Cosigned By    Initials Name Provider Type    Cristin Krishnamurthy OT Occupational Therapist              Therapy Charges for Today     Code Description Service Date Service Provider Modifiers Qty    [id number]  OT EVAL LOW COMPLEXITY 3 3/31/2023 Cristin Metz OT GO 1               Cristin Metz OT  3/31/2023

## 2023-04-01 ENCOUNTER — READMISSION MANAGEMENT (OUTPATIENT)
Dept: CALL CENTER | Facility: HOSPITAL | Age: 68
End: 2023-04-01
Payer: MEDICARE

## 2023-04-01 VITALS
DIASTOLIC BLOOD PRESSURE: 72 MMHG | SYSTOLIC BLOOD PRESSURE: 110 MMHG | BODY MASS INDEX: 43.58 KG/M2 | RESPIRATION RATE: 24 BRPM | WEIGHT: 230.82 LBS | HEART RATE: 60 BPM | HEIGHT: 61 IN | OXYGEN SATURATION: 95 % | TEMPERATURE: 97.5 F

## 2023-04-01 LAB
ANION GAP SERPL CALCULATED.3IONS-SCNC: 12.2 MMOL/L (ref 5–15)
BUN SERPL-MCNC: 23 MG/DL (ref 8–23)
BUN/CREAT SERPL: 22.8 (ref 7–25)
CALCIUM SPEC-SCNC: 9 MG/DL (ref 8.6–10.5)
CHLORIDE SERPL-SCNC: 101 MMOL/L (ref 98–107)
CO2 SERPL-SCNC: 25.8 MMOL/L (ref 22–29)
CREAT SERPL-MCNC: 1.01 MG/DL (ref 0.57–1)
DEPRECATED RDW RBC AUTO: 40.2 FL (ref 37–54)
EGFRCR SERPLBLD CKD-EPI 2021: 61.9 ML/MIN/1.73
ERYTHROCYTE [DISTWIDTH] IN BLOOD BY AUTOMATED COUNT: 12.2 % (ref 12.3–15.4)
GLUCOSE SERPL-MCNC: 107 MG/DL (ref 65–99)
HCT VFR BLD AUTO: 34.3 % (ref 34–46.6)
HGB BLD-MCNC: 11.1 G/DL (ref 12–15.9)
MCH RBC QN AUTO: 29.2 PG (ref 26.6–33)
MCHC RBC AUTO-ENTMCNC: 32.4 G/DL (ref 31.5–35.7)
MCV RBC AUTO: 90.3 FL (ref 79–97)
PLATELET # BLD AUTO: 304 10*3/MM3 (ref 140–450)
PMV BLD AUTO: 11.7 FL (ref 6–12)
POTASSIUM SERPL-SCNC: 3.5 MMOL/L (ref 3.5–5.2)
RBC # BLD AUTO: 3.8 10*6/MM3 (ref 3.77–5.28)
SODIUM SERPL-SCNC: 139 MMOL/L (ref 136–145)
WBC NRBC COR # BLD: 7 10*3/MM3 (ref 3.4–10.8)

## 2023-04-01 PROCEDURE — 25010000002 PIPERACILLIN SOD-TAZOBACTAM PER 1 G: Performed by: FAMILY MEDICINE

## 2023-04-01 PROCEDURE — 94660 CPAP INITIATION&MGMT: CPT

## 2023-04-01 PROCEDURE — 99239 HOSP IP/OBS DSCHRG MGMT >30: CPT | Performed by: INTERNAL MEDICINE

## 2023-04-01 PROCEDURE — 94799 UNLISTED PULMONARY SVC/PX: CPT

## 2023-04-01 PROCEDURE — 80048 BASIC METABOLIC PNL TOTAL CA: CPT | Performed by: INTERNAL MEDICINE

## 2023-04-01 PROCEDURE — 63710000001 PREDNISONE PER 1 MG: Performed by: FAMILY MEDICINE

## 2023-04-01 PROCEDURE — 85027 COMPLETE CBC AUTOMATED: CPT | Performed by: INTERNAL MEDICINE

## 2023-04-01 PROCEDURE — 25010000002 ENOXAPARIN PER 10 MG: Performed by: FAMILY MEDICINE

## 2023-04-01 RX ORDER — FUROSEMIDE 40 MG/1
40 TABLET ORAL DAILY
Qty: 30 TABLET | Refills: 0 | Status: SHIPPED | OUTPATIENT
Start: 2023-04-01

## 2023-04-01 RX ORDER — PREDNISONE 20 MG/1
40 TABLET ORAL
Qty: 8 TABLET | Refills: 0 | Status: SHIPPED | OUTPATIENT
Start: 2023-04-01 | End: 2023-04-05

## 2023-04-01 RX ORDER — AMOXICILLIN AND CLAVULANATE POTASSIUM 875; 125 MG/1; MG/1
1 TABLET, FILM COATED ORAL 2 TIMES DAILY
Qty: 8 TABLET | Refills: 0 | Status: SHIPPED | OUTPATIENT
Start: 2023-04-01 | End: 2023-04-05

## 2023-04-01 RX ORDER — POTASSIUM CHLORIDE 750 MG/1
40 CAPSULE, EXTENDED RELEASE ORAL EVERY 4 HOURS
Status: DISCONTINUED | OUTPATIENT
Start: 2023-04-01 | End: 2023-04-01 | Stop reason: HOSPADM

## 2023-04-01 RX ADMIN — FLUTICASONE PROPIONATE 2 SPRAY: 50 SPRAY, METERED NASAL at 10:46

## 2023-04-01 RX ADMIN — Medication 1 CAPSULE: at 10:45

## 2023-04-01 RX ADMIN — Medication 10 ML: at 10:46

## 2023-04-01 RX ADMIN — TAZOBACTAM SODIUM AND PIPERACILLIN SODIUM 4.5 G: 500; 4 INJECTION, SOLUTION INTRAVENOUS at 03:27

## 2023-04-01 RX ADMIN — GABAPENTIN 400 MG: 400 CAPSULE ORAL at 05:29

## 2023-04-01 RX ADMIN — ASPIRIN 81 MG CHEWABLE TABLET 81 MG: 81 TABLET CHEWABLE at 10:40

## 2023-04-01 RX ADMIN — PREDNISONE 40 MG: 20 TABLET ORAL at 10:42

## 2023-04-01 RX ADMIN — POTASSIUM CHLORIDE 40 MEQ: 750 CAPSULE, EXTENDED RELEASE ORAL at 10:41

## 2023-04-01 RX ADMIN — ATORVASTATIN CALCIUM 20 MG: 20 TABLET, FILM COATED ORAL at 10:41

## 2023-04-01 RX ADMIN — PANTOPRAZOLE SODIUM 40 MG: 40 TABLET, DELAYED RELEASE ORAL at 05:29

## 2023-04-01 RX ADMIN — FLUOXETINE 40 MG: 20 CAPSULE ORAL at 10:43

## 2023-04-01 RX ADMIN — FUROSEMIDE 40 MG: 40 TABLET ORAL at 10:43

## 2023-04-01 RX ADMIN — LEVOTHYROXINE SODIUM 50 MCG: 0.05 TABLET ORAL at 05:29

## 2023-04-01 RX ADMIN — CALCIUM CARBONATE (ANTACID) CHEW TAB 500 MG 2 TABLET: 500 CHEW TAB at 02:01

## 2023-04-01 RX ADMIN — ENOXAPARIN SODIUM 40 MG: 100 INJECTION SUBCUTANEOUS at 10:43

## 2023-04-01 NOTE — PROGRESS NOTES
Patient currently on room air with O2 sat of 95%. Does not qualify for walking oximetry per protocol.

## 2023-04-01 NOTE — PLAN OF CARE
Goal Outcome Evaluation:  Plan of Care Reviewed With: patient        Progress: improving  Outcome Evaluation: VSS, PT COMPLIANT WITH BIPAP USAGE, O2 AT 3 LITERS N/C DURING DAY  NO ACUTE EVENTS OVERNIGHT

## 2023-04-01 NOTE — DISCHARGE SUMMARY
[facility]   DISCHARGE SUMMARY      Name:  Jessica Flowers   Age:  67 y.o.  Sex:  female  :  1955  MRN:  [id number]   Visit Number:  [id number]    Admission Date:  3/25/2023  Date of Discharge:  2023  Primary Care Physician:  Karley Jolly APRN    Important issues to note:    1.  Patient was admitted with bilateral pneumonia and diastolic heart failure causing acute respiratory failure.  She was treated with Zosyn as well as IV diuretic therapy with improvement in her symptoms and was able to come off oxygen therapy.  She was seen by Dr. Calero during the hospital stay.  2.  Patient will be discharged home with home health and rolling walker.  Continue Augmentin and prednisone for 4 more days.  3.  Follow-up with Dr. Calero as scheduled.  Patient would benefit from home CPAP therapy following her sleep study.  4.  Patient will be scheduled with Dr. Cano in 6 weeks due to history of dysphagia.  5.  Follow-up with Dr. Anthony with GYN due to abnormal endometrium noted on CT scan.  6.  Follow-up with primary care provider in 1 week.    Discharge Diagnoses:     1. Acute hypoxic respiratory failure, secondary to #2, POA, improved.  2. Bilateral bacterial pneumonia, unable to classify further, POA, improved.  3. Acute on chronic diastolic heart failure, POA, resolved.  4. No evidence of acute renal failure.  5. Essential hypertension.  6. Gastroesophageal reflux disease.  7. Obesity with a BMI of 44.  8. Prominent endometrium noted on CT abdomen- needs outpatient GYN follow-up.  9. Obstructive sleep apnea-awaiting CPAP therapy.    Problem List:     Active Hospital Problems    Diagnosis  POA   • **Pneumonia of both lower lobes due to infectious organism [J18.9]  Yes     Priority: High   • Acute exacerbation of CHF (congestive heart failure) [I50.9]  Yes     Priority: High   • Bacterial pneumonia [J15.9]  Yes     Priority: High   • Acute respiratory failure with hypoxia [J96.01]   Yes     Priority: High   • (HFpEF) heart failure with preserved ejection fraction [I50.30]  Yes   • Depression [F32.A]  Yes   • GERD (gastroesophageal reflux disease) [K21.9]  Yes   • Hypertension [I10]  Yes   • Hypothyroid [E03.9]  Yes   • OAB (overactive bladder) [N32.81]  Yes   • Postmenopausal HRT (hormone replacement therapy) [Z79.890]  Not Applicable      Resolved Hospital Problems   No resolved problems to display.     Presenting Problem:    Chief Complaint   Patient presents with   • Respiratory Distress      Consults:     Consulting Physician(s)     Provider Relationship Specialty    Sebas Calero MD Consulting Physician Pulmonary Disease        Procedures Performed:    None.    History of presenting illness/Hospital Course:    Ms. Flowers is a 67-year-old female with a history of hypertension, dyslipidemia, hypothyroidism, morbid obesity, obstructive sleep apnea (currently awaiting CPAP) was admitted from the emergency room with increasing shortness of breath.  She had apparently had a recent hospitalization at [facility] due to pulmonary edema and was on diuretic therapy and was discharged less than 48 hours ago. Her initial pulse ox was in the 50s as per EMS.     In the ER, she was hemodynamically stable except for hypoxia requiring BiPAP initially.  ABG showed hypoxia noted.  She had mild elevated high-sensitivity troponin.  proBNP within normal limits.  COVID and flu testing were negative.  Chest x-ray was showing cardiomegaly with concern for pneumonia.    CT of the abdomen and pelvis showed air-fluid levels in the nondistended small and large bowels suggestive of enteritis.  Postsurgical change of gastric sleeve and possible small hiatal hernia noted.  5 cm area of decreased attenuation noted in the pelvis adjacent to the fundus of the uterus.  Fairly diffuse bilateral airspace disease with cardiomegaly noted.  She was given Bumex.  She was started on Zosyn due to the elevated  procalcitonin.     Patient was consulted by Dr. Calero from pulmonology due to significant hypoxemia.  She was started on Lasix drip and had around 4 L of output.  She was continued on Zosyn for pneumonia and her repeat chest x-ray showed improvement.  She did have some abnormal findings on the CT scan abdomen pelvis and a pelvic ultrasound was ordered which did not show any evidence of uterine mass but recommended outpatient GYN follow-up due to prominent endometrium.     Patient improved significantly over the next couple of days and was able to come off oxygen.  She is currently saturating at 97% on room air.  She also did walk in the hallway with walker with the help of physical therapy.  She does complain of cough which may be there for another 1 week due to her bilateral pneumonia.  She also has history of gastric sleeve surgery about 10 years ago and since then has had problems swallowing.  We will schedule her to follow-up with Dr. Cano as an outpatient as she may need upper GI endoscopy.  She did not qualify for home oxygen.  She is currently being discharged home with Augmentin for 4 more days to complete 7 days of therapy.  She will also be discharged on prednisone for 4 more days.  She is advised to follow-up with Dr. Calero who has scheduled her for outpatient sleep study for CPAP therapy.  She will be arranged home health services and a rolling walker.  She is advised to follow-up with her primary care provider in 1 week.  I tried to call the patient's son Jourdan but there was no response.  I subsequently called and discussed the patient's discharge plan with the patient's daughter Marion over the phone.    Vital Signs:    Temp:  [97.2 °F (36.2 °C)-98.3 °F (36.8 °C)] 97.6 °F (36.4 °C)  Heart Rate:  [51-65] 60  Resp:  [16-18] 18  BP: ()/(55-90) 110/72    Physical Exam:    General Appearance:  Alert and cooperative.   Head:  Atraumatic and normocephalic.   Eyes: Conjunctivae and sclerae normal, no  icterus. No pallor.   Ears:  Ears with no abnormalities noted.   Throat: No oral lesions, no thrush, oral mucosa moist.   Neck: Supple, trachea midline, no thyromegaly.   Back:   No kyphoscoliosis present. No tenderness to palpation.   Lungs:   Breath sounds heard bilaterally equally.  No wheezing.  Occasional basal crackles heard.  No Pleural rub or bronchial breathing.   Heart:  Normal S1 and S2, no murmur, no gallop, no rub. No JVD.   Abdomen:   Normal bowel sounds, no masses, no organomegaly. Soft, nontender, obese, no rebound tenderness.  Right upper quadrant horizontal surgical scar noted from previous cholecystectomy.   Extremities: Supple, no edema, no cyanosis, no clubbing.  Bilateral knee joint surgical scars noted.   Pulses: Pulses palpable bilaterally.   Skin: No bleeding or rash.   Neurologic: Alert and oriented x 3. No facial asymmetry. Moves all four limbs. No tremors.     Pertinent Lab Results:     Results from last 7 days   Lab Units 04/01/23  0609 03/31/23 1958 03/31/23  0548 03/30/23  0450 03/29/23  1452 03/29/23  0445   SODIUM mmol/L 139  --  138 135*  --  141   POTASSIUM mmol/L 3.5 4.4 3.1* 3.9   < > 3.2*   CHLORIDE mmol/L 101  --  97* 96*  --  97*   CO2 mmol/L 25.8  --  29.7* 29.9*  --  32.0*   BUN mg/dL 23  --  24* 26*  --  24*   CREATININE mg/dL 1.01*  --  1.02* 1.00  --  1.07*   CALCIUM mg/dL 9.0  --  9.1 8.9  --  9.0   BILIRUBIN mg/dL  --   --  0.5  --   --  0.6   ALK PHOS U/L  --   --  57  --   --  74   ALT (SGPT) U/L  --   --  7  --   --  5   AST (SGOT) U/L  --   --  9  --   --  10   GLUCOSE mg/dL 107*  --  98 200*  --  98    < > = values in this interval not displayed.     Results from last 7 days   Lab Units 04/01/23  0609 03/31/23 0548 03/30/23  0450   WBC 10*3/mm3 7.00 7.85 6.88   HEMOGLOBIN g/dL 11.1* 11.3* 11.2*   HEMATOCRIT % 34.3 35.5 34.8   PLATELETS 10*3/mm3 304 311 284       Results from last 7 days   Lab Units 03/28/23  0418   PROBNP pg/mL 917.6*       Results from last 7  days   Lab Units 03/30/23  0734   PH, ARTERIAL pH units 7.509*   PO2 ART mm Hg 146.0*   PCO2, ARTERIAL mm Hg 41.3   HCO3 ART mmol/L 32.9*     Results from last 7 days   Lab Units 03/25/23  1115 03/25/23  1110   BLOODCX  No growth at 5 days No growth at 5 days     Pertinent Radiology Results:    Imaging Results (All)     Procedure Component Value Units Date/Time    XR Chest 1 View [[id number]] Collected: 03/30/23 1141     Updated: 03/30/23 1143    Narrative:      PROCEDURE: XR CHEST 1 VW-     HISTORY: Resp Failure.; J96.01-Acute respiratory failure with hypoxia;  J81.0-Acute pulmonary edema     COMPARISON: 1 day prior.     FINDINGS: The heart is upper limits of normal. The diffuse, bilateral  airspace disease has improved compared to the prior exam.. . The  mediastinum is unremarkable. There is no pneumothorax.  There are no  acute osseous abnormalities.       Impression:      Diffusely improved bilateral airspace disease compared to  one day prior..     This report was signed and finalized on 3/30/2023 11:41 AM by Belinda Selby MD.    US Pelvis Limited [[id number]] Collected: 03/30/23 1137     Updated: 03/30/23 1143    Narrative:      PROCEDURE: US PELVIS LIMITED-     HISTORY: Pelvic pain.     PROCEDURE: Transabdominal images of the pelvis were obtained.     FINDINGS: The uterus measures 8.1 day 4.3 x 5.0 cm. Uterus is normal in  size without focal mass. The endometrium is abnormally thickened at 8 mm  or 67-year-old postmenopausal female, recommend GYN consultation to  exclude endometrial malignancy. No fundal mass is seen to correlate with  the CT finding, suspect this is of segment of focally dilated sigmoid  colon, consider colonoscopy. The right ovary  is normal . The left ovary   is normal . Blood flow identified in both ovaries. No ovarian masses  identified. No free fluid seen..       Impression:      No uterine mass to correlate with CT finding, suspect  focally dilated segment of sigmoid colon, recommend  colonoscopy.     Prominent endometrium for postmenopausal female, recommend GYN  consultation..     This report was signed and finalized on 3/30/2023 11:41 AM by Belinda Selby MD.    XR Chest 1 View [[id number]] Collected: 03/29/23 0740     Updated: 03/29/23 0743    Narrative:      PROCEDURE: XR CHEST 1 VW-     HISTORY: Resp Failure.; J96.01-Acute respiratory failure with hypoxia;  J81.0-Acute pulmonary edema     COMPARISON: 1 day prior.     FINDINGS: The heart is mildly enlarged, stable.. There has been mild  interval worsening in the bilateral airspace disease compared to the  prior exam consistent with worsening edema or pneumonia.. The  mediastinum is unremarkable. There is no pneumothorax.  There are no  acute osseous abnormalities.       Impression:      Worsened bilateral airspace disease compared to prior,  recommend continued follow-up..     This report was signed and finalized on 3/29/2023 7:40 AM by Belinda Selby MD.    CT Abdomen Pelvis Without Contrast [[id number]] Collected: 03/28/23 2052     Updated: 03/29/23 0639    Narrative:      CT ABDOMEN AND PELVIS WITHOUT CONTRAST     HISTORY: Gastroparesis/dysmotility (Ped 0-17years).     COMPARISON: None.     PROCEDURE: Axial images were obtained from the lung bases to the pubic  symphysis by computed tomography. This study was performed with  techniques to keep radiation doses as low as reasonably achievable,  (ALARA). Individualized dose reduction techniques using automated  exposure control or adjustment of mA and/or kV according to the patient  size were employed.     FINDINGS:      ABDOMEN:  There are fairly diffuse ground glass opacities involving both  visualized lower lung fields with some peripheral sparing.  Minimal left  pleural effusion identified. The heart is enlarged, consider congestive  heart failure. The limited non-contrast images of the liver demonstrates  no focal abnormality but the liver is enlarged at 19.6 cm. Gallbladder  is not  identified. No metallic clips are seen in the right upper  quadrant. There is postsurgical change in the stomach suggesting  previous gastric sleeve. There appears to be a small hiatal hernia.  Vascular calcification is noted. The spleen is unremarkable. No adrenal  masses are seen. The aorta is normal in caliber. There is no significant  free fluid or adenopathy.  There is no nephrolithiasis. There is no  hydronephrosis.     PELVIS: The GI tract demonstrate no obstruction. The appendix is not  identified, no secondary signs of appendicitis seen.. The urinary  bladder is decompressed by a Bear catheter and an air-fluid level is  noted. Uterus is midline. There is a rounded low-attenuation structure  which is immediately adjacent to the fundus of the uterus and measures  26 Hounsfield units. This may be part of the sigmoid colon with mild  focal dilatation. Ovarian origin is another consideration. Consider  pelvic ultrasound for further evaluation. Air-fluid levels are  identified in nondistended small and large bowel, consider enteritis.  There is no fluid or adenopathy.        Impression:      No hydronephrosis or nephrolithiasis.     Air-fluid levels in nondistended small and large bowel, possible  enteritis.     Postsurgical change of gastric sleeve and possible small hiatal hernia,  endoscopy may be helpful.     5 cm area of decreased attenuation in the pelvis adjacent to the fundus  of the uterus which may represent focally dilated sigmoid colon or  ovarian lesion, recommend pelvic ultrasound.     Fairly diffuse bilateral basilar airspace disease with cardiomegaly and  minimal left effusion, consider congestive heart failure.     This report was signed and finalized on 3/29/2023 6:37 AM by Belinda Selby MD.    XR Chest 1 View [[id number]] Collected: 03/28/23 1157     Updated: 03/28/23 1209    Narrative:      PROCEDURE: XR CHEST 1 VW-        HISTORY: f/u hypoxia; J96.01-Acute respiratory failure with  hypoxia;  J81.0-Acute pulmonary edema     COMPARISON: One day prior.     FINDINGS: The patient is rotated to the right. The heart is stable in  size. The mediastinum is stable. There has been slight interval  improvement in diffuse bilateral airspace disease consistent with  improving edema or pneumonia. There is no pneumothorax.       Impression:      Slight interval improvement in diffuse bilateral airspace  disease consistent with improving edema or pneumonia.       Images were reviewed, interpreted, and dictated by Dr. Belinda Selby MD  Transcribed by Tracy Mena PA-C.     This report was signed and finalized on 3/28/2023 12:07 PM by Belinda Selby MD.    XR Chest 1 View [[id number]] Collected: 03/27/23 1908     Updated: 03/27/23 1911    Narrative:      PROCEDURE: XR CHEST 1 VW-     HISTORY: resp fail f/u; J96.01-Acute respiratory failure with hypoxia;  J81.0-Acute pulmonary edema     COMPARISON: 1 day prior.     FINDINGS: The heart is stable in size.. There is fairly diffuse,  bilateral airspace disease mildly worsened from prior exam consistent  with worsening pneumonia or edema.. The mediastinum is unremarkable.  There is no pneumothorax.  There are no acute osseous abnormalities.       Impression:      Mildly worsened bilateral airspace disease, consider  worsening pneumonia or edema..         This report was signed and finalized on 3/27/2023 7:09 PM by Belinda Selby MD.    CT Head Without Contrast [[id number]] Collected: 03/27/23 0002     Updated: 03/27/23 0004    Narrative:      FINAL REPORT    TECHNIQUE:  Axial CT images were performed through the head. Coronal  reformatted images were submitted. This study was performed with  techniques to keep radiation doses as low as reasonably  achievable (ALARA). Individualized dose reduction techniques  using automated exposure control or adjustment of mA and/or kV  according to the patient's size were employed.    CLINICAL HISTORY:  Headache, new or worsening  (Age >= 50y)    FINDINGS:  The ventricles are normal in size.  There is no evidence of  hemorrhage.  There is no mass or edema identified.  There is no  abnormal extra-axial fluid seen.  The sinuses are well aerated.      Impression:      No acute intracranial process.    Authenticated and Electronically Signed by Haroon Garay MD  on 03/27/2023 12:02:14 AM    XR Chest 1 View [[id number]] Collected: 03/26/23 1118     Updated: 03/26/23 1122    Narrative:      PORTABLE CHEST  3/26/2023 11:10 AM     HISTORY: Worsening shortness of breath     COMPARISON:   None     FINDINGS: The cardiac silhouette is possibly enlarged. Persistent  pulmonary vascular congestion.  Diffuse interstitial and alveolar  nodular opacities, unchanged from prior study. There is no pneumothorax.  The visualized osseous structures demonstrate no acute abnormalities.       Impression:      No significant interval change.      This report was signed and finalized on 3/26/2023 11:20 AM by Rakel Martins MD.    XR Chest 1 View [[id number]] Collected: 03/25/23 0851     Updated: 03/25/23 0906    Narrative:      PROCEDURE: XR CHEST 1 VW-     HISTORY: SOA Triage Protocol        COMPARISON: November 25, 2015.     FINDINGS:  The heart size is normal. The mediastinum is normal. There is  pulmonary vascular congestion. There are bilateral pulmonary opacities  that may represent pneumonia or atelectasis. There is no pneumothorax.  The bony thorax in intact.        Impression:      Worsening pulmonary vascular congestion.     Bilateral pulmonary opacities consistent with pneumonia or atelectasis.     This report was signed and finalized on 3/25/2023 9:04 AM by Maurice Friedman MD.        Echo:    Results for orders placed during the hospital encounter of 03/25/23    Adult Transthoracic Echo Complete W/ Cont if Necessary Per Protocol    Interpretation Summary  •  Left ventricular systolic function is normal. Calculated left ventricular EF = 62%  •  Left  ventricular wall thickness is consistent with mild concentric hypertrophy.  •  Left ventricular diastolic function is consistent with (grade I) impaired relaxation.  •  No hemodynamically significant valvular pathology.    Condition on Discharge:      Stable.    Code status during the hospital stay:    Code Status and Medical Interventions:   Ordered at: 03/25/23 1339     Medical Intervention Limits:    NO intubation (DNI)     Code Status (Patient has no pulse and is not breathing):    No CPR (Do Not Attempt to Resuscitate)     Medical Interventions (Patient has pulse or is breathing):    Limited Support     Discharge Disposition:    Home-Health Care [facility]    Discharge Medications:       Discharge Medications      New Medications      Instructions Start Date   amoxicillin-clavulanate 875-125 MG per tablet  Commonly known as: Augmentin   1 tablet, Oral, 2 Times Daily      aspirin 81 MG chewable tablet   81 mg, Oral, Daily      predniSONE 20 MG tablet  Commonly known as: DELTASONE   40 mg, Oral, Daily With Breakfast         Changes to Medications      Instructions Start Date   furosemide 40 MG tablet  Commonly known as: LASIX  What changed:   · medication strength  · how much to take  · when to take this   40 mg, Oral, Daily         Continue These Medications      Instructions Start Date   albuterol sulfate  (90 Base) MCG/ACT inhaler  Commonly known as: PROVENTIL HFA;VENTOLIN HFA;PROAIR HFA   2 puffs, Inhalation, Every 4 Hours PRN      atorvastatin 20 MG tablet  Commonly known as: LIPITOR   20 mg, Oral, Daily      budesonide-formoterol 160-4.5 MCG/ACT inhaler  Commonly known as: SYMBICORT   2 puffs, Inhalation, 2 Times Daily - RT      estradiol 1 MG tablet  Commonly known as: ESTRACE   1 mg, Oral, Daily      FLUoxetine 20 MG capsule  Commonly known as: PROzac   40 mg, Oral, Daily      fluticasone 50 MCG/ACT nasal spray  Commonly known as: FLONASE   2 sprays, Nasal, Daily PRN      gabapentin 800 MG  tablet  Commonly known as: NEURONTIN   800 mg, Oral, 3 Times Daily      ibuprofen 800 MG tablet  Commonly known as: ADVIL,MOTRIN   800 mg, Oral, Every 8 Hours PRN      lansoprazole 30 MG capsule  Commonly known as: PREVACID   30 mg, Oral, Daily      levocetirizine 5 MG tablet  Commonly known as: XYZAL   5 mg, Oral, Every Evening      levothyroxine 50 MCG tablet  Commonly known as: SYNTHROID, LEVOTHROID   50 mcg, Oral, Daily      medroxyPROGESTERone 2.5 MG tablet  Commonly known as: PROVERA   2.5 mg, Oral, Daily      metFORMIN 500 MG tablet  Commonly known as: GLUCOPHAGE   500 mg, Oral, Daily With Breakfast      montelukast 10 MG tablet  Commonly known as: SINGULAIR   10 mg, Oral, Nightly      pramipexole 1 MG tablet  Commonly known as: MIRAPEX   1 mg, Oral, Nightly      traZODone 100 MG tablet  Commonly known as: DESYREL   100 mg, Oral, Nightly           Discharge Diet:     Diet Instructions     Diet: Regular/House Diet; Texture: Regular Texture (IDDSI 7); Fluid Consistency: Thin (IDDSI 0)      Discharge Diet: Regular/House Diet    Texture: Regular Texture (IDDSI 7)    Fluid Consistency: Thin (IDDSI 0)        Activity at Discharge:     Activity Instructions     Activity as Tolerated          Follow-up Appointments:    Additional Instructions for the Follow-ups that You Need to Schedule     Ambulatory Referral to Home Health (Hospital)   As directed      Face to Face Visit Date: 3/31/2023    Follow-up provider for Plan of Care?: I treated the patient in an acute care facility and will not continue treatment after discharge.    Follow-up provider: JUAN DUBOIS [[id number]]    Reason/Clinical Findings: Bilateral pneumonia, diastolic CHF, respiratory failure, obesity    Describe mobility limitations that make leaving home difficult: Generalized weakness, fall risk    Nursing/Therapeutic Services Requested: Skilled Nursing Physical Therapy Occupational Therapy    Skilled nursing orders: Medication education Cardiopulmonary  "assessments    PT orders: Therapeutic exercise Gait Training Transfer training Strengthening    Weight Bearing Status: As Tolerated    Occupational orders: Activities of daily living Strengthening    Frequency: 1 Week 1         Discharge Follow-up with Specified Provider: Dr. Calero OR Samina Love; 4 Months   As directed      To: Dr. Calero OR Samina Love    Follow Up: 4 Months    Follow Up Details: If being discharged on a weekend, please call our office on the next working day to schedule this appointment.            Follow-up Information     Karley Jolly APRN Follow up in 1 week(s).    Specialty: Family Medicine  Contact information:  [address] [place] [address] [id number] [phone]             Sebas Calero MD .    Specialties: Pulmonary Disease, Sleep Medicine  Why: needs follow up in 4 weeks  Contact information:  [address] [place]  [facility] [address]  [phone]             Cholo Cano MD Follow up in 6 week(s).    Specialty: Gastroenterology  Why: New patient-remote history of gastric sleeve surgery (about 10 years) with current symptoms of dysphagia with \"food getting stuck in the lower part of the esophagus\"  Contact information:  [address]  [facility] [address]  [phone]             Jazmin Anthony MD Follow up.    Specialties: Obstetrics and Gynecology, Gynecology  Why: New patient-prominent endometrium noted on CT scan.  Contact information:  [address] [facility]  [id number]  [facility] [address]  [phone]                       Test Results Pending at Discharge:    None.       Natalio Boyle MD  04/01/23  09:50 EDT    Time: I spent 35 minutes on this discharge activity which included: face-to-face encounter with the patient, reviewing the data in the system, coordination of the care with the nursing staff as well as consultants, documentation, and entering orders.     Dictated utilizing Dragon dictation.    "

## 2023-04-02 NOTE — PAYOR COMM NOTE
"TO: [facility] MEDICARE REPLACEMENT  FROM: YOGI COLUNGA RN  PH: [phone], FAX: [phone]  NPI: [id number]  TAX ID: [id number]  MEMBER ID: [id number]  MRN: [id number]    Larissa Flowers (67 y.o. Female)     Date of Birth   1955    Social Security Number       Address   [address] [place] [address]    Home Phone   [phone]    MRN   [id number]       Catholic   Non-Druze    Marital Status   Legally                             Admission Date   3/25/23    Admission Type   Emergency    Admitting Provider   Kerley, Brian Joseph, DO    Attending Provider       [facility]       Discharge Date   4/1/2023    Discharge Disposition   [place] [facility]    [place] Destination   Home                            Attending Provider: (none)   Allergies: No Known Allergies    Isolation: None   Infection: None   Code Status: Prior    Ht: 154.9 cm (61\")   Wt: 105 kg (230 lb 13.2 oz)    Admission Cmt: None   Principal Problem: Pneumonia of both lower lobes due to infectious organism [J18.9]                 Active Insurance as of 3/25/2023     Primary Coverage     Payor Plan Insurance Group Employer/Plan Group    [facility] MEDICARE REPLACEMENT [facility] DUAL COMPLETE MEDICARE REPLACEMENT [id number]     Payor Plan Address Payor Plan Phone Number Payor Plan Fax Number Effective Dates    [address] [phone]  1/1/2023 - None Entered    [facility] [id number]       Subscriber Name Subscriber Birth Date Member ID       LARISSA FLOWERS 1955 [id number]           Secondary Coverage     Payor Plan Insurance Group Employer/Plan Group    [facility] MEDICAID      Payor Plan Address Payor Plan Phone Number Payor Plan Fax Number Effective Dates    [address] [address] [phone]  12/23/2016 - None Entered    [facility] [id number]       Subscriber Name Subscriber Birth Date Member ID       LARISSA FLOWERS 1955 [id number]  "                Emergency Contacts      (Rel.) Home Phone Work Phone Mobile Phone    Jourdan Flowers (Son) [phone] -- [phone]    GREGG FLOWERS (Daughter) -- -- [phone]    Bryce Kang (Brother) -- -- [phone]               Discharge Summary      Natalio Boyle MD at 23 [id number]              [facility]   DISCHARGE SUMMARY      Name:  Jessica Flowers   Age:  67 y.o.  Sex:  female  :  1955  MRN:  [id number]   Visit Number:  [id number]    Admission Date:  3/25/2023  Date of Discharge:  2023  Primary Care Physician:  Karley Jolly APRN    Important issues to note:    1.  Patient was admitted with bilateral pneumonia and diastolic heart failure causing acute respiratory failure.  She was treated with Zosyn as well as IV diuretic therapy with improvement in her symptoms and was able to come off oxygen therapy.  She was seen by Dr. Calero during the hospital stay.  2.  Patient will be discharged home with home health and rolling walker.  Continue Augmentin and prednisone for 4 more days.  3.  Follow-up with Dr. Calero as scheduled.  Patient would benefit from home CPAP therapy following her sleep study.  4.  Patient will be scheduled with Dr. Cano in 6 weeks due to history of dysphagia.  5.  Follow-up with Dr. Anthony with GYN due to abnormal endometrium noted on CT scan.  6.  Follow-up with primary care provider in 1 week.    Discharge Diagnoses:     1. Acute hypoxic respiratory failure, secondary to #2, POA, improved.  2. Bilateral bacterial pneumonia, unable to classify further, POA, improved.  3. Acute on chronic diastolic heart failure, POA, resolved.  4. No evidence of acute renal failure.  5. Essential hypertension.  6. Gastroesophageal reflux disease.  7. Obesity with a BMI of 44.  8. Prominent endometrium noted on CT abdomen- needs outpatient GYN follow-up.  9. Obstructive sleep apnea-awaiting CPAP therapy.    Problem List:     Active Hospital  Problems    Diagnosis  POA   • **Pneumonia of both lower lobes due to infectious organism [J18.9]  Yes     Priority: High   • Acute exacerbation of CHF (congestive heart failure) [I50.9]  Yes     Priority: High   • Bacterial pneumonia [J15.9]  Yes     Priority: High   • Acute respiratory failure with hypoxia [J96.01]  Yes     Priority: High   • (HFpEF) heart failure with preserved ejection fraction [I50.30]  Yes   • Depression [F32.A]  Yes   • GERD (gastroesophageal reflux disease) [K21.9]  Yes   • Hypertension [I10]  Yes   • Hypothyroid [E03.9]  Yes   • OAB (overactive bladder) [N32.81]  Yes   • Postmenopausal HRT (hormone replacement therapy) [Z79.890]  Not Applicable      Resolved Hospital Problems   No resolved problems to display.     Presenting Problem:    Chief Complaint   Patient presents with   • Respiratory Distress      Consults:     Consulting Physician(s)     Provider Relationship Specialty    Sebas Calero MD Consulting Physician Pulmonary Disease        Procedures Performed:    None.    History of presenting illness/Hospital Course:    Ms. Flowers is a 67-year-old female with a history of hypertension, dyslipidemia, hypothyroidism, morbid obesity, obstructive sleep apnea (currently awaiting CPAP) was admitted from the emergency room with increasing shortness of breath.  She had apparently had a recent hospitalization at [facility] due to pulmonary edema and was on diuretic therapy and was discharged less than 48 hours ago. Her initial pulse ox was in the 50s as per EMS.     In the ER, she was hemodynamically stable except for hypoxia requiring BiPAP initially.  ABG showed hypoxia noted.  She had mild elevated high-sensitivity troponin.  proBNP within normal limits.  COVID and flu testing were negative.  Chest x-ray was showing cardiomegaly with concern for pneumonia.    CT of the abdomen and pelvis showed air-fluid levels in the nondistended small and large bowels suggestive of  enteritis.  Postsurgical change of gastric sleeve and possible small hiatal hernia noted.  5 cm area of decreased attenuation noted in the pelvis adjacent to the fundus of the uterus.  Fairly diffuse bilateral airspace disease with cardiomegaly noted.  She was given Bumex.  She was started on Zosyn due to the elevated procalcitonin.     Patient was consulted by Dr. Calero from pulmonology due to significant hypoxemia.  She was started on Lasix drip and had around 4 L of output.  She was continued on Zosyn for pneumonia and her repeat chest x-ray showed improvement.  She did have some abnormal findings on the CT scan abdomen pelvis and a pelvic ultrasound was ordered which did not show any evidence of uterine mass but recommended outpatient GYN follow-up due to prominent endometrium.     Patient improved significantly over the next couple of days and was able to come off oxygen.  She is currently saturating at 97% on room air.  She also did walk in the hallway with walker with the help of physical therapy.  She does complain of cough which may be there for another 1 week due to her bilateral pneumonia.  She also has history of gastric sleeve surgery about 10 years ago and since then has had problems swallowing.  We will schedule her to follow-up with Dr. Cano as an outpatient as she may need upper GI endoscopy.  She did not qualify for home oxygen.  She is currently being discharged home with Augmentin for 4 more days to complete 7 days of therapy.  She will also be discharged on prednisone for 4 more days.  She is advised to follow-up with Dr. Calero who has scheduled her for outpatient sleep study for CPAP therapy.  She will be arranged home health services and a rolling walker.  She is advised to follow-up with her primary care provider in 1 week.  I tried to call the patient's son Jourdan but there was no response.  I subsequently called and discussed the patient's discharge plan with the patient's daughter  Marion over the phone.    Vital Signs:    Temp:  [97.2 °F (36.2 °C)-98.3 °F (36.8 °C)] 97.6 °F (36.4 °C)  Heart Rate:  [51-65] 60  Resp:  [16-18] 18  BP: ()/(55-90) 110/72    Physical Exam:    General Appearance:  Alert and cooperative.   Head:  Atraumatic and normocephalic.   Eyes: Conjunctivae and sclerae normal, no icterus. No pallor.   Ears:  Ears with no abnormalities noted.   Throat: No oral lesions, no thrush, oral mucosa moist.   Neck: Supple, trachea midline, no thyromegaly.   Back:   No kyphoscoliosis present. No tenderness to palpation.   Lungs:   Breath sounds heard bilaterally equally.  No wheezing.  Occasional basal crackles heard.  No Pleural rub or bronchial breathing.   Heart:  Normal S1 and S2, no murmur, no gallop, no rub. No JVD.   Abdomen:   Normal bowel sounds, no masses, no organomegaly. Soft, nontender, obese, no rebound tenderness.  Right upper quadrant horizontal surgical scar noted from previous cholecystectomy.   Extremities: Supple, no edema, no cyanosis, no clubbing.  Bilateral knee joint surgical scars noted.   Pulses: Pulses palpable bilaterally.   Skin: No bleeding or rash.   Neurologic: Alert and oriented x 3. No facial asymmetry. Moves all four limbs. No tremors.     Pertinent Lab Results:     Results from last 7 days   Lab Units 04/01/23  0609 03/31/23  1958 03/31/23  0548 03/30/23  0450 03/29/23  1452 03/29/23  0445   SODIUM mmol/L 139  --  138 135*  --  141   POTASSIUM mmol/L 3.5 4.4 3.1* 3.9   < > 3.2*   CHLORIDE mmol/L 101  --  97* 96*  --  97*   CO2 mmol/L 25.8  --  29.7* 29.9*  --  32.0*   BUN mg/dL 23  --  24* 26*  --  24*   CREATININE mg/dL 1.01*  --  1.02* 1.00  --  1.07*   CALCIUM mg/dL 9.0  --  9.1 8.9  --  9.0   BILIRUBIN mg/dL  --   --  0.5  --   --  0.6   ALK PHOS U/L  --   --  57  --   --  74   ALT (SGPT) U/L  --   --  7  --   --  5   AST (SGOT) U/L  --   --  9  --   --  10   GLUCOSE mg/dL 107*  --  98 200*  --  98    < > = values in this interval not  displayed.     Results from last 7 days   Lab Units 04/01/23  0609 03/31/23  0548 03/30/23  0450   WBC 10*3/mm3 7.00 7.85 6.88   HEMOGLOBIN g/dL 11.1* 11.3* 11.2*   HEMATOCRIT % 34.3 35.5 34.8   PLATELETS 10*3/mm3 304 311 284       Results from last 7 days   Lab Units 03/28/23  0418   PROBNP pg/mL 917.6*       Results from last 7 days   Lab Units 03/30/23  0734   PH, ARTERIAL pH units 7.509*   PO2 ART mm Hg 146.0*   PCO2, ARTERIAL mm Hg 41.3   HCO3 ART mmol/L 32.9*     Results from last 7 days   Lab Units 03/25/23  1115 03/25/23  1110   BLOODCX  No growth at 5 days No growth at 5 days     Pertinent Radiology Results:    Imaging Results (All)     Procedure Component Value Units Date/Time    XR Chest 1 View [[id number]] Collected: 03/30/23 1141     Updated: 03/30/23 1143    Narrative:      PROCEDURE: XR CHEST 1 VW-     HISTORY: Resp Failure.; J96.01-Acute respiratory failure with hypoxia;  J81.0-Acute pulmonary edema     COMPARISON: 1 day prior.     FINDINGS: The heart is upper limits of normal. The diffuse, bilateral  airspace disease has improved compared to the prior exam.. . The  mediastinum is unremarkable. There is no pneumothorax.  There are no  acute osseous abnormalities.       Impression:      Diffusely improved bilateral airspace disease compared to  one day prior..     This report was signed and finalized on 3/30/2023 11:41 AM by Belinda Selby MD.    US Pelvis Limited [[id number]] Collected: 03/30/23 1137     Updated: 03/30/23 1143    Narrative:      PROCEDURE: US PELVIS LIMITED-     HISTORY: Pelvic pain.     PROCEDURE: Transabdominal images of the pelvis were obtained.     FINDINGS: The uterus measures 8.1 day 4.3 x 5.0 cm. Uterus is normal in  size without focal mass. The endometrium is abnormally thickened at 8 mm  or 67-year-old postmenopausal female, recommend GYN consultation to  exclude endometrial malignancy. No fundal mass is seen to correlate with  the CT finding, suspect this is of segment of  focally dilated sigmoid  colon, consider colonoscopy. The right ovary  is normal . The left ovary   is normal . Blood flow identified in both ovaries. No ovarian masses  identified. No free fluid seen..       Impression:      No uterine mass to correlate with CT finding, suspect  focally dilated segment of sigmoid colon, recommend colonoscopy.     Prominent endometrium for postmenopausal female, recommend GYN  consultation..     This report was signed and finalized on 3/30/2023 11:41 AM by Belinda Selby MD.    XR Chest 1 View [[id number]] Collected: 03/29/23 0740     Updated: 03/29/23 0743    Narrative:      PROCEDURE: XR CHEST 1 VW-     HISTORY: Resp Failure.; J96.01-Acute respiratory failure with hypoxia;  J81.0-Acute pulmonary edema     COMPARISON: 1 day prior.     FINDINGS: The heart is mildly enlarged, stable.. There has been mild  interval worsening in the bilateral airspace disease compared to the  prior exam consistent with worsening edema or pneumonia.. The  mediastinum is unremarkable. There is no pneumothorax.  There are no  acute osseous abnormalities.       Impression:      Worsened bilateral airspace disease compared to prior,  recommend continued follow-up..     This report was signed and finalized on 3/29/2023 7:40 AM by Belinda Selby MD.    CT Abdomen Pelvis Without Contrast [[id number]] Collected: 03/28/23 2052     Updated: 03/29/23 0639    Narrative:      CT ABDOMEN AND PELVIS WITHOUT CONTRAST     HISTORY: Gastroparesis/dysmotility (Ped 0-17years).     COMPARISON: None.     PROCEDURE: Axial images were obtained from the lung bases to the pubic  symphysis by computed tomography. This study was performed with  techniques to keep radiation doses as low as reasonably achievable,  (ALARA). Individualized dose reduction techniques using automated  exposure control or adjustment of mA and/or kV according to the patient  size were employed.     FINDINGS:      ABDOMEN:  There are fairly diffuse ground glass  opacities involving both  visualized lower lung fields with some peripheral sparing.  Minimal left  pleural effusion identified. The heart is enlarged, consider congestive  heart failure. The limited non-contrast images of the liver demonstrates  no focal abnormality but the liver is enlarged at 19.6 cm. Gallbladder  is not identified. No metallic clips are seen in the right upper  quadrant. There is postsurgical change in the stomach suggesting  previous gastric sleeve. There appears to be a small hiatal hernia.  Vascular calcification is noted. The spleen is unremarkable. No adrenal  masses are seen. The aorta is normal in caliber. There is no significant  free fluid or adenopathy.  There is no nephrolithiasis. There is no  hydronephrosis.     PELVIS: The GI tract demonstrate no obstruction. The appendix is not  identified, no secondary signs of appendicitis seen.. The urinary  bladder is decompressed by a Bear catheter and an air-fluid level is  noted. Uterus is midline. There is a rounded low-attenuation structure  which is immediately adjacent to the fundus of the uterus and measures  26 Hounsfield units. This may be part of the sigmoid colon with mild  focal dilatation. Ovarian origin is another consideration. Consider  pelvic ultrasound for further evaluation. Air-fluid levels are  identified in nondistended small and large bowel, consider enteritis.  There is no fluid or adenopathy.        Impression:      No hydronephrosis or nephrolithiasis.     Air-fluid levels in nondistended small and large bowel, possible  enteritis.     Postsurgical change of gastric sleeve and possible small hiatal hernia,  endoscopy may be helpful.     5 cm area of decreased attenuation in the pelvis adjacent to the fundus  of the uterus which may represent focally dilated sigmoid colon or  ovarian lesion, recommend pelvic ultrasound.     Fairly diffuse bilateral basilar airspace disease with cardiomegaly and  minimal left  effusion, consider congestive heart failure.     This report was signed and finalized on 3/29/2023 6:37 AM by Belinda Selby MD.    XR Chest 1 View [[id number]] Collected: 03/28/23 1157     Updated: 03/28/23 1209    Narrative:      PROCEDURE: XR CHEST 1 VW-        HISTORY: f/u hypoxia; J96.01-Acute respiratory failure with hypoxia;  J81.0-Acute pulmonary edema     COMPARISON: One day prior.     FINDINGS: The patient is rotated to the right. The heart is stable in  size. The mediastinum is stable. There has been slight interval  improvement in diffuse bilateral airspace disease consistent with  improving edema or pneumonia. There is no pneumothorax.       Impression:      Slight interval improvement in diffuse bilateral airspace  disease consistent with improving edema or pneumonia.       Images were reviewed, interpreted, and dictated by Dr. Belinda Selby MD  Transcribed by Tracy Mena PA-C.     This report was signed and finalized on 3/28/2023 12:07 PM by Belinda Selby MD.    XR Chest 1 View [[id number]] Collected: 03/27/23 1908     Updated: 03/27/23 1911    Narrative:      PROCEDURE: XR CHEST 1 VW-     HISTORY: resp fail f/u; J96.01-Acute respiratory failure with hypoxia;  J81.0-Acute pulmonary edema     COMPARISON: 1 day prior.     FINDINGS: The heart is stable in size.. There is fairly diffuse,  bilateral airspace disease mildly worsened from prior exam consistent  with worsening pneumonia or edema.. The mediastinum is unremarkable.  There is no pneumothorax.  There are no acute osseous abnormalities.       Impression:      Mildly worsened bilateral airspace disease, consider  worsening pneumonia or edema..         This report was signed and finalized on 3/27/2023 7:09 PM by Belinda Selby MD.    CT Head Without Contrast [[id number]] Collected: 03/27/23 0002     Updated: 03/27/23 0004    Narrative:      FINAL REPORT    TECHNIQUE:  Axial CT images were performed through the head. Coronal  reformatted images were  submitted. This study was performed with  techniques to keep radiation doses as low as reasonably  achievable (ALARA). Individualized dose reduction techniques  using automated exposure control or adjustment of mA and/or kV  according to the patient's size were employed.    CLINICAL HISTORY:  Headache, new or worsening (Age >= 50y)    FINDINGS:  The ventricles are normal in size.  There is no evidence of  hemorrhage.  There is no mass or edema identified.  There is no  abnormal extra-axial fluid seen.  The sinuses are well aerated.      Impression:      No acute intracranial process.    Authenticated and Electronically Signed by Haroon Garay MD  on 03/27/2023 12:02:14 AM    XR Chest 1 View [[id number]] Collected: 03/26/23 1118     Updated: 03/26/23 1122    Narrative:      PORTABLE CHEST  3/26/2023 11:10 AM     HISTORY: Worsening shortness of breath     COMPARISON:   None     FINDINGS: The cardiac silhouette is possibly enlarged. Persistent  pulmonary vascular congestion.  Diffuse interstitial and alveolar  nodular opacities, unchanged from prior study. There is no pneumothorax.  The visualized osseous structures demonstrate no acute abnormalities.       Impression:      No significant interval change.      This report was signed and finalized on 3/26/2023 11:20 AM by Rakel Martins MD.    XR Chest 1 View [[id number]] Collected: 03/25/23 0851     Updated: 03/25/23 0906    Narrative:      PROCEDURE: XR CHEST 1 VW-     HISTORY: SOA Triage Protocol        COMPARISON: November 25, 2015.     FINDINGS:  The heart size is normal. The mediastinum is normal. There is  pulmonary vascular congestion. There are bilateral pulmonary opacities  that may represent pneumonia or atelectasis. There is no pneumothorax.  The bony thorax in intact.        Impression:      Worsening pulmonary vascular congestion.     Bilateral pulmonary opacities consistent with pneumonia or atelectasis.     This report was signed and finalized on  3/25/2023 9:04 AM by Maurice Friedman MD.        Echo:    Results for orders placed during the hospital encounter of 03/25/23    Adult Transthoracic Echo Complete W/ Cont if Necessary Per Protocol    Interpretation Summary  •  Left ventricular systolic function is normal. Calculated left ventricular EF = 62%  •  Left ventricular wall thickness is consistent with mild concentric hypertrophy.  •  Left ventricular diastolic function is consistent with (grade I) impaired relaxation.  •  No hemodynamically significant valvular pathology.    Condition on Discharge:      Stable.    Code status during the hospital stay:    Code Status and Medical Interventions:   Ordered at: 03/25/23 1339     Medical Intervention Limits:    NO intubation (DNI)     Code Status (Patient has no pulse and is not breathing):    No CPR (Do Not Attempt to Resuscitate)     Medical Interventions (Patient has pulse or is breathing):    Limited Support     Discharge Disposition:    [place] [facility]    Discharge Medications:       Discharge Medications      New Medications      Instructions Start Date   amoxicillin-clavulanate 875-125 MG per tablet  Commonly known as: Augmentin   1 tablet, Oral, 2 Times Daily      aspirin 81 MG chewable tablet   81 mg, Oral, Daily      predniSONE 20 MG tablet  Commonly known as: DELTASONE   40 mg, Oral, Daily With Breakfast         Changes to Medications      Instructions Start Date   furosemide 40 MG tablet  Commonly known as: LASIX  What changed:   · medication strength  · how much to take  · when to take this   40 mg, Oral, Daily         Continue These Medications      Instructions Start Date   albuterol sulfate  (90 Base) MCG/ACT inhaler  Commonly known as: PROVENTIL HFA;VENTOLIN HFA;PROAIR HFA   2 puffs, Inhalation, Every 4 Hours PRN      atorvastatin 20 MG tablet  Commonly known as: LIPITOR   20 mg, Oral, Daily      budesonide-formoterol 160-4.5 MCG/ACT inhaler  Commonly known as: SYMBICORT   2 puffs,  Inhalation, 2 Times Daily - RT      estradiol 1 MG tablet  Commonly known as: ESTRACE   1 mg, Oral, Daily      FLUoxetine 20 MG capsule  Commonly known as: PROzac   40 mg, Oral, Daily      fluticasone 50 MCG/ACT nasal spray  Commonly known as: FLONASE   2 sprays, Nasal, Daily PRN      gabapentin 800 MG tablet  Commonly known as: NEURONTIN   800 mg, Oral, 3 Times Daily      ibuprofen 800 MG tablet  Commonly known as: ADVIL,MOTRIN   800 mg, Oral, Every 8 Hours PRN      lansoprazole 30 MG capsule  Commonly known as: PREVACID   30 mg, Oral, Daily      levocetirizine 5 MG tablet  Commonly known as: XYZAL   5 mg, Oral, Every Evening      levothyroxine 50 MCG tablet  Commonly known as: SYNTHROID, LEVOTHROID   50 mcg, Oral, Daily      medroxyPROGESTERone 2.5 MG tablet  Commonly known as: PROVERA   2.5 mg, Oral, Daily      metFORMIN 500 MG tablet  Commonly known as: GLUCOPHAGE   500 mg, Oral, Daily With Breakfast      montelukast 10 MG tablet  Commonly known as: SINGULAIR   10 mg, Oral, Nightly      pramipexole 1 MG tablet  Commonly known as: MIRAPEX   1 mg, Oral, Nightly      traZODone 100 MG tablet  Commonly known as: DESYREL   100 mg, Oral, Nightly           Discharge Diet:     Diet Instructions     Diet: Regular/House Diet; Texture: Regular Texture (IDDSI 7); Fluid Consistency: Thin (IDDSI 0)      Discharge Diet: Regular/House Diet    Texture: Regular Texture (IDDSI 7)    Fluid Consistency: Thin (IDDSI 0)        Activity at Discharge:     Activity Instructions     Activity as Tolerated          Follow-up Appointments:    Additional Instructions for the Follow-ups that You Need to Schedule     Ambulatory Referral to Home Health (Hospital)   As directed      Face to Face Visit Date: 3/31/2023    Follow-up provider for Plan of Care?: I treated the patient in an acute care facility and will not continue treatment after discharge.    Follow-up provider: JUAN DUBIOS [[id number]]    Reason/Clinical Findings: Bilateral pneumonia,  "diastolic CHF, respiratory failure, obesity    Describe mobility limitations that make leaving home difficult: Generalized weakness, fall risk    Nursing/Therapeutic Services Requested: Skilled Nursing Physical Therapy Occupational Therapy    Skilled nursing orders: Medication education Cardiopulmonary assessments    PT orders: Therapeutic exercise Gait Training Transfer training Strengthening    Weight Bearing Status: As Tolerated    Occupational orders: Activities of daily living Strengthening    Frequency: 1 Week 1         Discharge Follow-up with Specified Provider: Dr. Calero OR Samina Love; 4 Months   As directed      To: Dr. Calero OR Samina Love    Follow Up: 4 Months    Follow Up Details: If being discharged on a weekend, please call our office on the next working day to schedule this appointment.            Follow-up Information     Karley Jolly APRN Follow up in 1 week(s).    Specialty: Family Medicine  Contact information:  [address] [place] [address] [phone]             Sebas Calero MD .    Specialties: Pulmonary Disease, Sleep Medicine  Why: needs follow up in 4 weeks  Contact information:  [address] [facility] [address]  [phone]             Cholo Cano MD Follow up in 6 week(s).    Specialty: Gastroenterology  Why: New patient-remote history of gastric sleeve surgery (about 10 years) with current symptoms of dysphagia with \"food getting stuck in the lower part of the esophagus\"  Contact information:  [address]  [address]  [facility] [address]  [phone]             Jazmin Anthony MD Follow up.    Specialties: Obstetrics and Gynecology, Gynecology  Why: New patient-prominent endometrium noted on CT scan.  Contact information:  [address] [facility]  [id number]  [place] [id number] [phone]                       Test Results Pending at Discharge:    None.       Natalio Boyle MD  04/01/23  09:50 EDT    Time: I spent 35 minutes on this " discharge activity which included: face-to-face encounter with the patient, reviewing the data in the system, coordination of the care with the nursing staff as well as consultants, documentation, and entering orders.     Dictated utilizing Dragon dictation.      Electronically signed by Natalio Boyle MD at 04/01/23 0959

## 2023-04-02 NOTE — OUTREACH NOTE
Prep Survey    Flowsheet Row Responses   Religion facility patient discharged from? [facility]   Is LACE score < 7 ? No   Eligibility Readm Mgmt   Discharge diagnosis PNA   Does the patient have one of the following disease processes/diagnoses(primary or secondary)? Pneumonia   Does the patient have Home health ordered? No   Is there a DME ordered? No   Prep survey completed? Yes          Mehreen COYLE - Registered Nurse

## 2023-04-05 ENCOUNTER — READMISSION MANAGEMENT (OUTPATIENT)
Dept: CALL CENTER | Facility: HOSPITAL | Age: 68
End: 2023-04-05
Payer: MEDICARE

## 2023-04-07 NOTE — CASE MANAGEMENT/SOCIAL WORK
Case Management Discharge Note      Final Note: pt was not seen by HH until today 4/7/23, changed code to home         Selected Continued Care - Discharged on 4/1/2023 Admission date: 3/25/2023 - Discharge disposition: Home-Health Care Svc    Destination    No services have been selected for the patient.              Durable Medical Equipment     Service Provider Selected Services Address Phone Fax Patient Preferred    [facility] [address] [place] [address] [address] [address] [phone] --          Dialysis/Infusion    No services have been selected for the patient.              Home Medical Care     Service Provider Selected Services Address Phone Fax Patient Preferred    [facility] [address] [facility] [address] [address] [phone] --          Therapy    No services have been selected for the patient.              Community Resources    No services have been selected for the patient.              Community & DME    No services have been selected for the patient.                       Final Discharge Disposition Code: 01 - home or self-care

## 2023-04-11 ENCOUNTER — READMISSION MANAGEMENT (OUTPATIENT)
Dept: CALL CENTER | Facility: HOSPITAL | Age: 68
End: 2023-04-11
Payer: MEDICARE

## 2023-04-11 RX ORDER — ESTRADIOL 1 MG/1
1 TABLET ORAL DAILY
Qty: 30 TABLET | Refills: 1 | Status: SHIPPED | OUTPATIENT
Start: 2023-04-11

## 2023-04-11 NOTE — OUTREACH NOTE
COPD/PN Week 2 Survey    Flowsheet Row Responses   [facility] facility patient discharged from? [facility]   Does the patient have one of the following disease processes/diagnoses(primary or secondary)? Pneumonia   Week 2 attempt successful? No   Unsuccessful attempts Attempt 1          FAUZIA SALDANA - Registered Nurse

## 2023-04-13 ENCOUNTER — READMISSION MANAGEMENT (OUTPATIENT)
Dept: CALL CENTER | Facility: HOSPITAL | Age: 68
End: 2023-04-13
Payer: MEDICARE

## 2023-04-13 NOTE — OUTREACH NOTE
COPD/PN Week 2 Survey    Flowsheet Row Responses   [facility] facility patient discharged from? [facility]   Does the patient have one of the following disease processes/diagnoses(primary or secondary)? Pneumonia   Week 2 attempt successful? No   Unsuccessful attempts Attempt 2   Revoke Decline to participate          Brittanie COYLE - Licensed Nurse

## 2023-04-25 ENCOUNTER — HOSPITAL ENCOUNTER (OUTPATIENT)
Dept: SLEEP MEDICINE | Facility: HOSPITAL | Age: 68
End: 2023-04-25
Payer: MEDICARE

## 2023-04-25 DIAGNOSIS — G47.33 OSA (OBSTRUCTIVE SLEEP APNEA): ICD-10-CM

## 2023-04-25 PROCEDURE — 95806 SLEEP STUDY UNATT&RESP EFFT: CPT

## 2023-04-28 ENCOUNTER — HOSPITAL ENCOUNTER (OUTPATIENT)
Dept: MRI IMAGING | Facility: HOSPITAL | Age: 68
Discharge: HOME | End: 2023-04-28
Payer: MEDICARE

## 2023-04-28 DIAGNOSIS — M54.50 LOW BACK PAIN, UNSPECIFIED BACK PAIN LATERALITY, UNSPECIFIED CHRONICITY, UNSPECIFIED WHETHER SCIATICA PRESENT: ICD-10-CM

## 2023-04-28 PROCEDURE — 72148 MRI LUMBAR SPINE W/O DYE: CPT

## 2023-05-02 ENCOUNTER — OFFICE VISIT (OUTPATIENT)
Dept: OBSTETRICS AND GYNECOLOGY | Facility: CLINIC | Age: 68
End: 2023-05-02
Payer: MEDICARE

## 2023-05-02 VITALS — DIASTOLIC BLOOD PRESSURE: 78 MMHG | SYSTOLIC BLOOD PRESSURE: 128 MMHG | WEIGHT: 231.4 LBS | BODY MASS INDEX: 43.72 KG/M2

## 2023-05-02 DIAGNOSIS — R93.89 THICKENED ENDOMETRIUM: Primary | ICD-10-CM

## 2023-05-02 NOTE — PROGRESS NOTES
Subjective   Chief Complaint   Patient presents with   • Follow-up     Follow up on hospital admission. TVS done today     Jessica Flowers is a 67 y.o. year old No obstetric history on file..  No LMP recorded. Patient is postmenopausal.  She presents to be seen because of recent hospitalization for bilateral pneumonia fluid overload.  At the time ultrasound was done which stated patient had a 8 mm endometrium.  Anatomy otherwise normal.  Ultrasound as noted below shows 4.5 mm endometrium.  She is on daily estrogen and progesterone.  Could not tolerate being off.  She does take them regularly and together.     OTHER COMPLAINTS:  Nothing else    The following portions of the patient's history were reviewed and updated as appropriate:  She  has a past medical history of Abnormal CXR, Allergic rhinitis, Anxiety, Carpal tunnel syndrome, Chronic narcotic use, Depression, Dyspnea on exertion, Fatigue, GERD (gastroesophageal reflux disease), Glucose intolerance (impaired glucose tolerance), Hiatal hernia with gastroesophageal reflux, MRSA infection, Hyperlipidemia, Hypertension, Hypertriglyceridemia, Hypothyroid, Insomnia, Joint pain, Morbid obesity, OAB (overactive bladder), Osteoarthritis of knees, bilateral, Plantar fasciitis, Postmenopausal HRT (hormone replacement therapy), Psoriasis, and Vitamin D deficiency.  She does not have any pertinent problems on file.  She  has a past surgical history that includes Appendectomy (1989); Laparoscopic tubal ligation (1988); Dilation and curettage of uterus (1990, 2015); Carpal tunnel release; Cholecystectomy open (1980); Other surgical history; pr laps surg esopg/gstr fundoplasty (N/A, 7/5/2017); and Gastric Sleeve (N/A, 7/5/2017).  Her family history includes Cancer in her father and sister; Diabetes in her brother and father; Heart attack in her brother and father; Hypertension in her mother.  She  reports that she has never smoked. She has never used smokeless tobacco. She  reports that she does not drink alcohol and does not use drugs.  Current Outpatient Medications   Medication Sig Dispense Refill   • albuterol sulfate  (90 Base) MCG/ACT inhaler Inhale 2 puffs Every 4 (Four) Hours As Needed for Wheezing or Shortness of Air.     • aspirin 81 MG chewable tablet Chew 1 tablet Daily.     • atorvastatin (LIPITOR) 20 MG tablet Take 1 tablet by mouth Daily.     • budesonide-formoterol (SYMBICORT) 160-4.5 MCG/ACT inhaler Inhale 2 puffs 2 (Two) Times a Day.     • estradiol (ESTRACE) 1 MG tablet TAKE 1 TABLET BY MOUTH DAILY 30 tablet 1   • FLUoxetine (PROzac) 20 MG capsule Take 2 capsules by mouth Daily.     • fluticasone (FLONASE) 50 MCG/ACT nasal spray 2 sprays into the nostril(s) as directed by provider Daily As Needed for Rhinitis.     • furosemide (LASIX) 40 MG tablet Take 1 tablet by mouth Daily. 30 tablet 0   • gabapentin (NEURONTIN) 800 MG tablet Take 1 tablet by mouth 3 (Three) Times a Day.     • ibuprofen (ADVIL,MOTRIN) 800 MG tablet Take 1 tablet by mouth Every 8 (Eight) Hours As Needed for Mild Pain.     • lansoprazole (PREVACID) 30 MG capsule Take 1 capsule by mouth Daily.  0   • levocetirizine (XYZAL) 5 MG tablet Take 1 tablet by mouth Every Evening.     • levothyroxine (SYNTHROID, LEVOTHROID) 50 MCG tablet Take 1 tablet by mouth Daily.     • medroxyPROGESTERone (PROVERA) 2.5 MG tablet Take 1 tablet by mouth Daily. 30 tablet 12   • metFORMIN (GLUCOPHAGE) 500 MG tablet Take 1 tablet by mouth Daily With Breakfast.     • montelukast (SINGULAIR) 10 MG tablet Take 1 tablet by mouth Every Night.     • pramipexole (MIRAPEX) 1 MG tablet Take 1 tablet by mouth Every Night.     • traZODone (DESYREL) 100 MG tablet Take 1 tablet by mouth Every Night.       No current facility-administered medications for this visit.     Current Outpatient Medications on File Prior to Visit   Medication Sig   • albuterol sulfate  (90 Base) MCG/ACT inhaler Inhale 2 puffs Every 4 (Four) Hours As  Needed for Wheezing or Shortness of Air.   • aspirin 81 MG chewable tablet Chew 1 tablet Daily.   • atorvastatin (LIPITOR) 20 MG tablet Take 1 tablet by mouth Daily.   • budesonide-formoterol (SYMBICORT) 160-4.5 MCG/ACT inhaler Inhale 2 puffs 2 (Two) Times a Day.   • estradiol (ESTRACE) 1 MG tablet TAKE 1 TABLET BY MOUTH DAILY   • FLUoxetine (PROzac) 20 MG capsule Take 2 capsules by mouth Daily.   • fluticasone (FLONASE) 50 MCG/ACT nasal spray 2 sprays into the nostril(s) as directed by provider Daily As Needed for Rhinitis.   • furosemide (LASIX) 40 MG tablet Take 1 tablet by mouth Daily.   • gabapentin (NEURONTIN) 800 MG tablet Take 1 tablet by mouth 3 (Three) Times a Day.   • ibuprofen (ADVIL,MOTRIN) 800 MG tablet Take 1 tablet by mouth Every 8 (Eight) Hours As Needed for Mild Pain.   • lansoprazole (PREVACID) 30 MG capsule Take 1 capsule by mouth Daily.   • levocetirizine (XYZAL) 5 MG tablet Take 1 tablet by mouth Every Evening.   • levothyroxine (SYNTHROID, LEVOTHROID) 50 MCG tablet Take 1 tablet by mouth Daily.   • medroxyPROGESTERone (PROVERA) 2.5 MG tablet Take 1 tablet by mouth Daily.   • metFORMIN (GLUCOPHAGE) 500 MG tablet Take 1 tablet by mouth Daily With Breakfast.   • montelukast (SINGULAIR) 10 MG tablet Take 1 tablet by mouth Every Night.   • pramipexole (MIRAPEX) 1 MG tablet Take 1 tablet by mouth Every Night.   • traZODone (DESYREL) 100 MG tablet Take 1 tablet by mouth Every Night.     No current facility-administered medications on file prior to visit.     She has No Known Allergies.    Social History    Tobacco Use      Smoking status: Never      Smokeless tobacco: Never    Review of Systems  Consitutional POS: nothing reported    NEG: anorexia or night sweats   Gastointestinal POS: nothing reported    NEG: bloating, change in bowel habits, melena or reflux symptoms   Genitourinary POS: nothing reported    NEG: dysuria or hematuria   Integument POS: nothing reported    NEG: moles that are changing  in size, shape, color or rashes   Breast POS: nothing reported    NEG: persistent breast lump, skin dimpling or nipple discharge         Pertinent items are noted in HPI.          Objective   /78   Wt 105 kg (231 lb 6.4 oz)   BMI 43.72 kg/m²     General:  well developed; well nourished  no acute distress   Skin:  Not performed.   Thyroid: not examined   Lungs:  breathing is unlabored   Heart:  Not performed.   Breasts:  Not performed.   Abdomen: soft, non-tender; no masses  no umbilical or inguinal hernias are present  no hepato-splenomegaly   Pelvis: Not performed.     Psychiatric: Alert and oriented ×3, mood and affect appropriate  HEENT: Atraumatic, normocephalic, normal scleral icterus  Extremities: 2+ pulses bilaterally, no edema      Lab Review   CBC and CMP    Imaging   CT of abdomen/pelvis report  Pelvic ultrasound report  Pelvic ultrasound images independantly reviewed - Uterus is anteverted normal size and shape.  Small consistent with menopausal status.  Normal right ovary.  Left ovary not seen.  Adnexa small.  Endometrium 4.48 mm.  Thin.  Calcifications noted within the endometrium        Assessment   1. Reassuring vaginal ultrasound with normal anatomy and endometrial lining.  Patient has no vaginal bleeding.  May continue HRT.     Plan   1. As above.  Follow-up annually/as needed  2.     No orders of the defined types were placed in this encounter.         This note was electronically signed.      May 2, 2023

## 2023-05-15 ENCOUNTER — HOSPITAL ENCOUNTER (OUTPATIENT)
Facility: HOSPITAL | Age: 68
Discharge: HOME | End: 2023-05-15
Payer: MEDICARE

## 2023-05-15 LAB
ALBUMIN SERPL-MCNC: 3.6 G/DL (ref 3.4–4.8)
ALBUMIN/GLOB SERPL: 1.3 {RATIO} (ref 0.8–2)
ALP SERPL-CCNC: 78 U/L (ref 25–100)
ALT SERPL-CCNC: 7 U/L (ref 4–36)
ANION GAP SERPL CALCULATED.3IONS-SCNC: 12 MMOL/L (ref 3–16)
AST SERPL-CCNC: 8 U/L (ref 8–33)
BASOPHILS # BLD: 0.1 K/UL (ref 0–0.1)
BASOPHILS NFR BLD: 1.2 %
BILIRUB SERPL-MCNC: 0.8 MG/DL (ref 0.3–1.2)
BUN SERPL-MCNC: 12 MG/DL (ref 6–20)
CALCIUM SERPL-MCNC: 8.9 MG/DL (ref 8.5–10.5)
CHLORIDE SERPL-SCNC: 109 MMOL/L (ref 98–107)
CHOLEST SERPL-MCNC: 132 MG/DL (ref 0–200)
CO2 SERPL-SCNC: 23 MMOL/L (ref 20–30)
CREAT SERPL-MCNC: 0.8 MG/DL (ref 0.4–1.2)
EOSINOPHIL # BLD: 0.4 K/UL (ref 0–0.4)
EOSINOPHIL NFR BLD: 6.7 %
ERYTHROCYTE [DISTWIDTH] IN BLOOD BY AUTOMATED COUNT: 13.3 % (ref 11–16)
FOLATE SERPL-MCNC: 7.55 NG/ML
GFR SERPLBLD CREATININE-BSD FMLA CKD-EPI: >60 ML/MIN/{1.73_M2}
GLOBULIN SER CALC-MCNC: 2.7 G/DL
GLUCOSE SERPL-MCNC: 101 MG/DL (ref 74–106)
HBA1C MFR BLD: 6.2 %
HCT VFR BLD AUTO: 37.7 % (ref 37–47)
HDLC SERPL-MCNC: 52 MG/DL (ref 40–60)
HGB BLD-MCNC: 11.9 G/DL (ref 11.5–16.5)
IMM GRANULOCYTES # BLD: 0 K/UL
IMM GRANULOCYTES NFR BLD: 0.5 % (ref 0–5)
LDLC SERPL CALC-MCNC: 48 MG/DL
LYMPHOCYTES # BLD: 1.7 K/UL (ref 1.5–4)
LYMPHOCYTES NFR BLD: 28.7 %
MCH RBC QN AUTO: 29.3 PG (ref 27–32)
MCHC RBC AUTO-ENTMCNC: 31.6 G/DL (ref 31–35)
MCV RBC AUTO: 92.9 FL (ref 80–100)
MONOCYTES # BLD: 0.5 K/UL (ref 0.2–0.8)
MONOCYTES NFR BLD: 8.6 %
NEUTROPHILS # BLD: 3.2 K/UL (ref 2–7.5)
NEUTS SEG NFR BLD: 54.3 %
PLATELET # BLD AUTO: 287 K/UL (ref 150–400)
PMV BLD AUTO: 10.6 FL (ref 6–10)
POTASSIUM SERPL-SCNC: 3.9 MMOL/L (ref 3.4–5.1)
PROT SERPL-MCNC: 6.3 G/DL (ref 6.4–8.3)
RBC # BLD AUTO: 4.06 M/UL (ref 3.8–5.8)
SODIUM SERPL-SCNC: 144 MMOL/L (ref 136–145)
TRIGL SERPL-MCNC: 162 MG/DL (ref 0–249)
TSH SERPL DL<=0.005 MIU/L-ACNC: 2.8 UIU/ML (ref 0.27–4.2)
VIT B12 SERPL-MCNC: 312 PG/ML (ref 211–911)
VLDLC SERPL CALC-MCNC: 32 MG/DL
WBC # BLD AUTO: 6 K/UL (ref 4–11)

## 2023-05-15 PROCEDURE — 83036 HEMOGLOBIN GLYCOSYLATED A1C: CPT

## 2023-05-15 PROCEDURE — 36415 COLL VENOUS BLD VENIPUNCTURE: CPT

## 2023-05-15 PROCEDURE — 82607 VITAMIN B-12: CPT

## 2023-05-15 PROCEDURE — 85025 COMPLETE CBC W/AUTO DIFF WBC: CPT

## 2023-05-15 PROCEDURE — 82746 ASSAY OF FOLIC ACID SERUM: CPT

## 2023-05-15 PROCEDURE — 84443 ASSAY THYROID STIM HORMONE: CPT

## 2023-05-15 PROCEDURE — 80053 COMPREHEN METABOLIC PANEL: CPT

## 2023-05-15 PROCEDURE — 80061 LIPID PANEL: CPT

## 2023-05-26 ENCOUNTER — HOSPITAL ENCOUNTER (OUTPATIENT)
Facility: HOSPITAL | Age: 68
Discharge: HOME | End: 2023-05-26
Payer: MEDICARE

## 2023-05-26 PROCEDURE — 87070 CULTURE OTHR SPECIMN AEROBIC: CPT

## 2023-05-26 PROCEDURE — 87077 CULTURE AEROBIC IDENTIFY: CPT

## 2023-05-28 LAB
BACTERIA SPEC AEROBE CULT: ABNORMAL
ORGANISM: ABNORMAL

## 2023-05-29 LAB
BACTERIA SPEC AEROBE CULT: ABNORMAL
GRAM STN SPEC: ABNORMAL
ORGANISM: ABNORMAL

## 2023-07-14 ENCOUNTER — APPOINTMENT (OUTPATIENT)
Dept: GENERAL RADIOLOGY | Facility: HOSPITAL | Age: 68
End: 2023-07-14
Payer: MEDICARE

## 2023-07-14 ENCOUNTER — HOSPITAL ENCOUNTER (EMERGENCY)
Facility: HOSPITAL | Age: 68
Discharge: HOME | End: 2023-07-14
Attending: EMERGENCY MEDICINE
Payer: MEDICARE

## 2023-07-14 VITALS
OXYGEN SATURATION: 98 % | BODY MASS INDEX: 41.54 KG/M2 | HEIGHT: 61 IN | DIASTOLIC BLOOD PRESSURE: 65 MMHG | HEART RATE: 95 BPM | RESPIRATION RATE: 16 BRPM | WEIGHT: 220 LBS | TEMPERATURE: 98.5 F | SYSTOLIC BLOOD PRESSURE: 104 MMHG

## 2023-07-14 DIAGNOSIS — S86.911A MUSCLE STRAIN OF RIGHT LOWER LEG, INITIAL ENCOUNTER: Primary | ICD-10-CM

## 2023-07-14 LAB — D DIMER PPP DDU-MCNC: 0.49 UG/ML FEU (ref 0–0.6)

## 2023-07-14 PROCEDURE — 36415 COLL VENOUS BLD VENIPUNCTURE: CPT

## 2023-07-14 PROCEDURE — 85379 FIBRIN DEGRADATION QUANT: CPT

## 2023-07-14 PROCEDURE — 99284 EMERGENCY DEPT VISIT MOD MDM: CPT

## 2023-07-14 PROCEDURE — 73562 X-RAY EXAM OF KNEE 3: CPT

## 2023-07-14 PROCEDURE — 96372 THER/PROPH/DIAG INJ SC/IM: CPT

## 2023-07-14 PROCEDURE — 6360000002 HC RX W HCPCS: Performed by: EMERGENCY MEDICINE

## 2023-07-14 RX ORDER — ACETAMINOPHEN/DIPHENHYDRAMINE 325-12.5MG
1 TABLET ORAL 2 TIMES DAILY PRN
Qty: 14 TABLET | Refills: 0 | Status: SHIPPED | OUTPATIENT
Start: 2023-07-14 | End: 2023-07-21

## 2023-07-14 RX ORDER — ONDANSETRON 2 MG/ML
4 INJECTION INTRAMUSCULAR; INTRAVENOUS ONCE
Status: COMPLETED | OUTPATIENT
Start: 2023-07-14 | End: 2023-07-14

## 2023-07-14 RX ORDER — KETOROLAC TROMETHAMINE 30 MG/ML
30 INJECTION, SOLUTION INTRAMUSCULAR; INTRAVENOUS ONCE
Status: COMPLETED | OUTPATIENT
Start: 2023-07-14 | End: 2023-07-14

## 2023-07-14 RX ADMIN — ONDANSETRON 4 MG: 2 INJECTION INTRAMUSCULAR; INTRAVENOUS at 19:44

## 2023-07-14 RX ADMIN — KETOROLAC TROMETHAMINE 30 MG: 30 INJECTION, SOLUTION INTRAMUSCULAR; INTRAVENOUS at 19:44

## 2023-07-14 ASSESSMENT — PAIN DESCRIPTION - ORIENTATION: ORIENTATION: RIGHT

## 2023-07-14 ASSESSMENT — PAIN - FUNCTIONAL ASSESSMENT: PAIN_FUNCTIONAL_ASSESSMENT: 0-10

## 2023-07-14 ASSESSMENT — PAIN SCALES - GENERAL: PAINLEVEL_OUTOF10: 8

## 2023-07-14 ASSESSMENT — PAIN DESCRIPTION - FREQUENCY: FREQUENCY: CONTINUOUS

## 2023-07-14 ASSESSMENT — PAIN DESCRIPTION - PAIN TYPE: TYPE: ACUTE PAIN

## 2023-07-14 ASSESSMENT — PAIN DESCRIPTION - ONSET: ONSET: SUDDEN

## 2023-07-14 ASSESSMENT — PAIN DESCRIPTION - LOCATION: LOCATION: LEG

## 2023-07-14 ASSESSMENT — LIFESTYLE VARIABLES: HOW OFTEN DO YOU HAVE A DRINK CONTAINING ALCOHOL: NEVER

## 2023-07-14 ASSESSMENT — PAIN DESCRIPTION - DESCRIPTORS: DESCRIPTORS: ACHING

## 2023-07-14 NOTE — ED TRIAGE NOTES
Pt to ED with right leg pain and swelling that started this morning. Pt also complains of a headache and nausea.

## 2023-07-15 ASSESSMENT — ENCOUNTER SYMPTOMS
RHINORRHEA: 0
NAUSEA: 0
SHORTNESS OF BREATH: 0
CHEST TIGHTNESS: 0
BACK PAIN: 0
DIARRHEA: 0
WHEEZING: 0
EYE REDNESS: 0
EYE PAIN: 0
SORE THROAT: 0
VOMITING: 0
STRIDOR: 0
PHOTOPHOBIA: 0
ABDOMINAL PAIN: 0
SINUS PRESSURE: 0

## 2023-07-15 NOTE — ED PROVIDER NOTES
[address] ENCOUNTER        Pt Name: Cass Mcmahan  MRN: [id number]  [address] [facility] 1955  Date of evaluation: 7/14/2023  Provider: Jhon Flores MD  PCP: KARMA Kraft CNP  Note Started: 4:04 AM EDT 7/15/23    CHIEF COMPLAINT       Chief Complaint   Patient presents with    Leg Pain     Right leg pain and swelling      Leg Swelling    Nausea       HISTORY OF PRESENT ILLNESS: 1 or more Elements     History from : Patient    Limitations to history : None    Cass Mcmahan is a 74 y.o. female who presents presents to the emergency room with right lower extremity pain after carrying a heavy trash bag yesterday afternoon. Patient denies any fall or direct injury to the right lower extremity, however approximately 6 years ago the patient underwent a right knee replacement and is concerned with possible complications associated with the arthroplasty. Patient is anxious and short of breath, denies recent travel,exposure to sick contacts. Nursing Notes were all reviewed and agreed with or any disagreements were addressed in the HPI. REVIEW OF SYSTEMS :      Review of Systems   Constitutional:  Negative for appetite change, chills, diaphoresis, fatigue, fever and unexpected weight change. HENT:  Negative for dental problem, ear discharge, ear pain, hearing loss, mouth sores, nosebleeds, rhinorrhea, sinus pressure, sneezing and sore throat. Eyes:  Negative for photophobia, pain and redness. Respiratory:  Negative for chest tightness, shortness of breath, wheezing and stridor. Cardiovascular:  Negative for chest pain and leg swelling. Gastrointestinal:  Negative for abdominal pain, diarrhea, nausea and vomiting. Endocrine: Negative for cold intolerance and heat intolerance. Genitourinary:  Negative for difficulty urinating. Musculoskeletal:  Positive for arthralgias (right leg). Negative for back pain.    Skin:  Negative for pallor

## 2023-07-15 NOTE — DISCHARGE INSTRUCTIONS
Please take the pain medication prescribed as directed. Continue all current occasions as previously prescribed. Follow-up with PCP on Monday if persistent pain in the right lower extremity. If any new/acute symptoms or worsening of current presentation please go to the closest urgent care or emergency room for prompt reevaluation.

## 2023-07-17 ENCOUNTER — HOSPITAL ENCOUNTER (OUTPATIENT)
Facility: HOSPITAL | Age: 68
Discharge: HOME | End: 2023-07-17
Payer: MEDICARE

## 2023-07-17 ENCOUNTER — HOSPITAL ENCOUNTER (OUTPATIENT)
Dept: GENERAL RADIOLOGY | Facility: HOSPITAL | Age: 68
Discharge: HOME | End: 2023-07-17
Payer: MEDICARE

## 2023-07-17 DIAGNOSIS — N20.0 KIDNEY STONES: ICD-10-CM

## 2023-07-17 LAB
BILIRUB UR QL STRIP.AUTO: NEGATIVE
CLARITY UR: CLEAR
COLOR UR: YELLOW
GLUCOSE UR STRIP.AUTO-MCNC: NEGATIVE MG/DL
HGB UR QL STRIP.AUTO: NEGATIVE
KETONES UR STRIP.AUTO-MCNC: NEGATIVE MG/DL
LEUKOCYTE ESTERASE UR QL STRIP.AUTO: NEGATIVE
NITRITE UR QL STRIP.AUTO: NEGATIVE
PH UR STRIP.AUTO: 5.5 [PH] (ref 5–8)
PROT UR STRIP.AUTO-MCNC: NEGATIVE MG/DL
SP GR UR STRIP.AUTO: 1.02 (ref 1–1.03)
UA DIPSTICK W REFLEX MICRO PNL UR: NORMAL
URN SPEC COLLECT METH UR: NORMAL
UROBILINOGEN UR STRIP-ACNC: 0.2 E.U./DL

## 2023-07-17 PROCEDURE — 81003 URINALYSIS AUTO W/O SCOPE: CPT

## 2023-07-17 PROCEDURE — 87086 URINE CULTURE/COLONY COUNT: CPT

## 2023-07-17 PROCEDURE — 74018 RADEX ABDOMEN 1 VIEW: CPT

## 2023-07-17 PROCEDURE — 87077 CULTURE AEROBIC IDENTIFY: CPT

## 2023-07-18 ENCOUNTER — TELEPHONE (OUTPATIENT)
Dept: PULMONOLOGY | Facility: CLINIC | Age: 68
End: 2023-07-18

## 2023-07-18 LAB
BACTERIA UR CULT: ABNORMAL
ORGANISM: ABNORMAL

## 2023-07-18 NOTE — TELEPHONE ENCOUNTER
Hub staff attempted to follow warm transfer process and was unsuccessful     Caller: Jessica Flowers    Relationship to patient: Self    Best call back number: [phone]    Patient is needing: PT RETURNING PHONE CALL FROM OFFICE

## 2023-07-25 ENCOUNTER — OFFICE VISIT (OUTPATIENT)
Dept: PULMONOLOGY | Facility: CLINIC | Age: 68
End: 2023-07-25
Payer: MEDICARE

## 2023-07-25 VITALS
BODY MASS INDEX: 40.97 KG/M2 | DIASTOLIC BLOOD PRESSURE: 68 MMHG | WEIGHT: 217 LBS | RESPIRATION RATE: 16 BRPM | SYSTOLIC BLOOD PRESSURE: 118 MMHG | HEART RATE: 68 BPM | HEIGHT: 61 IN | OXYGEN SATURATION: 96 %

## 2023-07-25 DIAGNOSIS — R06.02 SOB (SHORTNESS OF BREATH): ICD-10-CM

## 2023-07-25 DIAGNOSIS — E66.01 MORBID OBESITY WITH BMI OF 40.0-44.9, ADULT: ICD-10-CM

## 2023-07-25 DIAGNOSIS — G47.33 OSA (OBSTRUCTIVE SLEEP APNEA): Primary | ICD-10-CM

## 2023-07-25 DIAGNOSIS — R06.02 SHORTNESS OF BREATH: Primary | ICD-10-CM

## 2023-07-25 PROCEDURE — 95012 NITRIC OXIDE EXP GAS DETER: CPT | Performed by: NURSE PRACTITIONER

## 2023-07-25 PROCEDURE — 94060 EVALUATION OF WHEEZING: CPT | Performed by: INTERNAL MEDICINE

## 2023-07-25 PROCEDURE — 99214 OFFICE O/P EST MOD 30 MIN: CPT | Performed by: NURSE PRACTITIONER

## 2023-07-25 RX ORDER — SEMAGLUTIDE 0.68 MG/ML
INJECTION, SOLUTION SUBCUTANEOUS
COMMUNITY
Start: 2023-07-24

## 2023-07-25 RX ORDER — POTASSIUM CHLORIDE 750 MG/1
10 CAPSULE, EXTENDED RELEASE ORAL 2 TIMES DAILY
COMMUNITY

## 2023-07-25 RX ORDER — ALBUTEROL SULFATE 90 UG/1
2 AEROSOL, METERED RESPIRATORY (INHALATION) EVERY 4 HOURS PRN
Qty: 18 G | Refills: 5 | Status: SHIPPED | OUTPATIENT
Start: 2023-07-25

## 2023-07-25 RX ORDER — TAMSULOSIN HYDROCHLORIDE 0.4 MG/1
1 CAPSULE ORAL DAILY
COMMUNITY

## 2023-07-25 RX ORDER — DIAZEPAM 10 MG/1
TABLET ORAL
COMMUNITY
Start: 2023-05-08

## 2023-07-25 RX ORDER — ACETAMINOPHEN/DIPHENHYDRAMINE 325-12.5MG
TABLET ORAL
COMMUNITY

## 2023-07-25 NOTE — PROGRESS NOTES
"Chief Complaint   Patient presents with    Follow-up    Breathing Problem         Subjective   Jessica Flowers is a 67 y.o. female.   The patient comes in for follow-up of hospitalization in March.    She went to the hospital with complaints of shortness of breath and chest pain.    She had a home sleep study which is noted as poorly performed on interpretation, which showed no sleep apnea.    Patient had sleep study ordered at [facility] in the last month. She sees ENT that goes to Dr. Giraldo's office. She states the sleep study showed EZEQUIEL and she is supposed to get set up soon with CPAP.     She reports using oxygen at night as prescribed by Dr. Giraldo.     She reports breathing is better since discharge from hospital.     She used to use albuterol but has not had any recently. She requests a prescription for albuterol.     Symbicort is on her med list and she state she had taken it but is not taking it now.     She c/o being fatigued with activity and cannot walk far due to SOB and weakness.    She takes Singulair nightly.     She uses Flonase as needed.     She has never smoked.     The following portions of the patient's history were reviewed and updated as appropriate: allergies, current medications, past family history, past medical history, past social history, and past surgical history.      Review of Systems   HENT:  Negative for sinus pressure, sneezing and sore throat.    Respiratory:  Negative for cough, chest tightness, shortness of breath and wheezing.        Objective   Visit Vitals  /68   Pulse 68   Resp 16   Ht 154.9 cm (61\") Comment: pt reported   Wt 98.4 kg (217 lb)   SpO2 96%   BMI 41.00 kg/m²         Physical Exam  Vitals reviewed.   HENT:      Head: Atraumatic.      Mouth/Throat:      Mouth: Mucous membranes are moist.      Comments: Crowded oropharynx. Dentures noted.   Eyes:      Extraocular Movements: Extraocular movements intact.   Cardiovascular:      Rate and Rhythm: Normal rate " and regular rhythm.   Pulmonary:      Effort: Pulmonary effort is normal. No respiratory distress.      Comments: Somewhat decreased A/E without wheezing.   Abdominal:      Comments: Obese abdomen.    Musculoskeletal:      Comments: Gait normal.   Skin:     General: Skin is warm.   Neurological:      Mental Status: She is alert and oriented to person, place, and time.         Assessment & Plan   Diagnoses and all orders for this visit:    1. EZEQUIEL (obstructive sleep apnea) (Primary)    2. SOB (shortness of breath)  -     Nitric Oxide  -     Pulmonary Function Test    3. Morbid obesity with BMI of 40.0-44.9, adult    Other orders  -     albuterol sulfate  (90 Base) MCG/ACT inhaler; Inhale 2 puffs Every 4 (Four) Hours As Needed for Wheezing or Shortness of Air.  Dispense: 18 g; Refill: 5           Return in about 6 months (around 1/25/2024) for Recheck, For Dr. Calero.    DISCUSSION (if any):  Spirometry today consistent with no obstruction postBD.    I have asked her to continue albuterol for shortness of breath.  She is not needing this on a regular basis.    Her breathing issues are likely more cardiac related.  Pulmonary edema was noted during her last hospitalization.    FeNO today 5 ppb.      Latest Reference Range & Units Most Recent   pH, Arterial 7.350 - 7.450 pH units 7.509 (C)  3/30/23 07:34   pCO2, Arterial 35.0 - 45.0 mm Hg 41.3  3/30/23 07:34   pO2, Arterial 75.0 - 100.0 mm Hg 146.0 (H)  3/30/23 07:34   HCO3, Arterial 22.0 - 28.0 mmol/L 32.9 (H)  3/30/23 07:34   Base Excess 0.0 - 2.0 mmol/L 9.0 (H)  3/30/23 07:34   O2 Saturation, Arterial 94.0 - 100.0 % 98.7  3/30/23 07:34   A-a DO2  See Comment  3/30/23 07:34       I reviewed d/c summary and pertinent labs/radiology.     Study Result    Narrative & Impression   PROCEDURE: XR CHEST 1 VW-     HISTORY: Resp Failure.; J96.01-Acute respiratory failure with hypoxia;  J81.0-Acute pulmonary edema     COMPARISON: 1 day prior.     FINDINGS: The heart is  mildly enlarged, stable.. There has been mild  interval worsening in the bilateral airspace disease compared to the  prior exam consistent with worsening edema or pneumonia.. The  mediastinum is unremarkable. There is no pneumothorax.  There are no  acute osseous abnormalities.     IMPRESSION:  Worsened bilateral airspace disease compared to prior,  recommend continued follow-up..           This report was signed and finalized on 3/29/2023 7:40 AM by Belinda Selby MD.      Latest Reference Range & Units Most Recent   pH, Arterial 7.350 - 7.450 pH units 7.509 (C)  3/30/23 07:34   pCO2, Arterial 35.0 - 45.0 mm Hg 41.3  3/30/23 07:34   pO2, Arterial 75.0 - 100.0 mm Hg 146.0 (H)  3/30/23 07:34   HCO3, Arterial 22.0 - 28.0 mmol/L 32.9 (H)  3/30/23 07:34   Base Excess 0.0 - 2.0 mmol/L 9.0 (H)  3/30/23 07:34   O2 Saturation, Arterial 94.0 - 100.0 % 98.7  3/30/23 07:34   A-a DO2  See Comment  3/30/23 07:34   Total CO2, Blood Gas 24.0 - 30.0 mmol/L 26.5 (E)  2/16/19 19:22   O2 Device  Unknown (E)  2/16/19 19:22   Carboxyhemoglobin 0 - 2 % <0.7  3/30/23 07:34   Methemoglobin 0.00 - 1.50 % 0.40  3/30/23 07:34   Oxyhemoglobin 94 - 99 % 98.3  3/30/23 07:34   Hematocrit, Blood Gas % 35.4  3/30/23 07:34   Site  Right Radial  3/30/23 07:34   Harshil's Test  Positive  3/30/23 07:34   Modality  Nasal Cannula  3/30/23 07:34   FIO2 % 100  3/25/23 05:11   Flow Rate lpm 8.0  3/30/23 07:34   Ventilator Mode  NA  3/30/23 07:34   Barometric Pressure for Blood Gas mmHg 740  3/30/23 07:34     Echo reviewed.  Interpretation Summary         Left ventricular systolic function is normal. Calculated left ventricular EF = 62%    Left ventricular wall thickness is consistent with mild concentric hypertrophy.    Left ventricular diastolic function is consistent with (grade I) impaired relaxation.    No hemodynamically significant valvular pathology.       She should continue to f/w ENT for sleep apnea management.     I have told patient she should get  pneumococcal vaccine. She verbalized understanding.       Dictated utilizing Dragon dictation.    This document was electronically signed by CYNTHIA Trevino July 25, 2023  12:05 EDT

## 2023-08-24 ENCOUNTER — HOSPITAL ENCOUNTER (OUTPATIENT)
Facility: HOSPITAL | Age: 68
Discharge: HOME | End: 2023-08-24
Payer: MEDICARE

## 2023-08-24 LAB
25(OH)D3 SERPL-MCNC: 37.6 NG/ML (ref 32–100)
ALBUMIN SERPL-MCNC: 3.7 G/DL (ref 3.4–4.8)
ALBUMIN/GLOB SERPL: 1.3 {RATIO} (ref 0.8–2)
ALP SERPL-CCNC: 77 U/L (ref 25–100)
ALT SERPL-CCNC: 7 U/L (ref 4–36)
ANION GAP SERPL CALCULATED.3IONS-SCNC: 13 MMOL/L (ref 3–16)
AST SERPL-CCNC: 11 U/L (ref 8–33)
BASOPHILS # BLD: 0.1 K/UL (ref 0–0.1)
BASOPHILS NFR BLD: 1.4 %
BILIRUB SERPL-MCNC: 0.8 MG/DL (ref 0.3–1.2)
BUN SERPL-MCNC: 14 MG/DL (ref 6–20)
CALCIUM SERPL-MCNC: 8.7 MG/DL (ref 8.5–10.5)
CHLORIDE SERPL-SCNC: 106 MMOL/L (ref 98–107)
CHOLEST SERPL-MCNC: 116 MG/DL (ref 0–200)
CO2 SERPL-SCNC: 24 MMOL/L (ref 20–30)
CREAT SERPL-MCNC: 1 MG/DL (ref 0.4–1.2)
EOSINOPHIL # BLD: 0.3 K/UL (ref 0–0.4)
EOSINOPHIL NFR BLD: 5.3 %
ERYTHROCYTE [DISTWIDTH] IN BLOOD BY AUTOMATED COUNT: 13.4 % (ref 11–16)
FOLATE SERPL-MCNC: 4.63 NG/ML
GFR SERPLBLD CREATININE-BSD FMLA CKD-EPI: >60 ML/MIN/{1.73_M2}
GLOBULIN SER CALC-MCNC: 2.9 G/DL
GLUCOSE SERPL-MCNC: 101 MG/DL (ref 74–106)
HBA1C MFR BLD: 5.9 %
HCT VFR BLD AUTO: 36.9 % (ref 37–47)
HDLC SERPL-MCNC: 56 MG/DL (ref 40–60)
HGB BLD-MCNC: 11.8 G/DL (ref 11.5–16.5)
IMM GRANULOCYTES # BLD: 0 K/UL
IMM GRANULOCYTES NFR BLD: 0.2 % (ref 0–5)
LDLC SERPL CALC-MCNC: 39 MG/DL
LIPASE SERPL-CCNC: 29 U/L (ref 5.6–51.3)
LYMPHOCYTES # BLD: 1.8 K/UL (ref 1.5–4)
LYMPHOCYTES NFR BLD: 34.2 %
MCH RBC QN AUTO: 29.4 PG (ref 27–32)
MCHC RBC AUTO-ENTMCNC: 32 G/DL (ref 31–35)
MCV RBC AUTO: 91.8 FL (ref 80–100)
MONOCYTES # BLD: 0.5 K/UL (ref 0.2–0.8)
MONOCYTES NFR BLD: 10.4 %
NEUTROPHILS # BLD: 2.5 K/UL (ref 2–7.5)
NEUTS SEG NFR BLD: 48.5 %
PLATELET # BLD AUTO: 255 K/UL (ref 150–400)
PMV BLD AUTO: 10.9 FL (ref 6–10)
POTASSIUM SERPL-SCNC: 3.8 MMOL/L (ref 3.4–5.1)
PROT SERPL-MCNC: 6.6 G/DL (ref 6.4–8.3)
RBC # BLD AUTO: 4.02 M/UL (ref 3.8–5.8)
SODIUM SERPL-SCNC: 143 MMOL/L (ref 136–145)
TRIGL SERPL-MCNC: 105 MG/DL (ref 0–249)
TSH SERPL DL<=0.005 MIU/L-ACNC: 2.51 UIU/ML (ref 0.27–4.2)
VIT B12 SERPL-MCNC: 234 PG/ML (ref 211–911)
VLDLC SERPL CALC-MCNC: 21 MG/DL
WBC # BLD AUTO: 5.1 K/UL (ref 4–11)

## 2023-08-24 PROCEDURE — 82607 VITAMIN B-12: CPT

## 2023-08-24 PROCEDURE — 82746 ASSAY OF FOLIC ACID SERUM: CPT

## 2023-08-24 PROCEDURE — 36415 COLL VENOUS BLD VENIPUNCTURE: CPT

## 2023-08-24 PROCEDURE — 80061 LIPID PANEL: CPT

## 2023-08-24 PROCEDURE — 85025 COMPLETE CBC W/AUTO DIFF WBC: CPT

## 2023-08-24 PROCEDURE — 84443 ASSAY THYROID STIM HORMONE: CPT

## 2023-08-24 PROCEDURE — 80053 COMPREHEN METABOLIC PANEL: CPT

## 2023-08-24 PROCEDURE — 82306 VITAMIN D 25 HYDROXY: CPT

## 2023-08-24 PROCEDURE — 83036 HEMOGLOBIN GLYCOSYLATED A1C: CPT

## 2023-08-24 PROCEDURE — 83690 ASSAY OF LIPASE: CPT

## 2023-09-05 ENCOUNTER — HOSPITAL ENCOUNTER (OUTPATIENT)
Facility: HOSPITAL | Age: 68
Discharge: HOME | End: 2023-09-05
Payer: MEDICARE

## 2023-09-05 ENCOUNTER — TELEPHONE (OUTPATIENT)
Dept: SURGERY | Facility: CLINIC | Age: 68
End: 2023-09-05
Payer: MEDICARE

## 2023-09-05 ENCOUNTER — HOSPITAL ENCOUNTER (OUTPATIENT)
Dept: GENERAL RADIOLOGY | Facility: HOSPITAL | Age: 68
Discharge: HOME | End: 2023-09-05
Payer: MEDICARE

## 2023-09-05 DIAGNOSIS — M54.16 LUMBAR RADICULOPATHY: ICD-10-CM

## 2023-09-05 PROCEDURE — 72110 X-RAY EXAM L-2 SPINE 4/>VWS: CPT

## 2023-09-06 ENCOUNTER — TELEPHONE (OUTPATIENT)
Dept: SURGERY | Facility: CLINIC | Age: 68
End: 2023-09-06
Payer: MEDICARE

## 2023-09-25 ENCOUNTER — TELEPHONE (OUTPATIENT)
Dept: SURGERY | Facility: CLINIC | Age: 68
End: 2023-09-25

## 2023-09-26 ENCOUNTER — TELEPHONE (OUTPATIENT)
Dept: SURGERY | Facility: CLINIC | Age: 68
End: 2023-09-26
Payer: MEDICARE

## 2023-09-26 ENCOUNTER — HOSPITAL ENCOUNTER (EMERGENCY)
Facility: HOSPITAL | Age: 68
Discharge: HOME | End: 2023-09-26
Attending: FAMILY MEDICINE
Payer: MEDICARE

## 2023-09-26 VITALS
SYSTOLIC BLOOD PRESSURE: 129 MMHG | HEART RATE: 58 BPM | HEIGHT: 61 IN | RESPIRATION RATE: 16 BRPM | TEMPERATURE: 97.8 F | OXYGEN SATURATION: 97 % | WEIGHT: 220 LBS | BODY MASS INDEX: 41.54 KG/M2 | DIASTOLIC BLOOD PRESSURE: 87 MMHG

## 2023-09-26 DIAGNOSIS — S01.312A LACERATION OF LEFT EARLOBE, INITIAL ENCOUNTER: Primary | ICD-10-CM

## 2023-09-26 PROCEDURE — 6370000000 HC RX 637 (ALT 250 FOR IP): Performed by: FAMILY MEDICINE

## 2023-09-26 PROCEDURE — 6360000002 HC RX W HCPCS: Performed by: FAMILY MEDICINE

## 2023-09-26 PROCEDURE — 90715 TDAP VACCINE 7 YRS/> IM: CPT | Performed by: FAMILY MEDICINE

## 2023-09-26 PROCEDURE — 99284 EMERGENCY DEPT VISIT MOD MDM: CPT

## 2023-09-26 PROCEDURE — 96372 THER/PROPH/DIAG INJ SC/IM: CPT

## 2023-09-26 PROCEDURE — 90471 IMMUNIZATION ADMIN: CPT | Performed by: FAMILY MEDICINE

## 2023-09-26 RX ORDER — CEPHALEXIN 500 MG/1
500 CAPSULE ORAL 2 TIMES DAILY
Qty: 10 CAPSULE | Refills: 0 | Status: SHIPPED | OUTPATIENT
Start: 2023-09-26 | End: 2023-10-01

## 2023-09-26 RX ORDER — CEPHALEXIN 500 MG/1
500 CAPSULE ORAL ONCE
Status: COMPLETED | OUTPATIENT
Start: 2023-09-26 | End: 2023-09-26

## 2023-09-26 RX ORDER — KETOROLAC TROMETHAMINE 30 MG/ML
30 INJECTION, SOLUTION INTRAMUSCULAR; INTRAVENOUS ONCE
Status: COMPLETED | OUTPATIENT
Start: 2023-09-26 | End: 2023-09-26

## 2023-09-26 RX ADMIN — KETOROLAC TROMETHAMINE 30 MG: 30 INJECTION, SOLUTION INTRAMUSCULAR at 15:08

## 2023-09-26 RX ADMIN — TETANUS TOXOID, REDUCED DIPHTHERIA TOXOID AND ACELLULAR PERTUSSIS VACCINE, ADSORBED 0.5 ML: 5; 2.5; 8; 8; 2.5 SUSPENSION INTRAMUSCULAR at 15:07

## 2023-09-26 RX ADMIN — CEPHALEXIN 500 MG: 500 CAPSULE ORAL at 15:08

## 2023-09-26 ASSESSMENT — PATIENT HEALTH QUESTIONNAIRE - PHQ9
SUM OF ALL RESPONSES TO PHQ QUESTIONS 1-9: 0
1. LITTLE INTEREST OR PLEASURE IN DOING THINGS: 0
2. FEELING DOWN, DEPRESSED OR HOPELESS: 0
SUM OF ALL RESPONSES TO PHQ9 QUESTIONS 1 & 2: 0
SUM OF ALL RESPONSES TO PHQ QUESTIONS 1-9: 0

## 2023-09-26 ASSESSMENT — LIFESTYLE VARIABLES
HOW MANY STANDARD DRINKS CONTAINING ALCOHOL DO YOU HAVE ON A TYPICAL DAY: PATIENT DOES NOT DRINK
HOW OFTEN DO YOU HAVE A DRINK CONTAINING ALCOHOL: NEVER

## 2023-09-26 NOTE — ED NOTES
Discharge instructions reviewed and medications discussed with verbalized understanding from patient. Patient had no further questions or concerns.         Rosi Guidry RN  09/26/23 2801

## 2023-09-26 NOTE — TELEPHONE ENCOUNTER
Effie at [facility] states :  Pt states she doesn't want to go through with the colonoscopy at this time.   Cancelled the procedure with [facility]

## 2023-09-26 NOTE — ED NOTES
Wound cleansed at this time and steri-strips applied. Patient tolerated well with no complaints.       Dewayne Tobar RN  09/26/23 9756

## 2023-09-26 NOTE — ED TRIAGE NOTES
Patient states that 3 days ago she noticed her ear lobe was ripped. Patient states that she does not know ho wit happened. Pt states that she does wear earrings but was not wearing earrings at the time she ripped.

## 2023-10-07 ENCOUNTER — OFFICE VISIT (OUTPATIENT)
Dept: PRIMARY CARE CLINIC | Age: 68
End: 2023-10-07
Payer: MEDICARE

## 2023-10-07 VITALS
HEART RATE: 75 BPM | BODY MASS INDEX: 41.54 KG/M2 | SYSTOLIC BLOOD PRESSURE: 108 MMHG | RESPIRATION RATE: 18 BRPM | HEIGHT: 61 IN | DIASTOLIC BLOOD PRESSURE: 73 MMHG | WEIGHT: 220 LBS | OXYGEN SATURATION: 93 % | TEMPERATURE: 98 F

## 2023-10-07 DIAGNOSIS — Z20.822 SUSPECTED COVID-19 VIRUS INFECTION: ICD-10-CM

## 2023-10-07 DIAGNOSIS — Z71.89 EDUCATED ABOUT COVID-19 VIRUS INFECTION: ICD-10-CM

## 2023-10-07 DIAGNOSIS — R19.7 DIARRHEA, UNSPECIFIED TYPE: ICD-10-CM

## 2023-10-07 DIAGNOSIS — R05.1 ACUTE COUGH: Primary | ICD-10-CM

## 2023-10-07 LAB
INFLUENZA A ANTIBODY: NORMAL
INFLUENZA B ANTIBODY: NORMAL

## 2023-10-07 PROCEDURE — 1123F ACP DISCUSS/DSCN MKR DOCD: CPT | Performed by: NURSE PRACTITIONER

## 2023-10-07 PROCEDURE — G8484 FLU IMMUNIZE NO ADMIN: HCPCS | Performed by: NURSE PRACTITIONER

## 2023-10-07 PROCEDURE — 1036F TOBACCO NON-USER: CPT | Performed by: NURSE PRACTITIONER

## 2023-10-07 PROCEDURE — 3078F DIAST BP <80 MM HG: CPT | Performed by: NURSE PRACTITIONER

## 2023-10-07 PROCEDURE — G8417 CALC BMI ABV UP PARAM F/U: HCPCS | Performed by: NURSE PRACTITIONER

## 2023-10-07 PROCEDURE — G8427 DOCREV CUR MEDS BY ELIG CLIN: HCPCS | Performed by: NURSE PRACTITIONER

## 2023-10-07 PROCEDURE — 3074F SYST BP LT 130 MM HG: CPT | Performed by: NURSE PRACTITIONER

## 2023-10-07 PROCEDURE — 99213 OFFICE O/P EST LOW 20 MIN: CPT | Performed by: NURSE PRACTITIONER

## 2023-10-07 PROCEDURE — 1090F PRES/ABSN URINE INCON ASSESS: CPT | Performed by: NURSE PRACTITIONER

## 2023-10-07 PROCEDURE — 3017F COLORECTAL CA SCREEN DOC REV: CPT | Performed by: NURSE PRACTITIONER

## 2023-10-07 PROCEDURE — G8399 PT W/DXA RESULTS DOCUMENT: HCPCS | Performed by: NURSE PRACTITIONER

## 2023-10-07 RX ORDER — DEXTROMETHORPHAN POLISTIREX 30 MG/5ML
60 SUSPENSION ORAL 2 TIMES DAILY PRN
Qty: 148 ML | Refills: 0 | Status: SHIPPED | OUTPATIENT
Start: 2023-10-07 | End: 2023-10-17

## 2023-10-07 RX ORDER — ASPIRIN 81 MG/1
TABLET ORAL
COMMUNITY

## 2023-10-07 RX ORDER — SEMAGLUTIDE 0.68 MG/ML
INJECTION, SOLUTION SUBCUTANEOUS
COMMUNITY
Start: 2023-07-24

## 2023-10-07 RX ORDER — PREDNISONE 20 MG/1
20 TABLET ORAL DAILY
Qty: 5 TABLET | Refills: 0 | Status: SHIPPED | OUTPATIENT
Start: 2023-10-07 | End: 2023-10-12

## 2023-10-07 RX ORDER — FLUTICASONE PROPIONATE 50 MCG
1 SPRAY, SUSPENSION (ML) NASAL DAILY
COMMUNITY
Start: 2022-11-11

## 2023-10-07 RX ORDER — PHENTERMINE HYDROCHLORIDE 37.5 MG/1
1 TABLET ORAL DAILY
COMMUNITY

## 2023-10-07 RX ORDER — AZITHROMYCIN 250 MG/1
250 TABLET, FILM COATED ORAL SEE ADMIN INSTRUCTIONS
Qty: 6 TABLET | Refills: 0 | Status: SHIPPED | OUTPATIENT
Start: 2023-10-07 | End: 2023-10-12

## 2023-10-07 RX ORDER — FLUCONAZOLE 150 MG/1
1 TABLET ORAL DAILY PRN
COMMUNITY

## 2023-10-07 RX ORDER — OXYCODONE AND ACETAMINOPHEN 7.5; 325 MG/1; MG/1
1 TABLET ORAL EVERY 4 HOURS PRN
COMMUNITY
Start: 2023-09-11

## 2023-10-07 RX ORDER — FUROSEMIDE 40 MG/1
TABLET ORAL
COMMUNITY
Start: 2023-07-21

## 2023-10-07 RX ORDER — ONDANSETRON 4 MG/1
4 TABLET, FILM COATED ORAL EVERY 6 HOURS PRN
COMMUNITY
Start: 2023-09-28

## 2023-10-07 RX ORDER — HYDROCODONE BITARTRATE AND ACETAMINOPHEN 10; 325 MG/1; MG/1
TABLET ORAL
COMMUNITY

## 2023-10-07 ASSESSMENT — ENCOUNTER SYMPTOMS
COUGH: 1
SORE THROAT: 1
EYES NEGATIVE: 1
GASTROINTESTINAL NEGATIVE: 1

## 2023-10-07 NOTE — PROGRESS NOTES
Chief Complaint   Patient presents with    Congestion    Cough    Diarrhea     Started 1 day ago       Have you seen any other physician or provider since your last visit no    Have you had any other diagnostic tests since your last visit? no    Have you changed or stopped any medications since your last visit? no
deficit present. Mental Status: She is alert and oriented to person, place, and time. Psychiatric:         Behavior: Behavior normal.         Lab Results   Component Value Date/Time     08/24/2023 07:16 AM    K 3.8 08/24/2023 07:16 AM    K 3.9 03/24/2023 05:20 AM     08/24/2023 07:16 AM    CO2 24 08/24/2023 07:16 AM    GLUCOSE 101 08/24/2023 07:16 AM    BUN 14 08/24/2023 07:16 AM    CREATININE 1.0 08/24/2023 07:16 AM    CALCIUM 8.7 08/24/2023 07:16 AM    PROT 6.6 08/24/2023 07:16 AM    LABALBU 3.7 08/24/2023 07:16 AM    BILITOT 0.8 08/24/2023 07:16 AM    ALT 7 08/24/2023 07:16 AM    AST 11 08/24/2023 07:16 AM     Hemoglobin A1C (%)   Date Value   08/24/2023 5.9     LDL Calculated (mg/dL)   Date Value   08/24/2023 39     LDL Direct (mg/dL)   Date Value   12/17/2014 109       Lab Results   Component Value Date/Time    WBC 5.1 08/24/2023 07:16 AM    NEUTROABS 2.5 08/24/2023 07:16 AM    HGB 11.8 08/24/2023 07:16 AM    HCT 36.9 08/24/2023 07:16 AM    MCV 91.8 08/24/2023 07:16 AM     08/24/2023 07:16 AM     Lab Results   Component Value Date    TSH 2.51 08/24/2023       ASSESSMENT/PLAN:     1. Acute cough  Medication as directed. Rest for few days. Increase oral intake of clear liquids. Follow up if symptoms fail to get better or get worse. - azithromycin (ZITHROMAX) 250 MG tablet; Take 1 tablet by mouth See Admin Instructions for 5 days 500mg on day 1 followed by 250mg on days 2 - 5  Dispense: 6 tablet; Refill: 0  - predniSONE (DELTASONE) 20 MG tablet; Take 1 tablet by mouth daily for 5 days  Dispense: 5 tablet; Refill: 0    2. Suspected COVID-19 virus infection  Covid negative  - POCT COVID-19 Rapid, NAAT    3. Educated about COVID-19 virus infection  Increase oral intake of fluids. Wear mask. - POCT COVID-19 Rapid, NAAT    4. Diarrhea, unspecified type  Increase oral intake of fluids.   Eat foods high in fiber.   - POCT Influenza A/B      Orders Placed This Encounter   Medications

## 2023-10-17 ENCOUNTER — HOSPITAL ENCOUNTER (EMERGENCY)
Facility: HOSPITAL | Age: 68
Discharge: HOME | End: 2023-10-17
Attending: EMERGENCY MEDICINE
Payer: MEDICARE

## 2023-10-17 VITALS
WEIGHT: 220 LBS | OXYGEN SATURATION: 99 % | DIASTOLIC BLOOD PRESSURE: 95 MMHG | HEIGHT: 61 IN | HEART RATE: 96 BPM | BODY MASS INDEX: 41.54 KG/M2 | SYSTOLIC BLOOD PRESSURE: 161 MMHG | RESPIRATION RATE: 18 BRPM | TEMPERATURE: 98.7 F

## 2023-10-17 DIAGNOSIS — K13.79 ORAL PAIN OF UNKNOWN ETIOLOGY: Primary | ICD-10-CM

## 2023-10-17 LAB
FLUAV AG NPH QL: NEGATIVE
FLUBV AG NPH QL: NEGATIVE
KOH PREP SPEC: NORMAL
S PYO AG THROAT QL: NEGATIVE
SARS-COV-2 RDRP RESP QL NAA+PROBE: NOT DETECTED

## 2023-10-17 PROCEDURE — 87210 SMEAR WET MOUNT SALINE/INK: CPT

## 2023-10-17 PROCEDURE — 6370000000 HC RX 637 (ALT 250 FOR IP): Performed by: EMERGENCY MEDICINE

## 2023-10-17 PROCEDURE — 87880 STREP A ASSAY W/OPTIC: CPT

## 2023-10-17 PROCEDURE — 87635 SARS-COV-2 COVID-19 AMP PRB: CPT

## 2023-10-17 PROCEDURE — 87804 INFLUENZA ASSAY W/OPTIC: CPT

## 2023-10-17 PROCEDURE — 99283 EMERGENCY DEPT VISIT LOW MDM: CPT

## 2023-10-17 RX ADMIN — BENZOCAINE 6 MG-MENTHOL 10 MG LOZENGES 1 LOZENGE: at 01:58

## 2023-10-17 ASSESSMENT — PATIENT HEALTH QUESTIONNAIRE - PHQ9
SUM OF ALL RESPONSES TO PHQ QUESTIONS 1-9: 0
1. LITTLE INTEREST OR PLEASURE IN DOING THINGS: 0
SUM OF ALL RESPONSES TO PHQ9 QUESTIONS 1 & 2: 0
SUM OF ALL RESPONSES TO PHQ QUESTIONS 1-9: 0
2. FEELING DOWN, DEPRESSED OR HOPELESS: 0

## 2023-10-17 ASSESSMENT — PAIN DESCRIPTION - LOCATION: LOCATION: MOUTH

## 2023-10-17 ASSESSMENT — PAIN DESCRIPTION - DESCRIPTORS: DESCRIPTORS: BURNING

## 2023-10-17 ASSESSMENT — PAIN SCALES - GENERAL: PAINLEVEL_OUTOF10: 10

## 2023-10-17 NOTE — ED NOTES
Pt attempted to use oral throat lozenge but spit it out and said it burnt her mouth, Pt refused lozenge. Pt was informed of discharge instructions, Pt verbalizes understanding.       Lala Castaneda RN  10/17/23 9225

## 2023-10-17 NOTE — ED TRIAGE NOTES
Pt arrives to ED with c/o blisters to the mouth. Pt says her symptoms started over a week ago with flu like symptoms - Pt says the blisters and pain in the mouth increased this evening. Pt says she used lidocaine mouthwash at home that was prescribed by PCP with no relief. MD at bedside after initial RN triage and has numerous complaints including runny nose, diarrhea, congestion, headache, muscular soreness, and sore throat.

## 2023-10-25 ENCOUNTER — OFFICE VISIT (OUTPATIENT)
Dept: OBSTETRICS AND GYNECOLOGY | Facility: CLINIC | Age: 68
End: 2023-10-25
Payer: MEDICARE

## 2023-10-25 VITALS
BODY MASS INDEX: 42.63 KG/M2 | HEIGHT: 61 IN | WEIGHT: 225.8 LBS | SYSTOLIC BLOOD PRESSURE: 138 MMHG | DIASTOLIC BLOOD PRESSURE: 80 MMHG

## 2023-10-25 DIAGNOSIS — N95.0 PMB (POSTMENOPAUSAL BLEEDING): Primary | ICD-10-CM

## 2023-10-25 NOTE — PROGRESS NOTES
Subjective   Chief Complaint   Patient presents with    Vaginal Bleeding     Patient here for evaluation postmenopausal bleeding. TVS done today     Jessica Flowers is a 68 y.o. year old No obstetric history on file..  No LMP recorded. Patient is postmenopausal.  She presents to be seen because of PMB'ing for a couple of weeks, spotting minimally.     OTHER COMPLAINTS:  Nothing else    The following portions of the patient's history were reviewed and updated as appropriate:She  has a past medical history of Abnormal CXR, Allergic rhinitis, Anxiety, Carpal tunnel syndrome, Chronic narcotic use, Depression, Dyspnea on exertion, Fatigue, GERD (gastroesophageal reflux disease), Glucose intolerance (impaired glucose tolerance), Hiatal hernia with gastroesophageal reflux, MRSA infection, Hyperlipidemia, Hypertension, Hypertriglyceridemia, Hypothyroid, Insomnia, Joint pain, Morbid obesity, OAB (overactive bladder), Osteoarthritis of knees, bilateral, Plantar fasciitis, Postmenopausal HRT (hormone replacement therapy), Psoriasis, and Vitamin D deficiency.  She does not have any pertinent problems on file.  She  has a past surgical history that includes Appendectomy (1989); Laparoscopic tubal ligation (1988); Dilation and curettage of uterus (1990, 2015); Carpal tunnel release; Cholecystectomy open (1980); Other surgical history; pr laps surg esopg/gstr fundoplasty (N/A, 7/5/2017); and Gastric Sleeve (N/A, 7/5/2017).  Her family history includes Cancer in her father and sister; Diabetes in her brother and father; Heart attack in her brother and father; Hypertension in her mother.  She  reports that she has never smoked. She has never used smokeless tobacco. She reports that she does not drink alcohol and does not use drugs.  Current Outpatient Medications   Medication Sig Dispense Refill    albuterol sulfate  (90 Base) MCG/ACT inhaler Inhale 2 puffs Every 4 (Four) Hours As Needed for Wheezing or Shortness of Air. 18 g  5    aspirin 81 MG chewable tablet Chew 1 tablet Daily.      atorvastatin (LIPITOR) 20 MG tablet Take 1 tablet by mouth Daily.      budesonide-formoterol (SYMBICORT) 160-4.5 MCG/ACT inhaler Inhale 2 puffs 2 (Two) Times a Day.      estradiol (ESTRACE) 1 MG tablet TAKE 1 TABLET BY MOUTH DAILY 30 tablet 1    FLUoxetine (PROzac) 20 MG capsule Take 2 capsules by mouth Daily.      fluticasone (FLONASE) 50 MCG/ACT nasal spray 2 sprays into the nostril(s) as directed by provider Daily As Needed for Rhinitis.      furosemide (LASIX) 40 MG tablet Take 1 tablet by mouth Daily. 30 tablet 0    gabapentin (NEURONTIN) 800 MG tablet Take 1 tablet by mouth 3 (Three) Times a Day.      lansoprazole (PREVACID) 30 MG capsule Take 1 capsule by mouth Daily.  0    levocetirizine (XYZAL) 5 MG tablet Take 1 tablet by mouth Every Evening.      levothyroxine (SYNTHROID, LEVOTHROID) 50 MCG tablet Take 1 tablet by mouth Daily.      medroxyPROGESTERone (PROVERA) 2.5 MG tablet Take 1 tablet by mouth Daily. 30 tablet 12    metFORMIN (GLUCOPHAGE) 500 MG tablet Take 1 tablet by mouth Daily With Breakfast.      montelukast (SINGULAIR) 10 MG tablet Take 1 tablet by mouth Every Night.      potassium chloride (MICRO-K) 10 MEQ CR capsule Take 1 capsule by mouth 2 (Two) Times a Day.      pramipexole (MIRAPEX) 1 MG tablet Take 1 tablet by mouth Every Night.      tamsulosin (FLOMAX) 0.4 MG capsule 24 hr capsule Take 1 capsule by mouth Daily.      traZODone (DESYREL) 100 MG tablet Take 1 tablet by mouth Every Night.      bisacodyl 5 MG EC tablet Use as directed. (Patient not taking: Reported on 10/25/2023) 4 tablet 0    diazePAM (VALIUM) 10 MG tablet TAKE 1 TABLET BY MOUTH 30 MINUTES BEFORE PROCEDURE (Patient not taking: Reported on 10/25/2023)      diphenhydrAMINE-Acetaminophen (Percogesic Extra Strength) 12.5-500 MG tablet Take  by mouth. (Patient not taking: Reported on 10/25/2023)      ibuprofen (ADVIL,MOTRIN) 800 MG tablet Take 1 tablet by mouth Every 8  (Eight) Hours As Needed for Mild Pain. (Patient not taking: Reported on 10/25/2023)      Ozempic, 0.25 or 0.5 MG/DOSE, 2 MG/3ML solution pen-injector  (Patient not taking: Reported on 10/25/2023)      polyethylene glycol (MIRALAX) 17 GM/SCOOP powder Mix 238g powder with 64 oz of clear liquid. Use as directed. (Patient not taking: Reported on 10/25/2023) 238 g 0     No current facility-administered medications for this visit.     Current Outpatient Medications on File Prior to Visit   Medication Sig    albuterol sulfate  (90 Base) MCG/ACT inhaler Inhale 2 puffs Every 4 (Four) Hours As Needed for Wheezing or Shortness of Air.    aspirin 81 MG chewable tablet Chew 1 tablet Daily.    atorvastatin (LIPITOR) 20 MG tablet Take 1 tablet by mouth Daily.    budesonide-formoterol (SYMBICORT) 160-4.5 MCG/ACT inhaler Inhale 2 puffs 2 (Two) Times a Day.    estradiol (ESTRACE) 1 MG tablet TAKE 1 TABLET BY MOUTH DAILY    FLUoxetine (PROzac) 20 MG capsule Take 2 capsules by mouth Daily.    fluticasone (FLONASE) 50 MCG/ACT nasal spray 2 sprays into the nostril(s) as directed by provider Daily As Needed for Rhinitis.    furosemide (LASIX) 40 MG tablet Take 1 tablet by mouth Daily.    gabapentin (NEURONTIN) 800 MG tablet Take 1 tablet by mouth 3 (Three) Times a Day.    lansoprazole (PREVACID) 30 MG capsule Take 1 capsule by mouth Daily.    levocetirizine (XYZAL) 5 MG tablet Take 1 tablet by mouth Every Evening.    levothyroxine (SYNTHROID, LEVOTHROID) 50 MCG tablet Take 1 tablet by mouth Daily.    medroxyPROGESTERone (PROVERA) 2.5 MG tablet Take 1 tablet by mouth Daily.    metFORMIN (GLUCOPHAGE) 500 MG tablet Take 1 tablet by mouth Daily With Breakfast.    montelukast (SINGULAIR) 10 MG tablet Take 1 tablet by mouth Every Night.    potassium chloride (MICRO-K) 10 MEQ CR capsule Take 1 capsule by mouth 2 (Two) Times a Day.    pramipexole (MIRAPEX) 1 MG tablet Take 1 tablet by mouth Every Night.    tamsulosin (FLOMAX) 0.4 MG  "capsule 24 hr capsule Take 1 capsule by mouth Daily.    traZODone (DESYREL) 100 MG tablet Take 1 tablet by mouth Every Night.    bisacodyl 5 MG EC tablet Use as directed. (Patient not taking: Reported on 10/25/2023)    diazePAM (VALIUM) 10 MG tablet TAKE 1 TABLET BY MOUTH 30 MINUTES BEFORE PROCEDURE (Patient not taking: Reported on 10/25/2023)    diphenhydrAMINE-Acetaminophen (Percogesic Extra Strength) 12.5-500 MG tablet Take  by mouth. (Patient not taking: Reported on 10/25/2023)    ibuprofen (ADVIL,MOTRIN) 800 MG tablet Take 1 tablet by mouth Every 8 (Eight) Hours As Needed for Mild Pain. (Patient not taking: Reported on 10/25/2023)    Ozempic, 0.25 or 0.5 MG/DOSE, 2 MG/3ML solution pen-injector  (Patient not taking: Reported on 10/25/2023)    polyethylene glycol (MIRALAX) 17 GM/SCOOP powder Mix 238g powder with 64 oz of clear liquid. Use as directed. (Patient not taking: Reported on 10/25/2023)     No current facility-administered medications on file prior to visit.     She has No Known Allergies.    Social History    Tobacco Use      Smoking status: Never      Smokeless tobacco: Never    Review of Systems  Consitutional POS: nothing reported    NEG: anorexia or night sweats   Gastointestinal POS: nothing reported    NEG: bloating, change in bowel habits, melena, or reflux symptoms   Genitourinary POS: nothing reported    NEG: dysuria or hematuria   Integument POS: nothing reported    NEG: moles that are changing in size, shape, color or rashes   Breast POS: nothing reported    NEG: persistent breast lump, skin dimpling, or nipple discharge         Respiratory: negative  Cardiovascular: negative  GYN:   see HPI          Objective   /80   Ht 154.9 cm (61\")   Wt 102 kg (225 lb 12.8 oz)   BMI 42.66 kg/m²     General:  well developed; well nourished  no acute distress   Skin:  Not performed.   Thyroid: not examined   Lungs:  breathing is unlabored   Heart:  Not performed.   Breasts:  Not performed. "   Abdomen: soft, non-tender; no masses  no umbilical or inguinal hernias are present  no hepato-splenomegaly   Pelvis: Clinical staff was present for exam  External genitalia:  normal appearance of the external genitalia including Bartholin's and [place]'s glands.  :  urethral meatus normal;  Vaginal:  normal pink mucosa without prolapse or lesions.  Cervix:  normal appearance. blood is seen coming from external cervical os;  Uterus:  normal size, shape and consistency.  Adnexa:  non palpable bilaterally.  Rectal:  digital rectal exam not performed; anus visually normal appearing.     Psychiatric: Alert and oriented ×3, mood and affect appropriate  HEENT: Atraumatic, normocephalic, normal scleral icterus  Extremities: 2+ pulses bilaterally, 1+ edema      Lab Review   No data reviewed    Imaging   Uterus is anteverted.  1.5 cm intramural fibroid posterior fundus.  Endometrium 5 mm.  Left ovary not seen.  Right ovary normal.       Assessment   Postmenopausal bleeding whilst on HRT.  Endometrium relatively thin     Plan   Endometrial biopsy done after Betadine prep.  Cervical dilation had to occur secondary to stenosis.  This was done easily.  Patient tolerated endometrial biopsy well.  Minor amount of tissue obtained.  Discontinue HRT.  Follow-up if bleeding persists otherwise will call with results.  Follow-up with jejuni in the next couple of days regarding increased edema.    No orders of the defined types were placed in this encounter.         This note was electronically signed.      October 25, 2023

## 2023-10-26 ENCOUNTER — HOSPITAL ENCOUNTER (OUTPATIENT)
Facility: HOSPITAL | Age: 68
Discharge: HOME | End: 2023-10-26
Payer: MEDICARE

## 2023-10-26 LAB
ALBUMIN SERPL-MCNC: 3.4 G/DL (ref 3.4–4.8)
ALBUMIN/GLOB SERPL: 1.3 {RATIO} (ref 0.8–2)
ALP SERPL-CCNC: 71 U/L (ref 25–100)
ALT SERPL-CCNC: 11 U/L (ref 4–36)
ANION GAP SERPL CALCULATED.3IONS-SCNC: 8 MMOL/L (ref 3–16)
AST SERPL-CCNC: 16 U/L (ref 8–33)
BASOPHILS # BLD: 0 K/UL (ref 0–0.1)
BASOPHILS NFR BLD: 0.6 %
BILIRUB SERPL-MCNC: 0.3 MG/DL (ref 0.3–1.2)
BUN SERPL-MCNC: 10 MG/DL (ref 6–20)
CALCIUM SERPL-MCNC: 8.4 MG/DL (ref 8.5–10.5)
CHLORIDE SERPL-SCNC: 109 MMOL/L (ref 98–107)
CO2 SERPL-SCNC: 26 MMOL/L (ref 20–30)
CREAT SERPL-MCNC: 0.8 MG/DL (ref 0.4–1.2)
EOSINOPHIL # BLD: 0.2 K/UL (ref 0–0.4)
EOSINOPHIL NFR BLD: 3.9 %
ERYTHROCYTE [DISTWIDTH] IN BLOOD BY AUTOMATED COUNT: 13.5 % (ref 11–16)
GFR SERPLBLD CREATININE-BSD FMLA CKD-EPI: >60 ML/MIN/{1.73_M2}
GLOBULIN SER CALC-MCNC: 2.6 G/DL
GLUCOSE SERPL-MCNC: 99 MG/DL (ref 74–106)
HBA1C MFR BLD: 6.3 %
HCT VFR BLD AUTO: 37 % (ref 37–47)
HGB BLD-MCNC: 11.6 G/DL (ref 11.5–16.5)
IMM GRANULOCYTES # BLD: 0 K/UL
IMM GRANULOCYTES NFR BLD: 0.2 % (ref 0–5)
LYMPHOCYTES # BLD: 1.7 K/UL (ref 1.5–4)
LYMPHOCYTES NFR BLD: 32.5 %
MCH RBC QN AUTO: 28.7 PG (ref 27–32)
MCHC RBC AUTO-ENTMCNC: 31.4 G/DL (ref 31–35)
MCV RBC AUTO: 91.6 FL (ref 80–100)
MONOCYTES # BLD: 0.3 K/UL (ref 0.2–0.8)
MONOCYTES NFR BLD: 6.6 %
NEUTROPHILS # BLD: 2.9 K/UL (ref 2–7.5)
NEUTS SEG NFR BLD: 56.2 %
PLATELET # BLD AUTO: 315 K/UL (ref 150–400)
PMV BLD AUTO: 10.7 FL (ref 6–10)
POTASSIUM SERPL-SCNC: 4.3 MMOL/L (ref 3.4–5.1)
PROT SERPL-MCNC: 6 G/DL (ref 6.4–8.3)
RBC # BLD AUTO: 4.04 M/UL (ref 3.8–5.8)
REF LAB TEST METHOD: NORMAL
SODIUM SERPL-SCNC: 143 MMOL/L (ref 136–145)
WBC # BLD AUTO: 5.1 K/UL (ref 4–11)

## 2023-10-26 PROCEDURE — 85025 COMPLETE CBC W/AUTO DIFF WBC: CPT

## 2023-10-26 PROCEDURE — 80053 COMPREHEN METABOLIC PANEL: CPT

## 2023-10-26 PROCEDURE — 83036 HEMOGLOBIN GLYCOSYLATED A1C: CPT

## 2023-10-26 PROCEDURE — 36415 COLL VENOUS BLD VENIPUNCTURE: CPT

## 2023-10-31 ENCOUNTER — HOSPITAL ENCOUNTER (OUTPATIENT)
Facility: HOSPITAL | Age: 68
Discharge: HOME | End: 2023-10-31
Payer: MEDICARE

## 2023-10-31 PROCEDURE — 87077 CULTURE AEROBIC IDENTIFY: CPT

## 2023-10-31 PROCEDURE — 87186 SC STD MICRODIL/AGAR DIL: CPT

## 2023-10-31 PROCEDURE — 87086 URINE CULTURE/COLONY COUNT: CPT

## 2023-11-03 LAB
BACTERIA UR CULT: ABNORMAL
BACTERIA UR CULT: ABNORMAL
ORGANISM: ABNORMAL
ORGANISM: ABNORMAL

## 2023-11-14 ENCOUNTER — HOSPITAL ENCOUNTER (OUTPATIENT)
Dept: CT IMAGING | Facility: HOSPITAL | Age: 68
Discharge: HOME | End: 2023-11-14
Payer: MEDICARE

## 2023-11-14 DIAGNOSIS — R31.1 BENIGN ESSENTIAL MICROSCOPIC HEMATURIA: ICD-10-CM

## 2023-11-14 PROCEDURE — 74176 CT ABD & PELVIS W/O CONTRAST: CPT

## 2023-11-15 ENCOUNTER — HOSPITAL ENCOUNTER (EMERGENCY)
Facility: HOSPITAL | Age: 68
Discharge: HOME | End: 2023-11-15
Attending: HOSPITALIST
Payer: MEDICARE

## 2023-11-15 VITALS
BODY MASS INDEX: 41.54 KG/M2 | HEART RATE: 82 BPM | DIASTOLIC BLOOD PRESSURE: 71 MMHG | HEIGHT: 61 IN | WEIGHT: 220 LBS | SYSTOLIC BLOOD PRESSURE: 111 MMHG | TEMPERATURE: 97.9 F | OXYGEN SATURATION: 94 % | RESPIRATION RATE: 22 BRPM

## 2023-11-15 DIAGNOSIS — S61.211A LACERATION OF LEFT INDEX FINGER WITHOUT FOREIGN BODY WITHOUT DAMAGE TO NAIL, INITIAL ENCOUNTER: Primary | ICD-10-CM

## 2023-11-15 PROCEDURE — 99283 EMERGENCY DEPT VISIT LOW MDM: CPT

## 2023-11-15 PROCEDURE — 12001 RPR S/N/AX/GEN/TRNK 2.5CM/<: CPT

## 2023-11-15 PROCEDURE — 6370000000 HC RX 637 (ALT 250 FOR IP): Performed by: HOSPITALIST

## 2023-11-15 RX ORDER — CEPHALEXIN 500 MG/1
500 CAPSULE ORAL 3 TIMES DAILY
Qty: 30 CAPSULE | Refills: 0 | Status: SHIPPED | OUTPATIENT
Start: 2023-11-15 | End: 2023-11-25

## 2023-11-15 RX ADMIN — Medication: at 12:04

## 2023-11-15 NOTE — ED NOTES
Discharge instructions provided to patient. Patient verbalizes understanding of d/c plan and follow up care. Finger splint applied per order. Patient ambulatory off unit to POV at this time with no further needs noted.       Erasmo Rodriguez RN  11/15/23 [id number]

## 2023-11-15 NOTE — ED PROVIDER NOTES
[address] ENCOUNTER        Pt Name: Michael Kumar  MRN: [id number]  [address] [facility] [place] 1955  Date of evaluation: 11/15/2023  Provider: Nu Hernandez DO  PCP: KARMA Wild CNP  Note Started: 11:20 AM EST 11/15/23    CHIEF COMPLAINT       Chief Complaint   Patient presents with    Finger Laceration     Left index finger. From [facility]. HISTORY OF PRESENT ILLNESS: 1 or more Elements     History from : Patient    Limitations to history : None    Michael Kumar is a 79 y.o. female who presents for left finger laceration. Patient was using some hedge tremors at home to trim some bushes back when she accidentally cut the pad of her left index finger. Patient states that it bled quite well at home she came with it wrapped in a dressing but bleeding was controlled upon arrival here. Patient states she is actually had a tetanus booster within the last 5 years. She denies any other complaint at this time. She denies any numbness tingling weakness to the finger itself. Patient states that she can still move the finger without any difficulty. She is right-hand dominant. Past medical history sniffer arthritis, depression, thyroid disease and UTI. Nursing Notes were all reviewed and agreed with or any disagreements were addressed in the HPI.     REVIEW OF SYSTEMS :      Review of Systems    Review of systems reviewed and negative except as in HPI/MDM    PAST MEDICAL HISTORY     Past Medical History:   Diagnosis Date    Arthritis     Depression     Thyroid disease     UTI (urinary tract infection)        SURGICAL HISTORY     Past Surgical History:   Procedure Laterality Date    APPENDECTOMY      CARPAL TUNNEL RELEASE Bilateral     CHOLECYSTECTOMY      GASTRIC BYPASS SURGERY      JOINT REPLACEMENT Right     [place] COLONOSCOPY FLX DX W/COLLJ SPEC WHEN PFRMD N/A 08/02/2018    COLONOSCOPY DIAGNOSTIC OR SCREENING performed by Palomo Don MD at [facility]

## 2023-11-20 ENCOUNTER — HOSPITAL ENCOUNTER (OUTPATIENT)
Facility: HOSPITAL | Age: 68
Discharge: HOME | End: 2023-11-20
Payer: MEDICARE

## 2023-11-20 PROCEDURE — 87086 URINE CULTURE/COLONY COUNT: CPT

## 2023-11-20 PROCEDURE — 87186 SC STD MICRODIL/AGAR DIL: CPT

## 2023-11-20 PROCEDURE — 87077 CULTURE AEROBIC IDENTIFY: CPT

## 2023-11-22 ENCOUNTER — HOSPITAL ENCOUNTER (INPATIENT)
Facility: HOSPITAL | Age: 68
LOS: 3 days | Discharge: HOME | DRG: 871 | End: 2023-11-25
Attending: STUDENT IN AN ORGANIZED HEALTH CARE EDUCATION/TRAINING PROGRAM | Admitting: INTERNAL MEDICINE
Payer: MEDICARE

## 2023-11-22 ENCOUNTER — APPOINTMENT (OUTPATIENT)
Dept: CT IMAGING | Facility: HOSPITAL | Age: 68
DRG: 871 | End: 2023-11-22
Payer: MEDICARE

## 2023-11-22 ENCOUNTER — APPOINTMENT (OUTPATIENT)
Dept: GENERAL RADIOLOGY | Facility: HOSPITAL | Age: 68
DRG: 871 | End: 2023-11-22
Payer: MEDICARE

## 2023-11-22 DIAGNOSIS — J96.01 ACUTE HYPOXIC RESPIRATORY FAILURE: Primary | ICD-10-CM

## 2023-11-22 DIAGNOSIS — J18.9 MULTIFOCAL PNEUMONIA: ICD-10-CM

## 2023-11-22 LAB
ALBUMIN SERPL-MCNC: 3.3 G/DL (ref 3.5–5.2)
ALBUMIN/GLOB SERPL: 1 G/DL
ALP SERPL-CCNC: 74 U/L (ref 39–117)
ALT SERPL W P-5'-P-CCNC: 7 U/L (ref 1–33)
ANION GAP SERPL CALCULATED.3IONS-SCNC: 8.6 MMOL/L (ref 5–15)
AST SERPL-CCNC: 10 U/L (ref 1–32)
BASOPHILS # BLD AUTO: 0.03 10*3/MM3 (ref 0–0.2)
BASOPHILS NFR BLD AUTO: 0.2 % (ref 0–1.5)
BILIRUB SERPL-MCNC: 1.7 MG/DL (ref 0–1.2)
BUN SERPL-MCNC: 12 MG/DL (ref 8–23)
BUN/CREAT SERPL: 13.3 (ref 7–25)
CALCIUM SPEC-SCNC: 8.8 MG/DL (ref 8.6–10.5)
CHLORIDE SERPL-SCNC: 105 MMOL/L (ref 98–107)
CO2 SERPL-SCNC: 25.4 MMOL/L (ref 22–29)
CREAT SERPL-MCNC: 0.9 MG/DL (ref 0.57–1)
CRP SERPL-MCNC: 19.02 MG/DL (ref 0–0.5)
D DIMER PPP FEU-MCNC: 2.35 MCGFEU/ML (ref 0–0.66)
D-LACTATE SERPL-SCNC: 1.6 MMOL/L (ref 0.5–2)
DEPRECATED RDW RBC AUTO: 47.8 FL (ref 37–54)
EGFRCR SERPLBLD CKD-EPI 2021: 70.7 ML/MIN/1.73
EOSINOPHIL # BLD AUTO: 0.27 10*3/MM3 (ref 0–0.4)
EOSINOPHIL NFR BLD AUTO: 2.1 % (ref 0.3–6.2)
ERYTHROCYTE [DISTWIDTH] IN BLOOD BY AUTOMATED COUNT: 14.2 % (ref 12.3–15.4)
FLUAV SUBTYP SPEC NAA+PROBE: NOT DETECTED
FLUBV RNA ISLT QL NAA+PROBE: NOT DETECTED
GLOBULIN UR ELPH-MCNC: 3.2 GM/DL
GLUCOSE SERPL-MCNC: 164 MG/DL (ref 65–99)
HCT VFR BLD AUTO: 34 % (ref 34–46.6)
HGB BLD-MCNC: 10.6 G/DL (ref 12–15.9)
HOLD SPECIMEN: NORMAL
HOLD SPECIMEN: NORMAL
IMM GRANULOCYTES # BLD AUTO: 0.05 10*3/MM3 (ref 0–0.05)
IMM GRANULOCYTES NFR BLD AUTO: 0.4 % (ref 0–0.5)
L PNEUMO1 AG UR QL IA: NEGATIVE
LYMPHOCYTES # BLD AUTO: 1.07 10*3/MM3 (ref 0.7–3.1)
LYMPHOCYTES NFR BLD AUTO: 8.2 % (ref 19.6–45.3)
MAGNESIUM SERPL-MCNC: 2 MG/DL (ref 1.6–2.4)
MCH RBC QN AUTO: 28.6 PG (ref 26.6–33)
MCHC RBC AUTO-ENTMCNC: 31.2 G/DL (ref 31.5–35.7)
MCV RBC AUTO: 91.9 FL (ref 79–97)
MONOCYTES # BLD AUTO: 0.27 10*3/MM3 (ref 0.1–0.9)
MONOCYTES NFR BLD AUTO: 2.1 % (ref 5–12)
NEUTROPHILS NFR BLD AUTO: 11.34 10*3/MM3 (ref 1.7–7)
NEUTROPHILS NFR BLD AUTO: 87 % (ref 42.7–76)
NRBC BLD AUTO-RTO: 0 /100 WBC (ref 0–0.2)
NT-PROBNP SERPL-MCNC: 587.8 PG/ML (ref 0–900)
PLATELET # BLD AUTO: 265 10*3/MM3 (ref 140–450)
PMV BLD AUTO: 10.8 FL (ref 6–12)
POTASSIUM SERPL-SCNC: 3.9 MMOL/L (ref 3.5–5.2)
PROCALCITONIN SERPL-MCNC: 2.32 NG/ML (ref 0–0.25)
PROT SERPL-MCNC: 6.5 G/DL (ref 6–8.5)
RBC # BLD AUTO: 3.7 10*6/MM3 (ref 3.77–5.28)
S PNEUM AG SPEC QL LA: NEGATIVE
SARS-COV-2 RNA RESP QL NAA+PROBE: NOT DETECTED
SODIUM SERPL-SCNC: 139 MMOL/L (ref 136–145)
TROPONIN T SERPL HS-MCNC: 12 NG/L
WBC NRBC COR # BLD AUTO: 13.03 10*3/MM3 (ref 3.4–10.8)
WHOLE BLOOD HOLD COAG: NORMAL
WHOLE BLOOD HOLD SPECIMEN: NORMAL

## 2023-11-22 PROCEDURE — 87449 NOS EACH ORGANISM AG IA: CPT | Performed by: INTERNAL MEDICINE

## 2023-11-22 PROCEDURE — 87899 AGENT NOS ASSAY W/OPTIC: CPT | Performed by: INTERNAL MEDICINE

## 2023-11-22 PROCEDURE — 94640 AIRWAY INHALATION TREATMENT: CPT

## 2023-11-22 PROCEDURE — 86140 C-REACTIVE PROTEIN: CPT | Performed by: STUDENT IN AN ORGANIZED HEALTH CARE EDUCATION/TRAINING PROGRAM

## 2023-11-22 PROCEDURE — 85025 COMPLETE CBC W/AUTO DIFF WBC: CPT | Performed by: STUDENT IN AN ORGANIZED HEALTH CARE EDUCATION/TRAINING PROGRAM

## 2023-11-22 PROCEDURE — 25010000002 AZITHROMYCIN PER 500 MG: Performed by: STUDENT IN AN ORGANIZED HEALTH CARE EDUCATION/TRAINING PROGRAM

## 2023-11-22 PROCEDURE — 84145 PROCALCITONIN (PCT): CPT | Performed by: STUDENT IN AN ORGANIZED HEALTH CARE EDUCATION/TRAINING PROGRAM

## 2023-11-22 PROCEDURE — 25510000001 IOPAMIDOL 61 % SOLUTION: Performed by: STUDENT IN AN ORGANIZED HEALTH CARE EDUCATION/TRAINING PROGRAM

## 2023-11-22 PROCEDURE — 25010000002 ENOXAPARIN PER 10 MG: Performed by: INTERNAL MEDICINE

## 2023-11-22 PROCEDURE — 25010000002 CEFTRIAXONE SODIUM-DEXTROSE 2-2.22 GM-%(50ML) RECONSTITUTED SOLUTION: Performed by: STUDENT IN AN ORGANIZED HEALTH CARE EDUCATION/TRAINING PROGRAM

## 2023-11-22 PROCEDURE — 84484 ASSAY OF TROPONIN QUANT: CPT | Performed by: STUDENT IN AN ORGANIZED HEALTH CARE EDUCATION/TRAINING PROGRAM

## 2023-11-22 PROCEDURE — 93005 ELECTROCARDIOGRAM TRACING: CPT | Performed by: STUDENT IN AN ORGANIZED HEALTH CARE EDUCATION/TRAINING PROGRAM

## 2023-11-22 PROCEDURE — 99285 EMERGENCY DEPT VISIT HI MDM: CPT

## 2023-11-22 PROCEDURE — 87636 SARSCOV2 & INF A&B AMP PRB: CPT | Performed by: STUDENT IN AN ORGANIZED HEALTH CARE EDUCATION/TRAINING PROGRAM

## 2023-11-22 PROCEDURE — 71275 CT ANGIOGRAPHY CHEST: CPT

## 2023-11-22 PROCEDURE — 83880 ASSAY OF NATRIURETIC PEPTIDE: CPT | Performed by: STUDENT IN AN ORGANIZED HEALTH CARE EDUCATION/TRAINING PROGRAM

## 2023-11-22 PROCEDURE — 85379 FIBRIN DEGRADATION QUANT: CPT | Performed by: STUDENT IN AN ORGANIZED HEALTH CARE EDUCATION/TRAINING PROGRAM

## 2023-11-22 PROCEDURE — 25010000002 FUROSEMIDE PER 20 MG: Performed by: INTERNAL MEDICINE

## 2023-11-22 PROCEDURE — 87040 BLOOD CULTURE FOR BACTERIA: CPT | Performed by: STUDENT IN AN ORGANIZED HEALTH CARE EDUCATION/TRAINING PROGRAM

## 2023-11-22 PROCEDURE — 36415 COLL VENOUS BLD VENIPUNCTURE: CPT

## 2023-11-22 PROCEDURE — 83605 ASSAY OF LACTIC ACID: CPT | Performed by: STUDENT IN AN ORGANIZED HEALTH CARE EDUCATION/TRAINING PROGRAM

## 2023-11-22 PROCEDURE — 71045 X-RAY EXAM CHEST 1 VIEW: CPT

## 2023-11-22 PROCEDURE — 80053 COMPREHEN METABOLIC PANEL: CPT | Performed by: STUDENT IN AN ORGANIZED HEALTH CARE EDUCATION/TRAINING PROGRAM

## 2023-11-22 PROCEDURE — 99222 1ST HOSP IP/OBS MODERATE 55: CPT | Performed by: INTERNAL MEDICINE

## 2023-11-22 PROCEDURE — 83735 ASSAY OF MAGNESIUM: CPT | Performed by: STUDENT IN AN ORGANIZED HEALTH CARE EDUCATION/TRAINING PROGRAM

## 2023-11-22 PROCEDURE — 25810000003 SODIUM CHLORIDE 0.9 % SOLUTION: Performed by: STUDENT IN AN ORGANIZED HEALTH CARE EDUCATION/TRAINING PROGRAM

## 2023-11-22 RX ORDER — ACETAMINOPHEN 650 MG/1
650 SUPPOSITORY RECTAL EVERY 4 HOURS PRN
Status: DISCONTINUED | OUTPATIENT
Start: 2023-11-22 | End: 2023-11-25 | Stop reason: HOSPADM

## 2023-11-22 RX ORDER — CHOLECALCIFEROL (VITAMIN D3) 125 MCG
5 CAPSULE ORAL NIGHTLY PRN
Status: DISCONTINUED | OUTPATIENT
Start: 2023-11-22 | End: 2023-11-25 | Stop reason: HOSPADM

## 2023-11-22 RX ORDER — SODIUM CHLORIDE 0.9 % (FLUSH) 0.9 %
10 SYRINGE (ML) INJECTION EVERY 12 HOURS SCHEDULED
Status: DISCONTINUED | OUTPATIENT
Start: 2023-11-22 | End: 2023-11-25 | Stop reason: HOSPADM

## 2023-11-22 RX ORDER — ACETAMINOPHEN 325 MG/1
975 TABLET ORAL ONCE
Status: COMPLETED | OUTPATIENT
Start: 2023-11-22 | End: 2023-11-22

## 2023-11-22 RX ORDER — PANTOPRAZOLE SODIUM 40 MG/1
40 TABLET, DELAYED RELEASE ORAL
Status: DISCONTINUED | OUTPATIENT
Start: 2023-11-23 | End: 2023-11-25 | Stop reason: HOSPADM

## 2023-11-22 RX ORDER — BISACODYL 10 MG
10 SUPPOSITORY, RECTAL RECTAL DAILY PRN
Status: DISCONTINUED | OUTPATIENT
Start: 2023-11-22 | End: 2023-11-25 | Stop reason: HOSPADM

## 2023-11-22 RX ORDER — IPRATROPIUM BROMIDE AND ALBUTEROL SULFATE 2.5; .5 MG/3ML; MG/3ML
3 SOLUTION RESPIRATORY (INHALATION) EVERY 4 HOURS PRN
Status: DISCONTINUED | OUTPATIENT
Start: 2023-11-22 | End: 2023-11-25 | Stop reason: HOSPADM

## 2023-11-22 RX ORDER — BUTALBITAL, ACETAMINOPHEN AND CAFFEINE 50; 325; 40 MG/1; MG/1; MG/1
1 TABLET ORAL ONCE
Status: DISCONTINUED | OUTPATIENT
Start: 2023-11-22 | End: 2023-11-22

## 2023-11-22 RX ORDER — CEFTRIAXONE 1 G/50ML
1000 INJECTION, SOLUTION INTRAVENOUS ONCE
Status: DISCONTINUED | OUTPATIENT
Start: 2023-11-22 | End: 2023-11-22

## 2023-11-22 RX ORDER — LEVOTHYROXINE SODIUM 0.05 MG/1
50 TABLET ORAL
Status: DISCONTINUED | OUTPATIENT
Start: 2023-11-22 | End: 2023-11-25 | Stop reason: HOSPADM

## 2023-11-22 RX ORDER — BUDESONIDE AND FORMOTEROL FUMARATE DIHYDRATE 160; 4.5 UG/1; UG/1
2 AEROSOL RESPIRATORY (INHALATION)
Status: DISCONTINUED | OUTPATIENT
Start: 2023-11-22 | End: 2023-11-25 | Stop reason: HOSPADM

## 2023-11-22 RX ORDER — TRAZODONE HYDROCHLORIDE 50 MG/1
100 TABLET ORAL NIGHTLY
Status: DISCONTINUED | OUTPATIENT
Start: 2023-11-22 | End: 2023-11-25 | Stop reason: HOSPADM

## 2023-11-22 RX ORDER — SEMAGLUTIDE 0.68 MG/ML
0.25 INJECTION, SOLUTION SUBCUTANEOUS WEEKLY
COMMUNITY

## 2023-11-22 RX ORDER — CEFTRIAXONE 2 G/50ML
2000 INJECTION, SOLUTION INTRAVENOUS ONCE
Status: COMPLETED | OUTPATIENT
Start: 2023-11-22 | End: 2023-11-22

## 2023-11-22 RX ORDER — CEFTRIAXONE 2 G/50ML
2000 INJECTION, SOLUTION INTRAVENOUS EVERY 24 HOURS
Qty: 250 ML | Refills: 0 | Status: DISCONTINUED | OUTPATIENT
Start: 2023-11-23 | End: 2023-11-23

## 2023-11-22 RX ORDER — MONTELUKAST SODIUM 10 MG/1
10 TABLET ORAL NIGHTLY
Status: DISCONTINUED | OUTPATIENT
Start: 2023-11-22 | End: 2023-11-25 | Stop reason: HOSPADM

## 2023-11-22 RX ORDER — HYDROCODONE BITARTRATE AND ACETAMINOPHEN 10; 325 MG/1; MG/1
1 TABLET ORAL EVERY 8 HOURS PRN
COMMUNITY

## 2023-11-22 RX ORDER — AMOXICILLIN 250 MG
2 CAPSULE ORAL 2 TIMES DAILY
Status: DISCONTINUED | OUTPATIENT
Start: 2023-11-22 | End: 2023-11-25 | Stop reason: HOSPADM

## 2023-11-22 RX ORDER — ACETAMINOPHEN 325 MG/1
650 TABLET ORAL EVERY 4 HOURS PRN
Status: DISCONTINUED | OUTPATIENT
Start: 2023-11-22 | End: 2023-11-25 | Stop reason: HOSPADM

## 2023-11-22 RX ORDER — ACETAMINOPHEN 160 MG/5ML
650 SOLUTION ORAL EVERY 4 HOURS PRN
Status: DISCONTINUED | OUTPATIENT
Start: 2023-11-22 | End: 2023-11-25 | Stop reason: HOSPADM

## 2023-11-22 RX ORDER — FUROSEMIDE 40 MG/1
40 TABLET ORAL DAILY
Status: DISCONTINUED | OUTPATIENT
Start: 2023-11-23 | End: 2023-11-25 | Stop reason: HOSPADM

## 2023-11-22 RX ORDER — SODIUM CHLORIDE 9 MG/ML
40 INJECTION, SOLUTION INTRAVENOUS AS NEEDED
Status: DISCONTINUED | OUTPATIENT
Start: 2023-11-22 | End: 2023-11-25 | Stop reason: HOSPADM

## 2023-11-22 RX ORDER — SODIUM CHLORIDE 0.9 % (FLUSH) 0.9 %
10 SYRINGE (ML) INJECTION AS NEEDED
Status: DISCONTINUED | OUTPATIENT
Start: 2023-11-22 | End: 2023-11-25 | Stop reason: HOSPADM

## 2023-11-22 RX ORDER — HYDROCODONE BITARTRATE AND ACETAMINOPHEN 10; 325 MG/1; MG/1
1 TABLET ORAL EVERY 8 HOURS PRN
Status: DISCONTINUED | OUTPATIENT
Start: 2023-11-22 | End: 2023-11-25 | Stop reason: HOSPADM

## 2023-11-22 RX ORDER — ASPIRIN 81 MG/1
81 TABLET, CHEWABLE ORAL DAILY
Status: DISCONTINUED | OUTPATIENT
Start: 2023-11-22 | End: 2023-11-25 | Stop reason: HOSPADM

## 2023-11-22 RX ORDER — PRAMIPEXOLE DIHYDROCHLORIDE 1 MG/1
1 TABLET ORAL NIGHTLY
Status: DISCONTINUED | OUTPATIENT
Start: 2023-11-22 | End: 2023-11-25 | Stop reason: HOSPADM

## 2023-11-22 RX ORDER — ONDANSETRON 2 MG/ML
4 INJECTION INTRAMUSCULAR; INTRAVENOUS EVERY 6 HOURS PRN
Status: DISCONTINUED | OUTPATIENT
Start: 2023-11-22 | End: 2023-11-25 | Stop reason: HOSPADM

## 2023-11-22 RX ORDER — ENOXAPARIN SODIUM 100 MG/ML
40 INJECTION SUBCUTANEOUS EVERY 12 HOURS
Status: DISCONTINUED | OUTPATIENT
Start: 2023-11-22 | End: 2023-11-25 | Stop reason: HOSPADM

## 2023-11-22 RX ORDER — NITROGLYCERIN 0.4 MG/1
0.4 TABLET SUBLINGUAL
Status: DISCONTINUED | OUTPATIENT
Start: 2023-11-22 | End: 2023-11-25 | Stop reason: HOSPADM

## 2023-11-22 RX ORDER — GABAPENTIN 400 MG/1
800 CAPSULE ORAL EVERY 8 HOURS SCHEDULED
Status: DISCONTINUED | OUTPATIENT
Start: 2023-11-22 | End: 2023-11-25 | Stop reason: HOSPADM

## 2023-11-22 RX ORDER — BISACODYL 5 MG/1
5 TABLET, DELAYED RELEASE ORAL DAILY PRN
Status: DISCONTINUED | OUTPATIENT
Start: 2023-11-22 | End: 2023-11-25 | Stop reason: HOSPADM

## 2023-11-22 RX ORDER — FUROSEMIDE 10 MG/ML
60 INJECTION INTRAMUSCULAR; INTRAVENOUS ONCE
Status: COMPLETED | OUTPATIENT
Start: 2023-11-22 | End: 2023-11-22

## 2023-11-22 RX ORDER — POLYETHYLENE GLYCOL 3350 17 G/17G
17 POWDER, FOR SOLUTION ORAL DAILY PRN
Status: DISCONTINUED | OUTPATIENT
Start: 2023-11-22 | End: 2023-11-25 | Stop reason: HOSPADM

## 2023-11-22 RX ORDER — FLUOXETINE HYDROCHLORIDE 20 MG/1
40 CAPSULE ORAL DAILY
Status: DISCONTINUED | OUTPATIENT
Start: 2023-11-22 | End: 2023-11-25 | Stop reason: HOSPADM

## 2023-11-22 RX ORDER — TAMSULOSIN HYDROCHLORIDE 0.4 MG/1
0.4 CAPSULE ORAL DAILY
Status: DISCONTINUED | OUTPATIENT
Start: 2023-11-22 | End: 2023-11-25 | Stop reason: HOSPADM

## 2023-11-22 RX ORDER — FLUTICASONE PROPIONATE 50 MCG
2 SPRAY, SUSPENSION (ML) NASAL DAILY PRN
Status: DISCONTINUED | OUTPATIENT
Start: 2023-11-22 | End: 2023-11-25 | Stop reason: HOSPADM

## 2023-11-22 RX ORDER — ATORVASTATIN CALCIUM 20 MG/1
20 TABLET, FILM COATED ORAL DAILY
Status: DISCONTINUED | OUTPATIENT
Start: 2023-11-22 | End: 2023-11-25 | Stop reason: HOSPADM

## 2023-11-22 RX ADMIN — ASPIRIN 81 MG CHEWABLE TABLET 81 MG: 81 TABLET CHEWABLE at 17:39

## 2023-11-22 RX ADMIN — SODIUM CHLORIDE 500 MG: 900 INJECTION, SOLUTION INTRAVENOUS at 14:22

## 2023-11-22 RX ADMIN — DOCUSATE SODIUM 50MG AND SENNOSIDES 8.6MG 2 TABLET: 8.6; 5 TABLET, FILM COATED ORAL at 22:46

## 2023-11-22 RX ADMIN — ATORVASTATIN CALCIUM 20 MG: 20 TABLET, FILM COATED ORAL at 17:40

## 2023-11-22 RX ADMIN — ACETAMINOPHEN 650 MG: 325 TABLET, FILM COATED ORAL at 17:39

## 2023-11-22 RX ADMIN — ACETAMINOPHEN 975 MG: 325 TABLET, FILM COATED ORAL at 11:54

## 2023-11-22 RX ADMIN — Medication 10 ML: at 17:49

## 2023-11-22 RX ADMIN — HYDROCODONE BITARTRATE AND ACETAMINOPHEN 1 TABLET: 10; 325 TABLET ORAL at 22:46

## 2023-11-22 RX ADMIN — FLUOXETINE 40 MG: 20 CAPSULE ORAL at 17:39

## 2023-11-22 RX ADMIN — MONTELUKAST 10 MG: 10 TABLET, FILM COATED ORAL at 22:46

## 2023-11-22 RX ADMIN — ENOXAPARIN SODIUM 40 MG: 100 INJECTION SUBCUTANEOUS at 22:46

## 2023-11-22 RX ADMIN — PRAMIPEXOLE DIHYDROCHLORIDE 1 MG: 1 TABLET ORAL at 22:46

## 2023-11-22 RX ADMIN — Medication 10 ML: at 22:48

## 2023-11-22 RX ADMIN — TAMSULOSIN HYDROCHLORIDE 0.4 MG: 0.4 CAPSULE ORAL at 17:40

## 2023-11-22 RX ADMIN — TRAZODONE HYDROCHLORIDE 100 MG: 50 TABLET ORAL at 22:47

## 2023-11-22 RX ADMIN — GABAPENTIN 800 MG: 400 CAPSULE ORAL at 22:47

## 2023-11-22 RX ADMIN — IOPAMIDOL 100 ML: 612 INJECTION, SOLUTION INTRAVENOUS at 13:07

## 2023-11-22 RX ADMIN — CEFTRIAXONE 2000 MG: 2 INJECTION, SOLUTION INTRAVENOUS at 13:48

## 2023-11-22 RX ADMIN — LEVOTHYROXINE SODIUM 50 MCG: 50 TABLET ORAL at 17:40

## 2023-11-22 RX ADMIN — BUDESONIDE AND FORMOTEROL FUMARATE DIHYDRATE 2 PUFF: 160; 4.5 AEROSOL RESPIRATORY (INHALATION) at 20:56

## 2023-11-22 RX ADMIN — GABAPENTIN 800 MG: 400 CAPSULE ORAL at 17:39

## 2023-11-22 RX ADMIN — FUROSEMIDE 60 MG: 10 INJECTION, SOLUTION INTRAMUSCULAR; INTRAVENOUS at 17:48

## 2023-11-22 NOTE — ED PROVIDER NOTES
Subjective:  History of Present Illness:    Patient is a 68-year-old female with history of hypertension, hyperlipidemia, EZEQUIEL, obesity, GERD, no history of reactive airway disease, no oxygen at home who presents today with shortness of breath.  Reports that she followed up with her primary care doctor's office yesterday and was told that she had rhinovirus however, patient states that she was never swabbed for any viruses and denies any interventions.  States that today her increased work of breathing has become worse, was picked up by EMS, given Solu-Medrol and DuoNeb in route.  Patient denies any history of reactive airway disease.  Reportedly requiring 4 L nasal cannula in route.  She denies any measured fevers.  Is endorsing chest pain and shortness of breath.  Denies any abdominal pain, nausea, vomiting, diarrhea.  Denies any history of blood clot.  No unilateral leg swelling or leg pain.      Nurses Notes reviewed and agree, including vitals, allergies, social history and prior medical history.     REVIEW OF SYSTEMS: All systems reviewed and not pertinent unless noted.  Review of Systems   Constitutional:  Positive for activity change. Negative for appetite change, chills, fatigue and fever.   HENT:  Positive for congestion. Negative for rhinorrhea, sinus pressure and sinus pain.    Eyes:  Negative for discharge and itching.   Respiratory:  Positive for cough and shortness of breath.    Cardiovascular:  Positive for chest pain. Negative for leg swelling.   Gastrointestinal:  Negative for abdominal distention, abdominal pain, nausea and vomiting.   Endocrine: Negative for cold intolerance and heat intolerance.   Genitourinary:  Negative for decreased urine volume, difficulty urinating, flank pain, frequency, urgency, vaginal bleeding, vaginal discharge and vaginal pain.   Musculoskeletal:  Negative for gait problem, neck pain and neck stiffness.   Skin:  Negative for color change.   Allergic/Immunologic:  Negative for environmental allergies.   Neurological:  Negative for seizures, syncope, facial asymmetry and speech difficulty.   Psychiatric/Behavioral:  Negative for self-injury and suicidal ideas.        Past Medical History:   Diagnosis Date    Abnormal CXR     bronchitis poss, [facility]    Allergic rhinitis     uses inhalers prn, denies asthma    Anxiety     Carpal tunnel syndrome     Chronic narcotic use     HC    Depression     Dyspnea on exertion     Fatigue     GERD (gastroesophageal reflux disease)     on Prilosec + BID Zantac    Glucose intolerance (impaired glucose tolerance)     Hiatal hernia with gastroesophageal reflux     EGD [facility] 1/17, 39 cm, path DE mild nonspec changes.  serum h. pyl neg    Hx MRSA infection     2010 on abdomen, tx w/ Bactrim    Hyperlipidemia     Hypertension     Hypertriglyceridemia     Hypothyroid     Insomnia     uses Trazodone    Joint pain     Morbid obesity     OAB (overactive bladder)     tx w/ botox injections    Osteoarthritis of knees, bilateral     steroid injxns as above, supposed to get synvisc soon.  Says bone on bone, needs TKR, but ortho surgeon wants her to have WLS first    Plantar fasciitis     Postmenopausal HRT (hormone replacement therapy)     Psoriasis     Vitamin D deficiency        Allergies:    Patient has no known allergies.      Past Surgical History:   Procedure Laterality Date    APPENDECTOMY  1989    emergent, open    CARPAL TUNNEL RELEASE      (B)    CHOLECYSTECTOMY OPEN  1980    [facility]    DILATATION AND CURETTAGE  1990, 2015    GASTRIC SLEEVE LAPAROSCOPIC N/A 7/5/2017    Procedure: GASTRIC SLEEVE LAPAROSCOPIC, ;  Surgeon: Cj Gregg MD;  Location: [facility] OR;  Service:     LAPAROSCOPIC TUBAL LIGATION  1988    OTHER SURGICAL HISTORY      denies anesthesia issues    HI LAPS SURG ESOPG/GSTR FUNDOPLASTY N/A 7/5/2017    Procedure: HIATAL HERNIA REPAIR LAPAROSCOPIC;  Surgeon: Cj Gregg MD;  Location: [facility] OR;  Service: Bariatric  "        Social History     Socioeconomic History    Marital status: Legally    Tobacco Use    Smoking status: Never    Smokeless tobacco: Never   Vaping Use    Vaping Use: Never used   Substance and Sexual Activity    Alcohol use: No    Drug use: No    Sexual activity: Yes     Partners: Male     Comment: spouse         Family History   Problem Relation Age of Onset    Hypertension Mother     Diabetes Father     Cancer Father         brain    Heart attack Father     Cancer Sister         uterine    Diabetes Brother     Heart attack Brother        Objective  Physical Exam:  /76   Pulse (!) 124   Temp 100.5 °F (38.1 °C)   Resp 20   Ht 154.9 cm (61\")   Wt 118 kg (260 lb)   SpO2 92%   BMI 49.13 kg/m²      Physical Exam  Constitutional:       General: She is not in acute distress.     Appearance: Normal appearance. She is not ill-appearing.   HENT:      Head: Normocephalic and atraumatic.      Nose: Nose normal. No congestion or rhinorrhea.      Mouth/Throat:      Mouth: Mucous membranes are dry.      Pharynx: Oropharynx is clear. No oropharyngeal exudate or posterior oropharyngeal erythema.   Eyes:      Extraocular Movements: Extraocular movements intact.      Conjunctiva/sclera: Conjunctivae normal.      Pupils: Pupils are equal, round, and reactive to light.   Cardiovascular:      Rate and Rhythm: Normal rate and regular rhythm.      Pulses: Normal pulses.      Heart sounds: Normal heart sounds.   Pulmonary:      Effort: Pulmonary effort is normal. Tachypnea present. No accessory muscle usage or respiratory distress.      Breath sounds: Normal breath sounds. No decreased breath sounds or wheezing.   Abdominal:      General: Abdomen is flat. Bowel sounds are normal. There is no distension.      Palpations: Abdomen is soft.      Tenderness: There is no abdominal tenderness.   Musculoskeletal:         General: No swelling or tenderness. Normal range of motion.      Cervical back: Normal range of " motion and neck supple.   Skin:     General: Skin is warm and dry.      Capillary Refill: Capillary refill takes less than 2 seconds.   Neurological:      General: No focal deficit present.      Mental Status: She is alert and oriented to person, place, and time. Mental status is at baseline.      Cranial Nerves: No cranial nerve deficit.      Sensory: No sensory deficit.      Motor: No weakness.      Coordination: Coordination normal.   Psychiatric:         Mood and Affect: Mood normal.         Behavior: Behavior normal.         Thought Content: Thought content normal.         Judgment: Judgment normal.         Critical Care    Performed by: Feliciano Paige MD  Authorized by: Feliciano Paige MD    Critical care provider statement:     Critical care time (minutes):  45    Critical care was necessary to treat or prevent imminent or life-threatening deterioration of the following conditions:  Respiratory failure    Critical care was time spent personally by me on the following activities:  Blood draw for specimens, development of treatment plan with patient or surrogate, discussions with primary provider, evaluation of patient's response to treatment, obtaining history from patient or surrogate, examination of patient, ordering and performing treatments and interventions, ordering and review of laboratory studies, ordering and review of radiographic studies, pulse oximetry, re-evaluation of patient's condition and review of old charts    Care discussed with: admitting provider        ED Course:    ED Course as of 11/22/23 1352   Wed Nov 22, 2023   1125 EKG interpreted by me, sinus tachycardia with no concerning ST changes noted, rate of 106 [JE]      ED Course User Index  [JE] Feliciano Paige MD       Lab Results (last 24 hours)       Procedure Component Value Units Date/Time    CBC & Differential [[id number]]  (Abnormal) Collected: 11/22/23 1104    Specimen: Blood Updated: 11/22/23 1115    Narrative:       The following orders were created for panel order CBC & Differential.  Procedure                               Abnormality         Status                     ---------                               -----------         ------                     CBC Auto Differential[[id number]]        Abnormal            Final result                 Please view results for these tests on the individual orders.    Comprehensive Metabolic Panel [[id number]]  (Abnormal) Collected: 11/22/23 1104    Specimen: Blood Updated: 11/22/23 1135     Glucose 164 mg/dL      BUN 12 mg/dL      Creatinine 0.90 mg/dL      Sodium 139 mmol/L      Potassium 3.9 mmol/L      Chloride 105 mmol/L      CO2 25.4 mmol/L      Calcium 8.8 mg/dL      Total Protein 6.5 g/dL      Albumin 3.3 g/dL      ALT (SGPT) 7 U/L      AST (SGOT) 10 U/L      Alkaline Phosphatase 74 U/L      Total Bilirubin 1.7 mg/dL      Globulin 3.2 gm/dL      A/G Ratio 1.0 g/dL      BUN/Creatinine Ratio 13.3     Anion Gap 8.6 mmol/L      eGFR 70.7 mL/min/1.73     Narrative:      GFR Normal >60  Chronic Kidney Disease <60  Kidney Failure <15      BNP [[id number]]  (Normal) Collected: 11/22/23 1104    Specimen: Blood Updated: 11/22/23 1138     proBNP 587.8 pg/mL     Narrative:      This assay is used as an aid in the diagnosis of individuals suspected of having heart failure. It can be used as an aid in the diagnosis of acute decompensated heart failure (ADHF) in patients presenting with signs and symptoms of ADHF to the emergency department (ED). In addition, NT-proBNP of <300 pg/mL indicates ADHF is not likely.    Age Range Result Interpretation  NT-proBNP Concentration (pg/mL:      <50             Positive            >450                   Gray                 300-450                    Negative             <300    50-75           Positive            >900                  Gray                300-900                  Negative            <300      >75             Positive            >1800                   Flores                300-1800                  Negative            <300    Single High Sensitivity Troponin T [[id number]]  (Normal) Collected: 11/22/23 1104    Specimen: Blood Updated: 11/22/23 1138     HS Troponin T 12 ng/L     Narrative:      High Sensitive Troponin T Reference Range:  <14.0 ng/L- Negative Female for AMI  <22.0 ng/L- Negative Male for AMI  >=14 - Abnormal Female indicating possible myocardial injury.  >=22 - Abnormal Male indicating possible myocardial injury.   Clinicians would have to utilize clinical acumen, EKG, Troponin, and serial changes to determine if it is an Acute Myocardial Infarction or myocardial injury due to an underlying chronic condition.         CBC Auto Differential [[id number]]  (Abnormal) Collected: 11/22/23 1104    Specimen: Blood Updated: 11/22/23 1115     WBC 13.03 10*3/mm3      RBC 3.70 10*6/mm3      Hemoglobin 10.6 g/dL      Hematocrit 34.0 %      MCV 91.9 fL      MCH 28.6 pg      MCHC 31.2 g/dL      RDW 14.2 %      RDW-SD 47.8 fl      MPV 10.8 fL      Platelets 265 10*3/mm3      Neutrophil % 87.0 %      Lymphocyte % 8.2 %      Monocyte % 2.1 %      Eosinophil % 2.1 %      Basophil % 0.2 %      Immature Grans % 0.4 %      Neutrophils, Absolute 11.34 10*3/mm3      Lymphocytes, Absolute 1.07 10*3/mm3      Monocytes, Absolute 0.27 10*3/mm3      Eosinophils, Absolute 0.27 10*3/mm3      Basophils, Absolute 0.03 10*3/mm3      Immature Grans, Absolute 0.05 10*3/mm3      nRBC 0.0 /100 WBC     C-reactive Protein [[id number]]  (Abnormal) Collected: 11/22/23 1104    Specimen: Blood Updated: 11/22/23 1135     C-Reactive Protein 19.02 mg/dL     Procalcitonin [[id number]]  (Abnormal) Collected: 11/22/23 1104    Specimen: Blood Updated: 11/22/23 1148     Procalcitonin 2.32 ng/mL     Narrative:      As a Marker for Sepsis (Non-Neonates):    1. <0.5 ng/mL represents a low risk of severe sepsis and/or septic shock.  2. >2 ng/mL represents a high risk of severe sepsis and/or septic  "shock.    As a Marker for Lower Respiratory Tract Infections that require antibiotic therapy:    PCT on Admission    Antibiotic Therapy       6-12 Hrs later    >0.5                Strongly Recommended  >0.25 - <0.5        Recommended   0.1 - 0.25          Discouraged              Remeasure/reassess PCT  <0.1                Strongly Discouraged     Remeasure/reassess PCT    As 28 day mortality risk marker: \"Change in Procalcitonin Result\" (>80% or <=80%) if Day 0 (or Day 1) and Day 4 values are available. Refer to http://[email or web address]-[email or web address]    Change in PCT <=80%  A decrease of PCT levels below or equal to 80% defines a positive change in PCT test result representing a higher risk for 28-day all-cause mortality of patients diagnosed with severe sepsis for septic shock.    Change in PCT >80%  A decrease of PCT levels of more than 80% defines a negative change in PCT result representing a lower risk for 28-day all-cause mortality of patients diagnosed with severe sepsis or septic shock.       Magnesium [[id number]]  (Normal) Collected: 11/22/23 1104    Specimen: Blood Updated: 11/22/23 1135     Magnesium 2.0 mg/dL     COVID PRE-OP / PRE-PROCEDURE SCREENING ORDER (NO ISOLATION) - Swab, Nasopharynx [[id number]]  (Normal) Collected: 11/22/23 1105    Specimen: Swab from Nasopharynx Updated: 11/22/23 1131    Narrative:      The following orders were created for panel order COVID PRE-OP / PRE-PROCEDURE SCREENING ORDER (NO ISOLATION) - Swab, Nasopharynx.  Procedure                               Abnormality         Status                     ---------                               -----------         ------                     COVID-19 and FLU A/B PCR...[[id number]]  Normal              Final result                 Please view results for these tests on the individual orders.    COVID-19 and FLU A/B PCR, 1 HR TAT - Swab, Nasopharynx [[id number]]  (Normal) Collected: 11/22/23 1105    Specimen: Swab from Nasopharynx " "Updated: 11/22/23 1131     COVID19 Not Detected     Influenza A PCR Not Detected     Influenza B PCR Not Detected    Narrative:      Fact sheet for providers: https://www.fda.gov/media/737612/download    Fact sheet for patients: https://www.fda.gov/media/441148/download    Test performed by PCR.    D-dimer, Quantitative [[id number]]  (Abnormal) Collected: 11/22/23 1137    Specimen: Blood Updated: 11/22/23 1159     D-Dimer, Quantitative 2.35 MCGFEU/mL     Narrative:      According to the assay 's published package insert, a normal (<0.50 MCGFEU/mL) D-dimer result in conjunction with a non-high clinical probability assessment, excludes deep vein thrombosis (DVT) and pulmonary embolism (PE) with high sensitivity.    D-dimer values increase with age and this can make VTE exclusion of an older population difficult. To address this, the [employer], based on best available evidence and recent guidelines, recommends that clinicians use age-adjusted D-dimer thresholds in patients greater than 50 years of age with: a) a low probability of PE who do not meet all Pulmonary Embolism Rule Out Criteria, or b) in those with intermediate probability of PE.   The formula for an age-adjusted D-dimer cut-off is \"age/100\".  For example, a 60 year old patient would have an age-adjusted cut-off of 0.60 MCGFEU/mL and an 80 year old 0.80 MCGFEU/mL.    Blood Culture - Blood, Arm, Left [[id number]] Collected: 11/22/23 1319    Specimen: Blood from Arm, Left Updated: 11/22/23 1334    Lactic Acid, Plasma [[id number]] Collected: 11/22/23 1319    Specimen: Blood Updated: 11/22/23 1335    Blood Culture - Blood, Hand, Right [[id number]] Collected: 11/22/23 1325    Specimen: Blood from Hand, Right Updated: 11/22/23 1335             CT Angiogram Chest Pulmonary Embolism    Result Date: 11/22/2023  CTA/PE PROTOCOL CHEST CT:     11/22/2023 1:58 PM  CLINICAL INDICATION: Pulmonary embolism (PE) suspected, positive D-dimer " shortness of breath.  TECHNIQUE: Multiple axial CT images were obtained through the chest following IV contrast utilizing a CTA/PE protocol. 3D/MIP Reconstruction images were also performed. This study was performed with techniques to keep radiation doses as low as reasonably achievable, (ALARA). Individualized dose reduction techniques using automated exposure control or adjustment of mA and/or kV according to the patient size were employed. 340 mGy*cm  COMPARISON: Chest radiograph 11/22/2023.  FINDINGS: PA's and Aorta: No pulmonary embolus. Thoracic aorta is normal in caliber. No significant coronary artery calcifications.  Heart/mediastinum: No evidence for right heart strain. Trace pericardial effusion. Mild cardiomegaly. Gastric sleeve. Small hiatal hernia. Diffuse esophageal wall thickening.  Lungs/Pleura: Central airways are patent. Diffuse narrowing of the airways. Bilateral peribronchovascular interstitial thickening and peripheral interlobular septal thickening. Bilateral lower lobe subsegmental dense consolidations. Bilateral multifocal subsegmental atelectasis. Background parenchymal scarring. Small bilateral pleural effusions. No pneumothorax.  Lymph nodes: Multiple enlarged mediastinal and hilar lymph nodes, likely reactive/inflammatory.  Chest wall: No acute findings.  Bones: No acute fracture.  Upper abdomen: No acute findings in the upper abdomen.      Impression: No pulmonary embolus. No evidence of aortic dissection or aneurysm.  Cardiomegaly. Trace to small pericardial effusion. Small bilateral pleural effusions. Bilateral diffuse peribronchovascular interstitial thickening and peripheral interlobular septal thickening, suggestive of pulmonary edema. Bilateral multifocal subsegmental atelectasis. Background parenchymal scarring. Bilateral lower lobe subsegmental airspace consolidations, concerning for possible superimposed pneumonia or dense atelectasis. Correlate clinically.    Images personally  reviewed, interpreted and dictated by JIMMY Ching.  339.73 mGy.cm    This study was performed with techniques to keep radiation doses as low as reasonably achievable (ALARA). Individualized dose reduction techniques using automated exposure control or adjustment of mA and/or kV according to the patient size were employed.    This report was signed and finalized on 11/22/2023 1:34 PM by JIMMY Ching.      XR Chest 1 View    Result Date: 11/22/2023  CLINICAL INDICATION:  SOA Triage Protocol shortness of breath.  EXAMINATION TECHNIQUE: XR CHEST 1 VW-  COMPARISON: Radiographs 3/30/2023.  FINDINGS: Cardiomegaly. Perihilar vascular engorgement, cephalization. Bilateral diffuse groundglass opacities. Probable trace bilateral pleural effusions. No pneumothorax. Low lung volumes. Surgical suture line in the upper mid abdomen.      Impression: Cardiomegaly. Vascular engorgement. Bilateral diffuse groundglass opacities, concerning for mixed interstitial/alveolar edema or atypical viral pneumonia. Correlate clinically.    Images personally reviewed, interpreted and dictated by JIMMY Ching.     This report was signed and finalized on 11/22/2023 11:35 AM by JIMMY Ching.          MDM      Initial impression of presenting illness: Shortness of breath    DDX: includes but is not limited to: Reactive airway disease, bacterial pneumonia, viral URI, COVID-19, PE    Patient arrives guarded with vitals interpreted by myself.     Pertinent features from physical exam: Clear to auscultation, tachycardic, no increased work of breathing noted, patient requiring 4 L nasal cannula, not her baseline.    Initial diagnostic plan: CBC, CMP, Pro-Ejy, CRP, troponin, BNP, D-dimer, chest x-ray, mag    Results from initial plan were reviewed and interpreted by me revealing multifocal pneumonia seen on chest x-ray versus diffuse edema, patient with positive white count, CRP and Pro-Jey    Diagnostic information  from other sources: Reviewed past medical records    Interventions / Re-evaluation: By my read, patient with multifocal pneumonia on CT scan.  No concern for PE prior to read.  Given this, will provide Rocephin, azithromycin for CAP coverage    Results/clinical rationale were discussed with patient at bedside    Consultations/Discussion of results with other physicians: Given my concern for acute hypoxic respiratory failure secondary to multifocal pneumonia, discussed with hospitalist and admitted to their service for further management and IV antibiotics    Disposition plan: Admit  -----        Final diagnoses:   Acute hypoxic respiratory failure   Multifocal pneumonia          Feliciano Paige MD  11/22/23 6098

## 2023-11-22 NOTE — H&P
[facility]      Name:  Jessica Flowers   Age:  68 y.o.  Sex:  female  :  1955  MRN:  [id number]   Visit Number:  [id number]  Admission Date:  2023  Date Of Service:  23  Primary Care Physician:  Karley Jolly APRN    Chief Complaint:     Shortness of breath    History Of Presenting Illness:      Patient is a chronically ill 68-year-old female with history significant for HFpEF, morbid obesity, hypertension who presents to the emergency room with progressively worsening weakness and shortness of breath that began approximately 2 days ago.  Patient reports she was in her normal state of health when she suddenly developed shortness of breath and nonproductive cough.  She does not wear oxygen at baseline.  Lives with her grandson.  No known sick contacts or recent travel.  Patient denies tobacco abuse, alcohol use and illicit drug use.  No known respiratory disease.  On arrival to the ER, patient slightly febrile with Tmax 100.5 Fahrenheit, tachycardic with heart rate 141.  Normotensive and hypoxic requiring 4 L nasal cannula.  Patient is not on supplemental oxygen at baseline.  Troponin and proBNP within normal limits.  CMP unremarkable except for glucose 164.  CRP 19, lactate normal, procalcitonin 2.32.  D-dimer 2.35.  WBC 13.  COVID and flu negative.  CTA of the chest is negative for PE, no evidence of aortic dissection or aneurysm.  Does show findings of cardiomegaly with trace to small pericardial effusion.  Small bilateral pleural effusions.  There is also bilateral diffuse peribronchovascular interstitial thickening and peripheral interlobular septal thickening.  Bilateral multifocal subsegmental atelectasis with background parenchymal scarring.    Review Of Systems:    All systems were reviewed and negative except as mentioned in history of presenting illness, assessment and plan.    Past Medical History: Patient  has a past medical history  of Abnormal CXR, Allergic rhinitis, Anxiety, Carpal tunnel syndrome, Chronic narcotic use, Depression, Dyspnea on exertion, Fatigue, GERD (gastroesophageal reflux disease), Glucose intolerance (impaired glucose tolerance), Hiatal hernia with gastroesophageal reflux, MRSA infection, Hyperlipidemia, Hypertension, Hypertriglyceridemia, Hypothyroid, Insomnia, Joint pain, Morbid obesity, OAB (overactive bladder), Osteoarthritis of knees, bilateral, Plantar fasciitis, Postmenopausal HRT (hormone replacement therapy), Psoriasis, and Vitamin D deficiency.    Past Surgical History: Patient  has a past surgical history that includes Appendectomy (1989); Laparoscopic tubal ligation (1988); Dilation and curettage of uterus (1990, 2015); Carpal tunnel release; Cholecystectomy open (1980); Other surgical history; pr laps surg esopg/gstr fundoplasty (N/A, 7/5/2017); and Gastric Sleeve (N/A, 7/5/2017).    Social History: Patient  reports that she has never smoked. She has never used smokeless tobacco. She reports that she does not drink alcohol and does not use drugs.    Family History:  Patient's family history has been reviewed and found to be noncontributory.     Allergies:      Patient has no known allergies.    Home Medications:    Prior to Admission Medications       Prescriptions Last Dose Informant Patient Reported? Taking?    albuterol sulfate  (90 Base) MCG/ACT inhaler   No No    Inhale 2 puffs Every 4 (Four) Hours As Needed for Wheezing or Shortness of Air.    aspirin 81 MG chewable tablet   Yes No    Chew 1 tablet Daily.    atorvastatin (LIPITOR) 20 MG tablet   Yes No    Take 1 tablet by mouth Daily.    budesonide-formoterol (SYMBICORT) 160-4.5 MCG/ACT inhaler  Self Yes No    Inhale 2 puffs 2 (Two) Times a Day.    estradiol (ESTRACE) 1 MG tablet   No No    TAKE 1 TABLET BY MOUTH DAILY    FLUoxetine (PROzac) 20 MG capsule  Medication Bottle Yes No    Take 2 capsules by mouth Daily.    fluticasone (FLONASE) 50  MCG/ACT nasal spray   Yes No    2 sprays into the nostril(s) as directed by provider Daily As Needed for Rhinitis.    furosemide (LASIX) 40 MG tablet   No No    Take 1 tablet by mouth Daily.    gabapentin (NEURONTIN) 800 MG tablet  Self Yes No    Take 1 tablet by mouth 3 (Three) Times a Day.    lansoprazole (PREVACID) 30 MG capsule   Yes No    Take 1 capsule by mouth Daily.    levocetirizine (XYZAL) 5 MG tablet   Yes No    Take 1 tablet by mouth Every Evening.    levothyroxine (SYNTHROID, LEVOTHROID) 50 MCG tablet  Medication Bottle Yes No    Take 1 tablet by mouth Daily.    medroxyPROGESTERone (PROVERA) 2.5 MG tablet   No No    Take 1 tablet by mouth Daily.    metFORMIN (GLUCOPHAGE) 500 MG tablet   Yes No    Take 1 tablet by mouth Daily With Breakfast.    montelukast (SINGULAIR) 10 MG tablet   Yes No    Take 1 tablet by mouth Every Night.    potassium chloride (MICRO-K) 10 MEQ CR capsule   Yes No    Take 1 capsule by mouth 2 (Two) Times a Day.    pramipexole (MIRAPEX) 1 MG tablet   Yes No    Take 1 tablet by mouth Every Night.    tamsulosin (FLOMAX) 0.4 MG capsule 24 hr capsule   Yes No    Take 1 capsule by mouth Daily.    traZODone (DESYREL) 100 MG tablet  Medication Bottle Yes No    Take 1 tablet by mouth Every Night.    diphenhydrAMINE-Acetaminophen (Percogesic Extra Strength) 12.5-500 MG tablet   Yes No    Take  by mouth.    Patient not taking:  Reported on 10/25/2023    ibuprofen (ADVIL,MOTRIN) 800 MG tablet   Yes No    Take 1 tablet by mouth Every 8 (Eight) Hours As Needed for Mild Pain.    Patient not taking:  Reported on 10/25/2023          ED Medications:    Medications   sodium chloride 0.9 % flush 10 mL (has no administration in time range)   azithromycin (ZITHROMAX) 500 mg 0.9% NaCl (Add-vantage) 250 mL (500 mg Intravenous New Bag 11/22/23 1422)   aspirin chewable tablet 81 mg (has no administration in time range)   atorvastatin (LIPITOR) tablet 20 mg (has no administration in time range)    budesonide-formoterol (SYMBICORT) 160-4.5 MCG/ACT inhaler 2 puff (has no administration in time range)   FLUoxetine (PROzac) capsule 40 mg (has no administration in time range)   fluticasone (FLONASE) 50 MCG/ACT nasal spray 2 spray (has no administration in time range)   furosemide (LASIX) tablet 40 mg (has no administration in time range)   pantoprazole (PROTONIX) EC tablet 40 mg (has no administration in time range)   levothyroxine (SYNTHROID, LEVOTHROID) tablet 50 mcg (has no administration in time range)   pramipexole (MIRAPEX) tablet 1 mg (has no administration in time range)   tamsulosin (FLOMAX) 24 hr capsule 0.4 mg (has no administration in time range)   traZODone (DESYREL) tablet 100 mg (has no administration in time range)   montelukast (SINGULAIR) tablet 10 mg (has no administration in time range)   gabapentin (NEURONTIN) capsule 800 mg (has no administration in time range)   sodium chloride 0.9 % flush 10 mL (has no administration in time range)   sodium chloride 0.9 % flush 10 mL (has no administration in time range)   sodium chloride 0.9 % infusion 40 mL (has no administration in time range)   acetaminophen (TYLENOL) tablet 650 mg (has no administration in time range)     Or   acetaminophen (TYLENOL) 160 MG/5ML oral solution 650 mg (has no administration in time range)     Or   acetaminophen (TYLENOL) suppository 650 mg (has no administration in time range)   sennosides-docusate (PERICOLACE) 8.6-50 MG per tablet 2 tablet (has no administration in time range)     And   polyethylene glycol (MIRALAX) packet 17 g (has no administration in time range)     And   bisacodyl (DULCOLAX) EC tablet 5 mg (has no administration in time range)     And   bisacodyl (DULCOLAX) suppository 10 mg (has no administration in time range)   ondansetron (ZOFRAN) injection 4 mg (has no administration in time range)   Enoxaparin Sodium (LOVENOX) syringe 40 mg (has no administration in time range)   nitroglycerin (NITROSTAT) SL  "tablet 0.4 mg (has no administration in time range)   melatonin tablet 5 mg (has no administration in time range)   cefTRIAXone (ROCEPHIN) IVPB 2000 mg/50ml dextrose (premix) (has no administration in time range)   ipratropium-albuterol (DUO-NEB) nebulizer solution 3 mL (has no administration in time range)   furosemide (LASIX) injection 60 mg (has no administration in time range)   acetaminophen (TYLENOL) tablet 975 mg (975 mg Oral Given 11/22/23 1154)   iopamidol (ISOVUE-300) 61 % injection 100 mL (100 mL Intravenous Given 11/22/23 1307)   cefTRIAXone (ROCEPHIN) IVPB 2000 mg/50ml dextrose (premix) (0 mg Intravenous Stopped 11/22/23 1421)     Vital Signs:  Temp:  [98.6 °F (37 °C)-100.5 °F (38.1 °C)] 98.6 °F (37 °C)  Heart Rate:  [] 115  Resp:  [18-20] 18  BP: (106-132)/(69-76) 107/76        11/22/23  1104   Weight: 118 kg (260 lb)     Body mass index is 49.13 kg/m².    Physical Exam:     Most recent vital Signs: /76 (BP Location: Left arm, Patient Position: Lying)   Pulse 115   Temp 98.6 °F (37 °C) (Oral)   Resp 18   Ht 154.9 cm (61\")   Wt 118 kg (260 lb)   SpO2 92%   BMI 49.13 kg/m²     Physical Exam  Vitals reviewed.   Constitutional:       General: She is not in acute distress.     Appearance: She is obese. She is ill-appearing.   HENT:      Head: Normocephalic and atraumatic.      Right Ear: External ear normal.      Left Ear: External ear normal.      Mouth/Throat:      Mouth: Mucous membranes are moist.      Pharynx: Oropharynx is clear.   Eyes:      Extraocular Movements: Extraocular movements intact.      Conjunctiva/sclera: Conjunctivae normal.   Cardiovascular:      Rate and Rhythm: Normal rate and regular rhythm.      Pulses: Normal pulses.      Heart sounds: Normal heart sounds.   Pulmonary:      Effort: Pulmonary effort is normal. No respiratory distress.      Breath sounds: Wheezing present.   Abdominal:      General: Bowel sounds are normal.      Palpations: Abdomen is soft. " "  Musculoskeletal:      Right lower leg: No edema.      Left lower leg: No edema.   Skin:     General: Skin is warm and dry.   Neurological:      General: No focal deficit present.      Mental Status: She is alert and oriented to person, place, and time.   Psychiatric:         Behavior: Behavior normal.         Laboratory data:    I have reviewed the labs done in the emergency room.    Results from last 7 days   Lab Units 11/22/23  1104   SODIUM mmol/L 139   POTASSIUM mmol/L 3.9   CHLORIDE mmol/L 105   CO2 mmol/L 25.4   BUN mg/dL 12   CREATININE mg/dL 0.90   CALCIUM mg/dL 8.8   BILIRUBIN mg/dL 1.7*   ALK PHOS U/L 74   ALT (SGPT) U/L 7   AST (SGOT) U/L 10   GLUCOSE mg/dL 164*     Results from last 7 days   Lab Units 11/22/23  1104   WBC 10*3/mm3 13.03*   HEMOGLOBIN g/dL 10.6*   HEMATOCRIT % 34.0   PLATELETS 10*3/mm3 265         Results from last 7 days   Lab Units 11/22/23  1104   HSTROP T ng/L 12     Results from last 7 days   Lab Units 11/22/23  1104   PROBNP pg/mL 587.8                       Invalid input(s): \"USDES\", \"NITRITITE\", \"BACT\", \"EP\"    Pain Management Panel           No data to display                EKG:      EKG personally reviewed, sinus tachycardia with rate of 106 bpm.  No acute ST or T wave changes.    Radiology:    CT Angiogram Chest Pulmonary Embolism    Result Date: 11/22/2023  CTA/PE PROTOCOL CHEST CT:     11/22/2023 1:58 PM  CLINICAL INDICATION: Pulmonary embolism (PE) suspected, positive D-dimer shortness of breath.  TECHNIQUE: Multiple axial CT images were obtained through the chest following IV contrast utilizing a CTA/PE protocol. 3D/MIP Reconstruction images were also performed. This study was performed with techniques to keep radiation doses as low as reasonably achievable, (ALARA). Individualized dose reduction techniques using automated exposure control or adjustment of mA and/or kV according to the patient size were employed. 340 mGy*cm  COMPARISON: Chest radiograph 11/22/2023.  " FINDINGS: PA's and Aorta: No pulmonary embolus. Thoracic aorta is normal in caliber. No significant coronary artery calcifications.  Heart/mediastinum: No evidence for right heart strain. Trace pericardial effusion. Mild cardiomegaly. Gastric sleeve. Small hiatal hernia. Diffuse esophageal wall thickening.  Lungs/Pleura: Central airways are patent. Diffuse narrowing of the airways. Bilateral peribronchovascular interstitial thickening and peripheral interlobular septal thickening. Bilateral lower lobe subsegmental dense consolidations. Bilateral multifocal subsegmental atelectasis. Background parenchymal scarring. Small bilateral pleural effusions. No pneumothorax.  Lymph nodes: Multiple enlarged mediastinal and hilar lymph nodes, likely reactive/inflammatory.  Chest wall: No acute findings.  Bones: No acute fracture.  Upper abdomen: No acute findings in the upper abdomen.      No pulmonary embolus. No evidence of aortic dissection or aneurysm.  Cardiomegaly. Trace to small pericardial effusion. Small bilateral pleural effusions. Bilateral diffuse peribronchovascular interstitial thickening and peripheral interlobular septal thickening, suggestive of pulmonary edema. Bilateral multifocal subsegmental atelectasis. Background parenchymal scarring. Bilateral lower lobe subsegmental airspace consolidations, concerning for possible superimposed pneumonia or dense atelectasis. Correlate clinically.    Images personally reviewed, interpreted and dictated by JIMMY Ching.  339.73 mGy.cm    This study was performed with techniques to keep radiation doses as low as reasonably achievable (ALARA). Individualized dose reduction techniques using automated exposure control or adjustment of mA and/or kV according to the patient size were employed.    This report was signed and finalized on 11/22/2023 1:34 PM by JIMMY Ching.      XR Chest 1 View    Result Date: 11/22/2023  CLINICAL INDICATION:  [facility] Triage Protocol  shortness of breath.  EXAMINATION TECHNIQUE: XR CHEST 1 VW-  COMPARISON: Radiographs 3/30/2023.  FINDINGS: Cardiomegaly. Perihilar vascular engorgement, cephalization. Bilateral diffuse groundglass opacities. Probable trace bilateral pleural effusions. No pneumothorax. Low lung volumes. Surgical suture line in the upper mid abdomen.      Cardiomegaly. Vascular engorgement. Bilateral diffuse groundglass opacities, concerning for mixed interstitial/alveolar edema or atypical viral pneumonia. Correlate clinically.    Images personally reviewed, interpreted and dictated by JIMMY Ching.     This report was signed and finalized on 11/22/2023 11:35 AM by JIMMY Ching.       Assessment:    Acute hypoxic respiratory failure POA  Sepsis secondary to bacterial pneumonia without risk factors for MDRO's POA  HFpEF with acute exacerbation POA  Pericardial effusion POA  Bilateral pleural effusions/pulmonary edema POA  Hypertension  Hyperlipidemia  Depression/anxiety  Morbid obesity  Impaired mobility and ADLs    Plan:    Pulmonary patient to the hospital, anticipate greater than 2 midnight stay.    Acute hypoxic respiratory failure/sepsis/bacterial pneumonia  -Continue with empiric Rocephin  -Legionella and strep pneumo urinary antigens pending  -Sputum culture  -DuoNebs, cough suppressants, bronchodilators, incentive spirometer  -Continue supplemental oxygen as necessary to maintain saturation greater than 88%.  No requirement at baseline.  Titrate off as able.    HFrEF with acute exacerbation  Pericardial effusion/bilateral pleural effusion  -Given IV Lasix 60 mg  -Continue with home Lasix in the a.m.  -Strict I's and O's  -2D echo from 3/26/2023 revealed EF 62%, grade 1 diastolic dysfunction.  Repeat 2D echo pending.    Further orders as clinical course dictates.  Carlos Alberto reviewed, continued chronic gabapentin and [place].  PT/OT.    Risk Assessment: High  DVT Prophylaxis: Lovenox  Code Status: Full  Diet: As  tolerated    Advance Care Planning   ACP discussion was held with the patient during this visit. Patient does not have an advance directive, information provided.           Ricki Dai DO  11/22/23  15:00 EST    Dictated utilizing Dragon dictation.

## 2023-11-23 LAB
ANION GAP SERPL CALCULATED.3IONS-SCNC: 9.4 MMOL/L (ref 5–15)
BACTERIA UR CULT: ABNORMAL
BASOPHILS # BLD AUTO: 0.03 10*3/MM3 (ref 0–0.2)
BASOPHILS NFR BLD AUTO: 0.2 % (ref 0–1.5)
BUN SERPL-MCNC: 17 MG/DL (ref 8–23)
BUN/CREAT SERPL: 17.7 (ref 7–25)
CALCIUM SPEC-SCNC: 8.6 MG/DL (ref 8.6–10.5)
CHLORIDE SERPL-SCNC: 104 MMOL/L (ref 98–107)
CO2 SERPL-SCNC: 25.6 MMOL/L (ref 22–29)
CREAT SERPL-MCNC: 0.96 MG/DL (ref 0.57–1)
DEPRECATED RDW RBC AUTO: 47.7 FL (ref 37–54)
EGFRCR SERPLBLD CKD-EPI 2021: 65.4 ML/MIN/1.73
EOSINOPHIL # BLD AUTO: 0 10*3/MM3 (ref 0–0.4)
EOSINOPHIL NFR BLD AUTO: 0 % (ref 0.3–6.2)
ERYTHROCYTE [DISTWIDTH] IN BLOOD BY AUTOMATED COUNT: 13.9 % (ref 12.3–15.4)
GLUCOSE SERPL-MCNC: 154 MG/DL (ref 65–99)
HCT VFR BLD AUTO: 30.2 % (ref 34–46.6)
HGB BLD-MCNC: 9.4 G/DL (ref 12–15.9)
IMM GRANULOCYTES # BLD AUTO: 0.14 10*3/MM3 (ref 0–0.05)
IMM GRANULOCYTES NFR BLD AUTO: 0.7 % (ref 0–0.5)
LYMPHOCYTES # BLD AUTO: 0.66 10*3/MM3 (ref 0.7–3.1)
LYMPHOCYTES NFR BLD AUTO: 3.5 % (ref 19.6–45.3)
MCH RBC QN AUTO: 28.6 PG (ref 26.6–33)
MCHC RBC AUTO-ENTMCNC: 31.1 G/DL (ref 31.5–35.7)
MCV RBC AUTO: 91.8 FL (ref 79–97)
MONOCYTES # BLD AUTO: 0.43 10*3/MM3 (ref 0.1–0.9)
MONOCYTES NFR BLD AUTO: 2.3 % (ref 5–12)
MRSA DNA SPEC QL NAA+PROBE: NORMAL
NEUTROPHILS NFR BLD AUTO: 17.49 10*3/MM3 (ref 1.7–7)
NEUTROPHILS NFR BLD AUTO: 93.3 % (ref 42.7–76)
NRBC BLD AUTO-RTO: 0 /100 WBC (ref 0–0.2)
ORGANISM: ABNORMAL
PLATELET # BLD AUTO: 277 10*3/MM3 (ref 140–450)
PMV BLD AUTO: 11.5 FL (ref 6–12)
POTASSIUM SERPL-SCNC: 3.9 MMOL/L (ref 3.5–5.2)
PROCALCITONIN SERPL-MCNC: 2.71 NG/ML (ref 0–0.25)
RBC # BLD AUTO: 3.29 10*6/MM3 (ref 3.77–5.28)
SODIUM SERPL-SCNC: 139 MMOL/L (ref 136–145)
WBC NRBC COR # BLD AUTO: 18.75 10*3/MM3 (ref 3.4–10.8)

## 2023-11-23 PROCEDURE — 87641 MR-STAPH DNA AMP PROBE: CPT | Performed by: INTERNAL MEDICINE

## 2023-11-23 PROCEDURE — 25010000002 ENOXAPARIN PER 10 MG: Performed by: INTERNAL MEDICINE

## 2023-11-23 PROCEDURE — 85025 COMPLETE CBC W/AUTO DIFF WBC: CPT | Performed by: INTERNAL MEDICINE

## 2023-11-23 PROCEDURE — 84145 PROCALCITONIN (PCT): CPT | Performed by: INTERNAL MEDICINE

## 2023-11-23 PROCEDURE — 80048 BASIC METABOLIC PNL TOTAL CA: CPT | Performed by: INTERNAL MEDICINE

## 2023-11-23 PROCEDURE — 25010000002 CEFEPIME-DEXTROSE 2-5 GM-%(50ML) RECONSTITUTED SOLUTION: Performed by: INTERNAL MEDICINE

## 2023-11-23 PROCEDURE — 94664 DEMO&/EVAL PT USE INHALER: CPT

## 2023-11-23 PROCEDURE — 94799 UNLISTED PULMONARY SVC/PX: CPT

## 2023-11-23 PROCEDURE — 94761 N-INVAS EAR/PLS OXIMETRY MLT: CPT

## 2023-11-23 PROCEDURE — 99232 SBSQ HOSP IP/OBS MODERATE 35: CPT | Performed by: INTERNAL MEDICINE

## 2023-11-23 PROCEDURE — 97161 PT EVAL LOW COMPLEX 20 MIN: CPT

## 2023-11-23 RX ORDER — HYDROCODONE BITARTRATE AND ACETAMINOPHEN 10; 325 MG/1; MG/1
1 TABLET ORAL EVERY 8 HOURS PRN
Status: DISCONTINUED | OUTPATIENT
Start: 2023-11-23 | End: 2023-11-23

## 2023-11-23 RX ORDER — CEFEPIME HYDROCHLORIDE 2 G/50ML
2000 INJECTION, SOLUTION INTRAVENOUS EVERY 8 HOURS
Status: DISCONTINUED | OUTPATIENT
Start: 2023-11-23 | End: 2023-11-25 | Stop reason: HOSPADM

## 2023-11-23 RX ORDER — CEFEPIME HYDROCHLORIDE 2 G/50ML
2000 INJECTION, SOLUTION INTRAVENOUS ONCE
Status: COMPLETED | OUTPATIENT
Start: 2023-11-23 | End: 2023-11-23

## 2023-11-23 RX ADMIN — ENOXAPARIN SODIUM 40 MG: 100 INJECTION SUBCUTANEOUS at 20:48

## 2023-11-23 RX ADMIN — CEFEPIME HYDROCHLORIDE 2000 MG: 2 INJECTION, SOLUTION INTRAVENOUS at 11:11

## 2023-11-23 RX ADMIN — LEVOTHYROXINE SODIUM 50 MCG: 50 TABLET ORAL at 06:01

## 2023-11-23 RX ADMIN — CEFEPIME HYDROCHLORIDE 2000 MG: 2 INJECTION, SOLUTION INTRAVENOUS at 17:45

## 2023-11-23 RX ADMIN — BUDESONIDE AND FORMOTEROL FUMARATE DIHYDRATE 2 PUFF: 160; 4.5 AEROSOL RESPIRATORY (INHALATION) at 07:10

## 2023-11-23 RX ADMIN — PANTOPRAZOLE SODIUM 40 MG: 40 TABLET, DELAYED RELEASE ORAL at 06:01

## 2023-11-23 RX ADMIN — FLUOXETINE 40 MG: 20 CAPSULE ORAL at 08:24

## 2023-11-23 RX ADMIN — Medication 10 ML: at 08:26

## 2023-11-23 RX ADMIN — GABAPENTIN 800 MG: 400 CAPSULE ORAL at 06:01

## 2023-11-23 RX ADMIN — PRAMIPEXOLE DIHYDROCHLORIDE 1 MG: 1 TABLET ORAL at 20:49

## 2023-11-23 RX ADMIN — BUDESONIDE AND FORMOTEROL FUMARATE DIHYDRATE 2 PUFF: 160; 4.5 AEROSOL RESPIRATORY (INHALATION) at 20:01

## 2023-11-23 RX ADMIN — GABAPENTIN 800 MG: 400 CAPSULE ORAL at 16:06

## 2023-11-23 RX ADMIN — TRAZODONE HYDROCHLORIDE 100 MG: 50 TABLET ORAL at 20:49

## 2023-11-23 RX ADMIN — ATORVASTATIN CALCIUM 20 MG: 20 TABLET, FILM COATED ORAL at 08:24

## 2023-11-23 RX ADMIN — ENOXAPARIN SODIUM 40 MG: 100 INJECTION SUBCUTANEOUS at 08:24

## 2023-11-23 RX ADMIN — ACETAMINOPHEN 650 MG: 325 TABLET, FILM COATED ORAL at 06:03

## 2023-11-23 RX ADMIN — ASPIRIN 81 MG CHEWABLE TABLET 81 MG: 81 TABLET CHEWABLE at 08:24

## 2023-11-23 RX ADMIN — MONTELUKAST 10 MG: 10 TABLET, FILM COATED ORAL at 20:49

## 2023-11-23 RX ADMIN — DOCUSATE SODIUM 50MG AND SENNOSIDES 8.6MG 2 TABLET: 8.6; 5 TABLET, FILM COATED ORAL at 20:49

## 2023-11-23 RX ADMIN — GABAPENTIN 800 MG: 400 CAPSULE ORAL at 21:20

## 2023-11-23 RX ADMIN — TAMSULOSIN HYDROCHLORIDE 0.4 MG: 0.4 CAPSULE ORAL at 08:24

## 2023-11-23 RX ADMIN — DOCUSATE SODIUM 50MG AND SENNOSIDES 8.6MG 2 TABLET: 8.6; 5 TABLET, FILM COATED ORAL at 08:24

## 2023-11-23 NOTE — PLAN OF CARE
Goal Outcome Evaluation:  Plan of Care Reviewed With: patient        Progress: no change  Outcome Evaluation: Pt was very pleasant this date and participated well in PT evaluation. Pt supine upon arrival but agreeable to PT. pt came from supine to sitting EOB and sat mod I for multiple minutes. Pt completed STS CGA and ambulated 20 ft x 6 with no device. Pt maintained O2 sats above 90% during entirety of session. Pt returned to room and was seated in bedside chair with all needs met. Pt would benefit from skilled PT during this inpatient stay to maximize functional mobility, independence and endurance.      Anticipated Discharge Disposition (PT): home

## 2023-11-23 NOTE — CASE MANAGEMENT/SOCIAL WORK
Discharge Planning Assessment  [facility]     Patient Name: Jessica Flowers  MRN: [id number]  Today's Date: 11/23/2023    Admit Date: 11/22/2023    Plan: Pt plans to return home upon D/C. Pt has no identified needs at D/C. Family will provide transport home.   Discharge Needs Assessment       Row Name 11/23/23 1225       Living Environment    People in Home grandchild(nicole)    Current Living Arrangements apartment    Potentially Unsafe Housing Conditions none    Primary Care Provided by self    Provides Primary Care For grandchild(nicole)    Family Caregiver if Needed child(nicole), adult    Family Caregiver Names Marion Flowers DTR- [phone]    Quality of Family Relationships helpful;involved;stressful    Able to Return to Prior Arrangements yes       Resource/Environmental Concerns    Resource/Environmental Concerns none    Transportation Concerns none       Food Insecurity    Within the past 12 months, you worried that your food would run out before you got the money to buy more. Never true    Within the past 12 months, the food you bought just didn't last and you didn't have money to get more. Never true       Transition Planning    Patient/Family Anticipates Transition to home with family    Patient/Family Anticipated Services at Transition none    Transportation Anticipated family or friend will provide       Discharge Needs Assessment    Readmission Within the Last 30 Days no previous admission in last 30 days    Equipment Currently Used at Home walker, rolling;shower chair;cpap;pulse ox;glucometer    Concerns to be Addressed no discharge needs identified    Anticipated Changes Related to Illness none    Equipment Needed After Discharge none                   Discharge Plan       Row Name 11/23/23 1227       Plan    Plan Pt plans to return home upon D/C. Pt has no identified needs at D/C. Family will provide transport home.    Patient/Family in Agreement with Plan yes                   Demographic Summary        Row Name 11/23/23 1219       General Information    Admission Type inpatient    Arrived From home    Required Notices Provided Important Message from Medicare    Referral Source emergency department    Reason for Consult discharge planning    Preferred Language English                Functional Status       Row Name 11/23/23 1221       Functional Status    Usual Activity Tolerance fair    Current Activity Tolerance fair       Physical Activity    On average, how many days per week do you engage in moderate to strenuous exercise (like a brisk walk)? 0 days    On average, how many minutes do you engage in exercise at this level? 0 min    Number of minutes of exercise per week 0       Assessment of Health Literacy    How often do you have someone help you read hospital materials? Often    How often do you have problems learning about your medical condition because of difficulty understanding written information? Often    How often do you have a problem understanding what is told to you about your medical condition? Often    How confident are you filling out medical forms by yourself? Somewhat    Health Literacy Moderate       Functional Status, IADL    Medications independent    Meal Preparation independent    Housekeeping independent    Laundry independent    Shopping independent    IADL Comments Pt reports being able to do all of her ADLS independently.       Mental Status    General Appearance WDL WDL       Mental Status Summary    Recent Changes in Mental Status/Cognitive Functioning no changes       Employment/    Employment Status retired                   Psychosocial       Row Name 11/23/23 1223       Values/Beliefs    Spiritual, Cultural Beliefs, Scientologist Practices, Values that Affect Care no       Behavior WDL    Behavior WDL WDL       Emotion Mood WDL    Emotion/Mood/Affect WDL WDL       Mental Health    Little Interest or Pleasure in Doing Things 0-->not at all    Feeling Down, Depressed or  Hopeless 0-->not at all    Do you feel stress - tense, restless, nervous, or anxious, or unable to sleep at night because your mind is troubled all the time - these days? Not at all       Speech WDL    Speech WDL WDL       Perceptual State WDL    Perceptual State WDL WDL       Thought Process WDL    Thought Process WDL WDL       Intellectual Performance WDL    Intellectual Performance WDL WDL       Coping/Stress    Major Change/Loss/Stressor none    Patient Personal Strengths able to adapt;assertive;expressive of emotions;expressive of needs;humor;motivated    Sources of Support adult child(nicole);other family members    Techniques to [place] with Loss/Stress/Change not applicable    Reaction to Health Status accepting    Understanding of Condition and Treatment adequate understanding of medical condition;adequate understanding of treatment       Developmental Stage (Eriksson's)    Developmental Stage Stage 8 (67 years-death/Late Adulthood) Integrity vs. Despair       C-SSRS (Recent)    Q1 Wished to be Dead (Past Month) no    Q2 Suicidal Thoughts (Past Month) no    Q6 Suicide Behavior (Lifetime) no       Violence Risk    Feels Like Hurting Others no    Previous Attempt to Harm Others no                  Antonia Carpenter

## 2023-11-23 NOTE — PROGRESS NOTES
[facility]    [facility]    Name:  Jessica Flowers   Age:  68 y.o.  Sex:  female  :  1955  MRN:  [id number]   Visit Number:  [id number]  Admission Date:  2023  Date Of Service:  23  Primary Care Physician:  Karley Jolly APRN     LOS: 1 day :    Chief Complaint:      Shortness of breath    Subjective:    Patient evaluated, resting comfortably.  Sitting on the side of the bed, had just finished a shower.  Still on 4 L nasal cannula.  Overall appears much improved.  Still with nonproductive cough.  No acute events overnight per nursing staff.    Hospital Course:    Patient is a chronically ill 68-year-old female with history significant for HFpEF, morbid obesity, hypertension who presents to the emergency room with progressively worsening weakness and shortness of breath for 2 days.  No known sick contacts or recent travel.  Patient denies tobacco abuse, alcohol use and illicit drug use.  No known respiratory disease.  On arrival to the ER, patient slightly febrile with Tmax 100.5 Fahrenheit, tachycardic with heart rate 141.  Normotensive and hypoxic requiring 4 L nasal cannula.  Patient is not on supplemental oxygen at baseline.  Troponin and proBNP within normal limits.  CMP unremarkable except for glucose 164.  CRP 19, lactate normal, procalcitonin 2.32.  D-dimer 2.35.  WBC 13.  COVID and flu negative.  CTA of the chest is negative for PE, no evidence of aortic dissection or aneurysm.  Does show findings of cardiomegaly with trace to small pericardial effusion.  Small bilateral pleural effusions.  There is also bilateral diffuse peribronchovascular interstitial thickening and peripheral interlobular septal thickening.  Bilateral multifocal subsegmental atelectasis with background parenchymal scarring.       Review of Systems:     All systems were reviewed and negative except as mentioned in subjective, assessment and plan.    Vital Signs:    Temp:  [97.6 °F  (36.4 °C)-100.5 °F (38.1 °C)] (P) 97.8 °F (36.6 °C)  Heart Rate:  [] (P) 74  Resp:  [18-20] (P) 18  BP: ()/(55-76) (P) 112/75    Intake and output:    I/O last 3 completed shifts:  In: 290 [P.O.:240; IV Piggyback:50]  Out: 2400 [Urine:2400]  No intake/output data recorded.    Physical Examination:    General Appearance:  Alert and cooperative.    Head:  Atraumatic and normocephalic.   Eyes: Conjunctivae and sclerae normal, no icterus. No pallor.   Throat: No oral lesions, no thrush, oral mucosa moist.   Neck: Supple, trachea midline, no thyromegaly.   Lungs:   Breath sounds heard bilaterally equally.  Faint wheezing but overall improved from yesterday.   Heart:  Normal S1 and S2, no murmur, no gallop, no rub. No JVD.   Abdomen:   Normal bowel sounds, no masses, no organomegaly. Soft, nontender, nondistended, no rebound tenderness.   Extremities: Supple, no edema, no cyanosis, no clubbing.   Skin: No bleeding or rash.   Neurologic: Alert and oriented x 3. No facial asymmetry. Moves all four limbs. No tremors.      Laboratory results:    Results from last 7 days   Lab Units 11/23/23  0554 11/22/23  1104   SODIUM mmol/L 139 139   POTASSIUM mmol/L 3.9 3.9   CHLORIDE mmol/L 104 105   CO2 mmol/L 25.6 25.4   BUN mg/dL 17 12   CREATININE mg/dL 0.96 0.90   CALCIUM mg/dL 8.6 8.8   BILIRUBIN mg/dL  --  1.7*   ALK PHOS U/L  --  74   ALT (SGPT) U/L  --  7   AST (SGOT) U/L  --  10   GLUCOSE mg/dL 154* 164*     Results from last 7 days   Lab Units 11/23/23  0638 11/22/23  1104   WBC 10*3/mm3 18.75* 13.03*   HEMOGLOBIN g/dL 9.4* 10.6*   HEMATOCRIT % 30.2* 34.0   PLATELETS 10*3/mm3 277 265         Results from last 7 days   Lab Units 11/22/23  1104   HSTROP T ng/L 12         Recent Labs     03/25/23  0511 03/30/23  0734   PHART 7.384 7.509*   YPQ7HOA 43.2 41.3   PO2ART 64.9* 146.0*   IRP8FPA 25.8 32.9*   BASEEXCESS 0.5 9.0*      I have reviewed the patient's laboratory results.    Radiology results:    CT Angiogram Chest  Pulmonary Embolism    Result Date: 11/22/2023  CTA/PE PROTOCOL CHEST CT:     11/22/2023 1:58 PM  CLINICAL INDICATION: Pulmonary embolism (PE) suspected, positive D-dimer shortness of breath.  TECHNIQUE: Multiple axial CT images were obtained through the chest following IV contrast utilizing a CTA/PE protocol. 3D/MIP Reconstruction images were also performed. This study was performed with techniques to keep radiation doses as low as reasonably achievable, (ALARA). Individualized dose reduction techniques using automated exposure control or adjustment of mA and/or kV according to the patient size were employed. 340 mGy*cm  COMPARISON: Chest radiograph 11/22/2023.  FINDINGS: PA's and Aorta: No pulmonary embolus. Thoracic aorta is normal in caliber. No significant coronary artery calcifications.  Heart/mediastinum: No evidence for right heart strain. Trace pericardial effusion. Mild cardiomegaly. Gastric sleeve. Small hiatal hernia. Diffuse esophageal wall thickening.  Lungs/Pleura: Central airways are patent. Diffuse narrowing of the airways. Bilateral peribronchovascular interstitial thickening and peripheral interlobular septal thickening. Bilateral lower lobe subsegmental dense consolidations. Bilateral multifocal subsegmental atelectasis. Background parenchymal scarring. Small bilateral pleural effusions. No pneumothorax.  Lymph nodes: Multiple enlarged mediastinal and hilar lymph nodes, likely reactive/inflammatory.  Chest wall: No acute findings.  Bones: No acute fracture.  Upper abdomen: No acute findings in the upper abdomen.      Impression: No pulmonary embolus. No evidence of aortic dissection or aneurysm.  Cardiomegaly. Trace to small pericardial effusion. Small bilateral pleural effusions. Bilateral diffuse peribronchovascular interstitial thickening and peripheral interlobular septal thickening, suggestive of pulmonary edema. Bilateral multifocal subsegmental atelectasis. Background parenchymal scarring.  Bilateral lower lobe subsegmental airspace consolidations, concerning for possible superimposed pneumonia or dense atelectasis. Correlate clinically.    Images personally reviewed, interpreted and dictated by JIMMY Ching.  339.73 mGy.cm    This study was performed with techniques to keep radiation doses as low as reasonably achievable (ALARA). Individualized dose reduction techniques using automated exposure control or adjustment of mA and/or kV according to the patient size were employed.    This report was signed and finalized on 11/22/2023 1:34 PM by JIMMY Ching.      XR Chest 1 View    Result Date: 11/22/2023  CLINICAL INDICATION:  SOA Triage Protocol shortness of breath.  EXAMINATION TECHNIQUE: XR CHEST 1 VW-  COMPARISON: Radiographs 3/30/2023.  FINDINGS: Cardiomegaly. Perihilar vascular engorgement, cephalization. Bilateral diffuse groundglass opacities. Probable trace bilateral pleural effusions. No pneumothorax. Low lung volumes. Surgical suture line in the upper mid abdomen.      Impression: Cardiomegaly. Vascular engorgement. Bilateral diffuse groundglass opacities, concerning for mixed interstitial/alveolar edema or atypical viral pneumonia. Correlate clinically.    Images personally reviewed, interpreted and dictated by JIMMY Ching.     This report was signed and finalized on 11/22/2023 11:35 AM by JIMMY Ching.     I have reviewed the patient's radiology reports.    Medication Review:     I have reviewed the patient's active and prn medications.     Problem List:      Acute hypoxic respiratory failure      Assessment:    Acute hypoxic respiratory failure POA  Sepsis secondary to bacterial pneumonia without risk factors for MDRO's POA  HFpEF with acute exacerbation POA  Pericardial effusion POA  Bilateral pleural effusions/pulmonary edema POA  Hypertension  Hyperlipidemia  Depression/anxiety  Morbid obesity  Impaired mobility and ADLs    Plan:    Acute hypoxic  respiratory failure/sepsis/bacterial pneumonia  -Worsening leukocytosis and uptrending procalcitonin today.  Broaden antibiotics to cefepime as patient states that she has been hospitalized at [facility] within the last 3 months.  -Legionella and strep pneumo urinary antigens negative  -Sputum culture  -DuoNebs, cough suppressants, bronchodilators, incentive spirometer  -Continue supplemental oxygen as necessary to maintain saturation greater than 88%.  No requirement at baseline.  Titrate off as able.     HFrEF with acute exacerbation  Pericardial effusion/bilateral pleural effusion  -Given IV Lasix 60 mg on admission  -Continue with home Lasix   -Strict I's and O's  -2D echo from 3/26/2023 revealed EF 62%, grade 1 diastolic dysfunction.  Repeat 2D echo pending.     Further orders as clinical course dictates.  Carlos Alberto reviewed, continued chronic gabapentin and Norco.  PT/OT.    DVT Prophylaxis: Lovenox  Code Status: Full  Diet: As tolerated  Discharge Plan: 1-2 more days    Ricki Dai DO  11/23/23  11:43 EST    Dictated utilizing Dragon dictation.

## 2023-11-23 NOTE — PLAN OF CARE
Goal Outcome Evaluation:      IV abx continued. O2 sats >90% on 2L NC. Will continue with current plan of care.                       PO intake to meet >75% estimated needs.

## 2023-11-23 NOTE — THERAPY EVALUATION
Patient Name: Jessica Flowers  : 1955    MRN: [id number]                              Today's Date: 2023       Admit Date: 2023    Visit Dx:     ICD-10-CM ICD-9-CM   1. Acute hypoxic respiratory failure  J96.01 518.81   2. Multifocal pneumonia  J18.9 486     Patient Active Problem List   Diagnosis    Vitamin D deficiency    Psoriasis    Postmenopausal HRT (hormone replacement therapy)    Plantar fasciitis    OAB (overactive bladder)    Joint pain    Insomnia    Hypothyroid    Hypertension    Hyperlipidemia    GERD (gastroesophageal reflux disease)    Dyspnea on exertion    Fatigue    Depression    Allergic rhinitis    Anxiety    Glucose intolerance (impaired glucose tolerance)    Acute respiratory failure with hypoxia    Acute exacerbation of CHF (congestive heart failure)    Bacterial pneumonia    (HFpEF) heart failure with preserved ejection fraction    Pneumonia of both lower lobes due to infectious organism    Acute hypoxic respiratory failure     Past Medical History:   Diagnosis Date    Abnormal CXR     bronchitis poss, [facility]    Allergic rhinitis     uses inhalers prn, denies asthma    Anxiety     Carpal tunnel syndrome     Chronic narcotic use     HC    Depression     Dyspnea on exertion     Fatigue     GERD (gastroesophageal reflux disease)     on Prilosec + BID Zantac    Glucose intolerance (impaired glucose tolerance)     Hiatal hernia with gastroesophageal reflux     EGD GDW , 39 cm, path DE mild nonspec changes.  serum h. pyl neg    Hx MRSA infection     2010 on abdomen, tx w/ Bactrim    Hyperlipidemia     Hypertension     Hypertriglyceridemia     Hypothyroid     Insomnia     uses Trazodone    Joint pain     Morbid obesity     OAB (overactive bladder)     tx w/ botox injections    Osteoarthritis of knees, bilateral     steroid injxns as above, supposed to get synvisc soon.  Says bone on bone, needs TKR, but ortho surgeon wants her to have WLS first    Plantar fasciitis      Postmenopausal HRT (hormone replacement therapy)     Psoriasis     Vitamin D deficiency      Past Surgical History:   Procedure Laterality Date    APPENDECTOMY  1989    emergent, open    CARPAL TUNNEL RELEASE      (B)    CHOLECYSTECTOMY OPEN  1980    [facility]    DILATATION AND CURETTAGE  1990, 2015    GASTRIC SLEEVE LAPAROSCOPIC N/A 7/5/2017    Procedure: GASTRIC SLEEVE LAPAROSCOPIC, ;  Surgeon: Cj Gregg MD;  Location:  [facility];  Service:     LAPAROSCOPIC TUBAL LIGATION  1988    OTHER SURGICAL HISTORY      denies anesthesia issues    TN LAPS SURG ESOPG/GSTR FUNDOPLASTY N/A 7/5/2017    Procedure: HIATAL HERNIA REPAIR LAPAROSCOPIC;  Surgeon: Cj Gregg MD;  Location:  [facility];  Service: Bariatric      General Information       Row Name 11/23/23 1455          Physical Therapy Time and Intention    Document Type evaluation  -     Mode of Treatment physical therapy  -       Row Name 11/23/23 1455          General Information    Patient Profile Reviewed yes  -     Prior Level of Function independent:;ADL's;cooking;cleaning  -     Existing Precautions/Restrictions fall  -     Barriers to Rehab previous functional deficit  -       Row Name 11/23/23 1455          Living Environment    People in Home grandchild(nicole)  -       Row Name 11/23/23 1455          Home Main Entrance    Number of Stairs, Main Entrance none  -       Row Name 11/23/23 1455          Stairs Within Home, Primary    Number of Stairs, Within Home, Primary none  -       Row Name 11/23/23 1455          Cognition    Orientation Status (Cognition) oriented x 4  -       Row Name 11/23/23 1455          Safety Issues, Functional Mobility    Safety Issues Affecting Function (Mobility) impulsivity;awareness of need for assistance;insight into deficits/self-awareness;safety precaution awareness;safety precautions follow-through/compliance  -     Impairments Affecting Function (Mobility) balance;endurance/activity  tolerance;shortness of breath  -               User Key  (r) = Recorded By, (t) = Taken By, (c) = Cosigned By      Initials Name Provider Type    Ricki Calle, PT Physical Therapist                   Mobility       Row Name 11/23/23 1456          Bed Mobility    Bed Mobility supine-sit  -MK     Supine-Sit Imperial (Bed Mobility) modified independence  -       Row Name 11/23/23 1456          Sit-Stand Transfer    Sit-Stand [place] (Transfers) contact guard  -     Assistive Device (Sit-Stand Transfers) other (see comments)  gait belt  -       Row Name 11/23/23 1456          Gait/Stairs (Locomotion)    Imperial Level (Gait) contact guard  -     Assistive Device (Gait) other (see comments)  gait belt  -     Patient was able to Ambulate yes  -     Distance in Feet (Gait) 20 ft x 6  -MK               User Key  (r) = Recorded By, (t) = Taken By, (c) = Cosigned By      Initials Name Provider Type    Ricki Calle, PT Physical Therapist                   Obj/Interventions       Row Name 11/23/23 1458          Range of Motion Comprehensive    General Range of Motion bilateral lower extremity ROM WFL  -       Row Name 11/23/23 1458          Strength Comprehensive (MMT)    General Manual Muscle Testing (MMT) Assessment no strength deficits identified  -       Row Name 11/23/23 1458          Balance    Balance Assessment sitting static balance;sitting dynamic balance;sit to stand dynamic balance;standing static balance;standing dynamic balance  -     Static Sitting Balance modified independence  -     Dynamic Sitting Balance modified independence  -     Position, Sitting Balance unsupported;sitting edge of bed  -     Sit to Stand Dynamic Balance contact guard  -MK     Static Standing Balance contact guard  -     Dynamic Standing Balance contact guard  -     Position/Device Used, Standing Balance other (see comments)  gait belt  -     Balance Interventions sitting;standing;sit  to stand  -MK               User Key  (r) = Recorded By, (t) = Taken By, (c) = Cosigned By      Initials Name Provider Type    Ricki Calle, PT Physical Therapist                   Goals/Plan       Row Name 11/23/23 1501          Bed Mobility Goal 1 (PT)    Activity/Assistive Device (Bed Mobility Goal 1, PT) sit to supine;supine to sit  -MK     Waldorf Level/Cues Needed (Bed Mobility Goal 1, PT) independent  -MK     Time Frame (Bed Mobility Goal 1, PT) long term goal (LTG);10 days  -MK     Progress/Outcomes (Bed Mobility Goal 1, PT) goal ongoing  -       Row Name 11/23/23 1501          Transfer Goal 1 (PT)    Activity/Assistive Device (Transfer Goal 1, PT) sit-to-stand/stand-to-sit  -MK     Waldorf Level/Cues Needed (Transfer Goal 1, PT) independent  -MK     Time Frame (Transfer Goal 1, PT) long term goal (LTG);10 days  -MK     Progress/Outcome (Transfer Goal 1, PT) goal ongoing  -       Row Name 11/23/23 1501          Gait Training Goal 1 (PT)    Activity/Assistive Device (Gait Training Goal 1, PT) gait (walking locomotion)  -MK     Waldorf Level (Gait Training Goal 1, PT) independent  -MK     Distance (Gait Training Goal 1, PT) 150 ft  -MK     Time Frame (Gait Training Goal 1, PT) long term goal (LTG);10 days  -MK     Progress/Outcome (Gait Training Goal 1, PT) goal ongoing  -       Row Name 11/23/23 1501          Patient Education Goal (PT)    Activity (Patient Education Goal, PT) pt to participate in B LE ther ex program at least 2x per week  -MK     Waldorf/Cues/Accuracy (Memory Goal 2, PT) demonstrates adequately  -MK     Time Frame (Patient Education Goal, PT) long term goal (LTG);10 days  -MK     Progress/Outcome (Patient Education Goal, PT) goal ongoing  -       Row Name 11/23/23 1501          Therapy Assessment/Plan (PT)    Planned Therapy Interventions (PT) balance training;bed mobility training;gait training;home exercise program;neuromuscular re-education;patient/family  education;postural re-education;strengthening;transfer training  -               User Key  (r) = Recorded By, (t) = Taken By, (c) = Cosigned By      Initials Name Provider Type     Ricki Fonseca, PT Physical Therapist                   Clinical Impression       Row Name 11/23/23 1458          Pain    Pretreatment Pain Rating 3/10  -     Posttreatment Pain Rating 3/10  -     Pain Location - back  -     Pain Intervention(s) Repositioned;Ambulation/increased activity  -     Additional Documentation Pain Scale: Numbers Pre/Post-Treatment (Group)  -       Row Name 11/23/23 1458          Plan of Care Review    Plan of Care Reviewed With patient  -     Progress no change  -     Outcome Evaluation Pt was very pleasant this date and participated well in PT evaluation. Pt supine upon arrival but agreeable to PT. pt came from supine to sitting EOB and sat mod I for multiple minutes. Pt completed STS CGA and ambulated 20 ft x 6 with no device. Pt maintained O2 sats above 90% during entirety of session. Pt returned to room and was seated in bedside chair with all needs met. Pt would benefit from skilled PT during this inpatient stay to maximize functional mobility, independence and endurance.  -       Row Name 11/23/23 1458          Therapy Assessment/Plan (PT)    Rehab Potential (PT) good, to achieve stated therapy goals  -     Criteria for Skilled Interventions Met (PT) yes;meets criteria  -     Therapy Frequency (PT) 3 times/wk  -     Predicted Duration of Therapy Intervention (PT) 2 weeks  -       Row Name 11/23/23 1458          Vital Signs    Pre SpO2 (%) 94  -MK     O2 Delivery Pre Treatment supplemental O2  2 L  -MK     Intra SpO2 (%) 92  -MK     O2 Delivery Intra Treatment supplemental O2  2 L  -MK     Post SpO2 (%) 96  -MK     O2 Delivery Post Treatment supplemental O2  2 L  -MK     Pre Patient Position Supine  -MK     Intra Patient Position Standing  -MK     Post Patient Position Sitting   -       Row Name 11/23/23 1458          Positioning and Restraints    Pre-Treatment Position in bed  -     Post Treatment Position chair  -     In Chair reclined;call light within reach;encouraged to call for assist;exit alarm on  -               User Key  (r) = Recorded By, (t) = Taken By, (c) = Cosigned By      Initials Name Provider Type    Ricki Calle, BRITNI Physical Therapist                   Outcome Measures       Row Name 11/23/23 1502 11/23/23 0800       How much help from another person do you currently need...    Turning from your back to your side while in flat bed without using bedrails? 4  - 4  -CS    Moving from lying on back to sitting on the side of a flat bed without bedrails? 4  - 4  -CS    Moving to and from a bed to a chair (including a wheelchair)? 4  - 4  -CS    Standing up from a chair using your arms (e.g., wheelchair, bedside chair)? 4  - 4  -CS    Climbing 3-5 steps with a railing? 3  - 3  -CS    To walk in hospital room? 4  - 3  -CS    AM-PAC 6 Clicks Score (PT) 23  - 22  -CS    Highest Level of Mobility Goal 7 --> Walk 25 feet or more  - 7 --> Walk 25 feet or more  -      Row Name 11/23/23 1502          Functional Assessment    Outcome Measure Options AM-PAC 6 Clicks Basic Mobility (PT)  -               User Key  (r) = Recorded By, (t) = Taken By, (c) = Cosigned By      Initials Name Provider Type    Norma Calabrese LPN Licensed Nurse    Ricki Calle, BRITNI Physical Therapist                                 Physical Therapy Education       Title: PT OT SLP Therapies (In Progress)       Topic: Physical Therapy (In Progress)       Point: Mobility training (Done)       Learning Progress Summary             Patient Acceptance, E,D, VU,DU by  at 11/23/2023 1503                         Point: Home exercise program (Not Started)       Learner Progress:  Not documented in this visit.              Point: Body mechanics (Not Started)       Learner Progress:   Not documented in this visit.              Point: Precautions (Not Started)       Learner Progress:  Not documented in this visit.                              User Key       Initials Effective Dates Name Provider Type Discipline     06/13/23 -  Ricki Fonseca, PT Physical Therapist PT                  PT Recommendation and Plan  Planned Therapy Interventions (PT): balance training, bed mobility training, gait training, home exercise program, neuromuscular re-education, patient/family education, postural re-education, strengthening, transfer training  Plan of Care Reviewed With: patient  Progress: no change  Outcome Evaluation: Pt was very pleasant this date and participated well in PT evaluation. Pt supine upon arrival but agreeable to PT. pt came from supine to sitting EOB and sat mod I for multiple minutes. Pt completed STS CGA and ambulated 20 ft x 6 with no device. Pt maintained O2 sats above 90% during entirety of session. Pt returned to room and was seated in bedside chair with all needs met. Pt would benefit from skilled PT during this inpatient stay to maximize functional mobility, independence and endurance.     Time Calculation:   PT Evaluation Complexity  History, PT Evaluation Complexity: 1-2 personal factors and/or comorbidities  Examination of Body Systems (PT Eval Complexity): 1-2 elements  Clinical Presentation (PT Evaluation Complexity): evolving  Clinical Decision Making (PT Evaluation Complexity): low complexity  Overall Complexity (PT Evaluation Complexity): low complexity     PT Charges       Row Name 11/23/23 1405             Time Calculation    Start Time 1405  -MK      PT Received On 11/23/23  -MK      PT Goal Re-Cert Due Date 12/03/23  -MK         Untimed Charges    PT Eval/Re-eval Minutes 39  -MK         Total Minutes    Untimed Charges Total Minutes 39  -MK       Total Minutes 39  -MK                User Key  (r) = Recorded By, (t) = Taken By, (c) = Cosigned By      Initials Name  Provider Type     Ricki Fonseca, PT Physical Therapist                  Therapy Charges for Today       Code Description Service Date Service Provider Modifiers Qty    [id number] HC PT EVAL LOW COMPLEXITY 3 11/23/2023 Ricki Fonseca, PT GP 1            PT G-Codes  Outcome Measure Options: AM-PAC 6 Clicks Basic Mobility (PT)  AM-PAC 6 Clicks Score (PT): 23  PT Discharge Summary  Anticipated Discharge Disposition (PT): home    Ricki Fonseca PT  11/23/2023

## 2023-11-24 LAB
ANION GAP SERPL CALCULATED.3IONS-SCNC: 7 MMOL/L (ref 5–15)
BASOPHILS # BLD AUTO: 0.04 10*3/MM3 (ref 0–0.2)
BASOPHILS NFR BLD AUTO: 0.4 % (ref 0–1.5)
BUN SERPL-MCNC: 21 MG/DL (ref 8–23)
BUN/CREAT SERPL: 21.6 (ref 7–25)
CALCIUM SPEC-SCNC: 8.6 MG/DL (ref 8.6–10.5)
CHLORIDE SERPL-SCNC: 105 MMOL/L (ref 98–107)
CO2 SERPL-SCNC: 27 MMOL/L (ref 22–29)
CREAT SERPL-MCNC: 0.97 MG/DL (ref 0.57–1)
DEPRECATED RDW RBC AUTO: 47.8 FL (ref 37–54)
EGFRCR SERPLBLD CKD-EPI 2021: 64.6 ML/MIN/1.73
EOSINOPHIL # BLD AUTO: 0.54 10*3/MM3 (ref 0–0.4)
EOSINOPHIL NFR BLD AUTO: 5.9 % (ref 0.3–6.2)
ERYTHROCYTE [DISTWIDTH] IN BLOOD BY AUTOMATED COUNT: 14.1 % (ref 12.3–15.4)
GLUCOSE SERPL-MCNC: 99 MG/DL (ref 65–99)
HCT VFR BLD AUTO: 32.9 % (ref 34–46.6)
HGB BLD-MCNC: 10.1 G/DL (ref 12–15.9)
IMM GRANULOCYTES # BLD AUTO: 0.05 10*3/MM3 (ref 0–0.05)
IMM GRANULOCYTES NFR BLD AUTO: 0.5 % (ref 0–0.5)
LYMPHOCYTES # BLD AUTO: 1.46 10*3/MM3 (ref 0.7–3.1)
LYMPHOCYTES NFR BLD AUTO: 16 % (ref 19.6–45.3)
MCH RBC QN AUTO: 28.3 PG (ref 26.6–33)
MCHC RBC AUTO-ENTMCNC: 30.7 G/DL (ref 31.5–35.7)
MCV RBC AUTO: 92.2 FL (ref 79–97)
MONOCYTES # BLD AUTO: 0.48 10*3/MM3 (ref 0.1–0.9)
MONOCYTES NFR BLD AUTO: 5.3 % (ref 5–12)
NEUTROPHILS NFR BLD AUTO: 6.55 10*3/MM3 (ref 1.7–7)
NEUTROPHILS NFR BLD AUTO: 71.9 % (ref 42.7–76)
NRBC BLD AUTO-RTO: 0 /100 WBC (ref 0–0.2)
PLATELET # BLD AUTO: 273 10*3/MM3 (ref 140–450)
PMV BLD AUTO: 11.6 FL (ref 6–12)
POTASSIUM SERPL-SCNC: 4.2 MMOL/L (ref 3.5–5.2)
RBC # BLD AUTO: 3.57 10*6/MM3 (ref 3.77–5.28)
SODIUM SERPL-SCNC: 139 MMOL/L (ref 136–145)
WBC NRBC COR # BLD AUTO: 9.12 10*3/MM3 (ref 3.4–10.8)

## 2023-11-24 PROCEDURE — 97165 OT EVAL LOW COMPLEX 30 MIN: CPT

## 2023-11-24 PROCEDURE — 94799 UNLISTED PULMONARY SVC/PX: CPT

## 2023-11-24 PROCEDURE — 25010000002 CEFEPIME-DEXTROSE 2-5 GM-%(50ML) RECONSTITUTED SOLUTION: Performed by: INTERNAL MEDICINE

## 2023-11-24 PROCEDURE — 94664 DEMO&/EVAL PT USE INHALER: CPT

## 2023-11-24 PROCEDURE — 80048 BASIC METABOLIC PNL TOTAL CA: CPT | Performed by: INTERNAL MEDICINE

## 2023-11-24 PROCEDURE — 99232 SBSQ HOSP IP/OBS MODERATE 35: CPT | Performed by: INTERNAL MEDICINE

## 2023-11-24 PROCEDURE — 94761 N-INVAS EAR/PLS OXIMETRY MLT: CPT

## 2023-11-24 PROCEDURE — 25010000002 ENOXAPARIN PER 10 MG: Performed by: INTERNAL MEDICINE

## 2023-11-24 PROCEDURE — 85025 COMPLETE CBC W/AUTO DIFF WBC: CPT | Performed by: INTERNAL MEDICINE

## 2023-11-24 RX ORDER — L.ACID,PARA/B.BIFIDUM/S.THERM 8B CELL
1 CAPSULE ORAL 2 TIMES DAILY
Status: DISCONTINUED | OUTPATIENT
Start: 2023-11-24 | End: 2023-11-25 | Stop reason: HOSPADM

## 2023-11-24 RX ADMIN — Medication 1 CAPSULE: at 21:10

## 2023-11-24 RX ADMIN — PRAMIPEXOLE DIHYDROCHLORIDE 1 MG: 1 TABLET ORAL at 21:10

## 2023-11-24 RX ADMIN — Medication 1 CAPSULE: at 09:04

## 2023-11-24 RX ADMIN — DOCUSATE SODIUM 50MG AND SENNOSIDES 8.6MG 2 TABLET: 8.6; 5 TABLET, FILM COATED ORAL at 21:09

## 2023-11-24 RX ADMIN — CEFEPIME HYDROCHLORIDE 2000 MG: 2 INJECTION, SOLUTION INTRAVENOUS at 11:21

## 2023-11-24 RX ADMIN — DOCUSATE SODIUM 50MG AND SENNOSIDES 8.6MG 2 TABLET: 8.6; 5 TABLET, FILM COATED ORAL at 09:03

## 2023-11-24 RX ADMIN — FLUOXETINE 40 MG: 20 CAPSULE ORAL at 09:03

## 2023-11-24 RX ADMIN — BUDESONIDE AND FORMOTEROL FUMARATE DIHYDRATE 2 PUFF: 160; 4.5 AEROSOL RESPIRATORY (INHALATION) at 07:02

## 2023-11-24 RX ADMIN — Medication 10 ML: at 09:04

## 2023-11-24 RX ADMIN — ATORVASTATIN CALCIUM 20 MG: 20 TABLET, FILM COATED ORAL at 09:04

## 2023-11-24 RX ADMIN — LEVOTHYROXINE SODIUM 50 MCG: 50 TABLET ORAL at 05:49

## 2023-11-24 RX ADMIN — TRAZODONE HYDROCHLORIDE 100 MG: 50 TABLET ORAL at 21:09

## 2023-11-24 RX ADMIN — PANTOPRAZOLE SODIUM 40 MG: 40 TABLET, DELAYED RELEASE ORAL at 05:49

## 2023-11-24 RX ADMIN — Medication 10 ML: at 21:09

## 2023-11-24 RX ADMIN — GABAPENTIN 800 MG: 400 CAPSULE ORAL at 14:59

## 2023-11-24 RX ADMIN — ENOXAPARIN SODIUM 40 MG: 100 INJECTION SUBCUTANEOUS at 21:09

## 2023-11-24 RX ADMIN — ENOXAPARIN SODIUM 40 MG: 100 INJECTION SUBCUTANEOUS at 09:03

## 2023-11-24 RX ADMIN — GABAPENTIN 800 MG: 400 CAPSULE ORAL at 05:49

## 2023-11-24 RX ADMIN — BUDESONIDE AND FORMOTEROL FUMARATE DIHYDRATE 2 PUFF: 160; 4.5 AEROSOL RESPIRATORY (INHALATION) at 19:35

## 2023-11-24 RX ADMIN — ASPIRIN 81 MG CHEWABLE TABLET 81 MG: 81 TABLET CHEWABLE at 09:04

## 2023-11-24 RX ADMIN — FUROSEMIDE 40 MG: 40 TABLET ORAL at 09:04

## 2023-11-24 RX ADMIN — CEFEPIME HYDROCHLORIDE 2000 MG: 2 INJECTION, SOLUTION INTRAVENOUS at 02:31

## 2023-11-24 RX ADMIN — MONTELUKAST 10 MG: 10 TABLET, FILM COATED ORAL at 21:10

## 2023-11-24 RX ADMIN — CEFEPIME HYDROCHLORIDE 2000 MG: 2 INJECTION, SOLUTION INTRAVENOUS at 18:15

## 2023-11-24 RX ADMIN — GABAPENTIN 800 MG: 400 CAPSULE ORAL at 21:09

## 2023-11-24 RX ADMIN — TAMSULOSIN HYDROCHLORIDE 0.4 MG: 0.4 CAPSULE ORAL at 09:03

## 2023-11-24 NOTE — PLAN OF CARE
Goal Outcome Evaluation:                   Problem: Adjustment to Illness (Sepsis/Septic Shock)  Goal: Optimal Coping  Outcome: Ongoing, Progressing  Intervention: Optimize Psychosocial Adjustment to Illness  Description: Acknowledge, normalize, validate intensity and complexity of patient and support system response to situation.  Provide opportunity for expression of thoughts, feelings and concerns; respond with compassion and reassurance.  Decrease stress and anxiety by providing information about patient’s status and treatment.  Facilitate support system presence and participation in care; consider providing a diary in intensive care situation.  Support coping by recognizing current coping strategies; provide aid in developing new strategies.  Acknowledge and normalize difficulty in managing life-long lifestyle changes and expectations.  Assess and monitor for signs and symptoms of psychologic distress, anxiety and depression.  Consider palliative care consult for goals of care conversation, if the condition is worsening despite treatment.  Recent Flowsheet Documentation  Taken 11/24/2023 0800 by Carol Elaine RN  Family/Support System Care:   support provided   self-care encouraged     Problem: Bleeding (Sepsis/Septic Shock)  Goal: Absence of Bleeding  Outcome: Ongoing, Progressing     Problem: Glycemic Control Impaired (Sepsis/Septic Shock)  Goal: Blood Glucose Level Within Desired Range  Outcome: Ongoing, Progressing     Problem: Infection Progression (Sepsis/Septic Shock)  Goal: Absence of Infection Signs and Symptoms  Outcome: Ongoing, Progressing  Intervention: Initiate Sepsis Management  Description: Provide fluid therapy, such as crystalloid or albumin, to increase intravascular volume, organ perfusion and oxygen delivery.  Provide respiratory support, such as oxygen therapy, noninvasive or invasive positive pressure ventilation, to achieve oxygenation and ventilation goal; avoid  hyperoxemia.  Obtain cultures prior to initiating antimicrobial therapy when possible. Do not delay for laboratory results in the presence of high suspicion or clinical indicators.  Administer intravenous broad-spectrum antimicrobial therapy promptly.  Implement hemodynamic monitoring to guide intravascular support based on individual targeted parameters.  Determine and address underlying source of infection aggressively; implement transmission-based precautions and isolation, as indicated.  Recent Flowsheet Documentation  Taken 11/24/2023 1600 by Carol Elaine RN  Infection Prevention:   environmental surveillance performed   equipment surfaces disinfected   hand hygiene promoted   personal protective equipment utilized   rest/sleep promoted   single patient room provided   visitors restricted/screened  Taken 11/24/2023 1400 by Carol Elaine RN  Infection Prevention:   environmental surveillance performed   equipment surfaces disinfected   hand hygiene promoted   personal protective equipment utilized   rest/sleep promoted   single patient room provided   visitors restricted/screened  Taken 11/24/2023 1200 by Carol Elaine RN  Infection Prevention:   environmental surveillance performed   equipment surfaces disinfected   hand hygiene promoted   personal protective equipment utilized   rest/sleep promoted   single patient room provided   visitors restricted/screened  Taken 11/24/2023 0800 by Carol Elaine RN  Infection Prevention:   environmental surveillance performed   equipment surfaces disinfected   hand hygiene promoted   personal protective equipment utilized   rest/sleep promoted   single patient room provided   visitors restricted/screened  Intervention: Promote Recovery  Description: Encourage pulmonary hygiene, such as cough-enhancement and airway-clearance techniques, that may include use of incentive spirometry, deep breathing and cough.  Encourage early rehabilitation and  physical activity to optimize functional ability and activity tolerance, as well as minimize delirium.  Promote energy conservation; minimize oxygen demand and consumption by adjusting environment, decreasing stimulation, maintaining normothermia and treating pain.  Optimize fluid balance, nutrition intake, sleep and glycemic control to maintain tissue perfusion and enhance immune response.  Recent Flowsheet Documentation  Taken 11/24/2023 1600 by Carol Elaine RN  Activity Management: activity encouraged  Taken 11/24/2023 1400 by Carol Elaine RN  Activity Management: activity encouraged  Taken 11/24/2023 1200 by Carol Elaine RN  Activity Management: activity encouraged  Taken 11/24/2023 0800 by Carol Elaine RN  Activity Management: activity encouraged     Problem: Nutrition Impaired (Sepsis/Septic Shock)  Goal: Optimal Nutrition Intake  Outcome: Ongoing, Progressing     Problem: Diabetes Comorbidity  Goal: Blood Glucose Level Within Targeted Range  Outcome: Ongoing, Progressing     Problem: Heart Failure Comorbidity  Goal: Maintenance of Heart Failure Symptom Control  Outcome: Ongoing, Progressing  Intervention: Maintain Heart Failure-Management  Description: Evaluate adherence to home heart failure self-care regimen (e.g., medication, fluid balance, sodium intake, daily weight, physical activity, telemonitoring, support).  Advocate continuation of home medication and schedule.  Consider pharmacologic therapy administration time and effects (e.g., avoid giving diuretic prior to bedtime or nitrates on empty stomach).  Monitor response to pharmacologic therapy, including weight fluctuations, blood pressure and electrolyte levels.  Monitor for signs and symptoms of anxiety and depression, including severity and duration; if present, provide psychosocial support.  Consider need for heart failure clinic or palliative care consult.  Recent Flowsheet Documentation  Taken 11/24/2023 1600 by  Carol Elaine RN  Medication Review/Management: medications reviewed  Taken 11/24/2023 1400 by Carol Elaine RN  Medication Review/Management: medications reviewed  Taken 11/24/2023 1200 by Carol Elaine RN  Medication Review/Management: medications reviewed  Taken 11/24/2023 0800 by Carol Elaine RN  Medication Review/Management: medications reviewed     Problem: Hypertension Comorbidity  Goal: Blood Pressure in Desired Range  Outcome: Ongoing, Progressing  Intervention: Maintain Blood Pressure Management  Description: Evaluate adherence to home antihypertensive regimen (e.g., exercise and activity, diet modification, medication).  Provide scheduled antihypertensive medication; consider administration time and effects (e.g., avoid giving diuretic prior to bedtime).  Monitor response to antihypertensive medication therapy (e.g., blood pressure, electrolyte levels, medication effects).  Minimize risk of orthostatic hypotension; encourage caution with position changes, particularly if elderly.  Recent Flowsheet Documentation  Taken 11/24/2023 1600 by Carol Elaine RN  Medication Review/Management: medications reviewed  Taken 11/24/2023 1400 by Carol Elaine RN  Medication Review/Management: medications reviewed  Taken 11/24/2023 1200 by Carol Elaine RN  Medication Review/Management: medications reviewed  Taken 11/24/2023 0800 by Carol Elaine RN  Medication Review/Management: medications reviewed     Problem: Obstructive Sleep Apnea Risk or Actual Comorbidity Management  Goal: Unobstructed Breathing During Sleep  Outcome: Ongoing, Progressing     Problem: Osteoarthritis Comorbidity  Goal: Maintenance of Osteoarthritis Symptom Control  Outcome: Ongoing, Progressing  Intervention: Maintain Osteoarthritis Symptom Control  Description: Evaluate adherence to self-management plan, such as medication, exercise and weight management.  Advocate for continuation of home  regimen, such as medication, physical activity and thermal agents; monitor response.  Encourage participation in functional activities, such as mobility and ADLs (activities of daily living) to minimize decline associated with inactivity.  Facilitate use of patient-specific assistive devices, equipment or orthoses.  Evaluate effectiveness of coping skills; encourage expression of feelings, expectations and concerns related to disease management and quality of life; reinforce education to enhance management plan and wellbeing.  Recent Flowsheet Documentation  Taken 11/24/2023 1600 by Carol Elaine RN  Activity Management: activity encouraged  Medication Review/Management: medications reviewed  Taken 11/24/2023 1400 by Carol Elaine RN  Activity Management: activity encouraged  Medication Review/Management: medications reviewed  Taken 11/24/2023 1200 by Carol Elaine RN  Activity Management: activity encouraged  Medication Review/Management: medications reviewed  Taken 11/24/2023 0800 by Carol Elaine RN  Activity Management: activity encouraged  Medication Review/Management: medications reviewed     Problem: Fall Injury Risk  Goal: Absence of Fall and Fall-Related Injury  Outcome: Ongoing, Progressing  Intervention: Identify and Manage Contributors  Description: Develop a fall prevention plan with the patient and caregiver/family.  Provide reorientation, appropriate sensory stimulation and routines with changes in mental status to decrease risk of fall.  Promote use of personal vision and auditory aids.  Assess assistance level required for safe and effective self-care; provide support as needed, such as toileting, mobilization. For age 65 and older, implement timed toileting with assistance.  Encourage physical activity, such as performance of mobility and self-care at highest level of patient ability, multicomponent exercise program and provision of appropriate assistive devices.  If  fall occurs, assess the severity of injury; implement fall injury protocol. Determine the cause and revise fall injury prevention plan.  Regularly review medication contribution to fall risk; adjust medication administration times to minimize risk of falling.  Consider risk related to polypharmacy and age.  Balance adequate pain management with potential for oversedation.  Recent Flowsheet Documentation  Taken 11/24/2023 1600 by Carol Elaine RN  Medication Review/Management: medications reviewed  Taken 11/24/2023 1400 by Carol Elaine RN  Medication Review/Management: medications reviewed  Taken 11/24/2023 1200 by Carol Elaine RN  Medication Review/Management: medications reviewed  Taken 11/24/2023 0800 by Carol Elaine RN  Medication Review/Management: medications reviewed  Intervention: Promote Injury-Free Environment  Description: Provide a safe, barrier-free environment that encourages independent activity.  Keep care area uncluttered and well-lighted.  Determine need for increased observation or monitoring.  Avoid use of devices that minimize mobility, such as restraints or indwelling urinary catheter.  Recent Flowsheet Documentation  Taken 11/24/2023 1600 by Carol Elaine RN  Safety Promotion/Fall Prevention:   safety round/check completed   room organization consistent   nonskid shoes/slippers when out of bed   fall prevention program maintained   clutter free environment maintained   assistive device/personal items within reach   activity supervised  Taken 11/24/2023 1400 by Carol Elaine RN  Safety Promotion/Fall Prevention:   safety round/check completed   room organization consistent   nonskid shoes/slippers when out of bed   fall prevention program maintained   clutter free environment maintained   assistive device/personal items within reach   activity supervised  Taken 11/24/2023 1200 by Carol Elaine RN  Safety Promotion/Fall Prevention:   safety  round/check completed   room organization consistent   nonskid shoes/slippers when out of bed   fall prevention program maintained   clutter free environment maintained   assistive device/personal items within reach   activity supervised  Taken 11/24/2023 0800 by Carol Elaine RN  Safety Promotion/Fall Prevention:   safety round/check completed   room organization consistent   nonskid shoes/slippers when out of bed   fall prevention program maintained   clutter free environment maintained   assistive device/personal items within reach   activity supervised

## 2023-11-24 NOTE — PLAN OF CARE
Goal Outcome Evaluation:      PT LYING IN BED RESTING COMFORTABLY THIS SHIFT. NO ACUTE EVENTS NOTED OVERNIGHT. VSS. WILL CONTINUE TO MONITOR.

## 2023-11-24 NOTE — PROGRESS NOTES
[facility]    Name:  Jessica Flowers   Age:  68 y.o.  Sex:  female  :  1955  MRN:  [id number]   Visit Number:  [id number]  Admission Date:  2023  Date Of Service:  23  Primary Care Physician:  Karley Jolly APRN     LOS: 2 days :    Chief Complaint:      Follow-up of shortness of breath.    Subjective:    Ms. Flowers was seen and examined this morning.  She is currently lying down in the bed and is comfortable at rest.  She is currently on 2 L of nasal cannula oxygen.  Denies any chest pain and her shortness of breath has improved.  She has been able to walk to the bathroom.  No significant overnight events.  Previous physician documentation, laboratory and imaging data have been reviewed.    Hospital Course:    Ms. Flowers is a chronically ill 68-year-old female with history significant for HFpEF, morbid obesity, hypertension who presents to the emergency room with progressively worsening weakness and shortness of breath that began approximately 2 days ago.      On arrival to the ER, patient slightly febrile with Tmax 100.5 Fahrenheit, tachycardic with heart rate 141.  Normotensive and hypoxic requiring 4 L nasal cannula.  Patient is not on supplemental oxygen at baseline.  Troponin and proBNP within normal limits.  CMP unremarkable except for glucose 164.  CRP 19, lactate normal, procalcitonin 2.32.  D-dimer 2.35.  WBC 13.  COVID and flu negative.  CTA of the chest is negative for PE, no evidence of aortic dissection or aneurysm.  Does show findings of cardiomegaly with trace to small pericardial effusion.  Small bilateral pleural effusions.  There is also bilateral diffuse peribronchovascular interstitial thickening and peripheral interlobular septal thickening.  Bilateral multifocal subsegmental atelectasis with background parenchymal scarring.    Patient was admitted to the medical floor with telemetry and was continued on IV antibiotics therapy  with ceftriaxone.  She was placed on bronchodilators and mucolytic agents.  She was also continued on home dose of Lasix.  Recent echocardiogram from 3/26/2023 had revealed ejection fraction of 62% with grade 1 diastolic dysfunction.  Due to worsening leukocytosis her ceftriaxone was switched to cefepime the next day.  Her leukocytosis improved and she was initiated on physical therapy.    Review of Systems:     All systems were reviewed and negative except as mentioned in subjective, assessment and plan.    Vital Signs:    Temp:  [97.7 °F (36.5 °C)-98.3 °F (36.8 °C)] 98.3 °F (36.8 °C)  Heart Rate:  [70-87] 70  Resp:  [18] 18  BP: (109-125)/(67-91) 118/79    Intake and output:    I/O last 3 completed shifts:  In: 240 [P.O.:240]  Out: 2400 [Urine:2400]  I/O this shift:  In: 480 [P.O.:480]  Out: -     Physical Examination:    General Appearance:  Alert and cooperative.    Head:  Atraumatic and normocephalic.   Eyes: Conjunctivae and sclerae normal, no icterus. No pallor.   Throat: No oral lesions, no thrush, oral mucosa moist.   Neck: Supple, trachea midline, no thyromegaly.   Lungs:   Breath sounds heard bilaterally equally.  No wheezing or crackles. No Pleural rub or bronchial breathing.   Heart:  Normal S1 and S2, no murmur, no gallop, no rub. No JVD.   Abdomen:   Normal bowel sounds, no masses, no organomegaly. Soft, nontender, obese, no rebound tenderness.   Extremities: Supple, 1+ pitting ankle edema, no cyanosis, no clubbing.   Skin: No bleeding or rash.   Neurologic: Alert and oriented x 3. No facial asymmetry. Moves all four limbs. No tremors.      Laboratory results:    Results from last 7 days   Lab Units 11/24/23  0618 11/23/23  0554 11/22/23  1104   SODIUM mmol/L 139 139 139   POTASSIUM mmol/L 4.2 3.9 3.9   CHLORIDE mmol/L 105 104 105   CO2 mmol/L 27.0 25.6 25.4   BUN mg/dL 21 17 12   CREATININE mg/dL 0.97 0.96 0.90   CALCIUM mg/dL 8.6 8.6 8.8   BILIRUBIN mg/dL  --   --  1.7*   ALK PHOS U/L  --   --  74    ALT (SGPT) U/L  --   --  7   AST (SGOT) U/L  --   --  10   GLUCOSE mg/dL 99 154* 164*     Results from last 7 days   Lab Units 11/24/23  0618 11/23/23  0638 11/22/23  1104   WBC 10*3/mm3 9.12 18.75* 13.03*   HEMOGLOBIN g/dL 10.1* 9.4* 10.6*   HEMATOCRIT % 32.9* 30.2* 34.0   PLATELETS 10*3/mm3 273 277 265         Results from last 7 days   Lab Units 11/22/23  1104   HSTROP T ng/L 12     Results from last 7 days   Lab Units 11/22/23  1325 11/22/23  1319   BLOODCX  No growth at 2 days No growth at 2 days     Recent Labs     03/25/23  0511 03/30/23  0734   PHART 7.384 7.509*   POI5DIB 43.2 41.3   PO2ART 64.9* 146.0*   YBY8EXY 25.8 32.9*   BASEEXCESS 0.5 9.0*      I have reviewed the patient's laboratory results.    Radiology results:    No radiology results from the last 24 hrs    I have reviewed the patient's radiology reports.    Medication Review:     I have reviewed the patient's active and prn medications.     Problem List:      Acute hypoxic respiratory failure    Assessment:    Acute hypoxic respiratory failure, secondary to #2, POA.  Sepsis secondary to bacterial pneumonia without risk factors for MDRO's, POA.  HFpEF with acute exacerbation, POA  Bilateral pleural effusions/pulmonary edema, secondary to #3, POA.  Essential hypertension.  Hyperlipidemia.  Depression/anxiety.  Morbid obesity with a BMI of 49.  Impaired mobility and ADLs.    Plan:    Respiratory failure/bacterial pneumonia.  - Continue IV antibody therapy with cefepime.  - Continue bronchodilators and Symbicort.  - We will place her on lactobacillus supplements.  - Blood cultures have been negative so far.  - Nasal swab for MRSA and urine Legionella and strep pneumo antigens have been negative.    Chronic diastolic heart failure with exacerbation.  - Continue home dose of Lasix 40 mg daily.  - She does have small bilateral pleural effusions, no intervention at this time.    Continue physical and occupational therapy.  Enoxaparin    DVT  Prophylaxis: Enoxaparin  Code Status: Full  Diet: Diabetic  Discharge Plan: Hopefully, home in 1 to 2 days.    Natalio Boyle MD  11/24/23  17:17 EST    Dictated utilizing Dragon dictation.

## 2023-11-24 NOTE — THERAPY DISCHARGE NOTE
Acute Care - Occupational Therapy Discharge  [facility]    Patient Name: Jessica Flowers  : 1955    MRN: [id number]                              Today's Date: 2023       Admit Date: 2023    Visit Dx:     ICD-10-CM ICD-9-CM   1. Acute hypoxic respiratory failure  J96.01 518.81   2. Multifocal pneumonia  J18.9 486     Patient Active Problem List   Diagnosis    Vitamin D deficiency    Psoriasis    Postmenopausal HRT (hormone replacement therapy)    Plantar fasciitis    OAB (overactive bladder)    Joint pain    Insomnia    Hypothyroid    Hypertension    Hyperlipidemia    GERD (gastroesophageal reflux disease)    Dyspnea on exertion    Fatigue    Depression    Allergic rhinitis    Anxiety    Glucose intolerance (impaired glucose tolerance)    Acute respiratory failure with hypoxia    Acute exacerbation of CHF (congestive heart failure)    Bacterial pneumonia    (HFpEF) heart failure with preserved ejection fraction    Pneumonia of both lower lobes due to infectious organism    Acute hypoxic respiratory failure     Past Medical History:   Diagnosis Date    Abnormal CXR     bronchitis poss, [facility]    Allergic rhinitis     uses inhalers prn, denies asthma    Anxiety     Carpal tunnel syndrome     Chronic narcotic use     HC    Depression     Dyspnea on exertion     Fatigue     GERD (gastroesophageal reflux disease)     on Prilosec + BID Zantac    Glucose intolerance (impaired glucose tolerance)     Hiatal hernia with gastroesophageal reflux     EGD GDW , 39 cm, path DE mild nonspec changes.  serum h. pyl neg    Hx MRSA infection      on abdomen, tx w/ Bactrim    Hyperlipidemia     Hypertension     Hypertriglyceridemia     Hypothyroid     Insomnia     uses Trazodone    Joint pain     Morbid obesity     OAB (overactive bladder)     tx w/ botox injections    Osteoarthritis of knees, bilateral     steroid injxns as above, supposed to get synvisc soon.  Says bone on bone, needs TKR, but ortho  surgeon wants her to have WLS first    Plantar fasciitis     Postmenopausal HRT (hormone replacement therapy)     Psoriasis     Vitamin D deficiency      Past Surgical History:   Procedure Laterality Date    APPENDECTOMY  1989    emergent, open    CARPAL TUNNEL RELEASE      (B)    CHOLECYSTECTOMY OPEN  1980    [facility]    DILATATION AND CURETTAGE  1990, 2015    GASTRIC SLEEVE LAPAROSCOPIC N/A 7/5/2017    Procedure: GASTRIC SLEEVE LAPAROSCOPIC, ;  Surgeon: Cj Gregg MD;  Location: [facility] OR;  Service:     LAPAROSCOPIC TUBAL LIGATION  1988    OTHER SURGICAL HISTORY      denies anesthesia issues    SD LAPS SURG ESOPG/GSTR FUNDOPLASTY N/A 7/5/2017    Procedure: HIATAL HERNIA REPAIR LAPAROSCOPIC;  Surgeon: Cj Gregg MD;  Location: [facility] OR;  Service: Bariatric      General Information       Row Name 11/24/23 1149          OT Time and Intention    Document Type discharge evaluation/summary  -SD     Mode of Treatment occupational therapy  -SD       Row Name 11/24/23 1149          General Information    Patient Profile Reviewed yes  -SD     Prior Level of Function independent:;all household mobility;community mobility;ADL's  Lives with 16 y.o grandson; has a standard walker that she didn't use, and a SC. 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  -SD     Existing Precautions/Restrictions oxygen therapy device and L/min  -SD     Barriers to Rehab none identified  -SD       Row Name 11/24/23 1149          Living Environment    People in Home grandchild(nicole)  -SD       Row Name 11/24/23 1149          Home Main Entrance    Number of Stairs, Main Entrance none  -SD       Row Name 11/24/23 1149          Stairs Within Home, Primary    Number of Stairs, Within Home, Primary none  -SD       Row Name 11/24/23 1149          Cognition    Orientation Status (Cognition) oriented x 4  -SD       Row Name 11/24/23 1149          Safety Issues, Functional Mobility    Safety Issues Affecting  Function (Mobility) safety precautions follow-through/compliance  -SD     Impairments Affecting Function (Mobility) endurance/activity tolerance;shortness of breath  -SD               User Key  (r) = Recorded By, (t) = Taken By, (c) = Cosigned By      Initials Name Provider Type    SD Cristin Metz OT Occupational Therapist                   Mobility/ADL's       Row Name 11/24/23 1156          Bed Mobility    Bed Mobility supine-sit  -SD     Supine-Sit [place] (Bed Mobility) modified independence  -SD     Assistive Device (Bed Mobility) bed rails;head of bed elevated  -SD       Row Name 11/24/23 1156          Transfers    Transfers sit-stand transfer  -SD       Row Name 11/24/23 1156          Sit-Stand Transfer    Sit-Stand [place] (Transfers) standby assist  -SD       Row Name 11/24/23 1156          Functional Mobility    Functional Mobility- Ind. Level standby assist  -SD     Functional Mobility-Distance (Feet) 150  -SD       Row Name 11/24/23 1156          Activities of Daily Living    BADL Assessment/Intervention bathing;upper body dressing;lower body dressing;grooming;feeding;toileting  -SD       Row Name 11/24/23 1156          Bathing Assessment/Intervention    [place] Level (Bathing) supervision  -SD       Row Name 11/24/23 1156          Upper Body Dressing Assessment/Training    [place] Level (Upper Body Dressing) set up  -SD       Row Name 11/24/23 1156          Lower Body Dressing Assessment/Training    [place] Level (Lower Body Dressing) supervision  -SD       Row Name 11/24/23 1156          Grooming Assessment/Training    [place] Level (Grooming) supervision  -SD       Row Name 11/24/23 1156          Self-Feeding Assessment/Training    [place] Level (Feeding) set up  -SD       Row Name 11/24/23 1156          Toileting Assessment/Training    [place] Level (Toileting) supervision  -SD               User Key  (r) = Recorded By, (t) = Taken By, (c) = Cosigned By       Initials Name Provider Type    Cristin Krishnamurthy OT Occupational Therapist                   Obj/Interventions       Row Name 11/24/23 1200          Range of Motion Comprehensive    General Range of Motion bilateral upper extremity ROM WFL  -SD       [facility] Name 11/24/23 1200          Strength Comprehensive (MMT)    General Manual Muscle Testing (MMT) Assessment upper extremity strength deficits identified  -SD     Comment, General Manual Muscle Testing (MMT) Assessment UB 4/5  -SD               User Key  (r) = Recorded By, (t) = Taken By, (c) = Cosigned By      Initials Name Provider Type    Cristin Krishnamurthy OT Occupational Therapist                   Goals/Plan    No documentation.                  Clinical Impression       [facility] Name 11/24/23 1201          Pain Assessment    Pretreatment Pain Rating 0/10 - no pain  -SD     Posttreatment Pain Rating 0/10 - no pain  -SD       [facility] Name 11/24/23 1201          Plan of Care Review    Plan of Care Reviewed With patient  -SD     Progress improving  -SD     Outcome Evaluation OT eval completed. Patient performed supine to sit with modified ind. Performed sit to stand with SBA, walked 150' without AD with SBA. O2 removed for mobility and desatted to 88%, placed patient back on 1L and returned to 92%. Patient able to perform ADLs with Sup overall. Patient plans to return home, no DC needs or concerns.  -SD       [facility] Name 11/24/23 1201          Therapy Assessment/Plan (OT)    Patient/Family Therapy Goal Statement (OT) home  -SD     Rehab Potential (OT) good, to achieve stated therapy goals  -SD     Criteria for Skilled Therapeutic Interventions Met (OT) no problems identified which require skilled intervention  -SD       [facility] Name 11/24/23 1201          Therapy Plan Review/Discharge Plan (OT)    Anticipated Discharge Disposition (OT) home with assist  -SD       [facility] Name 11/24/23 1201          Vital Signs    Pre SpO2 (%) 94  -SD     O2 Delivery Pre Treatment  supplemental O2  -SD     Intra SpO2 (%) 88  -SD     O2 Delivery Intra Treatment room air  -SD     Post SpO2 (%) 92  -SD     O2 Delivery Post Treatment supplemental O2  -SD       Row Name 11/24/23 1201          Positioning and Restraints    Pre-Treatment Position in bed  -SD     Post Treatment Position chair  -SD     In Chair sitting;call light within reach;encouraged to call for assist  -SD               User Key  (r) = Recorded By, (t) = Taken By, (c) = Cosigned By      Initials Name Provider Type    Cristin Krishnamurthy OT Occupational Therapist                   Outcome Measures       Row Name 11/24/23 1205          How much help from another is currently needed...    Putting on and taking off regular lower body clothing? 4  -SD     Bathing (including washing, rinsing, and drying) 3  -SD     Toileting (which includes using toilet bed pan or urinal) 3  -SD     Putting on and taking off regular upper body clothing 4  -SD     Taking care of personal grooming (such as brushing teeth) 4  -SD     Eating meals 4  -SD     AM-PAC 6 Clicks Score (OT) 22  -SD       Row Name 11/24/23 1205          Functional Assessment    Outcome Measure Options AM-PAC 6 Clicks Daily Activity (OT)  -SD               User Key  (r) = Recorded By, (t) = Taken By, (c) = Cosigned By      Initials Name Provider Type    Cristin Krishnamurthy OT Occupational Therapist                  Occupational Therapy Education       Title: PT OT SLP Therapies (In Progress)       Topic: Occupational Therapy (In Progress)       Point: ADL training (Done)       Description:   Instruct learner(s) on proper safety adaptation and remediation techniques during self care or transfers.   Instruct in proper use of assistive devices.                  Learning Progress Summary             Patient Acceptance, E,TB, VU by SD at 11/24/2023 1206    Comment: OT POC                         Point: Home exercise program (Not Started)       Description:   Instruct learner(s) on  appropriate technique for monitoring, assisting and/or progressing therapeutic exercises/activities.                  Learner Progress:  Not documented in this visit.              Point: Precautions (Not Started)       Description:   Instruct learner(s) on prescribed precautions during self-care and functional transfers.                  Learner Progress:  Not documented in this visit.              Point: Body mechanics (Not Started)       Description:   Instruct learner(s) on proper positioning and spine alignment during self-care, functional mobility activities and/or exercises.                  Learner Progress:  Not documented in this visit.                              User Key       Initials Effective Dates Name Provider Type Discipline    SD 06/16/21 -  Cristin Metz OT Occupational Therapist OT                  OT Recommendation and Plan     Plan of Care Review  Plan of Care Reviewed With: patient  Progress: improving  Outcome Evaluation: OT eval completed. Patient performed supine to sit with modified ind. Performed sit to stand with SBA, walked 150' without AD with SBA. O2 removed for mobility and desatted to 88%, placed patient back on 1L and returned to 92%. Patient able to perform ADLs with Sup overall. Patient plans to return home, no DC needs or concerns.  Plan of Care Reviewed With: patient  Outcome Evaluation: OT eval completed. Patient performed supine to sit with modified ind. Performed sit to stand with SBA, walked 150' without AD with SBA. O2 removed for mobility and desatted to 88%, placed patient back on 1L and returned to 92%. Patient able to perform ADLs with Sup overall. Patient plans to return home, no DC needs or concerns.     Time Calculation:   Evaluation Complexity (OT)  Review Occupational Profile/Medical/Therapy History Complexity: brief/low complexity  Assessment, Occupational Performance/Identification of Deficit Complexity: 1-3 performance deficits  Clinical Decision Making  Complexity (OT): problem focused assessment/low complexity  Overall Complexity of Evaluation (OT): low complexity     Time Calculation- OT       Row Name 11/24/23 1210             Time Calculation- OT    OT Start Time 0926  -SD      OT Received On 11/24/23  -SD         Untimed Charges    OT Eval/Re-eval Minutes 45  -SD         Total Minutes    Untimed Charges Total Minutes 45  -SD       Total Minutes 45  -SD                User Key  (r) = Recorded By, (t) = Taken By, (c) = Cosigned By      Initials Name Provider Type    Cristin Krishnamurthy OT Occupational Therapist                  Therapy Charges for Today       Code Description Service Date Service Provider Modifiers Qty    [id number]  OT EVAL LOW COMPLEXITY 3 11/24/2023 Cristin Metz OT GO 1               OT Discharge Summary  Anticipated Discharge Disposition (OT): home with assist    Cristin Metz OT  11/24/2023

## 2023-11-24 NOTE — CASE MANAGEMENT/SOCIAL WORK
Case Management/Social Work    Patient Name:  Jessica Flowers  YOB: 1955  MRN: [id number]  Admit Date:  11/22/2023        Pt not medically ready for dc at this time. Goal is home, with family to transport. No identified needs at this time. SW/CM will continue to follow for any further needs.       Electronically signed by:  MAYCOL Turner  11/24/23 10:55 EST

## 2023-11-24 NOTE — THERAPY TREATMENT NOTE
"Pt declined PT today stating \"I just walked in hallway with occupational theraoy I don't think I need to do that again, with pt declining for PTA to check back later today. PT to f/u with pt at later date    "

## 2023-11-24 NOTE — PLAN OF CARE
Goal Outcome Evaluation:  Plan of Care Reviewed With: patient        Progress: improving  Outcome Evaluation: OT eval completed. Patient performed supine to sit with modified ind. Performed sit to stand with SBA, walked 150' without AD with SBA. O2 removed for mobility and desatted to 88%, placed patient back on 1L and returned to 92%. Patient able to perform ADLs with Sup overall. Patient plans to return home, no DC needs or concerns.      Anticipated Discharge Disposition (OT): home with assist

## 2023-11-25 ENCOUNTER — READMISSION MANAGEMENT (OUTPATIENT)
Dept: CALL CENTER | Facility: HOSPITAL | Age: 68
End: 2023-11-25
Payer: MEDICARE

## 2023-11-25 VITALS
WEIGHT: 228.84 LBS | TEMPERATURE: 98 F | HEART RATE: 126 BPM | RESPIRATION RATE: 18 BRPM | BODY MASS INDEX: 43.2 KG/M2 | OXYGEN SATURATION: 93 % | HEIGHT: 61 IN | DIASTOLIC BLOOD PRESSURE: 79 MMHG | SYSTOLIC BLOOD PRESSURE: 137 MMHG

## 2023-11-25 PROBLEM — I50.33 ACUTE ON CHRONIC HEART FAILURE WITH PRESERVED EJECTION FRACTION (HFPEF): Status: ACTIVE | Noted: 2023-11-25

## 2023-11-25 LAB
ALBUMIN SERPL-MCNC: 3.1 G/DL (ref 3.5–5.2)
ALBUMIN/GLOB SERPL: 1 G/DL
ALP SERPL-CCNC: 58 U/L (ref 39–117)
ALT SERPL W P-5'-P-CCNC: 5 U/L (ref 1–33)
ANION GAP SERPL CALCULATED.3IONS-SCNC: 9.8 MMOL/L (ref 5–15)
AST SERPL-CCNC: 7 U/L (ref 1–32)
BILIRUB SERPL-MCNC: 0.5 MG/DL (ref 0–1.2)
BUN SERPL-MCNC: 18 MG/DL (ref 8–23)
BUN/CREAT SERPL: 20 (ref 7–25)
CALCIUM SPEC-SCNC: 8.9 MG/DL (ref 8.6–10.5)
CHLORIDE SERPL-SCNC: 102 MMOL/L (ref 98–107)
CO2 SERPL-SCNC: 28.2 MMOL/L (ref 22–29)
CREAT SERPL-MCNC: 0.9 MG/DL (ref 0.57–1)
DEPRECATED RDW RBC AUTO: 47.2 FL (ref 37–54)
EGFRCR SERPLBLD CKD-EPI 2021: 70.7 ML/MIN/1.73
ERYTHROCYTE [DISTWIDTH] IN BLOOD BY AUTOMATED COUNT: 13.9 % (ref 12.3–15.4)
GLOBULIN UR ELPH-MCNC: 3 GM/DL
GLUCOSE SERPL-MCNC: 97 MG/DL (ref 65–99)
HCT VFR BLD AUTO: 32.3 % (ref 34–46.6)
HGB BLD-MCNC: 10 G/DL (ref 12–15.9)
MCH RBC QN AUTO: 28.2 PG (ref 26.6–33)
MCHC RBC AUTO-ENTMCNC: 31 G/DL (ref 31.5–35.7)
MCV RBC AUTO: 91.2 FL (ref 79–97)
PLATELET # BLD AUTO: 271 10*3/MM3 (ref 140–450)
PMV BLD AUTO: 11.2 FL (ref 6–12)
POTASSIUM SERPL-SCNC: 4 MMOL/L (ref 3.5–5.2)
PROCALCITONIN SERPL-MCNC: 1.08 NG/ML (ref 0–0.25)
PROT SERPL-MCNC: 6.1 G/DL (ref 6–8.5)
RBC # BLD AUTO: 3.54 10*6/MM3 (ref 3.77–5.28)
SODIUM SERPL-SCNC: 140 MMOL/L (ref 136–145)
WBC NRBC COR # BLD AUTO: 6.47 10*3/MM3 (ref 3.4–10.8)

## 2023-11-25 PROCEDURE — 25010000002 ENOXAPARIN PER 10 MG: Performed by: INTERNAL MEDICINE

## 2023-11-25 PROCEDURE — 94799 UNLISTED PULMONARY SVC/PX: CPT

## 2023-11-25 PROCEDURE — 94618 PULMONARY STRESS TESTING: CPT

## 2023-11-25 PROCEDURE — 80053 COMPREHEN METABOLIC PANEL: CPT | Performed by: INTERNAL MEDICINE

## 2023-11-25 PROCEDURE — 84145 PROCALCITONIN (PCT): CPT | Performed by: INTERNAL MEDICINE

## 2023-11-25 PROCEDURE — 94664 DEMO&/EVAL PT USE INHALER: CPT

## 2023-11-25 PROCEDURE — 85027 COMPLETE CBC AUTOMATED: CPT | Performed by: INTERNAL MEDICINE

## 2023-11-25 PROCEDURE — 25010000002 CEFEPIME-DEXTROSE 2-5 GM-%(50ML) RECONSTITUTED SOLUTION: Performed by: INTERNAL MEDICINE

## 2023-11-25 PROCEDURE — 99238 HOSP IP/OBS DSCHRG MGMT 30/<: CPT | Performed by: INTERNAL MEDICINE

## 2023-11-25 PROCEDURE — 94761 N-INVAS EAR/PLS OXIMETRY MLT: CPT

## 2023-11-25 RX ORDER — AMOXICILLIN AND CLAVULANATE POTASSIUM 875; 125 MG/1; MG/1
1 TABLET, FILM COATED ORAL 2 TIMES DAILY
Qty: 8 TABLET | Refills: 0 | Status: SHIPPED | OUTPATIENT
Start: 2023-11-25 | End: 2023-11-29

## 2023-11-25 RX ORDER — BUDESONIDE AND FORMOTEROL FUMARATE DIHYDRATE 160; 4.5 UG/1; UG/1
2 AEROSOL RESPIRATORY (INHALATION)
Qty: 10.2 G | Refills: 0 | Status: SHIPPED | OUTPATIENT
Start: 2023-11-25

## 2023-11-25 RX ADMIN — CEFEPIME HYDROCHLORIDE 2000 MG: 2 INJECTION, SOLUTION INTRAVENOUS at 10:42

## 2023-11-25 RX ADMIN — PANTOPRAZOLE SODIUM 40 MG: 40 TABLET, DELAYED RELEASE ORAL at 05:12

## 2023-11-25 RX ADMIN — Medication 10 ML: at 08:24

## 2023-11-25 RX ADMIN — TAMSULOSIN HYDROCHLORIDE 0.4 MG: 0.4 CAPSULE ORAL at 08:23

## 2023-11-25 RX ADMIN — ENOXAPARIN SODIUM 40 MG: 100 INJECTION SUBCUTANEOUS at 08:23

## 2023-11-25 RX ADMIN — ASPIRIN 81 MG CHEWABLE TABLET 81 MG: 81 TABLET CHEWABLE at 08:23

## 2023-11-25 RX ADMIN — Medication 1 CAPSULE: at 08:23

## 2023-11-25 RX ADMIN — CEFEPIME HYDROCHLORIDE 2000 MG: 2 INJECTION, SOLUTION INTRAVENOUS at 02:35

## 2023-11-25 RX ADMIN — FUROSEMIDE 40 MG: 40 TABLET ORAL at 08:23

## 2023-11-25 RX ADMIN — BUDESONIDE AND FORMOTEROL FUMARATE DIHYDRATE 2 PUFF: 160; 4.5 AEROSOL RESPIRATORY (INHALATION) at 07:07

## 2023-11-25 RX ADMIN — LEVOTHYROXINE SODIUM 50 MCG: 50 TABLET ORAL at 05:12

## 2023-11-25 RX ADMIN — FLUOXETINE 40 MG: 20 CAPSULE ORAL at 08:23

## 2023-11-25 RX ADMIN — GABAPENTIN 800 MG: 400 CAPSULE ORAL at 05:12

## 2023-11-25 RX ADMIN — ATORVASTATIN CALCIUM 20 MG: 20 TABLET, FILM COATED ORAL at 08:23

## 2023-11-25 NOTE — PAYOR COMM NOTE
"    D/c summary  Ur manager; radha langley [phone] and fax [phone]    Larissa Flowers (68 y.o. Female)       Date of Birth   1955    Social Security Number       Address   [address] [place], [place] [address]    Home Phone   [phone]    MRN   [id number]       Mandaeism   Non-Druze    Marital Status   Legally                             Admission Date   23    Admission Type   Emergency    Admitting Provider   Ricki Dai DO    Attending Provider   Natalio Boyle MD    Department, Room/Bed   [facility] TELEMETRY 3, 306/1       Discharge Date       Discharge Disposition   Home or Self Care    Discharge Destination                                 Attending Provider: Natalio Boyle MD    Allergies: No Known Allergies    Isolation: None   Infection: None   Code Status: CPR    Ht: 154.9 cm (61\")   Wt: 104 kg (228 lb 13.4 oz)    Admission Cmt: None   Principal Problem: Acute hypoxic respiratory failure [J96.01]                   Active Insurance as of 2023       Primary Coverage       Payor Plan Insurance Group Employer/Plan Group    [facility] MEDICARE REPLACEMENT [facility] MED ADV SNP HMO [id number]       Payor Plan Address Payor Plan Phone Number Payor Plan Fax Number Effective Dates    [address] [address]   2023 - None Entered    [facility] [id number]         Subscriber Name Subscriber Birth Date Member ID       LARISSA FLOWERS 1955 [id number]                     Emergency Contacts        (Rel.) Home Phone Work Phone Mobile Phone    Jourdan Flowers (Son) [phone] -- [phone]    GREGG FLOWERS (Daughter) -- -- [phone]    Bryce Kang (Brother) -- -- [phone]                 Physician Progress Notes (last 24 hours)        Natalio Boyle MD at 23 [facility]7              [facility] HOSPITALIST    PROGRESS NOTE    Name:  Larissa Flowers   Age:  68 y.o.  Sex:  female  :  1955  MRN:  " [id number]   Visit Number:  [id number]  Admission Date:  11/22/2023  Date Of Service:  11/24/23  Primary Care Physician:  Karley Jolly APRN     LOS: 2 days :    Chief Complaint:      Follow-up of shortness of breath.    Subjective:    Ms. Flowers was seen and examined this morning.  She is currently lying down in the bed and is comfortable at rest.  She is currently on 2 L of nasal cannula oxygen.  Denies any chest pain and her shortness of breath has improved.  She has been able to walk to the bathroom.  No significant overnight events.  Previous physician documentation, laboratory and imaging data have been reviewed.    Hospital Course:    Ms. Flowers is a chronically ill 68-year-old female with history significant for HFpEF, morbid obesity, hypertension who presents to the emergency room with progressively worsening weakness and shortness of breath that began approximately 2 days ago.      On arrival to the ER, patient slightly febrile with Tmax 100.5 Fahrenheit, tachycardic with heart rate 141.  Normotensive and hypoxic requiring 4 L nasal cannula.  Patient is not on supplemental oxygen at baseline.  Troponin and proBNP within normal limits.  CMP unremarkable except for glucose 164.  CRP 19, lactate normal, procalcitonin 2.32.  D-dimer 2.35.  WBC 13.  COVID and flu negative.  CTA of the chest is negative for PE, no evidence of aortic dissection or aneurysm.  Does show findings of cardiomegaly with trace to small pericardial effusion.  Small bilateral pleural effusions.  There is also bilateral diffuse peribronchovascular interstitial thickening and peripheral interlobular septal thickening.  Bilateral multifocal subsegmental atelectasis with background parenchymal scarring.    Patient was admitted to the medical floor with telemetry and was continued on IV antibiotics therapy with ceftriaxone.  She was placed on bronchodilators and mucolytic agents.  She was also continued on home dose of Lasix.  Recent  echocardiogram from 3/26/2023 had revealed ejection fraction of 62% with grade 1 diastolic dysfunction.  Due to worsening leukocytosis her ceftriaxone was switched to cefepime the next day.  Her leukocytosis improved and she was initiated on physical therapy.    Review of Systems:     All systems were reviewed and negative except as mentioned in subjective, assessment and plan.    Vital Signs:    Temp:  [97.7 °F (36.5 °C)-98.3 °F (36.8 °C)] 98.3 °F (36.8 °C)  Heart Rate:  [70-87] 70  Resp:  [18] 18  BP: (109-125)/(67-91) 118/79    Intake and output:    I/O last 3 completed shifts:  In: 240 [P.O.:240]  Out: 2400 [Urine:2400]  I/O this shift:  In: 480 [P.O.:480]  Out: -     Physical Examination:    General Appearance:  Alert and cooperative.    Head:  Atraumatic and normocephalic.   Eyes: Conjunctivae and sclerae normal, no icterus. No pallor.   Throat: No oral lesions, no thrush, oral mucosa moist.   Neck: Supple, trachea midline, no thyromegaly.   Lungs:   Breath sounds heard bilaterally equally.  No wheezing or crackles. No Pleural rub or bronchial breathing.   Heart:  Normal S1 and S2, no murmur, no gallop, no rub. No JVD.   Abdomen:   Normal bowel sounds, no masses, no organomegaly. Soft, nontender, obese, no rebound tenderness.   Extremities: Supple, 1+ pitting ankle edema, no cyanosis, no clubbing.   Skin: No bleeding or rash.   Neurologic: Alert and oriented x 3. No facial asymmetry. Moves all four limbs. No tremors.      Laboratory results:    Results from last 7 days   Lab Units 11/24/23  0618 11/23/23  0554 11/22/23  1104   SODIUM mmol/L 139 139 139   POTASSIUM mmol/L 4.2 3.9 3.9   CHLORIDE mmol/L 105 104 105   CO2 mmol/L 27.0 25.6 25.4   BUN mg/dL 21 17 12   CREATININE mg/dL 0.97 0.96 0.90   CALCIUM mg/dL 8.6 8.6 8.8   BILIRUBIN mg/dL  --   --  1.7*   ALK PHOS U/L  --   --  74   ALT (SGPT) U/L  --   --  7   AST (SGOT) U/L  --   --  10   GLUCOSE mg/dL 99 154* 164*     Results from last 7 days   Lab Units  11/24/23  0618 11/23/23  0638 11/22/23  1104   WBC 10*3/mm3 9.12 18.75* 13.03*   HEMOGLOBIN g/dL 10.1* 9.4* 10.6*   HEMATOCRIT % 32.9* 30.2* 34.0   PLATELETS 10*3/mm3 273 277 265         Results from last 7 days   Lab Units 11/22/23  1104   HSTROP T ng/L 12     Results from last 7 days   Lab Units 11/22/23  1325 11/22/23  1319   BLOODCX  No growth at 2 days No growth at 2 days     Recent Labs     03/25/23  0511 03/30/23  0734   PHART 7.384 7.509*   AAT3QMU 43.2 41.3   PO2ART 64.9* 146.0*   VMD8RJN 25.8 32.9*   BASEEXCESS 0.5 9.0*      I have reviewed the patient's laboratory results.    Radiology results:    No radiology results from the last 24 hrs    I have reviewed the patient's radiology reports.    Medication Review:     I have reviewed the patient's active and prn medications.     Problem List:      Acute hypoxic respiratory failure    Assessment:    Acute hypoxic respiratory failure, secondary to #2, POA.  Sepsis secondary to bacterial pneumonia without risk factors for MDRO's, POA.  HFpEF with acute exacerbation, POA  Bilateral pleural effusions/pulmonary edema, secondary to #3, POA.  Essential hypertension.  Hyperlipidemia.  Depression/anxiety.  Morbid obesity with a BMI of 49.  Impaired mobility and ADLs.    Plan:    Respiratory failure/bacterial pneumonia.  - Continue IV antibody therapy with cefepime.  - Continue bronchodilators and Symbicort.  - We will place her on lactobacillus supplements.  - Blood cultures have been negative so far.  - Nasal swab for MRSA and urine Legionella and strep pneumo antigens have been negative.    Chronic diastolic heart failure with exacerbation.  - Continue home dose of Lasix 40 mg daily.  - She does have small bilateral pleural effusions, no intervention at this time.    Continue physical and occupational therapy.  Enoxaparin    DVT Prophylaxis: Enoxaparin  Code Status: Full  Diet: Diabetic  Discharge Plan: Hopefully, home in 1 to 2 days.    Natalio Boyle,  MD  23  17:17 EST    Dictated utilizing Dragon dictation.      Electronically signed by Natalio Boyle MD at 23 1726          Discharge Summary        Natalio Boyle MD at 23 1140              [facility]   DISCHARGE SUMMARY      Name:  Jessica Flowers   Age:  68 y.o.  Sex:  female  :  1955  MRN:  [id number]   Visit Number:  [id number]    Admission Date:  2023  Date of Discharge:  2023  Primary Care Physician:  Karley Jolly APRN    Important issues to note:    1.  Patient was admitted with shortness of breath and bilateral pneumonia.  She was initially hypoxic and was placed on oxygen as well as IV antibody therapy with cephalosporins.  Patient improved significantly over the next couple of days with improvement in her leukocytosis.  She remained afebrile.  She is currently being discharged home with Augmentin for 4 more days of therapy to complete a course of 7 days.  2.  No change has been made to her home medication regimen.  She declined any home needs at this time.  Patient did have a walking oximetry and did not qualify for home oxygen.  Continue home CPAP therapy.  3.  Follow-up with primary care provider in 1 week.    Discharge Diagnoses:     Acute hypoxic respiratory failure, secondary to #2, POA, resolved.  Sepsis secondary to bacterial pneumonia without risk factors for MDRO's, POA, improved.  HFpEF with acute exacerbation, POA, improved  Bilateral pleural effusions/pulmonary edema, secondary to #3, POA.  Essential hypertension.  Obstructive sleep apnea on home CPAP therapy.  Depression/anxiety.  Morbid obesity with a BMI of 49.  Chronic back pain with chronic opioid dependence.    Problem List:     Active Hospital Problems    Diagnosis  POA    **Acute hypoxic respiratory failure [J96.01]  Yes    Acute on chronic heart failure with preserved ejection fraction (HFpEF) [I50.33]  Yes    (HFpEF) heart failure with preserved ejection fraction  [I50.30]  Yes      Resolved Hospital Problems   No resolved problems to display.     Presenting Problem:    Chief Complaint   Patient presents with    Shortness of Breath      Consults:     Consulting Physician(s)                     None              Procedures Performed:    None.    History of presenting illness/Hospital Course:    Ms. Flowers is a chronically ill 68-year-old female with history significant for HFpEF, morbid obesity, hypertension who presents to the emergency room with progressively worsening weakness and shortness of breath that began approximately 2 days ago.       On arrival to the ER, patient slightly febrile with Tmax 100.5 Fahrenheit, tachycardic with heart rate 141.  Normotensive and hypoxic requiring 4 L nasal cannula.  Patient is not on supplemental oxygen at baseline.  Troponin and proBNP within normal limits.  CMP unremarkable except for glucose 164.  CRP 19, lactate normal, procalcitonin 2.32.  D-dimer 2.35.  WBC 13.  COVID and flu negative.  CTA of the chest is negative for PE, no evidence of aortic dissection or aneurysm.  Does show findings of cardiomegaly with trace to small pericardial effusion.  Small bilateral pleural effusions.  There is also bilateral diffuse peribronchovascular interstitial thickening and peripheral interlobular septal thickening.  Bilateral multifocal subsegmental atelectasis with background parenchymal scarring.     Patient was admitted to the medical floor with telemetry and was continued on IV antibiotics therapy with ceftriaxone.  She was placed on bronchodilators and mucolytic agents.  She was also continued on home dose of Lasix.  Recent echocardiogram from 3/26/2023 had revealed ejection fraction of 62% with grade 1 diastolic dysfunction.  Due to worsening leukocytosis her ceftriaxone was switched to cefepime the next day.  Her leukocytosis improved and she was initiated on physical therapy.  Patient was able to walk in the hallway without any  difficulty.  She did not have any fevers.  One of the EKGs that she had initially on arrival to the emergency room showed possibility of SVT versus atrial fibrillation but has not had any further episodes of atrial fibrillation on telemetry and remained in sinus rhythm.    Patient is currently feeling better and is eager to go home.  She did have a walking oximetry and did not require home oxygen.  She already has CPAP machine at home which was recently prescribed and she is using it every night.  She has not been a smoker for many many years.  She does have grade 1 diastolic dysfunction but seems to be euvolemic and advised to continue her regular dose of Lasix at home.  She will be discharged home on Augmentin for 4 more days to complete a course of 7 days of therapy for her bilateral pneumonia.  She is advised to follow-up with her primary care provider in 1 week.  I have discussed the patient's condition and discharge plan with her daughter Marion over the phone today.  Patient states that her grandson of 16 years lives with her every day.  Patient does follow-up with pain clinic for her chronic back pain and does take Norco and Neurontin.    Vital Signs:    Temp:  [97.4 °F (36.3 °C)-98.3 °F (36.8 °C)] 98 °F (36.7 °C)  Heart Rate:  [] 126  Resp:  [17-19] 18  BP: (104-137)/(62-91) 137/79    Physical Exam:    General Appearance:  Alert and cooperative.    Head:  Atraumatic and normocephalic.   Eyes: Conjunctivae and sclerae normal, no icterus. No pallor.   Ears:  Ears with no abnormalities noted.   Throat: No oral lesions, no thrush, oral mucosa moist.   Neck: Supple, trachea midline, no thyromegaly.   Back:   No kyphoscoliosis present. No tenderness to palpation.   Lungs:   Breath sounds heard bilaterally equally.  No crackles or wheezing. No Pleural rub or bronchial breathing.   Heart:  Normal S1 and S2, no murmur, no gallop, no rub. No JVD.   Abdomen:   Normal bowel sounds, no masses, no organomegaly.  Soft, nontender, obese, no rebound tenderness.   Extremities: Supple, no edema, no cyanosis, no clubbing.   Pulses: Pulses palpable bilaterally.   Skin: No bleeding or rash.   Neurologic: Alert and oriented x 3. No facial asymmetry. Moves all four limbs. No tremors.     Pertinent Lab Results:     Results from last 7 days   Lab Units 11/25/23  0517 11/24/23  0618 11/23/23  0554 11/22/23  1104   SODIUM mmol/L 140 139 139 139   POTASSIUM mmol/L 4.0 4.2 3.9 3.9   CHLORIDE mmol/L 102 105 104 105   CO2 mmol/L 28.2 27.0 25.6 25.4   BUN mg/dL 18 21 17 12   CREATININE mg/dL 0.90 0.97 0.96 0.90   CALCIUM mg/dL 8.9 8.6 8.6 8.8   BILIRUBIN mg/dL 0.5  --   --  1.7*   ALK PHOS U/L 58  --   --  74   ALT (SGPT) U/L 5  --   --  7   AST (SGOT) U/L 7  --   --  10   GLUCOSE mg/dL 97 99 154* 164*     Results from last 7 days   Lab Units 11/25/23  0516 11/24/23  0618 11/23/23  0638   WBC 10*3/mm3 6.47 9.12 18.75*   HEMOGLOBIN g/dL 10.0* 10.1* 9.4*   HEMATOCRIT % 32.3* 32.9* 30.2*   PLATELETS 10*3/mm3 271 273 277         Results from last 7 days   Lab Units 11/22/23  1104   HSTROP T ng/L 12     Results from last 7 days   Lab Units 11/22/23  1104   PROBNP pg/mL 587.8       Results from last 7 days   Lab Units 11/22/23  1325 11/22/23  1319   BLOODCX  No growth at 2 days No growth at 2 days     Pertinent Radiology Results:    Imaging Results (All)       Procedure Component Value Units Date/Time    CT Angiogram Chest Pulmonary Embolism [[id number]] Collected: 11/22/23 1325     Updated: 11/22/23 1336    Narrative:      CTA/PE PROTOCOL CHEST CT:     11/22/2023 1:58 PM      CLINICAL INDICATION:  Pulmonary embolism (PE) suspected, positive D-dimer shortness of breath.     TECHNIQUE: Multiple axial CT images were obtained through the chest  following IV contrast utilizing a CTA/PE protocol. 3D/MIP Reconstruction  images were also performed. This study was performed with techniques to  keep radiation doses as low as reasonably achievable,  (ALARA).  Individualized dose reduction techniques using automated exposure  control or adjustment of mA and/or kV according to the patient size were  employed. 340 mGy*cm     COMPARISON:  Chest radiograph 11/22/2023.     FINDINGS:  PA's and Aorta:  No pulmonary embolus. Thoracic aorta is normal in caliber. No  significant coronary artery calcifications.     Heart/mediastinum:  No evidence for right heart strain. Trace pericardial effusion. Mild  cardiomegaly. Gastric sleeve. Small hiatal hernia. Diffuse esophageal  wall thickening.     Lungs/Pleura:  Central airways are patent. Diffuse narrowing of the airways. Bilateral  peribronchovascular interstitial thickening and peripheral interlobular  septal thickening. Bilateral lower lobe subsegmental dense  consolidations. Bilateral multifocal subsegmental atelectasis.  Background parenchymal scarring. Small bilateral pleural effusions. No  pneumothorax.     Lymph nodes:  Multiple enlarged mediastinal and hilar lymph nodes, likely  reactive/inflammatory.     Chest wall:  No acute findings.     Bones:  No acute fracture.     Upper abdomen:  No acute findings in the upper abdomen.       Impression:      No pulmonary embolus. No evidence of aortic dissection or aneurysm.     Cardiomegaly. Trace to small pericardial effusion. Small bilateral  pleural effusions. Bilateral diffuse peribronchovascular interstitial  thickening and peripheral interlobular septal thickening, suggestive of  pulmonary edema. Bilateral multifocal subsegmental atelectasis.  Background parenchymal scarring. Bilateral lower lobe subsegmental  airspace consolidations, concerning for possible superimposed pneumonia  or dense atelectasis. Correlate clinically.     Images personally reviewed, interpreted and dictated by JIMMY Ching.     This study was performed with techniques to keep radiation doses as low  as reasonably achievable (ALARA). Individualized dose reduction  techniques using  automated exposure control or adjustment of mA and/or  kV according to the patient size were employed.           This report was signed and finalized on 11/22/2023 1:34 PM by JIMMY Ching.       XR Chest 1 View [[id number]] Collected: 11/22/23 1118     Updated: 11/22/23 1137    Narrative:      CLINICAL INDICATION:    SOA Triage Protocol shortness of breath.     EXAMINATION TECHNIQUE:  XR CHEST 1 VW-     COMPARISON:  Radiographs 3/30/2023.     FINDINGS:  Cardiomegaly. Perihilar vascular engorgement, cephalization. Bilateral  diffuse groundglass opacities. Probable trace bilateral pleural  effusions. No pneumothorax. Low lung volumes. Surgical suture line in  the upper mid abdomen.       Impression:      Cardiomegaly. Vascular engorgement. Bilateral diffuse groundglass  opacities, concerning for mixed interstitial/alveolar edema or atypical  viral pneumonia. Correlate clinically.           Images personally reviewed, interpreted and dictated by JIMMY Ching.     This report was signed and finalized on 11/22/2023 11:35 AM by JIMMY Ching.        Echo:    Results for orders placed during the hospital encounter of 03/25/23    Adult Transthoracic Echo Complete W/ Cont if Necessary Per Protocol    Interpretation Summary    Left ventricular systolic function is normal. Calculated left ventricular EF = 62%    Left ventricular wall thickness is consistent with mild concentric hypertrophy.    Left ventricular diastolic function is consistent with (grade I) impaired relaxation.    No hemodynamically significant valvular pathology.    Condition on Discharge:      Stable.    Code status during the hospital stay:    Code Status and Medical Interventions:   Ordered at: 11/22/23 1456     Code Status (Patient has no pulse and is not breathing):    CPR (Attempt to Resuscitate)     Medical Interventions (Patient has pulse or is breathing):    Full Support     Discharge Disposition:    Home or Self  Care    Discharge Medications:       Discharge Medications        New Medications        Instructions Start Date   amoxicillin-clavulanate 875-125 MG per tablet  Commonly known as: AUGMENTIN   1 tablet, Oral, 2 Times Daily             Continue These Medications        Instructions Start Date   albuterol sulfate  (90 Base) MCG/ACT inhaler  Commonly known as: PROVENTIL HFA;VENTOLIN HFA;PROAIR HFA   2 puffs, Inhalation, Every 4 Hours PRN      aspirin 81 MG chewable tablet   81 mg, Oral, Daily      atorvastatin 20 MG tablet  Commonly known as: LIPITOR   20 mg, Oral, Daily      budesonide-formoterol 160-4.5 MCG/ACT inhaler  Commonly known as: SYMBICORT   2 puffs, Inhalation, 2 Times Daily - RT      estradiol 1 MG tablet  Commonly known as: ESTRACE   1 mg, Oral, Daily      FLUoxetine 20 MG capsule  Commonly known as: PROzac   40 mg, Oral, Daily      fluticasone 50 MCG/ACT nasal spray  Commonly known as: FLONASE   2 sprays, Nasal, Daily PRN      furosemide 40 MG tablet  Commonly known as: LASIX   40 mg, Oral, Daily      gabapentin 800 MG tablet  Commonly known as: NEURONTIN   800 mg, Oral, 3 Times Daily      HYDROcodone-acetaminophen  MG per tablet  Commonly known as: NORCO   1 tablet, Oral, Every 8 Hours PRN      lansoprazole 30 MG capsule  Commonly known as: PREVACID   30 mg, Oral, Daily      levocetirizine 5 MG tablet  Commonly known as: XYZAL   5 mg, Oral, Every Evening      levothyroxine 50 MCG tablet  Commonly known as: SYNTHROID, LEVOTHROID   50 mcg, Oral, Daily      metFORMIN 500 MG tablet  Commonly known as: GLUCOPHAGE   500 mg, Oral, Daily With Breakfast      montelukast 10 MG tablet  Commonly known as: SINGULAIR   10 mg, Oral, Nightly      Ozempic (0.25 or 0.5 MG/DOSE) 2 MG/3ML solution pen-injector  Generic drug: Semaglutide(0.25 or 0.5MG/DOS)   0.25 mg, Subcutaneous, Weekly      potassium chloride 10 MEQ CR capsule  Commonly known as: MICRO-K   10 mEq, Oral, 2 Times Daily      pramipexole 1 MG  tablet  Commonly known as: MIRAPEX   1 mg, Oral, Nightly      tamsulosin 0.4 MG capsule 24 hr capsule  Commonly known as: FLOMAX   1 capsule, Oral, Daily      traZODone 100 MG tablet  Commonly known as: DESYREL   100 mg, Oral, Nightly             Stop These Medications      medroxyPROGESTERone 2.5 MG tablet  Commonly known as: PROVERA            Discharge Diet:     Diet Instructions       Diet: Diabetic Diets; Consistent Carbohydrate; Regular Texture (IDDSI 7); Thin (IDDSI 0)      Discharge Diet: Diabetic Diets    Diabetic Diet: Consistent Carbohydrate    Texture: Regular Texture (IDDSI 7)    Fluid Consistency: Thin (IDDSI 0)          Activity at Discharge:     Activity Instructions       Activity as Tolerated            Follow-up Appointments:     Follow-up Information       Karley Jolly APRN Follow up in 1 week(s).    Specialty: Family Medicine  Contact information:  [address] [place] [id number] [phone]                           Future Appointments   Date Time Provider Department Center   1/29/2024 10:15 AM Sebas Calero MD [facility]     Test Results Pending at Discharge:    Pending Labs       Order Current Status    Blood Culture - Blood, Arm, Left Preliminary result    Blood Culture - Blood, Hand, Right Preliminary result               Natalio Boyle MD  11/25/23  11:40 EST    Time: I spent 25 minutes on this discharge activity which included: face-to-face encounter with the patient, reviewing the data in the system, coordination of the care with the nursing staff as well as consultants, documentation, and entering orders.     Dictated utilizing Dragon dictation.      Electronically signed by Natalio Boyle MD at 11/25/23 3118

## 2023-11-25 NOTE — DISCHARGE SUMMARY
[facility]   DISCHARGE SUMMARY      Name:  Jessica Flowers   Age:  68 y.o.  Sex:  female  :  1955  MRN:  [id number]   Visit Number:  [id number]    Admission Date:  2023  Date of Discharge:  2023  Primary Care Physician:  Karley Jolly APRN    Important issues to note:    1.  Patient was admitted with shortness of breath and bilateral pneumonia.  She was initially hypoxic and was placed on oxygen as well as IV antibody therapy with cephalosporins.  Patient improved significantly over the next couple of days with improvement in her leukocytosis.  She remained afebrile.  She is currently being discharged home with Augmentin for 4 more days of therapy to complete a course of 7 days.  2.  No change has been made to her home medication regimen.  She declined any home needs at this time.  Patient did have a walking oximetry and did not qualify for home oxygen.  Continue home CPAP therapy.  3.  Follow-up with primary care provider in 1 week.    Discharge Diagnoses:     Acute hypoxic respiratory failure, secondary to #2, POA, resolved.  Sepsis secondary to bacterial pneumonia without risk factors for MDRO's, POA, improved.  HFpEF with acute exacerbation, POA, improved  Bilateral pleural effusions/pulmonary edema, secondary to #3, POA.  Essential hypertension.  Obstructive sleep apnea on home CPAP therapy.  Depression/anxiety.  Morbid obesity with a BMI of 49.  Chronic back pain with chronic opioid dependence.    Problem List:     Active Hospital Problems    Diagnosis  POA    **Acute hypoxic respiratory failure [J96.01]  Yes    Acute on chronic heart failure with preserved ejection fraction (HFpEF) [I50.33]  Yes    (HFpEF) heart failure with preserved ejection fraction [I50.30]  Yes      Resolved Hospital Problems   No resolved problems to display.     Presenting Problem:    Chief Complaint   Patient presents with    Shortness of Breath      Consults:     Consulting  Physician(s)                     None              Procedures Performed:    None.    History of presenting illness/Hospital Course:    Ms. Flowers is a chronically ill 68-year-old female with history significant for HFpEF, morbid obesity, hypertension who presents to the emergency room with progressively worsening weakness and shortness of breath that began approximately 2 days ago.       On arrival to the ER, patient slightly febrile with Tmax 100.5 Fahrenheit, tachycardic with heart rate 141.  Normotensive and hypoxic requiring 4 L nasal cannula.  Patient is not on supplemental oxygen at baseline.  Troponin and proBNP within normal limits.  CMP unremarkable except for glucose 164.  CRP 19, lactate normal, procalcitonin 2.32.  D-dimer 2.35.  WBC 13.  COVID and flu negative.  CTA of the chest is negative for PE, no evidence of aortic dissection or aneurysm.  Does show findings of cardiomegaly with trace to small pericardial effusion.  Small bilateral pleural effusions.  There is also bilateral diffuse peribronchovascular interstitial thickening and peripheral interlobular septal thickening.  Bilateral multifocal subsegmental atelectasis with background parenchymal scarring.     Patient was admitted to the medical floor with telemetry and was continued on IV antibiotics therapy with ceftriaxone.  She was placed on bronchodilators and mucolytic agents.  She was also continued on home dose of Lasix.  Recent echocardiogram from 3/26/2023 had revealed ejection fraction of 62% with grade 1 diastolic dysfunction.  Due to worsening leukocytosis her ceftriaxone was switched to cefepime the next day.  Her leukocytosis improved and she was initiated on physical therapy.  Patient was able to walk in the hallway without any difficulty.  She did not have any fevers.  One of the EKGs that she had initially on arrival to the emergency room showed possibility of SVT versus atrial fibrillation but has not had any further episodes of  atrial fibrillation on telemetry and remained in sinus rhythm.    Patient is currently feeling better and is eager to go home.  She did have a walking oximetry and did not require home oxygen.  She already has CPAP machine at home which was recently prescribed and she is using it every night.  She has not been a smoker for many many years.  She does have grade 1 diastolic dysfunction but seems to be euvolemic and advised to continue her regular dose of Lasix at home.  She will be discharged home on Augmentin for 4 more days to complete a course of 7 days of therapy for her bilateral pneumonia.  She is advised to follow-up with her primary care provider in 1 week.  I have discussed the patient's condition and discharge plan with her daughter Marion over the phone today.  Patient states that her grandson of 16 years lives with her every day.  Patient does follow-up with pain clinic for her chronic back pain and does take Norco and Neurontin.    Vital Signs:    Temp:  [97.4 °F (36.3 °C)-98.3 °F (36.8 °C)] 98 °F (36.7 °C)  Heart Rate:  [] 126  Resp:  [17-19] 18  BP: (104-137)/(62-91) 137/79    Physical Exam:    General Appearance:  Alert and cooperative.    Head:  Atraumatic and normocephalic.   Eyes: Conjunctivae and sclerae normal, no icterus. No pallor.   Ears:  Ears with no abnormalities noted.   Throat: No oral lesions, no thrush, oral mucosa moist.   Neck: Supple, trachea midline, no thyromegaly.   Back:   No kyphoscoliosis present. No tenderness to palpation.   Lungs:   Breath sounds heard bilaterally equally.  No crackles or wheezing. No Pleural rub or bronchial breathing.   Heart:  Normal S1 and S2, no murmur, no gallop, no rub. No JVD.   Abdomen:   Normal bowel sounds, no masses, no organomegaly. Soft, nontender, obese, no rebound tenderness.   Extremities: Supple, no edema, no cyanosis, no clubbing.   Pulses: Pulses palpable bilaterally.   Skin: No bleeding or rash.   Neurologic: Alert and oriented x 3.  No facial asymmetry. Moves all four limbs. No tremors.     Pertinent Lab Results:     Results from last 7 days   Lab Units 11/25/23  0517 11/24/23  0618 11/23/23  0554 11/22/23  1104   SODIUM mmol/L 140 139 139 139   POTASSIUM mmol/L 4.0 4.2 3.9 3.9   CHLORIDE mmol/L 102 105 104 105   CO2 mmol/L 28.2 27.0 25.6 25.4   BUN mg/dL 18 21 17 12   CREATININE mg/dL 0.90 0.97 0.96 0.90   CALCIUM mg/dL 8.9 8.6 8.6 8.8   BILIRUBIN mg/dL 0.5  --   --  1.7*   ALK PHOS U/L 58  --   --  74   ALT (SGPT) U/L 5  --   --  7   AST (SGOT) U/L 7  --   --  10   GLUCOSE mg/dL 97 99 154* 164*     Results from last 7 days   Lab Units 11/25/23  0516 11/24/23  0618 11/23/23  0638   WBC 10*3/mm3 6.47 9.12 18.75*   HEMOGLOBIN g/dL 10.0* 10.1* 9.4*   HEMATOCRIT % 32.3* 32.9* 30.2*   PLATELETS 10*3/mm3 271 273 277         Results from last 7 days   Lab Units 11/22/23  1104   HSTROP T ng/L 12     Results from last 7 days   Lab Units 11/22/23  1104   PROBNP pg/mL 587.8       Results from last 7 days   Lab Units 11/22/23  1325 11/22/23  1319   BLOODCX  No growth at 2 days No growth at 2 days     Pertinent Radiology Results:    Imaging Results (All)       Procedure Component Value Units Date/Time    CT Angiogram Chest Pulmonary Embolism [[id number]] Collected: 11/22/23 1325     Updated: 11/22/23 1336    Narrative:      CTA/PE PROTOCOL CHEST CT:     11/22/2023 1:58 PM      CLINICAL INDICATION:  Pulmonary embolism (PE) suspected, positive D-dimer shortness of breath.     TECHNIQUE: Multiple axial CT images were obtained through the chest  following IV contrast utilizing a CTA/PE protocol. 3D/MIP Reconstruction  images were also performed. This study was performed with techniques to  keep radiation doses as low as reasonably achievable, (ALARA).  Individualized dose reduction techniques using automated exposure  control or adjustment of mA and/or kV according to the patient size were  employed. 340 mGy*cm     COMPARISON:  Chest radiograph 11/22/2023.      FINDINGS:  PA's and Aorta:  No pulmonary embolus. Thoracic aorta is normal in caliber. No  significant coronary artery calcifications.     Heart/mediastinum:  No evidence for right heart strain. Trace pericardial effusion. Mild  cardiomegaly. Gastric sleeve. Small hiatal hernia. Diffuse esophageal  wall thickening.     Lungs/Pleura:  Central airways are patent. Diffuse narrowing of the airways. Bilateral  peribronchovascular interstitial thickening and peripheral interlobular  septal thickening. Bilateral lower lobe subsegmental dense  consolidations. Bilateral multifocal subsegmental atelectasis.  Background parenchymal scarring. Small bilateral pleural effusions. No  pneumothorax.     Lymph nodes:  Multiple enlarged mediastinal and hilar lymph nodes, likely  reactive/inflammatory.     Chest wall:  No acute findings.     Bones:  No acute fracture.     Upper abdomen:  No acute findings in the upper abdomen.       Impression:      No pulmonary embolus. No evidence of aortic dissection or aneurysm.     Cardiomegaly. Trace to small pericardial effusion. Small bilateral  pleural effusions. Bilateral diffuse peribronchovascular interstitial  thickening and peripheral interlobular septal thickening, suggestive of  pulmonary edema. Bilateral multifocal subsegmental atelectasis.  Background parenchymal scarring. Bilateral lower lobe subsegmental  airspace consolidations, concerning for possible superimposed pneumonia  or dense atelectasis. Correlate clinically.     Images personally reviewed, interpreted and dictated by JIMMY Ching.     This study was performed with techniques to keep radiation doses as low  as reasonably achievable (ALARA). Individualized dose reduction  techniques using automated exposure control or adjustment of mA and/or  kV according to the patient size were employed.           This report was signed and finalized on 11/22/2023 1:34 PM by JIMMY Ching.       XR Chest 1 View  [[id number]] Collected: 11/22/23 1118     Updated: 11/22/23 1137    Narrative:      CLINICAL INDICATION:    SOA Triage Protocol shortness of breath.     EXAMINATION TECHNIQUE:  XR CHEST 1 VW-     COMPARISON:  Radiographs 3/30/2023.     FINDINGS:  Cardiomegaly. Perihilar vascular engorgement, cephalization. Bilateral  diffuse groundglass opacities. Probable trace bilateral pleural  effusions. No pneumothorax. Low lung volumes. Surgical suture line in  the upper mid abdomen.       Impression:      Cardiomegaly. Vascular engorgement. Bilateral diffuse groundglass  opacities, concerning for mixed interstitial/alveolar edema or atypical  viral pneumonia. Correlate clinically.           Images personally reviewed, interpreted and dictated by JIMMY Ching.     This report was signed and finalized on 11/22/2023 11:35 AM by JIMMY Ching.        Echo:    Results for orders placed during the hospital encounter of 03/25/23    Adult Transthoracic Echo Complete W/ Cont if Necessary Per Protocol    Interpretation Summary    Left ventricular systolic function is normal. Calculated left ventricular EF = 62%    Left ventricular wall thickness is consistent with mild concentric hypertrophy.    Left ventricular diastolic function is consistent with (grade I) impaired relaxation.    No hemodynamically significant valvular pathology.    Condition on Discharge:      Stable.    Code status during the hospital stay:    Code Status and Medical Interventions:   Ordered at: 11/22/23 1456     Code Status (Patient has no pulse and is not breathing):    CPR (Attempt to Resuscitate)     Medical Interventions (Patient has pulse or is breathing):    Full Support     Discharge Disposition:    Home or Self Care    Discharge Medications:       Discharge Medications        New Medications        Instructions Start Date   amoxicillin-clavulanate 875-125 MG per tablet  Commonly known as: AUGMENTIN   1 tablet, Oral, 2 Times Daily              Continue These Medications        Instructions Start Date   albuterol sulfate  (90 Base) MCG/ACT inhaler  Commonly known as: PROVENTIL HFA;VENTOLIN HFA;PROAIR HFA   2 puffs, Inhalation, Every 4 Hours PRN      aspirin 81 MG chewable tablet   81 mg, Oral, Daily      atorvastatin 20 MG tablet  Commonly known as: LIPITOR   20 mg, Oral, Daily      budesonide-formoterol 160-4.5 MCG/ACT inhaler  Commonly known as: SYMBICORT   2 puffs, Inhalation, 2 Times Daily - RT      estradiol 1 MG tablet  Commonly known as: ESTRACE   1 mg, Oral, Daily      FLUoxetine 20 MG capsule  Commonly known as: PROzac   40 mg, Oral, Daily      fluticasone 50 MCG/ACT nasal spray  Commonly known as: FLONASE   2 sprays, Nasal, Daily PRN      furosemide 40 MG tablet  Commonly known as: LASIX   40 mg, Oral, Daily      gabapentin 800 MG tablet  Commonly known as: NEURONTIN   800 mg, Oral, 3 Times Daily      HYDROcodone-acetaminophen  MG per tablet  Commonly known as: NORCO   1 tablet, Oral, Every 8 Hours PRN      lansoprazole 30 MG capsule  Commonly known as: PREVACID   30 mg, Oral, Daily      levocetirizine 5 MG tablet  Commonly known as: XYZAL   5 mg, Oral, Every Evening      levothyroxine 50 MCG tablet  Commonly known as: SYNTHROID, LEVOTHROID   50 mcg, Oral, Daily      metFORMIN 500 MG tablet  Commonly known as: GLUCOPHAGE   500 mg, Oral, Daily With Breakfast      montelukast 10 MG tablet  Commonly known as: SINGULAIR   10 mg, Oral, Nightly      Ozempic (0.25 or 0.5 MG/DOSE) 2 MG/3ML solution pen-injector  Generic drug: Semaglutide(0.25 or 0.5MG/DOS)   0.25 mg, Subcutaneous, Weekly      potassium chloride 10 MEQ CR capsule  Commonly known as: MICRO-K   10 mEq, Oral, 2 Times Daily      pramipexole 1 MG tablet  Commonly known as: MIRAPEX   1 mg, Oral, Nightly      tamsulosin 0.4 MG capsule 24 hr capsule  Commonly known as: FLOMAX   1 capsule, Oral, Daily      traZODone 100 MG tablet  Commonly known as: DESYREL   100 mg, Oral,  Nightly             Stop These Medications      medroxyPROGESTERone 2.5 MG tablet  Commonly known as: PROVERA            Discharge Diet:     Diet Instructions       Diet: Diabetic Diets; Consistent Carbohydrate; Regular Texture (IDDSI 7); Thin (IDDSI 0)      Discharge Diet: Diabetic Diets    Diabetic Diet: Consistent Carbohydrate    Texture: Regular Texture (IDDSI 7)    Fluid Consistency: Thin (IDDSI 0)          Activity at Discharge:     Activity Instructions       Activity as Tolerated            Follow-up Appointments:     Follow-up Information       Karley Jolly APRN Follow up in 1 week(s).    Specialty: Family Medicine  Contact information:  [address] [place] [id number] [phone]                           Future Appointments   Date Time Provider Department Center   1/29/2024 10:15 AM Sebas Calero MD [facility]     Test Results Pending at Discharge:    Pending Labs       Order Current Status    Blood Culture - Blood, Arm, Left Preliminary result    Blood Culture - Blood, Hand, Right Preliminary result               Natalio Boyle MD  11/25/23  11:40 EST    Time: I spent 25 minutes on this discharge activity which included: face-to-face encounter with the patient, reviewing the data in the system, coordination of the care with the nursing staff as well as consultants, documentation, and entering orders.     Dictated utilizing Dragon dictation.

## 2023-11-25 NOTE — PROGRESS NOTES
Exercise Oximetry    Patient Name:Jessica Flowers   MRN: [id number]   Date: 11/25/23             ROOM AIR BASELINE   SpO2% 94   Heart Rate 78   Blood Pressure 137/79     EXERCISE ON ROOM AIR SpO2% EXERCISE ON O2 @ x LPM SpO2%   1 MINUTE 94 1 MINUTE    2 MINUTES 93 2 MINUTES    3 MINUTES 93 3 MINUTES    4 MINUTES 94 4 MINUTES    5 MINUTES 94 5 MINUTES    6 MINUTES 92 6 MINUTES               Distance Walked  150 Distance Walked   Dyspnea (Amrit Scale)  3 Dyspnea (Amrit Scale)   Fatigue (Amrit Scale)  5 Fatigue (Amrit Scale)   SpO2% Post Exercise  94 SpO2% Post Exercise   HR Post Exercise  102 HR Post Exercise   Time to Recovery  0 min Time to Recovery     Comments: No oxygen needs at this time.

## 2023-11-25 NOTE — PLAN OF CARE
Goal Outcome Evaluation:   PT LYING IN BED RESTING COMFORTABLY THIS SHIFT. NO ACUTE EVENTS NOTED OVERNIGHT. VSS ON 0.5LPM VIA NC. WILL CONTINUE TO MONITOR.

## 2023-11-25 NOTE — OUTREACH NOTE
Prep Survey      Flowsheet Row Responses   [facility] facility patient discharged from? [facility]   Is LACE score < 7 ? No   Eligibility Readm Mgmt   Discharge diagnosis Acute hypoxic respiratory failure and pneumonia   Does the patient have one of the following disease processes/diagnoses(primary or secondary)? Pneumonia   Does the patient have Home health ordered? No   Is there a DME ordered? No   Prep survey completed? Yes            Gabby COYLE - Registered Nurse

## 2023-11-25 NOTE — PLAN OF CARE
Goal Outcome Evaluation:                 Problem: Adjustment to Illness (Sepsis/Septic Shock)  Goal: Optimal Coping  Outcome: Met     Problem: Bleeding (Sepsis/Septic Shock)  Goal: Absence of Bleeding  Outcome: Met     Problem: Glycemic Control Impaired (Sepsis/Septic Shock)  Goal: Blood Glucose Level Within Desired Range  Outcome: Met     Problem: Infection Progression (Sepsis/Septic Shock)  Goal: Absence of Infection Signs and Symptoms  Outcome: Met  Intervention: Initiate Sepsis Management  Description: Provide fluid therapy, such as crystalloid or albumin, to increase intravascular volume, organ perfusion and oxygen delivery.  Provide respiratory support, such as oxygen therapy, noninvasive or invasive positive pressure ventilation, to achieve oxygenation and ventilation goal; avoid hyperoxemia.  Obtain cultures prior to initiating antimicrobial therapy when possible. Do not delay for laboratory results in the presence of high suspicion or clinical indicators.  Administer intravenous broad-spectrum antimicrobial therapy promptly.  Implement hemodynamic monitoring to guide intravascular support based on individual targeted parameters.  Determine and address underlying source of infection aggressively; implement transmission-based precautions and isolation, as indicated.  Recent Flowsheet Documentation  Taken 11/25/2023 0800 by Carol Elaine RN  Infection Management: aseptic technique maintained  Intervention: Promote Recovery  Description: Encourage pulmonary hygiene, such as cough-enhancement and airway-clearance techniques, that may include use of incentive spirometry, deep breathing and cough.  Encourage early rehabilitation and physical activity to optimize functional ability and activity tolerance, as well as minimize delirium.  Promote energy conservation; minimize oxygen demand and consumption by adjusting environment, decreasing stimulation, maintaining normothermia and treating pain.  Optimize  fluid balance, nutrition intake, sleep and glycemic control to maintain tissue perfusion and enhance immune response.  Recent Flowsheet Documentation  Taken 11/25/2023 0800 by Carol Elaine RN  Activity Management: activity encouraged  Intervention: Promote Stabilization  Description: Monitor for signs of fluid responsiveness and overload; consider fluid adjustment and diuretic therapy.  Anticipate use of vasoactive agent to support microperfusion and oxygen delivery; titrate to response.  Monitor laboratory value, diagnostic test and clinical status trends for signs of infection progression and multiple organ failure.  Assess effectiveness of and potential for de-escalation of the antimicrobial regimen daily.  Provide fever-reduction and comfort measures.  Monitor and manage electrolyte imbalance, such as hypocalcemia.  Use lung protective ventilation measures, such as low volume, inspiratory pressure, optimal positive end-expiratory pressure, to minimize the risk of ventilator-induced lung injury; ensure minute volume demands.  Prepare for supportive therapy, such as corticosteroid therapy, coagulopathy management, CRRT (continuous renal replacement therapy), hemofiltration and cardiac-assist device.  Recent Flowsheet Documentation  Taken 11/25/2023 0800 by Carol Elaine RN  Fluid/Electrolyte Management: fluids provided     Problem: Nutrition Impaired (Sepsis/Septic Shock)  Goal: Optimal Nutrition Intake  Outcome: Met     Problem: Diabetes Comorbidity  Goal: Blood Glucose Level Within Targeted Range  Outcome: Met     Problem: Heart Failure Comorbidity  Goal: Maintenance of Heart Failure Symptom Control  Outcome: Met  Intervention: Maintain Heart Failure-Management  Description: Evaluate adherence to home heart failure self-care regimen (e.g., medication, fluid balance, sodium intake, daily weight, physical activity, telemonitoring, support).  Advocate continuation of home medication and  schedule.  Consider pharmacologic therapy administration time and effects (e.g., avoid giving diuretic prior to bedtime or nitrates on empty stomach).  Monitor response to pharmacologic therapy, including weight fluctuations, blood pressure and electrolyte levels.  Monitor for signs and symptoms of anxiety and depression, including severity and duration; if present, provide psychosocial support.  Consider need for heart failure clinic or palliative care consult.  Recent Flowsheet Documentation  Taken 11/25/2023 0800 by Carol Elaine RN  Medication Review/Management: medications reviewed     Problem: Hypertension Comorbidity  Goal: Blood Pressure in Desired Range  Outcome: Met  Intervention: Maintain Blood Pressure Management  Description: Evaluate adherence to home antihypertensive regimen (e.g., exercise and activity, diet modification, medication).  Provide scheduled antihypertensive medication; consider administration time and effects (e.g., avoid giving diuretic prior to bedtime).  Monitor response to antihypertensive medication therapy (e.g., blood pressure, electrolyte levels, medication effects).  Minimize risk of orthostatic hypotension; encourage caution with position changes, particularly if elderly.  Recent Flowsheet Documentation  Taken 11/25/2023 0800 by Carol Elaine RN  Medication Review/Management: medications reviewed     Problem: Obstructive Sleep Apnea Risk or Actual Comorbidity Management  Goal: Unobstructed Breathing During Sleep  Outcome: Met     Problem: Osteoarthritis Comorbidity  Goal: Maintenance of Osteoarthritis Symptom Control  Outcome: Met  Intervention: Maintain Osteoarthritis Symptom Control  Description: Evaluate adherence to self-management plan, such as medication, exercise and weight management.  Advocate for continuation of home regimen, such as medication, physical activity and thermal agents; monitor response.  Encourage participation in functional activities,  such as mobility and ADLs (activities of daily living) to minimize decline associated with inactivity.  Facilitate use of patient-specific assistive devices, equipment or orthoses.  Evaluate effectiveness of coping skills; encourage expression of feelings, expectations and concerns related to disease management and quality of life; reinforce education to enhance management plan and wellbeing.  Recent Flowsheet Documentation  Taken 11/25/2023 0800 by Carol Elaine RN  Activity Management: activity encouraged  Medication Review/Management: medications reviewed     Problem: Fall Injury Risk  Goal: Absence of Fall and Fall-Related Injury  Outcome: Met  Intervention: Identify and Manage Contributors  Description: Develop a fall prevention plan with the patient and caregiver/family.  Provide reorientation, appropriate sensory stimulation and routines with changes in mental status to decrease risk of fall.  Promote use of personal vision and auditory aids.  Assess assistance level required for safe and effective self-care; provide support as needed, such as toileting, mobilization. For age 65 and older, implement timed toileting with assistance.  Encourage physical activity, such as performance of mobility and self-care at highest level of patient ability, multicomponent exercise program and provision of appropriate assistive devices.  If fall occurs, assess the severity of injury; implement fall injury protocol. Determine the cause and revise fall injury prevention plan.  Regularly review medication contribution to fall risk; adjust medication administration times to minimize risk of falling.  Consider risk related to polypharmacy and age.  Balance adequate pain management with potential for oversedation.  Recent Flowsheet Documentation  Taken 11/25/2023 0800 by Carol Elaine RN  Medication Review/Management: medications reviewed  Intervention: Promote Injury-Free Environment  Description: Provide a safe,  barrier-free environment that encourages independent activity.  Keep care area uncluttered and well-lighted.  Determine need for increased observation or monitoring.  Avoid use of devices that minimize mobility, such as restraints or indwelling urinary catheter.  Recent Flowsheet Documentation  Taken 11/25/2023 0800 by Carol Elaine, RN  Safety Promotion/Fall Prevention:   safety round/check completed   room organization consistent   nonskid shoes/slippers when out of bed   fall prevention program maintained   clutter free environment maintained   assistive device/personal items within reach   activity supervised

## 2023-11-25 NOTE — CASE MANAGEMENT/SOCIAL WORK
Case Management Discharge Note                Selected Continued Care - Admitted Since 11/22/2023       Destination    No services have been selected for the patient.                Durable Medical Equipment    No services have been selected for the patient.                Dialysis/Infusion    No services have been selected for the patient.                Home Medical Care    No services have been selected for the patient.                Therapy    No services have been selected for the patient.                Community Resources    No services have been selected for the patient.                [facility] [place] been [place] for the [place]                    Transportation Services  Private: Car    Final Discharge Disposition Code: 01 - home or self-care

## 2023-11-27 LAB
BACTERIA SPEC AEROBE CULT: NORMAL
BACTERIA SPEC AEROBE CULT: NORMAL

## 2023-11-27 NOTE — PAYOR COMM NOTE
"To:  [facility]  From: Violet Arriaza RN  Phone: [phone]  Fax: [phone]  NPI: [id number]  TIN: [id number]  Member ID: [id number]   MRN: [id number]    Larissa Flowers (68 y.o. Female)       Date of Birth   1955    Social Security Number       Address   [address] [place] [address]    Home Phone   [phone]    MRN   [id number]       Sabianist   Non-Mu-ism    Marital Status   Legally                             Admission Date   11/22/23    Admission Type   Emergency    Admitting Provider   Ricki Dai DO    Attending Provider       [facility]       Discharge Date   11/25/2023    Discharge Disposition   Home or Self Care    Discharge Destination                                 Attending Provider: (none)   Allergies: No Known Allergies    Isolation: None   Infection: None   Code Status: Prior    Ht: 154.9 cm (61\")   Wt: 104 kg (228 lb 13.4 oz)    Admission Cmt: None   Principal Problem: Acute hypoxic respiratory failure [J96.01]                   Active Insurance as of 11/22/2023       Primary Coverage       Payor Plan Insurance Group Employer/Plan Group    [facility] MEDICARE REPLACEMENT [facility] MED ADV SNP HMO [id number]       Payor Plan Address Payor Plan Phone Number Payor Plan Fax Number Effective Dates    [address] [address]   8/1/2023 - None Entered    [facility] [id number]         Subscriber Name Subscriber Birth Date Member ID       LARISSA FLOWERS 1955 [id number]                     Emergency Contacts        (Rel.) Home Phone Work Phone Mobile Phone    Jourdan Flowers (Son) [phone] -- [phone]    GHASSANGREGG (Daughter) -- -- [phone]    Bryce Kang (Brother) -- -- [phone]              Vital Signs (last day) before discharge       Date/Time Temp Temp src Pulse Resp BP Patient Position SpO2    11/25/23 1022 -- -- 126 -- -- -- 93    11/25/23 0746 98 (36.7) Oral -- 18 " 137/79 Lying --    11/25/23 0707 -- -- -- 18 -- -- --    11/25/23 0358 97.4 (36.3) Oral 77 17 104/69 Lying 95    11/24/23 2327 98 (36.7) Oral 71 19 111/62 Lying 94    11/24/23 1902 97.8 (36.6) Oral 73 17 105/81 Lying 93    11/24/23 1642 98.3 (36.8) Oral 70 18 118/79 Lying 94    11/24/23 1158 97.7 (36.5) Oral 87 18 125/91 Lying 93    11/24/23 0820 97.7 (36.5) Oral 78 18 113/67 Lying 94    11/24/23 0702 -- -- -- 18 -- Lying --    11/24/23 0403 98.2 (36.8) Oral -- 18 -- Lying --          No current facility-administered medications for this encounter.     Current Outpatient Medications   Medication Sig Dispense Refill    albuterol sulfate  (90 Base) MCG/ACT inhaler Inhale 2 puffs Every 4 (Four) Hours As Needed for Wheezing or Shortness of Air. 18 g 5    aspirin 81 MG chewable tablet Chew 1 tablet Daily.      atorvastatin (LIPITOR) 20 MG tablet Take 1 tablet by mouth Daily.      budesonide-formoterol (SYMBICORT) 160-4.5 MCG/ACT inhaler Inhale 2 puffs 2 (Two) Times a Day. 10.2 g 0    estradiol (ESTRACE) 1 MG tablet TAKE 1 TABLET BY MOUTH DAILY 30 tablet 1    FLUoxetine (PROzac) 20 MG capsule Take 2 capsules by mouth Daily.      fluticasone (FLONASE) 50 MCG/ACT nasal spray 2 sprays into the nostril(s) as directed by provider Daily As Needed for Rhinitis.      furosemide (LASIX) 40 MG tablet Take 1 tablet by mouth Daily. 30 tablet 0    gabapentin (NEURONTIN) 800 MG tablet Take 1 tablet by mouth 3 (Three) Times a Day.      HYDROcodone-acetaminophen (NORCO)  MG per tablet Take 1 tablet by mouth Every 8 (Eight) Hours As Needed.      lansoprazole (PREVACID) 30 MG capsule Take 1 capsule by mouth Daily.  0    levocetirizine (XYZAL) 5 MG tablet Take 1 tablet by mouth Every Evening.      levothyroxine (SYNTHROID, LEVOTHROID) 50 MCG tablet Take 1 tablet by mouth Daily.      metFORMIN (GLUCOPHAGE) 500 MG tablet Take 1 tablet by mouth Daily With Breakfast.      montelukast (SINGULAIR) 10 MG tablet Take 1 tablet by mouth  "Every Night.      Ozempic, 0.25 or 0.5 MG/DOSE, 2 MG/3ML solution pen-injector Inject 0.25 mg under the skin into the appropriate area as directed 1 (One) Time Per Week.      potassium chloride (MICRO-K) 10 MEQ CR capsule Take 1 capsule by mouth 2 (Two) Times a Day.      pramipexole (MIRAPEX) 1 MG tablet Take 1 tablet by mouth Every Night.      tamsulosin (FLOMAX) 0.4 MG capsule 24 hr capsule Take 1 capsule by mouth Daily.      traZODone (DESYREL) 100 MG tablet Take 1 tablet by mouth Every Night.      amoxicillin-clavulanate (AUGMENTIN) 875-125 MG per tablet Take 1 tablet by mouth 2 (Two) Times a Day for 4 days. 8 tablet 0     Lab Results (last 24 hours)       Procedure Component Value Units Date/Time    Procalcitonin [[id number]]  (Abnormal) Collected: 11/25/23 0516    Specimen: Blood Updated: 11/25/23 0615     Procalcitonin 1.08 ng/mL     Narrative:      As a Marker for Sepsis (Non-Neonates):    1. <0.5 ng/mL represents a low risk of severe sepsis and/or septic shock.  2. >2 ng/mL represents a high risk of severe sepsis and/or septic shock.    As a Marker for Lower Respiratory Tract Infections that require antibiotic therapy:    PCT on Admission    Antibiotic Therapy       6-12 Hrs later    >0.5                Strongly Recommended  >0.25 - <0.5        Recommended   0.1 - 0.25          Discouraged              Remeasure/reassess PCT  <0.1                Strongly Discouraged     Remeasure/reassess PCT    As 28 day mortality risk marker: \"Change in Procalcitonin Result\" (>80% or <=80%) if Day 0 (or Day 1) and Day 4 values are available. Refer to http://www.[facility]-pct-calculator.com    Change in PCT <=80%  A decrease of PCT levels below or equal to 80% defines a positive change in PCT test result representing a higher risk for 28-day all-cause mortality of patients diagnosed with severe sepsis for septic shock.    Change in PCT >80%  A decrease of PCT levels of more than 80% defines a negative change in PCT result " representing a lower risk for 28-day all-cause mortality of patients diagnosed with severe sepsis or septic shock.       Comprehensive Metabolic Panel [[id number]]  (Abnormal) Collected: 11/25/23 0517    Specimen: Blood Updated: 11/25/23 0614     Glucose 97 mg/dL      BUN 18 mg/dL      Creatinine 0.90 mg/dL      Sodium 140 mmol/L      Potassium 4.0 mmol/L      Chloride 102 mmol/L      CO2 28.2 mmol/L      Calcium 8.9 mg/dL      Total Protein 6.1 g/dL      Albumin 3.1 g/dL      ALT (SGPT) 5 U/L      AST (SGOT) 7 U/L      Alkaline Phosphatase 58 U/L      Total Bilirubin 0.5 mg/dL      Globulin 3.0 gm/dL      A/G Ratio 1.0 g/dL      BUN/Creatinine Ratio 20.0     Anion Gap 9.8 mmol/L      eGFR 70.7 mL/min/1.73     Narrative:      GFR Normal >60  Chronic Kidney Disease <60  Kidney Failure <15      CBC (No Diff) [[id number]]  (Abnormal) Collected: 11/25/23 0516    Specimen: Blood Updated: 11/25/23 0546     WBC 6.47 10*3/mm3      RBC 3.54 10*6/mm3      Hemoglobin 10.0 g/dL      Hematocrit 32.3 %      MCV 91.2 fL      MCH 28.2 pg      MCHC 31.0 g/dL      RDW 13.9 %      RDW-SD 47.2 fl      MPV 11.2 fL      Platelets 271 10*3/mm3           Imaging Results (Last 24 Hours)       ** No results found for the last 24 hours. **          Orders (last 24 hrs)        Start     Ordered    11/25/23 0000  budesonide-formoterol (SYMBICORT) 160-4.5 MCG/ACT inhaler  2 Times Daily - RT         11/25/23 1116 11/25/23 0000  amoxicillin-clavulanate (AUGMENTIN) 875-125 MG per tablet  2 Times Daily         11/25/23 1116 11/25/23 0000  Activity as Tolerated         11/25/23 1116 11/25/23 0000  Resume Home CPAP / BiPAP After Discharge Using Previous Home Settings        Comments: Resume Settings Used Prior to Admission    11/25/23 1116    11/25/23 0000  Diet: Diabetic Diets; Consistent Carbohydrate; Regular Texture (IDDSI 7); Thin (IDDSI 0)         11/25/23 1116 11/22/23 1800  Incentive Spirometry  Every 4 Hours While Awake,   Status:   Canceled       23 1455    --  SCANNED - TELEMETRY           23 0000    --  HYDROcodone-acetaminophen (NORCO)  MG per tablet  Every 8 Hours PRN         23    --  Ozempic, 0.25 or 0.5 MG/DOSE, 2 MG/3ML solution pen-injector  Weekly         23    --  SCANNED - TELEMETRY           23 0000    --  SCANNED - TELEMETRY           23 0000    --  SCANNED - TELEMETRY           23 0000    --  SCANNED - TELEMETRY           23 0000    --  SCANNED - TELEMETRY           23 0000    --  SCANNED - TELEMETRY           23 0000                  Physician Progress Notes (last 24 hours)  Notes from 23 1023 through 23 1023   No notes of this type exist for this encounter.       Consult Notes (last 24 hours)  Notes from 23 1024 through 23 1024   No notes of this type exist for this encounter.          Discharge Summary        Natalio Boyle MD at 23 1140              [facility]   DISCHARGE SUMMARY      Name:  Jessica Flowers   Age:  68 y.o.  Sex:  female  :  1955  MRN:  [id number]   Visit Number:  [id number]    Admission Date:  2023  Date of Discharge:  2023  Primary Care Physician:  Karley oJlly APRN    Important issues to note:    1.  Patient was admitted with shortness of breath and bilateral pneumonia.  She was initially hypoxic and was placed on oxygen as well as IV antibody therapy with cephalosporins.  Patient improved significantly over the next couple of days with improvement in her leukocytosis.  She remained afebrile.  She is currently being discharged home with Augmentin for 4 more days of therapy to complete a course of 7 days.  2.  No change has been made to her home medication regimen.  She declined any home needs at this time.  Patient did have a walking oximetry and did not qualify for home oxygen.  Continue home CPAP therapy.  3.  Follow-up with primary care provider in 1  week.    Discharge Diagnoses:     Acute hypoxic respiratory failure, secondary to #2, POA, resolved.  Sepsis secondary to bacterial pneumonia without risk factors for MDRO's, POA, improved.  HFpEF with acute exacerbation, POA, improved  Bilateral pleural effusions/pulmonary edema, secondary to #3, POA.  Essential hypertension.  Obstructive sleep apnea on home CPAP therapy.  Depression/anxiety.  Morbid obesity with a BMI of 49.  Chronic back pain with chronic opioid dependence.    Problem List:     Active Hospital Problems    Diagnosis  POA    **Acute hypoxic respiratory failure [J96.01]  Yes    Acute on chronic heart failure with preserved ejection fraction (HFpEF) [I50.33]  Yes    (HFpEF) heart failure with preserved ejection fraction [I50.30]  Yes      Resolved Hospital Problems   No resolved problems to display.     Presenting Problem:    Chief Complaint   Patient presents with    Shortness of Breath      Consults:     Consulting Physician(s)                     None              Procedures Performed:    None.    History of presenting illness/Hospital Course:    Ms. Flowers is a chronically ill 68-year-old female with history significant for HFpEF, morbid obesity, hypertension who presents to the emergency room with progressively worsening weakness and shortness of breath that began approximately 2 days ago.       On arrival to the ER, patient slightly febrile with Tmax 100.5 Fahrenheit, tachycardic with heart rate 141.  Normotensive and hypoxic requiring 4 L nasal cannula.  Patient is not on supplemental oxygen at baseline.  Troponin and proBNP within normal limits.  CMP unremarkable except for glucose 164.  CRP 19, lactate normal, procalcitonin 2.32.  D-dimer 2.35.  WBC 13.  COVID and flu negative.  CTA of the chest is negative for PE, no evidence of aortic dissection or aneurysm.  Does show findings of cardiomegaly with trace to small pericardial effusion.  Small bilateral pleural effusions.  There is also  bilateral diffuse peribronchovascular interstitial thickening and peripheral interlobular septal thickening.  Bilateral multifocal subsegmental atelectasis with background parenchymal scarring.     Patient was admitted to the medical floor with telemetry and was continued on IV antibiotics therapy with ceftriaxone.  She was placed on bronchodilators and mucolytic agents.  She was also continued on home dose of Lasix.  Recent echocardiogram from 3/26/2023 had revealed ejection fraction of 62% with grade 1 diastolic dysfunction.  Due to worsening leukocytosis her ceftriaxone was switched to cefepime the next day.  Her leukocytosis improved and she was initiated on physical therapy.  Patient was able to walk in the hallway without any difficulty.  She did not have any fevers.  One of the EKGs that she had initially on arrival to the emergency room showed possibility of SVT versus atrial fibrillation but has not had any further episodes of atrial fibrillation on telemetry and remained in sinus rhythm.    Patient is currently feeling better and is eager to go home.  She did have a walking oximetry and did not require home oxygen.  She already has CPAP machine at home which was recently prescribed and she is using it every night.  She has not been a smoker for many many years.  She does have grade 1 diastolic dysfunction but seems to be euvolemic and advised to continue her regular dose of Lasix at home.  She will be discharged home on Augmentin for 4 more days to complete a course of 7 days of therapy for her bilateral pneumonia.  She is advised to follow-up with her primary care provider in 1 week.  I have discussed the patient's condition and discharge plan with her daughter Marion over the phone today.  Patient states that her grandson of 16 years lives with her every day.  Patient does follow-up with pain clinic for her chronic back pain and does take Norco and Neurontin.    Vital Signs:    Temp:  [97.4 °F (36.3  °C)-98.3 °F (36.8 °C)] 98 °F (36.7 °C)  Heart Rate:  [] 126  Resp:  [17-19] 18  BP: (104-137)/(62-91) 137/79    Physical Exam:    General Appearance:  Alert and cooperative.    Head:  Atraumatic and normocephalic.   Eyes: Conjunctivae and sclerae normal, no icterus. No pallor.   Ears:  Ears with no abnormalities noted.   Throat: No oral lesions, no thrush, oral mucosa moist.   Neck: Supple, trachea midline, no thyromegaly.   Back:   No kyphoscoliosis present. No tenderness to palpation.   Lungs:   Breath sounds heard bilaterally equally.  No crackles or wheezing. No Pleural rub or bronchial breathing.   Heart:  Normal S1 and S2, no murmur, no gallop, no rub. No JVD.   Abdomen:   Normal bowel sounds, no masses, no organomegaly. Soft, nontender, obese, no rebound tenderness.   Extremities: Supple, no edema, no cyanosis, no clubbing.   Pulses: Pulses palpable bilaterally.   Skin: No bleeding or rash.   Neurologic: Alert and oriented x 3. No facial asymmetry. Moves all four limbs. No tremors.     Pertinent Lab Results:     Results from last 7 days   Lab Units 11/25/23  0517 11/24/23  0618 11/23/23  0554 11/22/23  1104   SODIUM mmol/L 140 139 139 139   POTASSIUM mmol/L 4.0 4.2 3.9 3.9   CHLORIDE mmol/L 102 105 104 105   CO2 mmol/L 28.2 27.0 25.6 25.4   BUN mg/dL 18 21 17 12   CREATININE mg/dL 0.90 0.97 0.96 0.90   CALCIUM mg/dL 8.9 8.6 8.6 8.8   BILIRUBIN mg/dL 0.5  --   --  1.7*   ALK PHOS U/L 58  --   --  74   ALT (SGPT) U/L 5  --   --  7   AST (SGOT) U/L 7  --   --  10   GLUCOSE mg/dL 97 99 154* 164*     Results from last 7 days   Lab Units 11/25/23  0516 11/24/23  0618 11/23/23  0638   WBC 10*3/mm3 6.47 9.12 18.75*   HEMOGLOBIN g/dL 10.0* 10.1* 9.4*   HEMATOCRIT % 32.3* 32.9* 30.2*   PLATELETS 10*3/mm3 271 273 277         Results from last 7 days   Lab Units 11/22/23  1104   HSTROP T ng/L 12     Results from last 7 days   Lab Units 11/22/23  1104   PROBNP pg/mL 587.8       Results from last 7 days   Lab Units  11/22/23  1325 11/22/23  1319   BLOODCX  No growth at 2 days No growth at 2 days     Pertinent Radiology Results:    Imaging Results (All)       Procedure Component Value Units Date/Time    CT Angiogram Chest Pulmonary Embolism [[id number]] Collected: 11/22/23 1325     Updated: 11/22/23 1336    Narrative:      CTA/PE PROTOCOL CHEST CT:     11/22/2023 1:58 PM      CLINICAL INDICATION:  Pulmonary embolism (PE) suspected, positive D-dimer shortness of breath.     TECHNIQUE: Multiple axial CT images were obtained through the chest  following IV contrast utilizing a CTA/PE protocol. 3D/MIP Reconstruction  images were also performed. This study was performed with techniques to  keep radiation doses as low as reasonably achievable, (ALARA).  Individualized dose reduction techniques using automated exposure  control or adjustment of mA and/or kV according to the patient size were  employed. 340 mGy*cm     COMPARISON:  Chest radiograph 11/22/2023.     FINDINGS:  PA's and Aorta:  No pulmonary embolus. Thoracic aorta is normal in caliber. No  significant coronary artery calcifications.     Heart/mediastinum:  No evidence for right heart strain. Trace pericardial effusion. Mild  cardiomegaly. Gastric sleeve. Small hiatal hernia. Diffuse esophageal  wall thickening.     Lungs/Pleura:  Central airways are patent. Diffuse narrowing of the airways. Bilateral  peribronchovascular interstitial thickening and peripheral interlobular  septal thickening. Bilateral lower lobe subsegmental dense  consolidations. Bilateral multifocal subsegmental atelectasis.  Background parenchymal scarring. Small bilateral pleural effusions. No  pneumothorax.     Lymph nodes:  Multiple enlarged mediastinal and hilar lymph nodes, likely  reactive/inflammatory.     Chest wall:  No acute findings.     Bones:  No acute fracture.     Upper abdomen:  No acute findings in the upper abdomen.       Impression:      No pulmonary embolus. No evidence of aortic  dissection or aneurysm.     Cardiomegaly. Trace to small pericardial effusion. Small bilateral  pleural effusions. Bilateral diffuse peribronchovascular interstitial  thickening and peripheral interlobular septal thickening, suggestive of  pulmonary edema. Bilateral multifocal subsegmental atelectasis.  Background parenchymal scarring. Bilateral lower lobe subsegmental  airspace consolidations, concerning for possible superimposed pneumonia  or dense atelectasis. Correlate clinically.     Images personally reviewed, interpreted and dictated by JIMMY Ching.     This study was performed with techniques to keep radiation doses as low  as reasonably achievable (ALARA). Individualized dose reduction  techniques using automated exposure control or adjustment of mA and/or  kV according to the patient size were employed.           This report was signed and finalized on 11/22/2023 1:34 PM by JIMMY Ching.       XR Chest 1 View [[id number]] Collected: 11/22/23 1118     Updated: 11/22/23 1137    Narrative:      CLINICAL INDICATION:    SOA Triage Protocol shortness of breath.     EXAMINATION TECHNIQUE:  XR CHEST 1 VW-     COMPARISON:  Radiographs 3/30/2023.     FINDINGS:  Cardiomegaly. Perihilar vascular engorgement, cephalization. Bilateral  diffuse groundglass opacities. Probable trace bilateral pleural  effusions. No pneumothorax. Low lung volumes. Surgical suture line in  the upper mid abdomen.       Impression:      Cardiomegaly. Vascular engorgement. Bilateral diffuse groundglass  opacities, concerning for mixed interstitial/alveolar edema or atypical  viral pneumonia. Correlate clinically.           Images personally reviewed, interpreted and dictated by JIMMY Ching.     This report was signed and finalized on 11/22/2023 11:35 AM by JIMMY Ching.        Echo:    Results for orders placed during the hospital encounter of 03/25/23    Adult Transthoracic Echo Complete W/ Cont if  Necessary Per Protocol    Interpretation Summary    Left ventricular systolic function is normal. Calculated left ventricular EF = 62%    Left ventricular wall thickness is consistent with mild concentric hypertrophy.    Left ventricular diastolic function is consistent with (grade I) impaired relaxation.    No hemodynamically significant valvular pathology.    Condition on Discharge:      Stable.    Code status during the hospital stay:    Code Status and Medical Interventions:   Ordered at: 11/22/23 1456     Code Status (Patient has no pulse and is not breathing):    CPR (Attempt to Resuscitate)     Medical Interventions (Patient has pulse or is breathing):    Full Support     Discharge Disposition:    Home or Self Care    Discharge Medications:       Discharge Medications        New Medications        Instructions Start Date   amoxicillin-clavulanate 875-125 MG per tablet  Commonly known as: AUGMENTIN   1 tablet, Oral, 2 Times Daily             Continue These Medications        Instructions Start Date   albuterol sulfate  (90 Base) MCG/ACT inhaler  Commonly known as: PROVENTIL HFA;VENTOLIN HFA;PROAIR HFA   2 puffs, Inhalation, Every 4 Hours PRN      aspirin 81 MG chewable tablet   81 mg, Oral, Daily      atorvastatin 20 MG tablet  Commonly known as: LIPITOR   20 mg, Oral, Daily      budesonide-formoterol 160-4.5 MCG/ACT inhaler  Commonly known as: SYMBICORT   2 puffs, Inhalation, 2 Times Daily - RT      estradiol 1 MG tablet  Commonly known as: ESTRACE   1 mg, Oral, Daily      FLUoxetine 20 MG capsule  Commonly known as: PROzac   40 mg, Oral, Daily      fluticasone 50 MCG/ACT nasal spray  Commonly known as: FLONASE   2 sprays, Nasal, Daily PRN      furosemide 40 MG tablet  Commonly known as: LASIX   40 mg, Oral, Daily      gabapentin 800 MG tablet  Commonly known as: NEURONTIN   800 mg, Oral, 3 Times Daily      HYDROcodone-acetaminophen  MG per tablet  Commonly known as: NORCO   1 tablet, Oral, Every  8 Hours PRN      lansoprazole 30 MG capsule  Commonly known as: PREVACID   30 mg, Oral, Daily      levocetirizine 5 MG tablet  Commonly known as: XYZAL   5 mg, Oral, Every Evening      levothyroxine 50 MCG tablet  Commonly known as: SYNTHROID, LEVOTHROID   50 mcg, Oral, Daily      metFORMIN 500 MG tablet  Commonly known as: GLUCOPHAGE   500 mg, Oral, Daily With Breakfast      montelukast 10 MG tablet  Commonly known as: SINGULAIR   10 mg, Oral, Nightly      Ozempic (0.25 or 0.5 MG/DOSE) 2 MG/3ML solution pen-injector  Generic drug: Semaglutide(0.25 or 0.5MG/DOS)   0.25 mg, Subcutaneous, Weekly      potassium chloride 10 MEQ CR capsule  Commonly known as: MICRO-K   10 mEq, Oral, 2 Times Daily      pramipexole 1 MG tablet  Commonly known as: MIRAPEX   1 mg, Oral, Nightly      tamsulosin 0.4 MG capsule 24 hr capsule  Commonly known as: FLOMAX   1 capsule, Oral, Daily      traZODone 100 MG tablet  Commonly known as: DESYREL   100 mg, Oral, Nightly             Stop These Medications      medroxyPROGESTERone 2.5 MG tablet  Commonly known as: PROVERA            Discharge Diet:     Diet Instructions       Diet: Diabetic Diets; Consistent Carbohydrate; Regular Texture (IDDSI 7); Thin (IDDSI 0)      Discharge Diet: Diabetic Diets    Diabetic Diet: Consistent Carbohydrate    Texture: Regular Texture (IDDSI 7)    Fluid Consistency: Thin (IDDSI 0)          Activity at Discharge:     Activity Instructions       Activity as Tolerated            Follow-up Appointments:     Follow-up Information       Karley Jolly APRN Follow up in 1 week(s).    Specialty: Family Medicine  Contact information:  [address] [place] [id number] [phone]                           Future Appointments   Date Time Provider Department Center   1/29/2024 10:15 AM Sebas Calero MD [facility]     Test Results Pending at Discharge:    Pending Labs       Order Current Status    Blood Culture - Blood, Arm, Left Preliminary result    Blood  Culture - Blood, Hand, Right Preliminary result               Natalio Boyle MD  11/25/23  11:40 EST    Time: I spent 25 minutes on this discharge activity which included: face-to-face encounter with the patient, reviewing the data in the system, coordination of the care with the nursing staff as well as consultants, documentation, and entering orders.     Dictated utilizing Dragon dictation.      Electronically signed by Natalio Boyle MD at 11/25/23 9706

## 2023-11-28 ENCOUNTER — HOSPITAL ENCOUNTER (OUTPATIENT)
Dept: MAMMOGRAPHY | Facility: HOSPITAL | Age: 68
Discharge: HOME | End: 2023-11-28
Payer: MEDICARE

## 2023-11-28 VITALS — HEIGHT: 61 IN | BODY MASS INDEX: 50.98 KG/M2 | WEIGHT: 270 LBS

## 2023-11-28 DIAGNOSIS — Z12.31 BREAST CANCER SCREENING BY MAMMOGRAM: ICD-10-CM

## 2023-11-28 PROCEDURE — 77063 BREAST TOMOSYNTHESIS BI: CPT

## 2023-11-29 ENCOUNTER — READMISSION MANAGEMENT (OUTPATIENT)
Dept: CALL CENTER | Facility: HOSPITAL | Age: 68
End: 2023-11-29
Payer: MEDICARE

## 2023-11-29 NOTE — OUTREACH NOTE
COPD/PN Week 1 Survey      Flowsheet Row Responses   [facility] patient discharged from? [facility]   Does the patient have one of the following disease processes/diagnoses(primary or secondary)? Pneumonia   Week 1 attempt successful? No   Unsuccessful attempts Attempt 1            Wilma NVOAK - Registered Nurse

## 2023-12-05 ENCOUNTER — READMISSION MANAGEMENT (OUTPATIENT)
Dept: CALL CENTER | Facility: HOSPITAL | Age: 68
End: 2023-12-05
Payer: MEDICARE

## 2023-12-05 NOTE — OUTREACH NOTE
COPD/PN Week 2 Survey      Flowsheet Row Responses   [facility] patient discharged from? [facility]   Does the patient have one of the following disease processes/diagnoses(primary or secondary)? Pneumonia   Week 2 attempt successful? Yes   Call start time 1137   Call end time 1140   Discharge diagnosis Acute hypoxic respiratory failure and pneumonia   Meds reviewed with patient/caregiver? Yes   Is the patient having any side effects they believe may be caused by any medication additions or changes? No   Does the patient have all medications ordered at discharge? Yes   Is the patient taking all medications as directed (includes completed medication regime)? Yes   Does the patient have a primary care provider?  Yes   Does the patient have an appointment with their PCP or specialist within 7 days of discharge? No   What is preventing the patient from scheduling follow up appointments within 7 days of discharge? Haven't had time   Nursing Interventions Educated patient on importance of making appointment, Advised patient to make appointment   Has the patient kept scheduled appointments due by today? N/A   Has home health visited the patient within 72 hours of discharge? N/A   DME comments upper 90%   Pulse Ox monitoring Intermittent   Pulse Ox device source Patient   O2 Sat: education provided Sat levels, Monitoring frequency, When to seek care   Psychosocial issues? No   Did the patient receive a copy of their discharge instructions? Yes   Nursing interventions Reviewed instructions with patient   What is the patient's perception of their health status since discharge? Improving   Nursing Interventions Nurse provided patient education   Is the patient/caregiver able to teach back the hierarchy of who to call/visit for symptoms/problems? PCP, Specialist, Home health nurse, Urgent Care, ED, 911 Yes   Week 2 call completed? Yes   Wrap up additional comments Pt reports she is doing ok but feels like she might be a  cailin dehyrated. Pt will call PCP office at this time.   Call end time 1140            ALEXEY PLATA - Registered Nurse

## 2023-12-07 ENCOUNTER — HOSPITAL ENCOUNTER (OUTPATIENT)
Dept: MAMMOGRAPHY | Facility: HOSPITAL | Age: 68
Discharge: HOME | End: 2023-12-07
Payer: MEDICARE

## 2023-12-07 ENCOUNTER — HOSPITAL ENCOUNTER (OUTPATIENT)
Dept: ULTRASOUND IMAGING | Facility: HOSPITAL | Age: 68
Discharge: HOME | End: 2023-12-07
Payer: MEDICARE

## 2023-12-07 DIAGNOSIS — R92.8 ABNORMAL MAMMOGRAM: ICD-10-CM

## 2023-12-07 DIAGNOSIS — R92.8 ABNORMAL MAMMOGRAM OF LEFT BREAST: ICD-10-CM

## 2023-12-07 PROCEDURE — 76642 ULTRASOUND BREAST LIMITED: CPT

## 2023-12-07 PROCEDURE — G0279 TOMOSYNTHESIS, MAMMO: HCPCS

## 2023-12-11 ENCOUNTER — HOSPITAL ENCOUNTER (OUTPATIENT)
Facility: HOSPITAL | Age: 68
Discharge: HOME | End: 2023-12-11
Payer: MEDICARE

## 2023-12-11 ENCOUNTER — HOSPITAL ENCOUNTER (OUTPATIENT)
Dept: GENERAL RADIOLOGY | Facility: HOSPITAL | Age: 68
Discharge: HOME | End: 2023-12-11
Payer: MEDICARE

## 2023-12-11 DIAGNOSIS — R05.1 ACUTE COUGH: ICD-10-CM

## 2023-12-11 PROCEDURE — 71046 X-RAY EXAM CHEST 2 VIEWS: CPT

## 2023-12-14 ENCOUNTER — READMISSION MANAGEMENT (OUTPATIENT)
Dept: CALL CENTER | Facility: HOSPITAL | Age: 68
End: 2023-12-14
Payer: MEDICARE

## 2023-12-14 NOTE — OUTREACH NOTE
COPD/PN Week 3 Survey      Flowsheet Row Responses   [facility] patient discharged from? [facility]   Does the patient have one of the following disease processes/diagnoses(primary or secondary)? Pneumonia   Week 3 attempt successful? Yes   Call start time 0952   Call end time 0954   Discharge diagnosis Acute hypoxic respiratory failure and pneumonia   Is patient permission given to speak with other caregiver? Yes   List who call center can speak with Liam   Person spoke with today (if not patient) and relationship Liam   Has the patient kept scheduled appointments due by today? Yes   What is the patient's perception of their health status since discharge? Improving   Week 3 call completed? Yes   Graduated Yes   Graduated/Revoked comments Very brief call-Liam states pt is doing ok to the best of his knowledge   Call end time 0954            Yahaira PLATA - Registered Nurse

## 2024-01-08 ENCOUNTER — HOSPITAL ENCOUNTER (EMERGENCY)
Facility: HOSPITAL | Age: 69
Discharge: HOME | End: 2024-01-08
Attending: HOSPITALIST
Payer: MEDICARE

## 2024-01-08 ENCOUNTER — APPOINTMENT (OUTPATIENT)
Dept: GENERAL RADIOLOGY | Facility: HOSPITAL | Age: 69
End: 2024-01-08
Attending: HOSPITALIST
Payer: MEDICARE

## 2024-01-08 VITALS
TEMPERATURE: 98.9 F | HEIGHT: 61 IN | DIASTOLIC BLOOD PRESSURE: 64 MMHG | BODY MASS INDEX: 39.65 KG/M2 | WEIGHT: 210 LBS | HEART RATE: 73 BPM | RESPIRATION RATE: 22 BRPM | SYSTOLIC BLOOD PRESSURE: 117 MMHG | OXYGEN SATURATION: 96 %

## 2024-01-08 DIAGNOSIS — J20.9 ACUTE BRONCHITIS, UNSPECIFIED ORGANISM: Primary | ICD-10-CM

## 2024-01-08 DIAGNOSIS — B34.9 VIRAL ILLNESS: ICD-10-CM

## 2024-01-08 LAB
FLUAV AG NPH QL: NEGATIVE
FLUBV AG NPH QL: NEGATIVE
SARS-COV-2 RDRP RESP QL NAA+PROBE: NOT DETECTED

## 2024-01-08 PROCEDURE — 71046 X-RAY EXAM CHEST 2 VIEWS: CPT

## 2024-01-08 PROCEDURE — 99284 EMERGENCY DEPT VISIT MOD MDM: CPT

## 2024-01-08 PROCEDURE — 87635 SARS-COV-2 COVID-19 AMP PRB: CPT

## 2024-01-08 PROCEDURE — 87804 INFLUENZA ASSAY W/OPTIC: CPT

## 2024-01-08 RX ORDER — BENZONATATE 100 MG/1
100 CAPSULE ORAL 3 TIMES DAILY PRN
Qty: 10 CAPSULE | Refills: 0 | Status: SHIPPED | OUTPATIENT
Start: 2024-01-08 | End: 2024-01-15

## 2024-01-08 RX ORDER — ALBUTEROL SULFATE 90 UG/1
2 AEROSOL, METERED RESPIRATORY (INHALATION) 4 TIMES DAILY PRN
Qty: 54 G | Refills: 0 | Status: SHIPPED | OUTPATIENT
Start: 2024-01-08

## 2024-01-08 RX ORDER — PREDNISONE 20 MG/1
20 TABLET ORAL DAILY
Qty: 5 TABLET | Refills: 0 | Status: SHIPPED | OUTPATIENT
Start: 2024-01-08 | End: 2024-01-13

## 2024-01-08 ASSESSMENT — PAIN DESCRIPTION - LOCATION: LOCATION: BACK

## 2024-01-08 ASSESSMENT — PAIN - FUNCTIONAL ASSESSMENT: PAIN_FUNCTIONAL_ASSESSMENT: 0-10

## 2024-01-08 ASSESSMENT — PAIN SCALES - GENERAL: PAINLEVEL_OUTOF10: 6

## 2024-01-08 ASSESSMENT — PAIN DESCRIPTION - ORIENTATION: ORIENTATION: UPPER

## 2024-01-08 ASSESSMENT — LIFESTYLE VARIABLES
HOW OFTEN DO YOU HAVE A DRINK CONTAINING ALCOHOL: NEVER
HOW MANY STANDARD DRINKS CONTAINING ALCOHOL DO YOU HAVE ON A TYPICAL DAY: PATIENT DOES NOT DRINK

## 2024-01-08 NOTE — ED TRIAGE NOTES
Patient presents today with dry cough, fatigue, headache, painful breathing, nausea and diarrhea for 3 days. She reports that she was treated for pneumonia around Thanksgiving and admitted to [facility]. She was treated again about 3-4 weeks ago, and got feeling better. She has had some chills, but was not able to check her temperature. She denies being exposed to ill individuals, and has been vaccinated for influenza, covid and pneumonia. She does not use oxygen at home, but does have a CPAP.

## 2024-01-08 NOTE — ED PROVIDER NOTES
disease    Patient's radiological diagnostic studies were discussed with her she does state understanding.  Patient advised swabs were negative and chest radiograph was negative.  Symptoms do seem more consistent with acute bronchitis.  Will treat her with albuterol inhaler 2 puffs every 4-6 hours as needed for cough and shortness of breath sensation.  Will also place her on prednisone 20 mg a day for the next 5 days she was advised that because of her diabetic status this will cause her blood sugar to go up so she will need to monitor her blood sugar watch what she eats and drinks and continue with her medication so that she is aware that her blood sugar could become rather elevated because of this.  Patient also advised we place her on Tessalon Perles since her cough is dry and hacking and is not productive.  Advised she does need to follow-up with a regular family physician within the next 1 to 2 days for evaluation.  She was also given instructions if her symptoms worsens or new symptoms arise she should return back to emergency department for further evaluation workup.       CONSULTS: (Who and What was discussed)  None    Discussion with Other Profesionals : None    Social Determinants : None    Chronic Conditions:  has a past medical history of Arthritis, Depression, Thyroid disease, and UTI (urinary tract infection).    Records Reviewed : None    Disposition Considerations (include 1 Tests not done, Shared Decision Making, Pt Expectation of Test or Tx.): Patient agreeable have two-view chest along with rapid COVID and influenza swab  Appropriate for outpatient management albuterol, steroids and Tessalon Perles PCP follow-up.      I am the Primary Clinician of Record.    FINAL IMPRESSION      1. Acute bronchitis, unspecified organism    2. Viral illness          DISPOSITION/PLAN     DISPOSITION Decision To Discharge 01/08/2024 11:00:23 AM      PATIENT REFERRED TO:  No follow-up provider specified.    DISCHARGE

## 2024-01-08 NOTE — ED NOTES
Discharge instructions and medications reviewed with patient, verbalizes understanding. Patient alert and oriented x 4, no further questions or concerns. Patient discharged home via POV.

## 2024-01-25 ENCOUNTER — HOSPITAL ENCOUNTER (OUTPATIENT)
Facility: HOSPITAL | Age: 69
Discharge: HOME | End: 2024-01-25
Payer: MEDICARE

## 2024-01-25 LAB
25(OH)D3 SERPL-MCNC: 27 NG/ML (ref 32–100)
ALBUMIN SERPL-MCNC: 3.6 G/DL (ref 3.4–4.8)
ALBUMIN/GLOB SERPL: 1.4 {RATIO} (ref 0.8–2)
ALP SERPL-CCNC: 63 U/L (ref 25–100)
ALT SERPL-CCNC: 7 U/L (ref 4–36)
ANION GAP SERPL CALCULATED.3IONS-SCNC: 12 MMOL/L (ref 3–16)
AST SERPL-CCNC: 10 U/L (ref 8–33)
BASOPHILS # BLD: 0.1 K/UL (ref 0–0.1)
BASOPHILS NFR BLD: 1.3 %
BILIRUB SERPL-MCNC: 0.9 MG/DL (ref 0.3–1.2)
BUN SERPL-MCNC: 17 MG/DL (ref 6–20)
CALCIUM SERPL-MCNC: 8.9 MG/DL (ref 8.5–10.5)
CHLORIDE SERPL-SCNC: 106 MMOL/L (ref 98–107)
CHOLEST SERPL-MCNC: 180 MG/DL (ref 0–200)
CO2 SERPL-SCNC: 23 MMOL/L (ref 20–30)
CREAT SERPL-MCNC: 0.8 MG/DL (ref 0.4–1.2)
EOSINOPHIL # BLD: 0.2 K/UL (ref 0–0.4)
EOSINOPHIL NFR BLD: 4.7 %
ERYTHROCYTE [DISTWIDTH] IN BLOOD BY AUTOMATED COUNT: 14.3 % (ref 11–16)
FOLATE SERPL-MCNC: 6.25 NG/ML
GFR SERPLBLD CREATININE-BSD FMLA CKD-EPI: >60 ML/MIN/{1.73_M2}
GLOBULIN SER CALC-MCNC: 2.6 G/DL
GLUCOSE SERPL-MCNC: 91 MG/DL (ref 74–106)
HBA1C MFR BLD: 6 %
HCT VFR BLD AUTO: 35.8 % (ref 37–47)
HDLC SERPL-MCNC: 65 MG/DL (ref 40–60)
HGB BLD-MCNC: 11 G/DL (ref 11.5–16.5)
IMM GRANULOCYTES # BLD: 0 K/UL
IMM GRANULOCYTES NFR BLD: 0.2 % (ref 0–5)
LDLC SERPL CALC-MCNC: 91 MG/DL
LYMPHOCYTES # BLD: 1.3 K/UL (ref 1.5–4)
LYMPHOCYTES NFR BLD: 27.7 %
MCH RBC QN AUTO: 27 PG (ref 27–32)
MCHC RBC AUTO-ENTMCNC: 30.7 G/DL (ref 31–35)
MCV RBC AUTO: 88 FL (ref 80–100)
MONOCYTES # BLD: 0.3 K/UL (ref 0.2–0.8)
MONOCYTES NFR BLD: 7.1 %
NEUTROPHILS # BLD: 2.7 K/UL (ref 2–7.5)
NEUTS SEG NFR BLD: 59 %
PLATELET # BLD AUTO: 285 K/UL (ref 150–400)
PMV BLD AUTO: 11.1 FL (ref 6–10)
POTASSIUM SERPL-SCNC: 4.4 MMOL/L (ref 3.4–5.1)
PROT SERPL-MCNC: 6.2 G/DL (ref 6.4–8.3)
RBC # BLD AUTO: 4.07 M/UL (ref 3.8–5.8)
SODIUM SERPL-SCNC: 141 MMOL/L (ref 136–145)
TRIGL SERPL-MCNC: 118 MG/DL (ref 0–249)
TSH SERPL DL<=0.005 MIU/L-ACNC: 1.77 UIU/ML (ref 0.27–4.2)
VIT B12 SERPL-MCNC: 313 PG/ML (ref 211–911)
VLDLC SERPL CALC-MCNC: 24 MG/DL
WBC # BLD AUTO: 4.7 K/UL (ref 4–11)

## 2024-01-25 PROCEDURE — 85025 COMPLETE CBC W/AUTO DIFF WBC: CPT

## 2024-01-25 PROCEDURE — 36415 COLL VENOUS BLD VENIPUNCTURE: CPT

## 2024-01-25 PROCEDURE — 83036 HEMOGLOBIN GLYCOSYLATED A1C: CPT

## 2024-01-25 PROCEDURE — 82607 VITAMIN B-12: CPT

## 2024-01-25 PROCEDURE — 80053 COMPREHEN METABOLIC PANEL: CPT

## 2024-01-25 PROCEDURE — 82306 VITAMIN D 25 HYDROXY: CPT

## 2024-01-25 PROCEDURE — 84443 ASSAY THYROID STIM HORMONE: CPT

## 2024-01-25 PROCEDURE — 80061 LIPID PANEL: CPT

## 2024-01-25 PROCEDURE — 82746 ASSAY OF FOLIC ACID SERUM: CPT

## 2024-02-28 NOTE — ADDENDUM NOTE
Addended by: TALI NIETO on: 10/25/2023 03:06 PM     Modules accepted: Orders     The patient has received written discharge instructions for Warfarin/Coumadin, including the Warfarin/Coumadin discharge booklet, which contains all of the information listed below. Warfarin/Coumadin is used to prevent new blood clots and keep existing ones from getting bigger. Never skip a dose of Warfarin/Coumadin. If you forget to take your Warfarin/Coumadin, DO NOT take an extra pill to 'catch up'. NEVER TAKE A DOUBLE DOSE. Notify your doctor that you missed a dose. Take Warfarin/Coumadin in the evening at the same time. Warfarin/Coumadin may be taken with other medications or food.

## 2024-03-29 ENCOUNTER — HOSPITAL ENCOUNTER (EMERGENCY)
Facility: HOSPITAL | Age: 69
Discharge: LEFT BEFORE DISCHARGE | End: 2024-03-29

## 2024-03-30 NOTE — PROGRESS NOTES
Patient left from lobby after registration,   Attempted to call her back 3 times with no response. Patient was seen leaving by elevator and was ambulatory.

## 2024-04-01 ENCOUNTER — HOSPITAL ENCOUNTER (OUTPATIENT)
Dept: ULTRASOUND IMAGING | Facility: HOSPITAL | Age: 69
Discharge: HOME | End: 2024-04-01
Payer: MEDICARE

## 2024-04-01 ENCOUNTER — HOSPITAL ENCOUNTER (OUTPATIENT)
Dept: GENERAL RADIOLOGY | Facility: HOSPITAL | Age: 69
Discharge: HOME | End: 2024-04-01
Payer: MEDICARE

## 2024-04-01 ENCOUNTER — HOSPITAL ENCOUNTER (OUTPATIENT)
Facility: HOSPITAL | Age: 69
Discharge: HOME | End: 2024-04-01
Payer: MEDICARE

## 2024-04-01 DIAGNOSIS — M25.562 LEFT KNEE PAIN, UNSPECIFIED CHRONICITY: ICD-10-CM

## 2024-04-01 DIAGNOSIS — R60.9 EDEMA, UNSPECIFIED TYPE: ICD-10-CM

## 2024-04-01 DIAGNOSIS — M79.605 PAIN IN LEFT LEG: ICD-10-CM

## 2024-04-01 PROCEDURE — 73562 X-RAY EXAM OF KNEE 3: CPT

## 2024-04-01 PROCEDURE — 93971 EXTREMITY STUDY: CPT

## 2024-04-04 ENCOUNTER — APPOINTMENT (OUTPATIENT)
Dept: CT IMAGING | Facility: HOSPITAL | Age: 69
End: 2024-04-04
Payer: MEDICARE

## 2024-04-04 ENCOUNTER — APPOINTMENT (OUTPATIENT)
Dept: GENERAL RADIOLOGY | Facility: HOSPITAL | Age: 69
End: 2024-04-04
Payer: MEDICARE

## 2024-04-04 ENCOUNTER — APPOINTMENT (OUTPATIENT)
Dept: ULTRASOUND IMAGING | Facility: HOSPITAL | Age: 69
End: 2024-04-04
Payer: MEDICARE

## 2024-04-04 ENCOUNTER — HOSPITAL ENCOUNTER (EMERGENCY)
Facility: HOSPITAL | Age: 69
Discharge: HOME | End: 2024-04-04
Attending: EMERGENCY MEDICINE | Admitting: EMERGENCY MEDICINE
Payer: MEDICARE

## 2024-04-04 VITALS
HEIGHT: 61 IN | OXYGEN SATURATION: 96 % | TEMPERATURE: 97.6 F | BODY MASS INDEX: 43.29 KG/M2 | HEART RATE: 70 BPM | RESPIRATION RATE: 18 BRPM | WEIGHT: 229.28 LBS | SYSTOLIC BLOOD PRESSURE: 115 MMHG | DIASTOLIC BLOOD PRESSURE: 63 MMHG

## 2024-04-04 DIAGNOSIS — R06.00 DYSPNEA, UNSPECIFIED TYPE: ICD-10-CM

## 2024-04-04 DIAGNOSIS — I50.9 ACUTE ON CHRONIC CONGESTIVE HEART FAILURE, UNSPECIFIED HEART FAILURE TYPE: Primary | ICD-10-CM

## 2024-04-04 LAB
ALBUMIN SERPL-MCNC: 3.6 G/DL (ref 3.5–5.2)
ALBUMIN/GLOB SERPL: 1.2 G/DL
ALP SERPL-CCNC: 64 U/L (ref 39–117)
ALT SERPL W P-5'-P-CCNC: 8 U/L (ref 1–33)
ANION GAP SERPL CALCULATED.3IONS-SCNC: 9.7 MMOL/L (ref 5–15)
AST SERPL-CCNC: 11 U/L (ref 1–32)
BASOPHILS # BLD AUTO: 0.02 10*3/MM3 (ref 0–0.2)
BASOPHILS NFR BLD AUTO: 0.3 % (ref 0–1.5)
BILIRUB SERPL-MCNC: 0.6 MG/DL (ref 0–1.2)
BUN SERPL-MCNC: 14 MG/DL (ref 8–23)
BUN/CREAT SERPL: 16.5 (ref 7–25)
CALCIUM SPEC-SCNC: 9.2 MG/DL (ref 8.6–10.5)
CHLORIDE SERPL-SCNC: 107 MMOL/L (ref 98–107)
CO2 SERPL-SCNC: 25.3 MMOL/L (ref 22–29)
CREAT SERPL-MCNC: 0.85 MG/DL (ref 0.57–1)
D DIMER PPP FEU-MCNC: 1.71 MCGFEU/ML (ref 0–0.66)
D-LACTATE SERPL-SCNC: 1.9 MMOL/L (ref 0.5–2)
DEPRECATED RDW RBC AUTO: 48.3 FL (ref 37–54)
EGFRCR SERPLBLD CKD-EPI 2021: 75.7 ML/MIN/1.73
EOSINOPHIL # BLD AUTO: 0.15 10*3/MM3 (ref 0–0.4)
EOSINOPHIL NFR BLD AUTO: 2 % (ref 0.3–6.2)
ERYTHROCYTE [DISTWIDTH] IN BLOOD BY AUTOMATED COUNT: 14.6 % (ref 12.3–15.4)
FLUAV RNA RESP QL NAA+PROBE: NOT DETECTED
FLUBV RNA RESP QL NAA+PROBE: NOT DETECTED
GLOBULIN UR ELPH-MCNC: 3 GM/DL
GLUCOSE SERPL-MCNC: 103 MG/DL (ref 65–99)
HCT VFR BLD AUTO: 36.2 % (ref 34–46.6)
HGB BLD-MCNC: 11.2 G/DL (ref 12–15.9)
HOLD SPECIMEN: NORMAL
HOLD SPECIMEN: NORMAL
IMM GRANULOCYTES # BLD AUTO: 0.02 10*3/MM3 (ref 0–0.05)
IMM GRANULOCYTES NFR BLD AUTO: 0.3 % (ref 0–0.5)
LYMPHOCYTES # BLD AUTO: 0.57 10*3/MM3 (ref 0.7–3.1)
LYMPHOCYTES NFR BLD AUTO: 7.7 % (ref 19.6–45.3)
MCH RBC QN AUTO: 27.7 PG (ref 26.6–33)
MCHC RBC AUTO-ENTMCNC: 30.9 G/DL (ref 31.5–35.7)
MCV RBC AUTO: 89.6 FL (ref 79–97)
MONOCYTES # BLD AUTO: 0.1 10*3/MM3 (ref 0.1–0.9)
MONOCYTES NFR BLD AUTO: 1.4 % (ref 5–12)
NEUTROPHILS NFR BLD AUTO: 6.5 10*3/MM3 (ref 1.7–7)
NEUTROPHILS NFR BLD AUTO: 88.3 % (ref 42.7–76)
NRBC BLD AUTO-RTO: 0 /100 WBC (ref 0–0.2)
NT-PROBNP SERPL-MCNC: 278.7 PG/ML (ref 0–900)
PLATELET # BLD AUTO: 246 10*3/MM3 (ref 140–450)
PMV BLD AUTO: 10.6 FL (ref 6–12)
POTASSIUM SERPL-SCNC: 3.8 MMOL/L (ref 3.5–5.2)
PROT SERPL-MCNC: 6.6 G/DL (ref 6–8.5)
RBC # BLD AUTO: 4.04 10*6/MM3 (ref 3.77–5.28)
RSV RNA RESP QL NAA+PROBE: NOT DETECTED
SARS-COV-2 RNA RESP QL NAA+PROBE: NOT DETECTED
SODIUM SERPL-SCNC: 142 MMOL/L (ref 136–145)
TROPONIN T SERPL HS-MCNC: 8 NG/L
WBC NRBC COR # BLD AUTO: 7.36 10*3/MM3 (ref 3.4–10.8)
WHOLE BLOOD HOLD COAG: NORMAL
WHOLE BLOOD HOLD SPECIMEN: NORMAL

## 2024-04-04 PROCEDURE — 85379 FIBRIN DEGRADATION QUANT: CPT | Performed by: EMERGENCY MEDICINE

## 2024-04-04 PROCEDURE — 71275 CT ANGIOGRAPHY CHEST: CPT

## 2024-04-04 PROCEDURE — 93971 EXTREMITY STUDY: CPT

## 2024-04-04 PROCEDURE — 87637 SARSCOV2&INF A&B&RSV AMP PRB: CPT | Performed by: EMERGENCY MEDICINE

## 2024-04-04 PROCEDURE — 84484 ASSAY OF TROPONIN QUANT: CPT | Performed by: EMERGENCY MEDICINE

## 2024-04-04 PROCEDURE — 99285 EMERGENCY DEPT VISIT HI MDM: CPT

## 2024-04-04 PROCEDURE — 25010000002 FUROSEMIDE PER 20 MG: Performed by: EMERGENCY MEDICINE

## 2024-04-04 PROCEDURE — 96374 THER/PROPH/DIAG INJ IV PUSH: CPT

## 2024-04-04 PROCEDURE — 83880 ASSAY OF NATRIURETIC PEPTIDE: CPT | Performed by: EMERGENCY MEDICINE

## 2024-04-04 PROCEDURE — 93005 ELECTROCARDIOGRAM TRACING: CPT | Performed by: EMERGENCY MEDICINE

## 2024-04-04 PROCEDURE — 85025 COMPLETE CBC W/AUTO DIFF WBC: CPT | Performed by: EMERGENCY MEDICINE

## 2024-04-04 PROCEDURE — 83605 ASSAY OF LACTIC ACID: CPT | Performed by: EMERGENCY MEDICINE

## 2024-04-04 PROCEDURE — 80053 COMPREHEN METABOLIC PANEL: CPT | Performed by: EMERGENCY MEDICINE

## 2024-04-04 PROCEDURE — 71045 X-RAY EXAM CHEST 1 VIEW: CPT

## 2024-04-04 PROCEDURE — 25510000001 IOPAMIDOL 61 % SOLUTION: Performed by: EMERGENCY MEDICINE

## 2024-04-04 RX ORDER — SODIUM CHLORIDE 0.9 % (FLUSH) 0.9 %
10 SYRINGE (ML) INJECTION AS NEEDED
Status: DISCONTINUED | OUTPATIENT
Start: 2024-04-04 | End: 2024-04-04 | Stop reason: HOSPADM

## 2024-04-04 RX ORDER — FUROSEMIDE 10 MG/ML
40 INJECTION INTRAMUSCULAR; INTRAVENOUS ONCE
Status: COMPLETED | OUTPATIENT
Start: 2024-04-04 | End: 2024-04-04

## 2024-04-04 RX ADMIN — IOPAMIDOL 100 ML: 612 INJECTION, SOLUTION INTRAVENOUS at 16:11

## 2024-04-04 RX ADMIN — FUROSEMIDE 40 MG: 10 INJECTION, SOLUTION INTRAMUSCULAR; INTRAVENOUS at 17:04

## 2024-04-05 NOTE — ED PROVIDER NOTES
EMERGENCY DEPARTMENT ENCOUNTER    Pt Name: Jessica Flowers  MRN: [id number]  Pt :   1955  Room Number:    Date of encounter:  2024  PCP: Karley Jolly APRN  ED Provider: Jagdeep Perez MD    Historian: Patient      HPI:  Chief Complaint   Patient presents with    Shortness of Breath          Context: Jessica Flowers is a 68 y.o. female who presents to the ED c/o shortness of breath, present for several days, reports it is difficult to take deep breath, denies fevers, cough.  Reports some mild leg swelling.  Reports that her shortness of breath much worse when laying flat, has to prop her up on a couple of pillows.  Denies home oxygen use.      PAST MEDICAL HISTORY  Past Medical History:   Diagnosis Date    Abnormal CXR     bronchitis poss, [facility]    Allergic rhinitis     uses inhalers prn, denies asthma    Anxiety     Carpal tunnel syndrome     Chronic narcotic use     HC    Depression     Dyspnea on exertion     Fatigue     GERD (gastroesophageal reflux disease)     on Prilosec + BID Zantac    Glucose intolerance (impaired glucose tolerance)     Hiatal hernia with gastroesophageal reflux     EGD [facility]DW , 39 cm, path DE mild nonspec changes.  serum h. pyl neg    Hx MRSA infection     2010 on abdomen, tx w/ Bactrim    Hyperlipidemia     Hypertension     Hypertriglyceridemia     Hypothyroid     Insomnia     uses Trazodone    Joint pain     Morbid obesity     OAB (overactive bladder)     tx w/ botox injections    Osteoarthritis of knees, bilateral     steroid injxns as above, supposed to get synvisc soon.  Says bone on bone, needs TKR, but ortho surgeon wants her to have WLS first    Plantar fasciitis     Postmenopausal HRT (hormone replacement therapy)     Psoriasis     Vitamin D deficiency          PAST SURGICAL HISTORY  Past Surgical History:   Procedure Laterality Date    APPENDECTOMY      emergent, open    CARPAL TUNNEL RELEASE      (B)    CHOLECYSTECTOMY OPEN       [facility]    DILATATION AND CURETTAGE  1990, 2015    GASTRIC SLEEVE LAPAROSCOPIC N/A 7/5/2017    Procedure: GASTRIC SLEEVE LAPAROSCOPIC, ;  Surgeon: Cj Gregg MD;  Location:  [facility];  Service:     LAPAROSCOPIC TUBAL LIGATION  1988    OTHER SURGICAL HISTORY      denies anesthesia issues    TX LAPS SURG ESOPG/GSTR FUNDOPLASTY N/A 7/5/2017    Procedure: HIATAL HERNIA REPAIR LAPAROSCOPIC;  Surgeon: Cj Gregg MD;  Location:  [facility];  Service: Bariatric         FAMILY HISTORY  Family History   Problem Relation Age of Onset    Hypertension Mother     Diabetes Father     Cancer Father         brain    Heart attack Father     Cancer Sister         uterine    Diabetes Brother     Heart attack Brother          SOCIAL HISTORY  Social History     Socioeconomic History    Marital status: Legally    Tobacco Use    Smoking status: Never    Smokeless tobacco: Never   Vaping Use    Vaping status: Never Used   Substance and Sexual Activity    Alcohol use: No    Drug use: No    Sexual activity: Yes     Partners: Male     Comment: spouse         ALLERGIES  Patient has no known allergies.        REVIEW OF SYSTEMS  Review of Systems   Constitutional:  Negative for chills and fever.   HENT:  Negative for sore throat and trouble swallowing.    Eyes:  Negative for pain and redness.   Respiratory:  Positive for shortness of breath. Negative for cough.    Cardiovascular:  Positive for leg swelling. Negative for chest pain.   Gastrointestinal:  Negative for abdominal pain, nausea and vomiting.   Genitourinary:  Negative for dysuria and urgency.   Musculoskeletal:  Negative for back pain and neck pain.   Skin:  Negative for rash and wound.   Neurological:  Negative for dizziness and weakness.        All systems reviewed and negative except for those discussed in HPI.       PHYSICAL EXAM    I have reviewed the triage vital signs and nursing notes.    ED Triage Vitals   Temp Heart Rate Resp BP SpO2   04/04/24 1501  04/04/24 1501 04/04/24 1501 04/04/24 1500 04/04/24 1501   97.6 °F (36.4 °C) 87 18 122/74 90 %      Temp src Heart Rate Source Patient Position BP Location FiO2 (%)   04/04/24 1501 04/04/24 1501 04/04/24 1501 04/04/24 1501 --   Oral Monitor Sitting Left arm        Physical Exam  Constitutional:       Appearance: Normal appearance. She is not ill-appearing.   HENT:      Head: Normocephalic and atraumatic.      Right Ear: External ear normal.      Left Ear: External ear normal.      Nose: Nose normal.      Mouth/Throat:      Mouth: Mucous membranes are moist.      Pharynx: Oropharynx is clear.   Eyes:      Extraocular Movements: Extraocular movements intact.      Conjunctiva/sclera: Conjunctivae normal.      Pupils: Pupils are equal, round, and reactive to light.   Cardiovascular:      Rate and Rhythm: Normal rate and regular rhythm.      Pulses:           Radial pulses are 2+ on the right side and 2+ on the left side.   Pulmonary:      Effort: Pulmonary effort is normal.      Breath sounds: Examination of the right-lower field reveals rales. Examination of the left-lower field reveals rales. Rales present.   Abdominal:      General: There is no distension.      Palpations: Abdomen is soft.      Tenderness: There is no abdominal tenderness.   Musculoskeletal:         General: No tenderness or deformity. Normal range of motion.      Cervical back: Normal range of motion and neck supple.      Right lower leg: No edema.      Left lower leg: No edema.   Skin:     General: Skin is warm and dry.      Capillary Refill: Capillary refill takes less than 2 seconds.   Neurological:      General: No focal deficit present.      Mental Status: She is alert and oriented to person, place, and time.            LAB RESULTS  Recent Results (from the past 24 hour(s))   Comprehensive Metabolic Panel    Collection Time: 04/04/24  3:11 PM    Specimen: Blood   Result Value Ref Range    Glucose 103 (H) 65 - 99 mg/dL    BUN 14 8 - 23 mg/dL     Creatinine 0.85 0.57 - 1.00 mg/dL    Sodium 142 136 - 145 mmol/L    Potassium 3.8 3.5 - 5.2 mmol/L    Chloride 107 98 - 107 mmol/L    CO2 25.3 22.0 - 29.0 mmol/L    Calcium 9.2 8.6 - 10.5 mg/dL    Total Protein 6.6 6.0 - 8.5 g/dL    Albumin 3.6 3.5 - 5.2 g/dL    ALT (SGPT) 8 1 - 33 U/L    AST (SGOT) 11 1 - 32 U/L    Alkaline Phosphatase 64 39 - 117 U/L    Total Bilirubin 0.6 0.0 - 1.2 mg/dL    Globulin 3.0 gm/dL    A/G Ratio 1.2 g/dL    BUN/Creatinine Ratio 16.5 7.0 - 25.0    Anion Gap 9.7 5.0 - 15.0 mmol/L    eGFR 75.7 >60.0 mL/min/1.73   BNP    Collection Time: 04/04/24  3:11 PM    Specimen: Blood   Result Value Ref Range    proBNP 278.7 0.0 - 900.0 pg/mL   Single High Sensitivity Troponin T    Collection Time: 04/04/24  3:11 PM    Specimen: Blood   Result Value Ref Range    HS Troponin T 8 <14 ng/L   Green Top (Gel)    Collection Time: 04/04/24  3:11 PM   Result Value Ref Range    Extra Tube Hold for add-ons.    Lavender Top    Collection Time: 04/04/24  3:11 PM   Result Value Ref Range    Extra Tube hold for add-on    Gold Top - SST    Collection Time: 04/04/24  3:11 PM   Result Value Ref Range    Extra Tube Hold for add-ons.    Light Blue Top    Collection Time: 04/04/24  3:11 PM   Result Value Ref Range    Extra Tube Hold for add-ons.    CBC Auto Differential    Collection Time: 04/04/24  3:11 PM    Specimen: Blood   Result Value Ref Range    WBC 7.36 3.40 - 10.80 10*3/mm3    RBC 4.04 3.77 - 5.28 10*6/mm3    Hemoglobin 11.2 (L) 12.0 - 15.9 g/dL    Hematocrit 36.2 34.0 - 46.6 %    MCV 89.6 79.0 - 97.0 fL    MCH 27.7 26.6 - 33.0 pg    MCHC 30.9 (L) 31.5 - 35.7 g/dL    RDW 14.6 12.3 - 15.4 %    RDW-SD 48.3 37.0 - 54.0 fl    MPV 10.6 6.0 - 12.0 fL    Platelets 246 140 - 450 10*3/mm3    Neutrophil % 88.3 (H) 42.7 - 76.0 %    Lymphocyte % 7.7 (L) 19.6 - 45.3 %    Monocyte % 1.4 (L) 5.0 - 12.0 %    Eosinophil % 2.0 0.3 - 6.2 %    Basophil % 0.3 0.0 - 1.5 %    Immature Grans % 0.3 0.0 - 0.5 %    Neutrophils, Absolute  6.50 1.70 - 7.00 10*3/mm3    Lymphocytes, Absolute 0.57 (L) 0.70 - 3.10 10*3/mm3    Monocytes, Absolute 0.10 0.10 - 0.90 10*3/mm3    Eosinophils, Absolute 0.15 0.00 - 0.40 10*3/mm3    Basophils, Absolute 0.02 0.00 - 0.20 10*3/mm3    Immature Grans, Absolute 0.02 0.00 - 0.05 10*3/mm3    nRBC 0.0 0.0 - 0.2 /100 WBC   Lactic Acid, Plasma    Collection Time: 04/04/24  3:11 PM    Specimen: Blood   Result Value Ref Range    Lactate 1.9 0.5 - 2.0 mmol/L   D-dimer, Quantitative    Collection Time: 04/04/24  3:11 PM    Specimen: Blood   Result Value Ref Range    D-Dimer, Quantitative 1.71 (H) 0.00 - 0.66 MCGFEU/mL   COVID-19, FLU A/B, RSV PCR 1 HR TAT - Swab, Nasopharynx    Collection Time: 04/04/24  3:21 PM    Specimen: Nasopharynx; Swab   Result Value Ref Range    COVID19 Not Detected Not Detected - Ref. Range    Influenza A PCR Not Detected Not Detected    Influenza B PCR Not Detected Not Detected    RSV, PCR Not Detected Not Detected       If labs were ordered, I independently reviewed the results and considered them in treating the patient.        RADIOLOGY  US Venous Doppler Lower Extremity Right (duplex)    Result Date: 4/4/2024  FINAL REPORT TECHNIQUE: Multiple transverse and longitudinal images were performed of the femoral-popliteal deep venous system with augmentation and compression maneuvers. CLINICAL HISTORY: Pain, positive d dimer FINDINGS: Right lower extremity duplex ultrasound demonstrates normal flow in the deep venous system.  There is no abnormal echogenicity to suggest thrombus.  There is normal compression and augmentation.     No evidence of DVT. Authenticated and Electronically Signed by Liborio Luis M.D. on 04/04/2024 06:57:39 PM    CT Angiogram Chest Pulmonary Embolism    Result Date: 4/4/2024  PROCEDURE: CT ANGIOGRAM CHEST PULMONARY EMBOLISM-  HISTORY: chest pain, sob, high d dimer  COMPARISON: November 22, 2023.  TECHNIQUE: The patient was injected with  IV contrast. Axial images were obtained  through the chest in a CTA/ PE protocol. 3 D Reconstruction images were also performed. This study was performed with techniques to keep radiation doses as low as reasonably achievable, (ALARA). Individualized dose reduction techniques using automated exposure control or adjustment of mA and/or kV according to the patient size were employed.  FINDINGS: There is no right axillary adenopathy. There is recent postoperative change in the left axilla and left breast. There is infiltration of the fat in the left axilla associated with a few small lymph nodes and metallic surgical clips.. There is a 35 mm fluid collection associated with several metallic surgical clips in the lateral aspect of the left breast. There are multiple small mediastinal lymph nodes, which are stable from the prior exam.. The heart is upper limits of normal. There is no pericardial effusion. There are minimal bilateral pleural effusions. No filling defects are identified to suggest PE. There is no evidence of thoracic aortic aneurysm; dissection cannot be excluded secondary to timing of the contrast bolus.. Limited images of the upper abdomen demonstrate postoperative change of gastric sleeve. There is pulmonary vascular congestion, interlobular septal thickening and mild subsegmental atelectasis in the lower lobes. Consider fluid overload or mild CHF.      No evidence of pulmonary embolism.  Findings suggesting fluid overload or mild CHF.  Recent postoperative change in the left breast and axilla with probable seroma/hematoma formation in the lateral breast.   CTDI: 9.69 mGy DLP:293.06 mGy.cm  This report was signed and finalized on 4/4/2024 4:34 PM by Belinda Selby MD.      XR Chest 1 View    Result Date: 4/4/2024  PROCEDURE: XR CHEST 1 VW-  HISTORY: SOA Triage Protocol, short of breath, cough and body aches for 5 days.  COMPARISON: November 22, 2023..  FINDINGS: The heart is normal in size. There is slight blunting of the left costophrenic angle  which is stable from prior. There is bilateral interstitial disease, mildly improved from prior. The bibasilar airspace disease has resolved. Left axillary surgical clips noted.. The mediastinum is unremarkable. There is no pneumothorax.  There are no acute osseous abnormalities. Apical lordotic positioning noted.      No acute infiltrate seen..     This report was signed and finalized on 4/4/2024 4:00 PM by Belinda Selby MD.           PROCEDURES    Procedures    Interpretations    O2 Sat: The patients oxygen saturation was 96% on Room Air.  This was independently interpreted by me as Normal    EKG: I reviewed and independently interpreted the EKG as sinus rhythm at 81 bpm, normal axis, intervals, no ST elevation, no T wave inversions    Cardiac Monitoring: I reviewed and independently interpreted the Rhythm Strip as Normal Sinus rhythm rate of 70    Radiology: I ordered and independently reviewed the above noted radiographic studies.  I viewed images of Chest Xray which showed No pulmonary process per my independent interpretation. See radiologist's dictation for official interpretation.     Radiology: I ordered and independently reviewed the above noted radiographic studies.  I viewed images of CT Chest which showed interstitial edema per my independent interpretation. See radiologist's dictation for official interpretation      MEDICATIONS GIVEN IN ER    Medications   iopamidol (ISOVUE-300) 61 % injection 100 mL (100 mL Intravenous Given 4/4/24 1611)   furosemide (LASIX) injection 40 mg (40 mg Intravenous Given 4/4/24 1704)         MEDICAL DECISION MAKING, PROGRESS, and CONSULTS    All labs, if obtained, have been independently reviewed by me.  All radiology studies, if obtained, have been reviewed by me and the radiologist dictating the report.  All EKG's, if obtained, have been independently viewed and interpreted by me      Discussion below represents my analysis of pertinent findings related to patient's  condition, differential diagnosis, treatment plan and final disposition.      Differential diagnosis:    68-year-old female presented ED complaining of shortness of breath, she has orthopnea, I suspect likely heart failure, she has no wheezing to suggest acute bronchospastic disease, I do think this is likely related to heart failure, she generally provide pneumonia, viral illnesses COVID or flu.  Will obtain COVID swab, flu swab, laboratory workup, BNP, chest x-ray, D-dimer to rule out PE.    Additional Sources:  External (non-ED) record review:  Discharge summary 11/25/2023 admission for respiratory failure with preserved ejection fraction heart failure       Orders placed during this visit:  Orders Placed This Encounter   Procedures    COVID-19, FLU A/B, RSV PCR 1 HR TAT - Swab, Nasopharynx    XR Chest 1 View    CT Angiogram Chest Pulmonary Embolism    US Venous Doppler Lower Extremity Right (duplex)    [place] Draw    Comprehensive Metabolic Panel    BNP    Single High Sensitivity Troponin T    CBC Auto Differential    Lactic Acid, Plasma    D-dimer, Quantitative    Undress & Gown    Continuous Pulse Oximetry    Vital Signs    ECG 12 Lead ED Triage Standing Order; SOA    CBC & Differential    Green Top (Gel)    Lavender Top    Gold Top - SST    Light Blue Top         Additional orders considered but not ordered:  None    ED Course:    Consultants:  None    ED Course as of 04/04/24 2256   u Apr 04, 2024   1550 D-Dimer, Quant(!): 1.71  D-dimer is grossly elevated, will obtain CT scan of the chest to rule out PE [CS]   1551 HS Troponin T: 8  Troponin negative, given lack of chest pain, likely not cardiac in nature [CS]   1646 proBNP: 278.7  Her BNP is not significantly elevated, CT does show some mild CHF so IV Lasix has been ordered [CS]   1700 CT Angiogram Chest Pulmonary Embolism  CT of the chest does not show PE, however the patient is now complaining of right lower extremity pain, given the positive D-dimer  obtain ultrasound to rule out acute DVT [CS]   1859 DVT study is negative, patient saturating well, ambulated without any drop in saturations, her only desaturation care was when she was asleep, and quite curled up in a ball.  As such I am comfortable discharging the patient home, she received a large dose of IV Lasix here.  Will continue her Lasix at home and follow-up with her primary care provider. [CS]      ED Course User Index  [CS] Jagdeep Perez MD           After my consideration of clinical presentation and any laboratory/radiology studies obtained, I discussed the findings with the patient/patient representative who is in agreement with the treatment plan and the final disposition. Risks and benefits of discharge were discussed.     AS OF 22:56 EDT VITALS:    BP - 115/63  HR - 70  TEMP - 97.6 °F (36.4 °C) (Oral)  O2 SATS - 96%    I reviewed the patients prescription monitoring report if available prior to discharge    DIAGNOSIS  Final diagnoses:   Acute on chronic congestive heart failure, unspecified heart failure type   Dyspnea, unspecified type         DISPOSITION  ED Disposition       ED Disposition   Discharge    Condition   Stable    Comment   --                   Please note that portions of this document were completed with voice recognition software.        Jagdeep Perez MD  04/04/24 7072

## 2024-05-14 ENCOUNTER — HOSPITAL ENCOUNTER (EMERGENCY)
Facility: HOSPITAL | Age: 69
Discharge: HOME | End: 2024-05-14
Attending: EMERGENCY MEDICINE
Payer: MEDICARE

## 2024-05-14 VITALS
DIASTOLIC BLOOD PRESSURE: 72 MMHG | SYSTOLIC BLOOD PRESSURE: 98 MMHG | WEIGHT: 197 LBS | RESPIRATION RATE: 18 BRPM | HEART RATE: 73 BPM | OXYGEN SATURATION: 99 % | TEMPERATURE: 98 F | HEIGHT: 61 IN | BODY MASS INDEX: 37.19 KG/M2

## 2024-05-14 DIAGNOSIS — R19.7 DIARRHEA, UNSPECIFIED TYPE: Primary | ICD-10-CM

## 2024-05-14 LAB
ALBUMIN SERPL-MCNC: 3.2 G/DL (ref 3.4–4.8)
ALBUMIN/GLOB SERPL: 1.6 {RATIO} (ref 0.8–2)
ALP SERPL-CCNC: 116 U/L (ref 25–100)
ALT SERPL-CCNC: 8 U/L (ref 4–36)
ANION GAP SERPL CALCULATED.3IONS-SCNC: 10 MMOL/L (ref 3–16)
AST SERPL-CCNC: 8 U/L (ref 8–33)
BASOPHILS # BLD: 0 K/UL (ref 0–0.1)
BASOPHILS NFR BLD: 0.2 %
BILIRUB SERPL-MCNC: 0.5 MG/DL (ref 0.3–1.2)
BUN SERPL-MCNC: 18 MG/DL (ref 6–20)
CALCIUM SERPL-MCNC: 7.4 MG/DL (ref 8.5–10.5)
CHLORIDE SERPL-SCNC: 111 MMOL/L (ref 98–107)
CO2 SERPL-SCNC: 17 MMOL/L (ref 20–30)
CREAT SERPL-MCNC: <0.5 MG/DL (ref 0.4–1.2)
EOSINOPHIL # BLD: 0 K/UL (ref 0–0.4)
EOSINOPHIL NFR BLD: 0.8 %
ERYTHROCYTE [DISTWIDTH] IN BLOOD BY AUTOMATED COUNT: 17.8 % (ref 11–16)
GFR SERPLBLD CREATININE-BSD FMLA CKD-EPI: >90 ML/MIN/{1.73_M2}
GLOBULIN SER CALC-MCNC: 2 G/DL
GLUCOSE SERPL-MCNC: 90 MG/DL (ref 74–106)
HCT VFR BLD AUTO: 25.7 % (ref 37–47)
HGB BLD-MCNC: 8.1 G/DL (ref 11.5–16.5)
IMM GRANULOCYTES # BLD: 1.7 K/UL
IMM GRANULOCYTES NFR BLD: 36.7 % (ref 0–5)
LACTATE BLDV-SCNC: 1.8 MMOL/L (ref 0.4–2)
LIPASE SERPL-CCNC: 16 U/L (ref 5.6–51.3)
LYMPHOCYTES # BLD: 0.4 K/UL (ref 1.5–4)
LYMPHOCYTES NFR BLD: 8.9 %
MAGNESIUM SERPL-MCNC: 2 MG/DL (ref 1.7–2.4)
MCH RBC QN AUTO: 28.5 PG (ref 27–32)
MCHC RBC AUTO-ENTMCNC: 31.5 G/DL (ref 31–35)
MCV RBC AUTO: 90.5 FL (ref 80–100)
MONOCYTES # BLD: 0 K/UL (ref 0.2–0.8)
MONOCYTES NFR BLD: 0.6 %
NEUTROPHILS # BLD: 2.5 K/UL (ref 2–7.5)
NEUTS SEG NFR BLD: 52.8 %
PLATELET # BLD AUTO: 201 K/UL (ref 150–400)
PMV BLD AUTO: 11.1 FL (ref 6–10)
POTASSIUM SERPL-SCNC: 3.3 MMOL/L (ref 3.4–5.1)
PROT SERPL-MCNC: 5.2 G/DL (ref 6.4–8.3)
RBC # BLD AUTO: 2.84 M/UL (ref 3.8–5.8)
SODIUM SERPL-SCNC: 138 MMOL/L (ref 136–145)
WBC # BLD AUTO: 4.7 K/UL (ref 4–11)

## 2024-05-14 PROCEDURE — 96360 HYDRATION IV INFUSION INIT: CPT

## 2024-05-14 PROCEDURE — 36415 COLL VENOUS BLD VENIPUNCTURE: CPT

## 2024-05-14 PROCEDURE — 85025 COMPLETE CBC W/AUTO DIFF WBC: CPT

## 2024-05-14 PROCEDURE — 2580000003 HC RX 258: Performed by: EMERGENCY MEDICINE

## 2024-05-14 PROCEDURE — 83605 ASSAY OF LACTIC ACID: CPT

## 2024-05-14 PROCEDURE — 80053 COMPREHEN METABOLIC PANEL: CPT

## 2024-05-14 PROCEDURE — 83735 ASSAY OF MAGNESIUM: CPT

## 2024-05-14 PROCEDURE — 99284 EMERGENCY DEPT VISIT MOD MDM: CPT

## 2024-05-14 PROCEDURE — 83690 ASSAY OF LIPASE: CPT

## 2024-05-14 PROCEDURE — 96361 HYDRATE IV INFUSION ADD-ON: CPT

## 2024-05-14 RX ORDER — 0.9 % SODIUM CHLORIDE 0.9 %
1000 INTRAVENOUS SOLUTION INTRAVENOUS ONCE
Status: COMPLETED | OUTPATIENT
Start: 2024-05-14 | End: 2024-05-14

## 2024-05-14 RX ADMIN — SODIUM CHLORIDE 1000 ML: 9 INJECTION, SOLUTION INTRAVENOUS at 21:14

## 2024-05-14 RX ADMIN — SODIUM CHLORIDE 1000 ML: 9 INJECTION, SOLUTION INTRAVENOUS at 19:35

## 2024-05-14 ASSESSMENT — PAIN - FUNCTIONAL ASSESSMENT: PAIN_FUNCTIONAL_ASSESSMENT: NONE - DENIES PAIN

## 2024-05-14 NOTE — ED TRIAGE NOTES
Patient arrived from home to ER [facility] for C/O generalized weakness and diarrhea for over a week after receiving chemo for breast cancer. Patient drowsy and oriented X 3. Denies pain. Denies nausea or vomiting att.

## 2024-05-14 NOTE — ED PROVIDER NOTES
[facility]  EMERGENCY DEPARTMENT ENCOUNTER        Pt Name: Kathi Garcia  MRN: [id number]  Birthdate 1955  Date of evaluation: 5/14/2024  Provider: Renee Mccall MD  PCP: Taylor Johnson APRN - CNP  Note Started: 7:40 PM EDT 5/14/24    CHIEF COMPLAINT       Chief Complaint   Patient presents with    Diarrhea     Generalized weakness and diarrhea for one week after chemo.        HISTORY OF PRESENT ILLNESS: 1 or more Elements     History from : Patient    Limitations to history : None    Kathi Garcia is a 68 y.o. female who presents to the emergency department with weakness and diarrhea.  Patient has history of breast cancer.  She got her next the last IV infusion of chemotherapy 1 week ago.  She states since that time she has had diarrhea.  She does not typically get diarrhea with this chemo.  She is due to start radiation in the next week or so.  She states at times she has abdominal pain but not currently.  No nausea or vomiting, chest pain or shortness of breath.  She states every time she eats something she gets diarrhea.  She does not have diarrhea when she is not eating.  She has been trying to drink lots but she just feels so weak and tired.    Nursing Notes were all reviewed and agreed with or any disagreements were addressed in the HPI.    REVIEW OF SYSTEMS :      Review of Systems    10 systems reviewed and negative except as in HPI/MDM    SURGICAL HISTORY     Past Surgical History:   Procedure Laterality Date    APPENDECTOMY      CARPAL TUNNEL RELEASE Bilateral     CHOLECYSTECTOMY      GASTRIC BYPASS SURGERY      JOINT REPLACEMENT Right     HI COLONOSCOPY FLX DX W/COLLJ SPEC WHEN PFRMD N/A 08/02/2018    COLONOSCOPY DIAGNOSTIC OR SCREENING performed by David Ferreira MD at [facility] ENDOSCOPY    TOTAL KNEE ARTHROPLASTY Left 07/14/2021    TUBAL LIGATION      TUMOR REMOVAL Left     Breast CA tumor removed several lymoh nodes removed in jan. 2024        CURRENTMEDICATIONS       Previous Medications    ALBUTEROL SULFATE HFA (VENTOLIN HFA) 108 (90 BASE) MCG/ACT INHALER    Inhale 2 puffs into the lungs 4 times daily as needed for Wheezing    ASPIRIN 81 MG EC TABLET    Take 1 tablet every day by oral route.    ATORVASTATIN (LIPITOR) 20 MG TABLET    Take 1 tablet by mouth daily    BUDESONIDE-FORMOTEROL (SYMBICORT) 160-4.5 MCG/ACT AERO    Inhale 2 puffs into the lungs 2 times daily    FLUOXETINE (PROZAC) 40 MG CAPSULE    Take 1 capsule by mouth daily    FLUTICASONE (FLONASE) 50 MCG/ACT NASAL SPRAY    1 spray by Each Nostril route daily    FLUTICASONE (FLONASE) 50 MCG/ACT NASAL SPRAY    1 spray daily    FUROSEMIDE (LASIX) 20 MG TABLET    Take 1 tablet by mouth daily    FUROSEMIDE (LASIX) 40 MG TABLET        GABAPENTIN (NEURONTIN) 800 MG TABLET    Take 1 tablet by mouth 3 times daily.    LANSOPRAZOLE (PREVACID) 30 MG DELAYED RELEASE CAPSULE    Take 1 capsule by mouth daily    LEVOCETIRIZINE (XYZAL) 5 MG TABLET    Take 1 tablet by mouth nightly    LEVOTHYROXINE (SYNTHROID) 50 MCG TABLET    Take 1 tablet by mouth Daily    MELOXICAM (MOBIC) 15 MG TABLET    Take 1 tablet by mouth daily    METFORMIN (GLUCOPHAGE) 500 MG TABLET    Take 1 tablet by mouth daily    MONTELUKAST (SINGULAIR) 10 MG TABLET    Take 1 tablet by mouth daily    ONDANSETRON (ZOFRAN ODT) 4 MG DISINTEGRATING TABLET    Take 1 tablet by mouth every 8 hours as needed for Nausea or Vomiting    ONDANSETRON (ZOFRAN) 4 MG TABLET    Take 1 tablet by mouth every 6 hours as needed    OXYBUTYNIN CHLORIDE PO    Take 15 mg by mouth daily    OXYCODONE-ACETAMINOPHEN (PERCOCET) 7.5-325 MG PER TABLET    Take 1 tablet by mouth every 4 hours as needed.    POTASSIUM CHLORIDE (KLOR-CON M) 10 MEQ EXTENDED RELEASE TABLET    Take 1 tablet by mouth daily    PRAMIPEXOLE (MIRAPEX) 1 MG TABLET    Take 1 tablet by mouth nightly    TRAZODONE (DESYREL) 100 MG TABLET    Take 1 tablet by mouth nightly       ALLERGIES     Patient has no

## 2024-05-16 ENCOUNTER — HOSPITAL ENCOUNTER (EMERGENCY)
Facility: HOSPITAL | Age: 69
Discharge: HOME | End: 2024-05-16
Attending: EMERGENCY MEDICINE
Payer: MEDICARE

## 2024-05-16 ENCOUNTER — APPOINTMENT (OUTPATIENT)
Dept: GENERAL RADIOLOGY | Facility: HOSPITAL | Age: 69
End: 2024-05-16
Attending: EMERGENCY MEDICINE
Payer: MEDICARE

## 2024-05-16 VITALS
DIASTOLIC BLOOD PRESSURE: 65 MMHG | WEIGHT: 197 LBS | HEIGHT: 61 IN | RESPIRATION RATE: 24 BRPM | TEMPERATURE: 98.2 F | BODY MASS INDEX: 37.19 KG/M2 | SYSTOLIC BLOOD PRESSURE: 104 MMHG | HEART RATE: 84 BPM | OXYGEN SATURATION: 100 %

## 2024-05-16 DIAGNOSIS — I48.0 PAROXYSMAL ATRIAL FIBRILLATION (HCC): Primary | ICD-10-CM

## 2024-05-16 LAB
ANION GAP SERPL CALCULATED.3IONS-SCNC: 11 MMOL/L (ref 3–16)
BUN SERPL-MCNC: 8 MG/DL (ref 6–20)
CALCIUM SERPL-MCNC: 7.7 MG/DL (ref 8.5–10.5)
CHLORIDE SERPL-SCNC: 109 MMOL/L (ref 98–107)
CO2 SERPL-SCNC: 17 MMOL/L (ref 20–30)
CREAT SERPL-MCNC: 0.6 MG/DL (ref 0.4–1.2)
ERYTHROCYTE [DISTWIDTH] IN BLOOD BY AUTOMATED COUNT: 17.2 % (ref 11–16)
GFR SERPLBLD CREATININE-BSD FMLA CKD-EPI: >90 ML/MIN/{1.73_M2}
GLUCOSE SERPL-MCNC: 92 MG/DL (ref 74–106)
HCT VFR BLD AUTO: 25.9 % (ref 37–47)
HGB BLD-MCNC: 8 G/DL (ref 11.5–16.5)
IMM GRANULOCYTES # BLD: 0 K/UL
IMM GRANULOCYTES NFR BLD: 0 % (ref 0–5)
MAGNESIUM SERPL-MCNC: 1.7 MG/DL (ref 1.7–2.4)
MCH RBC QN AUTO: 27.9 PG (ref 27–32)
MCHC RBC AUTO-ENTMCNC: 30.9 G/DL (ref 31–35)
MCV RBC AUTO: 90.2 FL (ref 80–100)
PLATELET # BLD AUTO: 134 K/UL (ref 150–400)
PMV BLD AUTO: 11.8 FL (ref 6–10)
POTASSIUM SERPL-SCNC: 3.2 MMOL/L (ref 3.4–5.1)
RBC # BLD AUTO: 2.87 M/UL (ref 3.8–5.8)
SODIUM SERPL-SCNC: 137 MMOL/L (ref 136–145)
TROPONIN, HIGH SENSITIVITY: 17 NG/L (ref 0–14)
TROPONIN, HIGH SENSITIVITY: 18 NG/L (ref 0–14)
WBC # BLD AUTO: 0.5 K/UL (ref 4–11)

## 2024-05-16 PROCEDURE — 6370000000 HC RX 637 (ALT 250 FOR IP): Performed by: EMERGENCY MEDICINE

## 2024-05-16 PROCEDURE — 83735 ASSAY OF MAGNESIUM: CPT

## 2024-05-16 PROCEDURE — 2580000003 HC RX 258: Performed by: EMERGENCY MEDICINE

## 2024-05-16 PROCEDURE — 71045 X-RAY EXAM CHEST 1 VIEW: CPT

## 2024-05-16 PROCEDURE — 84484 ASSAY OF TROPONIN QUANT: CPT

## 2024-05-16 PROCEDURE — 99285 EMERGENCY DEPT VISIT HI MDM: CPT

## 2024-05-16 PROCEDURE — 80048 BASIC METABOLIC PNL TOTAL CA: CPT

## 2024-05-16 PROCEDURE — 96361 HYDRATE IV INFUSION ADD-ON: CPT

## 2024-05-16 PROCEDURE — 36415 COLL VENOUS BLD VENIPUNCTURE: CPT

## 2024-05-16 PROCEDURE — 96360 HYDRATION IV INFUSION INIT: CPT

## 2024-05-16 PROCEDURE — 85027 COMPLETE CBC AUTOMATED: CPT

## 2024-05-16 RX ORDER — CYCLOBENZAPRINE HCL 10 MG
10 TABLET ORAL ONCE
Status: COMPLETED | OUTPATIENT
Start: 2024-05-16 | End: 2024-05-16

## 2024-05-16 RX ORDER — METOPROLOL SUCCINATE 25 MG/1
25 TABLET, EXTENDED RELEASE ORAL DAILY
Qty: 30 TABLET | Refills: 0 | Status: SHIPPED | OUTPATIENT
Start: 2024-05-16 | End: 2024-06-15

## 2024-05-16 RX ORDER — SODIUM CHLORIDE, SODIUM LACTATE, POTASSIUM CHLORIDE, AND CALCIUM CHLORIDE .6; .31; .03; .02 G/100ML; G/100ML; G/100ML; G/100ML
1000 INJECTION, SOLUTION INTRAVENOUS ONCE
Status: DISCONTINUED | OUTPATIENT
Start: 2024-05-16 | End: 2024-05-16

## 2024-05-16 RX ORDER — POTASSIUM CHLORIDE 20 MEQ/1
40 TABLET, EXTENDED RELEASE ORAL ONCE
Status: COMPLETED | OUTPATIENT
Start: 2024-05-16 | End: 2024-05-16

## 2024-05-16 RX ORDER — CALCIUM CARBONATE 500(1250)
500 TABLET ORAL DAILY
Status: DISCONTINUED | OUTPATIENT
Start: 2024-05-16 | End: 2024-05-16 | Stop reason: HOSPADM

## 2024-05-16 RX ORDER — QUETIAPINE FUMARATE 50 MG/1
50 TABLET, EXTENDED RELEASE ORAL NIGHTLY
COMMUNITY

## 2024-05-16 RX ORDER — 0.9 % SODIUM CHLORIDE 0.9 %
1000 INTRAVENOUS SOLUTION INTRAVENOUS ONCE
Status: COMPLETED | OUTPATIENT
Start: 2024-05-16 | End: 2024-05-16

## 2024-05-16 RX ADMIN — CALCIUM 500 MG: 500 TABLET ORAL at 12:03

## 2024-05-16 RX ADMIN — APIXABAN 5 MG: 2.5 TABLET, FILM COATED ORAL at 12:40

## 2024-05-16 RX ADMIN — CYCLOBENZAPRINE 10 MG: 10 TABLET, FILM COATED ORAL at 10:53

## 2024-05-16 RX ADMIN — POTASSIUM CHLORIDE 40 MEQ: 1500 TABLET, EXTENDED RELEASE ORAL at 11:57

## 2024-05-16 RX ADMIN — SODIUM CHLORIDE 1000 ML: 9 INJECTION, SOLUTION INTRAVENOUS at 10:54

## 2024-05-16 ASSESSMENT — PAIN DESCRIPTION - PAIN TYPE: TYPE: ACUTE PAIN

## 2024-05-16 ASSESSMENT — PAIN - FUNCTIONAL ASSESSMENT: PAIN_FUNCTIONAL_ASSESSMENT: 0-10

## 2024-05-16 ASSESSMENT — PAIN SCALES - GENERAL
PAINLEVEL_OUTOF10: 5
PAINLEVEL_OUTOF10: 1
PAINLEVEL_OUTOF10: 5

## 2024-05-16 ASSESSMENT — LIFESTYLE VARIABLES: HOW OFTEN DO YOU HAVE A DRINK CONTAINING ALCOHOL: NEVER

## 2024-05-16 ASSESSMENT — PAIN DESCRIPTION - DESCRIPTORS
DESCRIPTORS: ACHING
DESCRIPTORS: ACHING

## 2024-05-16 ASSESSMENT — PAIN DESCRIPTION - ORIENTATION: ORIENTATION: LOWER

## 2024-05-16 ASSESSMENT — PAIN DESCRIPTION - LOCATION
LOCATION: BACK
LOCATION: BACK

## 2024-05-16 NOTE — ED NOTES
Pt left ED ambulatory with belongings at this time. Pt verbalized understanding of discharge instructions and follow-up. Instructed pt to not take Lopressor if SBP < 120 per Dr. Zavala, pt verbalized understanding and states she has a BP machine at home and will be able to log her BP/HR  for cardiology follow-up.

## 2024-05-16 NOTE — ED NOTES
Pt requests that I call her  brenda roman for a ride home.  Call to [phone] n/a call to [phone] voice mail.  LM

## 2024-05-16 NOTE — ED TRIAGE NOTES
Pt arrives with [facility] from [facility] with a c/o weakness, tachycardia and sob since last Thursday after her chemo treatment for breast ca.  She also has lower back pain 5/10 at this time.  Ems advises that pt has a hx of afib.  Her hr is 115 on arrival and she spontaniously converted back to a sinus rythym.  Pt is in and out of afib.

## 2024-05-16 NOTE — ED PROVIDER NOTES
results called to and read back by Guerline Brunson RN, 05/16/2024  11:12, by GILBERTO   TROPONIN - Abnormal; Notable for the following components:    Troponin, High Sensitivity 17 (*)     All other components within normal limits   TROPONIN - Abnormal; Notable for the following components:    Troponin, High Sensitivity 18 (*)     All other components within normal limits   MAGNESIUM       When ordered only abnormal lab results are displayed. All other labs were within normal range or not returned as of this dictation.    EKG: #1 when patient first arrived shows atrial fibrillation with a rate of 114 no ST changes.  Approximately 8 minutes later it was noted on the monitor that the patient had converted to sinus rhythm EKG was repeated #2 EKG shows sinus rhythm with a rate of 88 normal EKG.    RADIOLOGY:   Non-plain film images such as CT, Ultrasound and MRI are read by the radiologist. Plain radiographic images are visualized and preliminarily interpreted by the ED Provider with the below findings:        Interpretation per the Radiologist below, if available at the time of this note:    XR CHEST PORTABLE   Final Result   No acute abnormality.        No results found.    No results found.    PROCEDURES   Unless otherwise noted below, none     Procedures    CRITICAL CARE TIME       EMERGENCY DEPARTMENT COURSE and DIFFERENTIAL DIAGNOSIS/MDM:   Vitals:    Vitals:    05/16/24 1030 05/16/24 1033 05/16/24 1045 05/16/24 1055   BP: 91/72  102/68    Pulse:  88  86   Resp:    19   Temp:       TempSrc:       SpO2:   99%    Weight:       Height:           Patient was given the following medications:  Medications   sodium chloride 0.9 % bolus 1,000 mL (1,000 mLs IntraVENous New Bag 5/16/24 1054)   calcium elemental (OSCAL) tablet 500 mg (500 mg Oral Given 5/16/24 1203)   apixaban (ELIQUIS) tablet 5 mg (has no administration in time range)   metoprolol tartrate (LOPRESSOR) tablet 25 mg (has no administration in time range)

## 2024-05-17 NOTE — CARE COORDINATION
Spoke with patient in follow up call and she reports that she is still feeling weak due to chemo but that her hear doesn't \"feel funny\" any more. She is monitoring her blood pressure with her home machine and will take her blood pressure medicine as instructed and follow up with her PCP.

## 2024-05-31 ENCOUNTER — HOSPITAL ENCOUNTER (OUTPATIENT)
Facility: HOSPITAL | Age: 69
Discharge: HOME | End: 2024-05-31
Payer: MEDICARE

## 2024-05-31 ENCOUNTER — HOSPITAL ENCOUNTER (EMERGENCY)
Facility: HOSPITAL | Age: 69
Discharge: HOME | End: 2024-06-01
Attending: EMERGENCY MEDICINE
Payer: MEDICARE

## 2024-05-31 DIAGNOSIS — D61.818 PANCYTOPENIA (HCC): ICD-10-CM

## 2024-05-31 DIAGNOSIS — C50.919 BREAST CANCER METASTASIZED TO MULTIPLE SITES, UNSPECIFIED LATERALITY (HCC): ICD-10-CM

## 2024-05-31 DIAGNOSIS — D64.9 ANEMIA, UNSPECIFIED TYPE: Primary | ICD-10-CM

## 2024-05-31 DIAGNOSIS — E87.6 HYPOKALEMIA: ICD-10-CM

## 2024-05-31 LAB
ABO + RH BLD: NORMAL
ALBUMIN SERPL-MCNC: 3 G/DL (ref 3.4–4.8)
ALBUMIN/GLOB SERPL: 1.5 {RATIO} (ref 0.8–2)
ALP SERPL-CCNC: 86 U/L (ref 25–100)
ALT SERPL-CCNC: 9 U/L (ref 4–36)
ANION GAP SERPL CALCULATED.3IONS-SCNC: 11 MMOL/L (ref 3–16)
AST SERPL-CCNC: 8 U/L (ref 8–33)
BASOPHILS # BLD: 0 K/UL (ref 0–0.1)
BASOPHILS NFR BLD: 1.8 %
BILIRUB SERPL-MCNC: 0.3 MG/DL (ref 0.3–1.2)
BLD GP AB SCN SERPL QL: NORMAL
BUN SERPL-MCNC: 4 MG/DL (ref 6–20)
CALCIUM SERPL-MCNC: 7.6 MG/DL (ref 8.5–10.5)
CHLORIDE SERPL-SCNC: 106 MMOL/L (ref 98–107)
CO2 SERPL-SCNC: 20 MMOL/L (ref 20–30)
CREAT SERPL-MCNC: 0.6 MG/DL (ref 0.4–1.2)
EOSINOPHIL # BLD: 0 K/UL (ref 0–0.4)
EOSINOPHIL NFR BLD: 0.9 %
ERYTHROCYTE [DISTWIDTH] IN BLOOD BY AUTOMATED COUNT: 18.1 % (ref 11–16)
GFR SERPLBLD CREATININE-BSD FMLA CKD-EPI: >90 ML/MIN/{1.73_M2}
GLOBULIN SER CALC-MCNC: 2 G/DL
GLUCOSE SERPL-MCNC: 110 MG/DL (ref 74–106)
HCT VFR BLD AUTO: 20.4 % (ref 37–47)
HGB BLD-MCNC: 6.5 G/DL (ref 11.5–16.5)
IMM GRANULOCYTES # BLD: 0.1 K/UL
IMM GRANULOCYTES NFR BLD: 2.7 % (ref 0–5)
LACTATE BLDV-SCNC: 1.8 MMOL/L (ref 0.4–2)
LYMPHOCYTES # BLD: 0.3 K/UL (ref 1.5–4)
LYMPHOCYTES NFR BLD: 14.7 %
MAGNESIUM SERPL-MCNC: 1.7 MG/DL (ref 1.7–2.4)
MCH RBC QN AUTO: 28 PG (ref 27–32)
MCHC RBC AUTO-ENTMCNC: 31.9 G/DL (ref 31–35)
MCV RBC AUTO: 87.9 FL (ref 80–100)
MONOCYTES # BLD: 0.4 K/UL (ref 0.2–0.8)
MONOCYTES NFR BLD: 19.6 %
NEUTROPHILS # BLD: 1.4 K/UL (ref 2–7.5)
NEUTS SEG NFR BLD: 60.3 %
PLATELET # BLD AUTO: 53 K/UL (ref 150–400)
PMV BLD AUTO: 13 FL (ref 6–10)
POTASSIUM SERPL-SCNC: 2.6 MMOL/L (ref 3.4–5.1)
PROCALCITONIN SERPL IA-MCNC: 0.13 NG/ML (ref 0–0.15)
PROT SERPL-MCNC: 5 G/DL (ref 6.4–8.3)
RBC # BLD AUTO: 2.32 M/UL (ref 3.8–5.8)
SODIUM SERPL-SCNC: 137 MMOL/L (ref 136–145)
WBC # BLD AUTO: 2.3 K/UL (ref 4–11)

## 2024-05-31 PROCEDURE — 87506 IADNA-DNA/RNA PROBE TQ 6-11: CPT

## 2024-05-31 PROCEDURE — 83735 ASSAY OF MAGNESIUM: CPT

## 2024-05-31 PROCEDURE — 86920 COMPATIBILITY TEST SPIN: CPT

## 2024-05-31 PROCEDURE — 99285 EMERGENCY DEPT VISIT HI MDM: CPT

## 2024-05-31 PROCEDURE — 85025 COMPLETE CBC W/AUTO DIFF WBC: CPT

## 2024-05-31 PROCEDURE — 6370000000 HC RX 637 (ALT 250 FOR IP)

## 2024-05-31 PROCEDURE — P9016 RBC LEUKOCYTES REDUCED: HCPCS

## 2024-05-31 PROCEDURE — 86901 BLOOD TYPING SEROLOGIC RH(D): CPT

## 2024-05-31 PROCEDURE — 36415 COLL VENOUS BLD VENIPUNCTURE: CPT

## 2024-05-31 PROCEDURE — 84145 PROCALCITONIN (PCT): CPT

## 2024-05-31 PROCEDURE — 87635 SARS-COV-2 COVID-19 AMP PRB: CPT

## 2024-05-31 PROCEDURE — 2580000003 HC RX 258: Performed by: EMERGENCY MEDICINE

## 2024-05-31 PROCEDURE — 86850 RBC ANTIBODY SCREEN: CPT

## 2024-05-31 PROCEDURE — 86900 BLOOD TYPING SEROLOGIC ABO: CPT

## 2024-05-31 PROCEDURE — 80053 COMPREHEN METABOLIC PANEL: CPT

## 2024-05-31 PROCEDURE — 83605 ASSAY OF LACTIC ACID: CPT

## 2024-05-31 RX ORDER — POTASSIUM CHLORIDE 20 MEQ/1
40 TABLET, EXTENDED RELEASE ORAL ONCE
Status: COMPLETED | OUTPATIENT
Start: 2024-05-31 | End: 2024-05-31

## 2024-05-31 RX ORDER — POTASSIUM CHLORIDE 20 MEQ/1
TABLET, EXTENDED RELEASE ORAL
Status: COMPLETED
Start: 2024-05-31 | End: 2024-05-31

## 2024-05-31 RX ORDER — 0.9 % SODIUM CHLORIDE 0.9 %
1000 INTRAVENOUS SOLUTION INTRAVENOUS ONCE
Status: COMPLETED | OUTPATIENT
Start: 2024-05-31 | End: 2024-06-01

## 2024-05-31 RX ORDER — SODIUM CHLORIDE 9 MG/ML
INJECTION, SOLUTION INTRAVENOUS PRN
Status: DISCONTINUED | OUTPATIENT
Start: 2024-05-31 | End: 2024-06-01 | Stop reason: HOSPADM

## 2024-05-31 RX ADMIN — POTASSIUM CHLORIDE 40 MEQ: 1500 TABLET, EXTENDED RELEASE ORAL at 23:44

## 2024-05-31 RX ADMIN — SODIUM CHLORIDE 1000 ML: 9 INJECTION, SOLUTION INTRAVENOUS at 23:15

## 2024-05-31 RX ADMIN — POTASSIUM CHLORIDE 40 MEQ: 20 TABLET, EXTENDED RELEASE ORAL at 23:44

## 2024-05-31 ASSESSMENT — PAIN - FUNCTIONAL ASSESSMENT: PAIN_FUNCTIONAL_ASSESSMENT: 0-10

## 2024-05-31 ASSESSMENT — LIFESTYLE VARIABLES: HOW OFTEN DO YOU HAVE A DRINK CONTAINING ALCOHOL: NEVER

## 2024-05-31 ASSESSMENT — PAIN SCALES - GENERAL: PAINLEVEL_OUTOF10: 0

## 2024-06-01 VITALS
WEIGHT: 187 LBS | HEART RATE: 77 BPM | DIASTOLIC BLOOD PRESSURE: 73 MMHG | HEIGHT: 61 IN | RESPIRATION RATE: 20 BRPM | SYSTOLIC BLOOD PRESSURE: 105 MMHG | BODY MASS INDEX: 35.3 KG/M2 | OXYGEN SATURATION: 97 % | TEMPERATURE: 98.7 F

## 2024-06-01 LAB
BACTERIA URNS QL MICRO: ABNORMAL /HPF
BILIRUB UR QL STRIP.AUTO: NEGATIVE
CLARITY UR: CLEAR
COLOR UR: YELLOW
EPI CELLS #/AREA URNS HPF: ABNORMAL /HPF (ref 0–5)
GI PATHOGENS PNL STL NAA+PROBE: ABNORMAL
GI PATHOGENS PNL STL NAA+PROBE: ABNORMAL
GLUCOSE UR STRIP.AUTO-MCNC: NEGATIVE MG/DL
HCT VFR BLD AUTO: 24.6 % (ref 37–47)
HGB BLD-MCNC: 7.8 G/DL (ref 11.5–16.5)
HGB UR QL STRIP.AUTO: NEGATIVE
KETONES UR STRIP.AUTO-MCNC: NEGATIVE MG/DL
LEUKOCYTE ESTERASE UR QL STRIP.AUTO: ABNORMAL
NITRITE UR QL STRIP.AUTO: NEGATIVE
ORGANISM: ABNORMAL
PH UR STRIP.AUTO: 6 [PH] (ref 5–8)
PROT UR STRIP.AUTO-MCNC: NEGATIVE MG/DL
RBC #/AREA URNS HPF: ABNORMAL /HPF (ref 0–4)
SARS-COV-2 RDRP RESP QL NAA+PROBE: NOT DETECTED
SP GR UR STRIP.AUTO: 1.01 (ref 1–1.03)
UA DIPSTICK W REFLEX MICRO PNL UR: YES
URN SPEC COLLECT METH UR: ABNORMAL
UROBILINOGEN UR STRIP-ACNC: 0.2 E.U./DL
WBC #/AREA URNS HPF: ABNORMAL /HPF (ref 0–5)

## 2024-06-01 PROCEDURE — 85018 HEMOGLOBIN: CPT

## 2024-06-01 PROCEDURE — 85014 HEMATOCRIT: CPT

## 2024-06-01 PROCEDURE — 36415 COLL VENOUS BLD VENIPUNCTURE: CPT

## 2024-06-01 PROCEDURE — 36430 TRANSFUSION BLD/BLD COMPNT: CPT

## 2024-06-01 PROCEDURE — 81001 URINALYSIS AUTO W/SCOPE: CPT

## 2024-06-01 PROCEDURE — 6370000000 HC RX 637 (ALT 250 FOR IP): Performed by: EMERGENCY MEDICINE

## 2024-06-01 RX ORDER — POTASSIUM CHLORIDE 20 MEQ/1
40 TABLET, EXTENDED RELEASE ORAL ONCE
Status: COMPLETED | OUTPATIENT
Start: 2024-06-01 | End: 2024-06-01

## 2024-06-01 RX ORDER — ONDANSETRON 4 MG/1
4 TABLET, ORALLY DISINTEGRATING ORAL 3 TIMES DAILY PRN
Qty: 21 TABLET | Refills: 0 | Status: SHIPPED | OUTPATIENT
Start: 2024-06-01

## 2024-06-01 RX ADMIN — POTASSIUM CHLORIDE 40 MEQ: 1500 TABLET, EXTENDED RELEASE ORAL at 05:23

## 2024-06-01 NOTE — ED NOTES
Received call from dr alves from the [facility].  Pt was there today.  Dr alves advises that pt will need to stay overnight, needs her potassium and magnesium checked and will also need blood.  Her k+ is 2.6 H+H 7.1/21.9.  she had her last chemo treatment on 5-22-24.  It is unusual that she should still have diarrhea from her treatments.  Maybe she has something else going on per dr alves, like cdiff.      I did relay this report to dr ureña.

## 2024-06-01 NOTE — ED NOTES
Port accessed-right chest region per Jaye Albrecht per protocol.Pt. tolerated well,labs obtained/sent to lab.

## 2024-06-01 NOTE — DISCHARGE INSTRUCTIONS
Take your medicine as directed.  Maintain hydration and nutrition.  Follow-up with your doctors in the next 3 to 4 days for reassessment.  Rest and fluids.  Plenty of JUICE and bananas.  Take potassium p.o. once daily.  Return to the ER for reassessment if your condition worsens.

## 2024-06-01 NOTE — ED PROVIDER NOTES
[facility]  EMERGENCY DEPARTMENT ENCOUNTER        Pt Name: Kathi Garcia  MRN: [id number]  Birthdate 1955  Date of evaluation: 5/31/2024  Provider: Fredo Mckinney MD  PCP: Taylor Johnson APRN - CNP  Note Started: 10:51 PM EDT 5/31/24    CHIEF COMPLAINT       Chief Complaint   Patient presents with    abnormal labs    Diarrhea       HISTORY OF PRESENT ILLNESS: 1 or more Elements     History from : Patient    Limitations to history : None    Kathi Garcia is a 68 y.o. female who presents with history of low potassium low magnesium and anemia.  Sent to the ER by her physician for IV fluids magnesium replacement potassium replacement and a blood transfusion.  Discussed this with the staff here.  I was informed that patient can receive 1 unit of blood and then reassess her blood counts before second unit can be done.  If the patient's blood count is above 7 then a second blood transfusion would not be given due to the national shortage.  Patient on arrival to the ER is awake alert feels a little weak but is able to give a history and has no complaint of nausea vomiting or diarrhea.  She has a history of metastatic breast cancer and is on chemotherapy for that.  She also has a history of atrial fibrillation which started after chemotherapy.  Her physician took her off the blood thinner due to possibility of bleeding.  She has no complaint of chest pain.  No history of respiratory problems.    Nursing Notes were all reviewed and agreed with or any disagreements were addressed in the HPI.    REVIEW OF SYSTEMS :      Review of Systems    All systems reviewed and negative except as in HPI/MDM    PAST MEDICAL HISTORY     Past Medical History:   Diagnosis Date    Arthritis     Breast cancer (HCC)     spread lymph nodes    Depression     Thyroid disease     UTI (urinary tract infection)        SURGICAL HISTORY     Past Surgical History:   Procedure Laterality Date    APPENDECTOMY       CARPAL TUNNEL RELEASE Bilateral     CHOLECYSTECTOMY      GASTRIC BYPASS SURGERY      JOINT REPLACEMENT Right     RI COLONOSCOPY FLX DX W/COLLJ SPEC WHEN PFRMD N/A 08/02/2018    COLONOSCOPY DIAGNOSTIC OR SCREENING performed by David Ferreira MD at [facility] ENDOSCOPY    TOTAL KNEE ARTHROPLASTY Left 07/14/2021    TUBAL LIGATION      TUMOR REMOVAL Left     Breast CA tumor removed several lymoh nodes removed in jan. 2024       CURRENTMEDICATIONS       Discharge Medication List as of 6/1/2024  5:27 AM        CONTINUE these medications which have NOT CHANGED    Details   QUEtiapine (SEROQUEL XR) 50 MG extended release tablet Take 1 tablet by mouth nightlyHistorical Med      apixaban (ELIQUIS) 5 MG TABS tablet Take 1 tablet by mouth 2 times daily, Disp-60 tablet, R-0Normal      metoprolol succinate (TOPROL XL) 25 MG extended release tablet Take 1 tablet by mouth daily, Disp-30 tablet, R-0Normal      albuterol sulfate HFA (VENTOLIN HFA) 108 (90 Base) MCG/ACT inhaler Inhale 2 puffs into the lungs 4 times daily as needed for Wheezing, Disp-54 g, R-0Normal      aspirin 81 MG EC tablet Take 1 tablet every day by oral route.Historical Med      metFORMIN (GLUCOPHAGE) 500 MG tablet Take 1 tablet by mouth dailyHistorical Med      oxyCODONE-acetaminophen (PERCOCET) 7.5-325 MG per tablet Take 1 tablet by mouth every 4 hours as needed.Historical Med      !! furosemide (LASIX) 40 MG tablet Historical Med      !! furosemide (LASIX) 20 MG tablet Take 1 tablet by mouth daily, Disp-20 tablet, R-0Normal      potassium chloride (KLOR-CON M) 10 MEQ extended release tablet Take 1 tablet by mouth daily, Disp-20 tablet, R-0Normal      levocetirizine (XYZAL) 5 MG tablet Take 1 tablet by mouth nightlyHistorical Med      budesonide-formoterol (SYMBICORT) 160-4.5 MCG/ACT AERO Inhale 2 puffs into the lungs 2 times dailyHistorical Med      fluticasone (FLONASE) 50 MCG/ACT nasal spray 1 spray by Each Nostril route dailyHistorical Med      atorvastatin

## 2024-06-01 NOTE — ED TRIAGE NOTES
Pt states she is here because her doctor called her and told her to come here.  That her hemoglobin and potassium are dangerously low.  Pt is also having diarrhea and trouble sleeping.  States \"I can't stay asleep\".

## 2024-06-01 NOTE — CONSENT
Informed Consent for Blood Component Transfusion Note    I have discussed with the patient the rationale for blood component transfusion; its benefits in treating or preventing fatigue, organ damage, or death; and its risk which includes mild transfusion reactions, rare risk of blood borne infection, or more serious but rare reactions. I have discussed the alternatives to transfusion, including the risk and consequences of not receiving transfusion. The patient had an opportunity to ask questions and had agreed to proceed with transfusion of blood components.    Electronically signed by Fredo Mckinney MD on 6/1/24 at 6:56 AM EDT

## 2024-06-17 ENCOUNTER — HOSPITAL ENCOUNTER (OUTPATIENT)
Facility: HOSPITAL | Age: 69
Discharge: HOME | End: 2024-06-17
Payer: MEDICARE

## 2024-06-17 PROCEDURE — 87086 URINE CULTURE/COLONY COUNT: CPT

## 2024-06-18 LAB — BACTERIA UR CULT: NORMAL

## 2024-06-28 ENCOUNTER — HOSPITAL ENCOUNTER (OUTPATIENT)
Facility: HOSPITAL | Age: 69
Discharge: HOME | End: 2024-06-28
Payer: MEDICARE

## 2024-06-28 PROCEDURE — 87086 URINE CULTURE/COLONY COUNT: CPT

## 2024-06-30 LAB — BACTERIA UR CULT: NORMAL

## 2024-07-21 ENCOUNTER — HOSPITAL ENCOUNTER (EMERGENCY)
Facility: HOSPITAL | Age: 69
Discharge: HOME | End: 2024-07-21
Attending: EMERGENCY MEDICINE
Payer: MEDICARE

## 2024-07-21 VITALS
DIASTOLIC BLOOD PRESSURE: 76 MMHG | OXYGEN SATURATION: 98 % | HEART RATE: 81 BPM | RESPIRATION RATE: 18 BRPM | BODY MASS INDEX: 37.38 KG/M2 | HEIGHT: 61 IN | SYSTOLIC BLOOD PRESSURE: 106 MMHG | TEMPERATURE: 98.5 F | WEIGHT: 198 LBS

## 2024-07-21 DIAGNOSIS — R53.1 GENERAL WEAKNESS: Primary | ICD-10-CM

## 2024-07-21 DIAGNOSIS — R53.83 OTHER FATIGUE: ICD-10-CM

## 2024-07-21 LAB
ANION GAP SERPL CALCULATED.3IONS-SCNC: 11 MMOL/L (ref 3–16)
BASOPHILS # BLD: 0 K/UL (ref 0–0.1)
BASOPHILS NFR BLD: 1 %
BUN SERPL-MCNC: 12 MG/DL (ref 6–20)
CALCIUM SERPL-MCNC: 8.7 MG/DL (ref 8.5–10.5)
CHLORIDE SERPL-SCNC: 105 MMOL/L (ref 98–107)
CO2 SERPL-SCNC: 22 MMOL/L (ref 20–30)
CREAT SERPL-MCNC: 0.7 MG/DL (ref 0.4–1.2)
EOSINOPHIL # BLD: 0.1 K/UL (ref 0–0.4)
EOSINOPHIL NFR BLD: 2.1 %
ERYTHROCYTE [DISTWIDTH] IN BLOOD BY AUTOMATED COUNT: 14.9 % (ref 11–16)
GFR SERPLBLD CREATININE-BSD FMLA CKD-EPI: >90 ML/MIN/{1.73_M2}
GLUCOSE SERPL-MCNC: 95 MG/DL (ref 74–106)
HCT VFR BLD AUTO: 30.8 % (ref 37–47)
HGB BLD-MCNC: 9.8 G/DL (ref 11.5–16.5)
IMM GRANULOCYTES # BLD: 0 K/UL
IMM GRANULOCYTES NFR BLD: 0.3 % (ref 0–5)
LYMPHOCYTES # BLD: 0.8 K/UL (ref 1.5–4)
LYMPHOCYTES NFR BLD: 26.5 %
MAGNESIUM SERPL-MCNC: 2 MG/DL (ref 1.7–2.4)
MCH RBC QN AUTO: 30.9 PG (ref 27–32)
MCHC RBC AUTO-ENTMCNC: 31.8 G/DL (ref 31–35)
MCV RBC AUTO: 97.2 FL (ref 80–100)
MONOCYTES # BLD: 0.1 K/UL (ref 0.2–0.8)
MONOCYTES NFR BLD: 3.8 %
NEUTROPHILS # BLD: 1.9 K/UL (ref 2–7.5)
NEUTS SEG NFR BLD: 66.3 %
PLATELET # BLD AUTO: 218 K/UL (ref 150–400)
PMV BLD AUTO: 10.3 FL (ref 6–10)
POTASSIUM SERPL-SCNC: 3.9 MMOL/L (ref 3.4–5.1)
RBC # BLD AUTO: 3.17 M/UL (ref 3.8–5.8)
SODIUM SERPL-SCNC: 138 MMOL/L (ref 136–145)
WBC # BLD AUTO: 2.9 K/UL (ref 4–11)

## 2024-07-21 PROCEDURE — 83735 ASSAY OF MAGNESIUM: CPT

## 2024-07-21 PROCEDURE — 6360000002 HC RX W HCPCS

## 2024-07-21 PROCEDURE — 2580000003 HC RX 258: Performed by: EMERGENCY MEDICINE

## 2024-07-21 PROCEDURE — 99284 EMERGENCY DEPT VISIT MOD MDM: CPT

## 2024-07-21 PROCEDURE — 36415 COLL VENOUS BLD VENIPUNCTURE: CPT

## 2024-07-21 PROCEDURE — 85025 COMPLETE CBC W/AUTO DIFF WBC: CPT

## 2024-07-21 PROCEDURE — 80048 BASIC METABOLIC PNL TOTAL CA: CPT

## 2024-07-21 RX ORDER — HEPARIN 100 UNIT/ML
SYRINGE INTRAVENOUS
Status: COMPLETED
Start: 2024-07-21 | End: 2024-07-21

## 2024-07-21 RX ORDER — 0.9 % SODIUM CHLORIDE 0.9 %
1000 INTRAVENOUS SOLUTION INTRAVENOUS ONCE
Status: COMPLETED | OUTPATIENT
Start: 2024-07-21 | End: 2024-07-21

## 2024-07-21 RX ORDER — HEPARIN 100 UNIT/ML
300 SYRINGE INTRAVENOUS ONCE
Status: COMPLETED | OUTPATIENT
Start: 2024-07-21 | End: 2024-07-21

## 2024-07-21 RX ADMIN — SODIUM CHLORIDE 1000 ML: 9 INJECTION, SOLUTION INTRAVENOUS at 20:18

## 2024-07-21 RX ADMIN — HEPARIN 300 UNITS: 100 SYRINGE at 21:20

## 2024-07-21 NOTE — ED TRIAGE NOTES
Pt spoke with Oncologist, Mona Buitrago. She advised to be seen because the last time she was weak she need blood and her BP was low.

## 2024-07-21 NOTE — ED PROVIDER NOTES
spent performing procedures but includes time spent on direct patient care, history retrieval, review of the chart, and discussions with patient, family, and consultant(s).  EMERGENCY DEPARTMENT COURSE and DIFFERENTIAL DIAGNOSIS/MDM:   CONSULTS: (Who and what was discussed)  None  Discussion with Other Profesionals : None  Social Determinants Significantly Affecting Health : None  Chronic Conditions: see medical history  Records Reviewed : None    Patient was given the following medications:  Orders Placed This Encounter   Medications    sodium chloride 0.9 % bolus 1,000 mL    heparin (PF) 100 UNIT/ML injection 300 Units    heparin (PF) 100 UNIT/ML injection     Tricia Torres: cabinet override     INITIAL VITALS: BP: 106/76, Temp: 98.5 °F (36.9 °C), Pulse: 81, Respirations: 18, SpO2: 98 %   RECENT VITALS:  BP: 106/76,Temp: 98.5 °F (36.9 °C), Pulse: 81, Respirations: 18, SpO2: 98 %     Is this patient to be included in the SEP-1 Core Measure due to severe sepsis or septic shock?   No   Exclusion criteria - the patient is NOT to be included for SEP-1 Core Measure due to:  2+ SIRS criteria are not met  CC/HPI Summary, DDx, ED Course, and Reassessment:   Kathi Garcia is a 68 y.o. female who presents to the emergency department secondary to concern for symptoms as noted in HPI above.     On presentation she is in no acute distress.  Lab workup did show a leukopenia of 2.9 which correlates with patient's history of breast cancer receiving chemotherapy treatment.  Her hemoglobin is 9.8 and does not require blood transfusion at this time.  No evidence of acute kidney injury and no significant electrolyte normalities.  Patient was given IV fluid bolus and her symptoms did significantly improve on reexamination.  She was notified of her lab results and was given reassurance.  Patient does feel comfortable going home at this time.  She is stable for discharge.    A peripheral IV was placed, labs were ordered.    After

## 2024-07-22 NOTE — ED NOTES
Discharge instructions provided to patient. Port flushed with hep flush and de-accessed per protocol. Patient verbalizes understanding of d/c plan and follow up care. Patient ambulatory off unit to POV at this time with no further needs noted.

## 2024-07-26 ENCOUNTER — HOSPITAL ENCOUNTER (OUTPATIENT)
Facility: HOSPITAL | Age: 69
Discharge: HOME | End: 2024-07-26
Payer: MEDICARE

## 2024-07-26 ENCOUNTER — HOSPITAL ENCOUNTER (OUTPATIENT)
Dept: GENERAL RADIOLOGY | Facility: HOSPITAL | Age: 69
Discharge: HOME | End: 2024-07-26
Payer: MEDICARE

## 2024-07-26 DIAGNOSIS — R31.9 HEMATURIA, UNSPECIFIED TYPE: ICD-10-CM

## 2024-07-26 LAB
ALBUMIN SERPL-MCNC: 3.6 G/DL (ref 3.4–4.8)
ALBUMIN/GLOB SERPL: 1.4 {RATIO} (ref 0.8–2)
ALP SERPL-CCNC: 69 U/L (ref 25–100)
ALT SERPL-CCNC: 12 U/L (ref 4–36)
AMORPH SED URNS QL MICRO: ABNORMAL /HPF
ANION GAP SERPL CALCULATED.3IONS-SCNC: 9 MMOL/L (ref 3–16)
AST SERPL-CCNC: 13 U/L (ref 8–33)
BILIRUB SERPL-MCNC: 0.9 MG/DL (ref 0.3–1.2)
BILIRUB UR QL STRIP.AUTO: NEGATIVE
BUN SERPL-MCNC: 19 MG/DL (ref 6–20)
CALCIUM SERPL-MCNC: 8.4 MG/DL (ref 8.5–10.5)
CHLORIDE SERPL-SCNC: 110 MMOL/L (ref 98–107)
CLARITY UR: CLEAR
CO2 SERPL-SCNC: 23 MMOL/L (ref 20–30)
COLOR UR: YELLOW
CREAT SERPL-MCNC: 0.7 MG/DL (ref 0.4–1.2)
EPI CELLS #/AREA URNS HPF: ABNORMAL /HPF (ref 0–5)
GFR SERPLBLD CREATININE-BSD FMLA CKD-EPI: >90 ML/MIN/{1.73_M2}
GLOBULIN SER CALC-MCNC: 2.6 G/DL
GLUCOSE SERPL-MCNC: 96 MG/DL (ref 74–106)
GLUCOSE UR STRIP.AUTO-MCNC: NEGATIVE MG/DL
HGB UR QL STRIP.AUTO: NEGATIVE
KETONES UR STRIP.AUTO-MCNC: NEGATIVE MG/DL
LEUKOCYTE ESTERASE UR QL STRIP.AUTO: ABNORMAL
MUCOUS THREADS URNS QL MICRO: ABNORMAL /LPF
NITRITE UR QL STRIP.AUTO: NEGATIVE
PH UR STRIP.AUTO: 5.5 [PH] (ref 5–8)
POTASSIUM SERPL-SCNC: 4.4 MMOL/L (ref 3.4–5.1)
PROT SERPL-MCNC: 6.2 G/DL (ref 6.4–8.3)
PROT UR STRIP.AUTO-MCNC: NEGATIVE MG/DL
RBC #/AREA URNS HPF: ABNORMAL /HPF (ref 0–4)
SODIUM SERPL-SCNC: 142 MMOL/L (ref 136–145)
SP GR UR STRIP.AUTO: 1.02 (ref 1–1.03)
UA COMPLETE W REFLEX CULTURE PNL UR: ABNORMAL
UA DIPSTICK W REFLEX MICRO PNL UR: YES
URN SPEC COLLECT METH UR: ABNORMAL
UROBILINOGEN UR STRIP-ACNC: 0.2 E.U./DL
WBC #/AREA URNS HPF: ABNORMAL /HPF (ref 0–5)

## 2024-07-26 PROCEDURE — 36415 COLL VENOUS BLD VENIPUNCTURE: CPT

## 2024-07-26 PROCEDURE — 81001 URINALYSIS AUTO W/SCOPE: CPT

## 2024-07-26 PROCEDURE — 74018 RADEX ABDOMEN 1 VIEW: CPT

## 2024-07-26 PROCEDURE — 80053 COMPREHEN METABOLIC PANEL: CPT

## 2024-10-18 ENCOUNTER — APPOINTMENT (OUTPATIENT)
Dept: CT IMAGING | Facility: HOSPITAL | Age: 69
End: 2024-10-18
Payer: OTHER MISCELLANEOUS

## 2024-10-18 ENCOUNTER — HOSPITAL ENCOUNTER (EMERGENCY)
Facility: HOSPITAL | Age: 69
Discharge: HOME | End: 2024-10-18
Attending: STUDENT IN AN ORGANIZED HEALTH CARE EDUCATION/TRAINING PROGRAM
Payer: OTHER MISCELLANEOUS

## 2024-10-18 VITALS
WEIGHT: 187 LBS | HEART RATE: 78 BPM | OXYGEN SATURATION: 98 % | DIASTOLIC BLOOD PRESSURE: 99 MMHG | RESPIRATION RATE: 20 BRPM | SYSTOLIC BLOOD PRESSURE: 141 MMHG | BODY MASS INDEX: 35.3 KG/M2 | TEMPERATURE: 97.7 F | HEIGHT: 61 IN

## 2024-10-18 DIAGNOSIS — R07.89 RIGHT-SIDED CHEST WALL PAIN: ICD-10-CM

## 2024-10-18 DIAGNOSIS — M54.50 ACUTE MIDLINE LOW BACK PAIN WITHOUT SCIATICA: ICD-10-CM

## 2024-10-18 DIAGNOSIS — S16.1XXA STRAIN OF NECK MUSCLE, INITIAL ENCOUNTER: ICD-10-CM
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
## 2024-10-18 LAB
ANION GAP SERPL CALCULATED.3IONS-SCNC: 10.9 MMOL/L (ref 5–15)
BASOPHILS # BLD AUTO: 0.07 10*3/MM3 (ref 0–0.2)
BASOPHILS NFR BLD AUTO: 1.7 % (ref 0–1.5)
BUN SERPL-MCNC: 10 MG/DL (ref 8–23)
BUN/CREAT SERPL: 10.3 (ref 7–25)
CALCIUM SPEC-SCNC: 9.1 MG/DL (ref 8.6–10.5)
CHLORIDE SERPL-SCNC: 105 MMOL/L (ref 98–107)
CO2 SERPL-SCNC: 22.1 MMOL/L (ref 22–29)
CREAT SERPL-MCNC: 0.97 MG/DL (ref 0.57–1)
DEPRECATED RDW RBC AUTO: 45.3 FL (ref 37–54)
EGFRCR SERPLBLD CKD-EPI 2021: 64.2 ML/MIN/1.73
EOSINOPHIL # BLD AUTO: 0.19 10*3/MM3 (ref 0–0.4)
EOSINOPHIL NFR BLD AUTO: 4.6 % (ref 0.3–6.2)
ERYTHROCYTE [DISTWIDTH] IN BLOOD BY AUTOMATED COUNT: 13.2 % (ref 12.3–15.4)
GLUCOSE SERPL-MCNC: 91 MG/DL (ref 65–99)
HCT VFR BLD AUTO: 33.5 % (ref 34–46.6)
HGB BLD-MCNC: 10.8 G/DL (ref 12–15.9)
IMM GRANULOCYTES # BLD AUTO: 0.02 10*3/MM3 (ref 0–0.05)
IMM GRANULOCYTES NFR BLD AUTO: 0.5 % (ref 0–0.5)
LYMPHOCYTES # BLD AUTO: 0.67 10*3/MM3 (ref 0.7–3.1)
LYMPHOCYTES NFR BLD AUTO: 16.4 % (ref 19.6–45.3)
MCH RBC QN AUTO: 30.3 PG (ref 26.6–33)
MCHC RBC AUTO-ENTMCNC: 32.2 G/DL (ref 31.5–35.7)
MCV RBC AUTO: 93.8 FL (ref 79–97)
MONOCYTES # BLD AUTO: 0.28 10*3/MM3 (ref 0.1–0.9)
MONOCYTES NFR BLD AUTO: 6.8 % (ref 5–12)
NEUTROPHILS NFR BLD AUTO: 2.86 10*3/MM3 (ref 1.7–7)
NEUTROPHILS NFR BLD AUTO: 70 % (ref 42.7–76)
NRBC BLD AUTO-RTO: 0 /100 WBC (ref 0–0.2)
PLATELET # BLD AUTO: 230 10*3/MM3 (ref 140–450)
PMV BLD AUTO: 11.3 FL (ref 6–12)
POTASSIUM SERPL-SCNC: 4.2 MMOL/L (ref 3.5–5.2)
RBC # BLD AUTO: 3.57 10*6/MM3 (ref 3.77–5.28)
SODIUM SERPL-SCNC: 138 MMOL/L (ref 136–145)
WBC NRBC COR # BLD AUTO: 4.09 10*3/MM3 (ref 3.4–10.8)

## 2024-10-18 PROCEDURE — 25510000001 IOPAMIDOL 61 % SOLUTION: Performed by: STUDENT IN AN ORGANIZED HEALTH CARE EDUCATION/TRAINING PROGRAM

## 2024-10-18 PROCEDURE — 71260 CT THORAX DX C+: CPT

## 2024-10-18 PROCEDURE — 72125 CT NECK SPINE W/O DYE: CPT

## 2024-10-18 PROCEDURE — 74177 CT ABD & PELVIS W/CONTRAST: CPT

## 2024-10-18 PROCEDURE — 99285 EMERGENCY DEPT VISIT HI MDM: CPT

## 2024-10-18 PROCEDURE — 70450 CT HEAD/BRAIN W/O DYE: CPT

## 2024-10-18 PROCEDURE — 36415 COLL VENOUS BLD VENIPUNCTURE: CPT

## 2024-10-18 PROCEDURE — 25010000002 HEPARIN LOCK FLUSH PER 10 UNITS: Performed by: STUDENT IN AN ORGANIZED HEALTH CARE EDUCATION/TRAINING PROGRAM

## 2024-10-18 PROCEDURE — 85025 COMPLETE CBC W/AUTO DIFF WBC: CPT | Performed by: STUDENT IN AN ORGANIZED HEALTH CARE EDUCATION/TRAINING PROGRAM

## 2024-10-18 PROCEDURE — 80048 BASIC METABOLIC PNL TOTAL CA: CPT | Performed by: STUDENT IN AN ORGANIZED HEALTH CARE EDUCATION/TRAINING PROGRAM

## 2024-10-18 RX ORDER — HEPARIN SODIUM (PORCINE) LOCK FLUSH IV SOLN 100 UNIT/ML 100 UNIT/ML
300 SOLUTION INTRAVENOUS ONCE
Status: COMPLETED | OUTPATIENT
Start: 2024-10-18 | End: 2024-10-18

## 2024-10-18 RX ORDER — IOPAMIDOL 612 MG/ML
100 INJECTION, SOLUTION INTRAVASCULAR
Status: COMPLETED | OUTPATIENT
Start: 2024-10-18 | End: 2024-10-18

## 2024-10-18 RX ORDER — OXYCODONE AND ACETAMINOPHEN 10; 325 MG/1; MG/1
1 TABLET ORAL ONCE
Status: COMPLETED | OUTPATIENT
Start: 2024-10-18 | End: 2024-10-18

## 2024-10-18 RX ADMIN — HEPARIN 300 UNITS: 100 SYRINGE at 15:08

## 2024-10-18 RX ADMIN — OXYCODONE HYDROCHLORIDE AND ACETAMINOPHEN 1 TABLET: 10; 325 TABLET ORAL at 11:04

## 2024-10-18 RX ADMIN — IOPAMIDOL 100 ML: 612 INJECTION, SOLUTION INTRAVENOUS at 13:37
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
## 2024-11-11 ENCOUNTER — HOSPITAL ENCOUNTER (OUTPATIENT)
Dept: GENERAL RADIOLOGY | Facility: HOSPITAL | Age: 69
Discharge: HOME | End: 2024-11-11
Payer: MEDICARE

## 2024-11-11 ENCOUNTER — HOSPITAL ENCOUNTER (OUTPATIENT)
Facility: HOSPITAL | Age: 69
Discharge: HOME | End: 2024-11-11
Payer: MEDICARE

## 2024-11-11 DIAGNOSIS — M54.50 LUMBAR PAIN: ICD-10-CM

## 2024-11-11 DIAGNOSIS — R31.1 BENIGN ESSENTIAL MICROSCOPIC HEMATURIA: ICD-10-CM

## 2024-11-11 DIAGNOSIS — M54.50 LOIN PAIN-HEMATURIA SYNDROME: ICD-10-CM

## 2024-11-11 DIAGNOSIS — R31.9 LOIN PAIN-HEMATURIA SYNDROME: ICD-10-CM

## 2024-11-11 LAB
BACTERIA URNS QL MICRO: ABNORMAL /HPF
BILIRUB UR QL STRIP.AUTO: NEGATIVE
CLARITY UR: CLEAR
COLOR UR: YELLOW
CRYSTALS URNS MICRO: ABNORMAL /HPF
EPI CELLS #/AREA URNS HPF: ABNORMAL /HPF (ref 0–5)
GLUCOSE UR STRIP.AUTO-MCNC: NEGATIVE MG/DL
HGB UR QL STRIP.AUTO: NEGATIVE
KETONES UR STRIP.AUTO-MCNC: NEGATIVE MG/DL
LEUKOCYTE ESTERASE UR QL STRIP.AUTO: ABNORMAL
MUCOUS THREADS URNS QL MICRO: ABNORMAL /LPF
NITRITE UR QL STRIP.AUTO: NEGATIVE
PH UR STRIP.AUTO: 5.5 [PH] (ref 5–8)
PROT UR STRIP.AUTO-MCNC: NEGATIVE MG/DL
RBC #/AREA URNS HPF: ABNORMAL /HPF (ref 0–4)
SP GR UR STRIP.AUTO: 1.02 (ref 1–1.03)
UA DIPSTICK W REFLEX MICRO PNL UR: YES
URN SPEC COLLECT METH UR: ABNORMAL
UROBILINOGEN UR STRIP-ACNC: 0.2 E.U./DL
WBC #/AREA URNS HPF: ABNORMAL /HPF (ref 0–5)

## 2024-11-11 PROCEDURE — 72040 X-RAY EXAM NECK SPINE 2-3 VW: CPT

## 2024-11-11 PROCEDURE — 74018 RADEX ABDOMEN 1 VIEW: CPT

## 2024-11-11 PROCEDURE — 72100 X-RAY EXAM L-S SPINE 2/3 VWS: CPT

## 2024-11-11 PROCEDURE — 72220 X-RAY EXAM SACRUM TAILBONE: CPT

## 2024-11-11 PROCEDURE — 72072 X-RAY EXAM THORAC SPINE 3VWS: CPT

## 2024-11-11 PROCEDURE — 87086 URINE CULTURE/COLONY COUNT: CPT

## 2024-11-11 PROCEDURE — 81001 URINALYSIS AUTO W/SCOPE: CPT

## 2024-11-12 LAB — BACTERIA UR CULT: NORMAL

## 2024-11-27 ENCOUNTER — HOSPITAL ENCOUNTER (OUTPATIENT)
Dept: GENERAL RADIOLOGY | Facility: HOSPITAL | Age: 69
Discharge: HOME | End: 2024-11-27

## 2024-11-27 DIAGNOSIS — M54.2 CERVICALGIA: ICD-10-CM

## 2024-12-30 ENCOUNTER — HOSPITAL ENCOUNTER (OUTPATIENT)
Facility: HOSPITAL | Age: 69
Discharge: HOME | End: 2024-12-30
Payer: MEDICARE

## 2024-12-30 ENCOUNTER — HOSPITAL ENCOUNTER (OUTPATIENT)
Dept: GENERAL RADIOLOGY | Facility: HOSPITAL | Age: 69
Discharge: HOME | End: 2024-12-30
Payer: MEDICARE

## 2024-12-30 DIAGNOSIS — N39.41 URGE INCONTINENCE: ICD-10-CM

## 2024-12-30 LAB
ALBUMIN SERPL-MCNC: 3.8 G/DL (ref 3.4–4.8)
ALBUMIN/GLOB SERPL: 1.3 {RATIO} (ref 0.8–2)
ALP SERPL-CCNC: 80 U/L (ref 25–100)
ALT SERPL-CCNC: 7 U/L (ref 4–36)
ANION GAP SERPL CALCULATED.3IONS-SCNC: 13 MMOL/L (ref 3–16)
AST SERPL-CCNC: 15 U/L (ref 8–33)
BILIRUB SERPL-MCNC: 0.6 MG/DL (ref 0.3–1.2)
BILIRUB UR QL STRIP.AUTO: NEGATIVE
BUN SERPL-MCNC: 19 MG/DL (ref 6–20)
CALCIUM SERPL-MCNC: 9.1 MG/DL (ref 8.5–10.5)
CHLORIDE SERPL-SCNC: 105 MMOL/L (ref 98–107)
CLARITY UR: CLEAR
CO2 SERPL-SCNC: 22 MMOL/L (ref 20–30)
COLOR UR: YELLOW
CREAT SERPL-MCNC: 0.8 MG/DL (ref 0.4–1.2)
ERYTHROCYTE [DISTWIDTH] IN BLOOD BY AUTOMATED COUNT: 13.7 % (ref 11–16)
GFR SERPLBLD CREATININE-BSD FMLA CKD-EPI: 81 ML/MIN/{1.73_M2}
GLOBULIN SER CALC-MCNC: 3 G/DL
GLUCOSE SERPL-MCNC: 87 MG/DL (ref 74–106)
GLUCOSE UR STRIP.AUTO-MCNC: NEGATIVE MG/DL
HCT VFR BLD AUTO: 35.3 % (ref 37–47)
HGB BLD-MCNC: 11.3 G/DL (ref 11.5–16.5)
HGB UR QL STRIP.AUTO: NEGATIVE
KETONES UR STRIP.AUTO-MCNC: NEGATIVE MG/DL
LEUKOCYTE ESTERASE UR QL STRIP.AUTO: NEGATIVE
MCH RBC QN AUTO: 29.9 PG (ref 27–32)
MCHC RBC AUTO-ENTMCNC: 32 G/DL (ref 31–35)
MCV RBC AUTO: 93.4 FL (ref 80–100)
NITRITE UR QL STRIP.AUTO: NEGATIVE
PH UR STRIP.AUTO: 5.5 [PH] (ref 5–8)
PLATELET # BLD AUTO: 203 K/UL (ref 150–400)
PMV BLD AUTO: 10.3 FL (ref 6–10)
POTASSIUM SERPL-SCNC: 3.8 MMOL/L (ref 3.4–5.1)
PROT SERPL-MCNC: 6.8 G/DL (ref 6.4–8.3)
PROT UR STRIP.AUTO-MCNC: NEGATIVE MG/DL
RBC # BLD AUTO: 3.78 M/UL (ref 3.8–5.8)
SODIUM SERPL-SCNC: 140 MMOL/L (ref 136–145)
SP GR UR STRIP.AUTO: 1.02 (ref 1–1.03)
UA COMPLETE W REFLEX CULTURE PNL UR: NORMAL
UA DIPSTICK W REFLEX MICRO PNL UR: NORMAL
URN SPEC COLLECT METH UR: NORMAL
UROBILINOGEN UR STRIP-ACNC: 0.2 E.U./DL
WBC # BLD AUTO: 5 K/UL (ref 4–11)

## 2024-12-30 PROCEDURE — 36415 COLL VENOUS BLD VENIPUNCTURE: CPT

## 2024-12-30 PROCEDURE — 71046 X-RAY EXAM CHEST 2 VIEWS: CPT

## 2024-12-30 PROCEDURE — 80053 COMPREHEN METABOLIC PANEL: CPT

## 2024-12-30 PROCEDURE — 85027 COMPLETE CBC AUTOMATED: CPT

## 2024-12-30 PROCEDURE — 81003 URINALYSIS AUTO W/O SCOPE: CPT

## 2024-12-30 PROCEDURE — 74018 RADEX ABDOMEN 1 VIEW: CPT

## 2025-01-14 ENCOUNTER — HOSPITAL ENCOUNTER (OUTPATIENT)
Facility: HOSPITAL | Age: 70
Discharge: HOME | End: 2025-01-14
Payer: MEDICARE

## 2025-01-14 ENCOUNTER — HOSPITAL ENCOUNTER (OUTPATIENT)
Dept: GENERAL RADIOLOGY | Facility: HOSPITAL | Age: 70
Discharge: HOME | End: 2025-01-14
Payer: MEDICARE

## 2025-01-14 DIAGNOSIS — R05.9 COUGH IN ADULT PATIENT: ICD-10-CM

## 2025-01-14 PROCEDURE — 71046 X-RAY EXAM CHEST 2 VIEWS: CPT

## 2025-02-08 ENCOUNTER — HOSPITAL ENCOUNTER (OUTPATIENT)
Facility: HOSPITAL | Age: 70
Discharge: HOME | End: 2025-02-08
Payer: MEDICARE

## 2025-02-08 ENCOUNTER — HOSPITAL ENCOUNTER (OUTPATIENT)
Dept: GENERAL RADIOLOGY | Facility: HOSPITAL | Age: 70
Discharge: HOME | End: 2025-02-08
Payer: MEDICARE

## 2025-02-08 DIAGNOSIS — N39.41 URGE INCONTINENCE: ICD-10-CM

## 2025-02-08 PROCEDURE — 74018 RADEX ABDOMEN 1 VIEW: CPT

## 2025-03-24 ENCOUNTER — HOSPITAL ENCOUNTER (OUTPATIENT)
Dept: GENERAL RADIOLOGY | Facility: HOSPITAL | Age: 70
Discharge: HOME | End: 2025-03-24
Payer: MEDICARE

## 2025-03-24 ENCOUNTER — HOSPITAL ENCOUNTER (OUTPATIENT)
Facility: HOSPITAL | Age: 70
Discharge: HOME | End: 2025-03-24
Payer: MEDICARE

## 2025-03-24 DIAGNOSIS — R31.9 HEMATURIA SYNDROME: ICD-10-CM

## 2025-03-24 LAB
BILIRUB UR QL STRIP.AUTO: NEGATIVE
CLARITY UR: CLEAR
COLOR UR: YELLOW
GLUCOSE UR STRIP.AUTO-MCNC: NEGATIVE MG/DL
HGB UR QL STRIP.AUTO: NEGATIVE
KETONES UR STRIP.AUTO-MCNC: NEGATIVE MG/DL
LEUKOCYTE ESTERASE UR QL STRIP.AUTO: NEGATIVE
NITRITE UR QL STRIP.AUTO: NEGATIVE
PH UR STRIP.AUTO: 6 [PH] (ref 5–8)
PROT UR STRIP.AUTO-MCNC: NEGATIVE MG/DL
SP GR UR STRIP.AUTO: >=1.03 (ref 1–1.03)
UA DIPSTICK W REFLEX MICRO PNL UR: NORMAL
URN SPEC COLLECT METH UR: NORMAL
UROBILINOGEN UR STRIP-ACNC: 1 E.U./DL

## 2025-03-24 PROCEDURE — 74018 RADEX ABDOMEN 1 VIEW: CPT

## 2025-03-24 PROCEDURE — 81003 URINALYSIS AUTO W/O SCOPE: CPT

## 2025-03-24 PROCEDURE — 87086 URINE CULTURE/COLONY COUNT: CPT

## 2025-03-26 LAB — BACTERIA UR CULT: NORMAL

## 2025-04-14 ENCOUNTER — HOSPITAL ENCOUNTER (OUTPATIENT)
Facility: HOSPITAL | Age: 70
Discharge: HOME | End: 2025-04-14
Payer: MEDICARE

## 2025-04-14 LAB
25(OH)D3 SERPL-MCNC: 25.6 NG/ML (ref 32–100)
ALBUMIN SERPL-MCNC: 3.9 G/DL (ref 3.4–4.8)
ALBUMIN/GLOB SERPL: 1.7 {RATIO} (ref 0.8–2)
ALP SERPL-CCNC: 77 U/L (ref 25–100)
ALT SERPL-CCNC: 13 U/L (ref 4–36)
ANION GAP SERPL CALCULATED.3IONS-SCNC: 12 MMOL/L (ref 3–16)
AST SERPL-CCNC: 18 U/L (ref 8–33)
BASOPHILS # BLD: 0 K/UL (ref 0–0.1)
BASOPHILS NFR BLD: 1.4 %
BILIRUB SERPL-MCNC: 1.5 MG/DL (ref 0.3–1.2)
BUN SERPL-MCNC: 12 MG/DL (ref 6–20)
CALCIUM SERPL-MCNC: 9.3 MG/DL (ref 8.3–10.6)
CHLORIDE SERPL-SCNC: 109 MMOL/L (ref 98–107)
CHOLEST SERPL-MCNC: 155 MG/DL (ref 0–200)
CO2 SERPL-SCNC: 21 MMOL/L (ref 20–30)
CREAT SERPL-MCNC: 1 MG/DL (ref 0.4–1.2)
EOSINOPHIL # BLD: 0.3 K/UL (ref 0–0.4)
EOSINOPHIL NFR BLD: 9.1 %
ERYTHROCYTE [DISTWIDTH] IN BLOOD BY AUTOMATED COUNT: 14 % (ref 11–16)
FOLATE SERPL-MCNC: 7.21 NG/ML
GFR SERPLBLD CREATININE-BSD FMLA CKD-EPI: 62 ML/MIN/{1.73_M2}
GLOBULIN SER CALC-MCNC: 2.3 G/DL
GLUCOSE SERPL-MCNC: 114 MG/DL (ref 74–106)
HBA1C MFR BLD: 5.7 %
HCT VFR BLD AUTO: 33.9 % (ref 37–47)
HDLC SERPL-MCNC: 59 MG/DL (ref 40–60)
HGB BLD-MCNC: 10.9 G/DL (ref 11.5–16.5)
IMM GRANULOCYTES # BLD: 0 K/UL
IMM GRANULOCYTES NFR BLD: 0 % (ref 0–5)
LDLC SERPL CALC-MCNC: 79 MG/DL
LYMPHOCYTES # BLD: 0.8 K/UL (ref 1.5–4)
LYMPHOCYTES NFR BLD: 27.2 %
MAGNESIUM SERPL-MCNC: 2.1 MG/DL (ref 1.7–2.4)
MCH RBC QN AUTO: 28.7 PG (ref 27–32)
MCHC RBC AUTO-ENTMCNC: 32.2 G/DL (ref 31–35)
MCV RBC AUTO: 89.2 FL (ref 80–100)
MONOCYTES # BLD: 0.2 K/UL (ref 0.2–0.8)
MONOCYTES NFR BLD: 8.3 %
NEUTROPHILS # BLD: 1.5 K/UL (ref 2–7.5)
NEUTS SEG NFR BLD: 54 %
PLATELET # BLD AUTO: 187 K/UL (ref 150–400)
PMV BLD AUTO: 10.9 FL (ref 6–10)
POTASSIUM SERPL-SCNC: 3.6 MMOL/L (ref 3.4–5.1)
PROT SERPL-MCNC: 6.2 G/DL (ref 6.4–8.3)
RBC # BLD AUTO: 3.8 M/UL (ref 3.8–5.8)
SODIUM SERPL-SCNC: 142 MMOL/L (ref 136–145)
TRIGL SERPL-MCNC: 83 MG/DL (ref 0–249)
TSH SERPL DL<=0.005 MIU/L-ACNC: 2.28 UIU/ML (ref 0.27–4.2)
VIT B12 SERPL-MCNC: 208 PG/ML (ref 211–911)
VLDLC SERPL CALC-MCNC: 17 MG/DL
WBC # BLD AUTO: 2.8 K/UL (ref 4–11)

## 2025-04-14 PROCEDURE — 82746 ASSAY OF FOLIC ACID SERUM: CPT

## 2025-04-14 PROCEDURE — 83036 HEMOGLOBIN GLYCOSYLATED A1C: CPT

## 2025-04-14 PROCEDURE — 80053 COMPREHEN METABOLIC PANEL: CPT

## 2025-04-14 PROCEDURE — 82306 VITAMIN D 25 HYDROXY: CPT

## 2025-04-14 PROCEDURE — 82607 VITAMIN B-12: CPT

## 2025-04-14 PROCEDURE — 84443 ASSAY THYROID STIM HORMONE: CPT

## 2025-04-14 PROCEDURE — 36415 COLL VENOUS BLD VENIPUNCTURE: CPT

## 2025-04-14 PROCEDURE — 80061 LIPID PANEL: CPT

## 2025-04-14 PROCEDURE — 83735 ASSAY OF MAGNESIUM: CPT

## 2025-04-14 PROCEDURE — 85025 COMPLETE CBC W/AUTO DIFF WBC: CPT

## 2025-05-07 ENCOUNTER — OFFICE VISIT (OUTPATIENT)
Dept: OBSTETRICS AND GYNECOLOGY | Facility: CLINIC | Age: 70
End: 2025-05-07
Payer: MEDICARE

## 2025-05-07 VITALS — BODY MASS INDEX: 35.33 KG/M2 | SYSTOLIC BLOOD PRESSURE: 102 MMHG | HEIGHT: 61 IN | DIASTOLIC BLOOD PRESSURE: 62 MMHG

## 2025-05-07 DIAGNOSIS — Z01.419 ENCOUNTER FOR GYNECOLOGICAL EXAMINATION WITHOUT ABNORMAL FINDING: ICD-10-CM

## 2025-05-07 DIAGNOSIS — C50.412 MALIGNANT NEOPLASM OF UPPER-OUTER QUADRANT OF LEFT FEMALE BREAST, UNSPECIFIED ESTROGEN RECEPTOR STATUS: ICD-10-CM

## 2025-05-07 DIAGNOSIS — Z12.31 ENCOUNTER FOR SCREENING MAMMOGRAM FOR MALIGNANT NEOPLASM OF BREAST: Primary | ICD-10-CM

## 2025-05-07 PROCEDURE — 3078F DIAST BP <80 MM HG: CPT | Performed by: OBSTETRICS & GYNECOLOGY

## 2025-05-07 PROCEDURE — 3074F SYST BP LT 130 MM HG: CPT | Performed by: OBSTETRICS & GYNECOLOGY

## 2025-05-07 RX ORDER — EXEMESTANE 25 MG/1
25 TABLET ORAL DAILY
COMMUNITY
Start: 2024-11-20

## 2025-05-07 RX ORDER — POTASSIUM CHLORIDE 1500 MG/1
20 TABLET, EXTENDED RELEASE ORAL
COMMUNITY

## 2025-05-07 RX ORDER — DIAZEPAM 10 MG/1
10 TABLET ORAL DAILY PRN
COMMUNITY
Start: 2025-03-26 | End: 2025-05-07

## 2025-05-07 RX ORDER — BUSPIRONE HYDROCHLORIDE 10 MG/1
TABLET ORAL
COMMUNITY
Start: 2025-04-20 | End: 2025-05-07

## 2025-05-07 RX ORDER — VIBEGRON 75 MG/1
1 TABLET, FILM COATED ORAL DAILY
COMMUNITY
End: 2025-05-07

## 2025-05-07 RX ORDER — CELECOXIB 200 MG/1
1 CAPSULE ORAL DAILY
COMMUNITY
Start: 2025-04-23

## 2025-05-07 RX ORDER — METOPROLOL SUCCINATE 25 MG/1
25 TABLET, EXTENDED RELEASE ORAL DAILY
COMMUNITY

## 2025-05-07 RX ORDER — PEN NEEDLE, DIABETIC 32GX 5/32"
NEEDLE, DISPOSABLE MISCELLANEOUS
COMMUNITY
Start: 2025-03-20

## 2025-05-07 NOTE — PROGRESS NOTES
Subjective   Chief Complaint   Patient presents with    Gynecologic Exam     Yearly exam and pap smear     Jessica Flowers is a 69 y.o. year old No obstetric history on file..  No LMP recorded. Patient is postmenopausal.  She presents to be seen because of annual exam.     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    The following portions of the patient's history were reviewed and updated as appropriate:She  has a past medical history of Abnormal CXR, Allergic rhinitis, Anxiety, Carpal tunnel syndrome, Chronic narcotic use, Depression, Dyspnea on exertion, Fatigue, GERD (gastroesophageal reflux disease), Glucose intolerance (impaired glucose tolerance), Hiatal hernia with gastroesophageal reflux, MRSA infection, Hyperlipidemia, Hypertension, Hypertriglyceridemia, Hypothyroid, Insomnia, Joint pain, Morbid obesity, OAB (overactive bladder), Osteoarthritis of knees, bilateral, Plantar fasciitis, Postmenopausal HRT (hormone replacement therapy), Psoriasis, and Vitamin D deficiency.  She does not have any pertinent problems on file.  She  has a past surgical history that includes Appendectomy (1989); Laparoscopic tubal ligation (1988); Dilation and curettage of uterus (1990, 2015); Carpal tunnel release; Cholecystectomy open (1980); Other surgical history; pr laps surg esopg/gstr fundoplasty (N/A, 7/5/2017); and Gastric Sleeve (N/A, 7/5/2017).  Her family history includes Cancer in her father and sister; Diabetes in her brother and father; Heart attack in her brother and father; Hypertension in her mother.  She  reports that she has never smoked. She has never used smokeless tobacco. She reports that she does not drink alcohol and does not use drugs.  Current Outpatient Medications   Medication Sig Dispense Refill    albuterol sulfate  (90 Base) MCG/ACT inhaler Inhale 2 puffs Every 4 (Four) Hours As Needed for Wheezing or Shortness of Air. 18 g 5    apixaban (Eliquis) 5 MG tablet tablet Take 1  tablet by mouth.      aspirin 81 MG chewable tablet Chew 1 tablet Daily.      atorvastatin (LIPITOR) 20 MG tablet Take 1 tablet by mouth Daily.      BD Pen Needle [place] 2nd Gen 32G X 4 MM misc USE WITH OZEMPIC      celecoxib (CeleBREX) 200 MG capsule Take 1 capsule by mouth Daily.      exemestane (AROMASIN) 25 MG tablet Take 1 tablet by mouth Daily.      FLUoxetine (PROzac) 20 MG capsule Take 2 capsules by mouth Daily.      fluticasone (FLONASE) 50 MCG/ACT nasal spray Administer 2 sprays into the nostril(s) as directed by provider Daily As Needed for Rhinitis.      furosemide (LASIX) 40 MG tablet Take 1 tablet by mouth Daily. 30 tablet 0    gabapentin (NEURONTIN) 800 MG tablet Take 1 tablet by mouth 3 (Three) Times a Day.      HYDROcodone-acetaminophen (NORCO)  MG per tablet Take 1 tablet by mouth Every 8 (Eight) Hours As Needed.      lansoprazole (PREVACID) 30 MG capsule Take 1 capsule by mouth Daily.  0    levocetirizine (XYZAL) 5 MG tablet Take 1 tablet by mouth Every Evening.      levothyroxine (SYNTHROID, LEVOTHROID) 50 MCG tablet Take 1 tablet by mouth Daily.      metFORMIN (GLUCOPHAGE) 500 MG tablet Take 1 tablet by mouth Daily With Breakfast.      metoprolol succinate XL (TOPROL-XL) 25 MG 24 hr tablet Take 1 tablet by mouth Daily.      montelukast (SINGULAIR) 10 MG tablet Take 1 tablet by mouth Every Night.      Ozempic, 0.25 or 0.5 MG/DOSE, 2 MG/3ML solution pen-injector Inject 0.25 mg under the skin into the appropriate area as directed 1 (One) Time Per Week.      potassium chloride (KLOR-CON M20) 20 MEQ CR tablet Take 1 tablet by mouth.      pramipexole (MIRAPEX) 1 MG tablet Take 1 tablet by mouth Every Night.      traZODone (DESYREL) 100 MG tablet Take 1 tablet by mouth Every Night.       No current facility-administered medications for this visit.     Current Outpatient Medications on File Prior to Visit   Medication Sig    albuterol sulfate  (90 Base) MCG/ACT inhaler Inhale 2 puffs Every 4  (Four) Hours As Needed for Wheezing or Shortness of Air.    apixaban (Eliquis) 5 MG tablet tablet Take 1 tablet by mouth.    aspirin 81 MG chewable tablet Chew 1 tablet Daily.    atorvastatin (LIPITOR) 20 MG tablet Take 1 tablet by mouth Daily.    BD Pen Needle [place] 2nd Gen 32G X 4 MM misc USE WITH OZEMPIC    celecoxib (CeleBREX) 200 MG capsule Take 1 capsule by mouth Daily.    exemestane (AROMASIN) 25 MG tablet Take 1 tablet by mouth Daily.    FLUoxetine (PROzac) 20 MG capsule Take 2 capsules by mouth Daily.    fluticasone (FLONASE) 50 MCG/ACT nasal spray Administer 2 sprays into the nostril(s) as directed by provider Daily As Needed for Rhinitis.    furosemide (LASIX) 40 MG tablet Take 1 tablet by mouth Daily.    gabapentin (NEURONTIN) 800 MG tablet Take 1 tablet by mouth 3 (Three) Times a Day.    HYDROcodone-acetaminophen (NORCO)  MG per tablet Take 1 tablet by mouth Every 8 (Eight) Hours As Needed.    lansoprazole (PREVACID) 30 MG capsule Take 1 capsule by mouth Daily.    levocetirizine (XYZAL) 5 MG tablet Take 1 tablet by mouth Every Evening.    levothyroxine (SYNTHROID, LEVOTHROID) 50 MCG tablet Take 1 tablet by mouth Daily.    metFORMIN (GLUCOPHAGE) 500 MG tablet Take 1 tablet by mouth Daily With Breakfast.    metoprolol succinate XL (TOPROL-XL) 25 MG 24 hr tablet Take 1 tablet by mouth Daily.    montelukast (SINGULAIR) 10 MG tablet Take 1 tablet by mouth Every Night.    Ozempic, 0.25 or 0.5 MG/DOSE, 2 MG/3ML solution pen-injector Inject 0.25 mg under the skin into the appropriate area as directed 1 (One) Time Per Week.    potassium chloride (KLOR-CON M20) 20 MEQ CR tablet Take 1 tablet by mouth.    pramipexole (MIRAPEX) 1 MG tablet Take 1 tablet by mouth Every Night.    traZODone (DESYREL) 100 MG tablet Take 1 tablet by mouth Every Night.    [DISCONTINUED] budesonide-formoterol (SYMBICORT) 160-4.5 MCG/ACT inhaler Inhale 2 puffs 2 (Two) Times a Day.    [DISCONTINUED] busPIRone (BUSPAR) 10 MG tablet TAKE  "1 TABLET BY MOUTH THREE TIMES DAILY AS NEEDED FOR ANXIETY OR PANIC    [DISCONTINUED] diazePAM (VALIUM) 10 MG tablet Take 1 tablet by mouth Daily As Needed for Anxiety.    [DISCONTINUED] estradiol (ESTRACE) 1 MG tablet TAKE 1 TABLET BY MOUTH DAILY    [DISCONTINUED] Gemtesa 75 MG tablet Take 1 tablet by mouth Daily.    [DISCONTINUED] potassium chloride (MICRO-K) 10 MEQ CR capsule Take 1 capsule by mouth 2 (Two) Times a Day.    [DISCONTINUED] tamsulosin (FLOMAX) 0.4 MG capsule 24 hr capsule Take 1 capsule by mouth Daily.     No current facility-administered medications on file prior to visit.     She has no known allergies.    Social History    Tobacco Use      Smoking status: Never      Smokeless tobacco: Never    Review of Systems  Consitutional POS: nothing reported    NEG: anorexia or night sweats   Gastointestinal POS: nothing reported    NEG: bloating, change in bowel habits, melena, or reflux symptoms   Genitourinary POS: nothing reported    NEG: dysuria or hematuria   Integument POS: nothing reported    NEG: moles that are changing in size, shape, color or rashes   Breast POS: nothing reported    NEG: persistent breast lump, skin dimpling, or nipple discharge         Respiratory: negative  Cardiovascular: negative  GYN:  negative          Objective   /62   Ht 154.9 cm (61\")   BMI 35.33 kg/m²     General:  well developed; well nourished  no acute distress  mentation appropriate   Skin:  No suspicious lesions seen   Thyroid: normal to inspection and palpation   Lungs:  breathing is unlabored  clear to auscultation bilaterally   Heart:  regular rate and rhythm, S1, S2 normal, no murmur, click, rub or gallop   Breasts:  Examined in supine position  Symmetric without masses or skin dimpling  Nipples normal without inversion, lesions or discharge  There are no palpable axillary nodes   Abdomen: soft, non-tender; no masses  no umbilical or inguinal hernias are present  no hepato-splenomegaly   Pelvis: Clinical " staff was present for exam  External genitalia:  normal appearance of the external genitalia including Bartholin's and [place]'s glands.  :  urethral meatus normal; urethra normal:  Vaginal:  normal pink mucosa without prolapse or lesions.  Cervix:  normal appearance.  Uterus:  normal size, shape and consistency.  Adnexa:  normal bimanual exam of the adnexa.  Rectal:  digital rectal exam not performed; anus visually normal appearing.     Psychiatric: Alert and oriented ×3, mood and affect appropriate  HEENT: Atraumatic, normocephalic, normal scleral icterus  Extremities: 2+ pulses bilaterally, no edema      Lab Review   CBC and CMP    Imaging   Mammogram report        Assessment   Physical exam within normal limits     Plan   Pap smear done.  Diagnostic mammogram ordered.  She is due Zetts been 5 or 6 months since completing treatment and order has been placed.  She is following back up with oncology in June.  Discussed colonoscopy-patient to think on.  Diet and exercise discussed.  Call with any questions or concerns.      No orders of the defined types were placed in this encounter.         This note was electronically signed.      May 7, 2025

## 2025-05-13 LAB — REF LAB TEST METHOD: NORMAL

## 2025-05-15 ENCOUNTER — HOSPITAL ENCOUNTER (EMERGENCY)
Facility: HOSPITAL | Age: 70
Discharge: HOME | End: 2025-05-15
Attending: EMERGENCY MEDICINE
Payer: MEDICARE

## 2025-05-15 VITALS
BODY MASS INDEX: 35.3 KG/M2 | OXYGEN SATURATION: 98 % | SYSTOLIC BLOOD PRESSURE: 150 MMHG | DIASTOLIC BLOOD PRESSURE: 92 MMHG | HEIGHT: 61 IN | HEART RATE: 74 BPM | WEIGHT: 187 LBS | TEMPERATURE: 98.7 F | RESPIRATION RATE: 16 BRPM

## 2025-05-15 DIAGNOSIS — M54.50 ACUTE RIGHT-SIDED LOW BACK PAIN WITHOUT SCIATICA: ICD-10-CM

## 2025-05-15 DIAGNOSIS — R82.71 BACTERIA IN URINE: Primary | ICD-10-CM

## 2025-05-15 LAB
BACTERIA URNS QL MICRO: ABNORMAL /HPF
BILIRUB UR QL STRIP.AUTO: NEGATIVE
CLARITY UR: CLEAR
COLOR UR: YELLOW
EPI CELLS #/AREA URNS HPF: ABNORMAL /HPF (ref 0–5)
GLUCOSE UR STRIP.AUTO-MCNC: NEGATIVE MG/DL
HGB UR QL STRIP.AUTO: NEGATIVE
KETONES UR STRIP.AUTO-MCNC: NEGATIVE MG/DL
LEUKOCYTE ESTERASE UR QL STRIP.AUTO: ABNORMAL
NITRITE UR QL STRIP.AUTO: NEGATIVE
PH UR STRIP.AUTO: 7 [PH] (ref 5–8)
PROT UR STRIP.AUTO-MCNC: NEGATIVE MG/DL
RBC #/AREA URNS HPF: ABNORMAL /HPF (ref 0–4)
SP GR UR STRIP.AUTO: 1.02 (ref 1–1.03)
UA COMPLETE W REFLEX CULTURE PNL UR: YES
UA DIPSTICK W REFLEX MICRO PNL UR: YES
URN SPEC COLLECT METH UR: ABNORMAL
UROBILINOGEN UR STRIP-ACNC: 1 E.U./DL
WBC #/AREA URNS HPF: ABNORMAL /HPF (ref 0–5)

## 2025-05-15 PROCEDURE — 87077 CULTURE AEROBIC IDENTIFY: CPT

## 2025-05-15 PROCEDURE — 81001 URINALYSIS AUTO W/SCOPE: CPT

## 2025-05-15 PROCEDURE — 6370000000 HC RX 637 (ALT 250 FOR IP): Performed by: EMERGENCY MEDICINE

## 2025-05-15 PROCEDURE — 99283 EMERGENCY DEPT VISIT LOW MDM: CPT

## 2025-05-15 PROCEDURE — 87086 URINE CULTURE/COLONY COUNT: CPT

## 2025-05-15 RX ORDER — LIDOCAINE 4 G/G
1 PATCH TOPICAL ONCE
Status: DISCONTINUED | OUTPATIENT
Start: 2025-05-15 | End: 2025-05-15 | Stop reason: HOSPADM

## 2025-05-15 RX ORDER — CEFUROXIME AXETIL 250 MG/1
250 TABLET ORAL ONCE
Status: COMPLETED | OUTPATIENT
Start: 2025-05-15 | End: 2025-05-15

## 2025-05-15 RX ORDER — IBUPROFEN 600 MG/1
600 TABLET, FILM COATED ORAL ONCE
Status: COMPLETED | OUTPATIENT
Start: 2025-05-15 | End: 2025-05-15

## 2025-05-15 RX ORDER — IBUPROFEN 600 MG/1
600 TABLET, FILM COATED ORAL EVERY 8 HOURS PRN
Qty: 15 TABLET | Refills: 0 | Status: SHIPPED | OUTPATIENT
Start: 2025-05-15 | End: 2025-05-20

## 2025-05-15 RX ORDER — CEFUROXIME AXETIL 250 MG/1
250 TABLET ORAL 2 TIMES DAILY
Qty: 14 TABLET | Refills: 0 | Status: SHIPPED | OUTPATIENT
Start: 2025-05-15 | End: 2025-05-22

## 2025-05-15 RX ADMIN — PREDNISONE 60 MG: 50 TABLET ORAL at 20:27

## 2025-05-15 RX ADMIN — CEFUROXIME AXETIL 250 MG: 250 TABLET ORAL at 21:54

## 2025-05-15 RX ADMIN — IBUPROFEN 600 MG: 600 TABLET ORAL at 20:25

## 2025-05-15 ASSESSMENT — ENCOUNTER SYMPTOMS
ABDOMINAL PAIN: 0
VOMITING: 0
BACK PAIN: 1
SHORTNESS OF BREATH: 0
VOICE CHANGE: 0
FACIAL SWELLING: 0
WHEEZING: 0
NAUSEA: 0
ABDOMINAL DISTENTION: 1
STRIDOR: 0
TROUBLE SWALLOWING: 0
COLOR CHANGE: 0

## 2025-05-15 ASSESSMENT — PAIN DESCRIPTION - LOCATION
LOCATION: BACK
LOCATION: BACK

## 2025-05-15 ASSESSMENT — PAIN DESCRIPTION - DESCRIPTORS: DESCRIPTORS: ACHING;PRESSURE;SPASM

## 2025-05-15 ASSESSMENT — PAIN SCALES - GENERAL
PAINLEVEL_OUTOF10: 7
PAINLEVEL_OUTOF10: 8

## 2025-05-15 ASSESSMENT — PAIN - FUNCTIONAL ASSESSMENT: PAIN_FUNCTIONAL_ASSESSMENT: 0-10

## 2025-05-15 ASSESSMENT — PAIN DESCRIPTION - ORIENTATION
ORIENTATION: LOWER
ORIENTATION: LOWER

## 2025-05-15 NOTE — ED TRIAGE NOTES
Pt has chronic lower back pain,pt states that the  lower back pain has been severe the last few days. Pt states that it does not burn when she pees. Pt finished cancer treatments several months ago for breast cancer. Pt also states that her ABD is swollen and she recently had a pap smear that was ruled abnormal  PT is following up with gyno in June.

## 2025-05-16 NOTE — ED PROVIDER NOTES
[facility]  eMERGENCY dEPARTMENT eNCOUnter      Pt Name: Kathi Garcia  MRN: [id number]  Birthdate 1955  Date of evaluation: 5/15/2025  Provider: Wilbert Hung MD    CHIEF COMPLAINT       Chief Complaint   Patient presents with    Back Pain         HISTORY OF PRESENT ILLNESS   (Location/Symptom, Timing/Onset, Context/Setting, Quality, Duration, Modifying Factors, Severity)  Note limiting factors.     History obtained from: The patient    Kathi Garcia is a 69 y.o. female who reports she just finished treatments for breast cancer a couple months ago reports chronic low back pain worse on the right lower leg that has worsened over the past several days.  Denies any falls or injuries.  Denies any midline pain reporting pain is to her right lower back.  Reports that prolonged sitting or lying worsens the pain however if she gets up and walks around it seems to help the pain.  Denies any leg numbness or weakness.  Denies any urinary or bowel incontinence.  Denies any dysuria or hematuria.  Patient also reports some mild abdominal bloating but denies abdominal pain.  Reports that she recently had an abnormal Pap smear and is being worked up by OB/GYN as an outpatient.  Denies any vomiting, diarrhea, constipation, or difficulty tolerating p.o.      HPI    Nursing Notes were reviewed.    REVIEW OFSYSTEMS    (2-9 systems for level 4, 10 or more for level 5)     Review of Systems   Constitutional:  Negative for appetite change, fever and unexpected weight change.   HENT:  Negative for facial swelling, trouble swallowing and voice change.    Eyes:  Negative for visual disturbance.   Respiratory:  Negative for shortness of breath, wheezing and stridor.    Cardiovascular:  Negative for chest pain and palpitations.   Gastrointestinal:  Positive for abdominal distention. Negative for abdominal pain, nausea and vomiting.   Genitourinary:  Negative for dysuria and vaginal bleeding.

## 2025-05-16 NOTE — ED NOTES
Pt DC home with follow up to pcp to re evaluate UTI. Pt verbally understood, left ED without any difficulties.

## 2025-05-17 LAB
BACTERIA UR CULT: ABNORMAL
BACTERIA UR CULT: ABNORMAL
ORGANISM: ABNORMAL

## 2025-06-03 ENCOUNTER — OFFICE VISIT (OUTPATIENT)
Dept: OBSTETRICS AND GYNECOLOGY | Facility: CLINIC | Age: 70
End: 2025-06-03
Payer: MEDICARE

## 2025-06-03 VITALS
DIASTOLIC BLOOD PRESSURE: 60 MMHG | WEIGHT: 191.6 LBS | BODY MASS INDEX: 36.17 KG/M2 | HEIGHT: 61 IN | SYSTOLIC BLOOD PRESSURE: 106 MMHG

## 2025-06-03 DIAGNOSIS — R87.810 ASCUS WITH POSITIVE HIGH RISK HPV CERVICAL: Primary | ICD-10-CM

## 2025-06-03 DIAGNOSIS — R87.610 ASCUS WITH POSITIVE HIGH RISK HPV CERVICAL: Primary | ICD-10-CM

## 2025-06-03 RX ORDER — BUSPIRONE HYDROCHLORIDE 10 MG/1
TABLET ORAL
COMMUNITY
Start: 2025-05-18

## 2025-06-03 NOTE — PROGRESS NOTES
..Colposcopy    Date of procedure:  6/3/2025    Risks and benefits discussed? yes  All questions answered? yes  Consents given by the patient  Written consent obtained? yes    Pre-op indication: ASCUS with POSITIVE high risk HPV-prior Pap was ASCUS negative HPV.  Patient did recently complete breast cancer treatment and perhaps immunologic suppression allow this to come forward.  She does complain of some suprapubic discomfort for the past 2 weeks.  Has no urinary or bowel complaints.  No dysuria or constipation or diarrhea.  Procedure documentation:    The cervix was initially viewed colposcopically.  The cervix was next bathed in acetic acid.   The findings were as follows:      The transformation zone was not able to be seen adequately.    No visible lesions    Ectocervical biopsies not obtained..    An ECC was performed.      Colposcopic Impression: Adequate colposcopy  Inadequate colposcopy  Colposcopic findings are consistent with PAP if Paps are persistently abnormal given the inadequacy of the colposcopy would recommend LEEP.  Will have patient back in 3 weeks for ultrasound should the suprapubic discomfort symptoms persist.       Plan: Will base further treatment on pathology results      This note was electronically signed.    Javier Lundy MD.

## 2025-06-04 ENCOUNTER — LAB (OUTPATIENT)
Dept: LAB | Facility: HOSPITAL | Age: 70
End: 2025-06-04
Payer: MEDICARE

## 2025-06-04 ENCOUNTER — TRANSCRIBE ORDERS (OUTPATIENT)
Dept: LAB | Facility: HOSPITAL | Age: 70
End: 2025-06-04
Payer: MEDICARE

## 2025-06-04 DIAGNOSIS — C77.3 CARCINOMA OF LEFT BREAST METASTATIC TO AXILLARY LYMPH NODE: ICD-10-CM

## 2025-06-04 DIAGNOSIS — C50.912 CARCINOMA OF LEFT BREAST METASTATIC TO AXILLARY LYMPH NODE: ICD-10-CM

## 2025-06-04 DIAGNOSIS — C77.3 CARCINOMA OF LEFT BREAST METASTATIC TO AXILLARY LYMPH NODE: Primary | ICD-10-CM

## 2025-06-04 DIAGNOSIS — C50.912 CARCINOMA OF LEFT BREAST METASTATIC TO AXILLARY LYMPH NODE: Primary | ICD-10-CM

## 2025-06-04 LAB
ALBUMIN SERPL-MCNC: 3.8 G/DL (ref 3.5–5.2)
ALBUMIN/GLOB SERPL: 1.5 G/DL
ALP SERPL-CCNC: 87 U/L (ref 39–117)
ALT SERPL W P-5'-P-CCNC: 9 U/L (ref 1–33)
ANION GAP SERPL CALCULATED.3IONS-SCNC: 11.1 MMOL/L (ref 5–15)
AST SERPL-CCNC: 12 U/L (ref 1–32)
BASOPHILS # BLD AUTO: 0.06 10*3/MM3 (ref 0–0.2)
BASOPHILS NFR BLD AUTO: 1.1 % (ref 0–1.5)
BILIRUB SERPL-MCNC: 1.3 MG/DL (ref 0–1.2)
BUN SERPL-MCNC: 23 MG/DL (ref 8–23)
BUN/CREAT SERPL: 25.3 (ref 7–25)
CALCIUM SPEC-SCNC: 8.5 MG/DL (ref 8.6–10.5)
CHLORIDE SERPL-SCNC: 105 MMOL/L (ref 98–107)
CO2 SERPL-SCNC: 23.9 MMOL/L (ref 22–29)
CREAT SERPL-MCNC: 0.91 MG/DL (ref 0.57–1)
DEPRECATED RDW RBC AUTO: 45.9 FL (ref 37–54)
EGFRCR SERPLBLD CKD-EPI 2021: 69.3 ML/MIN/1.73
EOSINOPHIL # BLD AUTO: 0.35 10*3/MM3 (ref 0–0.4)
EOSINOPHIL NFR BLD AUTO: 6.3 % (ref 0.3–6.2)
ERYTHROCYTE [DISTWIDTH] IN BLOOD BY AUTOMATED COUNT: 14.4 % (ref 12.3–15.4)
GLOBULIN UR ELPH-MCNC: 2.5 GM/DL
GLUCOSE SERPL-MCNC: 100 MG/DL (ref 65–99)
HCT VFR BLD AUTO: 33.6 % (ref 34–46.6)
HGB BLD-MCNC: 10.8 G/DL (ref 12–15.9)
IMM GRANULOCYTES # BLD AUTO: 0.03 10*3/MM3 (ref 0–0.05)
IMM GRANULOCYTES NFR BLD AUTO: 0.5 % (ref 0–0.5)
LYMPHOCYTES # BLD AUTO: 1.15 10*3/MM3 (ref 0.7–3.1)
LYMPHOCYTES NFR BLD AUTO: 20.8 % (ref 19.6–45.3)
MCH RBC QN AUTO: 28.5 PG (ref 26.6–33)
MCHC RBC AUTO-ENTMCNC: 32.1 G/DL (ref 31.5–35.7)
MCV RBC AUTO: 88.7 FL (ref 79–97)
MONOCYTES # BLD AUTO: 0.34 10*3/MM3 (ref 0.1–0.9)
MONOCYTES NFR BLD AUTO: 6.1 % (ref 5–12)
NEUTROPHILS NFR BLD AUTO: 3.61 10*3/MM3 (ref 1.7–7)
NEUTROPHILS NFR BLD AUTO: 65.2 % (ref 42.7–76)
NRBC BLD AUTO-RTO: 0 /100 WBC (ref 0–0.2)
PLATELET # BLD AUTO: 229 10*3/MM3 (ref 140–450)
PMV BLD AUTO: 10.5 FL (ref 6–12)
POTASSIUM SERPL-SCNC: 4.1 MMOL/L (ref 3.5–5.2)
PROT SERPL-MCNC: 6.3 G/DL (ref 6–8.5)
RBC # BLD AUTO: 3.79 10*6/MM3 (ref 3.77–5.28)
REF LAB TEST METHOD: NORMAL
SODIUM SERPL-SCNC: 140 MMOL/L (ref 136–145)
WBC NRBC COR # BLD AUTO: 5.54 10*3/MM3 (ref 3.4–10.8)

## 2025-06-04 PROCEDURE — 36415 COLL VENOUS BLD VENIPUNCTURE: CPT

## 2025-06-04 PROCEDURE — 85025 COMPLETE CBC W/AUTO DIFF WBC: CPT

## 2025-06-04 PROCEDURE — 80053 COMPREHEN METABOLIC PANEL: CPT

## 2025-06-12 ENCOUNTER — HOSPITAL ENCOUNTER (OUTPATIENT)
Dept: MAMMOGRAPHY | Facility: HOSPITAL | Age: 70
Discharge: HOME | End: 2025-06-12
Payer: MEDICARE

## 2025-06-12 VITALS — BODY MASS INDEX: 28.34 KG/M2 | WEIGHT: 150 LBS

## 2025-06-12 DIAGNOSIS — Z85.3 HISTORY OF LEFT BREAST CANCER: ICD-10-CM

## 2025-06-12 DIAGNOSIS — Z12.31 VISIT FOR SCREENING MAMMOGRAM: ICD-10-CM

## 2025-06-12 PROCEDURE — G0279 TOMOSYNTHESIS, MAMMO: HCPCS

## 2025-06-12 PROCEDURE — 77063 BREAST TOMOSYNTHESIS BI: CPT

## 2025-06-27 ENCOUNTER — HOSPITAL ENCOUNTER (OUTPATIENT)
Dept: GENERAL RADIOLOGY | Facility: HOSPITAL | Age: 70
Discharge: HOME | End: 2025-06-27
Payer: MEDICARE

## 2025-06-27 ENCOUNTER — HOSPITAL ENCOUNTER (OUTPATIENT)
Facility: HOSPITAL | Age: 70
Discharge: HOME | End: 2025-06-27
Payer: MEDICARE

## 2025-06-27 DIAGNOSIS — R06.02 SOB (SHORTNESS OF BREATH): ICD-10-CM

## 2025-06-27 LAB
25(OH)D3 SERPL-MCNC: 35.2 NG/ML (ref 32–100)
ALBUMIN SERPL-MCNC: 4.1 G/DL (ref 3.4–4.8)
ALBUMIN/GLOB SERPL: 1.2 {RATIO} (ref 0.8–2)
ALP SERPL-CCNC: 92 U/L (ref 25–100)
ALT SERPL-CCNC: 10 U/L (ref 4–36)
ANION GAP SERPL CALCULATED.3IONS-SCNC: 12 MMOL/L (ref 3–16)
AST SERPL-CCNC: 17 U/L (ref 8–33)
BASOPHILS # BLD: 0.1 K/UL (ref 0–0.1)
BASOPHILS NFR BLD: 1.1 %
BILIRUB SERPL-MCNC: 1.6 MG/DL (ref 0.3–1.2)
BUN SERPL-MCNC: 15 MG/DL (ref 6–20)
CALCIUM SERPL-MCNC: 9.5 MG/DL (ref 8.3–10.6)
CHLORIDE SERPL-SCNC: 107 MMOL/L (ref 98–107)
CHOLEST SERPL-MCNC: 158 MG/DL (ref 0–200)
CO2 SERPL-SCNC: 23 MMOL/L (ref 20–30)
CREAT SERPL-MCNC: 1 MG/DL (ref 0.6–1.2)
EKG ATRIAL RATE: 69 BPM
EKG DIAGNOSIS: NORMAL
EKG P AXIS: 59 DEGREES
EKG P-R INTERVAL: 204 MS
EKG Q-T INTERVAL: 382 MS
EKG QRS DURATION: 70 MS
EKG QTC CALCULATION (BAZETT): 409 MS
EKG R AXIS: 20 DEGREES
EKG T AXIS: 25 DEGREES
EKG VENTRICULAR RATE: 69 BPM
EOSINOPHIL # BLD: 0.2 K/UL (ref 0–0.4)
EOSINOPHIL NFR BLD: 4.2 %
ERYTHROCYTE [DISTWIDTH] IN BLOOD BY AUTOMATED COUNT: 14.2 % (ref 11–16)
FOLATE SERPL-MCNC: 7.59 NG/ML
GFR SERPLBLD CREATININE-BSD FMLA CKD-EPI: 61 ML/MIN/{1.73_M2}
GLOBULIN SER CALC-MCNC: 3.4 G/DL
GLUCOSE SERPL-MCNC: 103 MG/DL (ref 74–106)
HCT VFR BLD AUTO: 35.9 % (ref 37–47)
HDLC SERPL-MCNC: 65 MG/DL (ref 40–60)
HGB BLD-MCNC: 11.4 G/DL (ref 11.5–16.5)
IMM GRANULOCYTES # BLD: 0 K/UL
IMM GRANULOCYTES NFR BLD: 0.2 % (ref 0–5)
LDLC SERPL CALC-MCNC: 74 MG/DL
LYMPHOCYTES # BLD: 1 K/UL (ref 1.5–4)
LYMPHOCYTES NFR BLD: 21.6 %
MCH RBC QN AUTO: 28.5 PG (ref 27–32)
MCHC RBC AUTO-ENTMCNC: 31.8 G/DL (ref 31–35)
MCV RBC AUTO: 89.8 FL (ref 80–100)
MONOCYTES # BLD: 0.3 K/UL (ref 0.2–0.8)
MONOCYTES NFR BLD: 7.1 %
NEUTROPHILS # BLD: 3 K/UL (ref 2–7.5)
NEUTS SEG NFR BLD: 65.8 %
PLATELET # BLD AUTO: 243 K/UL (ref 150–400)
PMV BLD AUTO: 10.6 FL (ref 6–10)
POTASSIUM SERPL-SCNC: 3.8 MMOL/L (ref 3.4–5.1)
PROT SERPL-MCNC: 7.5 G/DL (ref 6.4–8.3)
RBC # BLD AUTO: 4 M/UL (ref 3.8–5.8)
SODIUM SERPL-SCNC: 142 MMOL/L (ref 136–145)
TRIGL SERPL-MCNC: 96 MG/DL (ref 0–249)
TSH SERPL DL<=0.005 MIU/L-ACNC: 1.34 UIU/ML (ref 0.27–4.2)
VIT B12 SERPL-MCNC: 342 PG/ML (ref 211–911)
VLDLC SERPL CALC-MCNC: 19 MG/DL
WBC # BLD AUTO: 4.5 K/UL (ref 4–11)

## 2025-06-27 PROCEDURE — 80053 COMPREHEN METABOLIC PANEL: CPT

## 2025-06-27 PROCEDURE — 82607 VITAMIN B-12: CPT

## 2025-06-27 PROCEDURE — 82746 ASSAY OF FOLIC ACID SERUM: CPT

## 2025-06-27 PROCEDURE — 93005 ELECTROCARDIOGRAM TRACING: CPT

## 2025-06-27 PROCEDURE — 85025 COMPLETE CBC W/AUTO DIFF WBC: CPT

## 2025-06-27 PROCEDURE — 80061 LIPID PANEL: CPT

## 2025-06-27 PROCEDURE — 36415 COLL VENOUS BLD VENIPUNCTURE: CPT

## 2025-06-27 PROCEDURE — 82306 VITAMIN D 25 HYDROXY: CPT

## 2025-06-27 PROCEDURE — 84443 ASSAY THYROID STIM HORMONE: CPT

## 2025-06-27 PROCEDURE — 71046 X-RAY EXAM CHEST 2 VIEWS: CPT

## 2025-06-30 ENCOUNTER — OFFICE VISIT (OUTPATIENT)
Dept: PSYCHIATRY | Facility: CLINIC | Age: 70
End: 2025-06-30
Payer: MEDICARE

## 2025-06-30 VITALS
BODY MASS INDEX: 36.44 KG/M2 | OXYGEN SATURATION: 97 % | HEART RATE: 82 BPM | SYSTOLIC BLOOD PRESSURE: 114 MMHG | DIASTOLIC BLOOD PRESSURE: 70 MMHG | HEIGHT: 61 IN | WEIGHT: 193 LBS

## 2025-06-30 DIAGNOSIS — F33.1 MODERATE EPISODE OF RECURRENT MAJOR DEPRESSIVE DISORDER: ICD-10-CM

## 2025-06-30 DIAGNOSIS — F41.1 GENERALIZED ANXIETY DISORDER: Primary | ICD-10-CM

## 2025-06-30 RX ORDER — CHOLECALCIFEROL (VITAMIN D3) 25 MCG
1000 TABLET ORAL DAILY
COMMUNITY

## 2025-06-30 RX ORDER — DULOXETIN HYDROCHLORIDE 30 MG/1
CAPSULE, DELAYED RELEASE ORAL
Qty: 53 CAPSULE | Refills: 0 | Status: SHIPPED | OUTPATIENT
Start: 2025-06-30 | End: 2025-07-30

## 2025-06-30 NOTE — PROGRESS NOTES
"Subjective   Jessica Flowers is a 69 y.o. female who presents today for initial evaluation     Chief Complaint:  anxiety and depression    History of Present Illness:   History of Present Illness  Jessica Flowers presents to [facility] for initial evaluation. Reports history of depression and anxiety that has been present for several years. Currently taking medications managed by PCP that includes Prozac 40 mg daily and Trazodone 100 mg nightly. Buspirone also on medication list, but reports medication was stopped due to nightmares. Struggles with increasing level of depression. Most recent episode of depression was a couple months ago after grandson was evicted from her apartment. She spent 2 weeks in bed feeling down, sad, no energy and \"struggled to function.\" Says that she has been through multiple traumatic situations that have contributed to decline in mood. Daughter passed away 12-13 years ago then granddaughter (age 18)  by suicide six years ago.   Mood is often depressed, down, has low energy, no motivation, no interest in doing things, sleep issues, appetite changes and feels bad about herself. Anxiety is constant with symptoms that include feeling nervous, anxious, on edge, worries, has trouble relaxing, restlessness, easily annoyed/irritable and feeling afraid something awful might happen.  Rates current depression 7/10 on a 0-10 scale with 10 being the worst. Says that anxiety level is \"way up there.\" Often struggles to fall asleep and wakes early in the morning hours, unable to quantify the number of hours she sleeps per night. She also has history of chronic pain that negatively impacts sleep and daytime activity. Currently taking pain medication that is managed by [facility]. Says that she has experienced multiple fallls in past with most recent fall being several months ago. Denies issues with appetite. Adamantly denies any current SI/HI. PHQ-9 total score: 16, NICO-7 " total score: 21.    Past Psychiatric History: Reports history of anxiety and depression. Had one previous psychiatric hospitalization several years ago at [facility] for anxiety, depression and SI. Denies any past suicide attempts, self-harming behaviors, HI or AVH. Reports SI in the past without plan or intent.     Previous Psych Meds: Buspirone (nightmares), hydroxyzine, Seroquel (nightmares), Zoloft, Lexapro, Paxil, (diazepam, alprazolam, lorazepam listed on [facility])    Substance Use/Abuse: Denies:      Past Medical  History: Gastric sleeve, breast cancer, reports currently in remission after receiving chemo and radiation, hypertension, HLD, hypothyroid, OAB, arthritis    Family Psychiatric History: Denies any known family psychiatric history     Social History: Lives alone, has her own apartment in [place]. Grandson lived with her until he was evicted a couple months ago.      The following portions of the patient's history were reviewed and updated as appropriate: allergies, current medications, past family history, past medical history, past social history, past surgical history and problem list.    Past Medical History:   Diagnosis Date    Abnormal CXR     bronchitis poss, [facility]    Allergic rhinitis     uses inhalers prn, denies asthma    Anxiety     Carpal tunnel syndrome     Chronic narcotic use     HC    Depression     Dyspnea on exertion     Fatigue     GERD (gastroesophageal reflux disease)     on Prilosec + BID Zantac    Glucose intolerance (impaired glucose tolerance)     Hiatal hernia with gastroesophageal reflux     EGD [facility] 1/17, 39 cm, path DE mild nonspec changes.  serum h. pyl neg    Hx MRSA infection     2010 on abdomen, tx w/ Bactrim    Hyperlipidemia     Hypertension     Hypertriglyceridemia     Hypothyroid     Insomnia     uses Trazodone    Joint pain     Morbid obesity     OAB (overactive bladder)     tx w/ botox injections    Osteoarthritis of knees, bilateral     steroid  injxns as above, supposed to get synvisc soon.  Says bone on bone, needs TKR, but ortho surgeon wants her to have WLS first    Plantar fasciitis     Postmenopausal HRT (hormone replacement therapy)     Psoriasis     Vitamin D deficiency      Social History     Socioeconomic History    Marital status: Legally    Tobacco Use    Smoking status: Never     Passive exposure: Never    Smokeless tobacco: Never   Vaping Use    Vaping status: Never Used   Substance and Sexual Activity    Alcohol use: No    Drug use: No    Sexual activity: Yes     Partners: Male     Comment: spouse     Family History   Problem Relation Age of Onset    Hypertension Mother     Diabetes Father     Cancer Father         brain    Heart attack Father     Cancer Sister         uterine    Diabetes Brother     Heart attack Brother      Past Surgical History:   Procedure Laterality Date    APPENDECTOMY  1989    emergent, open    CARPAL TUNNEL RELEASE      (B)    CHOLECYSTECTOMY OPEN  1980    [facility]    DILATATION AND CURETTAGE  1990, 2015    GASTRIC SLEEVE LAPAROSCOPIC N/A 7/5/2017    Procedure: GASTRIC SLEEVE LAPAROSCOPIC, ;  Surgeon: Cj Gregg MD;  Location:  [facility];  Service:     LAPAROSCOPIC TUBAL LIGATION  1988    OTHER SURGICAL HISTORY      denies anesthesia issues    RI LAPS SURG ESOPG/GSTR FUNDOPLASTY N/A 7/5/2017    Procedure: HIATAL HERNIA REPAIR LAPAROSCOPIC;  Surgeon: Cj Gregg MD;  Location:  [facility];  Service: Bariatric     Patient Active Problem List   Diagnosis    Vitamin D deficiency    Psoriasis    Postmenopausal HRT (hormone replacement therapy)    Plantar fasciitis    OAB (overactive bladder)    Joint pain    Insomnia    Hypothyroid    Hypertension    Hyperlipidemia    GERD (gastroesophageal reflux disease)    Dyspnea on exertion    Fatigue    Depression    Allergic rhinitis    Anxiety    Glucose intolerance (impaired glucose tolerance)    Acute respiratory failure with hypoxia    Acute exacerbation  of CHF (congestive heart failure)    Bacterial pneumonia    (HFpEF) heart failure with preserved ejection fraction    Pneumonia of both lower lobes due to infectious organism    Acute hypoxic respiratory failure    Acute on chronic heart failure with preserved ejection fraction (HFpEF)     Allergy:   No Known Allergies     Current Outpatient Medications   Medication Sig Dispense Refill    albuterol sulfate  (90 Base) MCG/ACT inhaler Inhale 2 puffs Every 4 (Four) Hours As Needed for Wheezing or Shortness of Air. 18 g 5    apixaban (Eliquis) 5 MG tablet tablet Take 1 tablet by mouth.      aspirin 81 MG chewable tablet Chew 1 tablet Daily.      atorvastatin (LIPITOR) 20 MG tablet Take 1 tablet by mouth Daily.      BD Pen Needle [place] 2nd Gen 32G X 4 MM misc USE WITH OZEMPIC      celecoxib (CeleBREX) 200 MG capsule Take 1 capsule by mouth Daily.      Cholecalciferol 25 MCG (1000 UT) tablet Take 1 tablet by mouth Daily.      exemestane (AROMASIN) 25 MG tablet Take 1 tablet by mouth Daily.      fluticasone (FLONASE) 50 MCG/ACT nasal spray Administer 2 sprays into the nostril(s) as directed by provider Daily As Needed for Rhinitis.      furosemide (LASIX) 40 MG tablet Take 1 tablet by mouth Daily. 30 tablet 0    gabapentin (NEURONTIN) 800 MG tablet Take 1 tablet by mouth 3 (Three) Times a Day.      HYDROcodone-acetaminophen (NORCO)  MG per tablet Take 1 tablet by mouth Every 8 (Eight) Hours As Needed.      lansoprazole (PREVACID) 30 MG capsule Take 1 capsule by mouth Daily.  0    levocetirizine (XYZAL) 5 MG tablet Take 1 tablet by mouth Every Evening.      levothyroxine (SYNTHROID, LEVOTHROID) 50 MCG tablet Take 1 tablet by mouth Daily.      metFORMIN (GLUCOPHAGE) 500 MG tablet Take 1 tablet by mouth Daily With Breakfast.      metoprolol succinate XL (TOPROL-XL) 25 MG 24 hr tablet Take 1 tablet by mouth Daily.      montelukast (SINGULAIR) 10 MG tablet Take 1 tablet by mouth Every Night.      Ozempic, 0.25 or  "0.5 MG/DOSE, 2 MG/3ML solution pen-injector Inject 0.25 mg under the skin into the appropriate area as directed 1 (One) Time Per Week.      potassium chloride (KLOR-CON M20) 20 MEQ CR tablet Take 1 tablet by mouth.      pramipexole (MIRAPEX) 1 MG tablet Take 1 tablet by mouth Every Night.      tiZANidine (ZANAFLEX) 4 MG tablet Take 1 tablet by mouth Every 12 (Twelve) Hours.      traZODone (DESYREL) 100 MG tablet Take 1 tablet by mouth Every Night.      DULoxetine (Cymbalta) 30 MG capsule Take 1 capsule by mouth Daily for 7 days, THEN 2 capsules Daily for 23 days. 53 capsule 0     No current facility-administered medications for this visit.     Review of Systems   Constitutional:  Negative for activity change, appetite change, unexpected weight gain and unexpected weight loss.   Respiratory:  Negative for shortness of breath.    Cardiovascular:  Negative for chest pain.   Psychiatric/Behavioral:  Positive for sleep disturbance, depressed mood and stress. Negative for suicidal ideas. The patient is nervous/anxious.      Physical Exam  Vitals reviewed.   Constitutional:       General: She is not in acute distress.     Appearance: Normal appearance.   Neurological:      Gait: Gait normal.     Vitals:   Blood pressure 114/70, pulse 82, height 154.9 cm (61\"), weight 87.5 kg (193 lb), SpO2 97%.    Mental Status Exam:   Hygiene:   good  Cooperation:  Cooperative  Eye Contact:  Good  Psychomotor Behavior:  Appropriate  Affect:  Full range and Appropriate  Mood: normal  Hopelessness: Denies  Speech:  Normal  Thought Process:  Goal directed and Linear  Thought Content:  Mood congruent  Suicidal:  None  Homicidal:  None  Hallucinations:  None  Delusion:  None  Memory:  Intact  Orientation:  Person, Place, Time, and Situation  Reliability:  good  Insight:  Good  Judgement:  Good  Impulse Control:  Good    Assessment & Plan   Problems Addressed this Visit       Depression    Relevant Medications    DULoxetine (Cymbalta) 30 MG " capsule     Other Visit Diagnoses         Generalized anxiety disorder    -  Primary    Relevant Medications    DULoxetine (Cymbalta) 30 MG capsule          Diagnoses         Codes Comments      Generalized anxiety disorder    -  Primary ICD-10-CM: F41.1  ICD-9-CM: 300.02       Moderate episode of recurrent major depressive disorder     ICD-10-CM: F33.1  ICD-9-CM: 296.32           Visit Diagnoses:    ICD-10-CM ICD-9-CM   1. Generalized anxiety disorder  F41.1 300.02   2. Moderate episode of recurrent major depressive disorder  F33.1 296.32     Ms. Flowers presents today for initial evaluation and medication management. She has history of anxiety and depression with symptoms that have worsened over the past several months. Much of her symptoms burden likely related to situational stressors, but has not felt that medication regimen has been working well to control symptoms. She voices interest in changing Prozac due to ineffectiveness. Will change Prozac to Cymbalta and continue other medications as previously prescribed. Also taking Trazodone, but says that she is not taking buspirone due to side effects (nightmares). Encouraged to consider therapy to assist with managing stressors and past trauma.     -Continue Trazodone 100 mg nightly (prescribed by PCP)  -Discontinue Prozac 40 mg daily, decreasing to 20 mg daily x 1 week then stop  -Start Cymbalta 60 mg daily, 30 mg daily x 1 week then increase to 60 mg daily.    Discussed cross taper and provided written instructions on medication cross taper:   Week1: decrease Prozac to 20 mg daily and start Cymbalta 30 mg daily.  Week 2: stop Prozac and increase Cymbalta to 60 mg daily.     -Reviewed previous available documentation and most recent available labs.   KRISTA reviewed and is appropriate.     GOALS:  Short Term Goals: Patient will be compliant with medication, and patient will have no significant medication related side effects.  Patient will be engaged in  psychotherapy as indicated.  Patient will report subjective improvement of symptoms.  Long term goals: To stabilize mood and treat/improve subjective symptoms, the patient will stay out of the hospital, the patient will be at an optimal level of functioning, and the patient will take all medications as prescribed.  The patient/guardian verbalized understanding and agreement with goals that were mutually set.    TREATMENT PLAN: Continue supportive psychotherapy efforts and medications as indicated for patient's diagnosis.  Pharmacological and Non-Pharmacological treatment options discussed during today's visit. Patient/Guardian acknowledged and verbally consented with current treatment plan and was educated on the importance of compliance with treatment and follow-up appointments.      MEDICATION ISSUES:  Discussed medication options and treatment plan of prescribed medication as well as the risks, benefits, any black box warnings, and side effects including potential falls, possible impaired driving, and metabolic adversities among others. Patient is agreeable to call the office with any worsening of symptoms or onset of side effects, or if any concerns or questions arise.  The contact information for the office is made available to the patient. Patient is agreeable to call 911 or go to the nearest ER should they begin having any SI/HI, or if any urgent concerns arise. No medication side effects or related complaints today.     MEDS ORDERED DURING VISIT:  New Medications Ordered This Visit   Medications    DULoxetine (Cymbalta) 30 MG capsule     Sig: Take 1 capsule by mouth Daily for 7 days, THEN 2 capsules Daily for 23 days.     Dispense:  53 capsule     Refill:  0     FOLLOW UP:  Return in about 4 weeks (around 7/28/2025) for Recheck.           This document has been electronically signed by CYNTHIA Love  June 30, 2025 10:51 EDT    Please note that portions of this note were completed with a voice  recognition program. Efforts were made to edit dictation, but occasionally words are mistranscribed.

## 2025-07-24 ENCOUNTER — HOSPITAL ENCOUNTER (OUTPATIENT)
Dept: GENERAL RADIOLOGY | Facility: HOSPITAL | Age: 70
Discharge: HOME | End: 2025-07-24
Payer: MEDICARE

## 2025-07-24 ENCOUNTER — HOSPITAL ENCOUNTER (OUTPATIENT)
Facility: HOSPITAL | Age: 70
Discharge: HOME | End: 2025-07-24
Payer: MEDICARE

## 2025-07-24 DIAGNOSIS — R31.1 BENIGN ESSENTIAL MICROSCOPIC HEMATURIA: ICD-10-CM

## 2025-07-24 LAB — URN SPEC COLLECT METH UR: NORMAL

## 2025-07-24 PROCEDURE — 74018 RADEX ABDOMEN 1 VIEW: CPT

## 2025-07-31 ENCOUNTER — HOSPITAL ENCOUNTER (OUTPATIENT)
Dept: CT IMAGING | Facility: HOSPITAL | Age: 70
Discharge: HOME | End: 2025-07-31
Payer: MEDICARE

## 2025-07-31 DIAGNOSIS — S09.90XA INJURIES TO THE HEAD, INITIAL ENCOUNTER: ICD-10-CM

## 2025-07-31 DIAGNOSIS — R51.9 BAD HEADACHE: ICD-10-CM

## 2025-07-31 PROCEDURE — 70450 CT HEAD/BRAIN W/O DYE: CPT

## 2025-08-06 DIAGNOSIS — F33.1 MODERATE EPISODE OF RECURRENT MAJOR DEPRESSIVE DISORDER: ICD-10-CM

## 2025-08-06 DIAGNOSIS — F41.1 GENERALIZED ANXIETY DISORDER: ICD-10-CM

## 2025-08-06 RX ORDER — DULOXETIN HYDROCHLORIDE 60 MG/1
60 CAPSULE, DELAYED RELEASE ORAL DAILY
Qty: 30 CAPSULE | Refills: 2 | Status: SHIPPED | OUTPATIENT
Start: 2025-08-06

## 2025-08-29 ENCOUNTER — HOSPITAL ENCOUNTER (OUTPATIENT)
Facility: HOSPITAL | Age: 70
Discharge: HOME | End: 2025-08-29
Payer: MEDICARE

## 2025-08-29 PROCEDURE — 87086 URINE CULTURE/COLONY COUNT: CPT

## 2025-08-31 LAB — BACTERIA UR CULT: NORMAL

## 2025-09-01 ENCOUNTER — APPOINTMENT (OUTPATIENT)
Dept: CT IMAGING | Facility: HOSPITAL | Age: 70
DRG: 439 | End: 2025-09-01
Attending: EMERGENCY MEDICINE
Payer: MEDICARE

## 2025-09-01 ENCOUNTER — HOSPITAL ENCOUNTER (INPATIENT)
Facility: HOSPITAL | Age: 70
LOS: 2 days | Discharge: HOME | DRG: 439 | End: 2025-09-03
Attending: EMERGENCY MEDICINE | Admitting: INTERNAL MEDICINE
Payer: MEDICARE

## 2025-09-01 DIAGNOSIS — K85.00 IDIOPATHIC ACUTE PANCREATITIS WITHOUT INFECTION OR NECROSIS: Primary | ICD-10-CM

## 2025-09-01 PROBLEM — K85.90 PANCREATITIS, UNSPECIFIED PANCREATITIS TYPE: Status: ACTIVE | Noted: 2025-09-01

## 2025-09-01 LAB
ALBUMIN SERPL-MCNC: 3.5 G/DL (ref 3.4–4.8)
ALBUMIN/GLOB SERPL: 1.3 {RATIO} (ref 0.8–2)
ALP SERPL-CCNC: 170 U/L (ref 25–100)
ALT SERPL-CCNC: 101 U/L (ref 4–36)
ANION GAP SERPL CALCULATED.3IONS-SCNC: 11 MMOL/L (ref 3–16)
AST SERPL-CCNC: 123 U/L (ref 8–33)
BACTERIA URNS QL MICRO: ABNORMAL /HPF
BASOPHILS # BLD: 0 K/UL (ref 0–0.1)
BASOPHILS NFR BLD: 0.6 %
BILIRUB SERPL-MCNC: 2.2 MG/DL (ref 0.3–1.2)
BILIRUB UR QL STRIP.AUTO: ABNORMAL
BUN SERPL-MCNC: 16 MG/DL (ref 6–20)
CALCIUM SERPL-MCNC: 9.1 MG/DL (ref 8.3–10.6)
CHLORIDE SERPL-SCNC: 104 MMOL/L (ref 98–107)
CLARITY UR: CLEAR
CO2 SERPL-SCNC: 22 MMOL/L (ref 20–30)
COLOR UR: ABNORMAL
CREAT SERPL-MCNC: 0.9 MG/DL (ref 0.6–1.2)
EOSINOPHIL # BLD: 0.3 K/UL (ref 0–0.4)
EOSINOPHIL NFR BLD: 4.2 %
EPI CELLS #/AREA URNS HPF: ABNORMAL /HPF (ref 0–5)
ERYTHROCYTE [DISTWIDTH] IN BLOOD BY AUTOMATED COUNT: 14.6 % (ref 11–16)
GFR SERPLBLD CREATININE-BSD FMLA CKD-EPI: 70 ML/MIN/{1.73_M2}
GLOBULIN SER CALC-MCNC: 2.8 G/DL
GLUCOSE SERPL-MCNC: 192 MG/DL (ref 74–106)
GLUCOSE UR STRIP.AUTO-MCNC: 100 MG/DL
HBA1C MFR BLD: 5.8 %
HCT VFR BLD AUTO: 32.6 % (ref 37–47)
HGB BLD-MCNC: 10 G/DL (ref 11.5–16.5)
HGB UR QL STRIP.AUTO: NEGATIVE
IMM GRANULOCYTES # BLD: 0 K/UL
IMM GRANULOCYTES NFR BLD: 0.4 % (ref 0–5)
KETONES UR STRIP.AUTO-MCNC: NEGATIVE MG/DL
LEUKOCYTE ESTERASE UR QL STRIP.AUTO: NEGATIVE
LIPASE SERPL-CCNC: 603 U/L (ref 5.6–51.3)
LYMPHOCYTES # BLD: 0.2 K/UL (ref 1.5–4)
LYMPHOCYTES NFR BLD: 2.3 %
MCH RBC QN AUTO: 27.5 PG (ref 27–32)
MCHC RBC AUTO-ENTMCNC: 30.7 G/DL (ref 31–35)
MCV RBC AUTO: 89.8 FL (ref 80–100)
MONOCYTES # BLD: 0.2 K/UL (ref 0.2–0.8)
MONOCYTES NFR BLD: 2.5 %
NEUTROPHILS # BLD: 6.4 K/UL (ref 2–7.5)
NEUTS SEG NFR BLD: 90 %
NITRITE UR QL STRIP.AUTO: NEGATIVE
PH UR STRIP.AUTO: 7 [PH] (ref 5–8)
PLATELET # BLD AUTO: 213 K/UL (ref 150–400)
PMV BLD AUTO: 10.3 FL (ref 6–10)
POTASSIUM SERPL-SCNC: 4 MMOL/L (ref 3.4–5.1)
PROT SERPL-MCNC: 6.3 G/DL (ref 6.4–8.3)
PROT UR STRIP.AUTO-MCNC: 30 MG/DL
RBC # BLD AUTO: 3.63 M/UL (ref 3.8–5.8)
RBC #/AREA URNS HPF: ABNORMAL /HPF (ref 0–4)
SODIUM SERPL-SCNC: 137 MMOL/L (ref 136–145)
SP GR UR STRIP.AUTO: 1.02 (ref 1–1.03)
TRIGL SERPL-MCNC: 55 MG/DL (ref 0–249)
UA COMPLETE W REFLEX CULTURE PNL UR: ABNORMAL
UA DIPSTICK W REFLEX MICRO PNL UR: YES
URN SPEC COLLECT METH UR: ABNORMAL
UROBILINOGEN UR STRIP-ACNC: >=8 E.U./DL
WBC # BLD AUTO: 7.1 K/UL (ref 4–11)
WBC #/AREA URNS HPF: ABNORMAL /HPF (ref 0–5)

## 2025-09-01 PROCEDURE — 6360000002 HC RX W HCPCS: Performed by: EMERGENCY MEDICINE

## 2025-09-01 PROCEDURE — 6360000004 HC RX CONTRAST MEDICATION: Performed by: EMERGENCY MEDICINE

## 2025-09-01 PROCEDURE — 80053 COMPREHEN METABOLIC PANEL: CPT

## 2025-09-01 PROCEDURE — 2580000003 HC RX 258: Performed by: PHYSICIAN ASSISTANT

## 2025-09-01 PROCEDURE — 84478 ASSAY OF TRIGLYCERIDES: CPT

## 2025-09-01 PROCEDURE — 81001 URINALYSIS AUTO W/SCOPE: CPT

## 2025-09-01 PROCEDURE — 6360000002 HC RX W HCPCS: Performed by: PHYSICIAN ASSISTANT

## 2025-09-01 PROCEDURE — 6370000000 HC RX 637 (ALT 250 FOR IP): Performed by: PHYSICIAN ASSISTANT

## 2025-09-01 PROCEDURE — 83690 ASSAY OF LIPASE: CPT

## 2025-09-01 PROCEDURE — 83036 HEMOGLOBIN GLYCOSYLATED A1C: CPT

## 2025-09-01 PROCEDURE — 36415 COLL VENOUS BLD VENIPUNCTURE: CPT

## 2025-09-01 PROCEDURE — 1200000000 HC SEMI PRIVATE

## 2025-09-01 PROCEDURE — 74177 CT ABD & PELVIS W/CONTRAST: CPT

## 2025-09-01 PROCEDURE — 85025 COMPLETE CBC W/AUTO DIFF WBC: CPT

## 2025-09-01 PROCEDURE — 99285 EMERGENCY DEPT VISIT HI MDM: CPT

## 2025-09-01 PROCEDURE — 96374 THER/PROPH/DIAG INJ IV PUSH: CPT

## 2025-09-01 RX ORDER — ONDANSETRON 2 MG/ML
4 INJECTION INTRAMUSCULAR; INTRAVENOUS EVERY 6 HOURS PRN
Status: DISCONTINUED | OUTPATIENT
Start: 2025-09-01 | End: 2025-09-03 | Stop reason: HOSPADM

## 2025-09-01 RX ORDER — ONDANSETRON 2 MG/ML
4 INJECTION INTRAMUSCULAR; INTRAVENOUS ONCE
Status: COMPLETED | OUTPATIENT
Start: 2025-09-01 | End: 2025-09-01

## 2025-09-01 RX ORDER — MORPHINE SULFATE 2 MG/ML
INJECTION, SOLUTION INTRAMUSCULAR; INTRAVENOUS
Status: DISPENSED
Start: 2025-09-01 | End: 2025-09-02

## 2025-09-01 RX ORDER — ACETAMINOPHEN 650 MG/1
650 SUPPOSITORY RECTAL EVERY 6 HOURS PRN
Status: DISCONTINUED | OUTPATIENT
Start: 2025-09-01 | End: 2025-09-03 | Stop reason: HOSPADM

## 2025-09-01 RX ORDER — SODIUM CHLORIDE, SODIUM LACTATE, POTASSIUM CHLORIDE, CALCIUM CHLORIDE 600; 310; 30; 20 MG/100ML; MG/100ML; MG/100ML; MG/100ML
INJECTION, SOLUTION INTRAVENOUS CONTINUOUS
Status: ACTIVE | OUTPATIENT
Start: 2025-09-01 | End: 2025-09-02

## 2025-09-01 RX ORDER — MECOBALAMIN 5000 MCG
10 TABLET,DISINTEGRATING ORAL NIGHTLY PRN
Status: DISCONTINUED | OUTPATIENT
Start: 2025-09-01 | End: 2025-09-03 | Stop reason: HOSPADM

## 2025-09-01 RX ORDER — ONDANSETRON 4 MG/1
4 TABLET, ORALLY DISINTEGRATING ORAL EVERY 8 HOURS PRN
Status: DISCONTINUED | OUTPATIENT
Start: 2025-09-01 | End: 2025-09-03 | Stop reason: HOSPADM

## 2025-09-01 RX ORDER — ENOXAPARIN SODIUM 100 MG/ML
40 INJECTION SUBCUTANEOUS DAILY
Status: DISCONTINUED | OUTPATIENT
Start: 2025-09-01 | End: 2025-09-02

## 2025-09-01 RX ORDER — MORPHINE SULFATE 2 MG/ML
2 INJECTION, SOLUTION INTRAMUSCULAR; INTRAVENOUS EVERY 4 HOURS PRN
Status: DISCONTINUED | OUTPATIENT
Start: 2025-09-01 | End: 2025-09-03 | Stop reason: HOSPADM

## 2025-09-01 RX ORDER — ACETAMINOPHEN 325 MG/1
650 TABLET ORAL EVERY 6 HOURS PRN
Status: DISCONTINUED | OUTPATIENT
Start: 2025-09-01 | End: 2025-09-03 | Stop reason: HOSPADM

## 2025-09-01 RX ORDER — POLYETHYLENE GLYCOL 3350 17 G/17G
17 POWDER, FOR SOLUTION ORAL DAILY PRN
Status: DISCONTINUED | OUTPATIENT
Start: 2025-09-01 | End: 2025-09-03 | Stop reason: HOSPADM

## 2025-09-01 RX ORDER — IOPAMIDOL 755 MG/ML
100 INJECTION, SOLUTION INTRAVASCULAR
Status: COMPLETED | OUTPATIENT
Start: 2025-09-01 | End: 2025-09-01

## 2025-09-01 RX ADMIN — ONDANSETRON 4 MG: 2 INJECTION, SOLUTION INTRAMUSCULAR; INTRAVENOUS at 11:01

## 2025-09-01 RX ADMIN — ACETAMINOPHEN 650 MG: 325 TABLET, FILM COATED ORAL at 17:01

## 2025-09-01 RX ADMIN — Medication 10 MG: at 22:47

## 2025-09-01 RX ADMIN — IOPAMIDOL 100 ML: 755 INJECTION, SOLUTION INTRAVENOUS at 12:17

## 2025-09-01 RX ADMIN — ENOXAPARIN SODIUM 40 MG: 100 INJECTION SUBCUTANEOUS at 17:01

## 2025-09-01 RX ADMIN — MORPHINE SULFATE 2 MG: 2 INJECTION, SOLUTION INTRAMUSCULAR; INTRAVENOUS at 22:52

## 2025-09-01 RX ADMIN — SODIUM CHLORIDE, SODIUM LACTATE, POTASSIUM CHLORIDE, AND CALCIUM CHLORIDE: .6; .31; .03; .02 INJECTION, SOLUTION INTRAVENOUS at 17:03

## 2025-09-01 RX ADMIN — MORPHINE SULFATE 2 MG: 2 INJECTION, SOLUTION INTRAMUSCULAR; INTRAVENOUS at 17:01

## 2025-09-01 SDOH — ECONOMIC STABILITY: INCOME INSECURITY: HOW HARD IS IT FOR YOU TO PAY FOR THE VERY BASICS LIKE FOOD, HOUSING, MEDICAL CARE, AND HEATING?: NOT HARD AT ALL

## 2025-09-01 SDOH — ECONOMIC STABILITY: FOOD INSECURITY: WITHIN THE PAST 12 MONTHS, YOU WORRIED THAT YOUR FOOD WOULD RUN OUT BEFORE YOU GOT MONEY TO BUY MORE.: NEVER TRUE

## 2025-09-01 SDOH — ECONOMIC STABILITY: INCOME INSECURITY: IN THE PAST 12 MONTHS, HAS THE ELECTRIC, GAS, OIL, OR WATER COMPANY THREATENED TO SHUT OFF SERVICE IN YOUR HOME?: NO

## 2025-09-01 ASSESSMENT — PAIN SCALES - GENERAL
PAINLEVEL_OUTOF10: 7
PAINLEVEL_OUTOF10: 8
PAINLEVEL_OUTOF10: 8
PAINLEVEL_OUTOF10: 0

## 2025-09-01 ASSESSMENT — PAIN DESCRIPTION - ORIENTATION: ORIENTATION: RIGHT

## 2025-09-01 ASSESSMENT — PAIN - FUNCTIONAL ASSESSMENT
PAIN_FUNCTIONAL_ASSESSMENT: 0-10

## 2025-09-01 ASSESSMENT — PATIENT HEALTH QUESTIONNAIRE - PHQ9
SUM OF ALL RESPONSES TO PHQ QUESTIONS 1-9: 0
1. LITTLE INTEREST OR PLEASURE IN DOING THINGS: NOT AT ALL
SUM OF ALL RESPONSES TO PHQ QUESTIONS 1-9: 0
2. FEELING DOWN, DEPRESSED OR HOPELESS: NOT AT ALL
SUM OF ALL RESPONSES TO PHQ QUESTIONS 1-9: 0
SUM OF ALL RESPONSES TO PHQ QUESTIONS 1-9: 0

## 2025-09-01 ASSESSMENT — PAIN DESCRIPTION - LOCATION
LOCATION: FLANK;GROIN
LOCATION: ABDOMEN

## 2025-09-01 ASSESSMENT — PAIN DESCRIPTION - FREQUENCY: FREQUENCY: CONTINUOUS

## 2025-09-01 ASSESSMENT — PAIN DESCRIPTION - PAIN TYPE: TYPE: ACUTE PAIN

## 2025-09-02 PROBLEM — E80.4 GILBERT'S SYNDROME: Status: ACTIVE | Noted: 2025-09-02

## 2025-09-02 PROBLEM — Z85.3 HISTORY OF BREAST CANCER: Status: ACTIVE | Noted: 2025-09-02

## 2025-09-02 PROBLEM — E11.9 DIABETES MELLITUS TYPE 2, CONTROLLED (HCC): Status: ACTIVE | Noted: 2025-09-02

## 2025-09-02 PROBLEM — I50.32 CHRONIC DIASTOLIC HEART FAILURE (HCC): Status: ACTIVE | Noted: 2019-02-19

## 2025-09-02 PROBLEM — I48.91 ATRIAL FIBRILLATION (HCC): Status: ACTIVE | Noted: 2025-09-02

## 2025-09-02 PROBLEM — R74.01 TRANSAMINITIS: Status: ACTIVE | Noted: 2025-09-02

## 2025-09-02 PROBLEM — E80.6 HYPERBILIRUBINEMIA: Status: ACTIVE | Noted: 2025-09-02

## 2025-09-02 PROBLEM — J01.10 ACUTE FRONTAL SINUSITIS: Status: ACTIVE | Noted: 2025-09-02

## 2025-09-02 LAB
ALBUMIN SERPL-MCNC: 3.3 G/DL (ref 3.4–4.8)
ALBUMIN/GLOB SERPL: 1.2 {RATIO} (ref 0.8–2)
ALP SERPL-CCNC: 164 U/L (ref 25–100)
ALT SERPL-CCNC: 71 U/L (ref 4–36)
ANION GAP SERPL CALCULATED.3IONS-SCNC: 12 MMOL/L (ref 3–16)
AST SERPL-CCNC: 61 U/L (ref 8–33)
BILIRUB SERPL-MCNC: 2 MG/DL (ref 0.3–1.2)
BUN SERPL-MCNC: 11 MG/DL (ref 6–20)
CALCIUM SERPL-MCNC: 9 MG/DL (ref 8.3–10.6)
CHLORIDE SERPL-SCNC: 104 MMOL/L (ref 98–107)
CO2 SERPL-SCNC: 22 MMOL/L (ref 20–30)
CREAT SERPL-MCNC: 0.8 MG/DL (ref 0.6–1.2)
ERYTHROCYTE [DISTWIDTH] IN BLOOD BY AUTOMATED COUNT: 14.6 % (ref 11–16)
FOLATE SERPL-MCNC: 7.98 NG/ML
GFR SERPLBLD CREATININE-BSD FMLA CKD-EPI: 80 ML/MIN/{1.73_M2}
GLOBULIN SER CALC-MCNC: 2.7 G/DL
GLUCOSE SERPL-MCNC: 96 MG/DL (ref 74–106)
HAV IGM SERPL QL IA: NORMAL
HBV CORE IGM SERPL QL IA: NORMAL
HBV SURFACE AG SERPL QL IA: NORMAL
HCT VFR BLD AUTO: 31.4 % (ref 37–47)
HCV AB SERPL QL IA: NORMAL
HGB BLD-MCNC: 9.7 G/DL (ref 11.5–16.5)
IRON SATN MFR SERPL: 15 % (ref 15–50)
IRON SERPL-MCNC: 45 UG/DL (ref 37–145)
LIPASE SERPL-CCNC: 761 U/L (ref 5.6–51.3)
MCH RBC QN AUTO: 28 PG (ref 27–32)
MCHC RBC AUTO-ENTMCNC: 30.9 G/DL (ref 31–35)
MCV RBC AUTO: 90.8 FL (ref 80–100)
PLATELET # BLD AUTO: 200 K/UL (ref 150–400)
PMV BLD AUTO: 10.2 FL (ref 6–10)
POTASSIUM SERPL-SCNC: 3.9 MMOL/L (ref 3.4–5.1)
PROT SERPL-MCNC: 6 G/DL (ref 6.4–8.3)
RBC # BLD AUTO: 3.46 M/UL (ref 3.8–5.8)
SODIUM SERPL-SCNC: 138 MMOL/L (ref 136–145)
T4 FREE SERPL-MCNC: 0.9 NG/DL (ref 0.92–1.68)
TIBC SERPL-MCNC: 298 UG/DL (ref 250–450)
TSH SERPL-MCNC: 8.05 UIU/ML (ref 0.27–4.2)
VIT B12 SERPL-MCNC: 296 PG/ML (ref 211–911)
WBC # BLD AUTO: 3.7 K/UL (ref 4–11)

## 2025-09-02 PROCEDURE — 2580000003 HC RX 258: Performed by: PHYSICIAN ASSISTANT

## 2025-09-02 PROCEDURE — 97161 PT EVAL LOW COMPLEX 20 MIN: CPT

## 2025-09-02 PROCEDURE — 97165 OT EVAL LOW COMPLEX 30 MIN: CPT

## 2025-09-02 PROCEDURE — 82746 ASSAY OF FOLIC ACID SERUM: CPT

## 2025-09-02 PROCEDURE — 84439 ASSAY OF FREE THYROXINE: CPT

## 2025-09-02 PROCEDURE — 83550 IRON BINDING TEST: CPT

## 2025-09-02 PROCEDURE — 83540 ASSAY OF IRON: CPT

## 2025-09-02 PROCEDURE — 1200000000 HC SEMI PRIVATE

## 2025-09-02 PROCEDURE — 94640 AIRWAY INHALATION TREATMENT: CPT

## 2025-09-02 PROCEDURE — 6360000002 HC RX W HCPCS: Performed by: PHYSICIAN ASSISTANT

## 2025-09-02 PROCEDURE — 85027 COMPLETE CBC AUTOMATED: CPT

## 2025-09-02 PROCEDURE — 83690 ASSAY OF LIPASE: CPT

## 2025-09-02 PROCEDURE — 84443 ASSAY THYROID STIM HORMONE: CPT

## 2025-09-02 PROCEDURE — 99223 1ST HOSP IP/OBS HIGH 75: CPT | Performed by: INTERNAL MEDICINE

## 2025-09-02 PROCEDURE — 82607 VITAMIN B-12: CPT

## 2025-09-02 PROCEDURE — 36415 COLL VENOUS BLD VENIPUNCTURE: CPT

## 2025-09-02 PROCEDURE — 80053 COMPREHEN METABOLIC PANEL: CPT

## 2025-09-02 PROCEDURE — 6370000000 HC RX 637 (ALT 250 FOR IP): Performed by: PHYSICIAN ASSISTANT

## 2025-09-02 PROCEDURE — 80074 ACUTE HEPATITIS PANEL: CPT

## 2025-09-02 RX ORDER — SEMAGLUTIDE 2.68 MG/ML
2 INJECTION, SOLUTION SUBCUTANEOUS
Status: ON HOLD | COMMUNITY
End: 2025-09-03 | Stop reason: HOSPADM

## 2025-09-02 RX ORDER — ASPIRIN 81 MG/1
81 TABLET ORAL DAILY
Status: DISCONTINUED | OUTPATIENT
Start: 2025-09-02 | End: 2025-09-03 | Stop reason: HOSPADM

## 2025-09-02 RX ORDER — GUAIFENESIN 600 MG/1
600 TABLET, EXTENDED RELEASE ORAL 2 TIMES DAILY
Status: DISCONTINUED | OUTPATIENT
Start: 2025-09-02 | End: 2025-09-03 | Stop reason: HOSPADM

## 2025-09-02 RX ORDER — CELECOXIB 200 MG/1
200 CAPSULE ORAL DAILY
COMMUNITY

## 2025-09-02 RX ORDER — DULOXETIN HYDROCHLORIDE 60 MG/1
60 CAPSULE, DELAYED RELEASE ORAL DAILY
COMMUNITY

## 2025-09-02 RX ORDER — METOPROLOL SUCCINATE 25 MG/1
25 TABLET, EXTENDED RELEASE ORAL DAILY
Status: DISCONTINUED | OUTPATIENT
Start: 2025-09-02 | End: 2025-09-03 | Stop reason: HOSPADM

## 2025-09-02 RX ORDER — EXEMESTANE 25 MG/1
25 TABLET ORAL DAILY
COMMUNITY

## 2025-09-02 RX ORDER — PRAMIPEXOLE DIHYDROCHLORIDE 1 MG/1
1 TABLET ORAL NIGHTLY
Status: DISCONTINUED | OUTPATIENT
Start: 2025-09-02 | End: 2025-09-03 | Stop reason: HOSPADM

## 2025-09-02 RX ORDER — MULTIVIT-MIN/IRON/FOLIC ACID/K 18-600-40
1000 CAPSULE ORAL DAILY
COMMUNITY

## 2025-09-02 RX ORDER — NITROFURANTOIN 25; 75 MG/1; MG/1
100 CAPSULE ORAL 2 TIMES DAILY
Status: ON HOLD | COMMUNITY
Start: 2025-08-29 | End: 2025-09-03 | Stop reason: HOSPADM

## 2025-09-02 RX ORDER — LEVOTHYROXINE SODIUM 50 UG/1
50 TABLET ORAL DAILY
Status: DISCONTINUED | OUTPATIENT
Start: 2025-09-02 | End: 2025-09-03

## 2025-09-02 RX ORDER — ALBUTEROL SULFATE 0.83 MG/ML
2.5 SOLUTION RESPIRATORY (INHALATION) EVERY 6 HOURS PRN
Status: DISCONTINUED | OUTPATIENT
Start: 2025-09-02 | End: 2025-09-03 | Stop reason: HOSPADM

## 2025-09-02 RX ORDER — PANTOPRAZOLE SODIUM 40 MG/1
40 TABLET, DELAYED RELEASE ORAL
Status: DISCONTINUED | OUTPATIENT
Start: 2025-09-03 | End: 2025-09-03 | Stop reason: HOSPADM

## 2025-09-02 RX ORDER — FLUTICASONE PROPIONATE 50 MCG
1 SPRAY, SUSPENSION (ML) NASAL DAILY
Status: DISCONTINUED | OUTPATIENT
Start: 2025-09-02 | End: 2025-09-03 | Stop reason: HOSPADM

## 2025-09-02 RX ORDER — MONTELUKAST SODIUM 10 MG/1
10 TABLET ORAL DAILY
Status: DISCONTINUED | OUTPATIENT
Start: 2025-09-02 | End: 2025-09-03 | Stop reason: HOSPADM

## 2025-09-02 RX ADMIN — MONTELUKAST SODIUM 10 MG: 10 TABLET, COATED ORAL at 10:57

## 2025-09-02 RX ADMIN — POLYETHYLENE GLYCOL 3350 17 G: 17 POWDER, FOR SOLUTION ORAL at 08:36

## 2025-09-02 RX ADMIN — ARFORMOTEROL TARTRATE: 15 SOLUTION RESPIRATORY (INHALATION) at 16:47

## 2025-09-02 RX ADMIN — MORPHINE SULFATE 2 MG: 2 INJECTION, SOLUTION INTRAMUSCULAR; INTRAVENOUS at 21:23

## 2025-09-02 RX ADMIN — AMOXICILLIN AND CLAVULANATE POTASSIUM 1 TABLET: 875; 125 TABLET, FILM COATED ORAL at 10:57

## 2025-09-02 RX ADMIN — APIXABAN 5 MG: 5 TABLET, FILM COATED ORAL at 21:22

## 2025-09-02 RX ADMIN — METOPROLOL SUCCINATE 25 MG: 25 TABLET, EXTENDED RELEASE ORAL at 10:55

## 2025-09-02 RX ADMIN — ARFORMOTEROL TARTRATE: 15 SOLUTION RESPIRATORY (INHALATION) at 12:00

## 2025-09-02 RX ADMIN — SODIUM CHLORIDE, SODIUM LACTATE, POTASSIUM CHLORIDE, AND CALCIUM CHLORIDE: .6; .31; .03; .02 INJECTION, SOLUTION INTRAVENOUS at 08:35

## 2025-09-02 RX ADMIN — APIXABAN 5 MG: 5 TABLET, FILM COATED ORAL at 10:57

## 2025-09-02 RX ADMIN — LEVOTHYROXINE SODIUM 50 MCG: 0.05 TABLET ORAL at 10:57

## 2025-09-02 RX ADMIN — PRAMIPEXOLE DIHYDROCHLORIDE 1 MG: 1 TABLET ORAL at 21:22

## 2025-09-02 RX ADMIN — GUAIFENESIN 600 MG: 600 TABLET, EXTENDED RELEASE ORAL at 21:22

## 2025-09-02 RX ADMIN — FLUTICASONE PROPIONATE 1 SPRAY: 50 SPRAY, METERED NASAL at 10:57

## 2025-09-02 RX ADMIN — GUAIFENESIN 600 MG: 600 TABLET, EXTENDED RELEASE ORAL at 12:43

## 2025-09-02 RX ADMIN — MORPHINE SULFATE 2 MG: 2 INJECTION, SOLUTION INTRAMUSCULAR; INTRAVENOUS at 14:53

## 2025-09-02 RX ADMIN — AMOXICILLIN AND CLAVULANATE POTASSIUM 1 TABLET: 875; 125 TABLET, FILM COATED ORAL at 21:22

## 2025-09-02 RX ADMIN — MORPHINE SULFATE 2 MG: 2 INJECTION, SOLUTION INTRAMUSCULAR; INTRAVENOUS at 06:50

## 2025-09-02 RX ADMIN — ASPIRIN 81 MG: 81 TABLET, DELAYED RELEASE ORAL at 10:55

## 2025-09-02 RX ADMIN — Medication 10 MG: at 21:22

## 2025-09-02 ASSESSMENT — PAIN DESCRIPTION - LOCATION
LOCATION: ABDOMEN
LOCATION: ABDOMEN

## 2025-09-02 ASSESSMENT — PAIN - FUNCTIONAL ASSESSMENT
PAIN_FUNCTIONAL_ASSESSMENT: 0-10
PAIN_FUNCTIONAL_ASSESSMENT: 0-10

## 2025-09-02 ASSESSMENT — PAIN DESCRIPTION - DESCRIPTORS: DESCRIPTORS: ACHING;CRAMPING;DISCOMFORT

## 2025-09-03 VITALS
TEMPERATURE: 98.1 F | HEART RATE: 78 BPM | WEIGHT: 203.25 LBS | SYSTOLIC BLOOD PRESSURE: 123 MMHG | RESPIRATION RATE: 18 BRPM | DIASTOLIC BLOOD PRESSURE: 86 MMHG | BODY MASS INDEX: 38.37 KG/M2 | OXYGEN SATURATION: 95 % | HEIGHT: 61 IN

## 2025-09-03 PROBLEM — K85.00 IDIOPATHIC ACUTE PANCREATITIS WITHOUT INFECTION OR NECROSIS: Status: ACTIVE | Noted: 2025-09-03

## 2025-09-03 LAB
ALBUMIN SERPL-MCNC: 3.3 G/DL (ref 3.4–4.8)
ALBUMIN/GLOB SERPL: 1.2 {RATIO} (ref 0.8–2)
ALP SERPL-CCNC: 155 U/L (ref 25–100)
ALT SERPL-CCNC: 53 U/L (ref 4–36)
ANION GAP SERPL CALCULATED.3IONS-SCNC: 14 MMOL/L (ref 3–16)
AST SERPL-CCNC: 35 U/L (ref 8–33)
BILIRUB SERPL-MCNC: 1.2 MG/DL (ref 0.3–1.2)
BUN SERPL-MCNC: 9 MG/DL (ref 6–20)
CALCIUM SERPL-MCNC: 9 MG/DL (ref 8.3–10.6)
CHLORIDE SERPL-SCNC: 105 MMOL/L (ref 98–107)
CO2 SERPL-SCNC: 22 MMOL/L (ref 20–30)
CREAT SERPL-MCNC: 0.8 MG/DL (ref 0.6–1.2)
ERYTHROCYTE [DISTWIDTH] IN BLOOD BY AUTOMATED COUNT: 14.6 % (ref 11–16)
GFR SERPLBLD CREATININE-BSD FMLA CKD-EPI: 80 ML/MIN/{1.73_M2}
GLOBULIN SER CALC-MCNC: 2.7 G/DL
GLUCOSE SERPL-MCNC: 93 MG/DL (ref 74–106)
HCT VFR BLD AUTO: 30.9 % (ref 37–47)
HGB BLD-MCNC: 9.7 G/DL (ref 11.5–16.5)
MCH RBC QN AUTO: 28 PG (ref 27–32)
MCHC RBC AUTO-ENTMCNC: 31.4 G/DL (ref 31–35)
MCV RBC AUTO: 89 FL (ref 80–100)
PLATELET # BLD AUTO: 222 K/UL (ref 150–400)
PMV BLD AUTO: 10.7 FL (ref 6–10)
POTASSIUM SERPL-SCNC: 3.8 MMOL/L (ref 3.4–5.1)
PROT SERPL-MCNC: 6 G/DL (ref 6.4–8.3)
RBC # BLD AUTO: 3.47 M/UL (ref 3.8–5.8)
SODIUM SERPL-SCNC: 141 MMOL/L (ref 136–145)
WBC # BLD AUTO: 2.6 K/UL (ref 4–11)

## 2025-09-03 PROCEDURE — 99238 HOSP IP/OBS DSCHRG MGMT 30/<: CPT | Performed by: INTERNAL MEDICINE

## 2025-09-03 PROCEDURE — 6370000000 HC RX 637 (ALT 250 FOR IP): Performed by: PHYSICIAN ASSISTANT

## 2025-09-03 PROCEDURE — 36415 COLL VENOUS BLD VENIPUNCTURE: CPT

## 2025-09-03 PROCEDURE — 85027 COMPLETE CBC AUTOMATED: CPT

## 2025-09-03 PROCEDURE — 80053 COMPREHEN METABOLIC PANEL: CPT

## 2025-09-03 PROCEDURE — 94761 N-INVAS EAR/PLS OXIMETRY MLT: CPT

## 2025-09-03 RX ORDER — MULTIVITAMIN WITH IRON
500 TABLET ORAL DAILY
Qty: 30 TABLET | Refills: 3 | Status: SHIPPED | OUTPATIENT
Start: 2025-09-03 | End: 2026-09-03

## 2025-09-03 RX ORDER — GUAIFENESIN 600 MG/1
600 TABLET, EXTENDED RELEASE ORAL 2 TIMES DAILY
Qty: 14 TABLET | Refills: 0 | Status: SHIPPED | OUTPATIENT
Start: 2025-09-03

## 2025-09-03 RX ORDER — GUAIFENESIN 600 MG/1
600 TABLET, EXTENDED RELEASE ORAL 2 TIMES DAILY
Qty: 10 TABLET | Refills: 0 | OUTPATIENT
Start: 2025-09-03

## 2025-09-03 RX ORDER — POLYETHYLENE GLYCOL 3350 17 G/17G
17 POWDER, FOR SOLUTION ORAL DAILY PRN
Qty: 30 PACKET | Refills: 0 | Status: SHIPPED | OUTPATIENT
Start: 2025-09-03 | End: 2025-10-03

## 2025-09-03 RX ORDER — FERROUS SULFATE 325(65) MG
325 TABLET ORAL
Qty: 90 TABLET | Refills: 1 | Status: SHIPPED | OUTPATIENT
Start: 2025-09-03

## 2025-09-03 RX ORDER — LEVOTHYROXINE SODIUM 75 UG/1
75 TABLET ORAL DAILY
Status: DISCONTINUED | OUTPATIENT
Start: 2025-09-04 | End: 2025-09-03 | Stop reason: HOSPADM

## 2025-09-03 RX ORDER — CALCIUM CARBONATE 500 MG/1
500 TABLET, CHEWABLE ORAL ONCE
Status: COMPLETED | OUTPATIENT
Start: 2025-09-03 | End: 2025-09-03

## 2025-09-03 RX ORDER — LEVOTHYROXINE SODIUM 25 UG/1
25 TABLET ORAL DAILY
Status: DISCONTINUED | OUTPATIENT
Start: 2025-09-03 | End: 2025-09-03

## 2025-09-03 RX ORDER — FOLIC ACID 1 MG/1
1 TABLET ORAL DAILY
Qty: 30 TABLET | Refills: 0 | Status: SHIPPED | OUTPATIENT
Start: 2025-09-03

## 2025-09-03 RX ORDER — LEVOTHYROXINE SODIUM 75 UG/1
75 TABLET ORAL DAILY
Qty: 90 TABLET | Refills: 1 | Status: SHIPPED | OUTPATIENT
Start: 2025-09-03

## 2025-09-03 RX ADMIN — PANTOPRAZOLE SODIUM 40 MG: 40 TABLET, DELAYED RELEASE ORAL at 06:11

## 2025-09-03 RX ADMIN — CALCIUM CARBONATE 500 MG: 500 TABLET, CHEWABLE ORAL at 09:32

## 2025-09-03 RX ADMIN — GUAIFENESIN 600 MG: 600 TABLET, EXTENDED RELEASE ORAL at 08:25

## 2025-09-03 RX ADMIN — ASPIRIN 81 MG: 81 TABLET, DELAYED RELEASE ORAL at 08:26

## 2025-09-03 RX ADMIN — LEVOTHYROXINE SODIUM 50 MCG: 0.05 TABLET ORAL at 06:11

## 2025-09-03 RX ADMIN — APIXABAN 5 MG: 5 TABLET, FILM COATED ORAL at 08:25

## 2025-09-03 RX ADMIN — AMOXICILLIN AND CLAVULANATE POTASSIUM 1 TABLET: 875; 125 TABLET, FILM COATED ORAL at 08:25

## 2025-09-03 RX ADMIN — FLUTICASONE PROPIONATE 1 SPRAY: 50 SPRAY, METERED NASAL at 08:25

## 2025-09-03 RX ADMIN — METOPROLOL SUCCINATE 25 MG: 25 TABLET, EXTENDED RELEASE ORAL at 08:25

## 2025-09-03 RX ADMIN — MONTELUKAST SODIUM 10 MG: 10 TABLET, COATED ORAL at 08:25

## 2025-09-03 ASSESSMENT — PAIN SCALES - GENERAL: PAINLEVEL_OUTOF10: 0

## (undated) DEVICE — SENSR O2 OXIMAX FNGR A/ 18IN NONSTR

## (undated) DEVICE — ENDOPATH XCEL BLADELESS TROCARS WITH STABILITY SLEEVES: Brand: ENDOPATH XCEL

## (undated) DEVICE — DRN PENRS 1/2X18IN LTX

## (undated) DEVICE — MEDI-VAC YANKAUER SUCTION HANDLE W/BULBOUS TIP: Brand: CARDINAL HEALTH

## (undated) DEVICE — GLV SURG SIGNATURE TOUCH PF LTX 8.5 STRL

## (undated) DEVICE — TISSUE RETRIEVAL SYSTEM: Brand: INZII RETRIEVAL SYSTEM

## (undated) DEVICE — ENDOPATH XCEL UNIVERSAL TROCAR STABLILITY SLEEVES: Brand: ENDOPATH XCEL

## (undated) DEVICE — SUT MONOCRYL PLS ANTIB UND 3/0  PS1 27IN

## (undated) DEVICE — FLTR HME STR UNIV W/SMPL PORT

## (undated) DEVICE — SYS CLS PORTSITE CT CLOSESURE 5AND10/12

## (undated) DEVICE — SKIN AFFIX SURG ADHESIVE 72/CS 0.55ML: Brand: MEDLINE

## (undated) DEVICE — APL DUPLOSPRAYER MIS 40CM

## (undated) DEVICE — DEFOGGER!" ANTI FOG KIT: Brand: DEROYAL

## (undated) DEVICE — ECHELON FLEX POWERED PLUS LONG ARTICULATING ENDOSCOPIC LINEAR CUTTER, 60MM: Brand: ECHELON FLEX

## (undated) DEVICE — SOL LR 1000ML

## (undated) DEVICE — 50" SINGLE PATIENT USE HOVERMATT: Brand: SINGLE PATIENT USE HOVERMATT

## (undated) DEVICE — SUT SILK 0 SH 30IN K834H

## (undated) DEVICE — MEDI-VAC NON-CONDUCTIVE SUCTION TUBING: Brand: CARDINAL HEALTH

## (undated) DEVICE — CONTN GRAD MEAS TRIANG 32OZ BLK

## (undated) DEVICE — PK BARIATRIC 10

## (undated) DEVICE — [HIGH FLOW INSUFFLATOR,  DO NOT USE IF PACKAGE IS DAMAGED,  KEEP DRY,  KEEP AWAY FROM SUNLIGHT,  PROTECT FROM HEAT AND RADIOACTIVE SOURCES.]: Brand: PNEUMOSURE

## (undated) DEVICE — CANN NASL CO2 DIVIDED A/

## (undated) DEVICE — ADAPT ST INFUS ADMIN SYR 70IN

## (undated) DEVICE — TROCAR: Brand: KII FIOS FIRST ENTRY

## (undated) DEVICE — AIRWY SZ11

## (undated) DEVICE — HARMONIC HD 1000I SHEARS, 36CM SHAFT LENGTH: Brand: HARMONIC

## (undated) DEVICE — FLTR PLUMEPORT LAP W/CONN STRL

## (undated) DEVICE — ENCORE® LATEX MICRO SIZE 8, STERILE LATEX POWDER-FREE SURGICAL GLOVE: Brand: ENCORE